# Patient Record
Sex: FEMALE | Race: WHITE | NOT HISPANIC OR LATINO | Employment: FULL TIME | URBAN - METROPOLITAN AREA
[De-identification: names, ages, dates, MRNs, and addresses within clinical notes are randomized per-mention and may not be internally consistent; named-entity substitution may affect disease eponyms.]

---

## 2017-05-23 ENCOUNTER — HOSPITAL ENCOUNTER (EMERGENCY)
Facility: HOSPITAL | Age: 52
Discharge: HOME | End: 2017-05-24
Attending: EMERGENCY MEDICINE
Payer: COMMERCIAL

## 2017-05-23 DIAGNOSIS — F41.0 ANXIETY ATTACK: Primary | ICD-10-CM

## 2017-05-23 PROCEDURE — 63600175 PHARM REV CODE 636 W HCPCS: Performed by: EMERGENCY MEDICINE

## 2017-05-23 PROCEDURE — 96372 THER/PROPH/DIAG INJ SC/IM: CPT

## 2017-05-23 PROCEDURE — 25000003 PHARM REV CODE 250: Performed by: EMERGENCY MEDICINE

## 2017-05-23 PROCEDURE — 99283 EMERGENCY DEPT VISIT LOW MDM: CPT | Mod: 25

## 2017-05-23 RX ORDER — ESTRADIOL 0.1 MG/D
1 PATCH TRANSDERMAL
COMMUNITY
End: 2019-10-30

## 2017-05-23 RX ORDER — DEXAMETHASONE SODIUM PHOSPHATE 4 MG/ML
8 INJECTION, SOLUTION INTRA-ARTICULAR; INTRALESIONAL; INTRAMUSCULAR; INTRAVENOUS; SOFT TISSUE
Status: COMPLETED | OUTPATIENT
Start: 2017-05-23 | End: 2017-05-23

## 2017-05-23 RX ORDER — BUTALBITAL, ACETAMINOPHEN AND CAFFEINE 50; 325; 40 MG/1; MG/1; MG/1
2 TABLET ORAL
Status: COMPLETED | OUTPATIENT
Start: 2017-05-23 | End: 2017-05-23

## 2017-05-23 RX ADMIN — BUTALBITAL, ACETAMINOPHEN, AND CAFFEINE 2 TABLET: 50; 325; 40 TABLET ORAL at 11:05

## 2017-05-23 RX ADMIN — DEXAMETHASONE SODIUM PHOSPHATE 8 MG: 4 INJECTION, SOLUTION INTRAMUSCULAR; INTRAVENOUS at 11:05

## 2017-05-24 VITALS
SYSTOLIC BLOOD PRESSURE: 129 MMHG | HEART RATE: 91 BPM | RESPIRATION RATE: 18 BRPM | BODY MASS INDEX: 28.32 KG/M2 | HEIGHT: 61 IN | DIASTOLIC BLOOD PRESSURE: 98 MMHG | OXYGEN SATURATION: 98 % | WEIGHT: 150 LBS | TEMPERATURE: 97 F

## 2017-05-24 PROCEDURE — 25000003 PHARM REV CODE 250: Performed by: EMERGENCY MEDICINE

## 2017-05-24 RX ORDER — PREGABALIN 100 MG/1
100 CAPSULE ORAL 2 TIMES DAILY
Qty: 60 CAPSULE | Refills: 6 | Status: SHIPPED | OUTPATIENT
Start: 2017-05-24 | End: 2017-05-24 | Stop reason: DRUGHIGH

## 2017-05-24 RX ORDER — ALPRAZOLAM 0.25 MG/1
0.5 TABLET ORAL
Status: COMPLETED | OUTPATIENT
Start: 2017-05-24 | End: 2017-05-24

## 2017-05-24 RX ORDER — PREGABALIN 150 MG/1
150 CAPSULE ORAL 2 TIMES DAILY
Qty: 60 CAPSULE | Refills: 6 | Status: SHIPPED | OUTPATIENT
Start: 2017-05-24 | End: 2019-06-07

## 2017-05-24 RX ADMIN — ALPRAZOLAM 0.5 MG: 0.25 TABLET ORAL at 12:05

## 2017-05-24 NOTE — ED PROVIDER NOTES
Encounter Date: 2017       History     Chief Complaint   Patient presents with    Weakness     Pt c/o generalized pain to total body ans restless. Pt has a hx of lupus and fibromyalgia     Review of patient's allergies indicates:   Allergen Reactions    Erythromycin Other (See Comments)    Other      Other reaction(s): Angioedema, carrots    Zofran (as hydrochloride) [ondansetron hcl] Hives     Local hive after IV injection     The history is provided by the patient.   Mental Health Problem   The primary symptoms include dysphoric mood. The current episode started today. This is a recurrent problem.   The degree of incapacity that she is experiencing as a consequence of her illness is mild. Additional symptoms of the illness include insomnia. Additional symptoms of the illness do not include no anhedonia, no hypersomnia, no appetite change, no unexpected weight change, no feelings of worthlessness or no increased goal-directed activity. She does not admit to suicidal ideas. She does not have a plan to commit suicide. She does not contemplate harming herself. She has not already injured self. She does not contemplate injuring another person. She has not already  injured another person.     Past Medical History:   Diagnosis Date    Anxiety     Asthma     Chronic constipation     Chronic insomnia     Edema     Fibromyalgia     Interstitial cystitis     Migraine headache     Restless legs syndrome     TIA (transient ischemic attack)      Past Surgical History:   Procedure Laterality Date    BLADDER SURGERY       SECTION      CHOLECYSTECTOMY      gastric sleeve      HYSTERECTOMY      LASER ABLATION      to back    TUBAL LIGATION       Family History   Problem Relation Age of Onset    Hypertension Mother     Hypertension Brother     Breast cancer Maternal Aunt      x2      Social History   Substance Use Topics    Smoking status: Never Smoker    Smokeless tobacco: Not on file     Alcohol use Yes      Comment: occasionally     Review of Systems   Constitutional: Negative for appetite change and unexpected weight change.   Psychiatric/Behavioral: Positive for dysphoric mood. The patient has insomnia.    All other systems reviewed and are negative.      Physical Exam     Initial Vitals   BP Pulse Resp Temp SpO2   -- -- -- -- --     Physical Exam    Nursing note and vitals reviewed.  Constitutional: She appears well-developed and well-nourished.   HENT:   Head: Normocephalic and atraumatic.   Eyes: EOM are normal.   Neck: Normal range of motion. Neck supple.   Cardiovascular: Normal rate, regular rhythm, normal heart sounds and intact distal pulses.   Pulmonary/Chest: Breath sounds normal.   Abdominal: Soft.   Musculoskeletal: Normal range of motion.   Neurological: She is alert and oriented to person, place, and time.   Skin: Skin is warm and dry. Capillary refill takes less than 2 seconds.   Psychiatric: She has a normal mood and affect. Her behavior is normal. Judgment and thought content normal.         ED Course   Procedures  Labs Reviewed - No data to display          Medical Decision Making:   ED Management:  Patient was given Decadron IM.  Sent for headache and Xanax for anxiety.  Patient is requesting Dilaudid and says that she takes for her pain.  Explained to her that that is not the treatment for fibromyalgia and I will treat her according to evidence based medicine.                   ED Course     Clinical Impression:   The encounter diagnosis was Anxiety attack.    Disposition:   Disposition: Discharged  Condition: Stable       Isis Garza MD  05/24/17 0037

## 2017-06-24 ENCOUNTER — HOSPITAL ENCOUNTER (EMERGENCY)
Facility: HOSPITAL | Age: 52
Discharge: HOME | End: 2017-06-24
Attending: EMERGENCY MEDICINE
Payer: COMMERCIAL

## 2017-06-24 VITALS
SYSTOLIC BLOOD PRESSURE: 139 MMHG | OXYGEN SATURATION: 98 % | RESPIRATION RATE: 16 BRPM | TEMPERATURE: 98 F | HEART RATE: 75 BPM | DIASTOLIC BLOOD PRESSURE: 82 MMHG | WEIGHT: 145 LBS | HEIGHT: 62 IN | BODY MASS INDEX: 26.68 KG/M2

## 2017-06-24 DIAGNOSIS — R51.9 ACUTE NONINTRACTABLE HEADACHE, UNSPECIFIED HEADACHE TYPE: Primary | ICD-10-CM

## 2017-06-24 LAB
ALBUMIN SERPL BCP-MCNC: 3.8 G/DL
ALP SERPL-CCNC: 79 U/L
ALT SERPL W/O P-5'-P-CCNC: 27 U/L
ANION GAP SERPL CALC-SCNC: 12 MMOL/L
AST SERPL-CCNC: 19 U/L
BASOPHILS # BLD AUTO: 0.04 K/UL
BASOPHILS NFR BLD: 0.4 %
BILIRUB SERPL-MCNC: 0.4 MG/DL
BUN SERPL-MCNC: 10 MG/DL
CALCIUM SERPL-MCNC: 8.9 MG/DL
CHLORIDE SERPL-SCNC: 109 MMOL/L
CO2 SERPL-SCNC: 24 MMOL/L
CREAT SERPL-MCNC: 0.65 MG/DL
DIFFERENTIAL METHOD: NORMAL
EOSINOPHIL # BLD AUTO: 0 K/UL
EOSINOPHIL NFR BLD: 0.2 %
ERYTHROCYTE [DISTWIDTH] IN BLOOD BY AUTOMATED COUNT: 13.9 %
EST. GFR  (AFRICAN AMERICAN): >60 ML/MIN/1.73 M^2
EST. GFR  (NON AFRICAN AMERICAN): >60 ML/MIN/1.73 M^2
GLUCOSE SERPL-MCNC: 95 MG/DL
HCT VFR BLD AUTO: 39.3 %
HGB BLD-MCNC: 13.2 G/DL
LYMPHOCYTES # BLD AUTO: 1.9 K/UL
LYMPHOCYTES NFR BLD: 21.3 %
MCH RBC QN AUTO: 28.4 PG
MCHC RBC AUTO-ENTMCNC: 33.6 %
MCV RBC AUTO: 85 FL
MONOCYTES # BLD AUTO: 0.6 K/UL
MONOCYTES NFR BLD: 7 %
NEUTROPHILS # BLD AUTO: 6.4 K/UL
NEUTROPHILS NFR BLD: 70.9 %
PLATELET # BLD AUTO: 245 K/UL
PMV BLD AUTO: 10.2 FL
POTASSIUM SERPL-SCNC: 4 MMOL/L
PROT SERPL-MCNC: 7.2 G/DL
RBC # BLD AUTO: 4.65 M/UL
SODIUM SERPL-SCNC: 145 MMOL/L
WBC # BLD AUTO: 9.08 K/UL

## 2017-06-24 PROCEDURE — 63600175 PHARM REV CODE 636 W HCPCS: Performed by: EMERGENCY MEDICINE

## 2017-06-24 PROCEDURE — 85025 COMPLETE CBC W/AUTO DIFF WBC: CPT

## 2017-06-24 PROCEDURE — 99283 EMERGENCY DEPT VISIT LOW MDM: CPT | Mod: 25

## 2017-06-24 PROCEDURE — 80053 COMPREHEN METABOLIC PANEL: CPT

## 2017-06-24 PROCEDURE — 96374 THER/PROPH/DIAG INJ IV PUSH: CPT

## 2017-06-24 RX ORDER — METHYLPREDNISOLONE SOD SUCC 125 MG
250 VIAL (EA) INJECTION
Status: COMPLETED | OUTPATIENT
Start: 2017-06-24 | End: 2017-06-24

## 2017-06-24 RX ORDER — PREDNISONE 20 MG/1
40 TABLET ORAL DAILY
Qty: 10 TABLET | Refills: 0 | Status: SHIPPED | OUTPATIENT
Start: 2017-06-24 | End: 2017-06-29

## 2017-06-24 RX ORDER — PREDNISONE 20 MG/1
40 TABLET ORAL DAILY
Qty: 10 TABLET | Refills: 0 | Status: SHIPPED | OUTPATIENT
Start: 2017-06-24 | End: 2017-06-24

## 2017-06-24 RX ADMIN — METHYLPREDNISOLONE SODIUM SUCCINATE 250 MG: 125 INJECTION, POWDER, FOR SOLUTION INTRAMUSCULAR; INTRAVENOUS at 01:06

## 2017-06-24 NOTE — ED PROVIDER NOTES
"Encounter Date: 2017       History     Chief Complaint   Patient presents with    Lupus     "I think I'm going into a lupus crisis."  Patient c/o severe headache and pain "all over but especially my hands are cramping up" x3 days.  C/O joint swelling to hands/knees, low grade fever at home to 100.0     The history is provided by the patient.   Headache    This is a recurrent problem. The current episode started in the past 7 days. The problem occurs intermittently. The problem has been unchanged. The pain is located in the bilateral, frontal, parietal and occipital region. The pain does not radiate. The pain quality is not similar to prior headaches. The quality of the pain is described as throbbing. The pain is at a severity of 8/10. Pertinent negatives include no abdominal pain, abnormal behavior, back pain, blurred vision, coughing, dizziness, drainage, hearing loss, muscle aches, nausea, neck pain, rhinorrhea, scalp tenderness, seizures or sinus pressure. Nothing aggravates the symptoms. She has tried oral narcotics for the symptoms. The treatment provided mild relief.     Review of patient's allergies indicates:   Allergen Reactions    Erythromycin Other (See Comments)    Other      Other reaction(s): Angioedema, carrots    Zofran (as hydrochloride) [ondansetron hcl] Hives     Local hive after IV injection     Past Medical History:   Diagnosis Date    Anxiety     Asthma     Chronic constipation     Chronic insomnia     Edema     Fibromyalgia     Interstitial cystitis     Migraine headache     Restless legs syndrome     TIA (transient ischemic attack)      Past Surgical History:   Procedure Laterality Date    BLADDER SURGERY       SECTION      CHOLECYSTECTOMY      gastric sleeve      HYSTERECTOMY      LASER ABLATION      to back    TUBAL LIGATION       Family History   Problem Relation Age of Onset    Hypertension Mother     Hypertension Brother     Breast cancer Maternal " Aunt      x2      Social History   Substance Use Topics    Smoking status: Never Smoker    Smokeless tobacco: Not on file    Alcohol use Yes      Comment: occasionally     Review of Systems   HENT: Negative for hearing loss, rhinorrhea and sinus pressure.    Eyes: Negative for blurred vision.   Respiratory: Negative for cough.    Gastrointestinal: Negative for abdominal pain and nausea.   Musculoskeletal: Negative for back pain and neck pain.   Neurological: Positive for headaches. Negative for dizziness and seizures.   All other systems reviewed and are negative.      Physical Exam     Initial Vitals   BP Pulse Resp Temp SpO2   06/24/17 0044 06/24/17 0037 06/24/17 0037 06/24/17 0044 06/24/17 0037   136/83 (!) 114 20 97.3 °F (36.3 °C) 96 %      MAP       06/24/17 0044       100.67         Physical Exam    Nursing note and vitals reviewed.  Constitutional: She appears well-developed and well-nourished.   HENT:   Head: Normocephalic and atraumatic.   Eyes: EOM are normal.   Neck: Normal range of motion. Neck supple.   Cardiovascular: Normal rate, regular rhythm, normal heart sounds and intact distal pulses.   Pulmonary/Chest: Breath sounds normal.   Abdominal: Soft.   Musculoskeletal: Normal range of motion.   Neurological: She is alert and oriented to person, place, and time.   Skin: Skin is warm and dry. Capillary refill takes less than 2 seconds.   Psychiatric: She has a normal mood and affect. Her behavior is normal. Judgment and thought content normal.         ED Course   Procedures  Labs Reviewed   CBC W/ AUTO DIFFERENTIAL   COMPREHENSIVE METABOLIC PANEL             Medical Decision Making:   Clinical Tests:   Lab Tests: Ordered and Reviewed                   ED Course     Clinical Impression:   The encounter diagnosis was Acute nonintractable headache, unspecified headache type.    Disposition:   Disposition: Discharged  Condition: Stable                        Isis Garza MD  06/24/17 4542

## 2018-09-26 ENCOUNTER — HOSPITAL ENCOUNTER (OUTPATIENT)
Facility: HOSPITAL | Age: 53
LOS: 1 days | Discharge: HOME | End: 2018-09-27
Attending: EMERGENCY MEDICINE | Admitting: HOSPITALIST
Payer: COMMERCIAL

## 2018-09-26 DIAGNOSIS — R79.89 ELEVATED D-DIMER: ICD-10-CM

## 2018-09-26 DIAGNOSIS — R07.89 ATYPICAL CHEST PAIN: ICD-10-CM

## 2018-09-26 DIAGNOSIS — R52 INTRACTABLE PAIN: Primary | ICD-10-CM

## 2018-09-26 DIAGNOSIS — M79.7 FIBROMYALGIA: ICD-10-CM

## 2018-09-26 DIAGNOSIS — G89.29 OTHER CHRONIC PAIN: ICD-10-CM

## 2018-09-26 DIAGNOSIS — M45.9 ANKYLOSING SPONDYLITIS, UNSPECIFIED SITE OF SPINE: ICD-10-CM

## 2018-09-26 DIAGNOSIS — N64.4 BREAST PAIN: ICD-10-CM

## 2018-09-26 DIAGNOSIS — R07.9 CHEST PAIN: ICD-10-CM

## 2018-09-26 PROCEDURE — 96375 TX/PRO/DX INJ NEW DRUG ADDON: CPT

## 2018-09-26 PROCEDURE — 99285 EMERGENCY DEPT VISIT HI MDM: CPT | Mod: 25

## 2018-09-26 PROCEDURE — 96372 THER/PROPH/DIAG INJ SC/IM: CPT

## 2018-09-26 PROCEDURE — 96374 THER/PROPH/DIAG INJ IV PUSH: CPT

## 2018-09-26 PROCEDURE — 96376 TX/PRO/DX INJ SAME DRUG ADON: CPT

## 2018-09-26 RX ORDER — HYDROMORPHONE HYDROCHLORIDE 1 MG/ML
1 INJECTION, SOLUTION INTRAMUSCULAR; INTRAVENOUS; SUBCUTANEOUS
Status: COMPLETED | OUTPATIENT
Start: 2018-09-27 | End: 2018-09-27

## 2018-09-27 VITALS
WEIGHT: 140 LBS | RESPIRATION RATE: 24 BRPM | TEMPERATURE: 99 F | SYSTOLIC BLOOD PRESSURE: 154 MMHG | HEIGHT: 62 IN | OXYGEN SATURATION: 99 % | DIASTOLIC BLOOD PRESSURE: 82 MMHG | BODY MASS INDEX: 25.76 KG/M2 | HEART RATE: 64 BPM

## 2018-09-27 PROBLEM — G89.18 POSTOPERATIVE PAIN: Status: ACTIVE | Noted: 2018-09-27

## 2018-09-27 PROBLEM — R79.89 ELEVATED D-DIMER: Status: ACTIVE | Noted: 2018-09-27

## 2018-09-27 PROBLEM — F11.90 CHRONIC, CONTINUOUS USE OF OPIOIDS: Chronic | Status: ACTIVE | Noted: 2018-09-27

## 2018-09-27 PROBLEM — R52 INTRACTABLE PAIN: Status: ACTIVE | Noted: 2018-09-27

## 2018-09-27 PROBLEM — Z76.5 DRUG-SEEKING BEHAVIOR: Status: ACTIVE | Noted: 2018-09-27

## 2018-09-27 PROBLEM — M79.7 FIBROMYALGIA: Chronic | Status: ACTIVE | Noted: 2018-09-27

## 2018-09-27 PROBLEM — Z90.3 HISTORY OF SLEEVE GASTRECTOMY: Chronic | Status: ACTIVE | Noted: 2018-09-27

## 2018-09-27 LAB
ANION GAP SERPL CALC-SCNC: 10 MMOL/L
BUN SERPL-MCNC: 11 MG/DL
CALCIUM SERPL-MCNC: 9.2 MG/DL
CHLORIDE SERPL-SCNC: 104 MMOL/L
CO2 SERPL-SCNC: 26 MMOL/L
CREAT SERPL-MCNC: 0.6 MG/DL
D DIMER PPP IA.FEU-MCNC: 0.73 MG/L FEU
EST. GFR  (AFRICAN AMERICAN): >60 ML/MIN/1.73 M^2
EST. GFR  (NON AFRICAN AMERICAN): >60 ML/MIN/1.73 M^2
GLUCOSE SERPL-MCNC: 75 MG/DL
POTASSIUM SERPL-SCNC: 3.3 MMOL/L
SODIUM SERPL-SCNC: 140 MMOL/L
TROPONIN I SERPL DL<=0.01 NG/ML-MCNC: 0.01 NG/ML

## 2018-09-27 PROCEDURE — 93010 ELECTROCARDIOGRAM REPORT: CPT | Mod: ,,, | Performed by: INTERNAL MEDICINE

## 2018-09-27 PROCEDURE — 85379 FIBRIN DEGRADATION QUANT: CPT

## 2018-09-27 PROCEDURE — G0378 HOSPITAL OBSERVATION PER HR: HCPCS

## 2018-09-27 PROCEDURE — 84484 ASSAY OF TROPONIN QUANT: CPT

## 2018-09-27 PROCEDURE — 80048 BASIC METABOLIC PNL TOTAL CA: CPT

## 2018-09-27 PROCEDURE — 93005 ELECTROCARDIOGRAM TRACING: CPT

## 2018-09-27 PROCEDURE — 25500020 PHARM REV CODE 255: Performed by: EMERGENCY MEDICINE

## 2018-09-27 PROCEDURE — 63600175 PHARM REV CODE 636 W HCPCS: Performed by: EMERGENCY MEDICINE

## 2018-09-27 RX ORDER — ZOLPIDEM TARTRATE 10 MG/1
10 TABLET ORAL NIGHTLY PRN
COMMUNITY
End: 2019-06-07 | Stop reason: ALTCHOICE

## 2018-09-27 RX ORDER — DIPHENHYDRAMINE HYDROCHLORIDE 50 MG/ML
25 INJECTION INTRAMUSCULAR; INTRAVENOUS
Status: COMPLETED | OUTPATIENT
Start: 2018-09-27 | End: 2018-09-27

## 2018-09-27 RX ORDER — MORPHINE SULFATE 2 MG/ML
5 INJECTION, SOLUTION INTRAMUSCULAR; INTRAVENOUS
Status: COMPLETED | OUTPATIENT
Start: 2018-09-27 | End: 2018-09-27

## 2018-09-27 RX ORDER — IBUPROFEN 400 MG/1
800 TABLET ORAL EVERY 8 HOURS PRN
Status: CANCELLED | OUTPATIENT
Start: 2018-09-27

## 2018-09-27 RX ORDER — ENOXAPARIN SODIUM 100 MG/ML
1 INJECTION SUBCUTANEOUS
Status: CANCELLED | OUTPATIENT
Start: 2018-09-27

## 2018-09-27 RX ORDER — FAMOTIDINE 20 MG/1
20 TABLET, FILM COATED ORAL 2 TIMES DAILY
Status: CANCELLED | OUTPATIENT
Start: 2018-09-27

## 2018-09-27 RX ORDER — HYDROMORPHONE HYDROCHLORIDE 1 MG/ML
1 INJECTION, SOLUTION INTRAMUSCULAR; INTRAVENOUS; SUBCUTANEOUS
Status: COMPLETED | OUTPATIENT
Start: 2018-09-27 | End: 2018-09-27

## 2018-09-27 RX ORDER — IBUPROFEN 400 MG/1
400 TABLET ORAL EVERY 6 HOURS PRN
Status: CANCELLED | OUTPATIENT
Start: 2018-09-27

## 2018-09-27 RX ORDER — HYDROMORPHONE HYDROCHLORIDE 2 MG/1
2 TABLET ORAL EVERY 8 HOURS PRN
COMMUNITY
End: 2019-07-29 | Stop reason: SDUPTHER

## 2018-09-27 RX ORDER — HYDROCODONE BITARTRATE AND ACETAMINOPHEN 10; 325 MG/1; MG/1
1 TABLET ORAL EVERY 8 HOURS PRN
COMMUNITY
End: 2019-07-29

## 2018-09-27 RX ORDER — KETOROLAC TROMETHAMINE 30 MG/ML
30 INJECTION, SOLUTION INTRAMUSCULAR; INTRAVENOUS
Status: COMPLETED | OUTPATIENT
Start: 2018-09-27 | End: 2018-09-27

## 2018-09-27 RX ORDER — ACETAMINOPHEN 325 MG/1
650 TABLET ORAL EVERY 6 HOURS PRN
Status: CANCELLED | OUTPATIENT
Start: 2018-09-27

## 2018-09-27 RX ORDER — SODIUM CHLORIDE 0.9 % (FLUSH) 0.9 %
5 SYRINGE (ML) INJECTION
Status: CANCELLED | OUTPATIENT
Start: 2018-09-27

## 2018-09-27 RX ADMIN — MORPHINE SULFATE 5 MG: 2 INJECTION, SOLUTION INTRAMUSCULAR; INTRAVENOUS at 02:09

## 2018-09-27 RX ADMIN — LORAZEPAM 1 MG: 2 INJECTION INTRAMUSCULAR; INTRAVENOUS at 05:09

## 2018-09-27 RX ADMIN — HYDROMORPHONE HYDROCHLORIDE 1 MG: 1 INJECTION, SOLUTION INTRAMUSCULAR; INTRAVENOUS; SUBCUTANEOUS at 06:09

## 2018-09-27 RX ADMIN — LORAZEPAM 1 MG: 2 INJECTION INTRAMUSCULAR; INTRAVENOUS at 01:09

## 2018-09-27 RX ADMIN — IOHEXOL 100 ML: 350 INJECTION, SOLUTION INTRAVENOUS at 08:09

## 2018-09-27 RX ADMIN — HYDROMORPHONE HYDROCHLORIDE 1 MG: 1 INJECTION, SOLUTION INTRAMUSCULAR; INTRAVENOUS; SUBCUTANEOUS at 12:09

## 2018-09-27 RX ADMIN — KETOROLAC TROMETHAMINE 30 MG: 30 INJECTION, SOLUTION INTRAMUSCULAR at 01:09

## 2018-09-27 RX ADMIN — DIPHENHYDRAMINE HYDROCHLORIDE 25 MG: 50 INJECTION, SOLUTION INTRAMUSCULAR; INTRAVENOUS at 03:09

## 2018-09-27 NOTE — PLAN OF CARE
[facility]    Tosha Kwok is a 53 y.o. 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  She lives in [place], [place].  She is .  Her primary care physician is Dr. Cecille Oliveira in [place].  Her rheumatologist is Dr. Gustavo Carson.  Her pain management specialist is Dr. Storm Rae.   She had been having breast pain after bilateral breast reduction surgery.  The surgeon was out of town so she went to [facility] on 9/26/18.  She takes hydrocodone-acetaminophen  mg (90 tablets filled on 9/2/18).  She also used to take hydromorphone 2 mg (90 tablets filled on 7/24/18) and fentanyl 50 mcg/hr patch (10 patches filled on 7/24/18).  She was prescribed 20 tablets of tramadol 50 mg and 9 tablets of diazepam 5 mg by an ED physician at [facility] on 9/2/18.     In [facility] ED, she was given hydromorphone 1 mg x 2, morphine 5 mg, lorazepam 1 mg IV x 2, and diphenhydramine 25 mg despite opioid tolerance and drug-seeking behavior.  Chest X-ray was unremarkable.  Due to proximity of pain to her chest, a D-dimer was checked and was mildly elevated at 0.73.  No other labs were checked.  An EKG was not checked.  A chest CTA was ordered but peripheral IV was unable to be placed in the ED.  A creatinine was not checked, which is necessary to know prior to getting a CTA.  She was admitted to [facility] the next morning for IV placement and chest CTA.    Plan: Consult Anesthesiology for peripheral IV placement.  Check BMP then get chest CTA.  Empiric enoxaparin for now.  Also check EKG and troponin to complete chest pain workup.  Ibuprofen PRN for pain.  If everything negative, can get discharged from the ED.

## 2018-09-27 NOTE — ED NOTES
Pt. Updated on the plan of care that a CT scan of the chest has been ordered and that she needs to have a larger IV for the contrast. IV attempt to left forearm was unsuccessesful. Pt. C/o that her pain hasn't been unrelieved and that she now has arm pain from being stuck multiple times. Pt. Wishes to speak to Dr. Rodriguez at this time.

## 2018-09-27 NOTE — ED PROVIDER NOTES
Encounter Date: 9/26/2018    SCRIBE #1 NOTE: I, Bhaskar De La Cruz, am scribing for, and in the presence of,  Dr. Rodriguez. I have scribed the entire note.       History     Chief Complaint   Patient presents with    Breast Pain     To ER with c/o bilateral breast pain.  pt states that she had breast reduction in february and has been having pain x 1 month increasing in pain daily.   states that she is scheduled for surgery soon for pain issues with Dr. Byrd     This is a 53 y.o. female who has a past medical history of Anxiety, Asthma, Chronic constipation, Chronic insomnia, Edema, Fibromyalgia, Interstitial cystitis, Migraine headache, Restless legs syndrome, and TIA who presents with chief complaint of bilateral breast pain and swelling x 1 month. She had a breast reduction and lump removal in February, and notes her pain has been gradually worsening since onset. Patient denies any cardiac-type CP, SOB, breast discharge, rash/skin changes, or any other concerning symptoms. She has been taking Hydrocodone and her Fentanyl patch at home for pain. The patient was receiving Cortisone injections to relieve her symptoms, but states she is scheduled to see Dr. Byrd to discuss the possibility a surgery for pain control with Dr. Byrd on Monday (10/1). She also states that Dr. Byrd plans to begin radiation following the surgery.      The history is provided by the patient and the spouse.     Review of patient's allergies indicates:   Allergen Reactions    Erythromycin Other (See Comments)    Other      Other reaction(s): Angioedema, carrots    Zofran (as hydrochloride) [ondansetron hcl] Hives     Local hive after IV injection     Past Medical History:   Diagnosis Date    Ankylosing spondylitis     Anxiety     Asthma     Chronic constipation     Chronic insomnia     Edema     Fibromyalgia     Interstitial cystitis     Migraine headache     Restless legs syndrome     TIA (transient ischemic attack)      Past  Surgical History:   Procedure Laterality Date    BLADDER SURGERY       SECTION      CHOLECYSTECTOMY      gastric sleeve      HYSTERECTOMY      LASER ABLATION      to back    TUBAL LIGATION       Family History   Problem Relation Age of Onset    Hypertension Mother     Hypertension Brother     Breast cancer Maternal Aunt         x2      Social History     Tobacco Use    Smoking status: Never Smoker   Substance Use Topics    Alcohol use: Yes     Comment: occasionally    Drug use: No     Review of Systems   Constitutional: Negative for chills and fever.   HENT: Negative for facial swelling and trouble swallowing.    Eyes: Negative for redness.   Respiratory: Negative for shortness of breath.    Cardiovascular: Negative for chest pain.   Gastrointestinal: Negative for abdominal pain, diarrhea and vomiting.   Genitourinary: Negative for dysuria and hematuria.   Musculoskeletal: Negative for gait problem.   Skin: Negative for rash.        Bilateral breast pain   Neurological: Negative for facial asymmetry and speech difficulty.     Physical Exam     Initial Vitals [18 2321]   BP Pulse Resp Temp SpO2   (!) 177/79 69 18 98.6 °F (37 °C) 100 %      MAP       --         Physical Exam    Nursing note and vitals reviewed.  Constitutional: She appears well-developed and well-nourished. She is not diaphoretic. No distress.   HENT:   Head: Normocephalic and atraumatic.   Eyes: Conjunctivae and EOM are normal.   Neck: Normal range of motion. Neck supple.   Cardiovascular: Normal rate, regular rhythm and normal heart sounds.   Pulmonary/Chest: Breath sounds normal. No respiratory distress.   Well-healed surgical incisions to both breasts, tender to touch  No abnormal skin changes, no nipple discharge, no erythema, no signs of infection, no palpable masses   Abdominal: Soft. There is no tenderness.   Musculoskeletal: Normal range of motion. She exhibits no edema or tenderness.   Neurological: She is alert  and oriented to person, place, and time. She has normal strength.   Skin: Skin is warm and dry. Capillary refill takes less than 2 seconds.       ED Course   Procedures  Labs Reviewed   D DIMER, QUANTITATIVE - Abnormal; Notable for the following components:       Result Value    D-Dimer 0.73 (*)     All other components within normal limits          X-Rays:   Independently Interpreted Readings:   Other Readings:  Reviewed by myself, read by radiology.    Imaging Results          X-Ray Chest PA And Lateral (Final result)  Result time 09/27/18 00:57:11    Final result by Saji Barrios MD (09/27/18 00:57:11)                 Impression:      No acute cardiopulmonary process identified.      Electronically signed by: Saji Barrios MD  Date:    09/27/2018  Time:    00:57             Narrative:    EXAMINATION:  XR CHEST PA AND LATERAL    CLINICAL HISTORY:  Mastodynia    TECHNIQUE:  PA and lateral views of the chest were performed.    COMPARISON:  None    FINDINGS:  Cardiac silhouette is normal in size.  Lungs are symmetrically expanded.  No evidence of focal consolidative process, pneumothorax, or significant effusion.  No acute osseous abnormality identified.                              Medical Decision Making:   Clinical Tests:   Radiological Study: Ordered and Reviewed  ED Management:  - Pt presents with worsening bilateral breast pain s/p surgical procedures in February 2018. Pt administered Dilaudid, Morphine, Ketorolac, and IV ativan without improvement in pain. CXR unremarkable for acute process. D -dimer elevated. Unable to get IV access to facilitate CT PE study. Will admit to [facility] for intractable pain and likely CT PE vs V/Q scan.   - Spoke with Dr. Odonnell, [facility], about patient's presentation. Plan to admit for intractable breast pain. Discussed elevated D-dimer and need for CTA or VQ scan to rule out PE. He is in agreement with admission.                      Clinical  Impression:     1. Intractable pain    2. Breast pain    3. Ankylosing spondylitis, unspecified site of spine    4. Elevated d-dimer      Disposition:   Disposition: Admitted       I, Pavan Rodriguez,  personally performed the services described in this documentation. All medical record entries made by the scribe were at my direction and in my presence.  I have reviewed the chart and agree that the record reflects my personal performance and is accurate and complete. Pavan Rodriguez M.D. 5:58 AM09/27/2018     Pavan Rodriguez MD  09/27/18 0558

## 2018-09-27 NOTE — ASSESSMENT & PLAN NOTE
Pt with reported chest pain. D-dimer checked only, 0.73. Pt has multiple inflammatory conditions.  -CTA negative  -BMP showing acceptable Cr 0.6  -EKG showing only sinus bradycardia  -Troponin is 0.006    D dimer was likely elevated 2/2 patient's multiple inflammatory conditions, including ankylosing spondylitis and fibromyalgia.

## 2018-09-27 NOTE — ED NOTES
Spoke with Dr. Ann regarding admission status - states will await lab and CT results before disposition determined. Discussed plan at length with patient and spouse, verbalizes understanding.

## 2018-09-27 NOTE — ED NOTES
"PT AGREES TO ALLOW ME TO DRAW D-DIMER FROM LEFT AC SITE WITH DR. CUEVAS AT BEDSIDE PER REQUEST OF DR. CUEVAS. PT STATES "OKAY, BUT Y'ALL OWE ME AN APOLOGY WHEN THIS DOESN'T WORK."  "

## 2018-09-27 NOTE — ASSESSMENT & PLAN NOTE
Drug-seeking behavior  Postoperative pain  History of sleeve gastrectomy  Fibromyalgia  Chronic, continuous use of opioids  Ankylosing spondylitis  Continue home pain management regimen. Follow up with rheumatology and surgeon.

## 2018-09-27 NOTE — HPI
Tosha Kwok is a 53 y.o. 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  She lives in [place], [place].  She is .  Her primary care physician is Dr. Cecille Oliveira in [place].  Her rheumatologist is Dr. Gustavo Carson.  Her pain management specialist is Dr. Storm Rae.              She had been having breast pain after bilateral breast reduction surgery.  The surgeon was out of town so she went to [facility] on 9/26/18.  She takes hydrocodone-acetaminophen  mg (90 tablets filled on 9/2/18).  She also used to take hydromorphone 2 mg (90 tablets filled on 7/24/18) and fentanyl 50 mcg/hr patch (10 patches filled on 7/24/18).  She was prescribed 20 tablets of tramadol 50 mg and 9 tablets of diazepam 5 mg by an ED physician at [facility] on 9/2/18.                In [facility] ED, she was given hydromorphone 1 mg x 2, morphine 5 mg, lorazepam 1 mg IV x 2, and diphenhydramine 25 mg despite opioid tolerance and drug-seeking behavior.  Chest X-ray was unremarkable.  Due to proximity of pain to her chest, a D-dimer was checked and was mildly elevated at 0.73.  No other labs were checked.  An EKG was not checked.  A chest CTA was ordered but peripheral IV was unable to be placed in the ED.  A creatinine was not checked, which is necessary to know prior to getting a CTA.  She was admitted to [facility] the next morning for IV placement and chest CTA.

## 2018-09-27 NOTE — ASSESSMENT & PLAN NOTE
Pt with reported chest pain. D-dimer checked only, 0.73. Pt has multiple inflammatory conditions.  -CTA  -Check BMP prior to CTA  -Check EKG  -Check troponin

## 2018-09-27 NOTE — ED NOTES
Dr. Garner aware of additional c/o pain. DARRYL Kwok ([facility]) at bedside for evaluation.

## 2018-09-27 NOTE — ED NOTES
"ATTEMPTED TO DRAW D-DIMER. PT WITH SPECIFIC REQUESTS ABOUT WHERE BLOOD CAN BE DRAWN. PT STATES "I GIVE BLOOD EVERY TWO WEEKS AND I KNOW WHERE MY VEINS BLOW." PT REQUESTING BLOOD TO BE DRAWN FROM LEFT WRIST; HOWEVER, THIS SITE SEEMS TO BE A TENDON. PT REFUSES BLOOD DRAW FROM ANY OTHER SITE. I EXPLAINED TO PT THAT I AM NOT COMFORTABLE DRAWING BLOOD FROM THAT SITE AS I DO NOT BELIEVE IT IS A VEIN. PT STATES "GOOD, THEN GET SOMEONE ELSE. I'M NOT COMFORTABLE WITH YOU DRAWING MY BLOOD EITHER." ARDEN, PRIMARY NURSE AWARE.  "

## 2018-09-27 NOTE — SUBJECTIVE & OBJECTIVE
Past Medical History:   Diagnosis Date    Ankylosing spondylitis     Anxiety     Asthma     Chronic constipation     Chronic insomnia     Edema     Fibromyalgia     Interstitial cystitis     Migraine headache     Restless legs syndrome     TIA (transient ischemic attack)        Past Surgical History:   Procedure Laterality Date    BLADDER SURGERY      BREAST SURGERY  2018    reduction     SECTION      CHOLECYSTECTOMY      HYSTERECTOMY      LASER ABLATION      to back    SLEEVE GASTROPLASTY  2016    TUBAL LIGATION         Review of patient's allergies indicates:   Allergen Reactions    Morphine Hives    Erythromycin Other (See Comments)    Other      Other reaction(s): Angioedema, carrots    Zofran (as hydrochloride) [ondansetron hcl] Hives     Local hive after IV injection       No current facility-administered medications on file prior to encounter.      Current Outpatient Medications on File Prior to Encounter   Medication Sig    HYDROcodone-acetaminophen (NORCO)  mg per tablet Take 1 tablet by mouth every 8 (eight) hours as needed for Pain.     HYDROmorphone (DILAUDID) 2 MG tablet Take 2 mg by mouth every 8 (eight) hours as needed for Pain.    zolpidem (AMBIEN) 10 mg Tab Take 10 mg by mouth nightly as needed (insomnia).    clonazepam (KLONOPIN) 0.5 MG tablet Take 0.5 mg by mouth. 1 Tablet Oral At bedtime.  BV [id number]    diclofenac sodium (VOLTAREN) 1 % Gel 1 %.  Gel Topical .  AAA bid    ergocalciferol (ERGOCALCIFEROL) 50,000 unit capsule Take by mouth. 1 Capsule Oral Weekly    estradiol (ESTRACE) 0.01 % (0.1 mg/g) vaginal cream Place 1 g vaginally twice a week.    estradiol 0.1 mg/24 hr td ptwk (ESTRADIOL TRANSDERMAL PATCH) 0.1 mg/24 hr PTWK Place 1 patch onto the skin every 7 days.    lidocaine (LIDODERM) 5 %(700 mg/patch) 1 Adhesive Patch, Medicated Topical Every 12 hours.  BV [id number]    lisinopril (PRINIVIL,ZESTRIL) 20 MG tablet Take 20 mg by mouth once daily.     meperidine (DEMEROL) 100 MG tablet Take 20 mg by mouth every 4 (four) hours as needed for Pain.    milnacipran (SAVELLA) 50 mg Tab 1 Tablet Oral Twice a day    mth-me blue-sod phos-phsal-hyo (URIBEL) 118-10-40.8-36 mg Cap     omeprazole (PRILOSEC) 20 MG capsule Take 20 mg by mouth. 1 Capsule, Delayed Release(E.C.) Oral Every day    polyethylene glycol 1000 Powd  Powder Miscellaneous .  17g po dailydispense qs 1 month    pregabalin (LYRICA) 150 MG capsule Take 1 capsule (150 mg total) by mouth 2 (two) times daily.    promethazine (PHENERGAN) 25 MG tablet Take 25 mg by mouth. 1 Tablet Oral Every 6 hours    topiramate (TOPAMAX) 50 MG tablet TAKE 1 TABLET BY MOUTH TWICE A DAY FOR MIGRAINE PREVENTION     Family History     Problem Relation (Age of Onset)    Breast cancer Maternal Aunt    Hypertension Mother, Brother        Tobacco Use    Smoking status: Never Smoker   Substance and Sexual Activity    Alcohol use: Yes     Comment: occasionally    Drug use: No    Sexual activity: Yes     Partners: Male     Review of Systems   Unable to perform ROS: Other (Patient refused)   Constitutional: Negative for chills and fever.   Respiratory: Positive for cough. Negative for shortness of breath.    Gastrointestinal: Positive for nausea and vomiting.     Objective:     Vital Signs (Most Recent):  Temp: 98.6 °F (37 °C) (09/27/18 0444)  Pulse: 64 (09/27/18 0856)  Resp: (!) 24 (09/27/18 0856)  BP: (!) 154/82 (09/27/18 0856)  SpO2: 99 % (09/27/18 0856) Vital Signs (24h Range):  Temp:  [98.6 °F (37 °C)] 98.6 °F (37 °C)  Pulse:  [63-78] 64  Resp:  [16-24] 24  SpO2:  [97 %-100 %] 99 %  BP: (136-177)/(69-82) 154/82     Weight: 63.5 kg (140 lb)  Body mass index is 25.61 kg/m².    Physical Exam   Constitutional:   Patient refused exam   Cardiovascular: Normal rate and regular rhythm.           Significant Labs: All pertinent labs within the past 24 hours have been reviewed.    Significant Imaging: I have reviewed all pertinent  imaging results/findings within the past 24 hours.

## 2018-09-27 NOTE — HOSPITAL COURSE
BMP showed Cr 0.6, so CTA was ordered. Negative for PE. No inflammation visible in the breasts. On exam/interview in the ED, pt was agitated and upset. I attempted to explain to her and her  that we would not be admitting her for pain management - that we were consulted for admission if she had a PE. Since she did not, she should follow up with her outpatient team for better pain management. Pt was still angry that she was not receiving more pain medications, and refused examination or further questioning. Pt was discharged from the ED.

## 2018-09-27 NOTE — ED NOTES
Patient complains of redness and itchiness after morphine push, Dr. Rodriguez notified. Benadryl IV ordered and administered.

## 2018-09-27 NOTE — ED NOTES
Dr. Rodriguez at the bedside speaking with pt. And spouse about the plan of care to admit pt. For pain control and for possible VQ scan today.

## 2018-09-27 NOTE — ED NOTES
Attempted blood draw x1 in left hand, unable to obtain specimen. Patient only wants blood draw from hand, unable to find a vein that I am comfortable to draw from. Called lab for lab draw.

## 2018-09-27 NOTE — DISCHARGE SUMMARY
[facility] Medicine  Discharge Summary      Patient Name: Tosha Kwok  MRN: [id number]  Admission Date: 9/26/2018  Hospital Length of Stay: 1 days  Discharge Date and Time: No discharge date for patient encounter.  Attending Physician: No att. providers found   Discharging Provider: Karen Kwok PA-C  Primary Care Provider: Cecille Oliveira MD      HPI:   Tosha Kwok is a 53 y.o. 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  She lives in [place], [place].  She is .  Her primary care physician is Dr. Cecille Oliveira in [place].  Her rheumatologist is Dr. Gustavo Carson.  Her pain management specialist is Dr. Storm Rae.              She had been having breast pain after bilateral breast reduction surgery.  The surgeon was out of town so she went to [facility] Emergency Department on 9/26/18.  She takes hydrocodone-acetaminophen  mg (90 tablets filled on 9/2/18).  She also used to take hydromorphone 2 mg (90 tablets filled on 7/24/18) and fentanyl 50 mcg/hr patch (10 patches filled on 7/24/18).  She was prescribed 20 tablets of tramadol 50 mg and 9 tablets of diazepam 5 mg by an ED physician at [facility] on 9/2/18.                In [facility] ED, she was given hydromorphone 1 mg x 2, morphine 5 mg, lorazepam 1 mg IV x 2, and diphenhydramine 25 mg despite opioid tolerance and drug-seeking behavior.  Chest X-ray was unremarkable.  Due to proximity of pain to her chest, a D-dimer was checked and was mildly elevated at 0.73.  No other labs were checked.  An EKG was not checked.  A chest CTA was ordered but peripheral IV was unable to be placed in the ED.  A creatinine was not checked, which is necessary to know prior to getting a  CTA.  She was admitted to [facility] the next morning for IV placement and chest CTA.    * No surgery found *      Hospital Course:   BMP showed Cr 0.6, so CTA was ordered. Negative for PE. No inflammation visible in the breasts. On exam/interview in the ED, pt was agitated and upset. I attempted to explain to her and her  that we would not be admitting her for pain management - that we were consulted for admission if she had a PE. Since she did not, she should follow up with her outpatient team for better pain management. Pt was still angry that she was not receiving more pain medications, and refused examination or further questioning. Pt was discharged from the ED.     Consults:   Consults (From admission, onward)        Status Ordering Provider     Inpatient consult to Anesthesiology  Once     Provider:  (Not yet assigned)    Acknowledged KALYAN GONZALEZ          * Elevated d-dimer    Pt with reported chest pain. D-dimer checked only, 0.73. Pt has multiple inflammatory conditions.  -CTA negative  -BMP showing acceptable Cr 0.6  -EKG showing only sinus bradycardia  -Troponin is 0.006    D dimer was likely elevated 2/2 patient's multiple inflammatory conditions, including ankylosing spondylitis and fibromyalgia.         Intractable pain    Drug-seeking behavior  Postoperative pain  History of sleeve gastrectomy  Fibromyalgia  Chronic, continuous use of opioids  Ankylosing spondylitis  Continue home pain management regimen. Follow up with rheumatology and surgeon.          Final Active Diagnoses:    Diagnosis Date Noted POA    PRINCIPAL PROBLEM:  Elevated d-dimer [R79.89] 09/27/2018 Yes    Chronic, continuous use of opioids [F11.90] 09/27/2018 Yes     Chronic    Fibromyalgia [M79.7] 09/27/2018 Yes     Chronic    Postoperative pain [G89.18] 09/27/2018 Yes    History of sleeve gastrectomy [Z90.3] 09/27/2018 Not Applicable     Chronic    Drug-seeking behavior [Z76.5] 09/27/2018 Yes     Intractable pain [R52] 09/27/2018 Yes    Ankylosing spondylitis [M45.9]  Yes     Chronic      Problems Resolved During this Admission:       Discharged Condition: good    Disposition: Home or Self Care    Follow Up:  Follow-up Information     Schedule an appointment as soon as possible for a visit  with Cecille Oliveira MD.    Specialty:  Internal Medicine  Contact information:  [address] [address]  [phone]                 Patient Instructions:      Diet Adult Regular     Notify your health care provider if you experience any of the following:  temperature >100.4     Notify your health care provider if you experience any of the following:  redness, tenderness, or signs of infection (pain, swelling, redness, odor or green/yellow discharge around incision site)     Notify your health care provider if you experience any of the following:  difficulty breathing or increased cough     Notify your health care provider if you experience any of the following:  increased confusion or weakness     Activity as tolerated       Significant Diagnostic Studies: Labs:   Troponin   Recent Labs   Lab  09/27/18   0620   TROPONINI  0.006     Radiology: CT scan: CTA   No evidence of acute pulmonary embolus.    Mild peribronchial wall thickening in this patient with a history of asthma.    3 mm noncalcified pulmonary nodule in the right upper lobe.  For a solid nodule <6 mm, Fleischner Society 2017 guidelines recommend no routine follow up for a low risk patient, or follow-up with non-contrast chest CT at 12 months in a high risk patient.    Postoperative changes as detailed in the body of the report.    Pending Diagnostic Studies:     None         Medications:  Reconciled Home Medications:      Medication List      CONTINUE taking these medications    ergocalciferol 50,000 unit Cap  Commonly known as:  ERGOCALCIFEROL  Take by mouth. 1 Capsule Oral Weekly     * ESTRADIOL TRANSDERMAL PATCH 0.1 mg/24 hr  Ptwk  Generic drug:  estradiol 0.1 mg/24 hr td ptwk  Place 1 patch onto the skin every 7 days.     * estradiol 0.01 % (0.1 mg/gram) vaginal cream  Commonly known as:  ESTRACE  Place 1 g vaginally twice a week.     HYDROcodone-acetaminophen  mg per tablet  Commonly known as:  NORCO  Take 1 tablet by mouth every 8 (eight) hours as needed for Pain.     HYDROmorphone 2 MG tablet  Commonly known as:  DILAUDID  Take 2 mg by mouth every 8 (eight) hours as needed for Pain.     KlonoPIN 0.5 MG tablet  Generic drug:  clonazePAM  Take 0.5 mg by mouth. 1 Tablet Oral At bedtime.  BV [id number]     LIDODERM 5 %  Generic drug:  lidocaine  1 Adhesive Patch, Medicated Topical Every 12 hours.   [id number]     lisinopril 20 MG tablet  Commonly known as:  PRINIVIL,ZESTRIL  Take 20 mg by mouth once daily.     meperidine 100 MG tablet  Commonly known as:  DEMEROL  Take 20 mg by mouth every 4 (four) hours as needed for Pain.     milnacipran 50 mg Tab  Commonly known as:  SAVELLA  1 Tablet Oral Twice a day     PHENERGAN 25 MG tablet  Generic drug:  promethazine  Take 25 mg by mouth. 1 Tablet Oral Every 6 hours     polyethylene glycol 1000(bulk) Powd  Powder Miscellaneous .  17g po dailydispense qs 1 month     pregabalin 150 MG capsule  Commonly known as:  LYRICA  Take 1 capsule (150 mg total) by mouth 2 (two) times daily.     PriLOSEC 20 MG capsule  Generic drug:  omeprazole  Take 20 mg by mouth. 1 Capsule, Delayed Release(E.C.) Oral Every day     topiramate 50 MG tablet  Commonly known as:  TOPAMAX  TAKE 1 TABLET BY MOUTH TWICE A DAY FOR MIGRAINE PREVENTION     URIBEL 118-10-40.8-36 mg Cap  Generic drug:  methen-mTessablue-s.phos-phsal-hyo     VOLTAREN 1 % Gel  Generic drug:  diclofenac sodium  1 %.  Gel Topical .  AAA bid     zolpidem 10 mg Tab  Commonly known as:  AMBIEN  Take 10 mg by mouth nightly as needed (insomnia).         * This list has 2 medication(s) that are the same as other medications prescribed for you. Read the directions  carefully, and ask your doctor or other care provider to review them with you.                Indwelling Lines/Drains at time of discharge:   Lines/Drains/Airways          None          Time spent on the discharge of patient: 45 minutes  Patient was seen and examined on the date of discharge and determined to be suitable for discharge.         Karen Kwok PA-C  Department of Hospital Medicine  [facility]

## 2018-09-27 NOTE — ED TRIAGE NOTES
Patient comes into the ER with bilateral breast pain. Patient had a breast reduction in February, reports pain started 1 month ago. She comes in today with 10/10 pain.

## 2018-12-25 ENCOUNTER — HOSPITAL ENCOUNTER (EMERGENCY)
Facility: HOSPITAL | Age: 53
Discharge: HOME | End: 2018-12-25
Attending: SURGERY
Payer: COMMERCIAL

## 2018-12-25 VITALS
SYSTOLIC BLOOD PRESSURE: 130 MMHG | RESPIRATION RATE: 20 BRPM | BODY MASS INDEX: 28.71 KG/M2 | TEMPERATURE: 98 F | WEIGHT: 156 LBS | DIASTOLIC BLOOD PRESSURE: 88 MMHG | HEIGHT: 62 IN | HEART RATE: 64 BPM | OXYGEN SATURATION: 99 %

## 2018-12-25 DIAGNOSIS — M32.9 LUPUS: ICD-10-CM

## 2018-12-25 DIAGNOSIS — G89.4 CHRONIC PAIN SYNDROME: ICD-10-CM

## 2018-12-25 DIAGNOSIS — M79.7 FIBROMYALGIA, PRIMARY: Primary | ICD-10-CM

## 2018-12-25 LAB
ALBUMIN SERPL BCP-MCNC: 3.9 G/DL
ALP SERPL-CCNC: 75 U/L
ALT SERPL W/O P-5'-P-CCNC: 34 U/L
ANION GAP SERPL CALC-SCNC: 8 MMOL/L
AST SERPL-CCNC: 30 U/L
BASOPHILS # BLD AUTO: 0.03 K/UL
BASOPHILS NFR BLD: 0.7 %
BILIRUB SERPL-MCNC: 0.4 MG/DL
BUN SERPL-MCNC: 22 MG/DL
CALCIUM SERPL-MCNC: 9.2 MG/DL
CHLORIDE SERPL-SCNC: 105 MMOL/L
CO2 SERPL-SCNC: 27 MMOL/L
CREAT SERPL-MCNC: 0.69 MG/DL
DIFFERENTIAL METHOD: ABNORMAL
EOSINOPHIL # BLD AUTO: 0.5 K/UL
EOSINOPHIL NFR BLD: 11.6 %
ERYTHROCYTE [DISTWIDTH] IN BLOOD BY AUTOMATED COUNT: 13.6 %
EST. GFR  (AFRICAN AMERICAN): >60 ML/MIN/1.73 M^2
EST. GFR  (NON AFRICAN AMERICAN): >60 ML/MIN/1.73 M^2
FLUAV AG SPEC QL IA: NEGATIVE
FLUBV AG SPEC QL IA: NEGATIVE
GLUCOSE SERPL-MCNC: 82 MG/DL
HCT VFR BLD AUTO: 35.2 %
HGB BLD-MCNC: 11.1 G/DL
LYMPHOCYTES # BLD AUTO: 2 K/UL
LYMPHOCYTES NFR BLD: 47.6 %
MCH RBC QN AUTO: 26.9 PG
MCHC RBC AUTO-ENTMCNC: 31.5 G/DL
MCV RBC AUTO: 85 FL
MONOCYTES # BLD AUTO: 0.4 K/UL
MONOCYTES NFR BLD: 8.5 %
NEUTROPHILS # BLD AUTO: 1.3 K/UL
NEUTROPHILS NFR BLD: 30.9 %
PLATELET # BLD AUTO: 270 K/UL
PMV BLD AUTO: 10.2 FL
POTASSIUM SERPL-SCNC: 4.2 MMOL/L
PROT SERPL-MCNC: 7.2 G/DL
RBC # BLD AUTO: 4.12 M/UL
SODIUM SERPL-SCNC: 140 MMOL/L
SPECIMEN SOURCE: NORMAL
WBC # BLD AUTO: 4.22 K/UL

## 2018-12-25 PROCEDURE — 99284 EMERGENCY DEPT VISIT MOD MDM: CPT | Mod: 25

## 2018-12-25 PROCEDURE — 93005 ELECTROCARDIOGRAM TRACING: CPT

## 2018-12-25 PROCEDURE — 93010 ELECTROCARDIOGRAM REPORT: CPT | Mod: ,,, | Performed by: STUDENT IN AN ORGANIZED HEALTH CARE EDUCATION/TRAINING PROGRAM

## 2018-12-25 PROCEDURE — 85025 COMPLETE CBC W/AUTO DIFF WBC: CPT

## 2018-12-25 PROCEDURE — 63600175 PHARM REV CODE 636 W HCPCS: Performed by: SURGERY

## 2018-12-25 PROCEDURE — 80053 COMPREHEN METABOLIC PANEL: CPT

## 2018-12-25 PROCEDURE — 87400 INFLUENZA A/B EACH AG IA: CPT

## 2018-12-25 PROCEDURE — 96375 TX/PRO/DX INJ NEW DRUG ADDON: CPT

## 2018-12-25 PROCEDURE — 96374 THER/PROPH/DIAG INJ IV PUSH: CPT

## 2018-12-25 PROCEDURE — 96361 HYDRATE IV INFUSION ADD-ON: CPT

## 2018-12-25 PROCEDURE — 25000003 PHARM REV CODE 250: Performed by: SURGERY

## 2018-12-25 RX ORDER — HYDROMORPHONE HYDROCHLORIDE 1 MG/ML
1 INJECTION, SOLUTION INTRAMUSCULAR; INTRAVENOUS; SUBCUTANEOUS
Status: COMPLETED | OUTPATIENT
Start: 2018-12-25 | End: 2018-12-25

## 2018-12-25 RX ADMIN — HYDROMORPHONE HYDROCHLORIDE 1 MG: 1 INJECTION, SOLUTION INTRAMUSCULAR; INTRAVENOUS; SUBCUTANEOUS at 09:12

## 2018-12-25 RX ADMIN — SODIUM CHLORIDE 1000 ML: 0.9 INJECTION, SOLUTION INTRAVENOUS at 09:12

## 2018-12-25 RX ADMIN — LORAZEPAM 1 MG: 2 INJECTION INTRAMUSCULAR; INTRAVENOUS at 09:12

## 2018-12-25 NOTE — ED PROVIDER NOTES
Encounter Date: 2018       History     Chief Complaint   Patient presents with    Generalized Body Aches     patient states that she have a hx of chronic pain      Patient as a history of chronic pain is on fentanyl and Dilaudid and has had a restless night secondary to chronic pain at multiple sites including chest wall shoulders hips and joints      The history is provided by the patient.   General Illness    The current episode started several hours ago. The problem occurs frequently. The problem has been unchanged. The pain is at a severity of 10/10. Nothing relieves the symptoms. Nothing aggravates the symptoms. Associated symptoms include neck pain. Pertinent negatives include no fever, no decreased vision, no double vision, no eye itching, no cough, no pain and no eye redness.     Review of patient's allergies indicates:   Allergen Reactions    Morphine Hives    Erythromycin Other (See Comments)    Other      Other reaction(s): Angioedema, carrots    Zofran (as hydrochloride) [ondansetron hcl] Hives     Local hive after IV injection     Past Medical History:   Diagnosis Date    Ankylosing spondylitis     Anxiety     Asthma     Chronic constipation     Chronic insomnia     Edema     Fibromyalgia     Interstitial cystitis     Migraine headache     Restless legs syndrome     TIA (transient ischemic attack)      Past Surgical History:   Procedure Laterality Date    BLADDER SURGERY      BREAST SURGERY  2018    reduction     SECTION      CHOLECYSTECTOMY      HYSTERECTOMY      LASER ABLATION      to back    SLEEVE GASTROPLASTY  2016    TUBAL LIGATION       Family History   Problem Relation Age of Onset    Hypertension Mother     Hypertension Brother     Breast cancer Maternal Aunt         x2      Social History     Tobacco Use    Smoking status: Never Smoker   Substance Use Topics    Alcohol use: Yes     Comment: occasionally    Drug use: No     Review of Systems    Constitutional: Negative.  Negative for fever.   HENT: Negative.    Eyes: Negative.  Negative for double vision, pain, redness and itching.   Respiratory: Negative.  Negative for cough.    Cardiovascular: Positive for chest pain.   Endocrine: Negative.    Genitourinary: Negative.    Musculoskeletal: Positive for back pain, myalgias and neck pain.   Skin: Negative.    Allergic/Immunologic: Negative.    Neurological: Negative.    Hematological: Negative.    Psychiatric/Behavioral: Negative.        Physical Exam     Initial Vitals [12/25/18 0907]   BP Pulse Resp Temp SpO2   130/88 76 18 98.3 °F (36.8 °C) 99 %      MAP       --         Physical Exam    Nursing note and vitals reviewed.  Constitutional: She appears well-developed and well-nourished. She is not diaphoretic. No distress.   HENT:   Head: Normocephalic.   Eyes: Conjunctivae are normal.   Neck: Normal range of motion.   Cardiovascular: Normal rate, regular rhythm and normal heart sounds.   Pulmonary/Chest: She exhibits tenderness.   Abdominal: Soft.   Musculoskeletal: Normal range of motion.        Arms:  Neurological: She is alert and oriented to person, place, and time. She has normal strength.   Skin: Skin is warm and dry. Capillary refill takes less than 2 seconds.   Psychiatric: She has a normal mood and affect.         ED Course   Procedures  Labs Reviewed   CBC W/ AUTO DIFFERENTIAL - Abnormal; Notable for the following components:       Result Value    Hemoglobin 11.1 (*)     Hematocrit 35.2 (*)     MCH 26.9 (*)     MCHC 31.5 (*)     Gran # (ANC) 1.3 (*)     Gran% 30.9 (*)     Eosinophil% 11.6 (*)     All other components within normal limits   COMPREHENSIVE METABOLIC PANEL   INFLUENZA A AND B ANTIGEN     EKG Readings: (Independently Interpreted)   Rhythm: Normal Sinus Rhythm. Ectopy: No Ectopy. Conduction: Normal. ST Segments: Normal ST Segments. T Waves: Normal. Clinical Impression: Normal Sinus Rhythm       Imaging Results    None          Medical  Decision Making:   Initial Assessment:   Acute exacerbation of fibromyalgia/lupus  ED Management:  Lab work, EKG physical exam all were unremarkable referred to pain management                      Clinical Impression:   The primary encounter diagnosis was Fibromyalgia, primary. Diagnoses of Lupus and Chronic pain syndrome were also pertinent to this visit.      Disposition:   Disposition: Discharged  Condition: Stable                        C. Brice Carr III, MD  12/25/18 0459

## 2018-12-28 ENCOUNTER — HOSPITAL ENCOUNTER (EMERGENCY)
Facility: HOSPITAL | Age: 53
Discharge: HOME | End: 2018-12-28
Attending: EMERGENCY MEDICINE
Payer: COMMERCIAL

## 2018-12-28 VITALS
BODY MASS INDEX: 23.92 KG/M2 | TEMPERATURE: 100 F | SYSTOLIC BLOOD PRESSURE: 135 MMHG | WEIGHT: 130 LBS | DIASTOLIC BLOOD PRESSURE: 85 MMHG | OXYGEN SATURATION: 100 % | HEART RATE: 88 BPM | RESPIRATION RATE: 21 BRPM | HEIGHT: 62 IN

## 2018-12-28 DIAGNOSIS — R07.9 CHEST PAIN: ICD-10-CM

## 2018-12-28 DIAGNOSIS — F41.0 ANXIETY ATTACK: Primary | ICD-10-CM

## 2018-12-28 LAB
ALBUMIN SERPL BCP-MCNC: 5 G/DL
ALP SERPL-CCNC: 133 U/L
ALT SERPL W/O P-5'-P-CCNC: 73 U/L
ANION GAP SERPL CALC-SCNC: 18 MMOL/L
AST SERPL-CCNC: 62 U/L
BASOPHILS # BLD AUTO: 0.02 K/UL
BASOPHILS NFR BLD: 0.3 %
BILIRUB SERPL-MCNC: 0.5 MG/DL
BUN SERPL-MCNC: 7 MG/DL
CALCIUM SERPL-MCNC: 10.5 MG/DL
CHLORIDE SERPL-SCNC: 104 MMOL/L
CO2 SERPL-SCNC: 21 MMOL/L
CREAT SERPL-MCNC: 0.61 MG/DL
DIFFERENTIAL METHOD: ABNORMAL
EOSINOPHIL # BLD AUTO: 0.4 K/UL
EOSINOPHIL NFR BLD: 6.6 %
ERYTHROCYTE [DISTWIDTH] IN BLOOD BY AUTOMATED COUNT: 13.6 %
EST. GFR  (AFRICAN AMERICAN): >60 ML/MIN/1.73 M^2
EST. GFR  (NON AFRICAN AMERICAN): >60 ML/MIN/1.73 M^2
GLUCOSE SERPL-MCNC: 114 MG/DL
HCT VFR BLD AUTO: 43.4 %
HGB BLD-MCNC: 14.7 G/DL
LYMPHOCYTES # BLD AUTO: 1.1 K/UL
LYMPHOCYTES NFR BLD: 17.9 %
MCH RBC QN AUTO: 27 PG
MCHC RBC AUTO-ENTMCNC: 33.9 G/DL
MCV RBC AUTO: 80 FL
MONOCYTES # BLD AUTO: 0.3 K/UL
MONOCYTES NFR BLD: 4.8 %
NEUTROPHILS # BLD AUTO: 4.2 K/UL
NEUTROPHILS NFR BLD: 69.6 %
NT-PROBNP: 82 PG/ML
PLATELET # BLD AUTO: 97 K/UL
PMV BLD AUTO: 10.3 FL
POTASSIUM SERPL-SCNC: 3.6 MMOL/L
PROT SERPL-MCNC: 9 G/DL
RBC # BLD AUTO: 5.44 M/UL
SODIUM SERPL-SCNC: 143 MMOL/L
TROPONIN I SERPL DL<=0.01 NG/ML-MCNC: <0.012 NG/ML
TROPONIN I SERPL DL<=0.01 NG/ML-MCNC: <0.012 NG/ML
WBC # BLD AUTO: 6.05 K/UL

## 2018-12-28 PROCEDURE — 93005 ELECTROCARDIOGRAM TRACING: CPT

## 2018-12-28 PROCEDURE — 93010 EKG 12-LEAD: ICD-10-PCS | Mod: ,,, | Performed by: INTERNAL MEDICINE

## 2018-12-28 PROCEDURE — 25000003 PHARM REV CODE 250: Performed by: EMERGENCY MEDICINE

## 2018-12-28 PROCEDURE — 96374 THER/PROPH/DIAG INJ IV PUSH: CPT

## 2018-12-28 PROCEDURE — 83880 ASSAY OF NATRIURETIC PEPTIDE: CPT

## 2018-12-28 PROCEDURE — 93010 ELECTROCARDIOGRAM REPORT: CPT | Mod: ,,, | Performed by: INTERNAL MEDICINE

## 2018-12-28 PROCEDURE — 84484 ASSAY OF TROPONIN QUANT: CPT

## 2018-12-28 PROCEDURE — 80053 COMPREHEN METABOLIC PANEL: CPT

## 2018-12-28 PROCEDURE — 96375 TX/PRO/DX INJ NEW DRUG ADDON: CPT

## 2018-12-28 PROCEDURE — 99285 EMERGENCY DEPT VISIT HI MDM: CPT | Mod: 25

## 2018-12-28 PROCEDURE — 96376 TX/PRO/DX INJ SAME DRUG ADON: CPT

## 2018-12-28 PROCEDURE — 85025 COMPLETE CBC W/AUTO DIFF WBC: CPT

## 2018-12-28 PROCEDURE — 63600175 PHARM REV CODE 636 W HCPCS: Performed by: EMERGENCY MEDICINE

## 2018-12-28 RX ORDER — KETOROLAC TROMETHAMINE 30 MG/ML
30 INJECTION, SOLUTION INTRAMUSCULAR; INTRAVENOUS
Status: COMPLETED | OUTPATIENT
Start: 2018-12-28 | End: 2018-12-28

## 2018-12-28 RX ORDER — CLONAZEPAM 0.5 MG/1
0.5 TABLET ORAL 3 TIMES DAILY
Qty: 30 TABLET | Refills: 0 | Status: SHIPPED | OUTPATIENT
Start: 2018-12-28 | End: 2019-06-07 | Stop reason: ALTCHOICE

## 2018-12-28 RX ORDER — ASPIRIN 325 MG
325 TABLET ORAL
Status: COMPLETED | OUTPATIENT
Start: 2018-12-28 | End: 2018-12-28

## 2018-12-28 RX ORDER — METHYLPREDNISOLONE SOD SUCC 125 MG
125 VIAL (EA) INJECTION
Status: COMPLETED | OUTPATIENT
Start: 2018-12-28 | End: 2018-12-28

## 2018-12-28 RX ADMIN — LORAZEPAM 1 MG: 2 INJECTION INTRAMUSCULAR; INTRAVENOUS at 09:12

## 2018-12-28 RX ADMIN — LORAZEPAM 1 MG: 2 INJECTION INTRAMUSCULAR; INTRAVENOUS at 07:12

## 2018-12-28 RX ADMIN — METHYLPREDNISOLONE SODIUM SUCCINATE 125 MG: 125 INJECTION, POWDER, FOR SOLUTION INTRAMUSCULAR; INTRAVENOUS at 09:12

## 2018-12-28 RX ADMIN — ASPIRIN 325 MG ORAL TABLET 325 MG: 325 PILL ORAL at 07:12

## 2018-12-28 RX ADMIN — KETOROLAC TROMETHAMINE 30 MG: 30 INJECTION, SOLUTION INTRAMUSCULAR at 08:12

## 2018-12-29 NOTE — ED NOTES
MD aware that patient has been stuck and no tolerating sticks to get Troponin #2. Awaiting for orders at this time. Patient vitals, respirations WDL. Breathing unlabored. O2 saturation 99% on room air.

## 2018-12-29 NOTE — ED TRIAGE NOTES
Pt presents to ED c/o shortness of breath, chest tightness, and palpitations that started this am. Pt reports her grandmother just  and her  is tomorrow-pt appears anxious but states she isnt. She is tachypnic-slightly hyperventilating Pt does have a hx of fibromyalgia and lupus which she thinks is flaring up. Pt is aao x 4. Chest pain does not radiate and is non reproducible. Described as elephant on chest.

## 2018-12-29 NOTE — ED NOTES
Patient laying in bed with  at bedside. Patient in tears and stated pain level is 8 out of 10 at this time. Respirations even and unlabored Vital signs stable at this time. Pateint oxygen saturations 99% on room air. MD informed that patient pain level is 8 out of 10. Will continue to monitor patient.

## 2018-12-29 NOTE — ED PROVIDER NOTES
"Encounter Date: 2018       History     Chief Complaint   Patient presents with    Shortness of Breath     c/o racing heart and chest pressure "feels like an elephant is on my chest"; also c/o SOB and N/V    Chest Pain    Tachycardia     The history is provided by the patient.   Chest Pain   The current episode started just prior to arrival. Duration of episode(s) is 10 hours. Chest pain occurs constantly. The chest pain is unchanged. The quality of the pain is described as pressure-like. The pain does not radiate. Primary symptoms include shortness of breath. Pertinent negatives for primary symptoms include no fever, no fatigue, no syncope, no cough, no wheezing, no palpitations, no abdominal pain, no nausea, no vomiting, no dizziness and no altered mental status.   Associated symptoms include numbness.   Pertinent negatives for associated symptoms include no claudication, no diaphoresis, no lower extremity edema, no near-syncope, no orthopnea, no paroxysmal nocturnal dyspnea and no weakness. She tried nothing for the symptoms. Risk factors include post-menopausal and sedentary lifestyle.   Pertinent negatives for past medical history include no CAD, no CHF, no diabetes, no hyperlipidemia, no hypertension, no MI and no strokes.     Review of patient's allergies indicates:   Allergen Reactions    Morphine Hives    Erythromycin Other (See Comments)    Other      Other reaction(s): Angioedema, carrots    Zofran (as hydrochloride) [ondansetron hcl] Hives     Local hive after IV injection     Past Medical History:   Diagnosis Date    Ankylosing spondylitis     Anxiety     Asthma     Chronic constipation     Chronic insomnia     Edema     Fibromyalgia     Interstitial cystitis     Migraine headache     Restless legs syndrome     TIA (transient ischemic attack)      Past Surgical History:   Procedure Laterality Date    BLADDER SURGERY      BREAST SURGERY  2018    reduction     SECTION   "    CHOLECYSTECTOMY      HYSTERECTOMY      LASER ABLATION      to back    SLEEVE GASTROPLASTY  05/2016    TUBAL LIGATION       Family History   Problem Relation Age of Onset    Hypertension Mother     Hypertension Brother     Breast cancer Maternal Aunt         x2      Social History     Tobacco Use    Smoking status: Never Smoker   Substance Use Topics    Alcohol use: Yes     Comment: occasionally    Drug use: No     Review of Systems   Constitutional: Negative for diaphoresis, fatigue and fever.   Respiratory: Positive for shortness of breath. Negative for cough and wheezing.    Cardiovascular: Positive for chest pain. Negative for palpitations, orthopnea, claudication, syncope and near-syncope.   Gastrointestinal: Negative for abdominal pain, nausea and vomiting.   Neurological: Positive for numbness. Negative for dizziness and weakness.   All other systems reviewed and are negative.      Physical Exam     Initial Vitals [12/28/18 1822]   BP Pulse Resp Temp SpO2   (!) 141/88 96 (!) 24 98.4 °F (36.9 °C) 100 %      MAP       --         Physical Exam    Nursing note and vitals reviewed.  Constitutional: She appears well-developed and well-nourished.   HENT:   Head: Normocephalic and atraumatic.   Eyes: Conjunctivae and EOM are normal.   Neck: Normal range of motion. Neck supple.   Cardiovascular: Normal rate, regular rhythm and normal heart sounds.   Pulmonary/Chest: Breath sounds normal. She has no wheezes. She has no rhonchi. She has no rales.   Abdominal: Soft. There is no tenderness. There is no rebound and no guarding.   Musculoskeletal: Normal range of motion.   Neurological: She is alert and oriented to person, place, and time. GCS score is 15. GCS eye subscore is 4. GCS verbal subscore is 5. GCS motor subscore is 6.   Skin: Skin is warm and dry. Capillary refill takes less than 2 seconds.   Psychiatric: Her behavior is normal. Judgment and thought content normal. Her mood appears anxious. Her  speech is rapid and/or pressured. She is not actively hallucinating. She is attentive.         ED Course   Procedures  Labs Reviewed   COMPREHENSIVE METABOLIC PANEL - Abnormal; Notable for the following components:       Result Value    CO2 21 (*)     Glucose 114 (*)     Total Protein 9.0 (*)     Alkaline Phosphatase 133 (*)     AST 62 (*)     ALT 73 (*)     Anion Gap 18 (*)     All other components within normal limits   CBC W/ AUTO DIFFERENTIAL - Abnormal; Notable for the following components:    RBC 5.44 (*)     MCV 80 (*)     Platelets 97 (*)     Lymph% 17.9 (*)     All other components within normal limits   TROPONIN I   NT-PRO NATRIURETIC PEPTIDE   TROPONIN I     EKG Readings: (Independently Interpreted)   Rhythm: Sinus Tachycardia. Heart Rate: 112. Ectopy: No Ectopy. Conduction: Normal. ST Segments: Normal ST Segments. T Waves: Normal. Q Waves: V2. Clinical Impression: Normal Sinus Rhythm       Imaging Results          X-Ray Chest PA And Lateral (Final result)  Result time 12/28/18 19:49:36    Final result by Gorge Serrano MD (12/28/18 19:49:36)                 Impression:      No acute findings.  No change since 09/27/2018.      Electronically signed by: Gorge Serrano MD  Date:    12/28/2018  Time:    19:49             Narrative:    EXAMINATION:  XR CHEST PA AND LATERAL    CLINICAL HISTORY:  Chest Pain;    COMPARISON:  None    FINDINGS:  Lungs are clear.  Heart size within normal limits.No significant bony findings.                              X-Rays:   Independently Interpreted Readings:   Chest X-Ray: Normal heart size.  No infiltrates.  No acute abnormalities.     Medical Decision Making:   Clinical Tests:   Lab Tests: Ordered and Reviewed  Radiological Study: Ordered and Reviewed  Medical Tests: Ordered and Reviewed                      Clinical Impression:   The primary encounter diagnosis was Anxiety attack. A diagnosis of Chest pain was also pertinent to this visit.      Disposition:    Disposition: Discharged  Condition: Stable                        Isis Garza MD  12/28/18 6338

## 2018-12-29 NOTE — ED NOTES
Patient provided with juice. No complaints of chest pain or SOB at this time. Vital signs remain WDL. Respirations even and unlabored. Oxygen saturation 99% on room air. Will continue to monitor patient.

## 2018-12-29 NOTE — ED NOTES
Patient stated that she cant breath per  who stepped out of the room to inform Nurse writer. MD informed and in route to bedside.

## 2018-12-31 PROBLEM — G89.18 POSTOPERATIVE PAIN: Status: RESOLVED | Noted: 2018-09-27 | Resolved: 2018-12-31

## 2019-05-22 ENCOUNTER — TELEPHONE (OUTPATIENT)
Dept: NEUROLOGY | Facility: CLINIC | Age: 54
End: 2019-05-22

## 2019-05-22 ENCOUNTER — HOSPITAL ENCOUNTER (EMERGENCY)
Facility: HOSPITAL | Age: 54
Discharge: HOME | End: 2019-05-22
Attending: EMERGENCY MEDICINE
Payer: COMMERCIAL

## 2019-05-22 VITALS — DIASTOLIC BLOOD PRESSURE: 102 MMHG | HEART RATE: 88 BPM | SYSTOLIC BLOOD PRESSURE: 146 MMHG | OXYGEN SATURATION: 97 %

## 2019-05-22 DIAGNOSIS — B02.9 HERPES ZOSTER WITHOUT COMPLICATION: Primary | ICD-10-CM

## 2019-05-22 PROCEDURE — 99284 EMERGENCY DEPT VISIT MOD MDM: CPT | Mod: ER

## 2019-05-22 PROCEDURE — 25000003 PHARM REV CODE 250: Mod: ER | Performed by: EMERGENCY MEDICINE

## 2019-05-22 RX ORDER — VALACYCLOVIR HYDROCHLORIDE 500 MG/1
500 TABLET, FILM COATED ORAL 2 TIMES DAILY
COMMUNITY
End: 2019-10-30

## 2019-05-22 RX ORDER — PREDNISONE 10 MG/1
TABLET ORAL DAILY
COMMUNITY
End: 2019-07-02

## 2019-05-22 RX ORDER — GABAPENTIN 300 MG/1
300 CAPSULE ORAL 3 TIMES DAILY
COMMUNITY
End: 2020-02-21 | Stop reason: SDUPTHER

## 2019-05-22 RX ORDER — FENTANYL 25 UG/1
1 PATCH TRANSDERMAL
COMMUNITY
End: 2019-06-07 | Stop reason: ALTCHOICE

## 2019-05-22 RX ORDER — OXYCODONE AND ACETAMINOPHEN 5; 325 MG/1; MG/1
1 TABLET ORAL
Status: COMPLETED | OUTPATIENT
Start: 2019-05-22 | End: 2019-05-22

## 2019-05-22 RX ORDER — KETOROLAC TROMETHAMINE 10 MG/1
10 TABLET, FILM COATED ORAL
Status: COMPLETED | OUTPATIENT
Start: 2019-05-22 | End: 2019-05-22

## 2019-05-22 RX ORDER — ETODOLAC 300 MG/1
300 CAPSULE ORAL EVERY 8 HOURS PRN
Qty: 30 CAPSULE | Refills: 0 | Status: SHIPPED | OUTPATIENT
Start: 2019-05-22 | End: 2019-06-07

## 2019-05-22 RX ORDER — OXYCODONE AND ACETAMINOPHEN 5; 325 MG/1; MG/1
1 TABLET ORAL EVERY 6 HOURS PRN
Qty: 12 TABLET | Refills: 0 | Status: SHIPPED | OUTPATIENT
Start: 2019-05-22 | End: 2019-07-29

## 2019-05-22 RX ADMIN — OXYCODONE HYDROCHLORIDE AND ACETAMINOPHEN 1 TABLET: 5; 325 TABLET ORAL at 10:05

## 2019-05-22 RX ADMIN — KETOROLAC TROMETHAMINE 10 MG: 10 TABLET, FILM COATED ORAL at 10:05

## 2019-05-22 NOTE — TELEPHONE ENCOUNTER
----- Message from Rosi Faustin sent at 5/22/2019 11:02 AM CDT -----  Contact: Pt  Pt called to speak to the nurse to find out if she should come in to her appt on 5/23/19 due to having the shingles and would like a call back today asap at [phone]

## 2019-05-22 NOTE — ED PROVIDER NOTES
"Encounter Date: 2019       History     Chief Complaint   Patient presents with    shingles on my back     "I have shingles on my back since last Tuesday and it is getting worse" states she is taking valtrex and got a shot of steroids at urgent care last Thursday.      HPI   This is a 54 y.o. female who has a past medical history of Ankylosing spondylitis, Anxiety, Asthma, Chronic constipation, Chronic insomnia, Edema, Fibromyalgia, Interstitial cystitis, Migraine headache, Restless legs syndrome, Shingles, and TIA (transient ischemic attack).     The patient presents to the Emergency Department with shingles to her right back since 1 week ago.   Symptoms are associated with pain to that right side.  Symptoms are aggravated by palpation even with light touch or with clothing.  Symptoms are relieved by nothing.  Patient is currently on Valtrex and had a steroid shot at urgent care a week ago.   Patient has no prior history of similar symptoms. Patient reports that she had chickenpox in her 20s.       Review of patient's allergies indicates:   Allergen Reactions    Morphine Hives    Erythromycin Other (See Comments)    Other      Other reaction(s): Angioedema, carrots    Zofran (as hydrochloride) [ondansetron hcl] Hives     Local hive after IV injection     Past Medical History:   Diagnosis Date    Ankylosing spondylitis     Anxiety     Asthma     Chronic constipation     Chronic insomnia     Edema     Fibromyalgia     Interstitial cystitis     Migraine headache     Restless legs syndrome     Shingles     TIA (transient ischemic attack)      Past Surgical History:   Procedure Laterality Date    BLADDER SURGERY      BREAST SURGERY  2018    reduction     SECTION      CHOLECYSTECTOMY      HYSTERECTOMY      LASER ABLATION      to back    SLEEVE GASTROPLASTY  2016    TUBAL LIGATION       Family History   Problem Relation Age of Onset    Hypertension Mother     Hypertension Brother  "    Breast cancer Maternal Aunt         x2      Social History     Tobacco Use    Smoking status: Never Smoker   Substance Use Topics    Alcohol use: Yes     Comment: occasionally    Drug use: No     Review of Systems   Constitutional: Positive for activity change.   Cardiovascular: Positive for chest pain.   Gastrointestinal: Positive for abdominal pain.   Musculoskeletal: Positive for back pain.   Skin: Positive for rash.   Psychiatric/Behavioral: Positive for sleep disturbance.   All other systems reviewed and are negative.      Physical Exam     Initial Vitals   BP Pulse Resp Temp SpO2   -- -- -- -- --      MAP       --         Physical Exam    Nursing note and vitals reviewed.  Constitutional: She appears well-developed and well-nourished. She is not diaphoretic. She appears distressed (pain).   HENT:   Head: Normocephalic and atraumatic.   Mouth/Throat: Oropharynx is clear and moist.   Eyes: Conjunctivae are normal.   Cardiovascular: Normal rate, regular rhythm and intact distal pulses.   Pulmonary/Chest: No respiratory distress.   Musculoskeletal: Normal range of motion.   Neurological: She is alert and oriented to person, place, and time.   Skin: Skin is warm and dry. Capillary refill takes less than 2 seconds. Rash noted. No erythema.   Vesicular/bullous rash to right flank in dermatomal distribution wrapping around to abdomen   Psychiatric:   Patient is anxious, distressed from pain                 ED Course   Procedures  Labs Reviewed - No data to display       Imaging Results    None          Medical Decision Making:   History:   Old Medical Records: I decided to obtain old medical records.  Old Records Summarized: records from previous admission(s).       <> Summary of Records: Patient was seen in the ER 12/25/2018 for chronic generalized body pain. Patient was seen again in the ER on 12/28/2018 for an anxiety attack.  Patient was discharged on both occasions  Initial Assessment:   Emergent  evaluation 54-year-old female presents with shingles, diagnosed a week ago, on Valtrex as clinically indicated.  Patient is on a fentanyl patch for pain, however no acute treatment of this painful episode.  Will write for Percocet and Toradol here in the ER, similar for home.  Discussed typical course, painful syndrome, symptomatic treatment and follow up with PCP.  Return for any emergent concerns.                      Clinical Impression:       ICD-10-CM ICD-9-CM   1. Herpes zoster without complication B02.9 053.9                                Rakan Berg MD  05/22/19 1001

## 2019-05-22 NOTE — DISCHARGE INSTRUCTIONS
Thank you for choosing [facility]! We appreciate you coming to us for your medical care. We hope you feel better soon! Please come back to [facility] for all of your future medical needs.    Our goal in the emergency department is to always give you outstanding care and exceptional service. You may receive a survey by mail or e-mail in the next week regarding your experience in our ED. We would greatly appreciate your completing and returning the survey. Your feedback provides us with a way to recognize our staff who give very good care and it helps us learn how to improve when your experience was below our aspiration of excellence.       Sincerely,    Rakan Berg MD  Medical Director  Emergency Department  [facility]

## 2019-05-23 NOTE — TELEPHONE ENCOUNTER
I called the patient and gave her the recommendations per Dr. Vee , she understood. She will call back after seeing the PCP.

## 2019-06-07 ENCOUNTER — OFFICE VISIT (OUTPATIENT)
Dept: FAMILY MEDICINE | Facility: CLINIC | Age: 54
End: 2019-06-07
Payer: COMMERCIAL

## 2019-06-07 VITALS
WEIGHT: 156.88 LBS | OXYGEN SATURATION: 97 % | DIASTOLIC BLOOD PRESSURE: 80 MMHG | BODY MASS INDEX: 28.87 KG/M2 | HEART RATE: 75 BPM | TEMPERATURE: 99 F | HEIGHT: 62 IN | SYSTOLIC BLOOD PRESSURE: 120 MMHG

## 2019-06-07 DIAGNOSIS — Z12.11 COLON CANCER SCREENING: ICD-10-CM

## 2019-06-07 DIAGNOSIS — Z13.6 ENCOUNTER FOR LIPID SCREENING FOR CARDIOVASCULAR DISEASE: ICD-10-CM

## 2019-06-07 DIAGNOSIS — G47.00 INSOMNIA, UNSPECIFIED TYPE: ICD-10-CM

## 2019-06-07 DIAGNOSIS — J45.909 ASTHMA, UNSPECIFIED ASTHMA SEVERITY, UNSPECIFIED WHETHER COMPLICATED, UNSPECIFIED WHETHER PERSISTENT: ICD-10-CM

## 2019-06-07 DIAGNOSIS — F41.9 ANXIETY: ICD-10-CM

## 2019-06-07 DIAGNOSIS — D64.9 ANEMIA, UNSPECIFIED TYPE: Primary | ICD-10-CM

## 2019-06-07 DIAGNOSIS — Z13.220 ENCOUNTER FOR LIPID SCREENING FOR CARDIOVASCULAR DISEASE: ICD-10-CM

## 2019-06-07 PROCEDURE — 99203 OFFICE O/P NEW LOW 30 MIN: CPT | Mod: S$GLB,,, | Performed by: FAMILY MEDICINE

## 2019-06-07 PROCEDURE — 3008F BODY MASS INDEX DOCD: CPT | Mod: CPTII,S$GLB,, | Performed by: FAMILY MEDICINE

## 2019-06-07 PROCEDURE — 99203 PR OFFICE/OUTPT VISIT, NEW, LEVL III, 30-44 MIN: ICD-10-PCS | Mod: S$GLB,,, | Performed by: FAMILY MEDICINE

## 2019-06-07 PROCEDURE — 3008F PR BODY MASS INDEX (BMI) DOCUMENTED: ICD-10-PCS | Mod: CPTII,S$GLB,, | Performed by: FAMILY MEDICINE

## 2019-06-07 RX ORDER — AMITRIPTYLINE HYDROCHLORIDE 25 MG/1
25 TABLET, FILM COATED ORAL NIGHTLY PRN
Qty: 30 TABLET | Refills: 3 | Status: SHIPPED | OUTPATIENT
Start: 2019-06-07 | End: 2019-07-02 | Stop reason: ALTCHOICE

## 2019-06-07 RX ORDER — ALBUTEROL SULFATE 90 UG/1
AEROSOL, METERED RESPIRATORY (INHALATION)
COMMUNITY
End: 2019-09-25

## 2019-06-07 RX ORDER — BUTALBITAL, ACETAMINOPHEN AND CAFFEINE 50; 325; 40 MG/1; MG/1; MG/1
TABLET ORAL
COMMUNITY
Start: 2015-12-28 | End: 2019-09-25

## 2019-06-07 RX ORDER — ALBUTEROL SULFATE 90 UG/1
2 AEROSOL, METERED RESPIRATORY (INHALATION) EVERY 4 HOURS PRN
Qty: 18 G | Refills: 3 | Status: SHIPPED | OUTPATIENT
Start: 2019-06-07 | End: 2022-03-22 | Stop reason: SDUPTHER

## 2019-06-07 RX ORDER — HYDROXYCHLOROQUINE SULFATE 200 MG/1
200 TABLET, FILM COATED ORAL DAILY
Refills: 1 | COMMUNITY
Start: 2019-05-15 | End: 2023-08-22 | Stop reason: SDUPTHER

## 2019-06-07 RX ORDER — ALBUTEROL SULFATE 90 UG/1
2 AEROSOL, METERED RESPIRATORY (INHALATION) EVERY 6 HOURS PRN
COMMUNITY
End: 2019-06-07 | Stop reason: SDUPTHER

## 2019-06-07 RX ORDER — TIZANIDINE 4 MG/1
4 TABLET ORAL 3 TIMES DAILY
Refills: 3 | COMMUNITY
Start: 2019-05-17 | End: 2020-09-06 | Stop reason: ALTCHOICE

## 2019-06-07 RX ORDER — CLONAZEPAM 0.5 MG/1
0.5 TABLET ORAL
COMMUNITY
End: 2019-06-07 | Stop reason: ALTCHOICE

## 2019-06-07 RX ORDER — PROGESTERONE 100 MG/1
100 CAPSULE ORAL NIGHTLY
Refills: 10 | COMMUNITY
Start: 2019-05-16 | End: 2019-10-30

## 2019-06-07 RX ORDER — MECLIZINE HYDROCHLORIDE 25 MG/1
TABLET ORAL
Refills: 0 | COMMUNITY
Start: 2019-05-02 | End: 2019-10-30

## 2019-06-07 RX ORDER — EPINEPHRINE 0.3 MG/.3ML
1 INJECTION SUBCUTANEOUS ONCE
Qty: 2 DEVICE | Refills: 3 | Status: SHIPPED | OUTPATIENT
Start: 2019-06-07 | End: 2020-05-29 | Stop reason: SDUPTHER

## 2019-06-07 RX ORDER — HYDROXYZINE PAMOATE 25 MG/1
25 CAPSULE ORAL EVERY 4 HOURS PRN
COMMUNITY
Start: 2014-11-26 | End: 2020-07-16 | Stop reason: SDUPTHER

## 2019-06-07 RX ORDER — ESCITALOPRAM OXALATE 20 MG/1
20 TABLET ORAL DAILY
Refills: 3 | COMMUNITY
Start: 2019-05-15 | End: 2019-06-07

## 2019-06-07 NOTE — PROGRESS NOTES
Chief Complaint  Chief Complaint   Patient presents with    Herpes Zoster       HPI  Tosha Kwok is a 54 y.o. female with multiple medical diagnoses as listed in the medical history and problem list that presents to establish care.  She has a history of Stage 0 breast cancer, fibromyalgia and lupus.  Going to pain management.  Needs PCP    Anxiety:  Has had problems with anxiety for many years.  Was taking clonazepam, but her pain management stopped prescribing it.  Lexapro was prescribed about 3 months ago.  This has not helped.  Has occasional panic attacks.  Irritability.  Feels anxious most of the time.  Doesn't sleep well.  Has ambien for sleep, but is sleep walking with this.     Asthma:  Rarely uses her rescue inhaler.  Rarely uses her Pulmicort.  No cough or wheezing    Anemia:  Says she had labs recently and her H & H was low.  Does not have a copy of those labs.  In surgical menopause.  No rectal bleeding.  No restless leg.  Always has fatigue.      PAST MEDICAL HISTORY:  Past Medical History:   Diagnosis Date    Ankylosing spondylitis     Anxiety     Asthma     Cancer     Breast    Chronic constipation     Chronic insomnia     Edema     Fibromyalgia     Interstitial cystitis     Lupus     Migraine headache     Restless legs syndrome     Shingles     TIA (transient ischemic attack)        PAST SURGICAL HISTORY:  Past Surgical History:   Procedure Laterality Date    BLADDER SURGERY      BREAST SURGERY  2018    reduction     SECTION      CHOLECYSTECTOMY      HYSTERECTOMY      LASER ABLATION      to back    SLEEVE GASTROPLASTY  2016    TUBAL LIGATION         SOCIAL HISTORY:  Social History     Socioeconomic History    Marital status:      Spouse name: Not on file    Number of children: Not on file    Years of education: Not on file    Highest education level: Not on file   Occupational History    Not on file   Social Needs    Financial resource strain: Not on  file    Food insecurity:     Worry: Not on file     Inability: Not on file    Transportation needs:     Medical: Not on file     Non-medical: Not on file   Tobacco Use    Smoking status: Never Smoker    Smokeless tobacco: Never Used   Substance and Sexual Activity    Alcohol use: Yes     Comment: occasionally    Drug use: No    Sexual activity: Yes     Partners: Male   Lifestyle    Physical activity:     Days per week: Not on file     Minutes per session: Not on file    Stress: Very much   Relationships    Social connections:     Talks on phone: Not on file     Gets together: Not on file     Attends Yazidi service: Not on file     Active member of club or organization: Not on file     Attends meetings of clubs or organizations: Not on file     Relationship status: Not on file   Other Topics Concern    Not on file   Social History Narrative    Not on file       FAMILY HISTORY:  Family History   Problem Relation Age of Onset    Hypertension Mother     Hypertension Brother     Breast cancer Maternal Aunt         x2        ALLERGIES AND MEDICATIONS: updated and reviewed.  Review of patient's allergies indicates:   Allergen Reactions    Morphine Hives    Erythromycin Other (See Comments)    Other      Other reaction(s): Angioedema, carrots    Zofran (as hydrochloride) [ondansetron hcl] Hives     Local hive after IV injection     Current Outpatient Medications   Medication Sig Dispense Refill    albuterol (PROAIR HFA) 90 mcg/actuation inhaler Inhale 2 puffs into the lungs every 4 (four) hours as needed. 18 g 3    albuterol (PROVENTIL/VENTOLIN HFA) 90 mcg/actuation inhaler Inhale into the lungs.      budesonide 180mcg (PULMICORT 180MCG) 180 mcg/actuation AePB Inhale 2 puffs into the lungs once daily. 1 each 3    butalbital-acetaminophen-caffeine -40 mg (FIORICET, ESGIC) -40 mg per tablet Take 1 to 2 tablets orally every 6 hours as needed for headache      diclofenac sodium (VOLTAREN)  1 % Gel 1 %.  Gel Topical .  AAA bid      escitalopram oxalate (LEXAPRO) 20 MG tablet Take 20 mg by mouth once daily.  3    estradiol 0.1 mg/24 hr td ptwk (ESTRADIOL TRANSDERMAL PATCH) 0.1 mg/24 hr PTWK Place 1 patch onto the skin every 7 days.      gabapentin (NEURONTIN) 300 MG capsule Take 300 mg by mouth 3 (three) times daily.      hydroxychloroquine (PLAQUENIL) 200 mg tablet Take 200 mg by mouth once daily.  1    hydrOXYzine pamoate (VISTARIL) 25 MG Cap Take 25 mg by mouth.      lidocaine (LIDODERM) 5 %(700 mg/patch) 1 Adhesive Patch, Medicated Topical Every 12 hours.   [id number]      lisinopril (PRINIVIL,ZESTRIL) 20 MG tablet Take 20 mg by mouth once daily.      meclizine (ANTIVERT) 25 mg tablet TAKE 1 TABLET BY MOUTH THREE TIMES A DAY AS NEEDED FOR DIZZINESS FOR 5 DAYS.  0    omeprazole (PRILOSEC) 20 MG capsule Take 20 mg by mouth. 1 Capsule, Delayed Release(E.C.) Oral Every day      oxyCODONE-acetaminophen (PERCOCET) 5-325 mg per tablet Take 1 tablet by mouth every 6 (six) hours as needed for Pain. 12 tablet 0    predniSONE (DELTASONE) 10 mg tablet pack Take by mouth once daily.      progesterone (PROMETRIUM) 100 MG capsule Take 100 mg by mouth nightly.  10    promethazine (PHENERGAN) 25 MG tablet Take 25 mg by mouth. 1 Tablet Oral Every 6 hours      tiZANidine (ZANAFLEX) 4 MG tablet Take 4 mg by mouth 3 (three) times daily.  3    amitriptyline (ELAVIL) 25 MG tablet Take 1 tablet (25 mg total) by mouth nightly as needed for Insomnia. 30 tablet 3    EPINEPHrine (EPIPEN 2-DENNIS) 0.3 mg/0.3 mL AtIn Inject 0.3 mLs (0.3 mg total) into the muscle once. for 1 dose 2 Device 3    HYDROcodone-acetaminophen (NORCO)  mg per tablet Take 1 tablet by mouth every 8 (eight) hours as needed for Pain.       HYDROmorphone (DILAUDID) 2 MG tablet Take 2 mg by mouth every 8 (eight) hours as needed for Pain.      topiramate (TOPAMAX) 50 MG tablet TAKE 1 TABLET BY MOUTH TWICE A DAY FOR MIGRAINE PREVENTION 60  "tablet 0    valACYclovir (VALTREX) 500 MG tablet Take 500 mg by mouth 2 (two) times daily.       No current facility-administered medications for this visit.          ROS  Review of Systems   Constitutional: Positive for fatigue. Negative for activity change, appetite change and chills.   HENT: Negative for congestion, ear discharge, ear pain, rhinorrhea, sinus pain, sore throat and trouble swallowing.    Eyes: Negative for photophobia, pain, redness, itching and visual disturbance.   Respiratory: Negative for cough, chest tightness, shortness of breath and wheezing.    Cardiovascular: Negative for chest pain, palpitations and leg swelling.   Gastrointestinal: Negative for abdominal distention, abdominal pain, blood in stool, diarrhea, nausea and vomiting.   Genitourinary: Negative for dysuria, pelvic pain, vaginal bleeding, vaginal discharge and vaginal pain.   Musculoskeletal: Positive for myalgias. Negative for arthralgias, back pain, gait problem and neck pain.   Skin: Negative for color change, pallor and rash.   Neurological: Negative for dizziness, tremors, weakness, light-headedness, numbness and headaches.   Psychiatric/Behavioral: Negative for agitation, behavioral problems, confusion and sleep disturbance.           PHYSICAL EXAM  Vitals:    06/07/19 1509   BP: 120/80   BP Location: Right arm   Patient Position: Sitting   Pulse: 75   Temp: 98.8 °F (37.1 °C)   TempSrc: Oral   SpO2: 97%   Weight: 71.2 kg (156 lb 13.7 oz)   Height: 5' 2" (1.575 m)    Body mass index is 28.69 kg/m².  Weight: 71.2 kg (156 lb 13.7 oz)   Height: 5' 2" (157.5 cm)     Physical Exam   Constitutional: She is oriented to person, place, and time. She appears well-developed and well-nourished. No distress.   HENT:   Head: Normocephalic and atraumatic.   Right Ear: External ear normal.   Left Ear: External ear normal.   Mouth/Throat: Oropharynx is clear and moist. No oropharyngeal exudate.   Eyes: Pupils are equal, round, and reactive " to light. Conjunctivae and EOM are normal. Right eye exhibits no discharge. Left eye exhibits no discharge.   Neck: Normal range of motion. Neck supple. No tracheal deviation present. No thyromegaly present.   Cardiovascular: Normal rate, regular rhythm, normal heart sounds and intact distal pulses. Exam reveals no gallop and no friction rub.   No murmur heard.  Pulmonary/Chest: Effort normal and breath sounds normal. No respiratory distress. She has no wheezes. She has no rales. She exhibits no tenderness.   Abdominal: Soft. Bowel sounds are normal. She exhibits no distension. There is no tenderness. There is no guarding.   Musculoskeletal: Normal range of motion. She exhibits no edema, tenderness or deformity.   Neurological: She is alert and oriented to person, place, and time. She displays normal reflexes. No cranial nerve deficit. She exhibits normal muscle tone. Coordination normal.   Skin: Skin is warm and dry. Capillary refill takes less than 2 seconds. No rash noted. She is not diaphoretic. No erythema. No pallor.   Psychiatric: She has a normal mood and affect. Her behavior is normal. Judgment normal.         Health Maintenance       Date Due Completion Date    TETANUS VACCINE 03/13/1985 ---    Lipid Panel 05/08/2017 5/8/2012    Mammogram 11/29/2019 (Originally 3/13/2007) ---    Colonoscopy 12/31/2019 (Originally 3/13/2017) ---    Influenza Vaccine 08/01/2019 11/2/2016            Assessment & Plan      Tosha was seen today for herpes zoster.    Diagnoses and all orders for this visit:    Anemia, unspecified type  -     CBC auto differential; Future  -     Comprehensive metabolic panel; Future  -     Vitamin D; Future  -     Iron; Future  -     Vitamin B12; Future    Colon cancer screening  -     Case request GI: COLONOSCOPY    Encounter for lipid screening for cardiovascular disease  -     Lipid panel; Future    Anxiety  -     TSH; Future    Insomnia, unspecified type    Asthma, unspecified asthma  severity, unspecified whether complicated, unspecified whether persistent    Other orders  -     EPINEPHrine (EPIPEN 2-DENNIS) 0.3 mg/0.3 mL AtIn; Inject 0.3 mLs (0.3 mg total) into the muscle once. for 1 dose  -     albuterol (PROAIR HFA) 90 mcg/actuation inhaler; Inhale 2 puffs into the lungs every 4 (four) hours as needed.  -     budesonide 180mcg (PULMICORT 180MCG) 180 mcg/actuation AePB; Inhale 2 puffs into the lungs once daily.  -     amitriptyline (ELAVIL) 25 MG tablet; Take 1 tablet (25 mg total) by mouth nightly as needed for Insomnia.          Follow-up: No follow-ups on file.

## 2019-06-10 ENCOUNTER — TELEPHONE (OUTPATIENT)
Dept: FAMILY MEDICINE | Facility: CLINIC | Age: 54
End: 2019-06-10

## 2019-06-10 NOTE — TELEPHONE ENCOUNTER
Spoke to pt and she states the elavil is not helping her sleep at night. Pt would like to try something else or something stronger. Please advise.

## 2019-06-10 NOTE — TELEPHONE ENCOUNTER
----- Message from Rosi Faustin sent at 6/10/2019  9:02 AM CDT -----  Contact: Pt  Pt called to speak to the nurse regarding her care and would like a call back at [phone]

## 2019-06-11 ENCOUNTER — TELEPHONE (OUTPATIENT)
Dept: GASTROENTEROLOGY | Facility: CLINIC | Age: 54
End: 2019-06-11

## 2019-06-11 ENCOUNTER — LAB VISIT (OUTPATIENT)
Dept: LAB | Facility: HOSPITAL | Age: 54
End: 2019-06-11
Payer: COMMERCIAL

## 2019-06-11 DIAGNOSIS — D64.9 ANEMIA, UNSPECIFIED TYPE: ICD-10-CM

## 2019-06-11 DIAGNOSIS — Z13.6 ENCOUNTER FOR LIPID SCREENING FOR CARDIOVASCULAR DISEASE: ICD-10-CM

## 2019-06-11 DIAGNOSIS — Z13.220 ENCOUNTER FOR LIPID SCREENING FOR CARDIOVASCULAR DISEASE: ICD-10-CM

## 2019-06-11 DIAGNOSIS — F41.9 ANXIETY: ICD-10-CM

## 2019-06-11 LAB
25(OH)D3+25(OH)D2 SERPL-MCNC: 12 NG/ML (ref 30–96)
ALBUMIN SERPL BCP-MCNC: 4.3 G/DL (ref 3.5–5.2)
ALP SERPL-CCNC: 69 U/L (ref 55–135)
ALT SERPL W/O P-5'-P-CCNC: 18 U/L (ref 10–44)
ANION GAP SERPL CALC-SCNC: 11 MMOL/L (ref 8–16)
AST SERPL-CCNC: 16 U/L (ref 10–40)
BASOPHILS # BLD AUTO: 0.03 K/UL (ref 0–0.2)
BASOPHILS NFR BLD: 0.8 % (ref 0–1.9)
BILIRUB SERPL-MCNC: 0.4 MG/DL (ref 0.1–1)
BUN SERPL-MCNC: 15 MG/DL (ref 6–20)
CALCIUM SERPL-MCNC: 9.7 MG/DL (ref 8.7–10.5)
CHLORIDE SERPL-SCNC: 107 MMOL/L (ref 95–110)
CHOLEST SERPL-MCNC: 230 MG/DL (ref 120–199)
CHOLEST/HDLC SERPL: 3.6 {RATIO} (ref 2–5)
CO2 SERPL-SCNC: 21 MMOL/L (ref 23–29)
CREAT SERPL-MCNC: 0.8 MG/DL (ref 0.5–1.4)
DIFFERENTIAL METHOD: ABNORMAL
EOSINOPHIL # BLD AUTO: 0.2 K/UL (ref 0–0.5)
EOSINOPHIL NFR BLD: 4.7 % (ref 0–8)
ERYTHROCYTE [DISTWIDTH] IN BLOOD BY AUTOMATED COUNT: 14.6 % (ref 11.5–14.5)
EST. GFR  (AFRICAN AMERICAN): >60 ML/MIN/1.73 M^2
EST. GFR  (NON AFRICAN AMERICAN): >60 ML/MIN/1.73 M^2
GLUCOSE SERPL-MCNC: 65 MG/DL (ref 70–110)
HCT VFR BLD AUTO: 36.2 % (ref 37–48.5)
HDLC SERPL-MCNC: 64 MG/DL (ref 40–75)
HDLC SERPL: 27.8 % (ref 20–50)
HGB BLD-MCNC: 11.7 G/DL (ref 12–16)
IMM GRANULOCYTES # BLD AUTO: 0 K/UL (ref 0–0.04)
IMM GRANULOCYTES NFR BLD AUTO: 0 % (ref 0–0.5)
IRON SERPL-MCNC: 115 UG/DL (ref 30–160)
LDLC SERPL CALC-MCNC: 144.8 MG/DL (ref 63–159)
LYMPHOCYTES # BLD AUTO: 2.1 K/UL (ref 1–4.8)
LYMPHOCYTES NFR BLD: 57.6 % (ref 18–48)
MCH RBC QN AUTO: 28.7 PG (ref 27–31)
MCHC RBC AUTO-ENTMCNC: 32.3 G/DL (ref 32–36)
MCV RBC AUTO: 89 FL (ref 82–98)
MONOCYTES # BLD AUTO: 0.3 K/UL (ref 0.3–1)
MONOCYTES NFR BLD: 8 % (ref 4–15)
NEUTROPHILS # BLD AUTO: 1 K/UL (ref 1.8–7.7)
NEUTROPHILS NFR BLD: 28.9 % (ref 38–73)
NONHDLC SERPL-MCNC: 166 MG/DL
NRBC BLD-RTO: 0 /100 WBC
PLATELET # BLD AUTO: 331 K/UL (ref 150–350)
PMV BLD AUTO: 10.4 FL (ref 9.2–12.9)
POTASSIUM SERPL-SCNC: 3.6 MMOL/L (ref 3.5–5.1)
PROT SERPL-MCNC: 7.7 G/DL (ref 6–8.4)
RBC # BLD AUTO: 4.07 M/UL (ref 4–5.4)
SODIUM SERPL-SCNC: 139 MMOL/L (ref 136–145)
TRIGL SERPL-MCNC: 106 MG/DL (ref 30–150)
TSH SERPL DL<=0.005 MIU/L-ACNC: 1.29 UIU/ML (ref 0.4–4)
VIT B12 SERPL-MCNC: 169 PG/ML (ref 210–950)
WBC # BLD AUTO: 3.61 K/UL (ref 3.9–12.7)

## 2019-06-11 PROCEDURE — 80053 COMPREHEN METABOLIC PANEL: CPT

## 2019-06-11 PROCEDURE — 85025 COMPLETE CBC W/AUTO DIFF WBC: CPT

## 2019-06-11 PROCEDURE — 82306 VITAMIN D 25 HYDROXY: CPT

## 2019-06-11 PROCEDURE — 84443 ASSAY THYROID STIM HORMONE: CPT

## 2019-06-11 PROCEDURE — 83540 ASSAY OF IRON: CPT

## 2019-06-11 PROCEDURE — 80061 LIPID PANEL: CPT

## 2019-06-11 PROCEDURE — 36415 COLL VENOUS BLD VENIPUNCTURE: CPT

## 2019-06-11 PROCEDURE — 82607 VITAMIN B-12: CPT

## 2019-06-11 NOTE — TELEPHONE ENCOUNTER
Called pt back explained the increase of meds and she refused the colonoscopy wanted to do fitkit she will come in a get a kit

## 2019-06-11 NOTE — TELEPHONE ENCOUNTER
Called pt back and explained that Dr Gibson would like to increase her meds to two pills a day no answer left message on vm to call office back

## 2019-06-25 ENCOUNTER — TELEPHONE (OUTPATIENT)
Dept: FAMILY MEDICINE | Facility: CLINIC | Age: 54
End: 2019-06-25

## 2019-06-25 DIAGNOSIS — Z12.11 COLON CANCER SCREENING: Primary | ICD-10-CM

## 2019-06-27 PROCEDURE — 82274 ASSAY TEST FOR BLOOD FECAL: CPT

## 2019-06-28 ENCOUNTER — LAB VISIT (OUTPATIENT)
Dept: LAB | Facility: HOSPITAL | Age: 54
End: 2019-06-28
Attending: INTERNAL MEDICINE
Payer: COMMERCIAL

## 2019-06-28 DIAGNOSIS — Z12.11 COLON CANCER SCREENING: ICD-10-CM

## 2019-07-01 LAB — HEMOCCULT STL QL IA: NEGATIVE

## 2019-07-02 ENCOUNTER — TELEPHONE (OUTPATIENT)
Dept: FAMILY MEDICINE | Facility: CLINIC | Age: 54
End: 2019-07-02

## 2019-07-02 ENCOUNTER — OFFICE VISIT (OUTPATIENT)
Dept: FAMILY MEDICINE | Facility: CLINIC | Age: 54
End: 2019-07-02
Payer: COMMERCIAL

## 2019-07-02 VITALS
BODY MASS INDEX: 30.04 KG/M2 | WEIGHT: 163.25 LBS | OXYGEN SATURATION: 99 % | DIASTOLIC BLOOD PRESSURE: 84 MMHG | HEIGHT: 62 IN | HEART RATE: 88 BPM | SYSTOLIC BLOOD PRESSURE: 130 MMHG | TEMPERATURE: 99 F

## 2019-07-02 DIAGNOSIS — G47.00 INSOMNIA, UNSPECIFIED TYPE: ICD-10-CM

## 2019-07-02 DIAGNOSIS — G56.01 CARPAL TUNNEL SYNDROME OF RIGHT WRIST: Primary | ICD-10-CM

## 2019-07-02 DIAGNOSIS — E53.8 VITAMIN B12 DEFICIENCY: ICD-10-CM

## 2019-07-02 DIAGNOSIS — E55.9 VITAMIN D DEFICIENCY: ICD-10-CM

## 2019-07-02 PROCEDURE — 3008F BODY MASS INDEX DOCD: CPT | Mod: CPTII,S$GLB,, | Performed by: FAMILY MEDICINE

## 2019-07-02 PROCEDURE — 99214 OFFICE O/P EST MOD 30 MIN: CPT | Mod: S$GLB,,, | Performed by: FAMILY MEDICINE

## 2019-07-02 PROCEDURE — 99214 PR OFFICE/OUTPT VISIT, EST, LEVL IV, 30-39 MIN: ICD-10-PCS | Mod: S$GLB,,, | Performed by: FAMILY MEDICINE

## 2019-07-02 PROCEDURE — 3008F PR BODY MASS INDEX (BMI) DOCUMENTED: ICD-10-PCS | Mod: CPTII,S$GLB,, | Performed by: FAMILY MEDICINE

## 2019-07-02 RX ORDER — TRAZODONE HYDROCHLORIDE 100 MG/1
100 TABLET ORAL NIGHTLY
Qty: 30 TABLET | Refills: 11 | Status: SHIPPED | OUTPATIENT
Start: 2019-07-02 | End: 2020-06-08 | Stop reason: SDUPTHER

## 2019-07-02 RX ORDER — FENTANYL 50 UG/1
PATCH TRANSDERMAL
Refills: 0 | COMMUNITY
Start: 2019-06-04 | End: 2019-09-25

## 2019-07-02 RX ORDER — CYANOCOBALAMIN 1000 UG/ML
1000 INJECTION, SOLUTION INTRAMUSCULAR; SUBCUTANEOUS
Qty: 10 ML | Refills: 3 | Status: SHIPPED | OUTPATIENT
Start: 2019-07-02 | End: 2020-07-08

## 2019-07-02 RX ORDER — ESCITALOPRAM OXALATE 20 MG/1
20 TABLET ORAL DAILY
Refills: 3 | COMMUNITY
Start: 2019-06-13 | End: 2019-12-04 | Stop reason: ALTCHOICE

## 2019-07-02 RX ORDER — ERGOCALCIFEROL 1.25 MG/1
50000 CAPSULE ORAL
Qty: 12 CAPSULE | Refills: 0 | Status: SHIPPED | OUTPATIENT
Start: 2019-07-02 | End: 2019-09-23 | Stop reason: SDUPTHER

## 2019-07-02 RX ORDER — PREDNISONE 2.5 MG/1
2.5 TABLET ORAL DAILY
Refills: 1 | COMMUNITY
Start: 2019-06-24 | End: 2019-08-29

## 2019-07-02 NOTE — PROGRESS NOTES
Chief Complaint  Chief Complaint   Patient presents with    FMLA paperwork       HPI  Tosha Kwok is a 54 y.o. female with multiple medical diagnoses as listed in the medical history and problem list that presents for f/u and to have FMLA paperwork filled out.  She has a history of Stage 0 breast cancer, fibromyalgia and lupus.  Going to pain management.   Says her pain remains uncontrolled.  Mostly back and nick pain, but also having pain in the right arm and wrist.  Had an EMG over 5 years ago and was given splints to wear at that time.     Did not bring FMLA paperwork with her.  She understands that her job is going to fax it to us.  (This would be unusual as patient are usually required to print the paperwork themselves.)  The following information might be needed:    Leave started 5/2/2019 and expected to continue until 11/2/19.  She is applying for permanent disability.  She has been off of work completely since that time.  She works in [employer] and is unable to type at the keyboard or stay in a sitting position for more than 15 minutes at a time.    Did PT over 1 year ago with no relief.  Had a nerve ablation on 6/20 with no improvement.   Zonster on 5/27/19    Anxiety:  Has had problems with anxiety for many years.  Was taking clonazepam, but her pain management stopped prescribing it.  Lexapro was prescribed about 3 months ago.  This has not helped.  Has occasional panic attacks.  Irritability.  Feels anxious most of the time.  Doesn't sleep well.  We stopped her ambien because she is on opioids and it's probably too sedating.  Elavil was not helpful at 25mg nightly.     Asthma:  Rarely uses her rescue inhaler.  Rarely uses her Pulmicort.  No cough or wheezing    Anemia:  Says she had labs recently and her H & H was low. Hemoglobins is slightly low.  Vitamin D and B12 are also low.  In surgical menopause.  No rectal bleeding.  No restless leg.  Always has fatigue.      PAST MEDICAL  HISTORY:  Past Medical History:   Diagnosis Date    Ankylosing spondylitis     Anxiety     Asthma     Cancer     Breast    Chronic constipation     Chronic insomnia     Edema     Fibromyalgia     Interstitial cystitis     Lupus     Migraine headache     Restless legs syndrome     Shingles     TIA (transient ischemic attack)        PAST SURGICAL HISTORY:  Past Surgical History:   Procedure Laterality Date    BLADDER SURGERY      BREAST SURGERY  2018    reduction     SECTION      CHOLECYSTECTOMY      HYSTERECTOMY      LASER ABLATION      to back    SLEEVE GASTROPLASTY  2016    TUBAL LIGATION         SOCIAL HISTORY:  Social History     Socioeconomic History    Marital status:      Spouse name: Not on file    Number of children: Not on file    Years of education: Not on file    Highest education level: Not on file   Occupational History    Not on file   Social Needs    Financial resource strain: Not hard at all    Food insecurity:     Worry: Never true     Inability: Never true    Transportation needs:     Medical: No     Non-medical: No   Tobacco Use    Smoking status: Never Smoker    Smokeless tobacco: Never Used   Substance and Sexual Activity    Alcohol use: Yes     Frequency: Monthly or less     Drinks per session: 1 or 2     Binge frequency: Never     Comment: occasionally    Drug use: No    Sexual activity: Yes     Partners: Male   Lifestyle    Physical activity:     Days per week: 2 days     Minutes per session: 20 min    Stress: To some extent   Relationships    Social connections:     Talks on phone: Three times a week     Gets together: Once a week     Attends Church service: Not on file     Active member of club or organization: Yes     Attends meetings of clubs or organizations: Never     Relationship status:    Other Topics Concern    Not on file   Social History Narrative    Not on file       FAMILY HISTORY:  Family History   Problem  Relation Age of Onset    Hypertension Mother     Hypertension Brother     Breast cancer Maternal Aunt         x2        ALLERGIES AND MEDICATIONS: updated and reviewed.  Review of patient's allergies indicates:   Allergen Reactions    Morphine Hives    Erythromycin Other (See Comments)    Other      Other reaction(s): Angioedema, carrots    Zofran (as hydrochloride) [ondansetron hcl] Hives     Local hive after IV injection     Current Outpatient Medications   Medication Sig Dispense Refill    albuterol (PROAIR HFA) 90 mcg/actuation inhaler Inhale 2 puffs into the lungs every 4 (four) hours as needed. 18 g 3    albuterol (PROVENTIL/VENTOLIN HFA) 90 mcg/actuation inhaler Inhale into the lungs.      budesonide 180mcg (PULMICORT 180MCG) 180 mcg/actuation AePB Inhale 2 puffs into the lungs once daily. 1 each 3    butalbital-acetaminophen-caffeine -40 mg (FIORICET, ESGIC) -40 mg per tablet Take 1 to 2 tablets orally every 6 hours as needed for headache      diclofenac sodium (VOLTAREN) 1 % Gel 1 %.  Gel Topical .  AAA bid      escitalopram oxalate (LEXAPRO) 20 MG tablet Take 20 mg by mouth once daily.  3    estradiol 0.1 mg/24 hr td ptwk (ESTRADIOL TRANSDERMAL PATCH) 0.1 mg/24 hr PTWK Place 1 patch onto the skin every 7 days.      fentaNYL (DURAGESIC) 50 mcg/hr APPLY 1 PATCH TO SKIN EVERY 3 DAYS AS NEEDED  0    gabapentin (NEURONTIN) 300 MG capsule Take 300 mg by mouth 3 (three) times daily.      HYDROcodone-acetaminophen (NORCO)  mg per tablet Take 1 tablet by mouth every 8 (eight) hours as needed for Pain.       HYDROmorphone (DILAUDID) 2 MG tablet Take 2 mg by mouth every 8 (eight) hours as needed for Pain.      hydroxychloroquine (PLAQUENIL) 200 mg tablet Take 200 mg by mouth once daily.  1    hydrOXYzine pamoate (VISTARIL) 25 MG Cap Take 25 mg by mouth.      lidocaine (LIDODERM) 5 %(700 mg/patch) 1 Adhesive Patch, Medicated Topical Every 12 hours.   [id number]      lisinopril  (PRINIVIL,ZESTRIL) 20 MG tablet Take 20 mg by mouth once daily.      meclizine (ANTIVERT) 25 mg tablet TAKE 1 TABLET BY MOUTH THREE TIMES A DAY AS NEEDED FOR DIZZINESS FOR 5 DAYS.  0    omeprazole (PRILOSEC) 20 MG capsule Take 20 mg by mouth. 1 Capsule, Delayed Release(E.C.) Oral Every day      predniSONE (DELTASONE) 2.5 MG tablet Take 2.5 mg by mouth once daily.  1    progesterone (PROMETRIUM) 100 MG capsule Take 100 mg by mouth nightly.  10    tiZANidine (ZANAFLEX) 4 MG tablet Take 4 mg by mouth 3 (three) times daily.  3    topiramate (TOPAMAX) 50 MG tablet TAKE 1 TABLET BY MOUTH TWICE A DAY FOR MIGRAINE PREVENTION 60 tablet 0    cyanocobalamin 1,000 mcg/mL injection Inject 1 mL (1,000 mcg total) into the muscle every 28 days. 10 mL 3    EPINEPHrine (EPIPEN 2-DENNIS) 0.3 mg/0.3 mL AtIn Inject 0.3 mLs (0.3 mg total) into the muscle once. for 1 dose 2 Device 3    ergocalciferol (ERGOCALCIFEROL) 50,000 unit Cap Take 1 capsule (50,000 Units total) by mouth every 7 days. 12 capsule 0    HYDROmorphone (DILAUDID) 2 MG tablet Take 1 tablet by mouth every 8 hours as needed for pain 90 tablet 0    oxyCODONE-acetaminophen (PERCOCET) 5-325 mg per tablet Take 1 tablet by mouth every 6 (six) hours as needed for Pain. 12 tablet 0    promethazine (PHENERGAN) 25 MG tablet Take 25 mg by mouth. 1 Tablet Oral Every 6 hours      traZODone (DESYREL) 100 MG tablet Take 1 tablet (100 mg total) by mouth every evening. 30 tablet 11    valACYclovir (VALTREX) 500 MG tablet Take 500 mg by mouth 2 (two) times daily.       No current facility-administered medications for this visit.          ROS  Review of Systems   Constitutional: Positive for fatigue. Negative for activity change, appetite change, chills and unexpected weight change.   HENT: Negative for congestion, ear discharge, ear pain, hearing loss, rhinorrhea, sinus pain, sore throat and trouble swallowing.    Eyes: Negative for photophobia, pain, discharge, redness, itching  "and visual disturbance.   Respiratory: Negative for cough, chest tightness, shortness of breath and wheezing.    Cardiovascular: Negative for chest pain, palpitations and leg swelling.   Gastrointestinal: Positive for constipation. Negative for abdominal distention, abdominal pain, blood in stool, diarrhea, nausea and vomiting.   Endocrine: Negative for polydipsia and polyuria.   Genitourinary: Positive for difficulty urinating. Negative for dysuria, hematuria, menstrual problem, pelvic pain, vaginal bleeding, vaginal discharge and vaginal pain.   Musculoskeletal: Positive for joint swelling and myalgias. Negative for arthralgias, back pain, gait problem and neck pain.   Skin: Negative for color change, pallor and rash.   Neurological: Negative for dizziness, tremors, weakness, light-headedness, numbness and headaches.   Psychiatric/Behavioral: Positive for dysphoric mood. Negative for agitation, behavioral problems, confusion and sleep disturbance.           PHYSICAL EXAM  Vitals:    07/02/19 1308   BP: 130/84   Pulse: 88   Temp: 99.2 °F (37.3 °C)   SpO2: 99%   Weight: 74 kg (163 lb 4 oz)   Height: 5' 2" (1.575 m)    Body mass index is 29.86 kg/m².  Weight: 74 kg (163 lb 4 oz)   Height: 5' 2" (157.5 cm)     Physical Exam   Constitutional: She is oriented to person, place, and time. She appears well-developed and well-nourished. No distress.   HENT:   Head: Normocephalic and atraumatic.   Right Ear: External ear normal.   Left Ear: External ear normal.   Mouth/Throat: Oropharynx is clear and moist. No oropharyngeal exudate.   Eyes: Pupils are equal, round, and reactive to light. Conjunctivae and EOM are normal. Right eye exhibits no discharge. Left eye exhibits no discharge.   Neck: Normal range of motion. Neck supple. No tracheal deviation present. No thyromegaly present.   Cardiovascular: Normal rate, regular rhythm, normal heart sounds and intact distal pulses. Exam reveals no gallop and no friction rub.   No " murmur heard.  Pulmonary/Chest: Effort normal and breath sounds normal. No respiratory distress. She has no wheezes. She has no rales. She exhibits no tenderness.   Abdominal: Soft. Bowel sounds are normal. She exhibits no distension. There is no tenderness. There is no guarding.   Musculoskeletal: Normal range of motion. She exhibits no edema, tenderness or deformity.   Neurological: She is alert and oriented to person, place, and time. She displays normal reflexes. No cranial nerve deficit. She exhibits normal muscle tone. Coordination normal.   Skin: Skin is warm and dry. Capillary refill takes less than 2 seconds. No rash noted. She is not diaphoretic. No erythema. No pallor.   Psychiatric: She has a normal mood and affect. Her behavior is normal. Judgment normal.         Health Maintenance       Date Due Completion Date    TETANUS VACCINE 03/13/1985 ---    Lipid Panel 05/08/2017 5/8/2012    Mammogram 11/29/2019 (Originally 3/13/2007) ---    Colonoscopy 12/31/2019 (Originally 3/13/2017) ---    Influenza Vaccine 08/01/2019 11/2/2016        Lab Visit on 06/28/2019   Component Date Value Ref Range Status    Fecal Immunochemical Test (iFOBT) 06/27/2019 Negative  Negative Final   Lab Visit on 06/11/2019   Component Date Value Ref Range Status    Cholesterol 06/11/2019 230* 120 - 199 mg/dL Final    Comment: The National Cholesterol Education Program (NCEP) has set the  following guidelines (reference ranges) for Cholesterol:  Optimal.....................<200 mg/dL  Borderline High.............200-239 mg/dL  High........................> or = 240 mg/dL      Triglycerides 06/11/2019 106  30 - 150 mg/dL Final    Comment: The National Cholesterol Education Program (NCEP) has set the  following guidelines (reference values) for triglycerides:  Normal......................<150 mg/dL  Borderline High.............150-199 mg/dL  High........................200-499 mg/dL      HDL 06/11/2019 64  40 - 75 mg/dL Final     Comment: The National Cholesterol Education Program (NCEP) has set the  following guidelines (reference values) for HDL Cholesterol:  Low...............<40 mg/dL  Optimal...........>60 mg/dL      LDL Cholesterol 06/11/2019 144.8  63.0 - 159.0 mg/dL Final    Comment: The National Cholesterol Education Program (NCEP) has set the  following guidelines (reference values) for LDL Cholesterol:  Optimal.......................<130 mg/dL  Borderline High...............130-159 mg/dL  High..........................160-189 mg/dL  Very High.....................>190 mg/dL      Hdl/Cholesterol Ratio 06/11/2019 27.8  20.0 - 50.0 % Final    Total Cholesterol/HDL Ratio 06/11/2019 3.6  2.0 - 5.0 Final    Non-HDL Cholesterol 06/11/2019 166  mg/dL Final    Comment: Risk category and Non-HDL cholesterol goals:  Coronary heart disease (CHD)or equivalent (10-year risk of CHD >20%):  Non-HDL cholesterol goal     <130 mg/dL  Two or more CHD risk factors and 10-year risk of CHD <= 20%:  Non-HDL cholesterol goal     <160 mg/dL  0 to 1 CHD risk factor:  Non-HDL cholesterol goal     <190 mg/dL      WBC 06/11/2019 3.61* 3.90 - 12.70 K/uL Final    RBC 06/11/2019 4.07  4.00 - 5.40 M/uL Final    Hemoglobin 06/11/2019 11.7* 12.0 - 16.0 g/dL Final    Hematocrit 06/11/2019 36.2* 37.0 - 48.5 % Final    Mean Corpuscular Volume 06/11/2019 89  82 - 98 fL Final    Mean Corpuscular Hemoglobin 06/11/2019 28.7  27.0 - 31.0 pg Final    Mean Corpuscular Hemoglobin Conc 06/11/2019 32.3  32.0 - 36.0 g/dL Final    RDW 06/11/2019 14.6* 11.5 - 14.5 % Final    Platelets 06/11/2019 331  150 - 350 K/uL Final    MPV 06/11/2019 10.4  9.2 - 12.9 fL Final    Immature Granulocytes 06/11/2019 0.0  0.0 - 0.5 % Final    Gran # (ANC) 06/11/2019 1.0* 1.8 - 7.7 K/uL Final    Immature Grans (Abs) 06/11/2019 0.00  0.00 - 0.04 K/uL Final    Comment: Mild elevation in immature granulocytes is non specific and   can be seen in a variety of conditions including stress  response,   acute inflammation, trauma and pregnancy. Correlation with other   laboratory and clinical findings is essential.      Lymph # 06/11/2019 2.1  1.0 - 4.8 K/uL Final    Mono # 06/11/2019 0.3  0.3 - 1.0 K/uL Final    Eos # 06/11/2019 0.2  0.0 - 0.5 K/uL Final    Baso # 06/11/2019 0.03  0.00 - 0.20 K/uL Final    nRBC 06/11/2019 0  0 /100 WBC Final    Gran% 06/11/2019 28.9* 38.0 - 73.0 % Final    Lymph% 06/11/2019 57.6* 18.0 - 48.0 % Final    Mono% 06/11/2019 8.0  4.0 - 15.0 % Final    Eosinophil% 06/11/2019 4.7  0.0 - 8.0 % Final    Basophil% 06/11/2019 0.8  0.0 - 1.9 % Final    Differential Method 06/11/2019 Automated   Final    Sodium 06/11/2019 139  136 - 145 mmol/L Final    Potassium 06/11/2019 3.6  3.5 - 5.1 mmol/L Final    Chloride 06/11/2019 107  95 - 110 mmol/L Final    CO2 06/11/2019 21* 23 - 29 mmol/L Final    Glucose 06/11/2019 65* 70 - 110 mg/dL Final    BUN, Bld 06/11/2019 15  6 - 20 mg/dL Final    Creatinine 06/11/2019 0.8  0.5 - 1.4 mg/dL Final    Calcium 06/11/2019 9.7  8.7 - 10.5 mg/dL Final    Total Protein 06/11/2019 7.7  6.0 - 8.4 g/dL Final    Albumin 06/11/2019 4.3  3.5 - 5.2 g/dL Final    Total Bilirubin 06/11/2019 0.4  0.1 - 1.0 mg/dL Final    Comment: For infants and newborns, interpretation of results should be based  on gestational age, weight and in agreement with clinical  observations.  Premature Infant recommended reference ranges:  Up to 24 hours.............<8.0 mg/dL  Up to 48 hours............<12.0 mg/dL  3-5 days..................<15.0 mg/dL  6-29 days.................<15.0 mg/dL      Alkaline Phosphatase 06/11/2019 69  55 - 135 U/L Final    AST 06/11/2019 16  10 - 40 U/L Final    ALT 06/11/2019 18  10 - 44 U/L Final    Anion Gap 06/11/2019 11  8 - 16 mmol/L Final    eGFR if African American 06/11/2019 >60.0  >60 mL/min/1.73 m^2 Final    eGFR if non African American 06/11/2019 >60.0  >60 mL/min/1.73 m^2 Final    Comment: Calculation used to  obtain the estimated glomerular filtration  rate (eGFR) is the CKD-EPI equation.       TSH 06/11/2019 1.293  0.400 - 4.000 uIU/mL Final    Vit D, 25-Hydroxy 06/11/2019 12* 30 - 96 ng/mL Final    Comment: Vitamin D deficiency.........<10 ng/mL                              Vitamin D insufficiency......10-29 ng/mL       Vitamin D sufficiency........> or equal to 30 ng/mL  Vitamin D toxicity............>100 ng/mL      Iron 06/11/2019 115  30 - 160 ug/dL Final    Vitamin B-12 06/11/2019 169* 210 - 950 pg/mL Final         Assessment & Plan      Tosha  was seen today for [facility] paperwork.    Diagnoses and all orders for this visit:    Carpal tunnel syndrome of right wrist  -     Ambulatory referral to Orthopedics    Insomnia, unspecified type  -     traZODone (DESYREL) 100 MG tablet; Take 1 tablet (100 mg total) by mouth every evening.    Vitamin B12 deficiency  -     cyanocobalamin 1,000 mcg/mL injection; Inject 1 mL (1,000 mcg total) into the muscle every 28 days.  -     Vitamin B12; Future    Vitamin D deficiency  -     ergocalciferol (ERGOCALCIFEROL) 50,000 unit Cap; Take 1 capsule (50,000 Units total) by mouth every 7 days.  -     Vitamin D; Future          Follow-up: No follow-ups on file.

## 2019-07-03 ENCOUNTER — OFFICE VISIT (OUTPATIENT)
Dept: ORTHOPEDICS | Facility: CLINIC | Age: 54
End: 2019-07-03
Attending: ORTHOPAEDIC SURGERY
Payer: COMMERCIAL

## 2019-07-03 VITALS — HEIGHT: 62 IN | WEIGHT: 163 LBS | BODY MASS INDEX: 30 KG/M2

## 2019-07-03 DIAGNOSIS — M65.4 DE QUERVAIN'S TENOSYNOVITIS, RIGHT: ICD-10-CM

## 2019-07-03 DIAGNOSIS — R20.0 HAND NUMBNESS: Primary | ICD-10-CM

## 2019-07-03 PROCEDURE — 99203 OFFICE O/P NEW LOW 30 MIN: CPT | Mod: 25,S$GLB,, | Performed by: ORTHOPAEDIC SURGERY

## 2019-07-03 PROCEDURE — 20550 NJX 1 TENDON SHEATH/LIGAMENT: CPT | Mod: RT,S$GLB,, | Performed by: ORTHOPAEDIC SURGERY

## 2019-07-03 PROCEDURE — 99203 PR OFFICE/OUTPT VISIT, NEW, LEVL III, 30-44 MIN: ICD-10-PCS | Mod: 25,S$GLB,, | Performed by: ORTHOPAEDIC SURGERY

## 2019-07-03 PROCEDURE — 99999 PR PBB SHADOW E&M-EST. PATIENT-LVL II: CPT | Mod: PBBFAC,,, | Performed by: ORTHOPAEDIC SURGERY

## 2019-07-03 PROCEDURE — 3008F BODY MASS INDEX DOCD: CPT | Mod: CPTII,S$GLB,, | Performed by: ORTHOPAEDIC SURGERY

## 2019-07-03 PROCEDURE — 20550 PR INJECT TENDON SHEATH/LIGAMENT: ICD-10-PCS | Mod: RT,S$GLB,, | Performed by: ORTHOPAEDIC SURGERY

## 2019-07-03 PROCEDURE — 99999 PR PBB SHADOW E&M-EST. PATIENT-LVL II: ICD-10-PCS | Mod: PBBFAC,,, | Performed by: ORTHOPAEDIC SURGERY

## 2019-07-03 PROCEDURE — 3008F PR BODY MASS INDEX (BMI) DOCUMENTED: ICD-10-PCS | Mod: CPTII,S$GLB,, | Performed by: ORTHOPAEDIC SURGERY

## 2019-07-03 RX ORDER — TRIAMCINOLONE ACETONIDE 40 MG/ML
20 INJECTION, SUSPENSION INTRA-ARTICULAR; INTRAMUSCULAR
Status: COMPLETED | OUTPATIENT
Start: 2019-07-03 | End: 2019-07-03

## 2019-07-03 RX ADMIN — TRIAMCINOLONE ACETONIDE 20 MG: 40 INJECTION, SUSPENSION INTRA-ARTICULAR; INTRAMUSCULAR at 01:07

## 2019-07-03 NOTE — PROGRESS NOTES
Subjective:      Patient ID: Tosha Kwok is a 54 y.o. female.    Chief Complaint: Pain and Numbness of the Right Shoulder; Pain and Numbness of the Right Arm; and Pain, Numbness, and Tingling of the Right Hand      HPI  Tosha Kwok is a  54 y.o. female presenting today for right hand and wrist pain.  There was not a history of trauma.  Onset of symptoms began several months ago.  She has noticed some pain and swelling the radial aspect of the right wrist usually worse with gripping  But she also has some nocturnal symptoms including numbness tingling in all the fingers  She does have a history of neck problems and had a nerve test a few years ago but she does know the results of that  No history of trauma  .      Review of patient's allergies indicates:   Allergen Reactions    Morphine Hives    Erythromycin Other (See Comments)    Other      Other reaction(s): Angioedema, carrots    Zofran (as hydrochloride) [ondansetron hcl] Hives     Local hive after IV injection         Current Outpatient Medications   Medication Sig Dispense Refill    albuterol (PROAIR HFA) 90 mcg/actuation inhaler Inhale 2 puffs into the lungs every 4 (four) hours as needed. 18 g 3    albuterol (PROVENTIL/VENTOLIN HFA) 90 mcg/actuation inhaler Inhale into the lungs.      budesonide 180mcg (PULMICORT 180MCG) 180 mcg/actuation AePB Inhale 2 puffs into the lungs once daily. 1 each 3    butalbital-acetaminophen-caffeine -40 mg (FIORICET, ESGIC) -40 mg per tablet Take 1 to 2 tablets orally every 6 hours as needed for headache      cyanocobalamin 1,000 mcg/mL injection Inject 1 mL (1,000 mcg total) into the muscle every 28 days. 10 mL 3    diclofenac sodium (VOLTAREN) 1 % Gel 1 %.  Gel Topical .  AAA bid      ergocalciferol (ERGOCALCIFEROL) 50,000 unit Cap Take 1 capsule (50,000 Units total) by mouth every 7 days. 12 capsule 0    escitalopram oxalate (LEXAPRO) 20 MG tablet Take 20 mg by mouth once daily.  3    estradiol  0.1 mg/24 hr td ptwk (ESTRADIOL TRANSDERMAL PATCH) 0.1 mg/24 hr PTWK Place 1 patch onto the skin every 7 days.      fentaNYL (DURAGESIC) 50 mcg/hr APPLY 1 PATCH TO SKIN EVERY 3 DAYS AS NEEDED  0    gabapentin (NEURONTIN) 300 MG capsule Take 300 mg by mouth 3 (three) times daily.      HYDROcodone-acetaminophen (NORCO)  mg per tablet Take 1 tablet by mouth every 8 (eight) hours as needed for Pain.       HYDROmorphone (DILAUDID) 2 MG tablet Take 2 mg by mouth every 8 (eight) hours as needed for Pain.      HYDROmorphone (DILAUDID) 2 MG tablet Take 1 tablet by mouth every 8 hours as needed for pain 90 tablet 0    hydroxychloroquine (PLAQUENIL) 200 mg tablet Take 200 mg by mouth once daily.  1    hydrOXYzine pamoate (VISTARIL) 25 MG Cap Take 25 mg by mouth.      lidocaine (LIDODERM) 5 %(700 mg/patch) 1 Adhesive Patch, Medicated Topical Every 12 hours.   [id number]      lisinopril (PRINIVIL,ZESTRIL) 20 MG tablet Take 20 mg by mouth once daily.      meclizine (ANTIVERT) 25 mg tablet TAKE 1 TABLET BY MOUTH THREE TIMES A DAY AS NEEDED FOR DIZZINESS FOR 5 DAYS.  0    omeprazole (PRILOSEC) 20 MG capsule Take 20 mg by mouth. 1 Capsule, Delayed Release(E.C.) Oral Every day      oxyCODONE-acetaminophen (PERCOCET) 5-325 mg per tablet Take 1 tablet by mouth every 6 (six) hours as needed for Pain. 12 tablet 0    predniSONE (DELTASONE) 2.5 MG tablet Take 2.5 mg by mouth once daily.  1    progesterone (PROMETRIUM) 100 MG capsule Take 100 mg by mouth nightly.  10    promethazine (PHENERGAN) 25 MG tablet Take 25 mg by mouth. 1 Tablet Oral Every 6 hours      tiZANidine (ZANAFLEX) 4 MG tablet Take 4 mg by mouth 3 (three) times daily.  3    topiramate (TOPAMAX) 50 MG tablet TAKE 1 TABLET BY MOUTH TWICE A DAY FOR MIGRAINE PREVENTION 60 tablet 0    traZODone (DESYREL) 100 MG tablet Take 1 tablet (100 mg total) by mouth every evening. 30 tablet 11    valACYclovir (VALTREX) 500 MG tablet Take 500 mg by mouth 2 (two)  "times daily.      EPINEPHrine (EPIPEN 2-DENNIS) 0.3 mg/0.3 mL AtIn Inject 0.3 mLs (0.3 mg total) into the muscle once. for 1 dose 2 Device 3     Current Facility-Administered Medications   Medication Dose Route Frequency Provider Last Rate Last Dose    [COMPLETED] triamcinolone acetonide injection 20 mg  20 mg INTRABURSAL 1 time in Clinic/HOD Dion Mojica Jr., MD   20 mg at 19 1330       Past Medical History:   Diagnosis Date    Ankylosing spondylitis     Anxiety     Asthma     Cancer     Breast    Chronic constipation     Chronic insomnia     Edema     Fibromyalgia     Interstitial cystitis     Lupus     Migraine headache     Restless legs syndrome     Shingles     TIA (transient ischemic attack)        Past Surgical History:   Procedure Laterality Date    BLADDER SURGERY      BREAST SURGERY  2018    reduction     SECTION      CHOLECYSTECTOMY      HYSTERECTOMY      LASER ABLATION      to back    SLEEVE GASTROPLASTY  2016    TUBAL LIGATION         Review of Systems:  ROS    OBJECTIVE:     PHYSICAL EXAM:  Height: 5' 2" (157.5 cm) Weight: 73.9 kg (163 lb)  Vitals:    19 1308   Weight: 73.9 kg (163 lb)   Height: 5' 2" (1.575 m)   PainSc:   6   PainLoc: Hand     Well developed, well nourished female in no acute distress  Alert and oriented x 3  HEENT- Normal exam  Lungs- Clear to auscultation  Heart- Regular rate and rhythm  Abdomen- Soft nontender  Extremity exam- examination right hand demonstrates some mild swelling over the 1st dorsal compartment of the wrist. Tenderness in that area  Positive Finkelstein test  Negative Tinel sign negative Phalen's test  Range of motion wrist fingers full  Sensation intact all digits  No atrophy noted      RADIOGRAPHS:  None  Comments: I have personally reviewed the imaging and I agree with the above radiologist's report.    ASSESSMENT/PLAN:     IMPRESSION:  1.  De Quervain tendonitis right wrist.  2.  Possible superimposed compression " neuropathy right arm.    PLAN:  I explained the nature of the problem to the patient. For the de Quervain I recommended injection.  After pause for time-out identified the right wrist injected 1st dorsal compartment  With combination Kenalog 20 mg 0.5 cc xylocaine sterile technique  She tolerated the procedure well without complication  I have also given her a thumb wrap for support  Additionally I ordered a nerve conduction study and explained that we would need to evaluate the nerves in the right arm because this is a different problem from the tendinitis  Follow-up after the nerve test is complete       - We talked at length about the anatomy and pathophysiology of   Encounter Diagnoses   Name Primary?    Hand numbness Yes    De Quervain's tenosynovitis, right            Disclaimer: This note has been generated using voice-recognition software. There may be typographical errors that have been missed during proof-reading.

## 2019-07-03 NOTE — LETTER
July 3, 2019      Jeannie Gibson, DO  [address] [address]           [facility] [address] [address] [address], [address] [place] [id number]  Phone: [phone]          Patient: Tosha Kwok   MR Number: [id number]   YOB: 1965   Date of Visit: 7/3/2019       Dear Dr. Jeannie Gibson:    Thank you for referring Tosha Kwok to me for evaluation. Attached you will find relevant portions of my assessment and plan of care.    If you have questions, please do not hesitate to call me. I look forward to following Tosha Kwok along with you.    Sincerely,    Dion Mojica Jr., MD    Enclosure  CC:  No Recipients    If you would like to receive this communication electronically, please contact [email or web address] or [phone] to request more information on [employer] Link access.    For providers and/or their staff who would like to refer a patient to [facility], please contact us through our one-stop-shop provider referral line, [facility], at [phone].    If you feel you have received this communication in error or would no longer like to receive these types of communications, please e-mail [email or web address]

## 2019-07-09 ENCOUNTER — PATIENT MESSAGE (OUTPATIENT)
Dept: ORTHOPEDICS | Facility: CLINIC | Age: 54
End: 2019-07-09

## 2019-07-10 ENCOUNTER — TELEPHONE (OUTPATIENT)
Dept: ORTHOPEDICS | Facility: CLINIC | Age: 54
End: 2019-07-10

## 2019-07-10 NOTE — TELEPHONE ENCOUNTER
"Message sent to  " Fabien , please put in External EMG order so I can fax to [facility] for her EMG text . "     "

## 2019-07-22 ENCOUNTER — OFFICE VISIT (OUTPATIENT)
Dept: NEUROLOGY | Facility: CLINIC | Age: 54
End: 2019-07-22
Payer: COMMERCIAL

## 2019-07-22 VITALS
BODY MASS INDEX: 30.39 KG/M2 | SYSTOLIC BLOOD PRESSURE: 162 MMHG | HEART RATE: 80 BPM | WEIGHT: 165.13 LBS | DIASTOLIC BLOOD PRESSURE: 84 MMHG | HEIGHT: 62 IN

## 2019-07-22 DIAGNOSIS — G43.809 OTHER MIGRAINE WITHOUT STATUS MIGRAINOSUS, NOT INTRACTABLE: ICD-10-CM

## 2019-07-22 PROBLEM — K21.9 GERD (GASTROESOPHAGEAL REFLUX DISEASE): Status: ACTIVE | Noted: 2019-07-22

## 2019-07-22 PROBLEM — G43.909 MIGRAINE: Status: ACTIVE | Noted: 2019-07-22

## 2019-07-22 PROBLEM — F41.9 ANXIETY: Status: ACTIVE | Noted: 2019-07-22

## 2019-07-22 PROBLEM — C50.919 MALIGNANT NEOPLASM OF FEMALE BREAST: Status: ACTIVE | Noted: 2019-07-22

## 2019-07-22 PROBLEM — E78.5 HYPERLIPIDEMIA: Status: ACTIVE | Noted: 2019-07-22

## 2019-07-22 PROBLEM — E55.9 VITAMIN D DEFICIENCY: Status: ACTIVE | Noted: 2019-07-22

## 2019-07-22 PROBLEM — Z79.890 POSTMENOPAUSAL HORMONE REPLACEMENT THERAPY: Status: ACTIVE | Noted: 2019-07-22

## 2019-07-22 PROCEDURE — 99203 OFFICE O/P NEW LOW 30 MIN: CPT | Mod: S$GLB,,, | Performed by: PSYCHIATRY & NEUROLOGY

## 2019-07-22 PROCEDURE — 99999 PR PBB SHADOW E&M-EST. PATIENT-LVL III: ICD-10-PCS | Mod: PBBFAC,,, | Performed by: PSYCHIATRY & NEUROLOGY

## 2019-07-22 PROCEDURE — 3008F PR BODY MASS INDEX (BMI) DOCUMENTED: ICD-10-PCS | Mod: CPTII,S$GLB,, | Performed by: PSYCHIATRY & NEUROLOGY

## 2019-07-22 PROCEDURE — 99203 PR OFFICE/OUTPT VISIT, NEW, LEVL III, 30-44 MIN: ICD-10-PCS | Mod: S$GLB,,, | Performed by: PSYCHIATRY & NEUROLOGY

## 2019-07-22 PROCEDURE — 99999 PR PBB SHADOW E&M-EST. PATIENT-LVL III: CPT | Mod: PBBFAC,,, | Performed by: PSYCHIATRY & NEUROLOGY

## 2019-07-22 PROCEDURE — 3008F BODY MASS INDEX DOCD: CPT | Mod: CPTII,S$GLB,, | Performed by: PSYCHIATRY & NEUROLOGY

## 2019-07-22 RX ORDER — TOPIRAMATE 50 MG/1
TABLET, FILM COATED ORAL
Qty: 180 TABLET | Refills: 3 | Status: SHIPPED | OUTPATIENT
Start: 2019-07-22 | End: 2019-10-16 | Stop reason: SDUPTHER

## 2019-07-22 NOTE — PROGRESS NOTES
[facility] NEUROLOGY  [facility] [place]    Date: 7/22/19  Patient Name: Tosha Kwok   MRN: [id number]   PCP: Cecille Oliveira  Referring Provider: Self, Aaareferral    Assessment:   Tosha Kwok is a 54 y.o. female Presenting for evaluation of a funny feeling in her head.  Patient's symptoms are very amorphous.  Her neurologic exam is completely normal today.  May be representative of a migrainous phenomenon.  As sensation began with discontinuation of Topamax, favor resumption of this.    Plan:     Problem List Items Addressed This Visit        Neuro    Migraine    Current Assessment & Plan     -- resume topamax tapering up to prior dose of 50 mg nightly               Skyler Sheth MD  [facility]   Department of Neurology    Patient note was created using MModal Dictation.  Any errors in syntax or even information may not have been identified and edited on initial review prior to signing this note.  Subjective:          HPI:   Ms. Tosha Kwok is a 54 y.o. female presenting for evaluation of a funny feeling in my head.  The patient states that she experiences a few seconds of the feeling like they are going over the top of a roller coaster throughout her head.  She states that at the longest this lasts 30 sec and at most last only an instant.  The episodes are not associated with any other symptoms such as loss of consciousness, weakness, numbness, change in coordination, change in speech, vertigo, presyncope, palpitations, chest pain, shortness of breath, or altered awareness.  She states this is occurring approximately 10 times per month and nothing clearly provokes it.  It can occur when she is lying still in bed and can also occur when walking around.  She does state that she feels that the symptoms began after she discontinued Topamax which she had been on for management of migraine.  She does believe that her migraine frequency has increased to 1-2 per month since  discontinuing Topamax.    PAST MEDICAL HISTORY:  Past Medical History:   Diagnosis Date    Ankylosing spondylitis     Anxiety     Asthma     Cancer     Breast    Chronic constipation     Chronic insomnia     Edema     Fibromyalgia     Interstitial cystitis     Lupus     Migraine headache     Restless legs syndrome     Shingles     TIA (transient ischemic attack)        PAST SURGICAL HISTORY:  Past Surgical History:   Procedure Laterality Date    BLADDER SURGERY      BREAST SURGERY  2018    reduction     SECTION      CHOLECYSTECTOMY      HYSTERECTOMY      LASER ABLATION      to back    SLEEVE GASTROPLASTY  2016    TUBAL LIGATION         CURRENT MEDS:  Current Outpatient Medications   Medication Sig Dispense Refill    albuterol (PROAIR HFA) 90 mcg/actuation inhaler Inhale 2 puffs into the lungs every 4 (four) hours as needed. 18 g 3    albuterol (PROVENTIL/VENTOLIN HFA) 90 mcg/actuation inhaler Inhale into the lungs.      cyanocobalamin 1,000 mcg/mL injection Inject 1 mL (1,000 mcg total) into the muscle every 28 days. 10 mL 3    diclofenac sodium (VOLTAREN) 1 % Gel 1 %.  Gel Topical .  AAA bid      ergocalciferol (ERGOCALCIFEROL) 50,000 unit Cap Take 1 capsule (50,000 Units total) by mouth every 7 days. 12 capsule 0    escitalopram oxalate (LEXAPRO) 20 MG tablet Take 20 mg by mouth once daily.  3    estradiol 0.1 mg/24 hr td ptwk (ESTRADIOL TRANSDERMAL PATCH) 0.1 mg/24 hr PTWK Place 1 patch onto the skin every 7 days.      fentaNYL (DURAGESIC) 50 mcg/hr APPLY 1 PATCH TO SKIN EVERY 3 DAYS AS NEEDED  0    gabapentin (NEURONTIN) 300 MG capsule Take 300 mg by mouth 3 (three) times daily.      hydroxychloroquine (PLAQUENIL) 200 mg tablet Take 200 mg by mouth once daily.  1    hydrOXYzine pamoate (VISTARIL) 25 MG Cap Take 25 mg by mouth.      lidocaine (LIDODERM) 5 %(700 mg/patch) 1 Adhesive Patch, Medicated Topical Every 12 hours.   [id number]      omeprazole (PRILOSEC) 20 MG  capsule Take 20 mg by mouth. 1 Capsule, Delayed Release(E.C.) Oral Every day      tiZANidine (ZANAFLEX) 4 MG tablet Take 4 mg by mouth 3 (three) times daily.  3    budesonide 180mcg (PULMICORT 180MCG) 180 mcg/actuation AePB Inhale 2 puffs into the lungs once daily. 1 each 3    butalbital-acetaminophen-caffeine -40 mg (FIORICET, ESGIC) -40 mg per tablet Take 1 to 2 tablets orally every 6 hours as needed for headache      EPINEPHrine (EPIPEN 2-DENNIS) 0.3 mg/0.3 mL AtIn Inject 0.3 mLs (0.3 mg total) into the muscle once. for 1 dose 2 Device 3    HYDROcodone-acetaminophen (NORCO)  mg per tablet Take 1 tablet by mouth every 8 (eight) hours as needed for Pain.       HYDROmorphone (DILAUDID) 2 MG tablet Take 2 mg by mouth every 8 (eight) hours as needed for Pain.      HYDROmorphone (DILAUDID) 2 MG tablet Take 1 tablet by mouth every 8 hours as needed for pain 90 tablet 0    lisinopril (PRINIVIL,ZESTRIL) 20 MG tablet Take 20 mg by mouth once daily.      meclizine (ANTIVERT) 25 mg tablet TAKE 1 TABLET BY MOUTH THREE TIMES A DAY AS NEEDED FOR DIZZINESS FOR 5 DAYS.  0    oxyCODONE-acetaminophen (PERCOCET) 5-325 mg per tablet Take 1 tablet by mouth every 6 (six) hours as needed for Pain. 12 tablet 0    predniSONE (DELTASONE) 2.5 MG tablet Take 2.5 mg by mouth once daily.  1    progesterone (PROMETRIUM) 100 MG capsule Take 100 mg by mouth nightly.  10    promethazine (PHENERGAN) 25 MG tablet Take 25 mg by mouth. 1 Tablet Oral Every 6 hours      topiramate (TOPAMAX) 50 MG tablet Take 0.5 tablets (25 mg total) by mouth every evening for 14 days, THEN 1 tablet (50 mg total) every evening. 180 tablet 3    traZODone (DESYREL) 100 MG tablet Take 1 tablet (100 mg total) by mouth every evening. 30 tablet 11    valACYclovir (VALTREX) 500 MG tablet Take 500 mg by mouth 2 (two) times daily.       No current facility-administered medications for this visit.        ALLERGIES:  Review of patient's allergies  "indicates:   Allergen Reactions    Morphine Hives    Erythromycin Other (See Comments)    Other      Other reaction(s): Angioedema, carrots    Zofran (as hydrochloride) [ondansetron hcl] Hives     Local hive after IV injection       FAMILY HISTORY:  Family History   Problem Relation Age of Onset    Hypertension Mother     Hypertension Brother     Breast cancer Maternal Aunt         x2        SOCIAL HISTORY:  Social History     Tobacco Use    Smoking status: Never Smoker    Smokeless tobacco: Never Used   Substance Use Topics    Alcohol use: Yes     Frequency: Monthly or less     Drinks per session: 1 or 2     Binge frequency: Never     Comment: occasionally    Drug use: No       Review of Systems:  12 system review of systems is negative except for the symptoms mentioned in HPI.      Objective:     Vitals:    07/22/19 1611   BP: (!) 162/84   Pulse: 80   Weight: 74.9 kg (165 lb 2 oz)   Height: 5' 2" (1.575 m)     General: NAD, well nourished   Eyes: no tearing, discharge, no erythema   ENT: moist mucous membranes of the oral cavity, nares patent    Neck: Supple, full range of motion  Cardiovascular: Warm and well perfused, pulses equal and symmetrical  Lungs: Normal work of breathing, normal chest wall excursions  Skin: No rash, lesions, or breakdown on exposed skin  Psychiatry: Mood and affect are appropriate   Abdomen: soft, non tender, non distended  Extremeties: No cyanosis, clubbing or edema.    Neurological   MENTAL STATUS: Alert and oriented to person, place, and time. Attention and concentration within normal limits. Speech without dysarthria. Recent and remote memory within normal limits   CRANIAL NERVES: Visual fields intact. PERRL. EOMI. Facial sensation intact. Face symmetrical. Hearing grossly intact. Full shoulder shrug bilaterally. Tongue protrudes midline   SENSORY: Sensation is intact to light touch throughout.    MOTOR: Normal bulk and tone.  5/5 deltoid, biceps, triceps, interosseous, " hand  bilaterally. 5/5 iliopsoas, knee extension/flexion, foot dorsi/plantarflexion bilaterally.    REFLEXES: Symmetric and 2+ throughout. CEREBELLAR/COORDINATION/GAIT: Gait steady with normal arm swing and stride length.   Finger to nose intact. Normal rapid alternating movements.

## 2019-07-27 ENCOUNTER — PATIENT MESSAGE (OUTPATIENT)
Dept: ORTHOPEDICS | Facility: CLINIC | Age: 54
End: 2019-07-27

## 2019-07-29 ENCOUNTER — TELEPHONE (OUTPATIENT)
Dept: ORTHOPEDICS | Facility: CLINIC | Age: 54
End: 2019-07-29

## 2019-07-29 ENCOUNTER — OFFICE VISIT (OUTPATIENT)
Dept: ORTHOPEDICS | Facility: CLINIC | Age: 54
End: 2019-07-29
Payer: COMMERCIAL

## 2019-07-29 VITALS — BODY MASS INDEX: 30.36 KG/M2 | HEIGHT: 62 IN | WEIGHT: 165 LBS

## 2019-07-29 DIAGNOSIS — M79.7 FIBROMYALGIA: ICD-10-CM

## 2019-07-29 DIAGNOSIS — M79.602 PAIN OF LEFT UPPER EXTREMITY: Primary | ICD-10-CM

## 2019-07-29 PROCEDURE — 99212 OFFICE O/P EST SF 10 MIN: CPT | Mod: S$GLB,,, | Performed by: ORTHOPAEDIC SURGERY

## 2019-07-29 PROCEDURE — 3008F BODY MASS INDEX DOCD: CPT | Mod: CPTII,S$GLB,, | Performed by: ORTHOPAEDIC SURGERY

## 2019-07-29 PROCEDURE — 99999 PR PBB SHADOW E&M-EST. PATIENT-LVL II: CPT | Mod: PBBFAC,,, | Performed by: ORTHOPAEDIC SURGERY

## 2019-07-29 PROCEDURE — 3008F PR BODY MASS INDEX (BMI) DOCUMENTED: ICD-10-PCS | Mod: CPTII,S$GLB,, | Performed by: ORTHOPAEDIC SURGERY

## 2019-07-29 PROCEDURE — 99212 PR OFFICE/OUTPT VISIT, EST, LEVL II, 10-19 MIN: ICD-10-PCS | Mod: S$GLB,,, | Performed by: ORTHOPAEDIC SURGERY

## 2019-07-29 PROCEDURE — 99999 PR PBB SHADOW E&M-EST. PATIENT-LVL II: ICD-10-PCS | Mod: PBBFAC,,, | Performed by: ORTHOPAEDIC SURGERY

## 2019-07-29 RX ORDER — CYCLOBENZAPRINE HCL 5 MG
TABLET ORAL
Refills: 6 | COMMUNITY
Start: 2019-07-21 | End: 2020-09-10

## 2019-07-29 NOTE — TELEPHONE ENCOUNTER
----- Message from Luciana Christianson sent at 7/29/2019  9:02 AM CDT -----  Contact: self / [phone]  Type:  Patient Returning Call    Who Called: Patient  Who Left Message for Patient: Nena   Does the patient know what this is regarding?: Appointment  Would the patient rather a call back or a response via [facility]? Call back  Best Call Back Number: [phone]  Additional Information: N/A

## 2019-07-29 NOTE — PROGRESS NOTES
Subjective:      Patient ID: Tosha Kwok is a 54 y.o. female.    Chief Complaint: Pain of the Left Upper Arm      HPI: Tosha Kwok has past medical history significant for fibromyalgia and lupus.  She is here with new complaints of left arm pain. Patient was seen approximately 3 weeks ago by Dr. Mojica with, opposite, right upper extremity pain, numbness tingling.  She was treated with EMG and corticosteroid injection were de Quervain tendonitis.  Today patient reports tendinitis has resolved.    Her only concern today is pain in her left forearm near the antecubital fossa that is achy in nature.  Pain is worse with use of the arm like twisting or lifting.  Pain radiates throughout the whole arm and up to the shoulder.  She denies any burning, numbness, tingling.  She denies any relevant history of injury or trauma.  Symptoms began 3 days ago and are unchanged.  Patient reports inability to use her arm 2/2 pain. Patient rates pain 10/10.  She has tried her oral Dilaudid, fentanyl patch, Zanaflex, Lidoderm, and Voltaren gel without significant relief.    Past Medical History:   Diagnosis Date    Ankylosing spondylitis     Anxiety     Asthma     Cancer     Breast    Chronic constipation     Chronic insomnia     Edema     Fibromyalgia     Interstitial cystitis     Lupus     Migraine headache     Restless legs syndrome     Shingles     TIA (transient ischemic attack)        Current Outpatient Medications:     albuterol (PROAIR HFA) 90 mcg/actuation inhaler, Inhale 2 puffs into the lungs every 4 (four) hours as needed., Disp: 18 g, Rfl: 3    albuterol (PROVENTIL/VENTOLIN HFA) 90 mcg/actuation inhaler, Inhale into the lungs., Disp: , Rfl:     budesonide 180mcg (PULMICORT 180MCG) 180 mcg/actuation AePB, Inhale 2 puffs into the lungs once daily., Disp: 1 each, Rfl: 3    butalbital-acetaminophen-caffeine -40 mg (FIORICET, ESGIC) -40 mg per tablet, Take 1 to 2 tablets orally every 6 hours  as needed for headache, Disp: , Rfl:     cyanocobalamin 1,000 mcg/mL injection, Inject 1 mL (1,000 mcg total) into the muscle every 28 days., Disp: 10 mL, Rfl: 3    cyclobenzaprine (FLEXERIL) 5 MG tablet, TAKE 3 PILLS BY MOUTH (NIGHTLY), Disp: , Rfl: 6    diclofenac sodium (VOLTAREN) 1 % Gel, 1 %.  Gel Topical .  AAA bid, Disp: , Rfl:     ergocalciferol (ERGOCALCIFEROL) 50,000 unit Cap, Take 1 capsule (50,000 Units total) by mouth every 7 days., Disp: 12 capsule, Rfl: 0    escitalopram oxalate (LEXAPRO) 20 MG tablet, Take 20 mg by mouth once daily., Disp: , Rfl: 3    estradiol 0.1 mg/24 hr td ptwk (ESTRADIOL TRANSDERMAL PATCH) 0.1 mg/24 hr PTWK, Place 1 patch onto the skin every 7 days., Disp: , Rfl:     fentaNYL (DURAGESIC) 50 mcg/hr, APPLY 1 PATCH TO SKIN EVERY 3 DAYS AS NEEDED, Disp: , Rfl: 0    gabapentin (NEURONTIN) 300 MG capsule, Take 300 mg by mouth 3 (three) times daily., Disp: , Rfl:     HYDROmorphone (DILAUDID) 2 MG tablet, Take 1 tablet by mouth every 8 hours as needed for pain, Disp: 90 tablet, Rfl: 0    hydroxychloroquine (PLAQUENIL) 200 mg tablet, Take 200 mg by mouth once daily., Disp: , Rfl: 1    hydrOXYzine pamoate (VISTARIL) 25 MG Cap, Take 25 mg by mouth., Disp: , Rfl:     lidocaine (LIDODERM) 5 %(700 mg/patch), 1 Adhesive Patch, Medicated Topical Every 12 hours.   [id number], Disp: , Rfl:     lisinopril (PRINIVIL,ZESTRIL) 20 MG tablet, Take 20 mg by mouth once daily., Disp: , Rfl:     meclizine (ANTIVERT) 25 mg tablet, TAKE 1 TABLET BY MOUTH THREE TIMES A DAY AS NEEDED FOR DIZZINESS FOR 5 DAYS., Disp: , Rfl: 0    omeprazole (PRILOSEC) 20 MG capsule, Take 20 mg by mouth. 1 Capsule, Delayed Release(E.C.) Oral Every day, Disp: , Rfl:     predniSONE (DELTASONE) 2.5 MG tablet, Take 2.5 mg by mouth once daily., Disp: , Rfl: 1    progesterone (PROMETRIUM) 100 MG capsule, Take 100 mg by mouth nightly., Disp: , Rfl: 10    promethazine (PHENERGAN) 25 MG tablet, Take 25 mg by mouth. 1  "Tablet Oral Every 6 hours, Disp: , Rfl:     tiZANidine (ZANAFLEX) 4 MG tablet, Take 4 mg by mouth 3 (three) times daily., Disp: , Rfl: 3    topiramate (TOPAMAX) 50 MG tablet, Take 0.5 tablets (25 mg total) by mouth every evening for 14 days, THEN 1 tablet (50 mg total) every evening., Disp: 180 tablet, Rfl: 3    traZODone (DESYREL) 100 MG tablet, Take 1 tablet (100 mg total) by mouth every evening., Disp: 30 tablet, Rfl: 11    valACYclovir (VALTREX) 500 MG tablet, Take 500 mg by mouth 2 (two) times daily., Disp: , Rfl:     EPINEPHrine (EPIPEN 2-DENNIS) 0.3 mg/0.3 mL AtIn, Inject 0.3 mLs (0.3 mg total) into the muscle once. for 1 dose, Disp: 2 Device, Rfl: 3  Review of patient's allergies indicates:   Allergen Reactions    Morphine Hives    Erythromycin Other (See Comments)    Other      Other reaction(s): Angioedema, carrots    Zofran (as hydrochloride) [ondansetron hcl] Hives     Local hive after IV injection       Ht 5' 2" (1.575 m)   Wt 74.8 kg (165 lb)   LMP 03/26/2012   BMI 30.18 kg/m²     Review of Systems   Constitution: Negative for chills and fever.   Cardiovascular: Negative for chest pain and palpitations.   Respiratory: Negative for shortness of breath and wheezing.    Skin: Negative for poor wound healing and rash.   Musculoskeletal: Positive for joint pain and myalgias. Negative for joint swelling.   Gastrointestinal: Negative for nausea and vomiting.   Genitourinary: Negative for dysuria and hematuria.   Neurological: Negative for numbness, paresthesias, seizures and tremors.   Psychiatric/Behavioral: Negative for altered mental status.   Allergic/Immunologic: Negative for environmental allergies and persistent infections.         Objective:    Ortho Exam       Left upper extremity:  Left upper extremity appears normal. There may be mild swelling at the wrist which is not tender to palpation.  However, the remaining forearm, antecubital fossa, lateral elbow, biceps, and shoulder are all " tender to palpation.  Pain worsens with wrist flexion, supination, pronation, and elbow flexion. Biceps tendon palpable. No bruising.  No muscle atrophy. Range of motion shoulder full.  Sensation intact.  GEN: Well developed, well nourished female. AAOX3. No acute distress.   Normocephalic, atraumatic.   NAJMA  Breathing unlabored.  Mood and affect anxious.     Assessment:     Imaging:  No new imaging.        1. Pain of left upper extremity    2. Fibromyalgia          Plan:           Explained to the patient that her symptoms and physical exam are unusual and do not correlate directly with 1 definitive diagnosis.  Dr. Mojica was consulted on this case and he was present for the physical exam.  My differential diagnosis includes but is not limited to:  Muscle strain, tendinitis (general), lateral epicondylitis, fibromyalgia, nerve compression, and drug-seeking behavior.   Dr. Mojica recommends adding left upper extremity to the previously ordered EMG and referral to pain management.   Patient is already currently under the care pain management.    Continue compression sleeve.  Start anti-inflammatory like 600-800 mg of ibuprofen.  Continue Voltaren.  Try fentanyl patch over the area.  I also offered the option of lateral epicondylitis injection -- however this was not likely to resolve the antecubital fossa pain. Patient declined.     Follow up for Regularly scheduled follow-up with Dr. Mojica.

## 2019-07-29 NOTE — TELEPHONE ENCOUNTER
----- Message from Brenda Mitchell sent at 7/29/2019  1:37 PM CDT -----  Contact: [facility]/[phone]  Vivian called to speak with your office about the EMG and NCV test on the patient.    Please call [phone] to discuss today.

## 2019-08-01 ENCOUNTER — TELEPHONE (OUTPATIENT)
Dept: NEUROLOGY | Facility: CLINIC | Age: 54
End: 2019-08-01

## 2019-08-05 ENCOUNTER — TELEPHONE (OUTPATIENT)
Dept: ORTHOPEDICS | Facility: CLINIC | Age: 54
End: 2019-08-05

## 2019-08-05 ENCOUNTER — TELEPHONE (OUTPATIENT)
Dept: NEUROLOGY | Facility: CLINIC | Age: 54
End: 2019-08-05

## 2019-08-05 ENCOUNTER — PATIENT MESSAGE (OUTPATIENT)
Dept: ORTHOPEDICS | Facility: CLINIC | Age: 54
End: 2019-08-05

## 2019-08-05 NOTE — TELEPHONE ENCOUNTER
Spoke to patient this morning.  She was returning my call from Friday, August 2nd.  I was attempting to schedule her EMG appointment, however the patient states this morning that she is confused.  She was told that she had an appointment on [facility], tomorrow, August 6th for an EMG procedure.  She had quite a few questions and I advised that she call Dr. Mojica's office so that her concerns could be addressed. I provided her with my return info. In case of anything further that I could assist with.

## 2019-08-05 NOTE — TELEPHONE ENCOUNTER
Patient left a message on my vm stating that she was going to keep her appointment for her EMG with the outside facility.

## 2019-08-05 NOTE — TELEPHONE ENCOUNTER
----- Message from Mitali Salazar sent at 8/5/2019  9:44 AM CDT -----  Contact: HANNA DOMINGUEZ    Name of Who is Calling: HANNA DOMINGUEZ        What is the request in detail: Patient is requesting a call back concerning her appointment that is schedule for tomorrow she has some questions      Can the clinic reply by [facility]: no      What Number to Call Back if not in [facility]: [phone]

## 2019-08-05 NOTE — TELEPHONE ENCOUNTER
Returned call to pt. She had an appt scheduled w/[facility] for tomorrow and she received a ph call from [facility] for her NCV. So she was confused.  I explained to her that since it had been taking awhile for [facility] to get pts in for an appt. That we had been sending our pts to an outside place for NCVs. I told her it was up to her if she wanted to wait or to go to the appt tomorrow and she said she would go tomorrow.

## 2019-08-16 ENCOUNTER — TELEPHONE (OUTPATIENT)
Dept: GASTROENTEROLOGY | Facility: CLINIC | Age: 54
End: 2019-08-16

## 2019-08-29 ENCOUNTER — OFFICE VISIT (OUTPATIENT)
Dept: ORTHOPEDICS | Facility: CLINIC | Age: 54
End: 2019-08-29
Payer: COMMERCIAL

## 2019-08-29 ENCOUNTER — HOSPITAL ENCOUNTER (OUTPATIENT)
Dept: RADIOLOGY | Facility: HOSPITAL | Age: 54
Discharge: HOME | End: 2019-08-29
Attending: ORTHOPAEDIC SURGERY
Payer: COMMERCIAL

## 2019-08-29 VITALS — HEIGHT: 62 IN | WEIGHT: 165 LBS | BODY MASS INDEX: 30.36 KG/M2

## 2019-08-29 DIAGNOSIS — M25.512 LEFT SHOULDER PAIN, UNSPECIFIED CHRONICITY: Primary | ICD-10-CM

## 2019-08-29 DIAGNOSIS — M25.512 LEFT SHOULDER PAIN, UNSPECIFIED CHRONICITY: ICD-10-CM

## 2019-08-29 DIAGNOSIS — M75.42 IMPINGEMENT SYNDROME OF LEFT SHOULDER: ICD-10-CM

## 2019-08-29 PROCEDURE — 73030 X-RAY EXAM OF SHOULDER: CPT | Mod: TC,PN,LT

## 2019-08-29 PROCEDURE — 73030 XR SHOULDER COMPLETE 2 OR MORE VIEWS LEFT: ICD-10-PCS | Mod: 26,LT,, | Performed by: RADIOLOGY

## 2019-08-29 PROCEDURE — 3008F PR BODY MASS INDEX (BMI) DOCUMENTED: ICD-10-PCS | Mod: CPTII,S$GLB,, | Performed by: ORTHOPAEDIC SURGERY

## 2019-08-29 PROCEDURE — 73030 X-RAY EXAM OF SHOULDER: CPT | Mod: 26,LT,, | Performed by: RADIOLOGY

## 2019-08-29 PROCEDURE — 99213 PR OFFICE/OUTPT VISIT, EST, LEVL III, 20-29 MIN: ICD-10-PCS | Mod: 25,S$GLB,, | Performed by: ORTHOPAEDIC SURGERY

## 2019-08-29 PROCEDURE — 99999 PR PBB SHADOW E&M-EST. PATIENT-LVL IV: CPT | Mod: PBBFAC,,, | Performed by: ORTHOPAEDIC SURGERY

## 2019-08-29 PROCEDURE — 99213 OFFICE O/P EST LOW 20 MIN: CPT | Mod: 25,S$GLB,, | Performed by: ORTHOPAEDIC SURGERY

## 2019-08-29 PROCEDURE — 99999 PR PBB SHADOW E&M-EST. PATIENT-LVL IV: ICD-10-PCS | Mod: PBBFAC,,, | Performed by: ORTHOPAEDIC SURGERY

## 2019-08-29 PROCEDURE — 20610 PR DRAIN/INJECT LARGE JOINT/BURSA: ICD-10-PCS | Mod: LT,S$GLB,, | Performed by: ORTHOPAEDIC SURGERY

## 2019-08-29 PROCEDURE — 20610 DRAIN/INJ JOINT/BURSA W/O US: CPT | Mod: LT,S$GLB,, | Performed by: ORTHOPAEDIC SURGERY

## 2019-08-29 PROCEDURE — 3008F BODY MASS INDEX DOCD: CPT | Mod: CPTII,S$GLB,, | Performed by: ORTHOPAEDIC SURGERY

## 2019-08-29 RX ORDER — TRIAMCINOLONE ACETONIDE 40 MG/ML
40 INJECTION, SUSPENSION INTRA-ARTICULAR; INTRAMUSCULAR
Status: COMPLETED | OUTPATIENT
Start: 2019-08-29 | End: 2019-08-29

## 2019-08-29 RX ADMIN — TRIAMCINOLONE ACETONIDE 40 MG: 40 INJECTION, SUSPENSION INTRA-ARTICULAR; INTRAMUSCULAR at 03:08

## 2019-08-29 NOTE — PROGRESS NOTES
Subjective:      Patient ID: Tosha Kwok is a 54 y.o. female.  Chief Complaint: Results (EMG)      HPI  Tosha Kwok is a  54 y.o. female presenting today for follow up of bilateral hand symptoms.  She reports that she is doing much better with the right wrist since the injection about 2 months ago.  However she has had a multiple other symptoms since that time.  She was having some pain in her left forearm and wrist and saw my PA for that.  A nerve conduction study was ordered for both upper extremities.  The results of the nerve test shows that she does have some mild left cubital tunnel syndrome.  I went over those findings with her today.  However she is not really having any numbness in her left hand.  Today the main complain is pain in the left left shoulder.  She reports that she has had problems in her shoulder on and off for the past few months no history of trauma  She does have fibromyalgia and chronic pain syndrome.    Review of patient's allergies indicates:   Allergen Reactions    Morphine Hives    Erythromycin Other (See Comments)    Other      Other reaction(s): Angioedema, carrots    Zofran (as hydrochloride) [ondansetron hcl] Hives     Local hive after IV injection         Current Outpatient Medications   Medication Sig Dispense Refill    cyanocobalamin 1,000 mcg/mL injection Inject 1 mL (1,000 mcg total) into the muscle every 28 days. 10 mL 3    cyclobenzaprine (FLEXERIL) 5 MG tablet TAKE 3 PILLS BY MOUTH (NIGHTLY)  6    ergocalciferol (ERGOCALCIFEROL) 50,000 unit Cap Take 1 capsule (50,000 Units total) by mouth every 7 days. 12 capsule 0    escitalopram oxalate (LEXAPRO) 20 MG tablet Take 20 mg by mouth once daily.  3    fentaNYL (DURAGESIC) 50 mcg/hr APPLY 1 PATCH TO SKIN EVERY 3 DAYS AS NEEDED  0    gabapentin (NEURONTIN) 300 MG capsule Take 600 mg by mouth 3 (three) times daily.       HYDROmorphone (DILAUDID) 2 MG tablet Take 1 tablet by mouth every 8 hours as needed for pain  90 tablet 0    hydroxychloroquine (PLAQUENIL) 200 mg tablet Take 200 mg by mouth once daily.  1    progesterone (PROMETRIUM) 100 MG capsule Take 100 mg by mouth nightly.  10    tiZANidine (ZANAFLEX) 4 MG tablet Take 4 mg by mouth 3 (three) times daily.  3    topiramate (TOPAMAX) 50 MG tablet Take 0.5 tablets (25 mg total) by mouth every evening for 14 days, THEN 1 tablet (50 mg total) every evening. 180 tablet 3    traZODone (DESYREL) 100 MG tablet Take 1 tablet (100 mg total) by mouth every evening. 30 tablet 11    albuterol (PROAIR HFA) 90 mcg/actuation inhaler Inhale 2 puffs into the lungs every 4 (four) hours as needed. 18 g 3    albuterol (PROVENTIL/VENTOLIN HFA) 90 mcg/actuation inhaler Inhale into the lungs.      budesonide 180mcg (PULMICORT 180MCG) 180 mcg/actuation AePB Inhale 2 puffs into the lungs once daily. 1 each 3    butalbital-acetaminophen-caffeine -40 mg (FIORICET, ESGIC) -40 mg per tablet Take 1 to 2 tablets orally every 6 hours as needed for headache      diclofenac sodium (VOLTAREN) 1 % Gel 1 %.  Gel Topical .  AAA bid      EPINEPHrine (EPIPEN 2-DENNIS) 0.3 mg/0.3 mL AtIn Inject 0.3 mLs (0.3 mg total) into the muscle once. for 1 dose 2 Device 3    estradiol 0.1 mg/24 hr td ptwk (ESTRADIOL TRANSDERMAL PATCH) 0.1 mg/24 hr PTWK Place 1 patch onto the skin every 7 days.      hydrOXYzine pamoate (VISTARIL) 25 MG Cap Take 25 mg by mouth.      lidocaine (LIDODERM) 5 %(700 mg/patch) 1 Adhesive Patch, Medicated Topical Every 12 hours.   [id number]      meclizine (ANTIVERT) 25 mg tablet TAKE 1 TABLET BY MOUTH THREE TIMES A DAY AS NEEDED FOR DIZZINESS FOR 5 DAYS.  0    omeprazole (PRILOSEC) 20 MG capsule Take 20 mg by mouth. 1 Capsule, Delayed Release(E.C.) Oral Every day      promethazine (PHENERGAN) 25 MG tablet Take 25 mg by mouth. 1 Tablet Oral Every 6 hours      valACYclovir (VALTREX) 500 MG tablet Take 500 mg by mouth 2 (two) times daily.       No current  "facility-administered medications for this visit.        Past Medical History:   Diagnosis Date    Ankylosing spondylitis     Anxiety     Asthma     Cancer     Breast    Chronic constipation     Chronic insomnia     Edema     Fibromyalgia     Interstitial cystitis     Lupus     Migraine headache     Restless legs syndrome     Shingles     TIA (transient ischemic attack)        Past Surgical History:   Procedure Laterality Date    BLADDER SURGERY      BREAST SURGERY  2018    reduction     SECTION      CHOLECYSTECTOMY      HYSTERECTOMY      LASER ABLATION      to back    SLEEVE GASTROPLASTY  2016    TUBAL LIGATION         OBJECTIVE:   PHYSICAL EXAM:  Height: 5' 2" (157.5 cm) Weight: 74.8 kg (165 lb)  Vitals:    19 1426   Weight: 74.8 kg (165 lb)   Height: 5' 2" (1.575 m)   PainSc: 0-No pain     Ortho/SPM Exam  Examination left shoulder there is diffuse tenderness.  There is limited range of motion due to patient guarding  She jumps in and winces and even slight touch to the shoulder  She is not able to tolerate much movement however the shoulder itself is not frozen.  She has full range of motion left elbow wrist fingers  Tinel sign is negative at the left elbow  Sensation intact left hand  No atrophy neck      RADIOGRAPHS:  AP and lateral x-rays left shoulder demonstrates some mild spurring at the anterolateral acromion.  Comments: I have personally reviewed the imaging and I agree with the above radiologist's report.    ASSESSMENT/PLAN:     IMPRESSION:  1.  Fibromyalgia with migratory symptoms both upper extremities.  2.  Impingement bursitis left shoulder symptomatic.  Mild subclinical left cubital tunnel syndrome    PLAN:  I explained the nature of these problems to the patient. I think today the majority of her symptoms are coming from the shoulder recommended injection for the shoulder.  After pause for time-out identified the left shoulder injected with Kenalog 40 mg 2 cc " xylocaine sterile technique  She tolerated the procedure well without complication  I recommend she continue current medications including anti-inflammatory medication but narcotics are not indicated for her symptoms      FOLLOW UP:  4-6 weeks    Disclaimer: This note has been generated using voice-recognition software. There may be typographical errors that have been missed during proof-reading.

## 2019-09-03 ENCOUNTER — OFFICE VISIT (OUTPATIENT)
Dept: FAMILY MEDICINE | Facility: CLINIC | Age: 54
End: 2019-09-03
Payer: COMMERCIAL

## 2019-09-03 VITALS
HEIGHT: 62 IN | HEART RATE: 86 BPM | DIASTOLIC BLOOD PRESSURE: 80 MMHG | SYSTOLIC BLOOD PRESSURE: 120 MMHG | BODY MASS INDEX: 29.22 KG/M2 | OXYGEN SATURATION: 98 % | WEIGHT: 158.81 LBS | TEMPERATURE: 99 F

## 2019-09-03 DIAGNOSIS — E55.9 VITAMIN D DEFICIENCY: ICD-10-CM

## 2019-09-03 DIAGNOSIS — K21.9 GASTROESOPHAGEAL REFLUX DISEASE, ESOPHAGITIS PRESENCE NOT SPECIFIED: ICD-10-CM

## 2019-09-03 DIAGNOSIS — D64.9 ANEMIA, UNSPECIFIED TYPE: Primary | ICD-10-CM

## 2019-09-03 DIAGNOSIS — E53.8 VITAMIN B12 DEFICIENCY: ICD-10-CM

## 2019-09-03 PROCEDURE — 90471 IMMUNIZATION ADMIN: CPT | Mod: S$GLB,,, | Performed by: FAMILY MEDICINE

## 2019-09-03 PROCEDURE — 90471 PNEUMOCOCCAL POLYSACCHARIDE VACCINE 23-VALENT =>2YO SQ IM: ICD-10-PCS | Mod: S$GLB,,, | Performed by: FAMILY MEDICINE

## 2019-09-03 PROCEDURE — 99214 OFFICE O/P EST MOD 30 MIN: CPT | Mod: 25,S$GLB,, | Performed by: FAMILY MEDICINE

## 2019-09-03 PROCEDURE — 99214 PR OFFICE/OUTPT VISIT, EST, LEVL IV, 30-39 MIN: ICD-10-PCS | Mod: 25,S$GLB,, | Performed by: FAMILY MEDICINE

## 2019-09-03 PROCEDURE — 3008F PR BODY MASS INDEX (BMI) DOCUMENTED: ICD-10-PCS | Mod: CPTII,S$GLB,, | Performed by: FAMILY MEDICINE

## 2019-09-03 PROCEDURE — 90732 PPSV23 VACC 2 YRS+ SUBQ/IM: CPT | Mod: S$GLB,,, | Performed by: FAMILY MEDICINE

## 2019-09-03 PROCEDURE — 3008F BODY MASS INDEX DOCD: CPT | Mod: CPTII,S$GLB,, | Performed by: FAMILY MEDICINE

## 2019-09-03 PROCEDURE — 90732 PNEUMOCOCCAL POLYSACCHARIDE VACCINE 23-VALENT =>2YO SQ IM: ICD-10-PCS | Mod: S$GLB,,, | Performed by: FAMILY MEDICINE

## 2019-09-03 RX ORDER — OMEPRAZOLE 20 MG/1
20 CAPSULE, DELAYED RELEASE ORAL DAILY
Qty: 90 CAPSULE | Refills: 3 | Status: SHIPPED | OUTPATIENT
Start: 2019-09-03 | End: 2020-09-01

## 2019-09-03 RX ORDER — ZOLPIDEM TARTRATE 10 MG/1
10 TABLET ORAL NIGHTLY
Refills: 5 | COMMUNITY
Start: 2019-08-28 | End: 2019-09-03 | Stop reason: ALTCHOICE

## 2019-09-03 NOTE — PROGRESS NOTES
Chief Complaint  No chief complaint on file.      HPI  Tosha Kwok is a 54 y.o. female with multiple medical diagnoses as listed in the medical history and problem list that presents for f/u.  She has a history of Stage 0 breast cancer, fibromyalgia and lupus.  Going to pain management.     Anxiety:  Has had problems with anxiety for many years.  Was taking clonazepam, but her pain management stopped prescribing it.  Lexapro was prescribed about 3 months ago.  This has not helped.  Has occasional panic attacks.  Irritability.  Feels anxious most of the time.  Doesn't sleep well.  Had ambien for sleep, but was sleep walking with this.   Trazodone helps with sleep some.      Asthma:  Rarely uses her rescue inhaler.  Rarely uses her Pulmicort.  No cough or wheezing.  Now having URI symptoms.  Started 2 days ago.  Scratchy throat.      Anemia:  Says she had labs recently and her H & H was low.  Does not have a copy of those labs.  In surgical menopause.  No rectal bleeding.  No restless leg.  Always has fatigue.  B12 deficiency and vitamin D deficiency.  Currently being supplemented with both.        PAST MEDICAL HISTORY:  Past Medical History:   Diagnosis Date    Ankylosing spondylitis     Anxiety     Asthma     Cancer     Breast    Chronic constipation     Chronic insomnia     Edema     Fibromyalgia     Interstitial cystitis     Lupus     Migraine headache     Restless legs syndrome     Shingles     TIA (transient ischemic attack)        PAST SURGICAL HISTORY:  Past Surgical History:   Procedure Laterality Date    BLADDER SURGERY      BREAST SURGERY  2018    reduction     SECTION      CHOLECYSTECTOMY      HYSTERECTOMY      LASER ABLATION      to back    SLEEVE GASTROPLASTY  2016    TUBAL LIGATION         SOCIAL HISTORY:  Social History     Socioeconomic History    Marital status:      Spouse name: Not on file    Number of children: Not on file    Years of education: Not on  file    Highest education level: Not on file   Occupational History    Not on file   Social Needs    Financial resource strain: Not hard at all    Food insecurity:     Worry: Never true     Inability: Never true    Transportation needs:     Medical: No     Non-medical: No   Tobacco Use    Smoking status: Never Smoker    Smokeless tobacco: Never Used   Substance and Sexual Activity    Alcohol use: Yes     Frequency: Monthly or less     Drinks per session: 1 or 2     Binge frequency: Never     Comment: occasionally    Drug use: No    Sexual activity: Yes     Partners: Male   Lifestyle    Physical activity:     Days per week: 2 days     Minutes per session: 20 min    Stress: To some extent   Relationships    Social connections:     Talks on phone: Three times a week     Gets together: Once a week     Attends [employer] service: Not on file     Active member of club or organization: Yes     Attends meetings of clubs or organizations: Never     Relationship status:    Other Topics Concern    Not on file   Social History Narrative    Not on file       FAMILY HISTORY:  Family History   Problem Relation Age of Onset    Hypertension Mother     Hypertension Brother     Breast cancer Maternal Aunt         x2        ALLERGIES AND MEDICATIONS: updated and reviewed.  Review of patient's allergies indicates:   Allergen Reactions    Morphine Hives    Erythromycin Other (See Comments)    Other      Other reaction(s): Angioedema, carrots    Zofran (as hydrochloride) [ondansetron hcl] Hives     Local hive after IV injection     Current Outpatient Medications   Medication Sig Dispense Refill    albuterol (PROAIR HFA) 90 mcg/actuation inhaler Inhale 2 puffs into the lungs every 4 (four) hours as needed. 18 g 3    albuterol (PROVENTIL/VENTOLIN HFA) 90 mcg/actuation inhaler Inhale into the lungs.      budesonide 180mcg (PULMICORT 180MCG) 180 mcg/actuation AePB Inhale 2 puffs into the lungs once daily. 1 each  3    butalbital-acetaminophen-caffeine -40 mg (FIORICET, ESGIC) -40 mg per tablet Take 1 to 2 tablets orally every 6 hours as needed for headache      cyanocobalamin 1,000 mcg/mL injection Inject 1 mL (1,000 mcg total) into the muscle every 28 days. 10 mL 3    cyclobenzaprine (FLEXERIL) 5 MG tablet TAKE 3 PILLS BY MOUTH (NIGHTLY)  6    diclofenac sodium (VOLTAREN) 1 % Gel 1 %.  Gel Topical .  AAA bid      EPINEPHrine (EPIPEN 2-DENNIS) 0.3 mg/0.3 mL AtIn Inject 0.3 mLs (0.3 mg total) into the muscle once. for 1 dose 2 Device 3    ergocalciferol (ERGOCALCIFEROL) 50,000 unit Cap Take 1 capsule (50,000 Units total) by mouth every 7 days. 12 capsule 0    escitalopram oxalate (LEXAPRO) 20 MG tablet Take 20 mg by mouth once daily.  3    estradiol 0.1 mg/24 hr td ptwk (ESTRADIOL TRANSDERMAL PATCH) 0.1 mg/24 hr PTWK Place 1 patch onto the skin every 7 days.      fentaNYL (DURAGESIC) 50 mcg/hr APPLY 1 PATCH TO SKIN EVERY 3 DAYS AS NEEDED  0    gabapentin (NEURONTIN) 300 MG capsule Take 600 mg by mouth 3 (three) times daily.       HYDROmorphone (DILAUDID) 2 MG tablet Take 1 tablet by mouth every 8 hours as needed for pain 90 tablet 0    hydroxychloroquine (PLAQUENIL) 200 mg tablet Take 200 mg by mouth once daily.  1    hydrOXYzine pamoate (VISTARIL) 25 MG Cap Take 25 mg by mouth.      lidocaine (LIDODERM) 5 %(700 mg/patch) 1 Adhesive Patch, Medicated Topical Every 12 hours.   [id number]      meclizine (ANTIVERT) 25 mg tablet TAKE 1 TABLET BY MOUTH THREE TIMES A DAY AS NEEDED FOR DIZZINESS FOR 5 DAYS.  0    omeprazole (PRILOSEC) 20 MG capsule Take 20 mg by mouth. 1 Capsule, Delayed Release(E.C.) Oral Every day      progesterone (PROMETRIUM) 100 MG capsule Take 100 mg by mouth nightly.  10    promethazine (PHENERGAN) 25 MG tablet Take 25 mg by mouth. 1 Tablet Oral Every 6 hours      tiZANidine (ZANAFLEX) 4 MG tablet Take 4 mg by mouth 3 (three) times daily.  3    topiramate (TOPAMAX) 50 MG tablet Take  "0.5 tablets (25 mg total) by mouth every evening for 14 days, THEN 1 tablet (50 mg total) every evening. 180 tablet 3    traZODone (DESYREL) 100 MG tablet Take 1 tablet (100 mg total) by mouth every evening. 30 tablet 11    valACYclovir (VALTREX) 500 MG tablet Take 500 mg by mouth 2 (two) times daily.       No current facility-administered medications for this visit.          ROS  Review of Systems   Constitutional: Positive for fatigue. Negative for activity change, appetite change and chills.   HENT: Negative for congestion, ear discharge, ear pain, rhinorrhea, sinus pain, sore throat and trouble swallowing.    Eyes: Negative for photophobia, pain, redness, itching and visual disturbance.   Respiratory: Negative for cough, chest tightness, shortness of breath and wheezing.    Cardiovascular: Negative for chest pain, palpitations and leg swelling.   Gastrointestinal: Negative for abdominal distention, abdominal pain, blood in stool, diarrhea, nausea and vomiting.   Genitourinary: Negative for dysuria, pelvic pain, vaginal bleeding, vaginal discharge and vaginal pain.   Musculoskeletal: Positive for myalgias. Negative for arthralgias, back pain, gait problem and neck pain.   Skin: Negative for color change, pallor and rash.   Neurological: Negative for dizziness, tremors, weakness, light-headedness, numbness and headaches.   Psychiatric/Behavioral: Negative for agitation, behavioral problems, confusion and sleep disturbance.           PHYSICAL EXAM    /80 (BP Location: Right arm, Patient Position: Sitting)   Pulse 86   Temp 98.5 °F (36.9 °C) (Oral)   Ht 5' 2" (1.575 m)   Wt 72 kg (158 lb 13.5 oz)   LMP 03/26/2012   SpO2 98%   BMI 29.05 kg/m²           Physical Exam   Constitutional: She is oriented to person, place, and time. She appears well-developed and well-nourished. No distress.   HENT:   Head: Normocephalic and atraumatic.   Right Ear: External ear normal.   Left Ear: External ear normal. "   Mouth/Throat: Oropharynx is clear and moist. No oropharyngeal exudate.   Eyes: Pupils are equal, round, and reactive to light. Conjunctivae and EOM are normal. Right eye exhibits no discharge. Left eye exhibits no discharge.   Neck: Normal range of motion. Neck supple. No tracheal deviation present. No thyromegaly present.   Cardiovascular: Normal rate, regular rhythm, normal heart sounds and intact distal pulses. Exam reveals no gallop and no friction rub.   No murmur heard.  Pulmonary/Chest: Effort normal and breath sounds normal. No respiratory distress. She has no wheezes. She has no rales. She exhibits no tenderness.   Abdominal: Soft. Bowel sounds are normal. She exhibits no distension. There is no tenderness. There is no guarding.   Musculoskeletal: Normal range of motion. She exhibits no edema, tenderness or deformity.   Neurological: She is alert and oriented to person, place, and time. She displays normal reflexes. No cranial nerve deficit. She exhibits normal muscle tone. Coordination normal.   Skin: Skin is warm and dry. Capillary refill takes less than 2 seconds. No rash noted. She is not diaphoretic. No erythema. No pallor.   Psychiatric: She has a normal mood and affect. Her behavior is normal. Judgment normal.         Health Maintenance       Date Due Completion Date    TETANUS VACCINE 03/13/1985 ---    Lipid Panel 05/08/2017 5/8/2012    Mammogram 11/29/2019 (Originally 3/13/2007) ---    Colonoscopy 12/31/2019 (Originally 3/13/2017) ---    Influenza Vaccine 08/01/2019 11/2/2016            Assessment & Plan    Anemia, unspecified type  -     CBC auto differential; Future; Expected date: 09/03/2019    Vitamin D deficiency  -     Vitamin D; Future; Expected date: 12/03/2019    Vitamin B12 deficiency    Gastroesophageal reflux disease, esophagitis presence not specified  -     omeprazole (PRILOSEC) 20 MG capsule; Take 1 capsule (20 mg total) by mouth once daily. 1 Capsule, Delayed Release(E.C.) Oral  Every day  Dispense: 90 capsule; Refill: 3    Other orders  -     (In Office Administered) Pneumococcal Polysaccharide Vaccine (23 Valent) (SQ/IM)          Follow-up: No follow-ups on file.

## 2019-09-13 ENCOUNTER — PATIENT MESSAGE (OUTPATIENT)
Dept: FAMILY MEDICINE | Facility: CLINIC | Age: 54
End: 2019-09-13

## 2019-09-23 DIAGNOSIS — E55.9 VITAMIN D DEFICIENCY: ICD-10-CM

## 2019-09-24 ENCOUNTER — TELEPHONE (OUTPATIENT)
Dept: GASTROENTEROLOGY | Facility: CLINIC | Age: 54
End: 2019-09-24

## 2019-09-24 RX ORDER — ERGOCALCIFEROL 1.25 MG/1
CAPSULE ORAL
Qty: 4 CAPSULE | Refills: 2 | Status: SHIPPED | OUTPATIENT
Start: 2019-09-24 | End: 2019-10-16 | Stop reason: SDUPTHER

## 2019-09-25 ENCOUNTER — HOSPITAL ENCOUNTER (EMERGENCY)
Facility: HOSPITAL | Age: 54
Discharge: HOME | End: 2019-09-25
Attending: EMERGENCY MEDICINE
Payer: COMMERCIAL

## 2019-09-25 VITALS
DIASTOLIC BLOOD PRESSURE: 83 MMHG | TEMPERATURE: 98 F | HEART RATE: 61 BPM | HEIGHT: 62 IN | SYSTOLIC BLOOD PRESSURE: 126 MMHG | OXYGEN SATURATION: 99 % | WEIGHT: 160 LBS | RESPIRATION RATE: 16 BRPM | BODY MASS INDEX: 29.44 KG/M2

## 2019-09-25 DIAGNOSIS — G89.4 CHRONIC PAIN SYNDROME: Primary | ICD-10-CM

## 2019-09-25 DIAGNOSIS — Z87.39 HISTORY OF FIBROMYALGIA: ICD-10-CM

## 2019-09-25 DIAGNOSIS — R00.2 PALPITATIONS: ICD-10-CM

## 2019-09-25 DIAGNOSIS — Z86.69 HISTORY OF MIGRAINE HEADACHES: ICD-10-CM

## 2019-09-25 DIAGNOSIS — M25.552 LEFT HIP PAIN: ICD-10-CM

## 2019-09-25 DIAGNOSIS — R51.9 ACUTE NONINTRACTABLE HEADACHE, UNSPECIFIED HEADACHE TYPE: ICD-10-CM

## 2019-09-25 DIAGNOSIS — R52 PAIN: ICD-10-CM

## 2019-09-25 LAB
ALBUMIN SERPL BCP-MCNC: 3.8 G/DL (ref 3.5–5.2)
ALP SERPL-CCNC: 101 U/L (ref 55–135)
ALT SERPL W/O P-5'-P-CCNC: 91 U/L (ref 10–44)
ANION GAP SERPL CALC-SCNC: 9 MMOL/L (ref 8–16)
AST SERPL-CCNC: 32 U/L (ref 10–40)
BASOPHILS # BLD AUTO: 0.03 K/UL (ref 0–0.2)
BASOPHILS NFR BLD: 0.7 % (ref 0–1.9)
BILIRUB SERPL-MCNC: 0.3 MG/DL (ref 0.1–1)
BILIRUB UR QL STRIP: NEGATIVE
BNP SERPL-MCNC: 15 PG/ML (ref 0–99)
BUN SERPL-MCNC: 16 MG/DL (ref 6–20)
CALCIUM SERPL-MCNC: 9.3 MG/DL (ref 8.7–10.5)
CHLORIDE SERPL-SCNC: 111 MMOL/L (ref 95–110)
CK SERPL-CCNC: 127 U/L (ref 20–180)
CLARITY UR REFRACT.AUTO: CLEAR
CO2 SERPL-SCNC: 21 MMOL/L (ref 23–29)
COLOR UR AUTO: YELLOW
CREAT SERPL-MCNC: 0.8 MG/DL (ref 0.5–1.4)
DIFFERENTIAL METHOD: ABNORMAL
EOSINOPHIL # BLD AUTO: 0.2 K/UL (ref 0–0.5)
EOSINOPHIL NFR BLD: 3.6 % (ref 0–8)
ERYTHROCYTE [DISTWIDTH] IN BLOOD BY AUTOMATED COUNT: 13.8 % (ref 11.5–14.5)
EST. GFR  (AFRICAN AMERICAN): >60 ML/MIN/1.73 M^2
EST. GFR  (NON AFRICAN AMERICAN): >60 ML/MIN/1.73 M^2
GLUCOSE SERPL-MCNC: 80 MG/DL (ref 70–110)
GLUCOSE UR QL STRIP: NEGATIVE
HCT VFR BLD AUTO: 35.3 % (ref 37–48.5)
HGB BLD-MCNC: 11.1 G/DL (ref 12–16)
HGB UR QL STRIP: NEGATIVE
IMM GRANULOCYTES # BLD AUTO: 0.01 K/UL (ref 0–0.04)
IMM GRANULOCYTES NFR BLD AUTO: 0.2 % (ref 0–0.5)
KETONES UR QL STRIP: NEGATIVE
LACTATE SERPL-SCNC: 0.9 MMOL/L (ref 0.5–2.2)
LEUKOCYTE ESTERASE UR QL STRIP: NEGATIVE
LIPASE SERPL-CCNC: 18 U/L (ref 4–60)
LYMPHOCYTES # BLD AUTO: 2 K/UL (ref 1–4.8)
LYMPHOCYTES NFR BLD: 44.6 % (ref 18–48)
MCH RBC QN AUTO: 28 PG (ref 27–31)
MCHC RBC AUTO-ENTMCNC: 31.4 G/DL (ref 32–36)
MCV RBC AUTO: 89 FL (ref 82–98)
MONOCYTES # BLD AUTO: 0.4 K/UL (ref 0.3–1)
MONOCYTES NFR BLD: 9.8 % (ref 4–15)
NEUTROPHILS # BLD AUTO: 1.8 K/UL (ref 1.8–7.7)
NEUTROPHILS NFR BLD: 41.1 % (ref 38–73)
NITRITE UR QL STRIP: NEGATIVE
NRBC BLD-RTO: 0 /100 WBC
PH UR STRIP: 6 [PH] (ref 5–8)
PLATELET # BLD AUTO: 274 K/UL (ref 150–350)
PMV BLD AUTO: 9.8 FL (ref 9.2–12.9)
POTASSIUM SERPL-SCNC: 3.8 MMOL/L (ref 3.5–5.1)
PROT SERPL-MCNC: 7.3 G/DL (ref 6–8.4)
PROT UR QL STRIP: NEGATIVE
RBC # BLD AUTO: 3.96 M/UL (ref 4–5.4)
SODIUM SERPL-SCNC: 141 MMOL/L (ref 136–145)
SP GR UR STRIP: 1.01 (ref 1–1.03)
TROPONIN I SERPL DL<=0.01 NG/ML-MCNC: <0.006 NG/ML (ref 0–0.03)
URN SPEC COLLECT METH UR: NORMAL
WBC # BLD AUTO: 4.48 K/UL (ref 3.9–12.7)

## 2019-09-25 PROCEDURE — 93010 EKG 12-LEAD: ICD-10-PCS | Mod: ,,, | Performed by: INTERNAL MEDICINE

## 2019-09-25 PROCEDURE — 99285 EMERGENCY DEPT VISIT HI MDM: CPT | Mod: 25

## 2019-09-25 PROCEDURE — 84484 ASSAY OF TROPONIN QUANT: CPT

## 2019-09-25 PROCEDURE — 83880 ASSAY OF NATRIURETIC PEPTIDE: CPT

## 2019-09-25 PROCEDURE — 96376 TX/PRO/DX INJ SAME DRUG ADON: CPT

## 2019-09-25 PROCEDURE — 82550 ASSAY OF CK (CPK): CPT

## 2019-09-25 PROCEDURE — 96361 HYDRATE IV INFUSION ADD-ON: CPT

## 2019-09-25 PROCEDURE — 81003 URINALYSIS AUTO W/O SCOPE: CPT

## 2019-09-25 PROCEDURE — 83690 ASSAY OF LIPASE: CPT

## 2019-09-25 PROCEDURE — 83605 ASSAY OF LACTIC ACID: CPT

## 2019-09-25 PROCEDURE — 25000003 PHARM REV CODE 250: Performed by: EMERGENCY MEDICINE

## 2019-09-25 PROCEDURE — 93010 ELECTROCARDIOGRAM REPORT: CPT | Mod: ,,, | Performed by: INTERNAL MEDICINE

## 2019-09-25 PROCEDURE — 93005 ELECTROCARDIOGRAM TRACING: CPT

## 2019-09-25 PROCEDURE — 99285 EMERGENCY DEPT VISIT HI MDM: CPT | Mod: ,,, | Performed by: EMERGENCY MEDICINE

## 2019-09-25 PROCEDURE — 85025 COMPLETE CBC W/AUTO DIFF WBC: CPT

## 2019-09-25 PROCEDURE — 96375 TX/PRO/DX INJ NEW DRUG ADDON: CPT

## 2019-09-25 PROCEDURE — 96365 THER/PROPH/DIAG IV INF INIT: CPT

## 2019-09-25 PROCEDURE — 99285 PR EMERGENCY DEPT VISIT,LEVEL V: ICD-10-PCS | Mod: ,,, | Performed by: EMERGENCY MEDICINE

## 2019-09-25 PROCEDURE — 63600175 PHARM REV CODE 636 W HCPCS: Performed by: EMERGENCY MEDICINE

## 2019-09-25 PROCEDURE — 80053 COMPREHEN METABOLIC PANEL: CPT

## 2019-09-25 RX ORDER — BUTALBITAL, ACETAMINOPHEN AND CAFFEINE 50; 325; 40 MG/1; MG/1; MG/1
1 TABLET ORAL EVERY 4 HOURS PRN
Qty: 30 TABLET | Refills: 0 | Status: SHIPPED | OUTPATIENT
Start: 2019-09-25 | End: 2019-10-16

## 2019-09-25 RX ORDER — HYDROMORPHONE HYDROCHLORIDE 1 MG/ML
1 INJECTION, SOLUTION INTRAMUSCULAR; INTRAVENOUS; SUBCUTANEOUS
Status: COMPLETED | OUTPATIENT
Start: 2019-09-25 | End: 2019-09-25

## 2019-09-25 RX ORDER — FENTANYL 50 UG/1
PATCH TRANSDERMAL
COMMUNITY
Start: 2016-07-12 | End: 2020-11-23

## 2019-09-25 RX ORDER — BUTALBITAL, ACETAMINOPHEN AND CAFFEINE 50; 325; 40 MG/1; MG/1; MG/1
2 TABLET ORAL
Status: COMPLETED | OUTPATIENT
Start: 2019-09-25 | End: 2019-09-25

## 2019-09-25 RX ORDER — KETOROLAC TROMETHAMINE 30 MG/ML
15 INJECTION, SOLUTION INTRAMUSCULAR; INTRAVENOUS
Status: COMPLETED | OUTPATIENT
Start: 2019-09-25 | End: 2019-09-25

## 2019-09-25 RX ORDER — BUTALBITAL, ACETAMINOPHEN AND CAFFEINE 50; 325; 40 MG/1; MG/1; MG/1
TABLET ORAL
COMMUNITY
Start: 2015-12-28 | End: 2019-09-25

## 2019-09-25 RX ORDER — DICLOFENAC SODIUM 10 MG/G
GEL TOPICAL
COMMUNITY
Start: 2015-07-24 | End: 2019-10-30

## 2019-09-25 RX ORDER — ALBUTEROL SULFATE 90 UG/1
AEROSOL, METERED RESPIRATORY (INHALATION)
COMMUNITY
End: 2019-09-25

## 2019-09-25 RX ORDER — GABAPENTIN 600 MG/1
600 TABLET ORAL DAILY
Refills: 5 | COMMUNITY
Start: 2019-09-10 | End: 2019-09-25

## 2019-09-25 RX ADMIN — PROMETHAZINE HYDROCHLORIDE 25 MG: 25 INJECTION INTRAMUSCULAR; INTRAVENOUS at 02:09

## 2019-09-25 RX ADMIN — KETOROLAC TROMETHAMINE 15 MG: 30 INJECTION, SOLUTION INTRAMUSCULAR; INTRAVENOUS at 01:09

## 2019-09-25 RX ADMIN — HYDROMORPHONE HYDROCHLORIDE 1 MG: 1 INJECTION, SOLUTION INTRAMUSCULAR; INTRAVENOUS; SUBCUTANEOUS at 04:09

## 2019-09-25 RX ADMIN — SODIUM CHLORIDE, SODIUM LACTATE, POTASSIUM CHLORIDE, AND CALCIUM CHLORIDE 1000 ML: .6; .31; .03; .02 INJECTION, SOLUTION INTRAVENOUS at 02:09

## 2019-09-25 RX ADMIN — BUTALBITAL, ACETAMINOPHEN AND CAFFEINE 2 TABLET: 50; 325; 40 TABLET ORAL at 02:09

## 2019-09-25 RX ADMIN — HYDROMORPHONE HYDROCHLORIDE 1 MG: 1 INJECTION, SOLUTION INTRAMUSCULAR; INTRAVENOUS; SUBCUTANEOUS at 03:09

## 2019-09-25 NOTE — ED NOTES
DR Mcclure notified that pt wishes to speak w/ him. Pt /  informed that Dr Mcclure  Will be w/ her as soon as he can.

## 2019-09-25 NOTE — ED TRIAGE NOTES
Office Appt canceled with Neurologist on yesterday, having headache and pain 10/10 everywhere, Hx Lupus fibromyalgia . Had a fall in the tub 2 days ago, also reports chest tightness, palpitations, light sensitivity, nausea and tunneled vision. Has a  fentanyl patch on , also took muscle relaxer, phenergan and oral dilaudid with no relief,  Denies diarrhea vomiting, CP, temps, recent illness      LOC: The patient is awake, alert, and oriented to place, time, situation. Affect is appropriate.  Speech is appropriate and clear.     APPEARANCE: Patient resting comfortably in no acute distress.  Patient is clean and well groomed.    SKIN: The skin is warm and dry; color consistent with ethnicity.  Patient has normal skin turgor and moist mucus membranes.  Skin intact; no breakdown or bruising noted.     MUSCULOSKELETAL: Patient moving upper and lower extremities without difficulty with exception of pain in hips and pelvis s/p fall 2 days ago.  Generalized  weakness.     RESPIRATORY: Airway is open and patent. Respirations spontaneous, even, easy, and non-labored.  Patient has a normal effort and rate.  No accessory muscle use noted. Denies cough.     CARDIAC:  Normal rhythm and rate noted.  No peripheral edema noted. No complaints of chest pain.      ABDOMEN: Soft and non tender to palpation.  No distention noted.     NEUROLOGIC: Eyes open spontaneously.  Behavior appropriate to situation.  Follows commands; facial expression symmetrical.  Purposeful motor response noted; normal sensation in all extremities.

## 2019-09-25 NOTE — ED PROVIDER NOTES
"Encounter Date: 9/25/2019    SCRIBE #1 NOTE: I, Marilee Cronin, am scribing for, and in the presence of,  Dr. Neo Cortez. I have scribed the following portions of the note - the EKG reading.       History     Chief Complaint   Patient presents with    Body Pain     fibromyalgia and lupus, pain "all over" that began 2 nights ago     HPI   53 Y/O F with history of fibromyalgia, ankylosing spondylitis, chronic back pain, chronic pain syndrome, lupus, migraine headaches, presents with  reporting gradual onset of "pain all over".  She is a non-smoker reporting non-radiating, band-like HA with associated photo/phono-phobia usual to her migraine headaches. She reports HA was gradual in onset  and not associated with any visual changes, dizziness, N/V, neck pain/stiffness, no fever or chills and no recent travel. No reported alleviating or aggravating factors. No reported Hx of DVT/PE or other complaints.  She reports slip and fall 48 hr ago with no preceding symptoms, and assures no head injury or loss of consciousness.  She denies any bleeding disorders, recurrent epistaxis, gingival bleed or easy bruising.  She is very sad that her neurologist canceled her appointment yesterday, which she was greatly looking forward secondary to her gradual worsening diffuse pain.  Otherwise, she denies any changes in her p.o. intake, nausea/vomiting, abdominal pain or, back pain, abdominal distension, black or bloody stool or any urinary or vaginal complaints.    Review of patient's allergies indicates:   Allergen Reactions    Morphine Anxiety     Hives     Other Other (See Comments)     Allergy is carrots Other reaction(s): Angioedema, carrots    Erythromycin Other (See Comments)     Hives and cramps     Zofran (as hydrochloride) [ondansetron hcl] Hives     Local hive after IV injection     Past Medical History:   Diagnosis Date    Ankylosing spondylitis     Anxiety     Asthma     Cancer     Breast    Chronic " constipation     Chronic insomnia     Edema     Fibromyalgia     Interstitial cystitis     Lupus     Migraine headache     Restless legs syndrome     Shingles     TIA (transient ischemic attack)      Past Surgical History:   Procedure Laterality Date    BLADDER SURGERY      BREAST SURGERY  2018    reduction     SECTION      CHOLECYSTECTOMY      HYSTERECTOMY      LASER ABLATION      to back    SLEEVE GASTROPLASTY  2016    TUBAL LIGATION       Family History   Problem Relation Age of Onset    Hypertension Mother     Hypertension Brother     Breast cancer Maternal Aunt         x2      Social History     Tobacco Use    Smoking status: Never Smoker    Smokeless tobacco: Never Used   Substance Use Topics    Alcohol use: Yes     Frequency: Monthly or less     Drinks per session: 1 or 2     Binge frequency: Never     Comment: occasionally    Drug use: No     Review of Systems  CONST: No fever, chills, weight change, or fatigue.  HEENT: + headache, but no blurry vision/change in vision, sore throat, ear pain, eye pain,  otorrhea, rhinorrhea, tooth pain, swelling, or voice changes; no tenderness to palpation or nodularity over B/L temporal artery region.  NECK: No pain, masses, trauma, or redness.  HEART: No pain, palpitations, diaphoresis, nausea, or vomiting  LUNG: No SOB, cough, orthopnea, SALGADO or other complaints.  ABDOMEN: No pain, nausea, vomiting, diarrhea, constipation, or flank pain; No black or  bloody stools.  : No discharge, dysuria, lesions, rashes, masses, sores, or hematuria  EXTREMITIES: +Left Hip/buttocks pain; FROM despite pain with No swelling, redness, injuries/trauma, lesions, sores,  weakness, numbness, or tingling  NEURO: No dizziness, weakness, fatigue, tremors, headache, change in vision or  disturbances of balance or coordination  SKIN: No lesions, rashes, trauma or other complaints.    Physical Exam     Initial Vitals [19 1246]   BP Pulse Resp Temp SpO2    103/66 79 18 97.6 °F (36.4 °C) 100 %      MAP       --         Physical Exam  GENERAL:  Anxious yet cooperative and very tearful; Well-appearing and Non-Toxic; Well-Nourished; mild acute distress secondary to sad mood and reported subjective pain  HEENT: AT/NC; green contact lenses with brown iris; anicteric; PERRL, EOMI, Acuity & Fields WNL; TA region with no TTP, no nodularity or pulse asymmetry to TA; speaking full sentences with no drooling.  NECK: Supple, FROM with no meningismus, no accessory muscle use. No carotid bruits B/L.  HEART: Regular rate and rhythm, no M/G/T.   LUNGS: No Tachypnea, No Retractions, and CTA B/L with no W/R/R.  ABDOMEN: +BS, Soft, ND, NTTP. No rigidity. No guarding. NEG Beltrán's, Rovsing's, Psoas', Obturator's and McBurney's Signs.  BACK: Atraumatic, No midline TTP to C/T/LS spine, but + LEFT paravertebral left muscle tenderness to palpation eliciting versus reproducing subjective pain; No CVA tenderness B/L.  EXTREMITIES: FROM. Strength 5/5. Symmetrical Sensorium and with no deficits. Soft Comparments.  SKIN: Warm, Dry, No Skin Tears or Rashes.  VASCULAR: 2+ pulses Prox/Dist &amp; Symm with no delay.  NEUROLOGIC: AAOx3, No Receptive or Expressive Aphasia, Answering Questions Appropriately, Recent & Remote Memory Intact, No Visual or Tactile Agnosia to B/L UE; CN/PN Intact, Strength 5/5, Sens Symmetrical to UE & LE, No Ataxia, NEG Romberg's and WNL FTN & HTS. Intact Marching in place.     ED Course   Procedures  Labs Reviewed   CBC W/ AUTO DIFFERENTIAL - Abnormal; Notable for the following components:       Result Value    RBC 3.96 (*)     Hemoglobin 11.1 (*)     Hematocrit 35.3 (*)     Mean Corpuscular Hemoglobin Conc 31.4 (*)     All other components within normal limits   COMPREHENSIVE METABOLIC PANEL - Abnormal; Notable for the following components:    Chloride 111 (*)     CO2 21 (*)     ALT 91 (*)     All other components within normal limits   LACTIC ACID, PLASMA   LIPASE    TROPONIN I   B-TYPE NATRIURETIC PEPTIDE   URINALYSIS, REFLEX TO URINE CULTURE    Narrative:     Preferred Collection Type->Urine, Clean Catch   CK     EKG Readings: (Independently Interpreted)   Sinus rhythm at 73 bpm. Normal ventricular axis; CT/QRS/QTC within normal limits; No STEMI.      ECG Results          EKG 12-lead (Final result)  Result time 09/26/19 16:39:55    Final result by Interface, Lab In [facility] (09/26/19 16:39:55)                 Narrative:    Test Reason : R00.2,    Vent. Rate : 073 BPM     Atrial Rate : 073 BPM     P-R Int : 178 ms          QRS Dur : 082 ms      QT Int : 398 ms       P-R-T Axes : 071 -02 049 degrees     QTc Int : 438 ms    Normal sinus rhythm  Low voltage QRS in the precordial leads  Otherwise normal ECG  When compared with ECG of 28-DEC-2018 18:26,  Vent. rate has decreased BY  39 BPM    Confirmed by Elizabeth DIAZ, Janice ([id number]) on 9/26/2019 4:39:45 PM    Referred By: [email or web address]   SELF           Confirmed By:Janice Johnson MD                            Imaging Results          X-Ray Hips Bilateral 2 View Incl AP Pelvis (Final result)  Result time 09/25/19 14:20:43    Final result by Oc Fiore MD (09/25/19 14:20:43)                 Impression:      No acute displaced fracture-dislocation identified.      Electronically signed by: Oc Fiore MD  Date:    09/25/2019  Time:    14:20             Narrative:    EXAMINATION:  XR HIPS BILATERAL 2 VIEW INCL AP PELVIS    CLINICAL HISTORY:  Pain in left hip    TECHNIQUE:  AP view of the pelvis and frogleg lateral views of both hips were performed.    COMPARISON:  None.    FINDINGS:  Bones are well mineralized.  Overall alignment is within normal limits.  No displaced fracture, dislocation or destructive osseous process.  Mild degenerative change at the pubic symphysis and bilateral sacroiliac and hip joints.  No subcutaneous emphysema or radiodense foreign body seen.                               X-Ray Chest PA And Lateral (Final result)   "Result time 09/25/19 14:12:33    Final result by Jaymie Ortiz MD (09/25/19 14:12:33)                 Impression:      No source for pain identified.      Electronically signed by: Jaymie Ortiz MD  Date:    09/25/2019  Time:    14:12             Narrative:    EXAMINATION:  XR CHEST PA AND LATERAL    CLINICAL HISTORY:  Pain, unspecified    TECHNIQUE:  PA and lateral views of the chest were performed.    COMPARISON:  12/28/2018.    FINDINGS:  Mediastinal structures are midline. Cardiac silhouette and pulmonary vascular distribution are normal.    Lung volumes are normal and symmetric. I detect no pulmonary disease, pleural fluid, lymph node enlargement, cardiac decompensation, pneumothorax, pneumomediastinum, pneumoperitoneum or significant osseous abnormality.                                 Medical Decision Making:   History:   Old Medical Records: I decided to obtain old medical records.  Initial Assessment:   Afebrile, atraumatic and hemodynamically stable female with extensive history of fibromyalgia presents very tearful reporting "pain all over".  She reports headache with no appreciated red flags warranting emergent CT or MRI, which I will treat.  No focal neurological deficits appreciated. No appreciated gross deformity, however, given reported trip and fall inter gluteus with left greater than right hip pain, will obtain x-ray to confirm lack suspicion of any fractures.  No neurovascular deficits as reported above.  Will perform basic labs, obtain x-rays and treat her symptoms.  ____________________  Ren Cortez MD, [facility] [place]  Emergency Medicine Staff  1:28 PM 9/25/2019    STAFF ATTENDING PHYSICIAN F/U NOTE:  Tosha Kwok has been evaluated and treated. She reports complete resolution of Sx and is ready to return home. Currently patient reports no nausea and is tolerating p.o. We discussed Sx warranting immediate ED return, which were acknowledged. I recommended F/U and discussion of visit with primary " care physician.  ____________________  Ren Cortez MD, [facility]  Emergency Medicine Staff        Clinical Tests:   Lab Tests: Ordered  Radiological Study: Ordered  Medical Tests: Ordered            Scribe Attestation:   Scribe #1: I performed the above scribed service and the documentation accurately describes the services I performed. I attest to the accuracy of the note.  Comments: STAFF ATTENDING PHYSICIAN NOTE:  I provided and agree with the documentation provided by JEAN CLAUDE on Tosha Kwok.  ____________________  Ren Cortez MD, [facility]  Emergency Medicine Staff                       Clinical Impression:       ICD-10-CM ICD-9-CM   1. Chronic pain syndrome G89.4 338.4   2. Palpitations R00.2 785.1   3. Pain R52 780.96   4. Left hip pain M25.552 719.45   5. History of fibromyalgia Z87.39 V13.59   6. History of migraine headaches Z86.69 V12.49   7. Acute nonintractable headache, unspecified headache type R51 784.0         Disposition:   Disposition: Discharged  Condition: Stable                        Neo Cortez MD  09/26/19 9113

## 2019-09-25 NOTE — DISCHARGE INSTRUCTIONS
X-Ray Hips Bilateral 2 View Incl AP Pelvis (Final result)   Result time 09/25/19 14:20:43   Final result by Oc Fiore MD (09/25/19 14:20:43)                Impression:      No acute displaced fracture-dislocation identified.      Electronically signed by: Oc Fiore MD  Date: 09/25/2019  Time: 14:20            Narrative:    EXAMINATION:  XR HIPS BILATERAL 2 VIEW INCL AP PELVIS    CLINICAL HISTORY:  Pain in left hip    TECHNIQUE:  AP view of the pelvis and frogleg lateral views of both hips were performed.    COMPARISON:  None.    FINDINGS:  Bones are well mineralized.  Overall alignment is within normal limits.  No displaced fracture, dislocation or destructive osseous process.  Mild degenerative change at the pubic symphysis and bilateral sacroiliac and hip joints.  No subcutaneous emphysema or radiodense foreign body seen.                    X-Ray Chest PA And Lateral (Final result)   Result time 09/25/19 14:12:33   Final result by Jaymie Ortiz MD (09/25/19 14:12:33)                Impression:      No source for pain identified.      Electronically signed by: Jaymie Ortiz MD  Date: 09/25/2019  Time: 14:12            Narrative:    EXAMINATION:  XR CHEST PA AND LATERAL    CLINICAL HISTORY:  Pain, unspecified    TECHNIQUE:  PA and lateral views of the chest were performed.    COMPARISON:  12/28/2018.    FINDINGS:  Mediastinal structures are midline. Cardiac silhouette and pulmonary vascular distribution are normal.    Lung volumes are normal and symmetric. I detect no pulmonary disease, pleural fluid, lymph node enlargement, cardiac decompensation, pneumothorax, pneumomediastinum, pneumoperitoneum or significant osseous abnormality.

## 2019-09-27 ENCOUNTER — PATIENT MESSAGE (OUTPATIENT)
Dept: FAMILY MEDICINE | Facility: CLINIC | Age: 54
End: 2019-09-27

## 2019-09-28 ENCOUNTER — TELEPHONE (OUTPATIENT)
Dept: FAMILY MEDICINE | Facility: CLINIC | Age: 54
End: 2019-09-28

## 2019-09-29 NOTE — TELEPHONE ENCOUNTER
Called [facility] still in pain   Did get some relief from er visit and dilaudid.  Was better pain returned asking for suggestions  Discussed increasing gabapentin to 600 mg tonight and using additional prednisone 15 mg and 20 mg in am   Prednisone may keep up so suggested the above. Warned about sedation

## 2019-09-30 ENCOUNTER — LAB VISIT (OUTPATIENT)
Dept: LAB | Facility: HOSPITAL | Age: 54
End: 2019-09-30
Attending: FAMILY MEDICINE
Payer: COMMERCIAL

## 2019-09-30 DIAGNOSIS — D64.9 ANEMIA, UNSPECIFIED TYPE: ICD-10-CM

## 2019-09-30 DIAGNOSIS — E55.9 VITAMIN D DEFICIENCY: ICD-10-CM

## 2019-09-30 DIAGNOSIS — E53.8 VITAMIN B12 DEFICIENCY: ICD-10-CM

## 2019-09-30 LAB
25(OH)D3+25(OH)D2 SERPL-MCNC: 22 NG/ML (ref 30–96)
BASOPHILS # BLD AUTO: 0.02 K/UL (ref 0–0.2)
BASOPHILS NFR BLD: 0.4 % (ref 0–1.9)
DIFFERENTIAL METHOD: ABNORMAL
EOSINOPHIL # BLD AUTO: 0.1 K/UL (ref 0–0.5)
EOSINOPHIL NFR BLD: 2.2 % (ref 0–8)
ERYTHROCYTE [DISTWIDTH] IN BLOOD BY AUTOMATED COUNT: 14 % (ref 11.5–14.5)
HCT VFR BLD AUTO: 39 % (ref 37–48.5)
HGB BLD-MCNC: 12.4 G/DL (ref 12–16)
LYMPHOCYTES # BLD AUTO: 2.2 K/UL (ref 1–4.8)
LYMPHOCYTES NFR BLD: 49.7 % (ref 18–48)
MCH RBC QN AUTO: 27.4 PG (ref 27–31)
MCHC RBC AUTO-ENTMCNC: 31.8 G/DL (ref 32–36)
MCV RBC AUTO: 86 FL (ref 82–98)
MONOCYTES # BLD AUTO: 0.4 K/UL (ref 0.3–1)
MONOCYTES NFR BLD: 8.3 % (ref 4–15)
NEUTROPHILS # BLD AUTO: 1.8 K/UL (ref 1.8–7.7)
NEUTROPHILS NFR BLD: 39.4 % (ref 38–73)
PLATELET # BLD AUTO: 362 K/UL (ref 150–350)
PMV BLD AUTO: 10 FL (ref 9.2–12.9)
RBC # BLD AUTO: 4.53 M/UL (ref 4–5.4)
VIT B12 SERPL-MCNC: 219 PG/ML (ref 210–950)
WBC # BLD AUTO: 4.47 K/UL (ref 3.9–12.7)

## 2019-09-30 PROCEDURE — 82306 VITAMIN D 25 HYDROXY: CPT | Mod: PO

## 2019-09-30 PROCEDURE — 36415 COLL VENOUS BLD VENIPUNCTURE: CPT | Mod: PO

## 2019-09-30 PROCEDURE — 82607 VITAMIN B-12: CPT | Mod: PO

## 2019-09-30 PROCEDURE — 85025 COMPLETE CBC W/AUTO DIFF WBC: CPT | Mod: PO

## 2019-10-01 ENCOUNTER — PATIENT MESSAGE (OUTPATIENT)
Dept: FAMILY MEDICINE | Facility: CLINIC | Age: 54
End: 2019-10-01

## 2019-10-01 RX ORDER — AMITRIPTYLINE HYDROCHLORIDE 25 MG/1
25 TABLET, FILM COATED ORAL NIGHTLY PRN
Qty: 30 TABLET | Refills: 3 | Status: SHIPPED | OUTPATIENT
Start: 2019-10-01 | End: 2020-01-21

## 2019-10-02 ENCOUNTER — PATIENT MESSAGE (OUTPATIENT)
Dept: FAMILY MEDICINE | Facility: CLINIC | Age: 54
End: 2019-10-02

## 2019-10-03 ENCOUNTER — PATIENT MESSAGE (OUTPATIENT)
Dept: FAMILY MEDICINE | Facility: CLINIC | Age: 54
End: 2019-10-03

## 2019-10-03 DIAGNOSIS — B02.8 HERPES ZOSTER WITH COMPLICATION: Primary | ICD-10-CM

## 2019-10-03 RX ORDER — ACYCLOVIR 800 MG/1
800 TABLET ORAL
Qty: 35 TABLET | Refills: 0 | Status: SHIPPED | OUTPATIENT
Start: 2019-10-03 | End: 2019-10-30

## 2019-10-10 ENCOUNTER — OFFICE VISIT (OUTPATIENT)
Dept: ORTHOPEDICS | Facility: CLINIC | Age: 54
End: 2019-10-10
Payer: COMMERCIAL

## 2019-10-10 DIAGNOSIS — M75.42 IMPINGEMENT SYNDROME OF LEFT SHOULDER: ICD-10-CM

## 2019-10-10 DIAGNOSIS — M79.7 FIBROMYALGIA: Primary | Chronic | ICD-10-CM

## 2019-10-10 PROCEDURE — 99213 PR OFFICE/OUTPT VISIT, EST, LEVL III, 20-29 MIN: ICD-10-PCS | Mod: S$GLB,,, | Performed by: ORTHOPAEDIC SURGERY

## 2019-10-10 PROCEDURE — 99999 PR PBB SHADOW E&M-EST. PATIENT-LVL III: ICD-10-PCS | Mod: PBBFAC,,, | Performed by: ORTHOPAEDIC SURGERY

## 2019-10-10 PROCEDURE — 99999 PR PBB SHADOW E&M-EST. PATIENT-LVL III: CPT | Mod: PBBFAC,,, | Performed by: ORTHOPAEDIC SURGERY

## 2019-10-10 PROCEDURE — 99213 OFFICE O/P EST LOW 20 MIN: CPT | Mod: S$GLB,,, | Performed by: ORTHOPAEDIC SURGERY

## 2019-10-10 NOTE — PROGRESS NOTES
Subjective:      Patient ID: Tosha Kwok is a 54 y.o. female.  Chief Complaint: Pain of the Right Forearm and Pain of the Right Wrist      HPI  Tosha Kwok is a  54 y.o. female presenting today for follow up of bilateral arm and wrist symptoms.  She reports that she is improved after the injection in the left shoulder last visit  But she is her main complaint today is weakness and difficulty using both hands  She has had a previous nerve test which showed some mild left cubital tunnel but no other significant findings.    Review of patient's allergies indicates:   Allergen Reactions    Morphine Anxiety     Hives     Other Other (See Comments)     Allergy is carrots Other reaction(s): Angioedema, carrots    Erythromycin Other (See Comments)     Hives and cramps     Zofran (as hydrochloride) [ondansetron hcl] Hives     Local hive after IV injection         Current Outpatient Medications   Medication Sig Dispense Refill    acyclovir (ZOVIRAX) 800 MG Tab Take 1 tablet (800 mg total) by mouth 5 (five) times daily. for 7 days 35 tablet 0    albuterol (PROAIR HFA) 90 mcg/actuation inhaler Inhale 2 puffs into the lungs every 4 (four) hours as needed. 18 g 3    amitriptyline (ELAVIL) 25 MG tablet TAKE 1 TABLET (25 MG TOTAL) BY MOUTH NIGHTLY AS NEEDED FOR INSOMNIA. 30 tablet 3    budesonide 180mcg (PULMICORT 180MCG) 180 mcg/actuation AePB Inhale 2 puffs into the lungs once daily. 1 each 3    butalbital-acetaminophen-caffeine -40 mg (FIORICET, ESGIC) -40 mg per tablet Take 1 tablet by mouth every 4 (four) hours as needed for Pain. 30 tablet 0    cyanocobalamin 1,000 mcg/mL injection Inject 1 mL (1,000 mcg total) into the muscle every 28 days. 10 mL 3    cyclobenzaprine (FLEXERIL) 5 MG tablet TAKE 3 PILLS BY MOUTH (NIGHTLY)  6    diclofenac sodium (VOLTAREN) 1 % Gel       ergocalciferol (ERGOCALCIFEROL) 50,000 unit Cap TAKE ONE CAPSULE BY MOUTH ONE TIME PER WEEK 4 capsule 2    escitalopram  oxalate (LEXAPRO) 20 MG tablet Take 20 mg by mouth once daily.  3    estradiol 0.1 mg/24 hr td ptwk (ESTRADIOL TRANSDERMAL PATCH) 0.1 mg/24 hr PTWK Place 1 patch onto the skin every 7 days.      fentaNYL (DURAGESIC) 50 mcg/hr APPLY 1 PATCH TO SKIN EVERY 72 HOURS      gabapentin (NEURONTIN) 300 MG capsule Take 300 mg by mouth 3 (three) times daily.       HYDROmorphone (DILAUDID) 2 MG tablet Take 1 tablet by mouth every 8 hours as needed for pain 90 tablet 0    hydroxychloroquine (PLAQUENIL) 200 mg tablet Take 200 mg by mouth once daily.  1    hydrOXYzine pamoate (VISTARIL) 25 MG Cap Take 25 mg by mouth.      lidocaine (LIDODERM) 5 %(700 mg/patch) 1 Adhesive Patch, Medicated Topical Every 12 hours.   [id number]      lidocaine HCl 2 % Crea Apply to affected areas 4 times a day as needed. 118 mL 2    meclizine (ANTIVERT) 25 mg tablet TAKE 1 TABLET BY MOUTH THREE TIMES A DAY AS NEEDED FOR DIZZINESS FOR 5 DAYS.  0    omeprazole (PRILOSEC) 20 MG capsule Take 1 capsule (20 mg total) by mouth once daily. 1 Capsule, Delayed Release(E.C.) Oral Every day 90 capsule 3    progesterone (PROMETRIUM) 100 MG capsule Take 100 mg by mouth nightly.  10    promethazine (PHENERGAN) 25 MG tablet Take 25 mg by mouth. 1 Tablet Oral Every 6 hours      tiZANidine (ZANAFLEX) 4 MG tablet Take 4 mg by mouth 3 (three) times daily.  3    topiramate (TOPAMAX) 50 MG tablet Take 0.5 tablets (25 mg total) by mouth every evening for 14 days, THEN 1 tablet (50 mg total) every evening. 180 tablet 3    traZODone (DESYREL) 100 MG tablet Take 1 tablet (100 mg total) by mouth every evening. 30 tablet 11    valACYclovir (VALTREX) 500 MG tablet Take 500 mg by mouth 2 (two) times daily.      EPINEPHrine (EPIPEN 2-DENNIS) 0.3 mg/0.3 mL AtIn Inject 0.3 mLs (0.3 mg total) into the muscle once. for 1 dose 2 Device 3     No current facility-administered medications for this visit.        Past Medical History:   Diagnosis Date    Ankylosing spondylitis      Anxiety     Asthma     Cancer     Breast    Chronic constipation     Chronic insomnia     Edema     Fibromyalgia     Interstitial cystitis     Lupus     Migraine headache     Restless legs syndrome     Shingles     TIA (transient ischemic attack)        Past Surgical History:   Procedure Laterality Date    BLADDER SURGERY      BREAST SURGERY  2018    reduction     SECTION      CHOLECYSTECTOMY      HYSTERECTOMY      LASER ABLATION      to back    SLEEVE GASTROPLASTY  2016    TUBAL LIGATION         OBJECTIVE:   PHYSICAL EXAM:       Vitals:    10/10/19 1501   PainSc:   5     Ortho/SPM Exam  On examination left shoulder is nontender she has full range of motion negative impingement sign  Examination of the hands demonstrates no atrophy she does have full range of motion wrist fingers but she does have weak  in both hands  Sensation intact in all digits      RADIOGRAPHS:  None  Comments: I have personally reviewed the imaging and I agree with the above radiologist's report.    ASSESSMENT/PLAN:     IMPRESSION:  1.  Left shoulder impingement improved.  2.  Bilateral hand weakness    PLAN:  She has never had therapy for her hands and elbows saw I have ordered some OT for strengthening of the forearms elbows and wrists.  In the meantime I would like her to start Voltaren gel for topical use on the elbows where she is having some pain and follow-up 4-6 weeks    FOLLOW UP:  4-6 weeks    Disclaimer: This note has been generated using voice-recognition software. There may be typographical errors that have been missed during proof-reading.

## 2019-10-16 ENCOUNTER — OFFICE VISIT (OUTPATIENT)
Dept: NEUROLOGY | Facility: CLINIC | Age: 54
End: 2019-10-16
Payer: COMMERCIAL

## 2019-10-16 ENCOUNTER — PATIENT MESSAGE (OUTPATIENT)
Dept: ORTHOPEDICS | Facility: CLINIC | Age: 54
End: 2019-10-16

## 2019-10-16 VITALS — WEIGHT: 160.06 LBS | BODY MASS INDEX: 29.45 KG/M2 | HEIGHT: 62 IN

## 2019-10-16 DIAGNOSIS — E55.9 VITAMIN D DEFICIENCY: ICD-10-CM

## 2019-10-16 DIAGNOSIS — G43.809 OTHER MIGRAINE WITHOUT STATUS MIGRAINOSUS, NOT INTRACTABLE: ICD-10-CM

## 2019-10-16 PROCEDURE — 3008F BODY MASS INDEX DOCD: CPT | Mod: CPTII,S$GLB,, | Performed by: PSYCHIATRY & NEUROLOGY

## 2019-10-16 PROCEDURE — 99213 OFFICE O/P EST LOW 20 MIN: CPT | Mod: S$GLB,,, | Performed by: PSYCHIATRY & NEUROLOGY

## 2019-10-16 PROCEDURE — 99999 PR PBB SHADOW E&M-EST. PATIENT-LVL III: CPT | Mod: PBBFAC,,, | Performed by: PSYCHIATRY & NEUROLOGY

## 2019-10-16 PROCEDURE — 99213 PR OFFICE/OUTPT VISIT, EST, LEVL III, 20-29 MIN: ICD-10-PCS | Mod: S$GLB,,, | Performed by: PSYCHIATRY & NEUROLOGY

## 2019-10-16 PROCEDURE — 99999 PR PBB SHADOW E&M-EST. PATIENT-LVL III: ICD-10-PCS | Mod: PBBFAC,,, | Performed by: PSYCHIATRY & NEUROLOGY

## 2019-10-16 PROCEDURE — 3008F PR BODY MASS INDEX (BMI) DOCUMENTED: ICD-10-PCS | Mod: CPTII,S$GLB,, | Performed by: PSYCHIATRY & NEUROLOGY

## 2019-10-16 RX ORDER — TOPIRAMATE 100 MG/1
100 TABLET, FILM COATED ORAL NIGHTLY
Qty: 90 TABLET | Refills: 3 | Status: SHIPPED | OUTPATIENT
Start: 2019-10-16 | End: 2020-11-19 | Stop reason: SDUPTHER

## 2019-10-16 NOTE — ASSESSMENT & PLAN NOTE
-- increasing topamax to 100 mg nightly  -- discontinue fioricet, contributing to rebound headache

## 2019-10-16 NOTE — PROGRESS NOTES
[facility] NEUROLOGY  [facility] [place]    Date: 10/16/19  Patient Name: Tosha Wilks   MRN: [id number]   PCP: Cecille Oliveira  Referring Provider: No ref. provider found    Assessment:   Tosha Wilks is a 54 y.o. female Presenting in follow-up for management of chronic headaches and migraines.  Discussed with patient her headaches are likely to be worsened by medication overuse.  Will discontinue Fioricet immediately.  Patient should not resume Fioricet due to her history of overuse.  Will increase Topamax by ramp to 100 mg nightly.     Plan:     Problem List Items Addressed This Visit        Neuro    Migraine    Current Assessment & Plan     -- increasing topamax to 100 mg nightly  -- discontinue fioricet, contributing to rebound headache             Skyler Sheth MD  [facility]   Department of Neurology    Patient note was created using MModal Dictation.  Any errors in syntax or even information may not have been identified and edited on initial review prior to signing this note.  Subjective:          HPI:   Ms. Tosha Wilks is a 54 y.o. female presenting in follow-up for frequent migraine headaches.  The patient reports that since her last visit she has presented to the emergency room once for breakthrough headache.  She does note slight increased headache frequency since resuming Topamax but admits she has been taking Fioricet every single day.  She denies any new features to her headaches.  She has no other complaints today.    PAST MEDICAL HISTORY:  Past Medical History:   Diagnosis Date    Ankylosing spondylitis     Anxiety     Asthma     Cancer     Breast    Chronic constipation     Chronic insomnia     Edema     Fibromyalgia     Interstitial cystitis     Lupus     Migraine headache     Restless legs syndrome     Shingles     TIA (transient ischemic attack)        PAST SURGICAL HISTORY:  Past Surgical History:   Procedure Laterality Date     BLADDER SURGERY      BREAST SURGERY  2018    reduction     SECTION      CHOLECYSTECTOMY      HYSTERECTOMY      LASER ABLATION      to back    SLEEVE GASTROPLASTY  2016    TUBAL LIGATION         CURRENT MEDS:  Current Outpatient Medications   Medication Sig Dispense Refill    acyclovir (ZOVIRAX) 800 MG Tab Take 1 tablet (800 mg total) by mouth 5 (five) times daily. for 7 days 35 tablet 0    albuterol (PROAIR HFA) 90 mcg/actuation inhaler Inhale 2 puffs into the lungs every 4 (four) hours as needed. 18 g 3    amitriptyline (ELAVIL) 25 MG tablet TAKE 1 TABLET (25 MG TOTAL) BY MOUTH NIGHTLY AS NEEDED FOR INSOMNIA. 30 tablet 3    budesonide 180mcg (PULMICORT 180MCG) 180 mcg/actuation AePB Inhale 2 puffs into the lungs once daily. 1 each 3    cyanocobalamin 1,000 mcg/mL injection Inject 1 mL (1,000 mcg total) into the muscle every 28 days. 10 mL 3    cyclobenzaprine (FLEXERIL) 5 MG tablet TAKE 3 PILLS BY MOUTH (NIGHTLY)  6    diclofenac sodium (VOLTAREN) 1 % Gel       EPINEPHrine (EPIPEN 2-DENNIS) 0.3 mg/0.3 mL AtIn Inject 0.3 mLs (0.3 mg total) into the muscle once. for 1 dose 2 Device 3    ergocalciferol (ERGOCALCIFEROL) 50,000 unit Cap TAKE ONE CAPSULE BY MOUTH ONE TIME PER WEEK 4 capsule 2    escitalopram oxalate (LEXAPRO) 20 MG tablet Take 20 mg by mouth once daily.  3    estradiol 0.1 mg/24 hr td ptwk (ESTRADIOL TRANSDERMAL PATCH) 0.1 mg/24 hr PTWK Place 1 patch onto the skin every 7 days.      fentaNYL (DURAGESIC) 50 mcg/hr APPLY 1 PATCH TO SKIN EVERY 72 HOURS      gabapentin (NEURONTIN) 300 MG capsule Take 300 mg by mouth 3 (three) times daily.       HYDROmorphone (DILAUDID) 2 MG tablet take 1 tablet by mouth every 8 hours as needed for pain 90 tablet 0    hydroxychloroquine (PLAQUENIL) 200 mg tablet Take 200 mg by mouth once daily.  1    hydrOXYzine pamoate (VISTARIL) 25 MG Cap Take 25 mg by mouth.      lidocaine (LIDODERM) 5 %(700 mg/patch) 1 Adhesive Patch, Medicated Topical  Every 12 hours.   [id number]      lidocaine HCl 2 % Crea Apply to affected areas 4 times a day as needed. 118 mL 2    meclizine (ANTIVERT) 25 mg tablet TAKE 1 TABLET BY MOUTH THREE TIMES A DAY AS NEEDED FOR DIZZINESS FOR 5 DAYS.  0    omeprazole (PRILOSEC) 20 MG capsule Take 1 capsule (20 mg total) by mouth once daily. 1 Capsule, Delayed Release(E.C.) Oral Every day 90 capsule 3    progesterone (PROMETRIUM) 100 MG capsule Take 100 mg by mouth nightly.  10    promethazine (PHENERGAN) 25 MG tablet Take 25 mg by mouth. 1 Tablet Oral Every 6 hours      tiZANidine (ZANAFLEX) 4 MG tablet Take 4 mg by mouth 3 (three) times daily.  3    topiramate (TOPAMAX) 100 MG tablet Take 1 tablet (100 mg total) by mouth every evening. for 14 days 90 tablet 3    traZODone (DESYREL) 100 MG tablet Take 1 tablet (100 mg total) by mouth every evening. 30 tablet 11    valACYclovir (VALTREX) 500 MG tablet Take 500 mg by mouth 2 (two) times daily.       No current facility-administered medications for this visit.        ALLERGIES:  Review of patient's allergies indicates:   Allergen Reactions    Morphine Anxiety     Hives     Other Other (See Comments)     Allergy is carrots Other reaction(s): Angioedema, carrots    Erythromycin Other (See Comments)     Hives and cramps     Zofran (as hydrochloride) [ondansetron hcl] Hives     Local hive after IV injection       FAMILY HISTORY:  Family History   Problem Relation Age of Onset    Hypertension Mother     Hypertension Brother     Breast cancer Maternal Aunt         x2        SOCIAL HISTORY:  Social History     Tobacco Use    Smoking status: Never Smoker    Smokeless tobacco: Never Used   Substance Use Topics    Alcohol use: Yes     Frequency: Monthly or less     Drinks per session: 1 or 2     Binge frequency: Never     Comment: occasionally    Drug use: No       Review of Systems:  12 system review of systems is negative except for the symptoms mentioned in HPI.      Objective:      There were no vitals filed for this visit.  General: NAD, well nourished   Eyes: no tearing, discharge, no erythema   ENT: moist mucous membranes of the oral cavity, nares patent    Neck: Supple, full range of motion  Cardiovascular: Warm and well perfused, pulses equal and symmetrical  Lungs: Normal work of breathing, normal chest wall excursions  Skin: No rash, lesions, or breakdown on exposed skin  Psychiatry: Mood and affect are appropriate   Abdomen: soft, non tender, non distended  Extremeties: No cyanosis, clubbing or edema.    Neurological   MENTAL STATUS: Alert and oriented to person, place, and time. Attention and concentration within normal limits. Speech without dysarthria. Recent and remote memory within normal limits   CRANIAL NERVES: Visual fields intact. PERRL. EOMI. Facial sensation intact. Face symmetrical. Hearing grossly intact. Full shoulder shrug bilaterally. Tongue protrudes midline   SENSORY: Sensation is intact to light touch throughout.    MOTOR: Normal bulk and tone.  5/5 deltoid, biceps, triceps, interosseous, hand  bilaterally. 5/5 iliopsoas, knee extension/flexion, foot dorsi/plantarflexion bilaterally.    REFLEXES: Symmetric and 2+ throughout. CEREBELLAR/COORDINATION/GAIT: Gait steady with normal arm swing and stride length.   Finger to nose intact. Normal rapid alternating movements.

## 2019-10-17 DIAGNOSIS — M25.519 SHOULDER PAIN, UNSPECIFIED CHRONICITY, UNSPECIFIED LATERALITY: Primary | ICD-10-CM

## 2019-10-17 RX ORDER — ERGOCALCIFEROL 1.25 MG/1
CAPSULE ORAL
Qty: 4 CAPSULE | Refills: 2 | Status: SHIPPED | OUTPATIENT
Start: 2019-10-17 | End: 2020-01-10

## 2019-10-29 ENCOUNTER — PATIENT MESSAGE (OUTPATIENT)
Dept: NEUROLOGY | Facility: CLINIC | Age: 54
End: 2019-10-29

## 2019-10-30 ENCOUNTER — PATIENT OUTREACH (OUTPATIENT)
Dept: ADMINISTRATIVE | Facility: OTHER | Age: 54
End: 2019-10-30

## 2019-10-30 ENCOUNTER — OFFICE VISIT (OUTPATIENT)
Dept: CARDIOLOGY | Facility: CLINIC | Age: 54
End: 2019-10-30
Payer: COMMERCIAL

## 2019-10-30 VITALS
WEIGHT: 158.75 LBS | HEIGHT: 62 IN | SYSTOLIC BLOOD PRESSURE: 114 MMHG | HEART RATE: 79 BPM | BODY MASS INDEX: 29.21 KG/M2 | DIASTOLIC BLOOD PRESSURE: 64 MMHG

## 2019-10-30 DIAGNOSIS — E78.00 PURE HYPERCHOLESTEROLEMIA: Primary | ICD-10-CM

## 2019-10-30 DIAGNOSIS — R00.2 PALPITATIONS: ICD-10-CM

## 2019-10-30 PROCEDURE — 3008F BODY MASS INDEX DOCD: CPT | Mod: CPTII,S$GLB,, | Performed by: INTERNAL MEDICINE

## 2019-10-30 PROCEDURE — 99999 PR PBB SHADOW E&M-EST. PATIENT-LVL III: CPT | Mod: PBBFAC,,, | Performed by: INTERNAL MEDICINE

## 2019-10-30 PROCEDURE — 99204 OFFICE O/P NEW MOD 45 MIN: CPT | Mod: S$GLB,,, | Performed by: INTERNAL MEDICINE

## 2019-10-30 PROCEDURE — 99999 PR PBB SHADOW E&M-EST. PATIENT-LVL III: ICD-10-PCS | Mod: PBBFAC,,, | Performed by: INTERNAL MEDICINE

## 2019-10-30 PROCEDURE — 3008F PR BODY MASS INDEX (BMI) DOCUMENTED: ICD-10-PCS | Mod: CPTII,S$GLB,, | Performed by: INTERNAL MEDICINE

## 2019-10-30 PROCEDURE — 99204 PR OFFICE/OUTPT VISIT, NEW, LEVL IV, 45-59 MIN: ICD-10-PCS | Mod: S$GLB,,, | Performed by: INTERNAL MEDICINE

## 2019-10-30 RX ORDER — SUMATRIPTAN 50 MG/1
TABLET, FILM COATED ORAL
Qty: 12 TABLET | Refills: 3 | Status: SHIPPED | OUTPATIENT
Start: 2019-10-30 | End: 2020-02-18 | Stop reason: SDUPTHER

## 2019-10-30 NOTE — PROGRESS NOTES
"Subjective:   Patient ID:  Tosha Wilks is a 54 y.o. female who presents for evaluation of Chest Pain; Tachycardia; and Palpitations      HPI: She presents today for evaluation of tachycardia. She reports that for the pat year or so she has had intermittent tachycardia. It comes on randomly and occurs 2-3 days a week. It will last for up to 30 minutes at a time. When she checks her pulse, it's around 100-120. Recently, the palpitations have been coming on more frequently and lasting longer. She has no associated symptoms with them, and they are non- exertional.  She has had a stress test in the past which was normal. She reports having a "TIA" in the  after childbirth. She reports having numbness in her right and leg which resolved after a few months. She was on asa for a while but then stopped it after a few years. She was never placed on statin therapy.    ECG :( 19): NSR 73 bpm    Past Medical History:   Diagnosis Date    Ankylosing spondylitis     Anxiety     Asthma     Chronic constipation     Chronic insomnia     Edema     Fibromyalgia     Interstitial cystitis     Lupus     Migraine headache     Restless legs syndrome     Shingles     Stroke     post partum right sided numbness    TIA (transient ischemic attack)        Past Surgical History:   Procedure Laterality Date    BLADDER SURGERY      BREAST SURGERY  2018    reduction     SECTION      CHOLECYSTECTOMY      HYSTERECTOMY      LASER ABLATION      to back    SLEEVE GASTROPLASTY  2016    TUBAL LIGATION         Social History     Socioeconomic History    Marital status:      Spouse name: Not on file    Number of children: Not on file    Years of education: Not on file    Highest education level: Not on file   Occupational History    Not on file   Social Needs    Financial resource strain: Not hard at all    Food insecurity:     Worry: Never true     Inability: Never true    " Transportation needs:     Medical: No     Non-medical: No   Tobacco Use    Smoking status: Never Smoker    Smokeless tobacco: Never Used   Substance and Sexual Activity    Alcohol use: Yes     Frequency: Monthly or less     Drinks per session: 1 or 2     Binge frequency: Never     Comment: occasionally    Drug use: No    Sexual activity: Yes     Partners: Male   Lifestyle    Physical activity:     Days per week: 2 days     Minutes per session: 20 min    Stress: To some extent   Relationships    Social connections:     Talks on phone: Three times a week     Gets together: Once a week     Attends Congregation service: Not on file     Active member of club or organization: Yes     Attends meetings of clubs or organizations: Never     Relationship status:    Other Topics Concern    Not on file   Social History Narrative    Not on file       Family History   Problem Relation Age of Onset    Hypertension Mother     Hypertension Brother     Breast cancer Maternal Aunt         x2     Heart attack Father     Hypertension Sister     Lupus Sister        Patient's Medications   New Prescriptions    No medications on file   Previous Medications    ALBUTEROL (PROAIR HFA) 90 MCG/ACTUATION INHALER    Inhale 2 puffs into the lungs every 4 (four) hours as needed.    AMITRIPTYLINE (ELAVIL) 25 MG TABLET    TAKE 1 TABLET (25 MG TOTAL) BY MOUTH NIGHTLY AS NEEDED FOR INSOMNIA.    BUDESONIDE 180MCG (PULMICORT 180MCG) 180 MCG/ACTUATION AEPB    Inhale 2 puffs into the lungs once daily.    CYANOCOBALAMIN 1,000 MCG/ML INJECTION    Inject 1 mL (1,000 mcg total) into the muscle every 28 days.    CYCLOBENZAPRINE (FLEXERIL) 5 MG TABLET    TAKE 3 PILLS BY MOUTH (NIGHTLY)    EPINEPHRINE (EPIPEN 2-DENNIS) 0.3 MG/0.3 ML ATIN    Inject 0.3 mLs (0.3 mg total) into the muscle once. for 1 dose    ERGOCALCIFEROL (ERGOCALCIFEROL) 50,000 UNIT CAP    TAKE ONE CAPSULE BY MOUTH ONE TIME PER WEEK    ESCITALOPRAM OXALATE (LEXAPRO) 20 MG TABLET     Take 20 mg by mouth once daily.    FENTANYL (DURAGESIC) 50 MCG/HR    APPLY 1 PATCH TO SKIN EVERY 72 HOURS    GABAPENTIN (NEURONTIN) 300 MG CAPSULE    Take 300 mg by mouth 3 (three) times daily.     HYDROMORPHONE (DILAUDID) 2 MG TABLET    take 1 tablet by mouth every 8 hours as needed for pain    HYDROXYCHLOROQUINE (PLAQUENIL) 200 MG TABLET    Take 200 mg by mouth once daily.    HYDROXYZINE PAMOATE (VISTARIL) 25 MG CAP    Take 25 mg by mouth every 4 (four) hours as needed.     LIDOCAINE HCL 2 % CREA    Apply to affected areas 4 times a day as needed.    OMEPRAZOLE (PRILOSEC) 20 MG CAPSULE    Take 1 capsule (20 mg total) by mouth once daily. 1 Capsule, Delayed Release(E.C.) Oral Every day    SUMATRIPTAN (IMITREX) 50 MG TABLET    Once for severe headache. May repeat once after 2 hours. Do not exceed 3-4 doses in one week.    TIZANIDINE (ZANAFLEX) 4 MG TABLET    Take 4 mg by mouth 3 (three) times daily.    TOPIRAMATE (TOPAMAX) 100 MG TABLET    Take 1 tablet (100 mg total) by mouth every evening. for 14 days    TRAZODONE (DESYREL) 100 MG TABLET    Take 1 tablet (100 mg total) by mouth every evening.   Modified Medications    No medications on file   Discontinued Medications    ACYCLOVIR (ZOVIRAX) 800 MG TAB    Take 1 tablet (800 mg total) by mouth 5 (five) times daily. for 7 days    DICLOFENAC SODIUM (VOLTAREN) 1 % GEL        ESTRADIOL 0.1 MG/24 HR TD PTWK (ESTRADIOL TRANSDERMAL PATCH) 0.1 MG/24 HR PTWK    Place 1 patch onto the skin every 7 days.    LIDOCAINE (LIDODERM) 5 %(700 MG/PATCH)    1 Adhesive Patch, Medicated Topical Every 12 hours.   [id number]    MECLIZINE (ANTIVERT) 25 MG TABLET    TAKE 1 TABLET BY MOUTH THREE TIMES A DAY AS NEEDED FOR DIZZINESS FOR 5 DAYS.    PROGESTERONE (PROMETRIUM) 100 MG CAPSULE    Take 100 mg by mouth nightly.    PROMETHAZINE (PHENERGAN) 25 MG TABLET    Take 25 mg by mouth. 1 Tablet Oral Every 6 hours    VALACYCLOVIR (VALTREX) 500 MG TABLET    Take 500 mg by mouth 2 (two) times daily.  "      Review of Systems   Constitution: Negative for malaise/fatigue and weight gain.   HENT: Negative for hearing loss.    Eyes: Negative for visual disturbance.   Cardiovascular: Positive for palpitations. Negative for chest pain, claudication, dyspnea on exertion, leg swelling, near-syncope, orthopnea, paroxysmal nocturnal dyspnea and syncope.   Respiratory: Negative for cough, shortness of breath, sleep disturbances due to breathing, snoring and wheezing.    Endocrine: Negative for cold intolerance, heat intolerance, polydipsia, polyphagia and polyuria.   Hematologic/Lymphatic: Negative for bleeding problem. Does not bruise/bleed easily.   Skin: Negative for rash and suspicious lesions.   Musculoskeletal: Negative for arthritis, falls, joint pain, muscle weakness and myalgias.   Gastrointestinal: Negative for abdominal pain, change in bowel habit, constipation, diarrhea, heartburn, hematochezia, melena and nausea.   Genitourinary: Negative for hematuria and nocturia.   Neurological: Negative for excessive daytime sleepiness, dizziness, headaches, light-headedness, loss of balance and weakness.   Psychiatric/Behavioral: Negative for depression. The patient is not nervous/anxious.    Allergic/Immunologic: Negative for environmental allergies.       /64 (BP Location: Left arm, Patient Position: Sitting, BP Method: Large (Automatic))   Pulse 79   Ht 5' 2" (1.575 m)   Wt 72 kg (158 lb 11.7 oz)   LMP 03/26/2012   BMI 29.03 kg/m²     Objective:   Physical Exam   Constitutional: She is oriented to person, place, and time. She appears well-developed and well-nourished.        HENT:   Head: Normocephalic and atraumatic.   Mouth/Throat: Oropharynx is clear and moist.   Eyes: Pupils are equal, round, and reactive to light. Conjunctivae and EOM are normal. No scleral icterus.   Neck: Normal range of motion. Neck supple. No hepatojugular reflux and no JVD present. No tracheal deviation present. No thyromegaly " present.   Cardiovascular: Normal rate, regular rhythm, normal heart sounds and intact distal pulses. PMI is not displaced.   Pulses:       Carotid pulses are 2+ on the right side, and 2+ on the left side.       Radial pulses are 2+ on the right side, and 2+ on the left side.        Dorsalis pedis pulses are 2+ on the right side, and 2+ on the left side.        Posterior tibial pulses are 2+ on the right side, and 2+ on the left side.   Pulmonary/Chest: Effort normal and breath sounds normal.   Abdominal: Soft. Bowel sounds are normal. She exhibits no distension and no mass. There is no hepatosplenomegaly. There is no tenderness.   Musculoskeletal: She exhibits no edema or tenderness.   Lymphadenopathy:     She has no cervical adenopathy.   Neurological: She is alert and oriented to person, place, and time.   Skin: Skin is warm and dry. No rash noted. No cyanosis or erythema. Nails show no clubbing.   Psychiatric: She has a normal mood and affect. Her speech is normal and behavior is normal.       Lab Results   Component Value Date     09/25/2019    K 3.8 09/25/2019     (H) 09/25/2019    CO2 21 (L) 09/25/2019    BUN 16 09/25/2019    CREATININE 0.8 09/25/2019    GLU 80 09/25/2019    AST 32 09/25/2019    ALT 91 (H) 09/25/2019    ALBUMIN 3.8 09/25/2019    PROT 7.3 09/25/2019    BILITOT 0.3 09/25/2019    WBC 4.47 09/30/2019    HGB 12.4 09/30/2019    HCT 39.0 09/30/2019    MCV 86 09/30/2019     (H) 09/30/2019    INR 0.9 06/29/2015    TSH 1.293 06/11/2019    CHOL 230 (H) 06/11/2019    HDL 64 06/11/2019    LDLCALC 144.8 06/11/2019    TRIG 106 06/11/2019    BNP 15 09/25/2019       Assessment:     1. Pure hypercholesterolemia : Her 10 year ASCVD risk score is 1.4%. I have ordered a carotid US given her history of TIA to see if statin therapy is warranted.   2. Palpitations : Her ECG and exam are normal. 30 day event monitor ordered.       Plan:     Tosha  was seen today for chest pain, tachycardia and  palpitations.    Diagnoses and all orders for this visit:    Pure hypercholesterolemia  -     Cardiac event monitor; Future  -     CV Ultrasound Bilateral Doppler Carotid; Future    Palpitations  -     Cardiac event monitor; Future  -     CV Ultrasound Bilateral Doppler Carotid; Future        Thank you for allowing me to participate in this patient's care. Please do not hesitate to contact me with any questions or concerns.

## 2019-10-31 ENCOUNTER — CLINICAL SUPPORT (OUTPATIENT)
Dept: REHABILITATION | Facility: HOSPITAL | Age: 54
End: 2019-10-31
Attending: ORTHOPAEDIC SURGERY
Payer: COMMERCIAL

## 2019-10-31 ENCOUNTER — CLINICAL SUPPORT (OUTPATIENT)
Dept: CARDIOLOGY | Facility: HOSPITAL | Age: 54
End: 2019-10-31
Attending: INTERNAL MEDICINE
Payer: COMMERCIAL

## 2019-10-31 DIAGNOSIS — M25.649 JOINT STIFFNESS OF HAND, UNSPECIFIED LATERALITY: ICD-10-CM

## 2019-10-31 DIAGNOSIS — M62.81 MUSCLE WEAKNESS: ICD-10-CM

## 2019-10-31 DIAGNOSIS — M79.641 PAIN IN BOTH HANDS: ICD-10-CM

## 2019-10-31 DIAGNOSIS — E78.00 PURE HYPERCHOLESTEROLEMIA: ICD-10-CM

## 2019-10-31 DIAGNOSIS — M79.642 PAIN IN BOTH HANDS: ICD-10-CM

## 2019-10-31 DIAGNOSIS — R00.2 PALPITATIONS: ICD-10-CM

## 2019-10-31 PROCEDURE — 93271 ECG/MONITORING AND ANALYSIS: CPT

## 2019-10-31 PROCEDURE — 97018 PARAFFIN BATH THERAPY: CPT | Mod: PO

## 2019-10-31 PROCEDURE — 97110 THERAPEUTIC EXERCISES: CPT | Mod: PO

## 2019-10-31 PROCEDURE — 93272 ECG/REVIEW INTERPRET ONLY: CPT | Mod: ,,, | Performed by: INTERNAL MEDICINE

## 2019-10-31 PROCEDURE — 97166 OT EVAL MOD COMPLEX 45 MIN: CPT | Mod: PO

## 2019-10-31 PROCEDURE — 93272 CARDIAC EVENT MONITOR (CUPID ONLY): ICD-10-PCS | Mod: ,,, | Performed by: INTERNAL MEDICINE

## 2019-10-31 NOTE — PATIENT INSTRUCTIONS
[facility], OCCUPATIONAL THERAPY  HOME EXERCISE PROGRAM     Complete the following massages for 3-5min each, 2x/day.                                            Complete the following exercises with 10 repetitions each, 2x/day.     AROM: Supination / Pronation   With your elbow by your side, turn your palm up then turn your palm down.     AROM: Wrist Flexion / Extension               Bend your wrist forward and back as far as possible.      AROM: Wrist Radial / Ulnar Deviation  Bend your wrist from side to side as far as possible.    AROM: Wrist Flexion / Extension  Make a fist, then bend your wrist forward then back as far as possible.         AROM: Wrist Radial / Ulnar Deviation   Make a fist then bend your wrist toward your body, then away.         Copyright © I. All rights reserved.     AROM: Elbow Flexion / Extension        With left hand palm up, gently bend elbow as far as possible. Then straighten arm as far as possible.  Repeat __10__ times per set. Do __1__ sets per session. Do __2__ sessions per day.    Therapist: VLAD Wilcox

## 2019-10-31 NOTE — PLAN OF CARE
"  [facility] Occupational Therapy  Initial Evaluation     Date: 10/31/2019  Name: Tosha Wilks  Clinic Number: [id number]    Therapy Diagnosis:   Encounter Diagnoses   Name Primary?    Pain in both hands     Joint stiffness of hand, unspecified laterality     Muscle weakness      Physician: Dion Mojica Jr., *    Physician Orders: Eval & treat  Medical Diagnosis: M79.7 (ICD-10-CM) - Fibromyalgia M75.42 (ICD-10-CM) - Impingement syndrome of left shoulder     Surgical Procedure and Date: n/a  Evaluation Date: 10310/19  Insurance Authorization Period Expiration: 10/09/2020  Plan of Care Certification Period: 10/31/19 to 12/13/19  Date of Return to MD: 11/21/19    Visit # / Visits authorized: 1 / pending  Time In: 5:05 am  Time Out: 6:05 am  Total Billable Time: 60 minutes     Precautions:  Standard, Fall and Fibromyalgia, Lupus    Subjective     Involved Side: Both elbow/wrist/hands  Dominant Side: Right  Date of Onset: approximately April 2019  Mechanism of Injury: gradual onset of symptoms that progressively worsened  History of Current Condition: patient self-treated symptoms for approx 2 weeks and when symptoms didn't improve she went to see Dr. Mojica.  She experienced a "flare up" and received a cortisone injection in her L shld on 08/29/19,  She currently is on p.o steroids for mgmt of chronic FM & Lupus, and ankylosis spond.  She also received R wrist cortisone injection, which helped temporarily. She has been referred to OT for strengthening and conservative mgmt of CuTS, B'ly  Surgical Procedure: n/a  Imaging: NCV/EMG  Previous Therapy: none for this conditon    Past Medical History/Physical Systems Review:   Tosha Wilks  has a past medical history of Ankylosing spondylitis, Anxiety, Asthma, Chronic constipation, Chronic insomnia, Edema, Fibromyalgia, Interstitial cystitis, Lupus, Migraine headache, Restless legs syndrome, Shingles, Stroke, and TIA (transient ischemic " "attack).    Tosha Wilks  has a past surgical history that includes Bladder surgery;  section; Tubal ligation; Laser ablation; Cholecystectomy; Hysterectomy; Sleeve Gastroplasty (2016); and Breast surgery (2018).    Tosha  has a current medication list which includes the following prescription(s): albuterol, amitriptyline, budesonide 180mcg, cyanocobalamin, cyclobenzaprine, epinephrine, ergocalciferol, escitalopram oxalate, fentanyl, gabapentin, hydromorphone, hydroxychloroquine, hydroxyzine pamoate, lidocaine hcl, omeprazole, sumatriptan, tizanidine, topiramate, and trazodone.    Review of patient's allergies indicates:   Allergen Reactions    Morphine Anxiety     Hives     Other Other (See Comments)     Allergy is carrots Other reaction(s): Angioedema, carrots    Erythromycin Other (See Comments)     Hives and cramps     Zofran (as hydrochloride) [ondansetron hcl] Hives     Local hive after IV injection        Patient's Goals for Therapy: "strenghtening. 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  Working presently: disability  Duties: admin    Functional Limitations/Social History:    Previous functional status includes: Independent with all ADLs, including work, driving    Current FunctionalStatus   Home/Living environment : lives with their spouse      Limitation of Functional Status as follows:   ADLs/IADLs:     - Feeding: moderate difficulty cutting meat/food    - Bathing: minimal difficulty    - Dressing/Grooming: minimal difficulty    - Driving: difficulty gripping    Objective "     Observation/Appearance:  Joint stiffness B hands/fingers and Deformities noted: multiple joints in B hands/fingers (swan-neck deformities)    Edema.   Trace to none in B elbow/forearm/wrist/hands    AROM:   Left Right   Forearm Sup/pron WNL WNl   Wrist E/F 36/40 34/10   Wrist RD/UD 10/15 12/20   Thumb R/P Abd 35/50 39/45   Thumb MP flex 69 66   Thumb IP flex 32 29   Thumb [place] To SF MCP To SF MCP       Hand ROM. Measured in degrees.   10/31/2019 10/31/2019    Left Right        Index: MP  0/79 0/79              PIP     0/96 -28/100              DIP 0/34 0/40              RAMSEY 209 191        Long:  MP 0/79 0/85              PIP -20/95 -16/100              DIP 0/52 0/25              RAMSEY 206 194        Ring:   MP 0/84 0/90              PIP -20/104 0/102              DIP 0/52 0/0              RAMSEY 196 192        Small:  MP 0/96 0/94               PIP 0/103 0/98               DIP 0/34 0/45              RAMSEY 233 229          Sensation:  Patient reports numbness & tingling in B wrist/hands     Strength (Dyanmometer) and Pinch Strength (Pinch Gauge)  Measured in pounds and psi.    10/31/2019 10/31/2019    Left Right   Rung II 25 10   Key Pinch 2 .5   3pt Pinch 1 1   2pt Pinch 1 .5       Treatment     Treatment Time In: 5:45 pm  Treatment Time Out: 6:05 pm  Total Treatment time separate from Evaluation time: 20 minutes    Tosha received the following supervised modalities after being cleared for contradictions for 10 minutes:   -Paraffin w/ MHP to B wrist/hands, pre-tx to decrease pain & increase tissue extensibility    Tosha received therapeutic exercises for 10 minutes including:  -performed each exercise on HEP, see attached for details    Home Exercise Program/Education:  Issued HEP (see patient instructions in EMR) and educated on modality use for pain management . Exercises were reviewed and Tosha was able to demonstrate them prior to the end of the session.   Pt received a written copy of exercises to  perform at home. Tosha demonstrated good  understanding of the education provided.  Pt was advised to perform these exercises free of pain, and to stop performing them if pain occurs.    Patient/Family Education: role of OT, goals for OT, scheduling/cancellations - pt verbalized understanding. Discussed insurance limitations with patient.    Additional Education provided: discussed basic joint protection principles and benefits of paraffin bath for home use for pain mmgt    Assessment     Tosha Wilks is a 54 y.o. female referred to outpatient occupational therapy and presents with a medical diagnosis of Fibromyalgia with B wrist/hand weakness, resulting in Decreased ROM, Decreased  strength, Decreased pinch strength, Decreased functional hand use, Increased pain, Joint Stiffness, Diminished/Impaired Sensation and Diminished/Impaired Coordination and demonstrates limitations as described in the chart below. Following medical record review it is determined that pt will benefit from occupational therapy services in order to maximize pain free and/or functional use of bilateral wrist/hands. The following goals were discussed with the patient and patient is in agreement with them as to be addressed in the treatment plan. The patient's rehab potential is Fair.     Anticipated barriers to occupational therapy: co-morbid ds of Lupus, FM  Pt has no cultural, educational or language barriers to learning provided.    Profile and History Assessment of Occupational Performance Level of Clinical Decision Making Complexity Score   Occupational Profile:   Tosha Wilks is a 54 y.o. female who lives with their spouse and is currently disability as an RN. Tosha Wilks has difficulty with  feeding, bathing, grooming and dressing  driving/transportation management, shopping, phone/computer use and housework/household chores  affecting his/her daily functional abilities. His/her main goal for therapy is gain  strength.     Comorbidities:   Lupus, FM    Medical and Therapy History Review:   Expanded               Performance Deficits    Physical:  Joint Mobility  Muscle Power/Strength  Muscle Endurance   Strength  Pinch Strength  Fine Motor Coordination  Pain    Cognitive:  No Deficits    Psychosocial:    No Deficits     Clinical Decision Making:  moderate    Assessment Process:  Detailed Assessments    Modification/Need for Assistance:  Minimal-Moderate Modifications/Assistance    Intervention Selection:  Several Treatment Options       moderate  Based on PMHX, co morbidities , data from assessments and functional level of assistance required with task and clinical presentation directly impacting function.       The following goals were discussed with the patient and patient is in agreement with them as to be addressed in the treatment plan.     Goals:   Short Term Goals: (in 2 weeks)  1) Patient will be independent in HEP  2) Decrease pain in B forearm/wrist/hands to no more than 5/10 worst with ADL/IADL's   3) Increase AROM in B IF-SF RAMSEY's by 8-10 degrees for improved functioning in ADL/IADL's  4) Increase B  strength by 3-5 psi for increased functional use  5) Patient able to cut meat/food without difficulty    Long Term Goals: (in 6 weeks)  1) Decrease pain in B forearm/wrist/hands to no more than 2-3/10 worst   2) Increase AROM of B wrist/hands to WFL for increased functioning in ADL/IADL's  3) Increase strength in B  by 20% of initial measures for improved functioning in ADL/IADL's  4) Increased functioning in ADL's, as evidenced by patient able to perform all self-care without difficulty and at Mod I      Plan     Certification Period/Plan of care expiration: 10/31/2019 to 12/13/19.    Outpatient Occupational Therapy 2 times weekly for 6 weeks to include the following interventions: Paraffin, Manual therapy/joint mobilizations, Modalities for pain management, US 3 mhz, Therapeutic  exercises/activities., Strengthening, Electrical Modalities, Joint Protection and Energy Conservation.      Micheal Calvo, OT

## 2019-11-06 ENCOUNTER — PATIENT MESSAGE (OUTPATIENT)
Dept: CARDIOLOGY | Facility: CLINIC | Age: 54
End: 2019-11-06

## 2019-11-06 ENCOUNTER — CLINICAL SUPPORT (OUTPATIENT)
Dept: CARDIOLOGY | Facility: CLINIC | Age: 54
End: 2019-11-06
Attending: INTERNAL MEDICINE
Payer: COMMERCIAL

## 2019-11-06 DIAGNOSIS — E78.00 PURE HYPERCHOLESTEROLEMIA: ICD-10-CM

## 2019-11-06 DIAGNOSIS — R00.2 PALPITATIONS: ICD-10-CM

## 2019-11-06 LAB
LEFT CBA DIAS: 27 CM/S
LEFT CBA SYS: 74 CM/S
LEFT CCA DIST DIAS: 26 CM/S
LEFT CCA DIST SYS: 79 CM/S
LEFT CCA MID DIAS: 20 CM/S
LEFT CCA MID SYS: 83 CM/S
LEFT CCA PROX DIAS: 30 CM/S
LEFT CCA PROX SYS: 140 CM/S
LEFT ECA DIAS: 22 CM/S
LEFT ECA SYS: 99 CM/S
LEFT ICA DIST DIAS: 31 CM/S
LEFT ICA DIST SYS: 78 CM/S
LEFT ICA MID DIAS: 42 CM/S
LEFT ICA MID SYS: 117 CM/S
LEFT ICA PROX DIAS: 23 CM/S
LEFT ICA PROX SYS: 59 CM/S
LEFT VERTEBRAL DIAS: 21 CM/S
LEFT VERTEBRAL SYS: 70 CM/S
OHS CV CAROTID RIGHT ICA EDV HIGHEST: 31
OHS CV CAROTID ULTRASOUND LEFT ICA/CCA RATIO: 0.84
OHS CV CAROTID ULTRASOUND RIGHT ICA/CCA RATIO: 1.06
OHS CV PV CAROTID LEFT HIGHEST CCA: 140
OHS CV PV CAROTID LEFT HIGHEST ICA: 117
OHS CV PV CAROTID RIGHT HIGHEST CCA: 89
OHS CV PV CAROTID RIGHT HIGHEST ICA: 94
OHS CV US CAROTID LEFT HIGHEST EDV: 42
RIGHT ARM DIASTOLIC BLOOD PRESSURE: 64 MMHG
RIGHT ARM SYSTOLIC BLOOD PRESSURE: 114 MMHG
RIGHT CBA DIAS: 23 CM/S
RIGHT CBA SYS: 72 CM/S
RIGHT CCA DIST DIAS: 27 CM/S
RIGHT CCA DIST SYS: 77 CM/S
RIGHT CCA MID DIAS: 26 CM/S
RIGHT CCA MID SYS: 83 CM/S
RIGHT CCA PROX DIAS: 19 CM/S
RIGHT CCA PROX SYS: 89 CM/S
RIGHT ECA DIAS: 44 CM/S
RIGHT ECA SYS: 160 CM/S
RIGHT ICA DIST DIAS: 31 CM/S
RIGHT ICA DIST SYS: 94 CM/S
RIGHT ICA MID DIAS: 28 CM/S
RIGHT ICA MID SYS: 72 CM/S
RIGHT ICA PROX DIAS: 13 CM/S
RIGHT ICA PROX SYS: 45 CM/S
RIGHT VERTEBRAL DIAS: 9 CM/S
RIGHT VERTEBRAL SYS: 53 CM/S

## 2019-11-06 PROCEDURE — 93880 EXTRACRANIAL BILAT STUDY: CPT | Mod: S$GLB,,, | Performed by: INTERNAL MEDICINE

## 2019-11-06 PROCEDURE — 93880 CV US DOPPLER CAROTID (CUPID ONLY): ICD-10-PCS | Mod: S$GLB,,, | Performed by: INTERNAL MEDICINE

## 2019-11-12 ENCOUNTER — PATIENT MESSAGE (OUTPATIENT)
Dept: CARDIOLOGY | Facility: CLINIC | Age: 54
End: 2019-11-12

## 2019-11-12 ENCOUNTER — PATIENT MESSAGE (OUTPATIENT)
Dept: ORTHOPEDICS | Facility: CLINIC | Age: 54
End: 2019-11-12

## 2019-11-15 ENCOUNTER — PATIENT MESSAGE (OUTPATIENT)
Dept: ORTHOPEDICS | Facility: CLINIC | Age: 54
End: 2019-11-15

## 2019-11-20 ENCOUNTER — PATIENT OUTREACH (OUTPATIENT)
Dept: ADMINISTRATIVE | Facility: OTHER | Age: 54
End: 2019-11-20

## 2019-11-21 ENCOUNTER — PATIENT MESSAGE (OUTPATIENT)
Dept: FAMILY MEDICINE | Facility: CLINIC | Age: 54
End: 2019-11-21

## 2019-11-21 ENCOUNTER — OFFICE VISIT (OUTPATIENT)
Dept: ORTHOPEDICS | Facility: CLINIC | Age: 54
End: 2019-11-21
Payer: COMMERCIAL

## 2019-11-21 VITALS
WEIGHT: 158 LBS | SYSTOLIC BLOOD PRESSURE: 150 MMHG | DIASTOLIC BLOOD PRESSURE: 90 MMHG | BODY MASS INDEX: 29.08 KG/M2 | TEMPERATURE: 98 F | HEIGHT: 62 IN

## 2019-11-21 DIAGNOSIS — M79.642 PAIN IN BOTH HANDS: Primary | ICD-10-CM

## 2019-11-21 DIAGNOSIS — M79.641 PAIN IN BOTH HANDS: Primary | ICD-10-CM

## 2019-11-21 DIAGNOSIS — M25.649 JOINT STIFFNESS OF HAND, UNSPECIFIED LATERALITY: ICD-10-CM

## 2019-11-21 PROCEDURE — 3008F BODY MASS INDEX DOCD: CPT | Mod: CPTII,S$GLB,, | Performed by: ORTHOPAEDIC SURGERY

## 2019-11-21 PROCEDURE — 99213 PR OFFICE/OUTPT VISIT, EST, LEVL III, 20-29 MIN: ICD-10-PCS | Mod: S$GLB,,, | Performed by: ORTHOPAEDIC SURGERY

## 2019-11-21 PROCEDURE — 99999 PR PBB SHADOW E&M-EST. PATIENT-LVL IV: ICD-10-PCS | Mod: PBBFAC,,, | Performed by: ORTHOPAEDIC SURGERY

## 2019-11-21 PROCEDURE — 99213 OFFICE O/P EST LOW 20 MIN: CPT | Mod: S$GLB,,, | Performed by: ORTHOPAEDIC SURGERY

## 2019-11-21 PROCEDURE — 99999 PR PBB SHADOW E&M-EST. PATIENT-LVL IV: CPT | Mod: PBBFAC,,, | Performed by: ORTHOPAEDIC SURGERY

## 2019-11-21 PROCEDURE — 3008F PR BODY MASS INDEX (BMI) DOCUMENTED: ICD-10-PCS | Mod: CPTII,S$GLB,, | Performed by: ORTHOPAEDIC SURGERY

## 2019-11-21 NOTE — PROGRESS NOTES
Subjective:      Patient ID: Tosha Wilks is a 54 y.o. female.  Chief Complaint: Hand Pain (bilateral/wrist); Arm Pain (bilaterl); Extremity Pain; and Follow-up      HPI  Tosha Wilks is a  54 y.o. female presenting today for follow up of bilateral hand and wrist pain.  She reports that she is still having pain which seems to be getting worse  Previously I ordered therapy but she has been frustrated by the inability to set up an appointment and she would like to change therapy over the [facility]  She does have some numbness which seems to be intermittent in the right hand mostly previous nerve test was negative for carpal tunnel syndrome.    Review of patient's allergies indicates:   Allergen Reactions    Morphine Anxiety     Hives     Other Other (See Comments)     Allergy is carrots Other reaction(s): Angioedema, carrots    Erythromycin Other (See Comments)     Hives and cramps     Zofran (as hydrochloride) [ondansetron hcl] Hives     Local hive after IV injection         Current Outpatient Medications   Medication Sig Dispense Refill    albuterol (PROAIR HFA) 90 mcg/actuation inhaler Inhale 2 puffs into the lungs every 4 (four) hours as needed. 18 g 3    amitriptyline (ELAVIL) 25 MG tablet TAKE 1 TABLET (25 MG TOTAL) BY MOUTH NIGHTLY AS NEEDED FOR INSOMNIA. 30 tablet 3    budesonide 180mcg (PULMICORT 180MCG) 180 mcg/actuation AePB Inhale 2 puffs into the lungs once daily. 1 each 3    cyanocobalamin 1,000 mcg/mL injection Inject 1 mL (1,000 mcg total) into the muscle every 28 days. 10 mL 3    cyclobenzaprine (FLEXERIL) 5 MG tablet TAKE 3 PILLS BY MOUTH (NIGHTLY)  6    ergocalciferol (ERGOCALCIFEROL) 50,000 unit Cap TAKE ONE CAPSULE BY MOUTH ONE TIME PER WEEK 4 capsule 2    escitalopram oxalate (LEXAPRO) 20 MG tablet Take 20 mg by mouth once daily.  3    fentaNYL (DURAGESIC) 50 mcg/hr APPLY 1 PATCH TO SKIN EVERY 72 HOURS      gabapentin (NEURONTIN) 300 MG capsule Take 300 mg by mouth 3  "(three) times daily.       HYDROmorphone (DILAUDID) 2 MG tablet take 1 tablet by mouth every 8 hours as needed for pain 90 tablet 0    hydroxychloroquine (PLAQUENIL) 200 mg tablet Take 200 mg by mouth once daily.  1    hydrOXYzine pamoate (VISTARIL) 25 MG Cap Take 25 mg by mouth every 4 (four) hours as needed.       lidocaine HCl 2 % Crea Apply to affected areas 4 times a day as needed. 118 mL 2    omeprazole (PRILOSEC) 20 MG capsule Take 1 capsule (20 mg total) by mouth once daily. 1 Capsule, Delayed Release(E.C.) Oral Every day 90 capsule 3    sumatriptan (IMITREX) 50 MG tablet Once for severe headache. May repeat once after 2 hours. Do not exceed 3-4 doses in one week. 12 tablet 3    tiZANidine (ZANAFLEX) 4 MG tablet Take 4 mg by mouth 3 (three) times daily.  3    traZODone (DESYREL) 100 MG tablet Take 1 tablet (100 mg total) by mouth every evening. 30 tablet 11    EPINEPHrine (EPIPEN 2-DENNIS) 0.3 mg/0.3 mL AtIn Inject 0.3 mLs (0.3 mg total) into the muscle once. for 1 dose 2 Device 3    topiramate (TOPAMAX) 100 MG tablet Take 1 tablet (100 mg total) by mouth every evening. for 14 days 90 tablet 3     No current facility-administered medications for this visit.        Past Medical History:   Diagnosis Date    Ankylosing spondylitis     Anxiety     Asthma     Chronic constipation     Chronic insomnia     Edema     Fibromyalgia     Interstitial cystitis     Lupus     Migraine headache     Restless legs syndrome     Shingles     Stroke     post partum right sided numbness    TIA (transient ischemic attack)        Past Surgical History:   Procedure Laterality Date    BLADDER SURGERY      BREAST SURGERY  2018    reduction     SECTION      CHOLECYSTECTOMY      HYSTERECTOMY      LASER ABLATION      to back    SLEEVE GASTROPLASTY  2016    TUBAL LIGATION         OBJECTIVE:   PHYSICAL EXAM:  Height: 5' 2" (157.5 cm) Weight: 71.7 kg (158 lb)  Vitals:    19 1526   BP: " "(!) 150/90   Temp: 98.4 °F (36.9 °C)   Weight: 71.7 kg (158 lb)   Height: 5' 2" (1.575 m)   PainSc: 10-Worst pain ever     Ortho/SPM Exam  Examination hands no swelling or tenderness either hand  Full range of motion wrist and fingers bilateral  She has some pain with motion   strength decreased both hands no atrophy either hand    RADIOGRAPHS:  None  Comments: I have personally reviewed the imaging and I agree with the above radiologist's report.    ASSESSMENT/PLAN:     IMPRESSION:  Bilateral hand pain and weakness    PLAN:  I have ordered OT and [facility] for range of motion stretching strengthening and modality treatments  She is currently on prednisone continue with that Voltaren gel topical  Follow-up 4-6 weeks    FOLLOW UP:  4-6 weeks    Disclaimer: This note has been generated using voice-recognition software. There may be typographical errors that have been missed during proof-reading.  "

## 2019-12-04 ENCOUNTER — PATIENT MESSAGE (OUTPATIENT)
Dept: CARDIOLOGY | Facility: CLINIC | Age: 54
End: 2019-12-04

## 2019-12-04 ENCOUNTER — OFFICE VISIT (OUTPATIENT)
Dept: FAMILY MEDICINE | Facility: CLINIC | Age: 54
End: 2019-12-04

## 2019-12-04 VITALS
HEIGHT: 62 IN | WEIGHT: 163.81 LBS | DIASTOLIC BLOOD PRESSURE: 84 MMHG | TEMPERATURE: 99 F | HEART RATE: 94 BPM | SYSTOLIC BLOOD PRESSURE: 126 MMHG | OXYGEN SATURATION: 98 % | BODY MASS INDEX: 30.14 KG/M2

## 2019-12-04 DIAGNOSIS — Z23 NEED FOR TETANUS BOOSTER: Primary | ICD-10-CM

## 2019-12-04 DIAGNOSIS — N95.1 MENOPAUSAL SYNDROME (HOT FLASHES): ICD-10-CM

## 2019-12-04 DIAGNOSIS — Z23 NEED FOR INFLUENZA VACCINATION: ICD-10-CM

## 2019-12-04 PROCEDURE — 90471 IMMUNIZATION ADMIN: CPT | Mod: S$GLB,,, | Performed by: FAMILY MEDICINE

## 2019-12-04 PROCEDURE — 99214 OFFICE O/P EST MOD 30 MIN: CPT | Mod: 25,S$GLB,, | Performed by: FAMILY MEDICINE

## 2019-12-04 PROCEDURE — 90715 TDAP VACCINE GREATER THAN OR EQUAL TO 7YO IM: ICD-10-PCS | Mod: S$GLB,,, | Performed by: FAMILY MEDICINE

## 2019-12-04 PROCEDURE — 3008F PR BODY MASS INDEX (BMI) DOCUMENTED: ICD-10-PCS | Mod: CPTII,S$GLB,, | Performed by: FAMILY MEDICINE

## 2019-12-04 PROCEDURE — 99214 PR OFFICE/OUTPT VISIT, EST, LEVL IV, 30-39 MIN: ICD-10-PCS | Mod: 25,S$GLB,, | Performed by: FAMILY MEDICINE

## 2019-12-04 PROCEDURE — 90471 FLU VACCINE (QUAD) GREATER THAN OR EQUAL TO 3YO PRESERVATIVE FREE IM: ICD-10-PCS | Mod: S$GLB,,, | Performed by: FAMILY MEDICINE

## 2019-12-04 PROCEDURE — 90715 TDAP VACCINE 7 YRS/> IM: CPT | Mod: S$GLB,,, | Performed by: FAMILY MEDICINE

## 2019-12-04 PROCEDURE — 3008F BODY MASS INDEX DOCD: CPT | Mod: CPTII,S$GLB,, | Performed by: FAMILY MEDICINE

## 2019-12-04 PROCEDURE — 90686 FLU VACCINE (QUAD) GREATER THAN OR EQUAL TO 3YO PRESERVATIVE FREE IM: ICD-10-PCS | Mod: S$GLB,,, | Performed by: FAMILY MEDICINE

## 2019-12-04 PROCEDURE — 90472 TDAP VACCINE GREATER THAN OR EQUAL TO 7YO IM: ICD-10-PCS | Mod: S$GLB,,, | Performed by: FAMILY MEDICINE

## 2019-12-04 PROCEDURE — 90686 IIV4 VACC NO PRSV 0.5 ML IM: CPT | Mod: S$GLB,,, | Performed by: FAMILY MEDICINE

## 2019-12-04 PROCEDURE — 90472 IMMUNIZATION ADMIN EACH ADD: CPT | Mod: S$GLB,,, | Performed by: FAMILY MEDICINE

## 2019-12-04 RX ORDER — VENLAFAXINE HYDROCHLORIDE 150 MG/1
150 CAPSULE, EXTENDED RELEASE ORAL DAILY
Qty: 30 CAPSULE | Refills: 3 | Status: SHIPPED | OUTPATIENT
Start: 2019-12-04 | End: 2020-03-03

## 2019-12-04 RX ORDER — VENLAFAXINE HYDROCHLORIDE 37.5 MG/1
37.5 CAPSULE, EXTENDED RELEASE ORAL DAILY
Qty: 7 CAPSULE | Refills: 0 | Status: SHIPPED | OUTPATIENT
Start: 2019-12-04 | End: 2020-09-01 | Stop reason: SDUPTHER

## 2019-12-04 RX ORDER — VENLAFAXINE HYDROCHLORIDE 75 MG/1
75 CAPSULE, EXTENDED RELEASE ORAL DAILY
Qty: 7 CAPSULE | Refills: 0 | Status: SHIPPED | OUTPATIENT
Start: 2019-12-04 | End: 2020-09-01 | Stop reason: SDUPTHER

## 2019-12-04 NOTE — PROGRESS NOTES
Chief Complaint  Chief Complaint   Patient presents with    Flu Vaccine    Herpes Zoster       HPI  Tosha Wilks is a 54 y.o. female with multiple medical diagnoses as listed in the medical history and problem list that presents for hot flashes.  She has a history of Stage 0 breast cancer, fibromyalgia and lupus.  Going to pain management.  She had a hysterectomy about 4 years ago.  Prior to that she was on estrogen for hot flashes.  She has been out of estrogen now for about 1 year.  Initially she did fine without it, but over the past few months she has had increasing hot flashes in the number and in intensity.  She has tried OTC phytoestrogen products with no improvement. No mood swings.  No skin changes.           PAST MEDICAL HISTORY:  Past Medical History:   Diagnosis Date    Ankylosing spondylitis     Anxiety     Asthma     Chronic constipation     Chronic insomnia     Edema     Fibromyalgia     Interstitial cystitis     Lupus     Migraine headache     Restless legs syndrome     Shingles     Stroke     post partum right sided numbness    TIA (transient ischemic attack)        PAST SURGICAL HISTORY:  Past Surgical History:   Procedure Laterality Date    BLADDER SURGERY      BREAST SURGERY  2018    reduction     SECTION      CHOLECYSTECTOMY      HYSTERECTOMY      LASER ABLATION      to back    SLEEVE GASTROPLASTY  2016    TUBAL LIGATION         SOCIAL HISTORY:  Social History     Socioeconomic History    Marital status:      Spouse name: Not on file    Number of children: Not on file    Years of education: Not on file    Highest education level: Not on file   Occupational History    Not on file   Social Needs    Financial resource strain: Not hard at all    Food insecurity:     Worry: Never true     Inability: Never true    Transportation needs:     Medical: No     Non-medical: No   Tobacco Use    Smoking status: Never Smoker    Smokeless  tobacco: Never Used   Substance and Sexual Activity    Alcohol use: Yes     Frequency: Monthly or less     Drinks per session: 1 or 2     Binge frequency: Never     Comment: occasionally    Drug use: No    Sexual activity: Yes     Partners: Male   Lifestyle    Physical activity:     Days per week: 2 days     Minutes per session: 20 min    Stress: To some extent   Relationships    Social connections:     Talks on phone: Three times a week     Gets together: Once a week     Attends Christianity service: Not on file     Active member of club or organization: Yes     Attends meetings of clubs or organizations: Never     Relationship status:    Other Topics Concern    Not on file   Social History Narrative    Not on file       FAMILY HISTORY:  Family History   Problem Relation Age of Onset    Hypertension Mother     Hypertension Brother     Breast cancer Maternal Aunt         x2     Heart attack Father     Hypertension Sister     Lupus Sister        ALLERGIES AND MEDICATIONS: updated and reviewed.  Review of patient's allergies indicates:   Allergen Reactions    Morphine Anxiety     Hives     Other Other (See Comments)     Allergy is carrots Other reaction(s): Angioedema, carrots    Erythromycin Other (See Comments)     Hives and cramps     Zofran (as hydrochloride) [ondansetron hcl] Hives     Local hive after IV injection     Current Outpatient Medications   Medication Sig Dispense Refill    albuterol (PROAIR HFA) 90 mcg/actuation inhaler Inhale 2 puffs into the lungs every 4 (four) hours as needed. 18 g 3    amitriptyline (ELAVIL) 25 MG tablet TAKE 1 TABLET (25 MG TOTAL) BY MOUTH NIGHTLY AS NEEDED FOR INSOMNIA. 30 tablet 3    budesonide 180mcg (PULMICORT 180MCG) 180 mcg/actuation AePB Inhale 2 puffs into the lungs once daily. 1 each 3    cyanocobalamin 1,000 mcg/mL injection Inject 1 mL (1,000 mcg total) into the muscle every 28 days. 10 mL 3    cyclobenzaprine (FLEXERIL) 5 MG tablet TAKE 3  PILLS BY MOUTH (NIGHTLY)  6    ergocalciferol (ERGOCALCIFEROL) 50,000 unit Cap TAKE ONE CAPSULE BY MOUTH ONE TIME PER WEEK 4 capsule 2    fentaNYL (DURAGESIC) 50 mcg/hr APPLY 1 PATCH TO SKIN EVERY 72 HOURS      gabapentin (NEURONTIN) 300 MG capsule Take 300 mg by mouth 3 (three) times daily.       HYDROmorphone (DILAUDID) 2 MG tablet take 1 tablet by mouth every 8 hours as needed for pain 90 tablet 0    hydroxychloroquine (PLAQUENIL) 200 mg tablet Take 200 mg by mouth once daily.  1    hydrOXYzine pamoate (VISTARIL) 25 MG Cap Take 25 mg by mouth every 4 (four) hours as needed.       lidocaine HCl 2 % Crea Apply to affected areas 4 times a day as needed. 118 mL 2    omeprazole (PRILOSEC) 20 MG capsule Take 1 capsule (20 mg total) by mouth once daily. 1 Capsule, Delayed Release(E.C.) Oral Every day 90 capsule 3    sumatriptan (IMITREX) 50 MG tablet Once for severe headache. May repeat once after 2 hours. Do not exceed 3-4 doses in one week. 12 tablet 3    tiZANidine (ZANAFLEX) 4 MG tablet Take 4 mg by mouth 3 (three) times daily.  3    traZODone (DESYREL) 100 MG tablet Take 1 tablet (100 mg total) by mouth every evening. 30 tablet 11    EPINEPHrine (EPIPEN 2-DENNIS) 0.3 mg/0.3 mL AtIn Inject 0.3 mLs (0.3 mg total) into the muscle once. for 1 dose 2 Device 3    topiramate (TOPAMAX) 100 MG tablet Take 1 tablet (100 mg total) by mouth every evening. for 14 days 90 tablet 3    venlafaxine (EFFEXOR-XR) 150 MG Cp24 Take 1 capsule (150 mg total) by mouth once daily. 30 capsule 3    venlafaxine (EFFEXOR-XR) 37.5 MG 24 hr capsule Take 1 capsule (37.5 mg total) by mouth once daily. 7 capsule 0    venlafaxine (EFFEXOR-XR) 75 MG 24 hr capsule Take 1 capsule (75 mg total) by mouth once daily. 7 capsule 0     No current facility-administered medications for this visit.          ROS  Review of Systems   Constitutional: Positive for fatigue. Negative for activity change, appetite change and chills.   HENT: Negative for  "congestion, ear discharge, ear pain, rhinorrhea, sinus pain, sore throat and trouble swallowing.    Eyes: Negative for photophobia, pain, redness, itching and visual disturbance.   Respiratory: Negative for cough, chest tightness, shortness of breath and wheezing.    Cardiovascular: Negative for chest pain, palpitations and leg swelling.   Gastrointestinal: Negative for abdominal distention, abdominal pain, blood in stool, diarrhea, nausea and vomiting.   Genitourinary: Negative for dysuria, pelvic pain, vaginal bleeding, vaginal discharge and vaginal pain.   Musculoskeletal: Positive for myalgias. Negative for arthralgias, back pain, gait problem and neck pain.   Skin: Negative for color change, pallor and rash.   Neurological: Negative for dizziness, tremors, weakness, light-headedness, numbness and headaches.   Psychiatric/Behavioral: Negative for agitation, behavioral problems, confusion and sleep disturbance.           PHYSICAL EXAM  Weight: 74.3 kg (163 lb 12.8 oz) /84 (BP Location: Left arm, Patient Position: Sitting, BP Method: Small (Manual))   Pulse 94   Temp 98.8 °F (37.1 °C) (Oral)   Ht 5' 2" (1.575 m)   Wt 74.3 kg (163 lb 12.8 oz)   LMP 03/26/2012   SpO2 98%   BMI 29.96 kg/m²     Height: 5' 2" (157.5 cm)     Physical Exam   Constitutional: She is oriented to person, place, and time. She appears well-developed and well-nourished. No distress.   HENT:   Head: Normocephalic and atraumatic.   Right Ear: External ear normal.   Left Ear: External ear normal.   Mouth/Throat: Oropharynx is clear and moist. No oropharyngeal exudate.   Eyes: Pupils are equal, round, and reactive to light. Conjunctivae and EOM are normal. Right eye exhibits no discharge. Left eye exhibits no discharge.   Neck: Normal range of motion. Neck supple. No tracheal deviation present. No thyromegaly present.   Cardiovascular: Normal rate, regular rhythm, normal heart sounds and intact distal pulses. Exam reveals no gallop and " no friction rub.   No murmur heard.  Pulmonary/Chest: Effort normal and breath sounds normal. No respiratory distress. She has no wheezes. She has no rales. She exhibits no tenderness.   Abdominal: Soft. Bowel sounds are normal. She exhibits no distension. There is no tenderness. There is no guarding.   Musculoskeletal: Normal range of motion. She exhibits no edema, tenderness or deformity.   Neurological: She is alert and oriented to person, place, and time. She displays normal reflexes. No cranial nerve deficit. She exhibits normal muscle tone. Coordination normal.   Skin: Skin is warm and dry. Capillary refill takes less than 2 seconds. No rash noted. She is not diaphoretic. No erythema. No pallor.   Psychiatric: She has a normal mood and affect. Her behavior is normal. Judgment normal.         Health Maintenance       Date Due Completion Date    TETANUS VACCINE 03/13/1985 ---    Lipid Panel 05/08/2017 5/8/2012    Mammogram 11/29/2019 (Originally 3/13/2007) ---    Colonoscopy 12/31/2019 (Originally 3/13/2017) ---    Influenza Vaccine 08/01/2019 11/2/2016            Assessment & Plan    Need for tetanus booster  -     (In Office Administered) Tdap Vaccine    Menopausal syndrome (hot flashes)  -     venlafaxine (EFFEXOR-XR) 37.5 MG 24 hr capsule; Take 1 capsule (37.5 mg total) by mouth once daily.  Dispense: 7 capsule; Refill: 0  -     venlafaxine (EFFEXOR-XR) 75 MG 24 hr capsule; Take 1 capsule (75 mg total) by mouth once daily.  Dispense: 7 capsule; Refill: 0  -     venlafaxine (EFFEXOR-XR) 150 MG Cp24; Take 1 capsule (150 mg total) by mouth once daily.  Dispense: 30 capsule; Refill: 3  - Instructions for cross-taper with lexapro given to patient.      Need for influenza vaccination  -     Influenza - Quadrivalent (6 months+) (PF)          Follow-up: No follow-ups on file.

## 2019-12-08 ENCOUNTER — PATIENT MESSAGE (OUTPATIENT)
Dept: ORTHOPEDICS | Facility: CLINIC | Age: 54
End: 2019-12-08

## 2019-12-12 ENCOUNTER — HOSPITAL ENCOUNTER (EMERGENCY)
Facility: HOSPITAL | Age: 54
Discharge: HOME | End: 2019-12-13
Attending: EMERGENCY MEDICINE

## 2019-12-12 DIAGNOSIS — R52 PAIN CRISIS: ICD-10-CM

## 2019-12-12 DIAGNOSIS — E87.6 HYPOKALEMIA: ICD-10-CM

## 2019-12-12 DIAGNOSIS — R07.9 CHEST PAIN: ICD-10-CM

## 2019-12-12 DIAGNOSIS — M79.7 FIBROMYALGIA: Primary | ICD-10-CM

## 2019-12-12 LAB
ALBUMIN SERPL BCP-MCNC: 3.8 G/DL (ref 3.5–5.2)
ALP SERPL-CCNC: 72 U/L (ref 55–135)
ALT SERPL W/O P-5'-P-CCNC: 12 U/L (ref 10–44)
ANION GAP SERPL CALC-SCNC: 11 MMOL/L (ref 8–16)
AST SERPL-CCNC: 16 U/L (ref 10–40)
B-HCG UR QL: NEGATIVE
BASOPHILS # BLD AUTO: 0.02 K/UL (ref 0–0.2)
BASOPHILS NFR BLD: 0.3 % (ref 0–1.9)
BILIRUB SERPL-MCNC: 0.2 MG/DL (ref 0.1–1)
BILIRUB UR QL STRIP: NEGATIVE
BUN SERPL-MCNC: 10 MG/DL (ref 6–20)
CALCIUM SERPL-MCNC: 8.9 MG/DL (ref 8.7–10.5)
CHLORIDE SERPL-SCNC: 108 MMOL/L (ref 95–110)
CK SERPL-CCNC: 40 U/L (ref 20–180)
CLARITY UR: CLEAR
CO2 SERPL-SCNC: 23 MMOL/L (ref 23–29)
COLOR UR: YELLOW
CREAT SERPL-MCNC: 0.8 MG/DL (ref 0.5–1.4)
CRP SERPL-MCNC: 1.5 MG/L (ref 0–8.2)
CTP QC/QA: YES
DIFFERENTIAL METHOD: ABNORMAL
EOSINOPHIL # BLD AUTO: 0.1 K/UL (ref 0–0.5)
EOSINOPHIL NFR BLD: 1.6 % (ref 0–8)
ERYTHROCYTE [DISTWIDTH] IN BLOOD BY AUTOMATED COUNT: 13.2 % (ref 11.5–14.5)
EST. GFR  (AFRICAN AMERICAN): >60 ML/MIN/1.73 M^2
EST. GFR  (NON AFRICAN AMERICAN): >60 ML/MIN/1.73 M^2
GLUCOSE SERPL-MCNC: 81 MG/DL (ref 70–110)
GLUCOSE UR QL STRIP: NEGATIVE
HCT VFR BLD AUTO: 36.2 % (ref 37–48.5)
HGB BLD-MCNC: 11.7 G/DL (ref 12–16)
HGB UR QL STRIP: NEGATIVE
KETONES UR QL STRIP: NEGATIVE
LEUKOCYTE ESTERASE UR QL STRIP: NEGATIVE
LYMPHOCYTES # BLD AUTO: 2.4 K/UL (ref 1–4.8)
LYMPHOCYTES NFR BLD: 38.1 % (ref 18–48)
MCH RBC QN AUTO: 28.2 PG (ref 27–31)
MCHC RBC AUTO-ENTMCNC: 32.3 G/DL (ref 32–36)
MCV RBC AUTO: 87 FL (ref 82–98)
MONOCYTES # BLD AUTO: 0.5 K/UL (ref 0.3–1)
MONOCYTES NFR BLD: 7.6 % (ref 4–15)
NEUTROPHILS # BLD AUTO: 3.3 K/UL (ref 1.8–7.7)
NEUTROPHILS NFR BLD: 52.4 % (ref 38–73)
NITRITE UR QL STRIP: NEGATIVE
PH UR STRIP: 8 [PH] (ref 5–8)
PLATELET # BLD AUTO: 319 K/UL (ref 150–350)
PMV BLD AUTO: 9.4 FL (ref 9.2–12.9)
POTASSIUM SERPL-SCNC: 3 MMOL/L (ref 3.5–5.1)
PROT SERPL-MCNC: 7.1 G/DL (ref 6–8.4)
PROT UR QL STRIP: NEGATIVE
RBC # BLD AUTO: 4.15 M/UL (ref 4–5.4)
SODIUM SERPL-SCNC: 142 MMOL/L (ref 136–145)
SP GR UR STRIP: 1.01 (ref 1–1.03)
TROPONIN I SERPL DL<=0.01 NG/ML-MCNC: <0.006 NG/ML (ref 0–0.03)
URN SPEC COLLECT METH UR: NORMAL
UROBILINOGEN UR STRIP-ACNC: 1 EU/DL
WBC # BLD AUTO: 6.22 K/UL (ref 3.9–12.7)

## 2019-12-12 PROCEDURE — 82550 ASSAY OF CK (CPK): CPT

## 2019-12-12 PROCEDURE — 96375 TX/PRO/DX INJ NEW DRUG ADDON: CPT

## 2019-12-12 PROCEDURE — 86140 C-REACTIVE PROTEIN: CPT

## 2019-12-12 PROCEDURE — 81003 URINALYSIS AUTO W/O SCOPE: CPT

## 2019-12-12 PROCEDURE — 84484 ASSAY OF TROPONIN QUANT: CPT

## 2019-12-12 PROCEDURE — 63600175 PHARM REV CODE 636 W HCPCS: Performed by: EMERGENCY MEDICINE

## 2019-12-12 PROCEDURE — 96374 THER/PROPH/DIAG INJ IV PUSH: CPT

## 2019-12-12 PROCEDURE — 80053 COMPREHEN METABOLIC PANEL: CPT

## 2019-12-12 PROCEDURE — 99285 EMERGENCY DEPT VISIT HI MDM: CPT | Mod: 25

## 2019-12-12 PROCEDURE — 85025 COMPLETE CBC W/AUTO DIFF WBC: CPT

## 2019-12-12 PROCEDURE — 81025 URINE PREGNANCY TEST: CPT | Performed by: EMERGENCY MEDICINE

## 2019-12-12 RX ORDER — HALOPERIDOL 5 MG/ML
5 INJECTION INTRAMUSCULAR
Status: DISCONTINUED | OUTPATIENT
Start: 2019-12-12 | End: 2019-12-12

## 2019-12-12 RX ORDER — HYDROMORPHONE HYDROCHLORIDE 1 MG/ML
1 INJECTION, SOLUTION INTRAMUSCULAR; INTRAVENOUS; SUBCUTANEOUS
Status: COMPLETED | OUTPATIENT
Start: 2019-12-13 | End: 2019-12-13

## 2019-12-12 RX ORDER — HALOPERIDOL 5 MG/ML
5 INJECTION INTRAMUSCULAR
Status: COMPLETED | OUTPATIENT
Start: 2019-12-12 | End: 2019-12-12

## 2019-12-12 RX ORDER — DIPHENHYDRAMINE HYDROCHLORIDE 50 MG/ML
25 INJECTION INTRAMUSCULAR; INTRAVENOUS
Status: COMPLETED | OUTPATIENT
Start: 2019-12-12 | End: 2019-12-12

## 2019-12-12 RX ORDER — KETOROLAC TROMETHAMINE 30 MG/ML
15 INJECTION, SOLUTION INTRAMUSCULAR; INTRAVENOUS
Status: COMPLETED | OUTPATIENT
Start: 2019-12-12 | End: 2019-12-12

## 2019-12-12 RX ORDER — FENTANYL CITRATE 50 UG/ML
50 INJECTION, SOLUTION INTRAMUSCULAR; INTRAVENOUS
Status: COMPLETED | OUTPATIENT
Start: 2019-12-12 | End: 2019-12-12

## 2019-12-12 RX ORDER — POTASSIUM CHLORIDE 20 MEQ/1
40 TABLET, EXTENDED RELEASE ORAL
Status: COMPLETED | OUTPATIENT
Start: 2019-12-13 | End: 2019-12-13

## 2019-12-12 RX ADMIN — DIPHENHYDRAMINE HYDROCHLORIDE 25 MG: 50 INJECTION, SOLUTION INTRAMUSCULAR; INTRAVENOUS at 10:12

## 2019-12-12 RX ADMIN — KETOROLAC TROMETHAMINE 15 MG: 30 INJECTION, SOLUTION INTRAMUSCULAR at 11:12

## 2019-12-12 RX ADMIN — HALOPERIDOL LACTATE 5 MG: 5 INJECTION, SOLUTION INTRAMUSCULAR at 11:12

## 2019-12-12 RX ADMIN — FENTANYL CITRATE 50 MCG: 50 INJECTION, SOLUTION INTRAMUSCULAR; INTRAVENOUS at 11:12

## 2019-12-13 VITALS
BODY MASS INDEX: 29.26 KG/M2 | WEIGHT: 160 LBS | RESPIRATION RATE: 14 BRPM | OXYGEN SATURATION: 95 % | DIASTOLIC BLOOD PRESSURE: 64 MMHG | TEMPERATURE: 98 F | HEART RATE: 83 BPM | SYSTOLIC BLOOD PRESSURE: 119 MMHG

## 2019-12-13 PROCEDURE — 25000003 PHARM REV CODE 250: Performed by: EMERGENCY MEDICINE

## 2019-12-13 PROCEDURE — 63600175 PHARM REV CODE 636 W HCPCS: Performed by: EMERGENCY MEDICINE

## 2019-12-13 RX ADMIN — HYDROMORPHONE HYDROCHLORIDE 1 MG: 1 INJECTION, SOLUTION INTRAMUSCULAR; INTRAVENOUS; SUBCUTANEOUS at 12:12

## 2019-12-13 RX ADMIN — POTASSIUM CHLORIDE 40 MEQ: 1500 TABLET, EXTENDED RELEASE ORAL at 12:12

## 2019-12-13 NOTE — ED PROVIDER NOTES
Encounter Date: 12/12/2019    SCRIBE #1 NOTE: I, Bhaskar De La Cruz, am scribing for, and in the presence of,  Dr. Alvarez. I have scribed the entire note.       History     Chief Complaint   Patient presents with    Generalized Body Aches     Patient had a fall in shower on Sunday. Started off with back pain. Progressed to generalized body aches over the week. Patient has hx of Lupus and Fibromyalgia. No cough or congestion. No fever. Last dose of pain medicine taken at 3 pm with no relief     Tosha Wilks is a 54 y.o. female who  has a past medical history of Ankylosing spondylitis, Anxiety, Asthma, Chronic constipation, Chronic insomnia, Edema, Fibromyalgia, Interstitial cystitis, Lupus, Migraine headache, Restless legs syndrome, Shingles, Stroke (1998), and TIA (transient ischemic attack) (1998).    The patient presents to the ED due to generalized body aches. The patient reports her symptoms began as back pain following a fall in the shower 4 days ago, which has gradually progressed to generalized body aches since the incident. She has been taking her prescribed Dilaudid 4 mg and Fentanyl patch for pain without relief, last taken at 1500 today. The patient reports that she has chronic chest pain which is at baseline. Patient denies any fever, chills, or SOB, and does not report any other symptoms. She reports her symptoms are somewhat similar with prior flares of her lupus and fibromyalgia. She is not a smoker, and denies any complications related to her lupus.        Review of patient's allergies indicates:   Allergen Reactions    Morphine Anxiety     Hives     Other Other (See Comments)     Allergy is carrots Other reaction(s): Angioedema, carrots    Erythromycin Other (See Comments)     Hives and cramps     Zofran (as hydrochloride) [ondansetron hcl] Hives     Local hive after IV injection     Past Medical History:   Diagnosis Date    Ankylosing spondylitis     Anxiety     Asthma     Chronic  constipation     Chronic insomnia     Edema     Fibromyalgia     Interstitial cystitis     Lupus     Migraine headache     Restless legs syndrome     Shingles     Stroke 1998    post partum right sided numbness    TIA (transient ischemic attack)      Past Surgical History:   Procedure Laterality Date    BLADDER SURGERY      BREAST SURGERY  2018    reduction     SECTION      CHOLECYSTECTOMY      HYSTERECTOMY      LASER ABLATION      to back    SLEEVE GASTROPLASTY  2016    TUBAL LIGATION       Family History   Problem Relation Age of Onset    Hypertension Mother     Hypertension Brother     Breast cancer Maternal Aunt         x2     Heart attack Father     Hypertension Sister     Lupus Sister      Social History     Tobacco Use    Smoking status: Never Smoker    Smokeless tobacco: Never Used   Substance Use Topics    Alcohol use: Yes     Frequency: Monthly or less     Drinks per session: 1 or 2     Binge frequency: Never     Comment: occasionally    Drug use: No     Review of Systems   Constitutional: Negative for chills and fever.   HENT: Negative for ear pain and sore throat.    Eyes: Negative for redness.   Respiratory: Negative for shortness of breath.    Cardiovascular: Positive for chest pain (chronic).   Gastrointestinal: Negative for abdominal pain, diarrhea and vomiting.   Genitourinary: Negative for dysuria.   Musculoskeletal: Positive for back pain and myalgias.   Skin: Negative for rash.   Neurological: Negative for headaches.       Physical Exam     Initial Vitals [19]   BP Pulse Resp Temp SpO2   120/88 91 18 98.2 °F (36.8 °C) 100 %      MAP       --         Physical Exam    Nursing note and vitals reviewed.  Constitutional: She appears well-developed and well-nourished. She is not diaphoretic.   Uncomfortable due to pain   HENT:   Head: Normocephalic and atraumatic.   Eyes: Conjunctivae and EOM are normal.   Neck: Normal range of motion. Neck supple.    Cardiovascular: Normal rate, regular rhythm and normal heart sounds.   Pulmonary/Chest: Breath sounds normal. No respiratory distress.   Abdominal: Soft. There is no tenderness.   Musculoskeletal: Normal range of motion. She exhibits no edema or tenderness.   Neurological: She is alert and oriented to person, place, and time. She has normal strength.   Skin: Skin is warm and dry. Capillary refill takes less than 2 seconds.         ED Course   Procedures  Labs Reviewed   CBC W/ AUTO DIFFERENTIAL - Abnormal; Notable for the following components:       Result Value    Hemoglobin 11.7 (*)     Hematocrit 36.2 (*)     All other components within normal limits   COMPREHENSIVE METABOLIC PANEL - Abnormal; Notable for the following components:    Potassium 3.0 (*)     All other components within normal limits   TROPONIN I   CK   C-REACTIVE PROTEIN   URINALYSIS, REFLEX TO URINE CULTURE    Narrative:     Preferred Collection Type->Urine, Clean Catch   POCT URINE PREGNANCY     EKG Readings: (Independently Interpreted)   Sinus rhythm 7 beats per minute, normal intervals, normal axis, no acute ST changes       Imaging Results          X-Ray Chest 1 View (Final result)  Result time 12/12/19 23:24:18    Final result by Darren Turner MD (12/12/19 23:24:18)                 Impression:      No acute process.      Electronically signed by: Darren Turner MD  Date:    12/12/2019  Time:    23:24             Narrative:    EXAMINATION:  XR CHEST 1 VIEW    CLINICAL HISTORY:  Chest pain, unspecified    TECHNIQUE:  Single frontal view of the chest was performed.    COMPARISON:  09/25/2019.    FINDINGS:  Monitoring EKG leads are present.  There are postoperative changes in the right upper abdominal quadrant.    The trachea is unremarkable.  The cardiac silhouette is within normal limits.  The hemidiaphragms are within normal limits.  There is no evidence of free air beneath the hemidiaphragms.  There are no pleural effusions.  There is no  evidence of a pneumothorax.  There is no evidence of pneumomediastinum.  No airspace opacity is present.  The osseous structures are unremarkable.                                 Medical Decision Making:   Initial Assessment:   54-year-old female history of anxiety, fibromyalgia, lupus, restless leg syndrome, presents the ER for evaluation of diffuse generalized pain weakness. Reports she fell in the shower Saturday, started pain progressed diffuse pain. She reports this feels like her pain crisis, has taken her fentanyl patch and Dilaudid with no improvement.  In came to the ER for further evaluation.  Patient is crying, tearful uncomfortable due to pain. Her physical exam is grossly.  However she is just complaining diffuse pain. She also notes that she is having chest pain she reports she has been seen and evaluated by Cardiology acute process noted.  Differential includes flare, lupus flare, rhabdo, pain crisis, infection versus cause.  Will obtain blood work symptomatic control reassess.  Clinical Tests:   Lab Tests: Ordered and Reviewed  Radiological Study: Ordered and Reviewed  Medical Tests: Ordered and Reviewed              Attending Attestation:           Physician Attestation for Scribe:  Physician Attestation Statement for Scribe #1: I, Dr. Alvarez, reviewed documentation, as scribed by Bhaskar De La Cruz in my presence, and it is both accurate and complete.                 ED Course as of Dec 13 0219   Fri Dec 13, 2019   0018 Resting in bed, no acute distress, pain improved.  Labs imaging reviewed, no acute process identified.  No leukocytosis, no elevated CRP, elevated troponin, CPK normal.  Noted to be slightly hypokalemic.  This could be the cause of the pain crisis, fibromyalgia patient's, been to be sensitive to electrolyte abnormalities.  Discussed with patient diagnosis of pain crisis, discussed plan to discharge home, strict return precautions, follow up with pain specialist.  Patient understand agree  with, patient will be discharged.    [SE]      ED Course User Index  [SE] Naz Alvarez MD                Clinical Impression:       ICD-10-CM ICD-9-CM   1. Fibromyalgia M79.7 729.1   2. Chest pain R07.9 786.50   3. Pain crisis R52 780.96   4. Hypokalemia E87.6 276.8         Disposition:   Disposition: Discharged  Condition: Stable                     Naz Alvarez MD  12/13/19 0221

## 2019-12-13 NOTE — ED TRIAGE NOTES
Pt. Care assumed, pt. Is awake, alert and oriented and has c/o generalized pain to entire body. Pt. Placed on the continuous cardiac, BP and pulse oximetry monitors. Skin is PWD. Bed in the low position, side rails elevated and spouse at the bedside.

## 2019-12-13 NOTE — ED TRIAGE NOTES
Patient arrived to ED from home c/o generalized body aches, states she had a fall at home on Sunday, no LOC, no noted deformity, no bruises or abrasions, patient has Lupus and Fibromyalgia, states she has been taking Dilaudid at home

## 2020-01-10 DIAGNOSIS — E55.9 VITAMIN D DEFICIENCY: ICD-10-CM

## 2020-01-10 RX ORDER — ERGOCALCIFEROL 1.25 MG/1
CAPSULE ORAL
Qty: 12 CAPSULE | Refills: 0 | Status: SHIPPED | OUTPATIENT
Start: 2020-01-10 | End: 2020-04-21

## 2020-01-16 ENCOUNTER — CLINICAL SUPPORT (OUTPATIENT)
Dept: REHABILITATION | Facility: HOSPITAL | Age: 55
End: 2020-01-16
Attending: ORTHOPAEDIC SURGERY
Payer: COMMERCIAL

## 2020-01-16 DIAGNOSIS — M79.641 PAIN IN BOTH HANDS: ICD-10-CM

## 2020-01-16 DIAGNOSIS — M62.81 MUSCLE WEAKNESS: ICD-10-CM

## 2020-01-16 DIAGNOSIS — M79.642 PAIN IN BOTH HANDS: ICD-10-CM

## 2020-01-16 DIAGNOSIS — M25.649 JOINT STIFFNESS OF HAND, UNSPECIFIED LATERALITY: ICD-10-CM

## 2020-01-16 PROCEDURE — 97110 THERAPEUTIC EXERCISES: CPT | Mod: PN

## 2020-01-16 PROCEDURE — 97018 PARAFFIN BATH THERAPY: CPT | Mod: PN

## 2020-01-16 NOTE — PATIENT INSTRUCTIONS
[facility] [place]  [phone]  CLEMENTINE CONNOR, OTR/L, CHT  OCCUPATIONAL THERAPIST, CERTIFIED HAND THERAPIST    Complete 10 repetitions 1- 2 x day

## 2020-01-20 NOTE — PLAN OF CARE
"  Occupational Therapy Daily Treatment Note      Date: 1/16/2020  Name: Tosha Wilks  [employer] [employer] Number: [id number]    Therapy Diagnosis:   Encounter Diagnoses   Name Primary?    Pain in both hands     Joint stiffness of hand, unspecified laterality     Muscle weakness      Physician: Dion Mojica Jr., *    Date: 10/31/2019  Name: Tosha Wilks  [employer] Number: [id number]     Therapy Diagnosis:        Encounter Diagnoses   Name Primary?    Pain in both hands      Joint stiffness of hand, unspecified laterality      Muscle weakness        Physician: Dion Mojica Jr., *     Physician Orders: Eval & treat  Medical Diagnosis: M79.7 (ICD-10-CM) - Fibromyalgia M75.42   M79.641,M79.642 (ICD-10-CM) - Pain in both hands  Surgical Procedure and Date: n/a  Evaluation Date: 10310/19  Insurance Authorization Period Expiration: 10/09/2020  Plan of Care Certification Period: 10/31/19 to 12/13/19  Date of Return to MD: 11/21/19     Visit # / Visits authorized: 2 / pending insurance- pt self pay currently   Time In: 100pm  Time Out : 200pm  Total Billable Time: 60 minutes      Precautions:  Standard, Fall and Fibromyalgia, Lupus      Subjective     Pt reports: " Everything hurts. I have been doing the exercises some. I have numbness at night. The doctor said it was from tennis elbow and a pinched nerve in my neck. My shoulders hurt too. I wear this brace but it doesn't help too much. I can't  heavy things. My  has to help me get undressed at times because of the pain. The pain spreads all the way up my arms and into my hands.  "   she was not compliant with home exercise program given last session.   Response to previous treatment:no change  Functional change: none reported     Pain: 6/10  Location: bilateral wrists    Heat helps with pain, TENS unit    Objective        AROM:   10/31/2019 10/31/2019 01/16/2020 10/16/2020     Left Right Left Right   Forearm Sup/pron WNL WNl WNL WNL   Wrist E/F 36/40 " 34/10 55/60 60/43   Wrist RD/UD 10/15 12/20 12/14 5/10   Thumb R/P Abd 35/50 39/45 35/45 30/35   Thumb MP flex 69 66 50 55   Thumb IP flex 32 29 35 10   Thumb [place] To SF MCP To SF MCP Tip of LF Tip of LF         Hand ROM. Measured in degrees.    10/31/2019 10/31/20  19 01/16/2020 01/16/2020     Left Right Left Right            Index: MP  0/79 0/79  30              PIP     0/96 -28/100  75              DIP 0/34 0/40  30              RAMSEY 209 191 Full fist 135            Long:  MP 0/79 0/85  30              PIP -20/95 -16/100  82              DIP 0/52 0/25  25              RAMSEY 206 194 Full fist 137            Ring:   MP 0/84 0/90  15              PIP -20/104 0/102  85              DIP 0/52 0/0  15              RAMSEY 196 192 Full fist 115            Small:  MP 0/96 0/94  10               PIP 0/103 0/98  64               DIP 0/34 0/45  5              RAMSEY 233 229 Full fist 79               Sensation:  Patient reports numbness & tingling in B wrist/hands      Strength (Dyanmometer) and Pinch Strength (Pinch Gauge)  Measured in pounds and psi.     10/31/2019 10/31/2019 01/16/2020 01/26/2020     Left Right Left Right   Rung II 25 10 15 1   Key Pinch 2 .5 5 1   3pt Pinch 1 1 1 1.5           Tosha received the following supervised modalities after being cleared for contradictions for 10 minutes:   -Paraffin w/ MHP to B wrist/hands, pre-tx to decrease pain & increase tissue extensibility        Tosha received therapeutic exercises for 40 minutes including:    AROM Wrist  Sup/Pro  Ext/flx  RD/UD  Ext/flx with fist  RD/UD with fist   X 5- 10 reps each    Elbow flex/ext  x 5-10 reps   TGEs  Spreads  Thumb AROM: IP flx, rad/palm abd, opposition   X5- 10 reps each        Home Exercises and Education Provided     Education provided: Cont previous HEP, compression glove wear at night, paraffin wax bath handout, [employer] catalog provided for adaptive equipment ideas, educated pt that we would attempt therapy and if  that doesn't help then she will need to f/u with MD, pt and  verbalized understanding.   - Progress towards goals     Written Home Exercises Provided: yes.  Exercises were reviewed and Tosha was able to demonstrate them prior to the end of the session.  Tosha demonstrated good  understanding of the education provided.   .   See EMR under Patient Instructions for exercises provided 01/16/2020.     Assessment     Pt. Presents for re-evaluation visit following initial eval in October by alternate therapist. Pt. States she has not been doing the exercises a much as she was instructed. She continues with high reports of pain. Upon receiving subjective report pt able to make full fist with bilateral hands, however upon objective AROM testing pt unable to make full fist with right hand. Pt. Able to  dynamometer on level 2 with right hand yet only able to obtain 1# of force. Unsure if pt is giving best effort with objective measurements. Pt. Wincing with paraffin dip and removal due to pain in hands and shoulders/elbows. She was only able to complete 5 repetitions of each exercise this date due to pain. Reports pain in back of hand with exercises. She is observed to be able to carrying medium sized handbag on shoulder out of clinic.     Tosha is not progressing towards her goals and there are no updates to goals at this time. Pt prognosis continues as Fair. Pt will continue to benefit from skilled outpatient occupational therapy to address the deficits listed in the problem list on initial evaluation, provide pt/family education and to maximize pt's level of independence in the home and community environment.     Anticipated barriers to continued occupational therapy: dx, compliance, best effort given    Pt's spiritual, cultural and educational needs considered and pt agreeable to plan of care and goals.    Goals     Goals:   Short Term Goals: (in 2 weeks)  1) Patient will be independent in  HEP-progressing  2) Decrease pain in B forearm/wrist/hands to no more than 5/10 worst with ADL/IADL's -progressing    3) Increase AROM in B IF-SF RAMSEY's by 8-10 degrees for improved functioning in ADL/IADL's-progressing    4) Increase B  strength by 3-5 psi for increased functional use-progressing    5) Patient able to cut meat/food without difficulty-progressing       Long Term Goals: (in 6 weeks)  1) Decrease pain in B forearm/wrist/hands to no more than 2-3/10 worst -progressing    2) Increase AROM of B wrist/hands to WFL for increased functioning in ADL/IADL's-progressing    3) Increase strength in B  by 20% of initial measures for improved functioning in ADL/IADL's-progressing    4) Increased functioning in ADL's, as evidenced by patient able to perform all self-care without difficulty and at Mod I-progressing         Plan     Continue skilled occupational therapy with individualized plan of care focusing on increasing active participation in daily activities through AROM, strength, and adaptations      Updates/Grading for next session: progress as able    Libby Estes, OTR/L,CHT

## 2020-01-21 ENCOUNTER — CLINICAL SUPPORT (OUTPATIENT)
Dept: REHABILITATION | Facility: HOSPITAL | Age: 55
End: 2020-01-21
Attending: ORTHOPAEDIC SURGERY
Payer: COMMERCIAL

## 2020-01-21 DIAGNOSIS — M62.81 MUSCLE WEAKNESS: ICD-10-CM

## 2020-01-21 DIAGNOSIS — M25.649 JOINT STIFFNESS OF HAND, UNSPECIFIED LATERALITY: ICD-10-CM

## 2020-01-21 DIAGNOSIS — M79.642 PAIN IN BOTH HANDS: ICD-10-CM

## 2020-01-21 DIAGNOSIS — M79.641 PAIN IN BOTH HANDS: ICD-10-CM

## 2020-01-21 PROCEDURE — 97110 THERAPEUTIC EXERCISES: CPT | Mod: PN

## 2020-01-21 PROCEDURE — 97018 PARAFFIN BATH THERAPY: CPT | Mod: PN

## 2020-01-21 RX ORDER — AMITRIPTYLINE HYDROCHLORIDE 25 MG/1
25 TABLET, FILM COATED ORAL NIGHTLY PRN
Qty: 90 TABLET | Refills: 1 | Status: SHIPPED | OUTPATIENT
Start: 2020-01-21 | End: 2020-09-06 | Stop reason: ALTCHOICE

## 2020-01-21 NOTE — PROGRESS NOTES
"  Occupational Therapy Daily Treatment Note      Date: 1/21/2020  Name: Tosha Wilks  [facility]inic Number: [id number]    Therapy Diagnosis:   Encounter Diagnoses   Name Primary?    Pain in both hands     Joint stiffness of hand, unspecified laterality     Muscle weakness      Physician: Dion Mojica Jr., *    Physician Orders: Eval & treat  Medical Diagnosis: M79.7 (ICD-10-CM) - Fibromyalgia M75.42   M79.641,M79.642 (ICD-10-CM) - Pain in both hands  Surgical Procedure and Date: n/a  Evaluation Date: 10310/19  Insurance Authorization Period Expiration: 10/09/2020  Plan of Care Certification Period: 01/16/2020 to 03/05/2020  Date of Return to MD: none scheduled     Visit # / Visits authorized: 3 / pending insurance- pt self pay currently   Time In: 105pm  Time Out : 153pm  Total Billable Time: 38 minutes      Precautions:  Standard, Fall and Fibromyalgia, Lupus       Subjective     Pt reports: " I'm having a bad day today. Any day it is cold I hurt a lot"   she was compliant with home exercise program given last session.   Response to previous treatment: getting a little better  Functional change: none reported    Pain: 3/10  Location: bilateral hands/fingers    Objective      AROM:    10/31/2019 10/31/2019 01/16/2020 10/16/2020     Left Right Left Right   Forearm Sup/pron WNL WNl WNL WNL   Wrist E/F 36/40 34/10 55/60 60/43   Wrist RD/UD 10/15 12/20 12/14 5/10   Thumb R/P Abd 35/50 39/45 35/45 30/35   Thumb MP flex 69 66 50 55   Thumb IP flex 32 29 35 10   Thumb [place] To SF MCP To SF MCP Tip of LF Tip of LF         Hand ROM. Measured in degrees.            10/31/2019 10/31/20  19 01/16/2020 01/16/2020     Left Right Left Right              Index: MP  0/79 0/79   30              PIP     0/96 -28/100   75              DIP 0/34 0/40   30              RAMSEY 209 191 Full fist 135              Long:  MP 0/79 0/85   30              PIP -20/95 -16/100   82              DIP 0/52 0/25   25              RAMSEY 206 194 Full fist " 137              Ring:   MP 0/84 0/90   15              PIP -20/104 0/102   85              DIP 0/52 0/0   15              RAMSEY 196 192 Full fist 115              Small:  MP 0/96 0/94   10               PIP 0/103 0/98   64               DIP 0/34 0/45   5              RAMSEY 233 229 Full fist 79                 Sensation:  Patient reports numbness & tingling in B wrist/hands      Strength (Dyanmometer) and Pinch Strength (Pinch Gauge)  Measured in pounds and psi.     10/31/2019 10/31/2019 01/16/2020 01/26/2020     Left Right Left Right   Rung II 25 10 15 1   Key Pinch 2 .5 5 1   3pt Pinch 1 1 1 1.5                 Tosha received the following supervised modalities after being cleared for contradictions for 10 minutes:   -Paraffin w/ MHP to B wrist/hands, pre-tx to decrease pain & increase tissue extensibility           Tosha received therapeutic exercises for 40 minutes including:     AROM Wrist  Sup/Pro  Ext/flx  RD/UD  Ext/flx with fist  RD/UD with fist    X 5- 10 reps each    Elbow flex/ext  x 5-10 reps   TGEs  Spreads  Thumb AROM: IP flx, rad/palm abd, opposition    X5- 10 reps each    Isospheres X 2 min palm down on table   Wrist maze X 2 min (unable on Right, able to complete on Left x 1.5 min)   Wrist PRE  1# x 10 reps x flex/ext only   Yellow Putty X 10 squeezes each gentle     Large pom poms  X1/2 container each hand, thumb to each digit as able          Home Exercises and Education Provided     Education provided: Cont HEP  - Progress towards goals     Written Home Exercises Provided: Patient instructed to cont prior HEP.  Exercises were reviewed and Tosha was able to demonstrate them prior to the end of the session.  Tosha demonstrated good  understanding of the education provided.   .   See EMR under Patient Instructions for exercises provided 01/21/2020.     Assessment   Pt. Presents for first visit after initial evaluation. She states she is having a bad day today but has low subjective pain  report. She was able to do her exercises at home per report with less pain than at initial visit. She states she has tried lidocane and voltran for topical pain relief. She has a call in to her PCP about getting a Rheumotologist on board. She is able to do some exercises better with L>R and some with R>L. Thumb opposition is most challenging. She is able to do light strengthening with pain reported but completed. Pt. Unable to complete all of opposition task this date.     Tosha is progressing  towards her goals and there are no updates to goals at this time. Pt prognosis continues as Fair. Pt will continue to benefit from skilled outpatient occupational therapy to address the deficits listed in the problem list on initial evaluation, provide pt/family education and to maximize pt's level of independence in the home and community environment.     Anticipated barriers to continued occupational therapy: dx, compliance, best effort given    Pt's spiritual, cultural and educational needs considered and pt agreeable to plan of care and goals.    Goals     Goals:   Short Term Goals: (in 2 weeks)  1) Patient will be independent in HEP-progressing  2) Decrease pain in B forearm/wrist/hands to no more than 5/10 worst with ADL/IADL's -progressing     3) Increase AROM in B IF-SF RAMSEY's by 8-10 degrees for improved functioning in ADL/IADL's-progressing     4) Increase B  strength by 3-5 psi for increased functional use-progressing     5) Patient able to cut meat/food without difficulty-progressing        Long Term Goals: (in 6 weeks)  1) Decrease pain in B forearm/wrist/hands to no more than 2-3/10 worst -progressing     2) Increase AROM of B wrist/hands to WFL for increased functioning in ADL/IADL's-progressing     3) Increase strength in B  by 20% of initial measures for improved functioning in ADL/IADL's-progressing     4) Increased functioning in ADL's, as evidenced by patient able to perform all self-care without  difficulty and at Mod I-progressing          Plan     Continue skilled occupational therapy with individualized plan of care focusing on increasing return to functional occupations with decreased pain      Updates/Grading for next session: cont to progress as able    Libby Estes, OTR/L,CHT

## 2020-01-23 ENCOUNTER — CLINICAL SUPPORT (OUTPATIENT)
Dept: REHABILITATION | Facility: HOSPITAL | Age: 55
End: 2020-01-23
Attending: ORTHOPAEDIC SURGERY
Payer: COMMERCIAL

## 2020-01-23 DIAGNOSIS — M79.641 PAIN IN BOTH HANDS: ICD-10-CM

## 2020-01-23 DIAGNOSIS — M62.81 MUSCLE WEAKNESS: ICD-10-CM

## 2020-01-23 DIAGNOSIS — M79.642 PAIN IN BOTH HANDS: ICD-10-CM

## 2020-01-23 DIAGNOSIS — M25.649 JOINT STIFFNESS OF HAND, UNSPECIFIED LATERALITY: ICD-10-CM

## 2020-01-23 PROCEDURE — 97018 PARAFFIN BATH THERAPY: CPT | Mod: PN

## 2020-01-23 PROCEDURE — 97110 THERAPEUTIC EXERCISES: CPT | Mod: PN

## 2020-01-23 NOTE — PROGRESS NOTES
"  Occupational Therapy Daily Treatment Note      Date: 1/23/2020  Name: Tosha Wilks  Clinic Number: [id number]    Therapy Diagnosis:   Encounter Diagnoses   Name Primary?    Pain in both hands     Joint stiffness of hand, unspecified laterality     Muscle weakness      Physician: Dion Mojica Jr., *    Physician Orders: Eval & treat  Medical Diagnosis: M79.7 (ICD-10-CM) - Fibromyalgia M75.42   M79.641,M79.642 (ICD-10-CM) - Pain in both hands  Surgical Procedure and Date: n/a  Evaluation Date: 10310/19  Insurance Authorization Period Expiration: 12/31/2020  Plan of Care Certification Period: 1/16/2020 - 3/5/2020  Date of Return to MD: not scheduled     Visit # / Visits authorized: 4 / 50  Time In: 11:05 am  Time Out : 12:00 pm  Total Treatment Time: 55 minutes   Total Timed minutes: 45 minutes     Precautions:  Standard, Fall and Fibromyalgia, Lupus       Subjective     Pt reports: "It's hurting. It always hurts when it's cold weather." Pt states she has min A from  with UE dressing and buttons. Pt did not report having assist with donning jewelry or with grooming and wrapping hair.   she was compliant with home exercise program given last session.   Response to previous treatment: getting a little better  Functional change: none reported    Pain: 7/10  Location: bilateral hands/fingers    Objective      AROM:    10/31/2019 10/31/2019 01/16/2020 01/16/2020     Left Right Left Right   Forearm Sup/pron WNL WNl WNL WNL   Wrist E/F 36/40 34/10 55/60 60/43   Wrist RD/UD 10/15 12/20 12/14 5/10   Thumb R/P Abd 35/50 39/45 35/45 30/35   Thumb MP flex 69 66 50 55   Thumb IP flex 32 29 35 10   Thumb [place] To SF MCP To SF MCP Tip of LF Tip of LF         Hand ROM. Measured in degrees.            10/31/2019 10/31/20  19 01/16/2020 01/16/2020     Left Right Left Right              Index: MP  0/79 0/79   30              PIP     0/96 -28/100   75              DIP 0/34 0/40   30              RAMSEY 209 191 Full fist 135 "              Long:  MP 0/79 0/85   30              PIP -20/95 -16/100   82              DIP 0/52 0/25   25              RAMSEY 206 194 Full fist 137              Ring:   MP 0/84 0/90   15              PIP -20/104 0/102   85              DIP 0/52 0/0   15              RAMSEY 196 192 Full fist 115              Small:  MP 0/96 0/94   10               PIP 0/103 0/98   64               DIP 0/34 0/45   5              RAMSEY 233 229 Full fist 79                 Sensation:  Patient reports numbness & tingling in B wrist/hands      Strength (Dyanmometer) and Pinch Strength (Pinch Gauge)  Measured in pounds and psi.     10/31/2019 10/31/2019 01/16/2020 01/26/2020     Left Right Left Right   Rung II 25 10 15 1   Key Pinch 2 .5 5 1   3pt Pinch 1 1 1 1.5                 Tosha received the following supervised modalities after being cleared for contradictions for 10 minutes:   -Paraffin w/ MHP to B wrist/hands, pre-tx to decrease pain & increase tissue extensibility           Tosah received therapeutic exercises for 40 minutes including:     AROM Wrist  Sup/Pro  Ext/flx  RD/UD  Ext/flx with fist  RD/UD with fist    X 5- 10 reps each    Elbow flex/ext  x 5-10 reps   TGEs  Spreads  Thumb AROM: IP flx, rad/palm abd, opposition    X5- 10 reps each    Isospheres X 2 min palm down on table   Wrist maze X 2 min    Wrist PRE  1# x 10 reps x flex/ext only   Yellow Putty X 10 squeezes each gentle     Large pom poms  X1/2 container each hand, thumb to each digit as able          Home Exercises and Education Provided     Education provided: Cont HEP  - Progress towards goals     Written Home Exercises Provided: Patient instructed to cont prior HEP.  Exercises were reviewed and Tosha was able to demonstrate them prior to the end of the session.  Tosha demonstrated good  understanding of the education provided.   .   See EMR under Patient Instructions for exercises provided 01/21/2020.     Assessment    She states she is having a bad  day again today and has high pain report. Pt tolerated tx fairly, and required increased time to perform ex. Observed pt being able to make fist with hands during session today. However, when performing tendon glides, she appeared to struggle with wave and fist. She was able to do her exercises at home per report.  Per report, she has a call in to her PCP about getting a Rheumotologist on board. She is able to do some exercises better with L>R and some with R>L. Thumb opposition cont to be most challenging. She is able to do light strengthening with pain reported but completed. Pt. Unable to complete all of opposition task again this date. During task she appeared to have difficulty with opposing even to her IF with B hands, however, observed patient  and grasp a card out of her purse, and able to manage coin purse with both hands. She reports she does require min A with UE dressing and supervision for showering, and that she has pain with activities.     Tosha is progressing  towards her goals and there are no updates to goals at this time. Pt prognosis continues as Fair. Pt will continue to benefit from skilled outpatient occupational therapy to address the deficits listed in the problem list on initial evaluation, provide pt/family education and to maximize pt's level of independence in the home and community environment.     Anticipated barriers to continued occupational therapy: dx, compliance, best effort given    Pt's spiritual, cultural and educational needs considered and pt agreeable to plan of care and goals.    Goals     Goals:   Short Term Goals: (in 2 weeks)  1) Patient will be independent in HEP-progressing  2) Decrease pain in B forearm/wrist/hands to no more than 5/10 worst with ADL/IADL's -progressing     3) Increase AROM in B IF-SF RAMSEY's by 8-10 degrees for improved functioning in ADL/IADL's-progressing     4) Increase B  strength by 3-5 psi for increased functional  use-progressing     5) Patient able to cut meat/food without difficulty-progressing        Long Term Goals: (in 6 weeks)  1) Decrease pain in B forearm/wrist/hands to no more than 2-3/10 worst -progressing     2) Increase AROM of B wrist/hands to WFL for increased functioning in ADL/IADL's-progressing     3) Increase strength in B  by 20% of initial measures for improved functioning in ADL/IADL's-progressing     4) Increased functioning in ADL's, as evidenced by patient able to perform all self-care without difficulty and at Mod I-progressing          Plan     Continue skilled occupational therapy with individualized plan of care focusing on increasing return to functional occupations with decreased pain      Updates/Grading for next session: cont to progress as able    Jessica Baugh, OT

## 2020-01-28 ENCOUNTER — DOCUMENTATION ONLY (OUTPATIENT)
Dept: REHABILITATION | Facility: HOSPITAL | Age: 55
End: 2020-01-28

## 2020-01-28 NOTE — PROGRESS NOTES
OT Not Seen Note    Date: 1/28/2020    Pt was scheduled for OT appointment but did not show. She called after appointment and stated that she was sick. Pt is scheduled for OT again on 2/4/2020. No charges dropped for today.    ETIENNE Lockhart

## 2020-02-17 ENCOUNTER — HOSPITAL ENCOUNTER (EMERGENCY)
Facility: HOSPITAL | Age: 55
Discharge: HOME | End: 2020-02-18
Attending: EMERGENCY MEDICINE
Payer: COMMERCIAL

## 2020-02-17 DIAGNOSIS — G43.809 OTHER MIGRAINE WITHOUT STATUS MIGRAINOSUS, NOT INTRACTABLE: Primary | ICD-10-CM

## 2020-02-17 PROCEDURE — 96372 THER/PROPH/DIAG INJ SC/IM: CPT | Mod: 59

## 2020-02-17 PROCEDURE — 63600175 PHARM REV CODE 636 W HCPCS: Performed by: EMERGENCY MEDICINE

## 2020-02-17 PROCEDURE — 96375 TX/PRO/DX INJ NEW DRUG ADDON: CPT

## 2020-02-17 PROCEDURE — 99284 EMERGENCY DEPT VISIT MOD MDM: CPT | Mod: 25

## 2020-02-17 PROCEDURE — 96365 THER/PROPH/DIAG IV INF INIT: CPT

## 2020-02-17 RX ORDER — HALOPERIDOL 5 MG/ML
5 INJECTION INTRAMUSCULAR
Status: COMPLETED | OUTPATIENT
Start: 2020-02-17 | End: 2020-02-17

## 2020-02-17 RX ORDER — KETOROLAC TROMETHAMINE 30 MG/ML
15 INJECTION, SOLUTION INTRAMUSCULAR; INTRAVENOUS
Status: DISCONTINUED | OUTPATIENT
Start: 2020-02-17 | End: 2020-02-17

## 2020-02-17 RX ORDER — KETOROLAC TROMETHAMINE 30 MG/ML
30 INJECTION, SOLUTION INTRAMUSCULAR; INTRAVENOUS
Status: COMPLETED | OUTPATIENT
Start: 2020-02-17 | End: 2020-02-17

## 2020-02-17 RX ORDER — METOCLOPRAMIDE HYDROCHLORIDE 5 MG/ML
10 INJECTION INTRAMUSCULAR; INTRAVENOUS
Status: DISCONTINUED | OUTPATIENT
Start: 2020-02-17 | End: 2020-02-17

## 2020-02-17 RX ORDER — DIPHENHYDRAMINE HYDROCHLORIDE 50 MG/ML
25 INJECTION INTRAMUSCULAR; INTRAVENOUS
Status: DISCONTINUED | OUTPATIENT
Start: 2020-02-17 | End: 2020-02-17

## 2020-02-17 RX ORDER — HYDROMORPHONE HYDROCHLORIDE 2 MG/ML
2 INJECTION, SOLUTION INTRAMUSCULAR; INTRAVENOUS; SUBCUTANEOUS
Status: COMPLETED | OUTPATIENT
Start: 2020-02-17 | End: 2020-02-17

## 2020-02-17 RX ADMIN — HYDROMORPHONE HYDROCHLORIDE 2 MG: 2 INJECTION, SOLUTION INTRAMUSCULAR; INTRAVENOUS; SUBCUTANEOUS at 11:02

## 2020-02-17 RX ADMIN — KETOROLAC TROMETHAMINE 30 MG: 30 INJECTION, SOLUTION INTRAMUSCULAR at 10:02

## 2020-02-17 RX ADMIN — HALOPERIDOL LACTATE 5 MG: 5 INJECTION, SOLUTION INTRAMUSCULAR at 10:02

## 2020-02-18 VITALS
TEMPERATURE: 99 F | SYSTOLIC BLOOD PRESSURE: 97 MMHG | OXYGEN SATURATION: 96 % | WEIGHT: 158 LBS | DIASTOLIC BLOOD PRESSURE: 66 MMHG | HEART RATE: 80 BPM | HEIGHT: 62 IN | BODY MASS INDEX: 29.08 KG/M2 | RESPIRATION RATE: 16 BRPM

## 2020-02-18 PROCEDURE — 63600175 PHARM REV CODE 636 W HCPCS: Performed by: EMERGENCY MEDICINE

## 2020-02-18 RX ORDER — SUMATRIPTAN 50 MG/1
TABLET, FILM COATED ORAL
Qty: 12 TABLET | Refills: 3 | Status: SHIPPED | OUTPATIENT
Start: 2020-02-18 | End: 2020-11-19 | Stop reason: SDUPTHER

## 2020-02-18 RX ORDER — BUTALBITAL, ACETAMINOPHEN AND CAFFEINE 50; 325; 40 MG/1; MG/1; MG/1
1 TABLET ORAL EVERY 4 HOURS PRN
Qty: 15 TABLET | Refills: 0 | Status: SHIPPED | OUTPATIENT
Start: 2020-02-18 | End: 2020-02-25

## 2020-02-18 RX ORDER — METOCLOPRAMIDE HYDROCHLORIDE 5 MG/ML
10 INJECTION INTRAMUSCULAR; INTRAVENOUS
Status: COMPLETED | OUTPATIENT
Start: 2020-02-18 | End: 2020-02-18

## 2020-02-18 RX ORDER — HYDROMORPHONE HYDROCHLORIDE 2 MG/ML
1 INJECTION, SOLUTION INTRAMUSCULAR; INTRAVENOUS; SUBCUTANEOUS
Status: COMPLETED | OUTPATIENT
Start: 2020-02-18 | End: 2020-02-18

## 2020-02-18 RX ADMIN — PROMETHAZINE HYDROCHLORIDE 25 MG: 25 INJECTION INTRAMUSCULAR; INTRAVENOUS at 01:02

## 2020-02-18 RX ADMIN — HYDROMORPHONE HYDROCHLORIDE 1 MG: 2 INJECTION, SOLUTION INTRAMUSCULAR; INTRAVENOUS; SUBCUTANEOUS at 12:02

## 2020-02-18 RX ADMIN — METOCLOPRAMIDE 10 MG: 5 INJECTION, SOLUTION INTRAMUSCULAR; INTRAVENOUS at 01:02

## 2020-02-18 NOTE — ED NOTES
Pt cont c/o headache pain. States unresolved with medication. Pt updated with plan to obtain CT head and dilaudid IM.

## 2020-02-18 NOTE — ED NOTES
Pt continues to state pain has greatly improved but is still present. States she is ready to go home. Reports nausea has resolved.

## 2020-02-18 NOTE — ED PROVIDER NOTES
Encounter Date: 2/17/2020    SCRIBE #1 NOTE: I, Samra Reynolds, am scribing for, and in the presence of,  Dr. Peña. I have scribed the entire note.     I, Dr. Madeleine Peña MD, personally performed the services described in this documentation. All medical record entries made by the scribe were at my direction and in my presence.  I have reviewed the chart and agree that the record reflects my personal performance and is accurate and complete. Madeleine Peña MD.    History     Chief Complaint   Patient presents with    Headache     Pt c/o headache, states she has clusters headaches and has had this one for a week and it keeps getting worse. Pt is on Imitrex and Topamax.     CHIEF COMPLAINT: Patient presents with: Migraine     HISTORY OF PRESENT ILLNESS: Tosha Wilks who is a 53 y/o with history of migraines, fibromyalgia, lupus who presents to the emergency department today with complaint of migraine.  She reports that her symptoms started about a week ago.  She has been taking her usual medications but has no relief.  She has been told that if she cannot get relief with home medication she should come to the emergency department.  She reports feels similar to her usual migraines.  It is just not responding to her medications.  She has not needed to come to the emergency department for year for her migraines.  She has needed to come for her fibromyalgia.  In addition to her migraine she feels like she is having back pain secondary to her fibromyalgia.  She denies any urinary symptoms, no dysuria, hematuria, frequency or urgency.  She denies any urinary incontinence.  No bowel incontinence.  No fever, chills or sweats. No rashes.  Her usual medications include Topamax and Imitrex.  She tried taking Toradol and Benadryl but is not having relief.  She is worried secondary to the fact that it has been lasting a week without getting relief.      ALLERGIES REVIEWED  MEDICATIONS REVIEWED  PMH/PSH/SOC/FH REVIEWED      The history is provided by the patient and her  at bedside    Nursing/Ancillary staff note reviewed.            Review of patient's allergies indicates:   Allergen Reactions    Morphine Anxiety     Hives     Other Other (See Comments)     Allergy is carrots Other reaction(s): Angioedema, carrots    Erythromycin Other (See Comments)     Hives and cramps     Zofran (as hydrochloride) [ondansetron hcl] Hives     Local hive after IV injection     Past Medical History:   Diagnosis Date    Ankylosing spondylitis     Anxiety     Asthma     Chronic constipation     Chronic insomnia     Edema     Fibromyalgia     Interstitial cystitis     Lupus     Migraine headache     Restless legs syndrome     Shingles     Stroke     post partum right sided numbness    TIA (transient ischemic attack)      Past Surgical History:   Procedure Laterality Date    BLADDER SURGERY      BREAST SURGERY  2018    reduction     SECTION      CHOLECYSTECTOMY      HYSTERECTOMY      LASER ABLATION      to back    SLEEVE GASTROPLASTY  2016    TUBAL LIGATION       Family History   Problem Relation Age of Onset    Hypertension Mother     Hypertension Brother     Breast cancer Maternal Aunt         x2     Heart attack Father     Hypertension Sister     Lupus Sister      Social History     Tobacco Use    Smoking status: Never Smoker    Smokeless tobacco: Never Used   Substance Use Topics    Alcohol use: Yes     Frequency: Monthly or less     Drinks per session: 1 or 2     Binge frequency: Never     Comment: occasionally    Drug use: No     Review of Systems   Constitutional: Negative for activity change, appetite change, chills, diaphoresis and fever.   HENT: Negative for congestion, drooling, ear pain, mouth sores, rhinorrhea, sinus pain, sore throat and trouble swallowing.    Eyes: Negative for pain and discharge.   Respiratory: Negative for cough, chest tightness, shortness of breath,  wheezing and stridor.    Cardiovascular: Negative for chest pain, palpitations and leg swelling.   Gastrointestinal: Positive for nausea and vomiting. Negative for abdominal distention, abdominal pain, blood in stool, constipation and diarrhea.   Genitourinary: Negative for difficulty urinating, dysuria, flank pain, frequency, hematuria and urgency.   Musculoskeletal: Positive for myalgias. Negative for arthralgias and back pain.   Skin: Negative for pallor, rash and wound.   Neurological: Positive for headaches. Negative for dizziness, syncope, weakness, light-headedness and numbness.        Tunnel vision   All other systems reviewed and are negative.      Physical Exam     Initial Vitals [02/17/20 2100]   BP Pulse Resp Temp SpO2   (!) 137/94 (!) 112 20 98.7 °F (37.1 °C) 100 %      MAP       --         Physical Exam    Nursing note and vitals reviewed.  Constitutional: She appears well-developed and well-nourished.   HENT:   Head: Normocephalic and atraumatic.   Right Ear: External ear normal.   Left Ear: External ear normal.   Nose: Nose normal.   Mouth/Throat: Oropharynx is clear and moist.   Eyes: Conjunctivae and EOM are normal. Pupils are equal, round, and reactive to light. No scleral icterus.   Neck: Normal range of motion. Neck supple. No JVD present.   Cardiovascular: Normal rate, regular rhythm, normal heart sounds and intact distal pulses. Exam reveals no gallop and no friction rub.    No murmur heard.  Pulmonary/Chest: Breath sounds normal. No stridor. No respiratory distress. She has no wheezes. She exhibits no tenderness.   Abdominal: Soft. Bowel sounds are normal. She exhibits no distension and no mass. There is no tenderness. There is no rebound and no guarding.   Musculoskeletal: Normal range of motion. She exhibits no edema or tenderness.   Back is nontender to palpation.    Neurological: She is alert and oriented to person, place, and time. She has normal strength. No cranial nerve deficit.    Skin: Skin is warm and dry. Capillary refill takes less than 2 seconds. No rash noted. No pallor.   Psychiatric: She has a normal mood and affect. Thought content normal.         ED Course   Procedures  Labs Reviewed - No data to display       Imaging Results    None          Medical Decision Making:   History:   Old Medical Records: I decided to obtain old medical records.  Initial Assessment:   Tosha Wilks presents to the ED today with migraine. Pt has a history of migraines.  No red flags on history or exam. I will treat their pain and reassess.   Differential Diagnosis:   Migraine headache, cluster headache, tension headache eye strain, and infectious causes such as meningitis, pharyngitis and sinusitis, other dangerous causes such as subarachnoid hemorrhage, tumor  ED Management:  2325 a review of the patient's chart shows that frequently she needs Dilaudid for her pain. At this time she reports that the Haldol and Toradol is not helping with her pain. I have discussed that we could try giving her a dose of Dilaudid and both she and her  agree that this would be beneficial.  Patient would like to get a head CT as she is worried due to the context of this migraine.  Will obtain CT scan of the head for further evaluation.  Will give Dilaudid and reassess.    0000 Pt care turned over to Dr Berg at shift change awaiting CT scan and response to treatment. He will make the final disposition according to the pts workup and response to treatment.                    ED Course as of Feb 17 2215 Mon Feb 17, 2020 2059 Sort note: Tosha Wilks nontoxic/afebrile 54 y.o.  presented to the ED with c/o headache x week. H/o cluster headache. Headache is similar to previous episodes     Patient seen and medically screened by Physician assistant in Sort process due to ED crowding.  Appropriate tests and/or medications ordered.  Care transferred to an alternate provider when patient was placed in an Exam  Room from the [facility] for physical exam, additional orders, and disposition. A[facility]      [AM]      ED Course User Index  [AM] Suly Coyne PA-C                Clinical Impression:       ICD-10-CM ICD-9-CM   1. Other migraine without status migrainosus, not intractable G43.809 346.80                 Madeleine Peña MD  02/18/20 [id number]

## 2020-02-18 NOTE — DISCHARGE INSTRUCTIONS
Thank you for choosing [facility]! We appreciate you coming to us for your medical care. We hope you feel better soon! Please come back to [facility] for all of your future medical needs.    Our goal in the emergency department is to always give you outstanding care and exceptional service. You may receive a survey by mail or e-mail in the next week regarding your experience in our ED. We would greatly appreciate your completing and returning the survey. Your feedback provides us with a way to recognize our staff who give very good care and it helps us learn how to improve when your experience was below our aspiration of excellence.       Sincerely,    Rakan Berg MD  Medical Director  Emergency Department  [facility] [place]

## 2020-02-18 NOTE — ED TRIAGE NOTES
Pt presents to ED with c/o headache x 1 wk that has gotten progressively worse. Pt confirms she has been taking medication, but has not resolved headache. Hx migraine headaches.     APPEARANCE: Alert, oriented and in no acute distress.  CARDIAC: Normal rate and rhythm, no murmur heard.   PERIPHERAL VASCULAR: peripheral pulses present. Normal cap refill. No edema. Warm to touch.    RESPIRATORY:Normal rate and effort, breath sounds clear bilaterally throughout chest. Respirations are equal and unlabored no obvious signs of distress.  NEURO: 5/5 strength major flexors/extensors bilaterally. Sensory intact to light touch bilaterally. Rian coma scale: eyes open spontaneously-4, oriented & converses-5, obeys commands-6. No neurological abnormalities; generalized headache with photophobia.  MENTAL STATUS: awake, alert and aware of environment.

## 2020-02-18 NOTE — PROVIDER PROGRESS NOTES - EMERGENCY DEPT.
Encounter Date: 2/17/2020    ED Physician Progress Notes           1:52 AM - patient feeling better at this time.  Just received Phenergan.  About to get Reglan 10 mg IV    2:19 AM - stable for discharge at this time.  Rx for sumatriptan and Fioricet.    No abnormality seen on CT, no concern for subarachnoid hemorrhage or other intracranial hemorrhage at this time.  No concern for hypertensive encephalopathy as patient's blood pressure is well controlled.    Patient to follow up with PCP as needed in the next week or return for any emergent concerns.      IMPRESSION:    ICD-10-CM ICD-9-CM   1. Other migraine without status migrainosus, not intractable G43.809 346.80         DISPO:  Pt dc in stable condition.

## 2020-02-18 NOTE — ED NOTES
Pt reports great improvement in headache. Rates pain 5/10. States nausea has improved but is still present.

## 2020-02-20 ENCOUNTER — PATIENT MESSAGE (OUTPATIENT)
Dept: NEUROLOGY | Facility: CLINIC | Age: 55
End: 2020-02-20

## 2020-02-21 ENCOUNTER — OFFICE VISIT (OUTPATIENT)
Dept: FAMILY MEDICINE | Facility: CLINIC | Age: 55
End: 2020-02-21
Payer: COMMERCIAL

## 2020-02-21 VITALS
WEIGHT: 164.44 LBS | HEIGHT: 62 IN | BODY MASS INDEX: 30.26 KG/M2 | TEMPERATURE: 98 F | OXYGEN SATURATION: 96 % | HEART RATE: 97 BPM | DIASTOLIC BLOOD PRESSURE: 80 MMHG | SYSTOLIC BLOOD PRESSURE: 130 MMHG

## 2020-02-21 DIAGNOSIS — M32.9 LUPUS: Primary | ICD-10-CM

## 2020-02-21 DIAGNOSIS — E55.9 VITAMIN D DEFICIENCY: ICD-10-CM

## 2020-02-21 DIAGNOSIS — E87.6 HYPOKALEMIA: ICD-10-CM

## 2020-02-21 PROCEDURE — 3008F BODY MASS INDEX DOCD: CPT | Mod: CPTII,S$GLB,, | Performed by: FAMILY MEDICINE

## 2020-02-21 PROCEDURE — 99214 OFFICE O/P EST MOD 30 MIN: CPT | Mod: S$GLB,,, | Performed by: FAMILY MEDICINE

## 2020-02-21 PROCEDURE — 99214 PR OFFICE/OUTPT VISIT, EST, LEVL IV, 30-39 MIN: ICD-10-PCS | Mod: S$GLB,,, | Performed by: FAMILY MEDICINE

## 2020-02-21 PROCEDURE — 3008F PR BODY MASS INDEX (BMI) DOCUMENTED: ICD-10-PCS | Mod: CPTII,S$GLB,, | Performed by: FAMILY MEDICINE

## 2020-02-21 RX ORDER — GABAPENTIN 400 MG/1
400 CAPSULE ORAL 3 TIMES DAILY
Qty: 90 CAPSULE | Refills: 0 | Status: SHIPPED | OUTPATIENT
Start: 2020-02-21 | End: 2020-03-17

## 2020-02-21 RX ORDER — POTASSIUM CHLORIDE 750 MG/1
10 TABLET, EXTENDED RELEASE ORAL 2 TIMES DAILY
Qty: 60 TABLET | Refills: 0 | Status: SHIPPED | OUTPATIENT
Start: 2020-02-21 | End: 2020-03-17

## 2020-02-21 NOTE — PROGRESS NOTES
"Chief Complaint  Chief Complaint   Patient presents with    Follow-up     6 month        HPI  Tosha Wilks is a 54 y.o. female with multiple medical diagnoses as listed in the medical history and problem list that presents for f/u.  She has a history of Stage 0 breast cancer, fibromyalgia and lupus.  Going to pain management.     Anxiety:  Has had problems with anxiety for many years.  Was taking clonazepam, but her pain management stopped prescribing it.  Now on effexor.   This is helping in addition it has eliminated her hot flahses. .  Has occasional panic attacks.  Irritability.  Feels anxious most of the time.  Doesn't sleep well.  Had ambien for sleep, but was sleep walking with this.   Trazodone helps with sleep some.      Asthma:  Rarely uses her rescue inhaler.  Rarely uses her Pulmicort.  No cough or wheezing.  Now having URI symptoms.  Started 2 days ago.  Scratchy throat.      Anemia:  Says she had labs recently and her H & H was low.  Does not have a copy of those labs.  In surgical menopause.  No rectal bleeding.  No restless leg.  Always has fatigue.  B12 deficiency and vitamin D deficiency.  Currently being supplemented with both.      Pruritis:  Feels like her skin is crawling. Started about 5 days ago.  She was seen in the ER for a headache and was treated with reglan, toradol, haldol and dilaudid.  Headache is improved, but not completely resolved.  Since then she has felt "itchy all over"  But scratching doesn't relieve the itch.     IV access:  Patient has multiple medical problems.  She has difficulty with lab drawn and infusion.  She requests a surgical consult for a port.        PAST MEDICAL HISTORY:  Past Medical History:   Diagnosis Date    Ankylosing spondylitis     Anxiety     Asthma     Chronic constipation     Chronic insomnia     Edema     Fibromyalgia     Interstitial cystitis     Lupus     Migraine headache     Restless legs syndrome     Shingles     Stroke 1998 "    post partum right sided numbness    TIA (transient ischemic attack)        PAST SURGICAL HISTORY:  Past Surgical History:   Procedure Laterality Date    BLADDER SURGERY      BREAST SURGERY  2018    reduction     SECTION      CHOLECYSTECTOMY      HYSTERECTOMY      LASER ABLATION      to back    SLEEVE GASTROPLASTY  2016    TUBAL LIGATION         SOCIAL HISTORY:  Social History     Socioeconomic History    Marital status:      Spouse name: Not on file    Number of children: Not on file    Years of education: Not on file    Highest education level: Not on file   Occupational History    Not on file   Social Needs    Financial resource strain: Not hard at all    Food insecurity:     Worry: Never true     Inability: Never true    Transportation needs:     Medical: No     Non-medical: No   Tobacco Use    Smoking status: Never Smoker    Smokeless tobacco: Never Used   Substance and Sexual Activity    Alcohol use: Yes     Frequency: Monthly or less     Drinks per session: 1 or 2     Binge frequency: Never     Comment: occasionally    Drug use: No    Sexual activity: Yes     Partners: Male   Lifestyle    Physical activity:     Days per week: 2 days     Minutes per session: 20 min    Stress: To some extent   Relationships    Social connections:     Talks on phone: Three times a week     Gets together: Once a week     Attends Yazidism service: Not on file     Active member of club or organization: Yes     Attends meetings of clubs or organizations: Never     Relationship status:    Other Topics Concern    Not on file   Social History Narrative    Not on file       FAMILY HISTORY:  Family History   Problem Relation Age of Onset    Hypertension Mother     Hypertension Brother     Breast cancer Maternal Aunt         x2     Heart attack Father     Hypertension Sister     Lupus Sister        ALLERGIES AND MEDICATIONS: updated and reviewed.  Review of patient's  allergies indicates:   Allergen Reactions    Morphine Anxiety     Hives     Other Other (See Comments)     Allergy is carrots Other reaction(s): Angioedema, carrots    Erythromycin Other (See Comments)     Hives and cramps     Zofran (as hydrochloride) [ondansetron hcl] Hives     Local hive after IV injection     Current Outpatient Medications   Medication Sig Dispense Refill    albuterol (PROAIR HFA) 90 mcg/actuation inhaler Inhale 2 puffs into the lungs every 4 (four) hours as needed. 18 g 3    amitriptyline (ELAVIL) 25 MG tablet TAKE 1 TABLET (25 MG TOTAL) BY MOUTH NIGHTLY AS NEEDED FOR INSOMNIA. 90 tablet 1    budesonide 180mcg (PULMICORT 180MCG) 180 mcg/actuation AePB Inhale 2 puffs into the lungs once daily. 1 each 3    butalbital-acetaminophen-caffeine -40 mg (FIORICET, ESGIC) -40 mg per tablet Take 1 tablet by mouth every 4 (four) hours as needed for Headaches. 15 tablet 0    cyanocobalamin 1,000 mcg/mL injection Inject 1 mL (1,000 mcg total) into the muscle every 28 days. 10 mL 3    cyclobenzaprine (FLEXERIL) 5 MG tablet TAKE 3 PILLS BY MOUTH (NIGHTLY)  6    ergocalciferol (ERGOCALCIFEROL) 50,000 unit Cap TAKE 1 CAPSULE BY MOUTH ONE TIME PER WEEK 12 capsule 0    fentaNYL (DURAGESIC) 50 mcg/hr APPLY 1 PATCH TO SKIN EVERY 72 HOURS      gabapentin (NEURONTIN) 400 MG capsule Take 1 capsule (400 mg total) by mouth 3 (three) times daily. 90 capsule 0    HYDROmorphone (DILAUDID) 2 MG tablet take 1 tablet by mouth every 8 hours as needed for pain 90 tablet 0    hydroxychloroquine (PLAQUENIL) 200 mg tablet Take 200 mg by mouth once daily.  1    hydrOXYzine pamoate (VISTARIL) 25 MG Cap Take 25 mg by mouth every 4 (four) hours as needed.       lidocaine HCl 2 % Crea Apply to affected areas 4 times a day as needed. 118 mL 2    omeprazole (PRILOSEC) 20 MG capsule Take 1 capsule (20 mg total) by mouth once daily. 1 Capsule, Delayed Release(E.C.) Oral Every day 90 capsule 3    sumatriptan  (IMITREX) 50 MG tablet Once for severe headache. May repeat once after 2 hours. Do not exceed 3-4 doses in one week. 12 tablet 3    tiZANidine (ZANAFLEX) 4 MG tablet Take 4 mg by mouth 3 (three) times daily.  3    topiramate (TOPAMAX) 100 MG tablet Take 1 tablet (100 mg total) by mouth every evening. for 14 days 90 tablet 3    traZODone (DESYREL) 100 MG tablet Take 1 tablet (100 mg total) by mouth every evening. 30 tablet 11    venlafaxine (EFFEXOR-XR) 150 MG Cp24 Take 1 capsule (150 mg total) by mouth once daily. 30 capsule 3    venlafaxine (EFFEXOR-XR) 37.5 MG 24 hr capsule Take 1 capsule (37.5 mg total) by mouth once daily. 7 capsule 0    venlafaxine (EFFEXOR-XR) 75 MG 24 hr capsule Take 1 capsule (75 mg total) by mouth once daily. 7 capsule 0    EPINEPHrine (EPIPEN 2-DENNIS) 0.3 mg/0.3 mL AtIn Inject 0.3 mLs (0.3 mg total) into the muscle once. for 1 dose 2 Device 3    potassium chloride (KLOR-CON) 10 MEQ TbSR Take 1 tablet (10 mEq total) by mouth 2 (two) times daily. 60 tablet 0     No current facility-administered medications for this visit.          ROS  Review of Systems   Constitutional: Positive for fatigue. Negative for activity change, appetite change and chills.   HENT: Negative for congestion, ear discharge, ear pain, rhinorrhea, sinus pain, sore throat and trouble swallowing.    Eyes: Negative for photophobia, pain, redness, itching and visual disturbance.   Respiratory: Negative for cough, chest tightness, shortness of breath and wheezing.    Cardiovascular: Negative for chest pain, palpitations and leg swelling.   Gastrointestinal: Negative for abdominal distention, abdominal pain, blood in stool, diarrhea, nausea and vomiting.   Genitourinary: Negative for dysuria, pelvic pain, vaginal bleeding, vaginal discharge and vaginal pain.   Musculoskeletal: Positive for myalgias. Negative for arthralgias, back pain, gait problem and neck pain.   Skin: Negative for color change, pallor and rash.  "  Neurological: Negative for dizziness, tremors, weakness, light-headedness, numbness and headaches.   Psychiatric/Behavioral: Negative for agitation, behavioral problems, confusion and sleep disturbance.           PHYSICAL EXAM  Weight: 74.6 kg (164 lb 7.4 oz) /80 (BP Location: Right arm, Patient Position: Sitting)   Pulse 97   Temp 98.4 °F (36.9 °C) (Oral)   Ht 5' 2" (1.575 m)   Wt 74.6 kg (164 lb 7.4 oz)   LMP 03/26/2012   SpO2 96%   BMI 30.08 kg/m²     Height: 5' 2" (157.5 cm)     Physical Exam   Constitutional: She is oriented to person, place, and time. She appears well-developed and well-nourished. No distress.   HENT:   Head: Normocephalic and atraumatic.   Right Ear: External ear normal.   Left Ear: External ear normal.   Mouth/Throat: Oropharynx is clear and moist. No oropharyngeal exudate.   Eyes: Pupils are equal, round, and reactive to light. Conjunctivae and EOM are normal. Right eye exhibits no discharge. Left eye exhibits no discharge.   Neck: Normal range of motion. Neck supple. No tracheal deviation present. No thyromegaly present.   Cardiovascular: Normal rate, regular rhythm, normal heart sounds and intact distal pulses. Exam reveals no gallop and no friction rub.   No murmur heard.  Pulmonary/Chest: Effort normal and breath sounds normal. No respiratory distress. She has no wheezes. She has no rales. She exhibits no tenderness.   Abdominal: Soft. Bowel sounds are normal. She exhibits no distension. There is no tenderness. There is no guarding.   Musculoskeletal: Normal range of motion. She exhibits no edema, tenderness or deformity.   Neurological: She is alert and oriented to person, place, and time. She displays normal reflexes. No cranial nerve deficit. She exhibits normal muscle tone. Coordination normal.   Skin: Skin is warm and dry. Capillary refill takes less than 2 seconds. No rash noted. She is not diaphoretic. No erythema. No pallor.   Psychiatric: She has a normal mood " and affect. Her behavior is normal. Judgment normal.         Health Maintenance       Date Due Completion Date    TETANUS VACCINE 03/13/1985 ---    Lipid Panel 05/08/2017 5/8/2012    Mammogram 11/29/2019 (Originally 3/13/2007) ---    Colonoscopy 12/31/2019 (Originally 3/13/2017) ---    Influenza Vaccine 08/01/2019 11/2/2016        No visits with results within 1 Week(s) from this visit.   Latest known visit with results is:   Admission on 12/12/2019, Discharged on 12/13/2019   Component Date Value Ref Range Status    WBC 12/12/2019 6.22  3.90 - 12.70 K/uL Final    RBC 12/12/2019 4.15  4.00 - 5.40 M/uL Final    Hemoglobin 12/12/2019 11.7* 12.0 - 16.0 g/dL Final    Hematocrit 12/12/2019 36.2* 37.0 - 48.5 % Final    Mean Corpuscular Volume 12/12/2019 87  82 - 98 fL Final    Mean Corpuscular Hemoglobin 12/12/2019 28.2  27.0 - 31.0 pg Final    Mean Corpuscular Hemoglobin Conc 12/12/2019 32.3  32.0 - 36.0 g/dL Final    RDW 12/12/2019 13.2  11.5 - 14.5 % Final    Platelets 12/12/2019 319  150 - 350 K/uL Final    MPV 12/12/2019 9.4  9.2 - 12.9 fL Final    Gran # (ANC) 12/12/2019 3.3  1.8 - 7.7 K/uL Final    Lymph # 12/12/2019 2.4  1.0 - 4.8 K/uL Final    Mono # 12/12/2019 0.5  0.3 - 1.0 K/uL Final    Eos # 12/12/2019 0.1  0.0 - 0.5 K/uL Final    Baso # 12/12/2019 0.02  0.00 - 0.20 K/uL Final    Gran% 12/12/2019 52.4  38.0 - 73.0 % Final    Lymph% 12/12/2019 38.1  18.0 - 48.0 % Final    Mono% 12/12/2019 7.6  4.0 - 15.0 % Final    Eosinophil% 12/12/2019 1.6  0.0 - 8.0 % Final    Basophil% 12/12/2019 0.3  0.0 - 1.9 % Final    Differential Method 12/12/2019 Automated   Final    Sodium 12/12/2019 142  136 - 145 mmol/L Final    Potassium 12/12/2019 3.0* 3.5 - 5.1 mmol/L Final    Chloride 12/12/2019 108  95 - 110 mmol/L Final    CO2 12/12/2019 23  23 - 29 mmol/L Final    Glucose 12/12/2019 81  70 - 110 mg/dL Final    BUN, Bld 12/12/2019 10  6 - 20 mg/dL Final    Creatinine 12/12/2019 0.8  0.5 - 1.4  mg/dL Final    Calcium 12/12/2019 8.9  8.7 - 10.5 mg/dL Final    Total Protein 12/12/2019 7.1  6.0 - 8.4 g/dL Final    Albumin 12/12/2019 3.8  3.5 - 5.2 g/dL Final    Total Bilirubin 12/12/2019 0.2  0.1 - 1.0 mg/dL Final    Comment: For infants and newborns, interpretation of results should be based  on gestational age, weight and in agreement with clinical  observations.  Premature Infant recommended reference ranges:  Up to 24 hours.............<8.0 mg/dL  Up to 48 hours............<12.0 mg/dL  3-5 days..................<15.0 mg/dL  6-29 days.................<15.0 mg/dL      Alkaline Phosphatase 12/12/2019 72  55 - 135 U/L Final    AST 12/12/2019 16  10 - 40 U/L Final    ALT 12/12/2019 12  10 - 44 U/L Final    Anion Gap 12/12/2019 11  8 - 16 mmol/L Final    eGFR if African American 12/12/2019 >60  >60 mL/min/1.73 m^2 Final    eGFR if non African American 12/12/2019 >60  >60 mL/min/1.73 m^2 Final    Comment: Calculation used to obtain the estimated glomerular filtration  rate (eGFR) is the CKD-EPI equation.       Troponin I 12/12/2019 <0.006  0.000 - 0.026 ng/mL Final    Comment: The reference interval for Troponin I represents the 99th percentile   cutoff   for our facility and is consistent with 3rd generation assay   performance.      CPK 12/12/2019 40  20 - 180 U/L Final    CRP 12/12/2019 1.5  0.0 - 8.2 mg/L Final    Specimen UA 12/12/2019 Urine, Clean Catch   Final    Color, UA 12/12/2019 Yellow  Yellow, Straw, [place] Final    Appearance, UA 12/12/2019 Clear  Clear Final    pH, UA 12/12/2019 8.0  5.0 - 8.0 Final    Specific Gravity, UA 12/12/2019 1.015  1.005 - 1.030 Final    Protein, UA 12/12/2019 Negative  Negative Final    Comment: Recommend a 24 hour urine protein or a urine   protein/creatinine ratio if globulin induced proteinuria is  clinically suspected.      Glucose, UA 12/12/2019 Negative  Negative Final    Ketones, UA 12/12/2019 Negative  Negative Final    Bilirubin (UA)  12/12/2019 Negative  Negative Final    Occult Blood UA 12/12/2019 Negative  Negative Final    Nitrite, UA 12/12/2019 Negative  Negative Final    Urobilinogen, UA 12/12/2019 1.0  <2.0 EU/dL Final    Leukocytes, UA 12/12/2019 Negative  Negative Final    POC Preg Test, Ur 12/12/2019 Negative  Negative Final     Acceptable 12/12/2019 Yes   Final         Assessment & Plan    Lupus  -     Ambulatory referral/consult to General Surgery; Future; Expected date: 02/28/2020  - Consult for port placement.     Vitamin D deficiency  -     Vitamin D; Future; Expected date: 05/21/2020    Hypokalemia  -     Basic metabolic panel; Future; Expected date: 02/21/2020    Pruritis:   -     gabapentin (NEURONTIN) 400 MG capsule; Take 1 capsule (400 mg total) by mouth 3 (three) times daily.  Dispense: 90 capsule; Refill: 0  -     potassium chloride (KLOR-CON) 10 MEQ TbSR; Take 1 tablet (10 mEq total) by mouth 2 (two) times daily.  Dispense: 60 tablet; Refill: 0  - I suspect the low potassium is causing this.  Will treat and see if symptoms improve.           Follow-up: No follow-ups on file.

## 2020-02-24 ENCOUNTER — TELEPHONE (OUTPATIENT)
Dept: FAMILY MEDICINE | Facility: CLINIC | Age: 55
End: 2020-02-24

## 2020-02-25 ENCOUNTER — HOSPITAL ENCOUNTER (EMERGENCY)
Facility: HOSPITAL | Age: 55
Discharge: HOME | End: 2020-02-25
Attending: EMERGENCY MEDICINE
Payer: COMMERCIAL

## 2020-02-25 VITALS
HEIGHT: 62 IN | OXYGEN SATURATION: 98 % | SYSTOLIC BLOOD PRESSURE: 129 MMHG | WEIGHT: 158 LBS | RESPIRATION RATE: 20 BRPM | TEMPERATURE: 98 F | HEART RATE: 106 BPM | DIASTOLIC BLOOD PRESSURE: 83 MMHG | BODY MASS INDEX: 29.08 KG/M2

## 2020-02-25 DIAGNOSIS — K59.00 CONSTIPATION, UNSPECIFIED CONSTIPATION TYPE: Primary | ICD-10-CM

## 2020-02-25 DIAGNOSIS — R10.9 ABDOMINAL PAIN: ICD-10-CM

## 2020-02-25 PROCEDURE — 99284 EMERGENCY DEPT VISIT MOD MDM: CPT | Mod: 25,ER

## 2020-02-25 PROCEDURE — 63600175 PHARM REV CODE 636 W HCPCS: Mod: ER | Performed by: EMERGENCY MEDICINE

## 2020-02-25 PROCEDURE — 96372 THER/PROPH/DIAG INJ SC/IM: CPT | Mod: ER

## 2020-02-25 RX ORDER — KETOROLAC TROMETHAMINE 30 MG/ML
60 INJECTION, SOLUTION INTRAMUSCULAR; INTRAVENOUS
Status: COMPLETED | OUTPATIENT
Start: 2020-02-25 | End: 2020-02-25

## 2020-02-25 RX ORDER — DOCUSATE SODIUM 100 MG/1
100 CAPSULE, LIQUID FILLED ORAL 2 TIMES DAILY
Qty: 30 CAPSULE | Refills: 0 | Status: SHIPPED | OUTPATIENT
Start: 2020-02-25 | End: 2020-03-11

## 2020-02-25 RX ORDER — METOCLOPRAMIDE HYDROCHLORIDE 5 MG/ML
10 INJECTION INTRAMUSCULAR; INTRAVENOUS
Status: COMPLETED | OUTPATIENT
Start: 2020-02-25 | End: 2020-02-25

## 2020-02-25 RX ADMIN — METOCLOPRAMIDE 10 MG: 5 INJECTION, SOLUTION INTRAMUSCULAR; INTRAVENOUS at 10:02

## 2020-02-25 RX ADMIN — KETOROLAC TROMETHAMINE 60 MG: 60 INJECTION, SOLUTION INTRAMUSCULAR at 10:02

## 2020-02-26 ENCOUNTER — OFFICE VISIT (OUTPATIENT)
Dept: SURGERY | Facility: CLINIC | Age: 55
End: 2020-02-26
Payer: COMMERCIAL

## 2020-02-26 VITALS
TEMPERATURE: 97 F | BODY MASS INDEX: 30.73 KG/M2 | DIASTOLIC BLOOD PRESSURE: 86 MMHG | HEART RATE: 90 BPM | HEIGHT: 62 IN | WEIGHT: 167 LBS | SYSTOLIC BLOOD PRESSURE: 149 MMHG

## 2020-02-26 DIAGNOSIS — M32.9 LUPUS: ICD-10-CM

## 2020-02-26 PROCEDURE — 3008F PR BODY MASS INDEX (BMI) DOCUMENTED: ICD-10-PCS | Mod: CPTII,S$GLB,, | Performed by: STUDENT IN AN ORGANIZED HEALTH CARE EDUCATION/TRAINING PROGRAM

## 2020-02-26 PROCEDURE — 99999 PR PBB SHADOW E&M-EST. PATIENT-LVL III: CPT | Mod: PBBFAC,,, | Performed by: STUDENT IN AN ORGANIZED HEALTH CARE EDUCATION/TRAINING PROGRAM

## 2020-02-26 PROCEDURE — 99204 PR OFFICE/OUTPT VISIT, NEW, LEVL IV, 45-59 MIN: ICD-10-PCS | Mod: S$GLB,,, | Performed by: STUDENT IN AN ORGANIZED HEALTH CARE EDUCATION/TRAINING PROGRAM

## 2020-02-26 PROCEDURE — 99204 OFFICE O/P NEW MOD 45 MIN: CPT | Mod: S$GLB,,, | Performed by: STUDENT IN AN ORGANIZED HEALTH CARE EDUCATION/TRAINING PROGRAM

## 2020-02-26 PROCEDURE — 99999 PR PBB SHADOW E&M-EST. PATIENT-LVL III: ICD-10-PCS | Mod: PBBFAC,,, | Performed by: STUDENT IN AN ORGANIZED HEALTH CARE EDUCATION/TRAINING PROGRAM

## 2020-02-26 PROCEDURE — 3008F BODY MASS INDEX DOCD: CPT | Mod: CPTII,S$GLB,, | Performed by: STUDENT IN AN ORGANIZED HEALTH CARE EDUCATION/TRAINING PROGRAM

## 2020-02-26 NOTE — ED PROVIDER NOTES
Chief Complaint  Chief Complaint   Patient presents with    Constipation     Per  pt has not had a bowel movement in a week. Pt takes pain medication for fibromyalgia. Pt states that she has had some nausea. C/o bilateral lower quadrant abdominal pain.        HPI  Tosha Wilks is a 54 y.o. female who presents with severe abdominal pain and rectal pain.  Patient has not had a bowel movement approximately 1 week.  She reports familiar history with the current constipation even since she was a child.  She does take opiate pain patches and pain medicines and has for years related to her fibromyalgia and lupus.  This is controlled by a pain management doctor.  She did report some nausea and may be small vomiting.  Her main complaint is the rectal pressure and pain that she feels is severe and exacerbated by nothing and relieved by nothing.  She believes she is impacted and would like assistance removing the impaction.  Symptoms and pain began strongly today.    Past medical history  Past Medical History:   Diagnosis Date    Ankylosing spondylitis     Anxiety     Asthma     Chronic constipation     Chronic insomnia     Edema     Fibromyalgia     Interstitial cystitis     Lupus     Migraine headache     Restless legs syndrome     Shingles     Stroke 1998    post partum right sided numbness    TIA (transient ischemic attack) 1998       Current Medications  No current facility-administered medications for this encounter.     Current Outpatient Medications:     albuterol (PROAIR HFA) 90 mcg/actuation inhaler, Inhale 2 puffs into the lungs every 4 (four) hours as needed., Disp: 18 g, Rfl: 3    amitriptyline (ELAVIL) 25 MG tablet, TAKE 1 TABLET (25 MG TOTAL) BY MOUTH NIGHTLY AS NEEDED FOR INSOMNIA., Disp: 90 tablet, Rfl: 1    budesonide 180mcg (PULMICORT 180MCG) 180 mcg/actuation AePB, Inhale 2 puffs into the lungs once daily., Disp: 1 each, Rfl: 3    butalbital-acetaminophen-caffeine -40  mg (FIORICET, ESGIC) -40 mg per tablet, Take 1 tablet by mouth every 4 (four) hours as needed for Headaches., Disp: 15 tablet, Rfl: 0    cyanocobalamin 1,000 mcg/mL injection, Inject 1 mL (1,000 mcg total) into the muscle every 28 days., Disp: 10 mL, Rfl: 3    cyclobenzaprine (FLEXERIL) 5 MG tablet, TAKE 3 PILLS BY MOUTH (NIGHTLY), Disp: , Rfl: 6    docusate sodium (COLACE) 100 MG capsule, Take 1 capsule (100 mg total) by mouth 2 (two) times daily. for 15 days, Disp: 30 capsule, Rfl: 0    EPINEPHrine (EPIPEN 2-DENNIS) 0.3 mg/0.3 mL AtIn, Inject 0.3 mLs (0.3 mg total) into the muscle once. for 1 dose, Disp: 2 Device, Rfl: 3    ergocalciferol (ERGOCALCIFEROL) 50,000 unit Cap, TAKE 1 CAPSULE BY MOUTH ONE TIME PER WEEK, Disp: 12 capsule, Rfl: 0    fentaNYL (DURAGESIC) 50 mcg/hr, APPLY 1 PATCH TO SKIN EVERY 72 HOURS, Disp: , Rfl:     gabapentin (NEURONTIN) 400 MG capsule, Take 1 capsule (400 mg total) by mouth 3 (three) times daily., Disp: 90 capsule, Rfl: 0    HYDROmorphone (DILAUDID) 2 MG tablet, take 1 tablet by mouth every 8 hours as needed for pain, Disp: 90 tablet, Rfl: 0    hydroxychloroquine (PLAQUENIL) 200 mg tablet, Take 200 mg by mouth once daily., Disp: , Rfl: 1    hydrOXYzine pamoate (VISTARIL) 25 MG Cap, Take 25 mg by mouth every 4 (four) hours as needed. , Disp: , Rfl:     lidocaine HCl 2 % Crea, Apply to affected areas 4 times a day as needed., Disp: 118 mL, Rfl: 2    omeprazole (PRILOSEC) 20 MG capsule, Take 1 capsule (20 mg total) by mouth once daily. 1 Capsule, Delayed Release(E.C.) Oral Every day, Disp: 90 capsule, Rfl: 3    potassium chloride (KLOR-CON) 10 MEQ TbSR, Take 1 tablet (10 mEq total) by mouth 2 (two) times daily., Disp: 60 tablet, Rfl: 0    sumatriptan (IMITREX) 50 MG tablet, Once for severe headache. May repeat once after 2 hours. Do not exceed 3-4 doses in one week., Disp: 12 tablet, Rfl: 3    tiZANidine (ZANAFLEX) 4 MG tablet, Take 4 mg by mouth 3 (three) times daily.,  Disp: , Rfl: 3    topiramate (TOPAMAX) 100 MG tablet, Take 1 tablet (100 mg total) by mouth every evening. for 14 days, Disp: 90 tablet, Rfl: 3    traZODone (DESYREL) 100 MG tablet, Take 1 tablet (100 mg total) by mouth every evening., Disp: 30 tablet, Rfl: 11    venlafaxine (EFFEXOR-XR) 150 MG Cp24, Take 1 capsule (150 mg total) by mouth once daily., Disp: 30 capsule, Rfl: 3    venlafaxine (EFFEXOR-XR) 37.5 MG 24 hr capsule, Take 1 capsule (37.5 mg total) by mouth once daily., Disp: 7 capsule, Rfl: 0    venlafaxine (EFFEXOR-XR) 75 MG 24 hr capsule, Take 1 capsule (75 mg total) by mouth once daily., Disp: 7 capsule, Rfl: 0    Allergies  Review of patient's allergies indicates:   Allergen Reactions    Morphine Anxiety     Hives     Other Other (See Comments)     Allergy is carrots Other reaction(s): Angioedema, carrots    Erythromycin Other (See Comments)     Hives and cramps     Zofran (as hydrochloride) [ondansetron hcl] Hives     Local hive after IV injection       Surgical history  Past Surgical History:   Procedure Laterality Date    BLADDER SURGERY      BREAST SURGERY  2018    reduction     SECTION      CHOLECYSTECTOMY      HYSTERECTOMY      LASER ABLATION      to back    SLEEVE GASTROPLASTY  2016    TUBAL LIGATION         Social history  Social History     Socioeconomic History    Marital status:      Spouse name: Not on file    Number of children: Not on file    Years of education: Not on file    Highest education level: Not on file   Occupational History    Not on file   Social Needs    Financial resource strain: Not hard at all    Food insecurity:     Worry: Never true     Inability: Never true    Transportation needs:     Medical: No     Non-medical: No   Tobacco Use    Smoking status: Never Smoker    Smokeless tobacco: Never Used   Substance and Sexual Activity    Alcohol use: Yes     Frequency: Monthly or less     Drinks per session: 1 or 2     Binge  "frequency: Never     Comment: occasionally    Drug use: No    Sexual activity: Yes     Partners: Male   Lifestyle    Physical activity:     Days per week: 2 days     Minutes per session: 20 min    Stress: To some extent   Relationships    Social connections:     Talks on phone: Three times a week     Gets together: Once a week     Attends Buddhism service: Not on file     Active member of club or organization: Yes     Attends meetings of clubs or organizations: Never     Relationship status:    Other Topics Concern    Not on file   Social History Narrative    Not on file       Family History  Family History   Problem Relation Age of Onset    Hypertension Mother     Hypertension Brother     Breast cancer Maternal Aunt         x2     Heart attack Father     Hypertension Sister     Lupus Sister        Review of systems  Musculoskeletal: No injury; full range of motion.  Skin: No rash, abscess, or laceration.  Neurologic: No new focal weakness or sensory changes.  All systems otherwise negative except as noted in ROS and HPI    Physical Exam  Vital signs: /83   Pulse 106   Temp 98 °F (36.7 °C) (Oral)   Resp 20   Ht 5' 2" (1.575 m)   Wt 71.7 kg (158 lb)   LMP 03/26/2012   SpO2 98%   BMI 28.90 kg/m²   Constitutional:  In severe distress secondary to pain.  Well developed, alert, crying in pain.  HENT: Normocephalic, atraumatic. Normal ear, nose, and throat.  Eyes: PERRL, EOMI, normal conjunctiva.  Neck: Normal range of motion, no tenderness; supple.  Respiratory: Nonlabored breathing with normal breath sounds.  Cardiovascular:  Tachycardic  GI: Soft, nontender, no rebound or guarding.  Rectal:  Large firm impaction, broken up with digital rectal examination but not removed by me.  Musculoskeletal: Normal ROM, no tenderness, injury, or edema.  Skin: Warm, dry skin without infection or injury.  Neurologic: Normal motor, sensation with no new focal deficit.  Psychiatric: Affect normal, " judgement normal, mood normal.  No SI, HI, and not gravely disabled.    Labs  Pertinent labs reviewed (see chart for details)  Labs Reviewed - No data to display    ECG  Results for orders placed or performed during the hospital encounter of 09/25/19   EKG 12-lead    Collection Time: 09/25/19  1:37 PM    Narrative    Test Reason : R00.2,    Vent. Rate : 073 BPM     Atrial Rate : 073 BPM     P-R Int : 178 ms          QRS Dur : 082 ms      QT Int : 398 ms       P-R-T Axes : 071 -02 049 degrees     QTc Int : 438 ms    Normal sinus rhythm  Low voltage QRS in the precordial leads  Otherwise normal ECG  When compared with ECG of 28-DEC-2018 18:26,  Vent. rate has decreased BY  39 BPM    Confirmed by Janice Johnson MD ([id number]) on 9/26/2019 4:39:45 PM    Referred By: ESTELITAERR   SELF           Confirmed By:Janice Johnson MD     ECG interpreted by ED MD    Radiology  X-Ray Abdomen Flat And Erect   Final Result      Stool-filled colon/constipation.         Electronically signed by: Zackery Christopher MD   Date:    02/25/2020   Time:    22:58          Procedures  Procedures    Medications   ketorolac injection 60 mg (60 mg Intramuscular Given 2/25/20 2235)   metoclopramide HCl injection 10 mg (10 mg Intramuscular Given 2/25/20 2236)       ED course and medical decision making         After digital rectal examination and breaking up the fecal impaction, the patient went to the restroom and had a large in successful bowel movement.  Upon return to the room, the patient's symptoms had resolved completely and she was smiling and laughing and very thankful.  She will follow up with her primary doctor and they will continue tips and tricks to prevent this in the future including increasing fiber as well as stool softeners and drinking more water and avoiding dehydrating substances and consideration of alternate therapies besides opiates with her pain management doctors and other primary doctors    Disposition    Patient discharged in stable  condition      Final impression  1. Constipation, unspecified constipation type    2. Abdominal pain        Critical care time spent with this patient was 0 minutes excluding the procedure time.          Edgardo Tabor MD  02/25/20 3121

## 2020-02-26 NOTE — DISCHARGE INSTRUCTIONS
Drink plenty of plain water and avoid caffeine and alcohol.  Please return to the ED immediately if symptoms return, change or worsen in any way.  Please call your primary doctor today for reevaluation appointment.

## 2020-02-26 NOTE — ED NOTES
At bedside for rectal exam with MD. Per MD, fecal impaction noted. Pt did not tolerated attempted disimpaction well, but states that she will attempt to utilize restroom at this time prior to continuation of disimpaction.

## 2020-02-26 NOTE — LETTER
February 28, 2020      Jeannie Gibson, DO  [address] [address]           [facility]  [address], [address]  [facility] [address]  Phone: [phone]          Patient: Tosha Wilks   MR Number: [id number]   YOB: 1965   Date of Visit: 2/26/2020       Dear Dr. Jaennie Gibson:    Thank you for referring Tosha Wilks to me for evaluation. Attached you will find relevant portions of my assessment and plan of care.    If you have questions, please do not hesitate to call me. I look forward to following Tosha Wilks along with you.    Sincerely,    Deandre Hyatt MD    Enclosure  CC:  No Recipients    If you would like to receive this communication electronically, please contact [email or web address] or [phone] to request more information on [employer] Link access.    For providers and/or their staff who would like to refer a patient to [facility], please contact us through our one-stop-shop provider referral line, [facility], at [phone].    If you feel you have received this communication in error or would no longer like to receive these types of communications, please e-mail [email or web address]

## 2020-02-28 NOTE — PROGRESS NOTES
Patient ID: Tosha Wilks is a 54 y.o. female.    Chief Complaint: No chief complaint on file.      HPI:  54F presents here for consultation for mediport placement. Hx of lupus and anemia of uncertain origin. Sees outside hematology. Gets frequent blood draws to follow response to medications? Adverse effects? Has gotten iron infusions in the past but does not seem very frequent. Never had colonosocpy but recent FOBT negative. No family hx of cancer. She reports that she has always been difficult to stick whether starting IVs or drawing blood and the nurses have a hard time everytime she comes in.       Review of Systems   Constitutional: Negative for fever.   HENT: Negative for trouble swallowing.    Respiratory: Negative for shortness of breath.    Cardiovascular: Negative for chest pain.   Gastrointestinal: Negative for abdominal pain, blood in stool, nausea and vomiting.   Genitourinary: Negative for dysuria.   Musculoskeletal: Positive for joint swelling and myalgias. Negative for gait problem.   Skin: Negative for rash and wound.   Allergic/Immunologic: Negative for immunocompromised state.   Neurological: Negative for weakness.   Hematological: Does not bruise/bleed easily.   Psychiatric/Behavioral: Negative for agitation.       Current Outpatient Medications   Medication Sig Dispense Refill    albuterol (PROAIR HFA) 90 mcg/actuation inhaler Inhale 2 puffs into the lungs every 4 (four) hours as needed. 18 g 3    amitriptyline (ELAVIL) 25 MG tablet TAKE 1 TABLET (25 MG TOTAL) BY MOUTH NIGHTLY AS NEEDED FOR INSOMNIA. 90 tablet 1    budesonide 180mcg (PULMICORT 180MCG) 180 mcg/actuation AePB Inhale 2 puffs into the lungs once daily. 1 each 3    cyanocobalamin 1,000 mcg/mL injection Inject 1 mL (1,000 mcg total) into the muscle every 28 days. 10 mL 3    docusate sodium (COLACE) 100 MG capsule Take 1 capsule (100 mg total) by mouth 2 (two) times daily. for 15 days 30 capsule 0    ergocalciferol  (ERGOCALCIFEROL) 50,000 unit Cap TAKE 1 CAPSULE BY MOUTH ONE TIME PER WEEK 12 capsule 0    fentaNYL (DURAGESIC) 50 mcg/hr APPLY 1 PATCH TO SKIN EVERY 72 HOURS      gabapentin (NEURONTIN) 400 MG capsule Take 1 capsule (400 mg total) by mouth 3 (three) times daily. 90 capsule 0    HYDROmorphone (DILAUDID) 2 MG tablet take 1 tablet by mouth every 8 hours as needed for pain 90 tablet 0    hydroxychloroquine (PLAQUENIL) 200 mg tablet Take 200 mg by mouth once daily.  1    hydrOXYzine pamoate (VISTARIL) 25 MG Cap Take 25 mg by mouth every 4 (four) hours as needed.       lidocaine HCl 2 % Crea Apply to affected areas 4 times a day as needed. 118 mL 2    omeprazole (PRILOSEC) 20 MG capsule Take 1 capsule (20 mg total) by mouth once daily. 1 Capsule, Delayed Release(E.C.) Oral Every day 90 capsule 3    potassium chloride (KLOR-CON) 10 MEQ TbSR Take 1 tablet (10 mEq total) by mouth 2 (two) times daily. 60 tablet 0    sumatriptan (IMITREX) 50 MG tablet Once for severe headache. May repeat once after 2 hours. Do not exceed 3-4 doses in one week. 12 tablet 3    tiZANidine (ZANAFLEX) 4 MG tablet Take 4 mg by mouth 3 (three) times daily.  3    topiramate (TOPAMAX) 100 MG tablet Take 1 tablet (100 mg total) by mouth every evening. for 14 days 90 tablet 3    traZODone (DESYREL) 100 MG tablet Take 1 tablet (100 mg total) by mouth every evening. 30 tablet 11    venlafaxine (EFFEXOR-XR) 150 MG Cp24 Take 1 capsule (150 mg total) by mouth once daily. 30 capsule 3    cyclobenzaprine (FLEXERIL) 5 MG tablet TAKE 3 PILLS BY MOUTH (NIGHTLY)  6    EPINEPHrine (EPIPEN 2-DENNIS) 0.3 mg/0.3 mL AtIn Inject 0.3 mLs (0.3 mg total) into the muscle once. for 1 dose 2 Device 3    venlafaxine (EFFEXOR-XR) 37.5 MG 24 hr capsule Take 1 capsule (37.5 mg total) by mouth once daily. (Patient not taking: Reported on 2/26/2020) 7 capsule 0    venlafaxine (EFFEXOR-XR) 75 MG 24 hr capsule Take 1 capsule (75 mg total) by mouth once daily. (Patient  not taking: Reported on 2020) 7 capsule 0     No current facility-administered medications for this visit.        Review of patient's allergies indicates:   Allergen Reactions    Morphine Anxiety     Hives     Other Other (See Comments)     Allergy is carrots Other reaction(s): Angioedema, carrots    Erythromycin Other (See Comments)     Hives and cramps     Zofran (as hydrochloride) [ondansetron hcl] Hives     Local hive after IV injection       Past Medical History:   Diagnosis Date    Ankylosing spondylitis     Anxiety     Asthma     Chronic constipation     Chronic insomnia     Edema     Fibromyalgia     Interstitial cystitis     Lupus     Migraine headache     Restless legs syndrome     Shingles     Stroke     post partum right sided numbness    TIA (transient ischemic attack)        Past Surgical History:   Procedure Laterality Date    BLADDER SURGERY      BREAST SURGERY  2018    reduction     SECTION      CHOLECYSTECTOMY      HYSTERECTOMY      LASER ABLATION      to back    SLEEVE GASTROPLASTY  2016    TUBAL LIGATION         Family History   Problem Relation Age of Onset    Hypertension Mother     Hypertension Brother     Breast cancer Maternal Aunt         x2     Heart attack Father     Hypertension Sister     Lupus Sister        Social History     Socioeconomic History    Marital status:      Spouse name: Not on file    Number of children: Not on file    Years of education: Not on file    Highest education level: Not on file   Occupational History    Not on file   Social Needs    Financial resource strain: Not hard at all    Food insecurity:     Worry: Never true     Inability: Never true    Transportation needs:     Medical: No     Non-medical: No   Tobacco Use    Smoking status: Never Smoker    Smokeless tobacco: Never Used   Substance and Sexual Activity    Alcohol use: Yes     Frequency: Monthly or less     Drinks per session: 1  or 2     Binge frequency: Never     Comment: occasionally    Drug use: No    Sexual activity: Yes     Partners: Male   Lifestyle    Physical activity:     Days per week: 2 days     Minutes per session: 20 min    Stress: To some extent   Relationships    Social connections:     Talks on phone: Three times a week     Gets together: Once a week     Attends Yarsani service: Not on file     Active member of club or organization: Yes     Attends meetings of clubs or organizations: Never     Relationship status:    Other Topics Concern    Not on file   Social History Narrative    Not on file       Vitals:    02/26/20 1551   BP: (!) 149/86   Pulse: 90   Temp: 97 °F (36.1 °C)       Physical Exam   Constitutional: She is oriented to person, place, and time. She appears well-developed and well-nourished. No distress.   HENT:   Head: Normocephalic and atraumatic.   Eyes: No scleral icterus.   Cardiovascular: Normal rate.   Pulmonary/Chest: Effort normal. No stridor.   Abdominal: Soft. She exhibits no distension. There is no tenderness.   Lymphadenopathy:     She has no cervical adenopathy.   Neurological: She is alert and oriented to person, place, and time.   Skin: Skin is warm. No erythema.   Psychiatric: She has a normal mood and affect. Her behavior is normal.     CBC normal. h as been mildly microcystic anemic in the past, normal platelets      Assessment & Plan:   54F with chronic pain from fibromyalgia, lupus presents requesting mediport  Not sure how well a mediport will help with frequent blood draws. Will discuss with pcp

## 2020-03-03 DIAGNOSIS — N95.1 MENOPAUSAL SYNDROME (HOT FLASHES): ICD-10-CM

## 2020-03-03 RX ORDER — VENLAFAXINE HYDROCHLORIDE 150 MG/1
150 CAPSULE, EXTENDED RELEASE ORAL DAILY
Qty: 90 CAPSULE | Refills: 1 | Status: SHIPPED | OUTPATIENT
Start: 2020-03-03 | End: 2020-09-01

## 2020-03-06 ENCOUNTER — PATIENT MESSAGE (OUTPATIENT)
Dept: CARDIOLOGY | Facility: CLINIC | Age: 55
End: 2020-03-06

## 2020-03-17 RX ORDER — POTASSIUM CHLORIDE 750 MG/1
TABLET, FILM COATED, EXTENDED RELEASE ORAL
Qty: 60 TABLET | Refills: 0 | Status: SHIPPED | OUTPATIENT
Start: 2020-03-17 | End: 2020-04-01 | Stop reason: SDUPTHER

## 2020-03-17 RX ORDER — GABAPENTIN 400 MG/1
400 CAPSULE ORAL 3 TIMES DAILY
Qty: 90 CAPSULE | Refills: 0 | Status: SHIPPED | OUTPATIENT
Start: 2020-03-17 | End: 2021-03-10 | Stop reason: ALTCHOICE

## 2020-03-23 ENCOUNTER — PATIENT MESSAGE (OUTPATIENT)
Dept: FAMILY MEDICINE | Facility: CLINIC | Age: 55
End: 2020-03-23

## 2020-03-25 ENCOUNTER — OFFICE VISIT (OUTPATIENT)
Dept: FAMILY MEDICINE | Facility: CLINIC | Age: 55
End: 2020-03-25
Payer: COMMERCIAL

## 2020-03-25 DIAGNOSIS — R50.9 FEVER, UNSPECIFIED FEVER CAUSE: Primary | ICD-10-CM

## 2020-03-25 PROCEDURE — 99213 PR OFFICE/OUTPT VISIT, EST, LEVL III, 20-29 MIN: ICD-10-PCS | Mod: 95,,, | Performed by: FAMILY MEDICINE

## 2020-03-25 PROCEDURE — 99213 OFFICE O/P EST LOW 20 MIN: CPT | Mod: 95,,, | Performed by: FAMILY MEDICINE

## 2020-03-25 NOTE — PROGRESS NOTES
The patient location is: home  The chief complaint leading to consultation is: fever and uri symptoms  Visit type: Virtual visit with synchronous audio and video  Total time spent with patient: 10 min  Each patient to whom he or she provides medical services by telemedicine is:  (1) informed of the relationship between the physician and patient and the respective role of any other health care provider with respect to management of the patient; and (2) notified that he or she may decline to receive medical services by telemedicine and may withdraw from such care at any time.    Notes:       Subjective:       Patient ID: Tosha Wilks is a 55 y.o. female.    Chief Complaint: fever and uri symptoms.   Fever    This is a new problem. The current episode started in the past 7 days. The problem occurs intermittently. The problem has been gradually improving. The maximum temperature noted was 101 to 101.9 F. The temperature was taken using an oral thermometer. Associated symptoms include congestion, coughing and a sore throat. Pertinent negatives include no abdominal pain, chest pain, diarrhea, ear pain, headaches, muscle aches, nausea, rash, sleepiness, urinary pain, vomiting or wheezing. She has tried nothing for the symptoms. The treatment provided mild relief.   Risk factors comment:   was sent home from work with URI symptoms.  Not tested for COVID because he had no fever.     Past Medical History:   Diagnosis Date    Ankylosing spondylitis     Anxiety     Asthma     Chronic constipation     Chronic insomnia     Edema     Fibromyalgia     Interstitial cystitis     Lupus     Migraine headache     Restless legs syndrome     Shingles     Stroke 1998    post partum right sided numbness    TIA (transient ischemic attack) 1998     Social History     Socioeconomic History    Marital status:      Spouse name: Not on file    Number of children: Not on file    Years of education: Not on file     Highest education level: Not on file   Occupational History    Not on file   Social Needs    Financial resource strain: Not hard at all    Food insecurity:     Worry: Never true     Inability: Never true    Transportation needs:     Medical: No     Non-medical: No   Tobacco Use    Smoking status: Never Smoker    Smokeless tobacco: Never Used   Substance and Sexual Activity    Alcohol use: Yes     Frequency: Monthly or less     Drinks per session: 1 or 2     Binge frequency: Never     Comment: occasionally    Drug use: No    Sexual activity: Yes     Partners: Male   Lifestyle    Physical activity:     Days per week: 2 days     Minutes per session: 20 min    Stress: To some extent   Relationships    Social connections:     Talks on phone: Three times a week     Gets together: Once a week     Attends Jewish service: Not on file     Active member of club or organization: Yes     Attends meetings of clubs or organizations: Never     Relationship status:    Other Topics Concern    Not on file   Social History Narrative    Not on file       Review of Systems   Constitutional: Positive for fever. Negative for fatigue.   HENT: Positive for congestion and sore throat. Negative for ear pain, postnasal drip, rhinorrhea, sinus pressure and voice change.    Eyes: Negative for discharge.   Respiratory: Positive for cough. Negative for chest tightness, shortness of breath and wheezing.    Cardiovascular: Negative for chest pain.   Gastrointestinal: Negative for abdominal pain, diarrhea, nausea and vomiting.   Genitourinary: Negative for dysuria.   Skin: Negative for rash.   Neurological: Negative for headaches.       Objective:      Physical Exam    Assessment:       1. Fever, unspecified fever cause        Plan:       Fever, unspecified fever cause  -     SARS- CoV-2 (COVID-19) QUALITATIVE PCR  - I counseled the patient on general home care guidelines for cough and congestion including increasing fluid  intake, getting plenty of rest and use of OTC cough and cold medications.  I recommended guafenesin for congestion and dextromethorphan as directed for cough.  A brand like Mucinex DM is recommended.  Avoidance of decongestants is recommended for patients with heart problems and hypertension.  Extra vitamin C may also benefit.  Return to clinic if symptoms last longer than 10 days or sooner if worsen with symptoms like fever > 100.4, severe sinus pain or headache, thick yellow nasal discharge or sputum, dehydration or lethargy.

## 2020-03-26 ENCOUNTER — CLINICAL SUPPORT (OUTPATIENT)
Dept: INTERNAL MEDICINE | Facility: CLINIC | Age: 55
End: 2020-03-26
Payer: COMMERCIAL

## 2020-03-26 ENCOUNTER — PATIENT MESSAGE (OUTPATIENT)
Dept: FAMILY MEDICINE | Facility: CLINIC | Age: 55
End: 2020-03-26

## 2020-03-26 ENCOUNTER — DOCUMENTATION ONLY (OUTPATIENT)
Dept: REHABILITATION | Facility: HOSPITAL | Age: 55
End: 2020-03-26

## 2020-03-26 DIAGNOSIS — R50.9 FEVER, UNSPECIFIED FEVER CAUSE: Primary | ICD-10-CM

## 2020-03-26 PROBLEM — M62.81 MUSCLE WEAKNESS: Status: RESOLVED | Noted: 2019-10-31 | Resolved: 2020-03-26

## 2020-03-26 PROBLEM — M25.649 JOINT STIFFNESS OF HAND, UNSPECIFIED LATERALITY: Status: RESOLVED | Noted: 2019-10-31 | Resolved: 2020-03-26

## 2020-03-26 PROBLEM — M79.641 PAIN IN BOTH HANDS: Status: RESOLVED | Noted: 2019-10-31 | Resolved: 2020-03-26

## 2020-03-26 PROBLEM — M79.642 PAIN IN BOTH HANDS: Status: RESOLVED | Noted: 2019-10-31 | Resolved: 2020-03-26

## 2020-03-26 PROCEDURE — U0002 COVID-19 LAB TEST NON-CDC: HCPCS

## 2020-03-26 NOTE — PROGRESS NOTES
Outpatient Therapy Discharge Summary     Name: Tosha Wilks  [facility] Number: [id number]    Therapy Diagnosis:        Encounter Diagnoses   Name Primary?    Pain in both hands      Joint stiffness of hand, unspecified laterality      Muscle weakness        Physician: Dion Mojica Jr., *     Physician Orders: Eval & treat  Medical Diagnosis: M79.7 (ICD-10-CM) - Fibromyalgia M75.42   M79.641,M79.642 (ICD-10-CM) - Pain in both hands  Surgical Procedure and Date: n/a  Evaluation Date: 10/31/19    Date of Last visit: 1/23/2020  Total Visits Received: 4  Cancelled Visits: 4  No Show Visits: 1    Assessment      Pt did not return to therapy after 4 visits.    Goals: not met    Discharge reason: Patient has not attended therapy since 1/23/2020    Plan   This patient is discharged from Occupational Therapy    ETIENNE Lockhart

## 2020-03-28 LAB — SARS-COV-2 RNA RESP QL NAA+PROBE: NOT DETECTED

## 2020-03-31 ENCOUNTER — PATIENT MESSAGE (OUTPATIENT)
Dept: FAMILY MEDICINE | Facility: CLINIC | Age: 55
End: 2020-03-31

## 2020-04-01 RX ORDER — POTASSIUM CHLORIDE 750 MG/1
10 TABLET, EXTENDED RELEASE ORAL 2 TIMES DAILY
Qty: 180 TABLET | Refills: 3 | Status: SHIPPED | OUTPATIENT
Start: 2020-04-01 | End: 2020-04-06 | Stop reason: SDUPTHER

## 2020-04-07 RX ORDER — POTASSIUM CHLORIDE 750 MG/1
10 TABLET, EXTENDED RELEASE ORAL 2 TIMES DAILY
Qty: 180 TABLET | Refills: 3 | Status: SHIPPED | OUTPATIENT
Start: 2020-04-07 | End: 2020-09-06

## 2020-04-18 DIAGNOSIS — E55.9 VITAMIN D DEFICIENCY: ICD-10-CM

## 2020-04-21 RX ORDER — ERGOCALCIFEROL 1.25 MG/1
CAPSULE ORAL
Qty: 12 CAPSULE | Refills: 0 | Status: SHIPPED | OUTPATIENT
Start: 2020-04-21 | End: 2020-07-09

## 2020-05-28 ENCOUNTER — PATIENT MESSAGE (OUTPATIENT)
Dept: FAMILY MEDICINE | Facility: CLINIC | Age: 55
End: 2020-05-28

## 2020-05-29 ENCOUNTER — OFFICE VISIT (OUTPATIENT)
Dept: FAMILY MEDICINE | Facility: CLINIC | Age: 55
End: 2020-05-29
Payer: COMMERCIAL

## 2020-05-29 VITALS
DIASTOLIC BLOOD PRESSURE: 62 MMHG | BODY MASS INDEX: 31.74 KG/M2 | SYSTOLIC BLOOD PRESSURE: 114 MMHG | TEMPERATURE: 99 F | HEART RATE: 107 BPM | WEIGHT: 172.5 LBS | HEIGHT: 62 IN | OXYGEN SATURATION: 98 %

## 2020-05-29 DIAGNOSIS — R59.1 LYMPHADENOPATHY: Primary | ICD-10-CM

## 2020-05-29 PROCEDURE — 99213 PR OFFICE/OUTPT VISIT, EST, LEVL III, 20-29 MIN: ICD-10-PCS | Mod: S$GLB,,, | Performed by: FAMILY MEDICINE

## 2020-05-29 PROCEDURE — 99213 OFFICE O/P EST LOW 20 MIN: CPT | Mod: S$GLB,,, | Performed by: FAMILY MEDICINE

## 2020-05-29 PROCEDURE — 3008F BODY MASS INDEX DOCD: CPT | Mod: CPTII,S$GLB,, | Performed by: FAMILY MEDICINE

## 2020-05-29 PROCEDURE — 3008F PR BODY MASS INDEX (BMI) DOCUMENTED: ICD-10-PCS | Mod: CPTII,S$GLB,, | Performed by: FAMILY MEDICINE

## 2020-05-29 RX ORDER — AMOXICILLIN 875 MG/1
875 TABLET, FILM COATED ORAL EVERY 12 HOURS
Qty: 14 TABLET | Refills: 0 | Status: SHIPPED | OUTPATIENT
Start: 2020-05-29 | End: 2020-09-06

## 2020-05-29 RX ORDER — EPINEPHRINE 0.3 MG/.3ML
1 INJECTION SUBCUTANEOUS ONCE
Qty: 2 DEVICE | Refills: 3 | Status: SHIPPED | OUTPATIENT
Start: 2020-05-29 | End: 2022-03-22 | Stop reason: SDUPTHER

## 2020-05-29 NOTE — PROGRESS NOTES
Chief Complaint  Chief Complaint   Patient presents with    Adenopathy       HPI  Tosha Wilks is a 55 y.o. female with multiple medical diagnoses as listed in the medical history and problem list that presents for a tender lump on her left jaw.  Has been present for 1 week.  Lump feels smaller, but is more tender.  No fevers.  Some post nasal drip and cough.  No SOB, rhinitis or congestion.       PAST MEDICAL HISTORY:  Past Medical History:   Diagnosis Date    Ankylosing spondylitis     Anxiety     Asthma     Chronic constipation     Chronic insomnia     Edema     Fibromyalgia     Interstitial cystitis     Lupus     Migraine headache     Restless legs syndrome     Shingles     Stroke     post partum right sided numbness    TIA (transient ischemic attack)        PAST SURGICAL HISTORY:  Past Surgical History:   Procedure Laterality Date    BLADDER SURGERY      BREAST SURGERY  2018    reduction     SECTION      CHOLECYSTECTOMY      HYSTERECTOMY      LASER ABLATION      to back    SLEEVE GASTROPLASTY  2016    TUBAL LIGATION         SOCIAL HISTORY:  Social History     Socioeconomic History    Marital status:      Spouse name: Not on file    Number of children: Not on file    Years of education: Not on file    Highest education level: Not on file   Occupational History    Not on file   Social Needs    Financial resource strain: Not hard at all    Food insecurity:     Worry: Never true     Inability: Never true    Transportation needs:     Medical: No     Non-medical: No   Tobacco Use    Smoking status: Never Smoker    Smokeless tobacco: Never Used   Substance and Sexual Activity    Alcohol use: Yes     Frequency: Monthly or less     Drinks per session: 1 or 2     Binge frequency: Never     Comment: occasionally    Drug use: No    Sexual activity: Yes     Partners: Male   Lifestyle    Physical activity:     Days per week: 2 days     Minutes per session:  20 min    Stress: To some extent   Relationships    Social connections:     Talks on phone: Three times a week     Gets together: Once a week     Attends [employer] service: Not on file     Active member of club or organization: Yes     Attends meetings of clubs or organizations: Never     Relationship status:    Other Topics Concern    Not on file   Social History Narrative    Not on file       FAMILY HISTORY:  Family History   Problem Relation Age of Onset    Hypertension Mother     Hypertension Brother     Breast cancer Maternal Aunt         x2     Heart attack Father     Hypertension Sister     Lupus Sister        ALLERGIES AND MEDICATIONS: updated and reviewed.  Review of patient's allergies indicates:   Allergen Reactions    Morphine Anxiety     Hives     Other Other (See Comments)     Allergy is carrots Other reaction(s): Angioedema, carrots    Erythromycin Other (See Comments)     Hives and cramps     Zofran (as hydrochloride) [ondansetron hcl] Hives     Local hive after IV injection     Current Outpatient Medications   Medication Sig Dispense Refill    albuterol (PROAIR HFA) 90 mcg/actuation inhaler Inhale 2 puffs into the lungs every 4 (four) hours as needed. 18 g 3    amitriptyline (ELAVIL) 25 MG tablet TAKE 1 TABLET (25 MG TOTAL) BY MOUTH NIGHTLY AS NEEDED FOR INSOMNIA. 90 tablet 1    budesonide 180mcg (PULMICORT 180MCG) 180 mcg/actuation AePB Inhale 2 puffs into the lungs once daily. 1 each 3    cyanocobalamin 1,000 mcg/mL injection Inject 1 mL (1,000 mcg total) into the muscle every 28 days. 10 mL 3    EPINEPHrine (EPIPEN 2-DENNIS) 0.3 mg/0.3 mL AtIn Inject 0.3 mLs (0.3 mg total) into the muscle once. for 1 dose 2 Device 3    gabapentin (NEURONTIN) 400 MG capsule TAKE 1 CAPSULE (400 MG TOTAL) BY MOUTH 3 (THREE) TIMES DAILY. 90 capsule 0    HYDROmorphone (DILAUDID) 2 MG tablet take 1 tablet by mouth every 8 hours as needed for pain 90 tablet 0    hydroxychloroquine (PLAQUENIL)  200 mg tablet Take 200 mg by mouth once daily.  1    hydrOXYzine pamoate (VISTARIL) 25 MG Cap Take 25 mg by mouth every 4 (four) hours as needed.       lidocaine HCl 2 % Crea Apply to affected areas 4 times a day as needed. 118 mL 2    omeprazole (PRILOSEC) 20 MG capsule Take 1 capsule (20 mg total) by mouth once daily. 1 Capsule, Delayed Release(E.C.) Oral Every day 90 capsule 3    potassium chloride (KLOR-CON 10) 10 MEQ TbSR Take 1 tablet (10 mEq total) by mouth 2 (two) times daily. 180 tablet 3    sumatriptan (IMITREX) 50 MG tablet Once for severe headache. May repeat once after 2 hours. Do not exceed 3-4 doses in one week. 12 tablet 3    tiZANidine (ZANAFLEX) 4 MG tablet Take 4 mg by mouth 3 (three) times daily.  3    topiramate (TOPAMAX) 100 MG tablet Take 1 tablet (100 mg total) by mouth every evening. for 14 days 90 tablet 3    traZODone (DESYREL) 100 MG tablet Take 1 tablet (100 mg total) by mouth every evening. 30 tablet 11    venlafaxine (EFFEXOR-XR) 150 MG Cp24 TAKE 1 CAPSULE (150 MG TOTAL) BY MOUTH ONCE DAILY. 90 capsule 1    VITAMIN D2 1,250 mcg (50,000 unit) capsule TAKE 1 CAPSULE BY MOUTH ONCE A WEEK 12 capsule 0    amoxicillin (AMOXIL) 875 MG tablet Take 1 tablet (875 mg total) by mouth every 12 (twelve) hours. 14 tablet 0    cyclobenzaprine (FLEXERIL) 5 MG tablet TAKE 3 PILLS BY MOUTH (NIGHTLY)  6    fentaNYL (DURAGESIC) 50 mcg/hr APPLY 1 PATCH TO SKIN EVERY 72 HOURS      HYDROmorphone (DILAUDID) 2 MG tablet Take 1 tablet (2 mg total) by mouth every 8 (eight) hours as needed for pain. 90 tablet 0    venlafaxine (EFFEXOR-XR) 37.5 MG 24 hr capsule Take 1 capsule (37.5 mg total) by mouth once daily. (Patient not taking: Reported on 2/26/2020) 7 capsule 0    venlafaxine (EFFEXOR-XR) 75 MG 24 hr capsule Take 1 capsule (75 mg total) by mouth once daily. (Patient not taking: Reported on 2/26/2020) 7 capsule 0     No current facility-administered medications for this visit.   "        ROS  Review of Systems   Constitutional: Positive for activity change. Negative for unexpected weight change.   HENT: Positive for trouble swallowing. Negative for hearing loss and rhinorrhea.    Eyes: Negative for discharge and visual disturbance.   Respiratory: Negative for chest tightness and wheezing.    Cardiovascular: Negative for chest pain and palpitations.   Gastrointestinal: Positive for constipation. Negative for blood in stool, diarrhea and vomiting.   Endocrine: Negative for polydipsia and polyuria.   Genitourinary: Negative for difficulty urinating, dysuria, hematuria and menstrual problem.   Musculoskeletal: Positive for arthralgias, joint swelling and neck pain.   Neurological: Positive for weakness and headaches.   Psychiatric/Behavioral: Positive for dysphoric mood. Negative for confusion.           PHYSICAL EXAM  Vitals:    05/29/20 1436   BP: 114/62   BP Location: Right arm   Patient Position: Sitting   Pulse: 107   Temp: 98.6 °F (37 °C)   TempSrc: Oral   SpO2: 98%   Weight: 78.3 kg (172 lb 8.2 oz)   Height: 5' 2" (1.575 m)    Body mass index is 31.55 kg/m².  Weight: 78.3 kg (172 lb 8.2 oz)   Height: 5' 2" (157.5 cm)     Physical Exam   Constitutional: She appears well-developed and well-nourished.   HENT:   Head: Normocephalic.   Eyes: Conjunctivae are normal.   Neck: Normal range of motion. Neck supple.   Cardiovascular: Normal rate, regular rhythm and normal heart sounds.   Pulmonary/Chest: Effort normal and breath sounds normal.   Lymphadenopathy:        Head (left side): Submental adenopathy present.   0.5 cm tender lymph node on left mandible.  No redness or open wounds.  Tender to palpation.    Neurological: She is alert.   Skin: Skin is warm and dry.   Psychiatric: Her behavior is normal.         Health Maintenance       Date Due Completion Date    HIV Screening 02/01/2021 (Originally 3/13/1980) ---    Shingles Vaccine (2 of 2) 02/21/2021 (Originally 1/29/2020) 12/4/2019 (Done)    " Override on 12/4/2019: Done    Fecal Occult Blood Test (FOBT)/[employer] 06/27/2020 6/27/2019    Mammogram 08/19/2021 8/19/2019    Lipid Panel 06/11/2024 6/11/2019    Pneumococcal Vaccine (Highest Risk) (3 of 3 - PPSV23) 09/03/2024 9/3/2019    TETANUS VACCINE 12/04/2029 12/4/2019    Override on 12/4/2019: Done            Assessment & Plan      Tosha  was seen today for adenopathy.    Diagnoses and all orders for this visit:    Lymphadenopathy  -     amoxicillin (AMOXIL) 875 MG tablet; Take 1 tablet (875 mg total) by mouth every 12 (twelve) hours.    Other orders  -     EPINEPHrine (EPIPEN 2-DENNIS) 0.3 mg/0.3 mL AtIn; Inject 0.3 mLs (0.3 mg total) into the muscle once. for 1 dose          Follow-up: No follow-ups on file.

## 2020-06-24 ENCOUNTER — OFFICE VISIT (OUTPATIENT)
Dept: SPORTS MEDICINE | Facility: CLINIC | Age: 55
End: 2020-06-24
Payer: COMMERCIAL

## 2020-06-24 ENCOUNTER — HOSPITAL ENCOUNTER (OUTPATIENT)
Dept: RADIOLOGY | Facility: HOSPITAL | Age: 55
Discharge: HOME | End: 2020-06-24
Attending: ORTHOPAEDIC SURGERY
Payer: COMMERCIAL

## 2020-06-24 ENCOUNTER — TELEPHONE (OUTPATIENT)
Dept: FAMILY MEDICINE | Facility: CLINIC | Age: 55
End: 2020-06-24

## 2020-06-24 VITALS
BODY MASS INDEX: 31.65 KG/M2 | HEART RATE: 91 BPM | HEIGHT: 62 IN | SYSTOLIC BLOOD PRESSURE: 148 MMHG | WEIGHT: 172 LBS | DIASTOLIC BLOOD PRESSURE: 100 MMHG

## 2020-06-24 DIAGNOSIS — M25.512 LEFT SHOULDER PAIN, UNSPECIFIED CHRONICITY: Primary | ICD-10-CM

## 2020-06-24 DIAGNOSIS — M25.50 ARTHRALGIA, UNSPECIFIED JOINT: Primary | ICD-10-CM

## 2020-06-24 DIAGNOSIS — M25.512 LEFT SHOULDER PAIN, UNSPECIFIED CHRONICITY: ICD-10-CM

## 2020-06-24 DIAGNOSIS — M75.102 ROTATOR CUFF SYNDROME OF LEFT SHOULDER: Primary | ICD-10-CM

## 2020-06-24 PROCEDURE — 99999 PR PBB SHADOW E&M-EST. PATIENT-LVL V: CPT | Mod: PBBFAC,,, | Performed by: ORTHOPAEDIC SURGERY

## 2020-06-24 PROCEDURE — 99203 OFFICE O/P NEW LOW 30 MIN: CPT | Mod: S$GLB,,, | Performed by: ORTHOPAEDIC SURGERY

## 2020-06-24 PROCEDURE — 3008F PR BODY MASS INDEX (BMI) DOCUMENTED: ICD-10-PCS | Mod: CPTII,S$GLB,, | Performed by: ORTHOPAEDIC SURGERY

## 2020-06-24 PROCEDURE — 73030 XR SHOULDER COMPLETE 2 OR MORE VIEWS LEFT: ICD-10-PCS | Mod: 26,LT,, | Performed by: RADIOLOGY

## 2020-06-24 PROCEDURE — 99203 PR OFFICE/OUTPT VISIT, NEW, LEVL III, 30-44 MIN: ICD-10-PCS | Mod: S$GLB,,, | Performed by: ORTHOPAEDIC SURGERY

## 2020-06-24 PROCEDURE — 73030 X-RAY EXAM OF SHOULDER: CPT | Mod: 26,LT,, | Performed by: RADIOLOGY

## 2020-06-24 PROCEDURE — 99999 PR PBB SHADOW E&M-EST. PATIENT-LVL V: ICD-10-PCS | Mod: PBBFAC,,, | Performed by: ORTHOPAEDIC SURGERY

## 2020-06-24 PROCEDURE — 3008F BODY MASS INDEX DOCD: CPT | Mod: CPTII,S$GLB,, | Performed by: ORTHOPAEDIC SURGERY

## 2020-06-24 PROCEDURE — 73030 X-RAY EXAM OF SHOULDER: CPT | Mod: TC,LT

## 2020-06-24 NOTE — PROGRESS NOTES
CC: left shoulder pain.  Referred by Dr. Arthur NASCIMENTO.  Work: [employer] from home.     55 y.o. Female reports that the pain is severe and not responding to any conservative care.  Fell onto Left shoulder, when she was cleaning the bathroom, 2 weeks ago.  Pain began after fall and progressed.  Now has pain at rest, worse with overhead activities.  Unable to lift arm above head because of pain.  Tried Narcotics and lyrica.  PMH of fibromyalgia and Lupus.  Also has ankylosing spondylitis.  Burning neck pain, no radiculopathy.    She reports that the pain is worse with overhead activity. It also bothers her at night.    Is affecting ADLs.     Review of Systems   Constitution: Negative. Negative for chills, fever and night sweats.   HENT: Negative for congestion and headaches.    Eyes: Negative for blurred vision, left vision loss and right vision loss.   Cardiovascular: Negative for chest pain and syncope.   Respiratory: Negative for cough and shortness of breath.    Endocrine: Negative for polydipsia, polyphagia and polyuria.   Hematologic/Lymphatic: Negative for bleeding problem. Does not bruise/bleed easily.   Skin: Negative for dry skin, itching and rash.   Musculoskeletal: Negative for falls and muscle weakness.   Gastrointestinal: Negative for abdominal pain and bowel incontinence.   Genitourinary: Negative for bladder incontinence and nocturia.   Neurological: Negative for disturbances in coordination, loss of balance and seizures.   Psychiatric/Behavioral: Negative for depression. The patient does not have insomnia.    Allergic/Immunologic: Negative for hives and persistent infections.     PAST MEDICAL HISTORY:   Past Medical History:   Diagnosis Date    Ankylosing spondylitis     Anxiety     Asthma     Chronic constipation     Chronic insomnia     Edema     Fibromyalgia     Interstitial cystitis     Lupus     Migraine headache     Restless legs syndrome     Shingles     Stroke 1998     [facility] Neurology outpatient progress note  Date of service: 6/4/2019    Patient here for a follow-up visit for severe head and facial trauma.  She has overall improved significantly since last visit, although right face tingling painful sensation still present Disp: 40 tablet, Rfl: 6  •  clotrimazole-betamethasone 1-0.05 % External Cream, Apply 1 application topically to affected area two times a day as needed, Disp: 60 g, Rfl: 3  Allergies:    Morphine                NAUSEA AND VOMITING    Comment:Derivatives post partum right sided numbness    TIA (transient ischemic attack)      PAST SURGICAL HISTORY:   Past Surgical History:   Procedure Laterality Date    BLADDER SURGERY      BREAST SURGERY  2018    reduction     SECTION      CHOLECYSTECTOMY      HYSTERECTOMY      LASER ABLATION      to back    SLEEVE GASTROPLASTY  2016    TUBAL LIGATION       FAMILY HISTORY:   Family History   Problem Relation Age of Onset    Hypertension Mother     Hypertension Brother     Breast cancer Maternal Aunt         x2     Heart attack Father     Hypertension Sister     Lupus Sister      SOCIAL HISTORY:   Social History     Socioeconomic History    Marital status:      Spouse name: Not on file    Number of children: Not on file    Years of education: Not on file    Highest education level: Not on file   Occupational History    Not on file   Social Needs    Financial resource strain: Not hard at all    Food insecurity     Worry: Never true     Inability: Never true    Transportation needs     Medical: No     Non-medical: No   Tobacco Use    Smoking status: Never Smoker    Smokeless tobacco: Never Used   Substance and Sexual Activity    Alcohol use: Yes     Frequency: Monthly or less     Drinks per session: 1 or 2     Binge frequency: Never     Comment: occasionally    Drug use: No    Sexual activity: Yes     Partners: Male   Lifestyle    Physical activity     Days per week: 2 days     Minutes per session: 20 min    Stress: To some extent   Relationships    Social connections     Talks on phone: Three times a week     Gets together: Once a week     Attends [employer] service: Not on file     Active member of club or organization: Yes     Attends meetings of clubs or organizations: Never     Relationship status:    Other Topics Concern    Not on file   Social History Narrative    Not on file       MEDICATIONS:   Current Outpatient Medications:     albuterol (PROAIR HFA) 90  6/4/2019    A/P:   SDH (subdural hematoma) (HCC)  (primary encounter diagnosis): left parietal occipital, small; resolved  Brain contusions in left parietal lobe, paramedian left cerebellar hemisphere   Closed nondisplaced fracture of fifth cervical verteb mcg/actuation inhaler, Inhale 2 puffs into the lungs every 4 (four) hours as needed., Disp: 18 g, Rfl: 3    amitriptyline (ELAVIL) 25 MG tablet, TAKE 1 TABLET (25 MG TOTAL) BY MOUTH NIGHTLY AS NEEDED FOR INSOMNIA., Disp: 90 tablet, Rfl: 1    amoxicillin (AMOXIL) 875 MG tablet, Take 1 tablet (875 mg total) by mouth every 12 (twelve) hours., Disp: 14 tablet, Rfl: 0    budesonide 180mcg (PULMICORT 180MCG) 180 mcg/actuation AePB, Inhale 2 puffs into the lungs once daily., Disp: 1 each, Rfl: 3    cyanocobalamin 1,000 mcg/mL injection, Inject 1 mL (1,000 mcg total) into the muscle every 28 days., Disp: 10 mL, Rfl: 3    gabapentin (NEURONTIN) 400 MG capsule, TAKE 1 CAPSULE (400 MG TOTAL) BY MOUTH 3 (THREE) TIMES DAILY., Disp: 90 capsule, Rfl: 0    HYDROmorphone (DILAUDID) 2 MG tablet, take 1 tablet by mouth every 8 hours as needed for pain, Disp: 90 tablet, Rfl: 0    hydroxychloroquine (PLAQUENIL) 200 mg tablet, Take 200 mg by mouth once daily., Disp: , Rfl: 1    hydrOXYzine pamoate (VISTARIL) 25 MG Cap, Take 25 mg by mouth every 4 (four) hours as needed. , Disp: , Rfl:     lidocaine HCl 2 % Crea, Apply to affected areas 4 times a day as needed., Disp: 118 mL, Rfl: 2    omeprazole (PRILOSEC) 20 MG capsule, Take 1 capsule (20 mg total) by mouth once daily. 1 Capsule, Delayed Release(E.C.) Oral Every day, Disp: 90 capsule, Rfl: 3    potassium chloride (KLOR-CON 10) 10 MEQ TbSR, Take 1 tablet (10 mEq total) by mouth 2 (two) times daily., Disp: 180 tablet, Rfl: 3    sumatriptan (IMITREX) 50 MG tablet, Once for severe headache. May repeat once after 2 hours. Do not exceed 3-4 doses in one week., Disp: 12 tablet, Rfl: 3    tiZANidine (ZANAFLEX) 4 MG tablet, Take 4 mg by mouth 3 (three) times daily., Disp: , Rfl: 3    topiramate (TOPAMAX) 100 MG tablet, Take 1 tablet (100 mg total) by mouth every evening. for 14 days, Disp: 90 tablet, Rfl: 3    traZODone (DESYREL) 100 MG tablet, TAKE 1 TABLET (100 MG TOTAL) BY  "MOUTH EVERY EVENING., Disp: 90 tablet, Rfl: 3    venlafaxine (EFFEXOR-XR) 150 MG Cp24, TAKE 1 CAPSULE (150 MG TOTAL) BY MOUTH ONCE DAILY., Disp: 90 capsule, Rfl: 1    VITAMIN D2 1,250 mcg (50,000 unit) capsule, TAKE 1 CAPSULE BY MOUTH ONCE A WEEK, Disp: 12 capsule, Rfl: 0    cyclobenzaprine (FLEXERIL) 5 MG tablet, TAKE 3 PILLS BY MOUTH (NIGHTLY), Disp: , Rfl: 6    EPINEPHrine (EPIPEN 2-DENNIS) 0.3 mg/0.3 mL AtIn, Inject 0.3 mLs (0.3 mg total) into the muscle once. for 1 dose, Disp: 2 Device, Rfl: 3    fentaNYL (DURAGESIC) 50 mcg/hr, APPLY 1 PATCH TO SKIN EVERY 72 HOURS, Disp: , Rfl:     HYDROmorphone (DILAUDID) 2 MG tablet, Take 1 tablet (2 mg total) by mouth every 8 (eight) hours as needed for pain., Disp: 90 tablet, Rfl: 0    venlafaxine (EFFEXOR-XR) 37.5 MG 24 hr capsule, Take 1 capsule (37.5 mg total) by mouth once daily. (Patient not taking: Reported on 2/26/2020), Disp: 7 capsule, Rfl: 0    venlafaxine (EFFEXOR-XR) 75 MG 24 hr capsule, Take 1 capsule (75 mg total) by mouth once daily. (Patient not taking: Reported on 2/26/2020), Disp: 7 capsule, Rfl: 0  ALLERGIES:   Review of patient's allergies indicates:   Allergen Reactions    Morphine Anxiety     Hives     Other Other (See Comments)     Allergy is carrots Other reaction(s): Angioedema, carrots    Erythromycin Other (See Comments)     Hives and cramps     Zofran (as hydrochloride) [ondansetron hcl] Hives     Local hive after IV injection       VITAL SIGNS: BP (!) 148/100   Pulse 91   Ht 5' 2" (1.575 m)   Wt 78 kg (172 lb)   LMP 03/26/2012   BMI 31.46 kg/m²      PHYSICAL EXAMINATION:  General:  The patient is alert and oriented x 3.  Mood is pleasant.  Observation of ears, eyes and nose reveal no gross abnormalities.  No labored breathing observed.  Gait is coordinated. Patient can toe walk and heel walk without difficulty.      left Shoulder / Upper Extremity Exam  * Unable to perform exam due to pain  OBSERVATION:  "    Swelling  *  Deformity  *   Discoloration  *   Scapular winging *   Scars   *  Atrophy  *    TENDERNESS / CREPITUS (T/C):          T/C      T/C   Clavicle   *  SUPRAspinatus    *     AC Jt.    *  INFRAspinatus  *    SC Jt.    *  Deltoid    *      G. Tuberosity  *  LH BICEP groove  *   Acromion:  *  Midline Neck   *     Scapular Spine *  Trapezium   *   SMA Scapula  *  GH jt. line - post  *     Scapulothoracic  *         ROM: (* = with pain)  Right shoulder   Left shoulder        AROM (PROM)   AROM (PROM)   FE    170° (175°)     *     ER at 0°    60°  (65°)    *   ER at 90° ABD  90°  (90°)    *   IR at 90°  ABD   NA  (40°)     *    IR (spine level)   T10     *    STRENGTH: (* = with pain) RIGHT SHOULDER  LEFT SHOULDER   SCAPTION at 0  5/5    *   SCAPTION at 30  5/5    *    IR    5/5    *   ER    5/5    *   BICEPS   5/5    *   Deltoid    5/5    *     SIGNS:  Painful side       NEER   *    OMITCHELLS  *    GARCIA   *    SPEEDS  *     DROP ARM   *   BELLY PRESS *   Superior escape *    LIFT-OFF  *   X-Body ADD    *    MOVING VALGUS *        STABILITY TESTING    RIGHT SHOULDER   LEFT SHOULDER     Translation     Anterior  up face     *    Posterior  up face    *    Sulcus   < 10mm    *     Signs   Apprehension   neg      *       Relocation   no change     *      Jerk test  neg     *    EXTREMITY NEURO-VASCULAR EXAM    Sensation grossly intact to light touch all dermatomal regions.    DTR 2+ Biceps, Triceps, BR and Negative Jss sign   Grossly intact motor function at Elbow, Wrist and Hand   Distal pulses radial and ulnar 2+, brisk cap refill, symmetric.      NECK:  Painless FROM and spinous processes non-tender. Negative Spurlings sign.      OTHER FINDINGS:  + scapular dyskinesia    XRAYS:  Shoulder trauma series left,  were obtained and reviewed  No convincing fracture or dislocation is noted. The osseous structures appear well mineralized and well aligned        ASSESSMENT:   left:  1. Likely acute  rotator cuff tear   2.  Scapular dyskinesia    PLAN:    PT for ROM and strengthening exercises    All questions were answered, pt will contact us for questions or concerns in the interim.

## 2020-06-25 ENCOUNTER — TELEPHONE (OUTPATIENT)
Dept: FAMILY MEDICINE | Facility: CLINIC | Age: 55
End: 2020-06-25

## 2020-06-27 ENCOUNTER — HOSPITAL ENCOUNTER (OUTPATIENT)
Dept: RADIOLOGY | Facility: HOSPITAL | Age: 55
Discharge: HOME | End: 2020-06-27
Attending: ORTHOPAEDIC SURGERY
Payer: COMMERCIAL

## 2020-06-27 DIAGNOSIS — M75.102 ROTATOR CUFF SYNDROME OF LEFT SHOULDER: ICD-10-CM

## 2020-06-27 PROCEDURE — 73221 MRI JOINT UPR EXTREM W/O DYE: CPT | Mod: 26,LT,, | Performed by: RADIOLOGY

## 2020-06-27 PROCEDURE — 73221 MRI JOINT UPR EXTREM W/O DYE: CPT | Mod: TC,LT

## 2020-06-27 PROCEDURE — 73221 MRI SHOULDER WITHOUT CONTRAST LEFT: ICD-10-PCS | Mod: 26,LT,, | Performed by: RADIOLOGY

## 2020-06-29 ENCOUNTER — TELEPHONE (OUTPATIENT)
Dept: SPORTS MEDICINE | Facility: CLINIC | Age: 55
End: 2020-06-29

## 2020-06-29 DIAGNOSIS — G25.89 SCAPULAR DYSKINESIS: Primary | ICD-10-CM

## 2020-06-29 NOTE — TELEPHONE ENCOUNTER
Called patient to discuss MRI of the Left shoulder, patient did not answer, and left her a voicemail instructing her to call back.  SLAP tear present.  Rotator cuff is intact.  Plan is to begin physical therapy for rotator cuff strengthening, scapular stabilizing exercises, and range of motion.  PT order has been placed.

## 2020-07-02 ENCOUNTER — PATIENT OUTREACH (OUTPATIENT)
Dept: ADMINISTRATIVE | Facility: HOSPITAL | Age: 55
End: 2020-07-02

## 2020-07-10 ENCOUNTER — CLINICAL SUPPORT (OUTPATIENT)
Dept: REHABILITATION | Facility: HOSPITAL | Age: 55
End: 2020-07-10
Payer: COMMERCIAL

## 2020-07-10 DIAGNOSIS — S46.912A LEFT SHOULDER STRAIN, INITIAL ENCOUNTER: ICD-10-CM

## 2020-07-10 DIAGNOSIS — M75.102 ROTATOR CUFF SYNDROME OF LEFT SHOULDER: ICD-10-CM

## 2020-07-10 DIAGNOSIS — M25.619 LIMITED RANGE OF MOTION (ROM) OF SHOULDER: ICD-10-CM

## 2020-07-10 PROCEDURE — 97162 PT EVAL MOD COMPLEX 30 MIN: CPT | Performed by: PHYSICAL THERAPIST

## 2020-07-10 NOTE — PLAN OF CARE
[facility]  Physical Therapy Initial Evaluation    Date: 7/10/2020   Name: Tosha Wilks  Clinic Number: [id number]    Therapy Diagnosis:   Encounter Diagnosis   Name Primary?    Rotator cuff syndrome of left shoulder      Physician: Zackery Rothman MD    Physician Orders: PT Eval and Treat   Medical Diagnosis from Referral: M75.102 (ICD-10-CM) - Rotator cuff syndrome of left shoulder  Evaluation Date: 7/10/2020  Authorization Period Expiration: 10/30/2020  Plan of Care Expiration: 2020  Visit # / Visits authorized:     Time In: 1045  Time Out: 1130  Total Appointment Time (timed & untimed codes): 45 minutes    Precautions: Fall and fibromyalgia, lupus, and ankylosing spondylitis    Subjective   Date of onset: 2 weeks  History of current condition - Tosha reports: Patients reports she was in the shower and slipped and fell. She hit her left shoulder on the drain and experienced sharp pain. She underwent an MRI which revealed a torn labrum but rotator cuff intact. She notes the right shoulder also hurts especially with reaching. She has been wearing a sling but reports pain at night when she takes it off and accidentally using it. She has difficulty completing ADLs and sleeping. Patient is right hand dominant     Medical History:   Past Medical History:   Diagnosis Date    Ankylosing spondylitis     Anxiety     Asthma     Chronic constipation     Chronic insomnia     Edema     Fibromyalgia     Interstitial cystitis     Lupus     Migraine headache     Restless legs syndrome     Shingles     Stroke     post partum right sided numbness    TIA (transient ischemic attack)        Surgical History:   Tosha Wilks  has a past surgical history that includes Bladder surgery;  section; Tubal ligation; Laser ablation; Cholecystectomy; Hysterectomy; Sleeve Gastroplasty (2016); and Breast surgery ().    Medications:   Tosha  has a current  medication list which includes the following prescription(s): albuterol, amitriptyline, amoxicillin, budesonide 180mcg, cyanocobalamin, cyclobenzaprine, epinephrine, ergocalciferol, fentanyl, gabapentin, hydromorphone, hydromorphone, hydroxychloroquine, hydroxyzine pamoate, lidocaine hcl, omeprazole, potassium chloride, sumatriptan, tizanidine, topiramate, trazodone, venlafaxine, venlafaxine, and venlafaxine.    Allergies:   Review of patient's allergies indicates:   Allergen Reactions    Morphine Anxiety     Hives     Other Other (See Comments)     Allergy is carrots Other reaction(s): Angioedema, carrots    Erythromycin Other (See Comments)     Hives and cramps     Zofran (as hydrochloride) [ondansetron hcl] Hives     Local hive after IV injection        Imaging, MRI studies:     Impression:     No evidence for rotator cuff tear.     Suspect SLAP tear with extension to the bicipital labral complex.     Indistinctness at the level of the inferior glenohumeral ligament which is thickened and early indistinctness at the level of the biceps pulley.  .  In the appropriate context, this may represent that of adhesive capsulitis.    Prior Therapy: Yes on wrist  Social History: She lives with their family  Occupation: Not working  Prior Level of Function: Independent  Current Level of Function: Modified independent with L arm in sling    Pain:  Current 8/10, worst 10/10, best 6/10   Location: { left shoulder(s)  Description: Aching, Burning, Throbbing and Deep  Aggravating Factors: Flexing and Lifting  Easing Factors: relaxation and rest    Pts goals: return to using LUE    Objective     Observation: Patient is a 55 y.o. female presenting with L arm in slide, single point crutch in R hand and a slipper on her L foot      Passive Range of Motion:   Shoulder Right Left   Flexion 180 64   Abduction 180 45   ER at 0 30 8   ER at 90 nt nt   IR 60 40      Patient guarding, reports extreme pain in all motions     Strength  not tested due to acute pain    Joint Mobility: reports pain with Gr I-II oscillations, guarding and not relaxing her arm. Verbal discussion distracted patient enough for passive motions listed above. Unable perform posterior or inferior humeral mobs due to reported pain     Palpation: Pain over AC joint and tightness to biceps tendon    Sensation: intact    Flexibility: Full elbow flexion and extension      Limitation/Restriction for FOTO Shoulder Survey    Therapist reviewed FOTO scores for Tosha Wilks on 7/10/2020.   FOTO documents entered into [employer] - see Media section.    Limitation Score: 64%         TREATMENT   Treatment Time In: 1100  Treatment Time Out: 1110  Total Treatment time (time-based codes) separate from Evaluation: 10 minutes    Tosha received the following manual therapy techniques: Joint mobilizations were applied to the: L shoulder  for 10 minutes, including:  Gr I-II oscillations   Elbow passive flexion/extension  Passive flexion/IR/ER within ROM    Home Exercises and Patient Education Provided    Education provided:   - Healing process for frozen shoulder    Written Home Exercises Provided: yes.  Exercises were reviewed and Theo was able to demonstrate them prior to the end of the session.  Theo demonstrated good  understanding of the education provided.     See EMR under Patient Instructions for exercisesprovided 7/10/2020.    Assessment   Tosha  is a 55 y.o. female referred to outpatient Physical Therapy with a medical diagnosis of left rotator cuff syndrome. Pt presents with limited left shoulder ROM, strength deficits, difficulty sleeping, trouble completing ADLs, limited to wearing a shoulder and pain in all positions    Pt prognosis is Excellent.   Pt will benefit from skilled outpatient Physical Therapy to address the deficits stated above and in the chart below, provide pt/family education, and to maximize pt's level of independence.     Plan of care discussed with  patient: Yes  Pt's spiritual, cultural and educational needs considered and patient is agreeable to the plan of care and goals as stated below:     Anticipated Barriers for therapy: covid    Medical Necessity is demonstrated by the following  History  Co-morbidities and personal factors that may impact the plan of care Co-morbidities:   advanced age, anxiety, difficulty sleeping, financial considerations, level of undertstanding of current condition and poor medication/medical compliance    Personal Factors:   no deficits     moderate   Examination  Body Structures and Functions, activity limitations and participation restrictions that may impact the plan of care Body Regions:   upper extremities    Body Systems:    ROM  strength  transfers  motor control  motor learning  edema    Participation Restrictions:   covid-19    Activity limitations:   Learning and applying knowledge  no deficits    General Tasks and Commands  no deficits    Communication  no deficits    Mobility  lifting and carrying objects  fine hand use (grasping/picking up)    Self care  no deficits    Domestic Life  no deficits    Interactions/Relationships  no deficits    Life Areas  no deficits    Community and Social Life  no deficits         moderate   Clinical Presentation evolving clinical presentation with changing clinical characteristics moderate   Decision Making/ Complexity Score: moderate     GOALS: Short Term Goals:  4 weeks  1.Report decreased shoulder pain < / =  3/10  to increase tolerance for completing ADLs  2. Increase PROM 140 deg of flexion to restore reach   3. Increased strength by 1/3 MMT grade in L shoulder to increase tolerance for ADL and work activities.  4. Pt to tolerate HEP to improve ROM and independence with ADL's    Long Term Goals: 12 weeks  1.Report decreased L shoulder pain  < / =  1 /10  to increase tolerance for sleeping at night  2.Increase AROM to 140 deg of flexion to restore overhead reach   3.Increase  strength to >/= 4/5 in L shoulder to increase tolerance for ADL and work activities.  4. Pt goal: reach overhead without pain in L shoulder  5. Pt will have improved to 29% on FOTO shoulder in order to demonstrate true functional improvement.      Plan   Plan of care Certification: 7/10/2020 to 10/30/2020.    Outpatient Physical Therapy 2 times weekly for 10 weeks to include the following interventions: Manual Therapy, Moist Heat/ Ice, Neuromuscular Re-ed, Patient Education, Self Care and Therapeutic Activites.     Tam Bragg, PT

## 2020-07-16 ENCOUNTER — OFFICE VISIT (OUTPATIENT)
Dept: FAMILY MEDICINE | Facility: CLINIC | Age: 55
End: 2020-07-16
Payer: COMMERCIAL

## 2020-07-16 VITALS
HEART RATE: 107 BPM | HEIGHT: 62 IN | BODY MASS INDEX: 32.62 KG/M2 | WEIGHT: 177.25 LBS | SYSTOLIC BLOOD PRESSURE: 118 MMHG | TEMPERATURE: 99 F | DIASTOLIC BLOOD PRESSURE: 72 MMHG | OXYGEN SATURATION: 97 %

## 2020-07-16 DIAGNOSIS — F41.9 ANXIETY: ICD-10-CM

## 2020-07-16 DIAGNOSIS — T14.8XXA BLISTER: Primary | ICD-10-CM

## 2020-07-16 PROCEDURE — 3008F BODY MASS INDEX DOCD: CPT | Mod: CPTII,S$GLB,, | Performed by: FAMILY MEDICINE

## 2020-07-16 PROCEDURE — 99213 OFFICE O/P EST LOW 20 MIN: CPT | Mod: S$GLB,,, | Performed by: FAMILY MEDICINE

## 2020-07-16 PROCEDURE — 3008F PR BODY MASS INDEX (BMI) DOCUMENTED: ICD-10-PCS | Mod: CPTII,S$GLB,, | Performed by: FAMILY MEDICINE

## 2020-07-16 PROCEDURE — 99213 PR OFFICE/OUTPT VISIT, EST, LEVL III, 20-29 MIN: ICD-10-PCS | Mod: S$GLB,,, | Performed by: FAMILY MEDICINE

## 2020-07-16 RX ORDER — HYDROXYZINE PAMOATE 25 MG/1
25 CAPSULE ORAL EVERY 4 HOURS PRN
Qty: 90 CAPSULE | Refills: 3 | Status: SHIPPED | OUTPATIENT
Start: 2020-07-16 | End: 2020-07-25

## 2020-07-16 NOTE — PROGRESS NOTES
Chief Complaint  Chief Complaint   Patient presents with    Follow-up       HPI  Tosha Wilks is a 55 y.o. female with multiple medical diagnoses as listed in the medical history and problem list that presents for a wound on her left heel.  She was seen at [facility] twice for this.  The wound was punctured and drained.  It has now dried, but remains very tender and she is having trouble walking on it.      PAST MEDICAL HISTORY:  Past Medical History:   Diagnosis Date    Ankylosing spondylitis     Anxiety     Asthma     Chronic constipation     Chronic insomnia     Edema     Fibromyalgia     Interstitial cystitis     Lupus     Migraine headache     Restless legs syndrome     Shingles     Stroke     post partum right sided numbness    TIA (transient ischemic attack)        PAST SURGICAL HISTORY:  Past Surgical History:   Procedure Laterality Date    BLADDER SURGERY      BREAST SURGERY  2018    reduction     SECTION      CHOLECYSTECTOMY      HYSTERECTOMY      LASER ABLATION      to back    SLEEVE GASTROPLASTY  2016    TUBAL LIGATION         SOCIAL HISTORY:  Social History     Socioeconomic History    Marital status:      Spouse name: Not on file    Number of children: Not on file    Years of education: Not on file    Highest education level: Not on file   Occupational History    Not on file   Social Needs    Financial resource strain: Not hard at all    Food insecurity     Worry: Never true     Inability: Never true    Transportation needs     Medical: No     Non-medical: No   Tobacco Use    Smoking status: Never Smoker    Smokeless tobacco: Never Used   Substance and Sexual Activity    Alcohol use: Yes     Frequency: Monthly or less     Drinks per session: 1 or 2     Binge frequency: Never     Comment: occasionally    Drug use: No    Sexual activity: Yes     Partners: Male   Lifestyle    Physical activity     Days per week: 2 days     Minutes per  session: 20 min    Stress: To some extent   Relationships    Social connections     Talks on phone: Three times a week     Gets together: Once a week     Attends Scientologist service: Not on file     Active member of club or organization: Yes     Attends meetings of clubs or organizations: Never     Relationship status:    Other Topics Concern    Not on file   Social History Narrative    Not on file       FAMILY HISTORY:  Family History   Problem Relation Age of Onset    Hypertension Mother     Hypertension Brother     Breast cancer Maternal Aunt         x2     Heart attack Father     Hypertension Sister     Lupus Sister        ALLERGIES AND MEDICATIONS: updated and reviewed.  Review of patient's allergies indicates:   Allergen Reactions    Morphine Anxiety     Hives     Other Other (See Comments)     Allergy is carrots Other reaction(s): Angioedema, carrots    Erythromycin Other (See Comments)     Hives and cramps     Zofran (as hydrochloride) [ondansetron hcl] Hives     Local hive after IV injection     Current Outpatient Medications   Medication Sig Dispense Refill    albuterol (PROAIR HFA) 90 mcg/actuation inhaler Inhale 2 puffs into the lungs every 4 (four) hours as needed. 18 g 3    amitriptyline (ELAVIL) 25 MG tablet TAKE 1 TABLET (25 MG TOTAL) BY MOUTH NIGHTLY AS NEEDED FOR INSOMNIA. 90 tablet 1    budesonide 180mcg (PULMICORT 180MCG) 180 mcg/actuation AePB Inhale 2 puffs into the lungs once daily. 1 each 3    EPINEPHrine (EPIPEN 2-DENNIS) 0.3 mg/0.3 mL AtIn Inject 0.3 mLs (0.3 mg total) into the muscle once. for 1 dose 2 Device 3    gabapentin (NEURONTIN) 400 MG capsule TAKE 1 CAPSULE (400 MG TOTAL) BY MOUTH 3 (THREE) TIMES DAILY. 90 capsule 0    hydroxychloroquine (PLAQUENIL) 200 mg tablet Take 200 mg by mouth once daily.  1    hydrOXYzine pamoate (VISTARIL) 25 MG Cap Take 1 capsule (25 mg total) by mouth every 4 (four) hours as needed. 90 capsule 3    lidocaine HCl 2 % Crea Apply to  affected areas 4 times a day as needed. 118 mL 2    potassium chloride (KLOR-CON 10) 10 MEQ TbSR Take 1 tablet (10 mEq total) by mouth 2 (two) times daily. 180 tablet 3    sumatriptan (IMITREX) 50 MG tablet Once for severe headache. May repeat once after 2 hours. Do not exceed 3-4 doses in one week. 12 tablet 3    tiZANidine (ZANAFLEX) 4 MG tablet Take 4 mg by mouth 3 (three) times daily.  3    topiramate (TOPAMAX) 100 MG tablet Take 1 tablet (100 mg total) by mouth every evening. for 14 days 90 tablet 3    traZODone (DESYREL) 100 MG tablet TAKE 1 TABLET (100 MG TOTAL) BY MOUTH EVERY EVENING. 90 tablet 3    venlafaxine (EFFEXOR-XR) 150 MG Cp24 TAKE 1 CAPSULE (150 MG TOTAL) BY MOUTH ONCE DAILY. 90 capsule 1    amoxicillin (AMOXIL) 875 MG tablet Take 1 tablet (875 mg total) by mouth every 12 (twelve) hours. (Patient not taking: Reported on 7/16/2020) 14 tablet 0    bacitracin-polymyxin b (POLYSPORIN) ointment Apply topically 2 (two) times daily. 15 g 1    cyanocobalamin 1,000 mcg/mL injection INJECT 1 ML (1,000 MCG TOTAL) INTO THE MUSCLE EVERY 28 DAYS. (Patient not taking: Reported on 7/16/2020) 3 mL 13    cyclobenzaprine (FLEXERIL) 5 MG tablet TAKE 3 PILLS BY MOUTH (NIGHTLY)  6    ergocalciferol (ERGOCALCIFEROL) 50,000 unit Cap TAKE 1 CAPSULE BY MOUTH ONE TIME PER WEEK (Patient not taking: Reported on 7/16/2020) 12 capsule 0    fentaNYL (DURAGESIC) 50 mcg/hr APPLY 1 PATCH TO SKIN EVERY 72 HOURS      HYDROmorphone (DILAUDID) 2 MG tablet take 1 tablet by mouth every 8 hours as needed for pain (Patient not taking: Reported on 7/16/2020) 90 tablet 0    HYDROmorphone (DILAUDID) 2 MG tablet Take 1 tablet by mouth every 8 hours as needed for pain (Patient not taking: Reported on 7/16/2020) 90 tablet 0    omeprazole (PRILOSEC) 20 MG capsule Take 1 capsule (20 mg total) by mouth once daily. 1 Capsule, Delayed Release(E.C.) Oral Every day (Patient not taking: Reported on 7/16/2020) 90 capsule 3    venlafaxine  "(EFFEXOR-XR) 37.5 MG 24 hr capsule Take 1 capsule (37.5 mg total) by mouth once daily. (Patient not taking: Reported on 2/26/2020) 7 capsule 0    venlafaxine (EFFEXOR-XR) 75 MG 24 hr capsule Take 1 capsule (75 mg total) by mouth once daily. (Patient not taking: Reported on 2/26/2020) 7 capsule 0     No current facility-administered medications for this visit.          ROS  Review of Systems   Constitutional: Positive for activity change. Negative for unexpected weight change.   HENT: Positive for trouble swallowing. Negative for hearing loss and rhinorrhea.    Eyes: Negative for discharge and visual disturbance.   Respiratory: Negative for chest tightness and wheezing.    Cardiovascular: Negative for chest pain and palpitations.   Gastrointestinal: Positive for constipation. Negative for blood in stool, diarrhea and vomiting.   Endocrine: Negative for polydipsia and polyuria.   Genitourinary: Negative for difficulty urinating, dysuria, hematuria and menstrual problem.   Musculoskeletal: Positive for arthralgias, joint swelling and neck pain.   Neurological: Positive for weakness and headaches.   Psychiatric/Behavioral: Positive for dysphoric mood. Negative for confusion.           PHYSICAL EXAM  Vitals:    07/16/20 1344   BP: 118/72   BP Location: Right arm   Patient Position: Sitting   Pulse: 107   Temp: 98.6 °F (37 °C)   TempSrc: Temporal   SpO2: 97%   Weight: 80.4 kg (177 lb 4 oz)   Height: 5' 2" (1.575 m)    Body mass index is 32.42 kg/m².  Weight: 80.4 kg (177 lb 4 oz)   Height: 5' 2" (157.5 cm)     Physical Exam  Constitutional:       Appearance: She is well-developed.   HENT:      Head: Normocephalic.   Eyes:      Conjunctiva/sclera: Conjunctivae normal.   Neck:      Musculoskeletal: Normal range of motion and neck supple.   Cardiovascular:      Rate and Rhythm: Normal rate and regular rhythm.      Pulses:           Dorsalis pedis pulses are 2+ on the right side and 2+ on the left side.        Posterior " "tibial pulses are 2+ on the right side and 2+ on the left side.      Heart sounds: Normal heart sounds.   Pulmonary:      Effort: Pulmonary effort is normal.      Breath sounds: Normal breath sounds.   Musculoskeletal:        Feet:    Feet:      Right foot:      Skin integrity: No ulcer, blister or skin breakdown.      Left foot:      Skin integrity: Blister present. No ulcer or skin breakdown.   Lymphadenopathy:      Head:      Left side of head: Submental adenopathy present.      Comments: 0.5 cm tender lymph node on left mandible.  No redness or open wounds.  Tender to palpation.    Skin:     General: Skin is warm and dry.   Neurological:      Mental Status: She is alert.   Psychiatric:         Behavior: Behavior normal.           Health Maintenance       Date Due Completion Date    HIV Screening 02/01/2021 (Originally 3/13/1980) ---    Shingles Vaccine (2 of 2) 02/21/2021 (Originally 1/29/2020) 12/4/2019 (Done)    Override on 12/4/2019: Done    Fecal Occult Blood Test (FOBT)/[employer] 06/27/2020 6/27/2019    Mammogram 08/19/2021 8/19/2019    Lipid Panel 06/11/2024 6/11/2019    Pneumococcal Vaccine (Highest Risk) (3 of 3 - PPSV23) 09/03/2024 9/3/2019    TETANUS VACCINE 12/04/2029 12/4/2019    Override on 12/4/2019: Done            Assessment & Plan      Tosha  was seen today for follow-up.    Diagnoses and all orders for this visit:    Blister  -     bacitracin-polymyxin b (POLYSPORIN) ointment; Apply topically 2 (two) times daily.  - At this point it looks like the blister is healing well.  She can apply neosporin daily until the blister "cap" comes off and the tissue undernealth as healed.     Anxiety  -     hydrOXYzine pamoate (VISTARIL) 25 MG Cap; Take 1 capsule (25 mg total) by mouth every 4 (four) hours as needed.          Follow-up: No follow-ups on file.      "

## 2020-07-24 ENCOUNTER — CLINICAL SUPPORT (OUTPATIENT)
Dept: REHABILITATION | Facility: HOSPITAL | Age: 55
End: 2020-07-24
Payer: COMMERCIAL

## 2020-07-24 DIAGNOSIS — M25.619 LIMITED RANGE OF MOTION (ROM) OF SHOULDER: ICD-10-CM

## 2020-07-24 DIAGNOSIS — S46.912A LEFT SHOULDER STRAIN, INITIAL ENCOUNTER: ICD-10-CM

## 2020-07-24 PROCEDURE — 97110 THERAPEUTIC EXERCISES: CPT | Mod: CQ

## 2020-07-24 NOTE — PROGRESS NOTES
Physical Therapy Daily Treatment Note     Name: Tosha Wilks  Clinic Number: [id number]    Therapy Diagnosis:   Encounter Diagnoses   Name Primary?    Left shoulder strain, initial encounter     Limited range of motion (ROM) of shoulder      Physician: Zackery Rothman MD    Visit Date: 7/24/2020    Physician Orders: PT Eval and Treat   Medical Diagnosis from Referral: M75.102 (ICD-10-CM) - Rotator cuff syndrome of left shoulder  Evaluation Date: 7/10/2020  Authorization Period Expiration: 10/30/2020  Plan of Care Expiration: 12/31/2020  Visit # / Visits authorized: 2/20    Time In: 1355  Time Out: 1433  Total Billable Time: 30 minutes    Precautions: Fall and fibromyalgia, lupus, and ankylosing spondylitis    Subjective     Pt reports: Pt returns to therapy this pm with c/o continued L shldr pain upon entry. She presents with slipper on L foot d/t a blister being removed from L heel.     She was compliant with home exercise program.  Response to previous treatment: No adverse effects  Functional change: N/A    Pain: 6/10  Location: left shoulder      Objective     Tosha received therapeutic exercises to develop strength, endurance, ROM and flexibility for 30 minutes including:    UBE (fwd) x 6'  Pulleys (flx) x 6'  Supine AA wand flexion x 6'  Supine AA wand ER x 6'  Pt education: HEP importance/frequency    Tosha received the following manual therapy techniques: Joint mobilizations were applied for 00 minutes including:      Tosha participated in neuromuscular re-education activities to improve Posture and Neuromuscular for 00 minutes. The following activities were included:      Tosha participated in dynamic functional therapeutic activities to improve functional performance for 00 minutes including:        Home Exercises Provided and Patient Education Provided     Education provided:   - Healing process for frozen shoulder    Written Home Exercises Provided: yes.  Exercises were reviewed and  "Tosha was able to demonstrate them prior to the end of the session.  Tosah demonstrated good  understanding of the education provided.     See EMR under Patient Instructions for exercises provided 7/10/2020.    Assessment     Pt was unable to perform any exercise without sig pain and emotional response to movement. Supine AA shldr flexion more resembled bicep curls and caused pt to cry/report "burning sensation" to her pinky finger. Per DPT, pt is unable to tolerate manual therapy and could benefit from AAROM; tolerance of AAROM was also poor. AA flexion on pulleys ~80deg. Encouraged frequent compliance with HEP; pt voiced understanding. No adverse effects reported p tx.     Tosha is progressing well towards her goals.   Pt prognosis is Excellent.     Pt will continue to benefit from skilled outpatient physical therapy to address the deficits listed in the problem list box on initial evaluation, provide pt/family education and to maximize pt's level of independence in the home and community environment.     Pt's spiritual, cultural and educational needs considered and pt agreeable to plan of care and goals.     Anticipated barriers to physical therapy: covid    GOALS: Short Term Goals:  4 weeks  1.Report decreased shoulder pain < / =  3/10  to increase tolerance for completing ADLs  2. Increase PROM 140 deg of flexion to restore reach   3. Increased strength by 1/3 MMT grade in L shoulder to increase tolerance for ADL and work activities.  4. Pt to tolerate HEP to improve ROM and independence with ADL's     Long Term Goals: 12 weeks  1.Report decreased L shoulder pain  < / =  1 /10  to increase tolerance for sleeping at night  2.Increase AROM to 140 deg of flexion to restore overhead reach   3.Increase strength to >/= 4/5 in L shoulder to increase tolerance for ADL and work activities.  4. Pt goal: reach overhead without pain in L shoulder  5. Pt will have improved to 29% on FOTO shoulder in order to " demonstrate true functional improvement.    Plan   Plan of care Certification: 7/10/2020 to 10/30/2020.     Outpatient Physical Therapy 2 times weekly for 10 weeks to include the following interventions: Manual Therapy, Moist Heat/ Ice, Neuromuscular Re-ed, Patient Education, Self Care and Therapeutic Activites.     Shara Richards, PTA

## 2020-07-28 DIAGNOSIS — T14.8XXA BLISTER: Primary | ICD-10-CM

## 2020-07-28 RX ORDER — SILVER SULFADIAZINE 10 G/1000G
CREAM TOPICAL 2 TIMES DAILY
Qty: 50 G | Refills: 0 | Status: SHIPPED | OUTPATIENT
Start: 2020-07-28 | End: 2020-09-06

## 2020-07-31 ENCOUNTER — OFFICE VISIT (OUTPATIENT)
Dept: PODIATRY | Facility: CLINIC | Age: 55
End: 2020-07-31
Payer: COMMERCIAL

## 2020-07-31 ENCOUNTER — PATIENT OUTREACH (OUTPATIENT)
Dept: ADMINISTRATIVE | Facility: OTHER | Age: 55
End: 2020-07-31

## 2020-07-31 ENCOUNTER — HOSPITAL ENCOUNTER (OUTPATIENT)
Dept: RADIOLOGY | Facility: HOSPITAL | Age: 55
Discharge: HOME | End: 2020-07-31
Attending: PODIATRIST
Payer: COMMERCIAL

## 2020-07-31 VITALS — BODY MASS INDEX: 32.62 KG/M2 | HEIGHT: 62 IN | WEIGHT: 177.25 LBS

## 2020-07-31 DIAGNOSIS — L02.612 ABSCESS OF LEFT FOOT: ICD-10-CM

## 2020-07-31 DIAGNOSIS — M79.672 PAIN OF LEFT HEEL: ICD-10-CM

## 2020-07-31 DIAGNOSIS — L02.612 ABSCESS OF LEFT FOOT: Primary | ICD-10-CM

## 2020-07-31 DIAGNOSIS — Z12.11 ENCOUNTER FOR FIT (FECAL IMMUNOCHEMICAL TEST) SCREENING: Primary | ICD-10-CM

## 2020-07-31 PROCEDURE — 3008F PR BODY MASS INDEX (BMI) DOCUMENTED: ICD-10-PCS | Mod: CPTII,S$GLB,, | Performed by: PODIATRIST

## 2020-07-31 PROCEDURE — 99999 PR PBB SHADOW E&M-EST. PATIENT-LVL V: ICD-10-PCS | Mod: PBBFAC,,, | Performed by: PODIATRIST

## 2020-07-31 PROCEDURE — 73650 X-RAY EXAM OF HEEL: CPT | Mod: 26,LT,, | Performed by: RADIOLOGY

## 2020-07-31 PROCEDURE — 99203 OFFICE O/P NEW LOW 30 MIN: CPT | Mod: S$GLB,,, | Performed by: PODIATRIST

## 2020-07-31 PROCEDURE — 99999 PR PBB SHADOW E&M-EST. PATIENT-LVL V: CPT | Mod: PBBFAC,,, | Performed by: PODIATRIST

## 2020-07-31 PROCEDURE — 73650 X-RAY EXAM OF HEEL: CPT | Mod: TC,FY,LT

## 2020-07-31 PROCEDURE — 3008F BODY MASS INDEX DOCD: CPT | Mod: CPTII,S$GLB,, | Performed by: PODIATRIST

## 2020-07-31 PROCEDURE — 99203 PR OFFICE/OUTPT VISIT, NEW, LEVL III, 30-44 MIN: ICD-10-PCS | Mod: S$GLB,,, | Performed by: PODIATRIST

## 2020-07-31 PROCEDURE — 73650 XR CALCANEUS 2 VIEW LEFT: ICD-10-PCS | Mod: 26,LT,, | Performed by: RADIOLOGY

## 2020-07-31 NOTE — PROGRESS NOTES
Requested updates within Care Everywhere.  Patient's chart was reviewed for overdue ALONSO topics.  Immunizations reconciled.    Orders placed: FIT kit  Tasked appts:  Labs Linked:

## 2020-07-31 NOTE — PROGRESS NOTES
"Subjective:      Patient ID: Tosha Wilks is a 55 y.o. female.    Chief Complaint: Diabetes Mellitus (2020 office visit with PCP-FIFI Gibson ) and Foot Ulcer (left foot )    Tosha  is a 55 y.o. female who presents to the clinic complaining of heel pain in the left foot, especially with the first step in the morning for the past 2 months.  Relates history of large blister forming to the plantar medial left heel that was lanced twice at different urgent care visits.  She completed a course of oral Bactrim DS with no improvement.  She was given topical antibiotic per her PCP which helps somewhat.  Pain is unchanged overall however did describe that initially when the blister began that there was numbness to the area.  There was a history back pain that radiates down the left lower extremity how that has improved.  The pain is described as Sharp. The onset of the pain was sudden and has worsened over the past several weeks. Tosha  rates the pain as 10/10. She denies a history of trauma. Prior treatments include antibiotics, wound care and debridement. Symptoms have worsened. She denies F/N/V/C.      Vitals:    20 1707   Weight: 80.4 kg (177 lb 4 oz)   Height: 5' 2" (1.575 m)   PainSc:   7   PainLoc: Foot      Past Medical History:   Diagnosis Date    Ankylosing spondylitis     Anxiety     Asthma     Chronic constipation     Chronic insomnia     Edema     Fibromyalgia     Interstitial cystitis     Lupus     Migraine headache     Restless legs syndrome     Shingles     Stroke     post partum right sided numbness    TIA (transient ischemic attack)        Past Surgical History:   Procedure Laterality Date    BLADDER SURGERY      BREAST SURGERY  2018    reduction     SECTION      CHOLECYSTECTOMY      HYSTERECTOMY      LASER ABLATION      to back    SLEEVE GASTROPLASTY  2016    TUBAL LIGATION         Family History   Problem Relation Age of Onset    " Hypertension Mother     Hypertension Brother     Breast cancer Maternal Aunt         x2     Heart attack Father     Hypertension Sister     Lupus Sister        Social History     Socioeconomic History    Marital status:      Spouse name: Not on file    Number of children: Not on file    Years of education: Not on file    Highest education level: Not on file   Occupational History    Not on file   Social Needs    Financial resource strain: Not hard at all    Food insecurity     Worry: Never true     Inability: Never true    Transportation needs     Medical: No     Non-medical: No   Tobacco Use    Smoking status: Never Smoker    Smokeless tobacco: Never Used   Substance and Sexual Activity    Alcohol use: Yes     Frequency: Monthly or less     Drinks per session: 1 or 2     Binge frequency: Never     Comment: occasionally    Drug use: No    Sexual activity: Yes     Partners: Male   Lifestyle    Physical activity     Days per week: 2 days     Minutes per session: 20 min    Stress: To some extent   Relationships    Social connections     Talks on phone: Three times a week     Gets together: Once a week     Attends Taoist service: Not on file     Active member of club or organization: Yes     Attends meetings of clubs or organizations: Never     Relationship status:    Other Topics Concern    Not on file   Social History Narrative    Not on file       Current Outpatient Medications   Medication Sig Dispense Refill    albuterol (PROAIR HFA) 90 mcg/actuation inhaler Inhale 2 puffs into the lungs every 4 (four) hours as needed. 18 g 3    amitriptyline (ELAVIL) 25 MG tablet TAKE 1 TABLET (25 MG TOTAL) BY MOUTH NIGHTLY AS NEEDED FOR INSOMNIA. 90 tablet 1    bacitracin-polymyxin b (POLYSPORIN) ointment Apply topically 2 (two) times daily. 15 g 1    budesonide 180mcg (PULMICORT 180MCG) 180 mcg/actuation AePB Inhale 2 puffs into the lungs once daily. 1 each 3    EPINEPHrine (EPIPEN  2-DENNIS) 0.3 mg/0.3 mL AtIn Inject 0.3 mLs (0.3 mg total) into the muscle once. for 1 dose 2 Device 3    gabapentin (NEURONTIN) 400 MG capsule TAKE 1 CAPSULE (400 MG TOTAL) BY MOUTH 3 (THREE) TIMES DAILY. 90 capsule 0    hydroxychloroquine (PLAQUENIL) 200 mg tablet Take 200 mg by mouth once daily.  1    hydrOXYzine pamoate (VISTARIL) 25 MG Cap TAKE 1 CAPSULE (25 MG TOTAL) BY MOUTH EVERY 4 (FOUR) HOURS AS NEEDED. 90 capsule 3    lidocaine HCl 2 % Crea Apply to affected areas 4 times a day as needed. 118 mL 2    potassium chloride (KLOR-CON 10) 10 MEQ TbSR Take 1 tablet (10 mEq total) by mouth 2 (two) times daily. 180 tablet 3    silver sulfADIAZINE 1% (SILVADENE) 1 % cream Apply topically 2 (two) times daily. 50 g 0    sumatriptan (IMITREX) 50 MG tablet Once for severe headache. May repeat once after 2 hours. Do not exceed 3-4 doses in one week. 12 tablet 3    tiZANidine (ZANAFLEX) 4 MG tablet Take 4 mg by mouth 3 (three) times daily.  3    topiramate (TOPAMAX) 100 MG tablet Take 1 tablet (100 mg total) by mouth every evening. for 14 days 90 tablet 3    traZODone (DESYREL) 100 MG tablet TAKE 1 TABLET (100 MG TOTAL) BY MOUTH EVERY EVENING. 90 tablet 3    venlafaxine (EFFEXOR-XR) 150 MG Cp24 TAKE 1 CAPSULE (150 MG TOTAL) BY MOUTH ONCE DAILY. 90 capsule 1    amoxicillin (AMOXIL) 875 MG tablet Take 1 tablet (875 mg total) by mouth every 12 (twelve) hours. (Patient not taking: Reported on 7/16/2020) 14 tablet 0    cyanocobalamin 1,000 mcg/mL injection INJECT 1 ML (1,000 MCG TOTAL) INTO THE MUSCLE EVERY 28 DAYS. (Patient not taking: Reported on 7/16/2020) 3 mL 13    cyclobenzaprine (FLEXERIL) 5 MG tablet TAKE 3 PILLS BY MOUTH (NIGHTLY)  6    ergocalciferol (ERGOCALCIFEROL) 50,000 unit Cap TAKE 1 CAPSULE BY MOUTH ONE TIME PER WEEK (Patient not taking: Reported on 7/16/2020) 12 capsule 0    fentaNYL (DURAGESIC) 50 mcg/hr APPLY 1 PATCH TO SKIN EVERY 72 HOURS      HYDROmorphone (DILAUDID) 2 MG tablet take 1  tablet by mouth every 8 hours as needed for pain (Patient not taking: Reported on 7/16/2020) 90 tablet 0    HYDROmorphone (DILAUDID) 2 MG tablet Take 1 tablet by mouth every 8 hours as needed for pain (Patient not taking: Reported on 7/16/2020) 90 tablet 0    omeprazole (PRILOSEC) 20 MG capsule Take 1 capsule (20 mg total) by mouth once daily. 1 Capsule, Delayed Release(E.C.) Oral Every day (Patient not taking: Reported on 7/16/2020) 90 capsule 3    venlafaxine (EFFEXOR-XR) 37.5 MG 24 hr capsule Take 1 capsule (37.5 mg total) by mouth once daily. (Patient not taking: Reported on 2/26/2020) 7 capsule 0    venlafaxine (EFFEXOR-XR) 75 MG 24 hr capsule Take 1 capsule (75 mg total) by mouth once daily. (Patient not taking: Reported on 2/26/2020) 7 capsule 0     No current facility-administered medications for this visit.        Review of patient's allergies indicates:   Allergen Reactions    Morphine Anxiety     Hives     Other Other (See Comments)     Allergy is carrots Other reaction(s): Angioedema, carrots    Erythromycin Other (See Comments)     Hives and cramps     Zofran (as hydrochloride) [ondansetron hcl] Hives     Local hive after IV injection       Review of Systems   Constitution: Negative for chills, decreased appetite, fever and night sweats.   HENT: Negative for congestion and hearing loss.    Cardiovascular: Negative for chest pain, claudication, cyanosis, irregular heartbeat and leg swelling.   Respiratory: Negative for cough and shortness of breath.    Skin: Positive for color change and poor wound healing. Negative for dry skin, itching, nail changes, rash, suspicious lesions and unusual hair distribution.        wound   Musculoskeletal: Positive for back pain. Negative for arthritis, falls, gout, joint pain, joint swelling and muscle weakness.        Left heel pain     Gastrointestinal: Negative for nausea and vomiting.   Neurological: Positive for numbness and paresthesias. Negative for  dizziness, headaches, loss of balance and weakness.           Objective:      Physical Exam  Constitutional:       General: She is not in acute distress.     Appearance: She is not ill-appearing.   Cardiovascular:      Pulses:           Dorsalis pedis pulses are 2+ on the right side and 2+ on the left side.        Posterior tibial pulses are 2+ on the right side and 2+ on the left side.      Comments: CFT brisk < 3s  No edema to the LEs  Hair growth and distribution is normal.    Musculoskeletal:      Comments: Patient has extreme pain to palpation at the wound site plantar medial left heel.  Pain also along with the distal tarsal tunnel that radiates to the toes with localized pain on palpation along the plantar lateral heel and plantar central heel.  Moderate localized edema overall to left heel slight erythematous appearance however no palpable fluctuance or crepitance.   Skin:     General: Skin is warm.      Capillary Refill: Capillary refill takes less than 2 seconds.      Findings: No ecchymosis.      Nails: There is no clubbing.        Comments: Small dry blister on the medial aspect of the left heel.  Deep tissue was healed there is minor edema some periwound erythema.   Neurological:      Mental Status: She is alert and oriented to person, place, and time.      Comments: + provocation to the PT nerve  Light touch intact.  MMT 5/5 DF, PF, Inv, Ev  Normal muscle tone.  No signs of atrophy.  No tremor or spasticity.                 Assessment:       Encounter Diagnosis   Name Primary?    Abscess of left foot Yes         Plan:       Tosha  was seen today for diabetes mellitus and foot ulcer.    Diagnoses and all orders for this visit:    Abscess of left foot  -     CBC auto differential; Future  -     C-reactive protein; Future  -     Sedimentation rate; Future  -     Basic metabolic panel; Future      I counseled the patient on her conditions, their implications and medical management.    Ddx:  Deep  abscess, soft tissue mass, tarsal tunnel, plantar fasciitis    Our working diagnosis is deep abscess due to extensive pain, edema and minor erythema to the wound that is slow to heal and at 1 point had drainage.     Plan for lab work, foot radiographs and MRI of the heel the left foot.    If MRI returns without any malignancy or deep abscess, plan to have the patient follow-up for diagnostic injection to the tarsal tunnel.  It is possible that the patient has worsening pain is due to her fibromyalgia after the initial incident.    Patient to follow up after MRI.    She is to present to the ED if she develops fever, nausea, vomiting or chills    Feliciano Duarte PGY 3    I have personally taken the history and examined this patient and agree with the resident's note as stated as above.   Blaine Jacinto DPM, FACFAS

## 2020-08-01 ENCOUNTER — PATIENT MESSAGE (OUTPATIENT)
Dept: PODIATRY | Facility: CLINIC | Age: 55
End: 2020-08-01

## 2020-08-06 ENCOUNTER — HOSPITAL ENCOUNTER (OUTPATIENT)
Dept: RADIOLOGY | Facility: HOSPITAL | Age: 55
Discharge: HOME | End: 2020-08-06
Attending: PODIATRIST
Payer: COMMERCIAL

## 2020-08-06 DIAGNOSIS — L02.612 ABSCESS OF LEFT FOOT: ICD-10-CM

## 2020-08-06 DIAGNOSIS — M79.672 PAIN OF LEFT HEEL: ICD-10-CM

## 2020-08-06 PROCEDURE — 73718 MRI FOOT (HINDFOOT) LEFT WITHOUT CONTRAST: ICD-10-PCS | Mod: 26,LT,, | Performed by: RADIOLOGY

## 2020-08-06 PROCEDURE — 73718 MRI LOWER EXTREMITY W/O DYE: CPT | Mod: TC,LT

## 2020-08-06 PROCEDURE — 73718 MRI LOWER EXTREMITY W/O DYE: CPT | Mod: 26,LT,, | Performed by: RADIOLOGY

## 2020-08-07 ENCOUNTER — OFFICE VISIT (OUTPATIENT)
Dept: PODIATRY | Facility: CLINIC | Age: 55
End: 2020-08-07
Payer: COMMERCIAL

## 2020-08-07 VITALS
HEART RATE: 90 BPM | BODY MASS INDEX: 32.62 KG/M2 | SYSTOLIC BLOOD PRESSURE: 138 MMHG | HEIGHT: 62 IN | WEIGHT: 177.25 LBS | DIASTOLIC BLOOD PRESSURE: 84 MMHG

## 2020-08-07 DIAGNOSIS — L02.612 ABSCESS OF LEFT FOOT: Primary | ICD-10-CM

## 2020-08-07 PROCEDURE — 3008F PR BODY MASS INDEX (BMI) DOCUMENTED: ICD-10-PCS | Mod: CPTII,S$GLB,, | Performed by: PODIATRIST

## 2020-08-07 PROCEDURE — 3008F BODY MASS INDEX DOCD: CPT | Mod: CPTII,S$GLB,, | Performed by: PODIATRIST

## 2020-08-07 PROCEDURE — 87075 CULTR BACTERIA EXCEPT BLOOD: CPT

## 2020-08-07 PROCEDURE — 99999 PR PBB SHADOW E&M-EST. PATIENT-LVL IV: ICD-10-PCS | Mod: PBBFAC,,, | Performed by: PODIATRIST

## 2020-08-07 PROCEDURE — 99213 PR OFFICE/OUTPT VISIT, EST, LEVL III, 20-29 MIN: ICD-10-PCS | Mod: 25,S$GLB,, | Performed by: PODIATRIST

## 2020-08-07 PROCEDURE — 10060 I&D ABSCESS SIMPLE/SINGLE: CPT | Mod: S$GLB,,, | Performed by: PODIATRIST

## 2020-08-07 PROCEDURE — 87070 CULTURE OTHR SPECIMN AEROBIC: CPT

## 2020-08-07 PROCEDURE — 99213 OFFICE O/P EST LOW 20 MIN: CPT | Mod: 25,S$GLB,, | Performed by: PODIATRIST

## 2020-08-07 PROCEDURE — 10060 PR DRAIN SKIN ABSCESS SIMPLE: ICD-10-PCS | Mod: S$GLB,,, | Performed by: PODIATRIST

## 2020-08-07 PROCEDURE — 99999 PR PBB SHADOW E&M-EST. PATIENT-LVL IV: CPT | Mod: PBBFAC,,, | Performed by: PODIATRIST

## 2020-08-07 RX ORDER — DOXYCYCLINE 100 MG/1
100 CAPSULE ORAL 2 TIMES DAILY
Qty: 20 CAPSULE | Refills: 0 | Status: SHIPPED | OUTPATIENT
Start: 2020-08-07 | End: 2020-09-06 | Stop reason: ALTCHOICE

## 2020-08-07 NOTE — PROGRESS NOTES
"Subjective:      Patient ID: Tosha Wilks is a 55 y.o. female.    Chief Complaint: No chief complaint on file.    Tosha  is a 55 y.o. female who presents to the clinic complaining of heel pain in the left foot, especially with the first step in the morning for the past 2 months.  Relates history of large blister forming to the plantar medial left heel that was lanced twice at different urgent care visits.  She completed a course of oral Bactrim DS with no improvement.  She was given topical antibiotic per her PCP which helps somewhat.  Pain is unchanged overall however did describe that initially when the blister began that there was numbness to the area.  There was a history back pain that radiates down the left lower extremity how that has improved.  The pain is described as Sharp. The onset of the pain was sudden and has worsened over the past several weeks. Tosha  rates the pain as 10/10. She denies a history of trauma. Prior treatments include antibiotics, wound care and debridement. Symptoms have worsened. She denies F/N/V/C.    2020: Patient relates worsening left heel pain 2 days ago that seems to have improved somewhat today.  She had recent MRI. Denies F/N/V/C.  Followed per Pain Clinic.      Vitals:    20 1310   BP: 138/84   Pulse: 90   Weight: 80.4 kg (177 lb 4 oz)   Height: 5' 2" (1.575 m)   PainSc:   5      Past Medical History:   Diagnosis Date    Ankylosing spondylitis     Anxiety     Asthma     Chronic constipation     Chronic insomnia     Edema     Fibromyalgia     Interstitial cystitis     Lupus     Migraine headache     Restless legs syndrome     Shingles     Stroke     post partum right sided numbness    TIA (transient ischemic attack)        Past Surgical History:   Procedure Laterality Date    BLADDER SURGERY      BREAST SURGERY  2018    reduction     SECTION      CHOLECYSTECTOMY      HYSTERECTOMY      LASER ABLATION      to back    " SLEEVE GASTROPLASTY  05/2016    TUBAL LIGATION         Family History   Problem Relation Age of Onset    Hypertension Mother     Hypertension Brother     Breast cancer Maternal Aunt         x2     Heart attack Father     Hypertension Sister     Lupus Sister        Social History     Socioeconomic History    Marital status:      Spouse name: Not on file    Number of children: Not on file    Years of education: Not on file    Highest education level: Not on file   Occupational History    Not on file   Social Needs    Financial resource strain: Not hard at all    Food insecurity     Worry: Never true     Inability: Never true    Transportation needs     Medical: No     Non-medical: No   Tobacco Use    Smoking status: Never Smoker    Smokeless tobacco: Never Used   Substance and Sexual Activity    Alcohol use: Yes     Frequency: Monthly or less     Drinks per session: 1 or 2     Binge frequency: Never     Comment: occasionally    Drug use: No    Sexual activity: Yes     Partners: Male   Lifestyle    Physical activity     Days per week: 2 days     Minutes per session: 20 min    Stress: To some extent   Relationships    Social connections     Talks on phone: Three times a week     Gets together: Once a week     Attends Jew service: Not on file     Active member of club or organization: Yes     Attends meetings of clubs or organizations: Never     Relationship status:    Other Topics Concern    Not on file   Social History Narrative    Not on file       Current Outpatient Medications   Medication Sig Dispense Refill    albuterol (PROAIR HFA) 90 mcg/actuation inhaler Inhale 2 puffs into the lungs every 4 (four) hours as needed. 18 g 3    amitriptyline (ELAVIL) 25 MG tablet TAKE 1 TABLET (25 MG TOTAL) BY MOUTH NIGHTLY AS NEEDED FOR INSOMNIA. 90 tablet 1    bacitracin-polymyxin b (POLYSPORIN) ointment Apply topically 2 (two) times daily. 15 g 1    budesonide 180mcg (PULMICORT  180MCG) 180 mcg/actuation AePB Inhale 2 puffs into the lungs once daily. 1 each 3    EPINEPHrine (EPIPEN 2-DENNIS) 0.3 mg/0.3 mL AtIn Inject 0.3 mLs (0.3 mg total) into the muscle once. for 1 dose 2 Device 3    gabapentin (NEURONTIN) 400 MG capsule TAKE 1 CAPSULE (400 MG TOTAL) BY MOUTH 3 (THREE) TIMES DAILY. 90 capsule 0    hydroxychloroquine (PLAQUENIL) 200 mg tablet Take 200 mg by mouth once daily.  1    hydrOXYzine pamoate (VISTARIL) 25 MG Cap TAKE 1 CAPSULE (25 MG TOTAL) BY MOUTH EVERY 4 (FOUR) HOURS AS NEEDED. 90 capsule 3    lidocaine HCl 2 % Crea Apply to affected areas 4 times a day as needed. 118 mL 2    potassium chloride (KLOR-CON 10) 10 MEQ TbSR Take 1 tablet (10 mEq total) by mouth 2 (two) times daily. 180 tablet 3    silver sulfADIAZINE 1% (SILVADENE) 1 % cream Apply topically 2 (two) times daily. 50 g 0    sumatriptan (IMITREX) 50 MG tablet Once for severe headache. May repeat once after 2 hours. Do not exceed 3-4 doses in one week. 12 tablet 3    tiZANidine (ZANAFLEX) 4 MG tablet Take 4 mg by mouth 3 (three) times daily.  3    topiramate (TOPAMAX) 100 MG tablet Take 1 tablet (100 mg total) by mouth every evening. for 14 days 90 tablet 3    traZODone (DESYREL) 100 MG tablet TAKE 1 TABLET (100 MG TOTAL) BY MOUTH EVERY EVENING. 90 tablet 3    venlafaxine (EFFEXOR-XR) 150 MG Cp24 TAKE 1 CAPSULE (150 MG TOTAL) BY MOUTH ONCE DAILY. 90 capsule 1    amoxicillin (AMOXIL) 875 MG tablet Take 1 tablet (875 mg total) by mouth every 12 (twelve) hours. (Patient not taking: Reported on 7/16/2020) 14 tablet 0    cyanocobalamin 1,000 mcg/mL injection INJECT 1 ML (1,000 MCG TOTAL) INTO THE MUSCLE EVERY 28 DAYS. (Patient not taking: Reported on 7/16/2020) 3 mL 13    cyclobenzaprine (FLEXERIL) 5 MG tablet TAKE 3 PILLS BY MOUTH (NIGHTLY)  6    doxycycline (VIBRAMYCIN) 100 MG Cap Take 1 capsule (100 mg total) by mouth 2 (two) times daily. 20 capsule 0    ergocalciferol (ERGOCALCIFEROL) 50,000 unit Cap TAKE 1  CAPSULE BY MOUTH ONE TIME PER WEEK (Patient not taking: Reported on 7/16/2020) 12 capsule 0    fentaNYL (DURAGESIC) 50 mcg/hr APPLY 1 PATCH TO SKIN EVERY 72 HOURS      HYDROmorphone (DILAUDID) 2 MG tablet take 1 tablet by mouth every 8 hours as needed for pain (Patient not taking: Reported on 7/16/2020) 90 tablet 0    HYDROmorphone (DILAUDID) 2 MG tablet Take 1 tablet (2 mg total) by mouth every 6 to 8 hours as needed for pain 105 tablet 0    omeprazole (PRILOSEC) 20 MG capsule Take 1 capsule (20 mg total) by mouth once daily. 1 Capsule, Delayed Release(E.C.) Oral Every day (Patient not taking: Reported on 7/16/2020) 90 capsule 3    venlafaxine (EFFEXOR-XR) 37.5 MG 24 hr capsule Take 1 capsule (37.5 mg total) by mouth once daily. (Patient not taking: Reported on 2/26/2020) 7 capsule 0    venlafaxine (EFFEXOR-XR) 75 MG 24 hr capsule Take 1 capsule (75 mg total) by mouth once daily. (Patient not taking: Reported on 2/26/2020) 7 capsule 0     No current facility-administered medications for this visit.        Review of patient's allergies indicates:   Allergen Reactions    Morphine Anxiety     Hives     Other Other (See Comments)     Allergy is carrots Other reaction(s): Angioedema, carrots    Erythromycin Other (See Comments)     Hives and cramps     Zofran (as hydrochloride) [ondansetron hcl] Hives     Local hive after IV injection       Review of Systems   Constitution: Negative for chills, decreased appetite, fever and night sweats.   HENT: Negative for congestion and hearing loss.    Cardiovascular: Negative for chest pain, claudication, cyanosis, irregular heartbeat and leg swelling.   Respiratory: Negative for cough and shortness of breath.    Skin: Positive for color change and poor wound healing. Negative for dry skin, itching, nail changes, rash, suspicious lesions and unusual hair distribution.        wound   Musculoskeletal: Positive for back pain. Negative for arthritis, falls, gout, joint pain,  joint swelling and muscle weakness.        Left heel pain     Gastrointestinal: Negative for nausea and vomiting.   Neurological: Positive for numbness and paresthesias. Negative for dizziness, headaches, loss of balance and weakness.           Objective:      Physical Exam  Constitutional:       General: She is not in acute distress.     Appearance: She is not ill-appearing.   Cardiovascular:      Pulses:           Dorsalis pedis pulses are 2+ on the right side and 2+ on the left side.        Posterior tibial pulses are 2+ on the right side and 2+ on the left side.      Comments: CFT brisk < 3s  No edema to the LEs  Hair growth and distribution is normal.    Musculoskeletal:      Comments: Localized pain on palpation to the plantar medial left heel region with no palpable fluctuance or crepitance.  Previous erythema to the area has since resolved.  There is mild localized edema.  There is some mild pain on palpation overlying the distal tarsal tunnel region.  No pain range of motion to the left hindfoot or mid foot.   Skin:     General: Skin is warm.      Capillary Refill: Capillary refill takes less than 2 seconds.      Findings: No ecchymosis.      Nails: There is no clubbing.        Comments: Left foot: Skin appears healed today. She still has tenderness on palpation to the foot. No erythema, and minor edema to the heel.    Neurological:      Mental Status: She is alert and oriented to person, place, and time.      Comments: + provocation to the PT nerve  Light touch intact.  MMT 5/5 DF, PF, Inv, Ev  Normal muscle tone.  No signs of atrophy.  No tremor or spasticity.                 Assessment:       Encounter Diagnosis   Name Primary?    Abscess of left foot Yes         Plan:       Diagnoses and all orders for this visit:    Abscess of left foot  -     Aerobic culture (Specify Source)  -     CULTURE, ANAEROBE    Other orders  -     doxycycline (VIBRAMYCIN) 100 MG Cap; Take 1 capsule (100 mg total) by mouth 2  (two) times daily.      I counseled the patient on her conditions, their implications and medical management.    MRI report revealed:  EXAMINATION:  MRI FOOT (HINDFOOT) LEFT WITHOUT CONTRAST     CLINICAL HISTORY:  assess for possible deep abscess left heel;  Cutaneous abscess of left foot     TECHNIQUE:  MRI evaluation of the left ankle performed without contrast according to the infection protocol.     COMPARISON:  Radiograph 07/31/2020.     FINDINGS:  Tendons: There is posterior tibial tendon sheath fluid.  There is flexor hallucis longus tenosynovitis with fluid extending distally up to the level of Yann's knot.  There is peroneal tendon sheath fluid.  Extensor tendons are normal.  Achilles tendon is intact.     Ligaments: There is nonvisualization of the anterior talofibular ligament in keeping with a remote injury.  Posterior talofibular, calcaneofibular, spring, anterior-inferior tibiofibular, posterior-inferior tibiofibular, Lisfranc, dorsal talonavicular, bifurcate and dorsal calcaneocuboid ligaments are intact.     Bones: No fractures.  No avascular necrosis.  No marrow infiltrative process.  Incidental note is made of small os naviculare and os peroneum.     Joints/cartilage: Tibial plafond and talar dome are intact.  There is mild subchondral edema within the lateral aspect of the calcaneocuboid joint with associated chondral thinning, likely degenerative in nature.  Tibiotalar, subtalar and talonavicular cartilage spaces are preserved.  No significant joint effusion.     Muscles: Normal bulk and signal intensity.  No accessory muscles.     Miscellaneous: There is a 2.6 x 1.4 x 1.9 cm focal area of signal hyperintensity on fluid sensitive sequences along the medial aspect of the heel pad consistent with edema without focal drainable fluid collection.  There is mild thickening involving the lateral cord of the plantar fascia at the level of the calcaneal insertion.  Sinus tarsi demonstrates preserved  signal intensity with grossly intact cervical and interosseous ligaments.  Tarsal tunnel is unremarkable.     Impression:     1. Focal area of edema within the medial aspect of the heel pad, possibly sterile inflammation or sequela of cellulitis.  No focal drainable fluid collection.  No osteomyelitis.  2. Posterior tibial, flexor hallucis longus, flexor digitorum longus and peroneal tenosynovitis.  3. Mild calcaneocuboid osteoarthritis.  4. Nonvisualization of the ATFL in keeping with a remote sprain.        Electronically signed by: Israel Welch MD  Date:                                            08/06/2020  Time:                                           09:58      Previous labs have been reviewed noting no significant increase in inflammatory markers.  Her prior x-ray of the left calcaneus had shown possible erosion to the plantar heel area.    We discussed all the findings and elected to perform a simple incision drainage of the skin overlying the previous blister due to the MRI findings which indicated a possible abscess/fluid collection within the fatty layer.    Surgeon:EUNICE Alas DPM  Assistant: Feliciano Duarte DPM PGY 3  Preoperative diagnosis:  Abscess left heel  Postoperative diagnosis:  Cellulitis left heel  Procedure:  Simple incision and drainage left heel  Anesthesia:  Local infiltration of 20 mL of one-to-one mixture containing 0.25% plain Marcaine and 2% plain lidocaine  Hemostasis:  Direct pressure  Estimated blood loss:  Less than 3 mL  Materials:  None  Injectables:  Per above  Complications:  None    Procedure in detail:  The above-named patient was identified and a time-out was performed identifying the patient procedure and foot.  The left foot was then prepped to Betadine.  Local infiltration of the above anesthetic was performed in the tarsal tunnel region/medial hindfoot and heel area.  A sterile 15.  Scalpel then utilized to perform a stab incision to the skin overlying the  previous blister site.  Blunt dissection was then created past the subcutaneous tissue layer to the fatty layer with a hemostat and scissors.  A pocket was felt to given this region however there was only mild sanguinous drainage observed.  No palpable fluctuance to the area.  The edges of the incision were expressed however only bloody drainage was noted.  A deep wound C&S was then obtained and sent for aerobic and anaerobic culture.  Copious irrigation of normal saline was performed.  Mastisol and Steri-Strips were applied to reapproximate the skin edges followed by Betadine and Vel foam.  Wrap 2 layers of cast padding to the foot secured with outer Coban wrap.  Dressings remain clean, dry and intact until her follow-up visit.    Patient tolerated procedure well without apparent complication.  Patient was started on doxycycline 100 mg p.o. b.i.d. prophylactically.    Patient is to rest and elevate her foot.    RTC 1-2 weeks or p.r.n. as discussed.

## 2020-08-10 LAB — BACTERIA SPEC AEROBE CULT: NORMAL

## 2020-08-12 ENCOUNTER — PATIENT OUTREACH (OUTPATIENT)
Dept: ADMINISTRATIVE | Facility: OTHER | Age: 55
End: 2020-08-12

## 2020-08-12 NOTE — PROGRESS NOTES
Updates were requested from care everywhere.  Chart was reviewed for overdue Proactive [facility] Encounters ([facility]) topics (CRS, Breast Cancer Screening, Eye exam)  Health Maintenance has been updated.  [place] immunization registry triggered.  Immunizations were reconciled.

## 2020-08-13 LAB — BACTERIA SPEC ANAEROBE CULT: NORMAL

## 2020-08-14 ENCOUNTER — OFFICE VISIT (OUTPATIENT)
Dept: PODIATRY | Facility: CLINIC | Age: 55
End: 2020-08-14
Payer: COMMERCIAL

## 2020-08-14 VITALS
BODY MASS INDEX: 32.62 KG/M2 | HEART RATE: 88 BPM | SYSTOLIC BLOOD PRESSURE: 138 MMHG | HEIGHT: 62 IN | WEIGHT: 177.25 LBS | DIASTOLIC BLOOD PRESSURE: 91 MMHG

## 2020-08-14 DIAGNOSIS — G57.52 TARSAL TUNNEL SYNDROME, LEFT: Primary | ICD-10-CM

## 2020-08-14 DIAGNOSIS — M79.672 LEFT FOOT PAIN: ICD-10-CM

## 2020-08-14 PROCEDURE — 99213 OFFICE O/P EST LOW 20 MIN: CPT | Mod: 25,S$GLB,, | Performed by: PODIATRIST

## 2020-08-14 PROCEDURE — 99999 PR PBB SHADOW E&M-EST. PATIENT-LVL IV: ICD-10-PCS | Mod: PBBFAC,,, | Performed by: PODIATRIST

## 2020-08-14 PROCEDURE — 99213 PR OFFICE/OUTPT VISIT, EST, LEVL III, 20-29 MIN: ICD-10-PCS | Mod: 25,S$GLB,, | Performed by: PODIATRIST

## 2020-08-14 PROCEDURE — 64450 NJX AA&/STRD OTHER PN/BRANCH: CPT | Mod: 79,LT,S$GLB, | Performed by: PODIATRIST

## 2020-08-14 PROCEDURE — 3008F BODY MASS INDEX DOCD: CPT | Mod: CPTII,S$GLB,, | Performed by: PODIATRIST

## 2020-08-14 PROCEDURE — 99999 PR PBB SHADOW E&M-EST. PATIENT-LVL IV: CPT | Mod: PBBFAC,,, | Performed by: PODIATRIST

## 2020-08-14 PROCEDURE — 64450 PR NERVE BLOCK INJ, ANES/STEROID, OTHER PERIPHERAL: ICD-10-PCS | Mod: 79,LT,S$GLB, | Performed by: PODIATRIST

## 2020-08-14 PROCEDURE — 3008F PR BODY MASS INDEX (BMI) DOCUMENTED: ICD-10-PCS | Mod: CPTII,S$GLB,, | Performed by: PODIATRIST

## 2020-08-14 RX ORDER — METHYLPREDNISOLONE ACETATE 40 MG/ML
40 INJECTION, SUSPENSION INTRA-ARTICULAR; INTRALESIONAL; INTRAMUSCULAR; SOFT TISSUE
Status: COMPLETED | OUTPATIENT
Start: 2020-08-14 | End: 2020-08-14

## 2020-08-14 RX ADMIN — METHYLPREDNISOLONE ACETATE 40 MG: 40 INJECTION, SUSPENSION INTRA-ARTICULAR; INTRALESIONAL; INTRAMUSCULAR; SOFT TISSUE at 03:08

## 2020-08-14 NOTE — PROGRESS NOTES
"Subjective:      Patient ID: Tosha Wilks is a 55 y.o. female.    Chief Complaint: Follow-up (abscess of left foot )    Tosha  is a 55 y.o. female who presents to the clinic complaining of heel pain in the left foot, especially with the first step in the morning for the past 2 months.  Relates history of large blister forming to the plantar medial left heel that was lanced twice at different urgent care visits.  She completed a course of oral Bactrim DS with no improvement.  She was given topical antibiotic per her PCP which helps somewhat.  Pain is unchanged overall however did describe that initially when the blister began that there was numbness to the area.  There was a history back pain that radiates down the left lower extremity how that has improved.  The pain is described as Sharp. The onset of the pain was sudden and has worsened over the past several weeks. Tosha  rates the pain as 10/10. She denies a history of trauma. Prior treatments include antibiotics, wound care and debridement. Symptoms have worsened. She denies F/N/V/C.    8/7/2020: Patient relates worsening left heel pain 2 days ago that seems to have improved somewhat today.  She had recent MRI. Denies F/N/V/C.  Followed per Pain Clinic.    08/14/2020:  Post I&D of the left heel x7 days.  Has 1 remaining days of doxycycline.  Wound C&S no growth.  Relates that she has a moderate amount of pain when she attempts to stand or walk on the left foot.  She feels pain radiate to her toes.  She has a history of low back pain also raise on the left lower extremity.  Previous MRI in shown area of uptake that the foot cellulitis versus a fluid collection along the plantar medial left heel.      Vitals:    08/14/20 1440   BP: (!) 138/91   Pulse: 88   Weight: 80.4 kg (177 lb 4 oz)   Height: 5' 2" (1.575 m)   PainSc:   8   PainLoc: Foot      Past Medical History:   Diagnosis Date    Ankylosing spondylitis     Anxiety     Asthma     Chronic " constipation     Chronic insomnia     Edema     Fibromyalgia     Interstitial cystitis     Lupus     Migraine headache     Restless legs syndrome     Shingles     Stroke 1998    post partum right sided numbness    TIA (transient ischemic attack)        Past Surgical History:   Procedure Laterality Date    BLADDER SURGERY      BREAST SURGERY  2018    reduction     SECTION      CHOLECYSTECTOMY      HYSTERECTOMY      LASER ABLATION      to back    SLEEVE GASTROPLASTY  2016    TUBAL LIGATION         Family History   Problem Relation Age of Onset    Hypertension Mother     Hypertension Brother     Breast cancer Maternal Aunt         x2     Heart attack Father     Hypertension Sister     Lupus Sister        Social History     Socioeconomic History    Marital status:      Spouse name: Not on file    Number of children: Not on file    Years of education: Not on file    Highest education level: Not on file   Occupational History    Not on file   Social Needs    Financial resource strain: Not hard at all    Food insecurity     Worry: Never true     Inability: Never true    Transportation needs     Medical: No     Non-medical: No   Tobacco Use    Smoking status: Never Smoker    Smokeless tobacco: Never Used   Substance and Sexual Activity    Alcohol use: Yes     Frequency: Monthly or less     Drinks per session: 1 or 2     Binge frequency: Never     Comment: occasionally    Drug use: No    Sexual activity: Yes     Partners: Male   Lifestyle    Physical activity     Days per week: 2 days     Minutes per session: 20 min    Stress: To some extent   Relationships    Social connections     Talks on phone: Three times a week     Gets together: Once a week     Attends Christian service: Not on file     Active member of club or organization: Yes     Attends meetings of clubs or organizations: Never     Relationship status:    Other Topics Concern    Not on file    Social History Narrative    Not on file       Current Outpatient Medications   Medication Sig Dispense Refill    albuterol (PROAIR HFA) 90 mcg/actuation inhaler Inhale 2 puffs into the lungs every 4 (four) hours as needed. 18 g 3    amitriptyline (ELAVIL) 25 MG tablet TAKE 1 TABLET (25 MG TOTAL) BY MOUTH NIGHTLY AS NEEDED FOR INSOMNIA. 90 tablet 1    amoxicillin (AMOXIL) 875 MG tablet Take 1 tablet (875 mg total) by mouth every 12 (twelve) hours. 14 tablet 0    bacitracin-polymyxin b (POLYSPORIN) ointment Apply topically 2 (two) times daily. 15 g 1    budesonide 180mcg (PULMICORT 180MCG) 180 mcg/actuation AePB Inhale 2 puffs into the lungs once daily. 1 each 3    cyanocobalamin 1,000 mcg/mL injection INJECT 1 ML (1,000 MCG TOTAL) INTO THE MUSCLE EVERY 28 DAYS. 3 mL 13    doxycycline (VIBRAMYCIN) 100 MG Cap Take 1 capsule (100 mg total) by mouth 2 (two) times daily. 20 capsule 0    EPINEPHrine (EPIPEN 2-DENNIS) 0.3 mg/0.3 mL AtIn Inject 0.3 mLs (0.3 mg total) into the muscle once. for 1 dose 2 Device 3    ergocalciferol (ERGOCALCIFEROL) 50,000 unit Cap TAKE 1 CAPSULE BY MOUTH ONE TIME PER WEEK 12 capsule 0    gabapentin (NEURONTIN) 400 MG capsule TAKE 1 CAPSULE (400 MG TOTAL) BY MOUTH 3 (THREE) TIMES DAILY. 90 capsule 0    HYDROmorphone (DILAUDID) 2 MG tablet take 1 tablet by mouth every 8 hours as needed for pain 90 tablet 0    HYDROmorphone (DILAUDID) 2 MG tablet Take 1 tablet (2 mg total) by mouth every 6 to 8 hours as needed for pain 105 tablet 0    hydroxychloroquine (PLAQUENIL) 200 mg tablet Take 200 mg by mouth once daily.  1    hydrOXYzine pamoate (VISTARIL) 25 MG Cap TAKE 1 CAPSULE (25 MG TOTAL) BY MOUTH EVERY 4 (FOUR) HOURS AS NEEDED. 90 capsule 3    lidocaine HCl 2 % Crea Apply to affected areas 4 times a day as needed. 118 mL 2    omeprazole (PRILOSEC) 20 MG capsule Take 1 capsule (20 mg total) by mouth once daily. 1 Capsule, Delayed Release(E.C.) Oral Every day 90 capsule 3    potassium  chloride (KLOR-CON 10) 10 MEQ TbSR Take 1 tablet (10 mEq total) by mouth 2 (two) times daily. 180 tablet 3    silver sulfADIAZINE 1% (SILVADENE) 1 % cream Apply topically 2 (two) times daily. 50 g 0    sumatriptan (IMITREX) 50 MG tablet Once for severe headache. May repeat once after 2 hours. Do not exceed 3-4 doses in one week. 12 tablet 3    tiZANidine (ZANAFLEX) 4 MG tablet Take 4 mg by mouth 3 (three) times daily.  3    topiramate (TOPAMAX) 100 MG tablet Take 1 tablet (100 mg total) by mouth every evening. for 14 days 90 tablet 3    traZODone (DESYREL) 100 MG tablet TAKE 1 TABLET (100 MG TOTAL) BY MOUTH EVERY EVENING. 90 tablet 3    venlafaxine (EFFEXOR-XR) 150 MG Cp24 TAKE 1 CAPSULE (150 MG TOTAL) BY MOUTH ONCE DAILY. 90 capsule 1    cyclobenzaprine (FLEXERIL) 5 MG tablet TAKE 3 PILLS BY MOUTH (NIGHTLY)  6    fentaNYL (DURAGESIC) 50 mcg/hr APPLY 1 PATCH TO SKIN EVERY 72 HOURS      venlafaxine (EFFEXOR-XR) 37.5 MG 24 hr capsule Take 1 capsule (37.5 mg total) by mouth once daily. (Patient not taking: Reported on 2/26/2020) 7 capsule 0    venlafaxine (EFFEXOR-XR) 75 MG 24 hr capsule Take 1 capsule (75 mg total) by mouth once daily. (Patient not taking: Reported on 2/26/2020) 7 capsule 0     No current facility-administered medications for this visit.        Review of patient's allergies indicates:   Allergen Reactions    Morphine Anxiety     Hives     Other Other (See Comments)     Allergy is carrots Other reaction(s): Angioedema, carrots    Erythromycin Other (See Comments)     Hives and cramps     Zofran (as hydrochloride) [ondansetron hcl] Hives     Local hive after IV injection       Review of Systems   Constitution: Negative for chills, decreased appetite, fever and night sweats.   HENT: Negative for congestion and hearing loss.    Cardiovascular: Negative for chest pain, claudication, cyanosis, irregular heartbeat and leg swelling.   Respiratory: Negative for cough and shortness of breath.     Skin: Positive for color change and poor wound healing. Negative for dry skin, itching, nail changes, rash, suspicious lesions and unusual hair distribution.        wound   Musculoskeletal: Positive for back pain. Negative for arthritis, falls, gout, joint pain, joint swelling and muscle weakness.        Left heel pain     Gastrointestinal: Negative for nausea and vomiting.   Neurological: Positive for numbness and paresthesias. Negative for dizziness, headaches, loss of balance and weakness.           Objective:      Physical Exam  Constitutional:       General: She is not in acute distress.     Appearance: She is not ill-appearing.   Cardiovascular:      Pulses:           Dorsalis pedis pulses are 2+ on the right side and 2+ on the left side.        Posterior tibial pulses are 2+ on the right side and 2+ on the left side.      Comments: CFT brisk < 3s  No edema to the LEs  Hair growth and distribution is normal.    Musculoskeletal:      Comments: Moderate pain on palpation to the left heel plantar medial and central aspect extending to the proximal 3rd of the medial longitudinal arch.  There is moderate pain on palpation along the tarsal tunnel region commencing at the area just superior to the medial malleolus with pain radiating down to the toes and this continues to extend down through the tarsal tunnel.  Pain with attempted dorsiflexion of the left foot along the medial hindfoot.  No palpable fluctuance or crepitance left foot.   Skin:     General: Skin is warm.      Capillary Refill: Capillary refill takes less than 2 seconds.      Findings: No ecchymosis.      Nails: There is no clubbing.        Comments: Left foot: Skin appears healed today. She still has tenderness on palpation to the foot. No erythema, and minor edema to the heel.    Neurological:      Mental Status: She is alert and oriented to person, place, and time.      Comments: + provocation to the PT nerve  Light touch intact.  MMT 5/5 DF, PF,  Inv, Ev  Normal muscle tone.  No signs of atrophy.  No tremor or spasticity.                 Assessment:       Encounter Diagnoses   Name Primary?    Tarsal tunnel syndrome, left Yes    Left foot pain          Plan:       Tosha  was seen today for follow-up.    Diagnoses and all orders for this visit:    Tarsal tunnel syndrome, left  -     EMG W/ ULTRASOUND AND NERVE CONDUCTION TEST 1 Extremity; Future    Left foot pain    Other orders  -     methylPREDNISolone acetate injection 40 mg      I counseled the patient on her conditions, their implications and medical management.    Previous I&D of the left heel per that there was no deep infection no growth the cultures.  I am more concerned about tarsal tunnel syndrome of the left lower extremity however it is possible she could have a double crush syndrome.  We did discuss diagnostic injection to the proximal aspect of the tarsal tunnel to see how much relief she gets and for how long.    With the patient's verbal consent the skin was prepped over the proximal tarsal tunnel with alcohol followed by skin anesthesia with ethyl chloride.  Injected a mixture containing 40 mg Depo-Medrol mixed with 1 mL 0.25% plain Marcaine to the affected area.  She tolerated procedure well without apparent complications.  He did report that she had pain radiating to the toes upon the injection.  This began to significantly reduce over subsequent several minutes to following the injection.  He is instructed rest, ice and elevate p.r.n..    Dispensed tall orthopedic boot to help protect the hindfoot reduce her pain.    Reviewed appropriate activity restrictions and modifications.    EMG/NCV to assess for significant tarsal tunnel syndrome and or lumbar radiculopathy.  Most likely will require tarsal tunnel decompression left lower extremity as we discussed today.    RTC 3 weeks or p.r.n. as discussed.

## 2020-08-26 ENCOUNTER — PATIENT MESSAGE (OUTPATIENT)
Dept: PODIATRY | Facility: CLINIC | Age: 55
End: 2020-08-26

## 2020-08-28 ENCOUNTER — PATIENT MESSAGE (OUTPATIENT)
Dept: PODIATRY | Facility: CLINIC | Age: 55
End: 2020-08-28

## 2020-08-28 RX ORDER — DICLOFENAC SODIUM AND MISOPROSTOL 75; 200 MG/1; UG/1
1 TABLET, DELAYED RELEASE ORAL 2 TIMES DAILY
Qty: 60 TABLET | Refills: 2 | Status: SHIPPED | OUTPATIENT
Start: 2020-08-28 | End: 2020-12-21

## 2020-08-28 RX ORDER — METHYLPREDNISOLONE 4 MG/1
TABLET ORAL
Qty: 1 PACKAGE | Refills: 0 | Status: SHIPPED | OUTPATIENT
Start: 2020-08-28 | End: 2020-09-18

## 2020-08-31 ENCOUNTER — TELEPHONE (OUTPATIENT)
Dept: PODIATRY | Facility: CLINIC | Age: 55
End: 2020-08-31

## 2020-08-31 NOTE — TELEPHONE ENCOUNTER
Left message to reschedule patient appointment with Dr. Jacinto, Patient is having a Nerve test on the same day 9/4/2020.

## 2020-09-01 ENCOUNTER — TELEPHONE (OUTPATIENT)
Dept: FAMILY MEDICINE | Facility: CLINIC | Age: 55
End: 2020-09-01

## 2020-09-01 ENCOUNTER — TELEPHONE (OUTPATIENT)
Dept: PODIATRY | Facility: CLINIC | Age: 55
End: 2020-09-01

## 2020-09-01 DIAGNOSIS — Z12.39 BREAST CANCER SCREENING: ICD-10-CM

## 2020-09-01 DIAGNOSIS — Z12.11 COLON CANCER SCREENING: Primary | ICD-10-CM

## 2020-09-01 NOTE — TELEPHONE ENCOUNTER
----- Message from Nia Olivarez sent at 9/1/2020  4:28 PM CDT -----  Type:  Patient Returning Call    Who CalledPatient  Who Left Message for Patient:Office  Does the patient know what this is regarding?:appointment  Would the patient rather a call back or a response via [facility]? call  Best Call Back Number: [phone]  Additional Information:

## 2020-09-02 ENCOUNTER — TELEPHONE (OUTPATIENT)
Dept: GASTROENTEROLOGY | Facility: CLINIC | Age: 55
End: 2020-09-02

## 2020-09-03 DIAGNOSIS — Z01.812 PRE-PROCEDURE LAB EXAM: ICD-10-CM

## 2020-09-03 NOTE — TELEPHONE ENCOUNTER
Endoscopy Scheduling Questionnaire:     1. Have you been admitted overnight to the hospital in the past 3 months? NO  2. Have you had a cardiac stent placed in the past 12 months? NO  3. Have you had a stroke or heart attack in the past 6 months? NO  4. Have you had any chest pain in the past 3 months?       If so, have you been evaluated by your PCP or Cardiologist? NO  5. Do you take any blood thinners?NO  6. Have you been diagnosed with Diverticulitis within the past 3 months? NO  7. Are you on dialysis or have Kidney Disease?NO   8. Are you diabetic?  Do you have an insulin pump? NO  9. Do you have any other health issues that you feel might limit your ability to safely have the procedure and/or sedation?   10. Is the patient over 79 yo? NO     If yes, has the patient been seen by their PCP or GI in the last 6 months?   11. Family History of Colon Cancer?NO         If yes, relationship/age?  12. Previous Colonoscopy Procedure? NO         If yes, when/where  13. History of Colon Cancer?NO  14.  History of Colon Polyps?NO  15. Have you had a FIT Test in the last year? NO                 -I have reviewed the patient's medications and allergies.       is not on high risk medication,   A Medication /Cardiac Clearance request  has been sent to N/A.  -I have verified the pharmacy information. The prep being used is SUPREP. The patient's prep instructions were sent by PORTAL.

## 2020-09-04 ENCOUNTER — PROCEDURE VISIT (OUTPATIENT)
Dept: PHYSICAL MEDICINE AND REHAB | Facility: CLINIC | Age: 55
End: 2020-09-04
Payer: COMMERCIAL

## 2020-09-04 DIAGNOSIS — G57.52 TARSAL TUNNEL SYNDROME, LEFT: ICD-10-CM

## 2020-09-04 PROCEDURE — 95886 PR EMG COMPLETE, W/ NERVE CONDUCTION STUDIES, 5+ MUSCLES: ICD-10-PCS | Mod: S$GLB,,, | Performed by: PHYSICAL MEDICINE & REHABILITATION

## 2020-09-04 PROCEDURE — 95909 NRV CNDJ TST 5-6 STUDIES: CPT | Mod: S$GLB,,, | Performed by: PHYSICAL MEDICINE & REHABILITATION

## 2020-09-04 PROCEDURE — 95909 PR NERVE CONDUCTION STUDY; 5-6 STUDIES: ICD-10-PCS | Mod: S$GLB,,, | Performed by: PHYSICAL MEDICINE & REHABILITATION

## 2020-09-04 PROCEDURE — 95886 MUSC TEST DONE W/N TEST COMP: CPT | Mod: S$GLB,,, | Performed by: PHYSICAL MEDICINE & REHABILITATION

## 2020-09-04 NOTE — PROCEDURES
Test Date:  2020    Patient: Tosha Wilks : 1965 Physician: Macho Sosa D.O.   ID#:  SEX: Female Ref. Phys: Blaine Jacinto DPM     HPI: Tosha Wilks is a 55 y.o.female who presents for NCS/EMG to evaluate left tarsal tunnel syndrome    NCV & EMG Findings:   All nerve conduction studies (as indicated in the following tables) were within normal limits.   All examined muscles (as indicated in the following table) showed no evidence of electrical instability.    Impression:  This was a normal electrodiagnostic study of the left lower extremity.  No electrophysiologic evidence of tarsal tunnel syndrome.    ___________________________  Macho Sosa D.O.        NCS+  Motor Nerve Results      Latency Amplitude F-Lat Segment Distance CV Comment   Site (ms) Norm (mV) Norm (ms)  (cm) (m/s) Norm    Left Fibular (EDB)   Ankle 3.5  < 6.5 4.3  > 1.10         Bel Fib Head 9.9 - 3.7 -  Bel Fib Head-Ankle 40 63  > 39    Pop Fossa 12.4 - 3.2 -  Pop Fossa-Bel Fib Head 16 64  > 42    Left Tibial (AHB)   Ankle 3.9  < 6.1 6.4  > 5.3         Knee 11.1 - 6.8 -  Knee-Ankle 39 54  > 40      Sensory Nerve Results      Latency (Peak) Amplitude (P-P) Segment Distance CV Comment   Site (ms) Norm (µV) Norm  (cm) (m/s) Norm    Left Sural   Calf-Lat Mall 3.1  < 4.5 12  > 4 Calf-Lat Mall 14 45  > 35    Left Superficial Fibular   14 cm-Ankle 2.8  < 4.2 34  > 5 14 cm-Ankle 14 50  > 32    Left Medial Plantar   Med Sole-Med Mall 2.8  < 3.7 25  > 10 Med Sole-Med Mall - - -      EMG+     Side Muscle Nerve Root Ins Act Fibs Psw Amp Dur Poly Recrt Int Pat Comment   Left Vastus Med Femoral L2-L4 Nml Nml Nml Nml Nml 0 Nml Nml    Left Tib Anterior Fibular,  Deep Fibula... L4-L5 Nml Nml Nml Nml Nml 0 Nml Nml    Left Fib Longus Fibular,  Superficial... L5-S1 Nml Nml Nml Nml Nml 0 Nml Nml    Left Gastroc Tibial S1-S2 Nml Nml Nml Nml Nml 0 Nml Nml    Left FDI Pedis Lateral Plantar,  [place]... S1-S2 Nml Nml Nml Nml Nml  0 Nml Nml    Left Gluteus Med Sup Gluteal L5-S1 Nml Nml Nml Nml Nml 0 Nml Nml    Left Lumbo Parasp (Lower) Rami L5-S1 Nml Nml Nml         Left ADQP [place] S1-S2 Nml Nml Nml Nml Nml 0 Nml Nml    Left AHB Tibial,  Medial Plant... S1-S2 Nml Nml Nml Nml Nml 0 Nml Nml    Left EDB Fibular,  Deep Fibula... L5-S1 Nml Nml Nml Nml Nml 0 Nml Nml            Waveforms:    Motor       Sensory

## 2020-09-06 ENCOUNTER — HOSPITAL ENCOUNTER (EMERGENCY)
Facility: HOSPITAL | Age: 55
Discharge: HOME | End: 2020-09-06
Attending: EMERGENCY MEDICINE
Payer: COMMERCIAL

## 2020-09-06 VITALS
WEIGHT: 160 LBS | HEIGHT: 62 IN | SYSTOLIC BLOOD PRESSURE: 147 MMHG | TEMPERATURE: 98 F | OXYGEN SATURATION: 100 % | HEART RATE: 75 BPM | RESPIRATION RATE: 18 BRPM | BODY MASS INDEX: 29.44 KG/M2 | DIASTOLIC BLOOD PRESSURE: 84 MMHG

## 2020-09-06 DIAGNOSIS — G89.4 CHRONIC PAIN SYNDROME: ICD-10-CM

## 2020-09-06 DIAGNOSIS — R52 PAIN CRISIS: Primary | ICD-10-CM

## 2020-09-06 PROCEDURE — 63600175 PHARM REV CODE 636 W HCPCS: Mod: ER | Performed by: EMERGENCY MEDICINE

## 2020-09-06 PROCEDURE — 99284 EMERGENCY DEPT VISIT MOD MDM: CPT | Mod: 25,ER

## 2020-09-06 PROCEDURE — 96372 THER/PROPH/DIAG INJ SC/IM: CPT | Mod: ER

## 2020-09-06 RX ORDER — DEXAMETHASONE SODIUM PHOSPHATE 4 MG/ML
8 INJECTION, SOLUTION INTRA-ARTICULAR; INTRALESIONAL; INTRAMUSCULAR; INTRAVENOUS; SOFT TISSUE
Status: COMPLETED | OUTPATIENT
Start: 2020-09-06 | End: 2020-09-06

## 2020-09-06 RX ORDER — KETOROLAC TROMETHAMINE 30 MG/ML
60 INJECTION, SOLUTION INTRAMUSCULAR; INTRAVENOUS
Status: COMPLETED | OUTPATIENT
Start: 2020-09-06 | End: 2020-09-06

## 2020-09-06 RX ADMIN — DEXAMETHASONE SODIUM PHOSPHATE 8 MG: 4 INJECTION, SOLUTION INTRAMUSCULAR; INTRAVENOUS at 06:09

## 2020-09-06 RX ADMIN — KETOROLAC TROMETHAMINE 60 MG: 30 INJECTION, SOLUTION INTRAMUSCULAR at 06:09

## 2020-09-06 NOTE — ED NOTES
"Patient informed that she is being discharged, when asked if she is ready to go, the patient  states, "not really, she haven't gotten any relief. " Patient and patient  informed that the doctor will be in to speak with them. When locating the doctor, the patient  stepped out of the room and stated, "Don't worry about it" and grabbed a wheelchair. MD informed of the patients concern. No new orders given and the patient was discharged. Patient wheeled to discharge desk and to the car by nurse.   "

## 2020-09-06 NOTE — ED PROVIDER NOTES
Chief Complaint  Chief Complaint   Patient presents with    Generalized Body Aches     Pt reports generalized body aches d/t lupus and fibromyalgia. Pt has tried her pain protocol at with not relief.       HPI  Tosha Wilks is a 55 y.o. female who presents with whole body pain.  Patient reports daily pain for years.  They report recent issue with foot that has cleared up completely but they feel it may have started a lupus flare.  She is using her fentanyl patch, her gabapentin and Lyrica, her daily steroids, her muscle relaxant and still her pain continues.  They do not have concern for any infection at this time.  No new trauma.  No fever or vomiting or diarrhea or chest pain or shortness of breath or palpitations or syncope.  No blister or rash at this time.  They are requesting help with her pain.  The  describes that her usual daily pain, he would put it on the level of 3 and he feels like she is now at a level of 10 pain.  No exacerbating or relieving factors.  They report the usual pain protocol she follows is to take all of her medicines and then go to the ER if having an exacerbation of her pain    Past medical history  Past Medical History:   Diagnosis Date    Ankylosing spondylitis     Anxiety     Asthma     Chronic constipation     Chronic insomnia     Edema     Fibromyalgia     Interstitial cystitis     Lupus     Migraine headache     Restless legs syndrome     Shingles     Stroke 1998    post partum right sided numbness    TIA (transient ischemic attack) 1998       Current Medications  No current facility-administered medications for this encounter.     Current Outpatient Medications:     diclofenac-misoprostol  mg-mcg (ARTHROTEC 75)  mg-mcg per tablet, Take 1 tablet by mouth 2 (two) times daily., Disp: 60 tablet, Rfl: 2    gabapentin (NEURONTIN) 400 MG capsule, TAKE 1 CAPSULE (400 MG TOTAL) BY MOUTH 3 (THREE) TIMES DAILY., Disp: 90 capsule, Rfl: 0     HYDROmorphone (DILAUDID) 2 MG tablet, Take 1 tablet (2 mg total) by mouth every 6 to 8 hours as needed for pain, Disp: 105 tablet, Rfl: 0    hydroxychloroquine (PLAQUENIL) 200 mg tablet, Take 200 mg by mouth once daily., Disp: , Rfl: 1    hydrOXYzine pamoate (VISTARIL) 25 MG Cap, TAKE 1 CAPSULE (25 MG TOTAL) BY MOUTH EVERY 4 (FOUR) HOURS AS NEEDED., Disp: 90 capsule, Rfl: 3    lidocaine HCl 2 % Crea, Apply to affected areas 4 times a day as needed., Disp: 118 mL, Rfl: 2    methylPREDNISolone (MEDROL DOSEPACK) 4 mg tablet, use as directed, Disp: 1 Package, Rfl: 0    topiramate (TOPAMAX) 100 MG tablet, Take 1 tablet (100 mg total) by mouth every evening. for 14 days, Disp: 90 tablet, Rfl: 3    albuterol (PROAIR HFA) 90 mcg/actuation inhaler, Inhale 2 puffs into the lungs every 4 (four) hours as needed., Disp: 18 g, Rfl: 3    budesonide 180mcg (PULMICORT 180MCG) 180 mcg/actuation AePB, Inhale 2 puffs into the lungs once daily., Disp: 1 each, Rfl: 3    cyclobenzaprine (FLEXERIL) 5 MG tablet, TAKE 3 PILLS BY MOUTH (NIGHTLY), Disp: , Rfl: 6    EPINEPHrine (EPIPEN 2-DENNIS) 0.3 mg/0.3 mL AtIn, Inject 0.3 mLs (0.3 mg total) into the muscle once. for 1 dose, Disp: 2 Device, Rfl: 3    fentaNYL (DURAGESIC) 50 mcg/hr, APPLY 1 PATCH TO SKIN EVERY 72 HOURS, Disp: , Rfl:     HYDROmorphone (DILAUDID) 2 MG tablet, take 1 tablet by mouth every 8 hours as needed for pain, Disp: 90 tablet, Rfl: 0    omeprazole (PRILOSEC) 20 MG capsule, TAKE 1 CAPSULE BY MOUTH EVERY DAY (Patient taking differently: Take 20 mg by mouth 3 (three) times daily with meals. ), Disp: 90 capsule, Rfl: 3    sumatriptan (IMITREX) 50 MG tablet, Once for severe headache. May repeat once after 2 hours. Do not exceed 3-4 doses in one week., Disp: 12 tablet, Rfl: 3    Allergies  Review of patient's allergies indicates:   Allergen Reactions    Morphine Anxiety     Hives     Other Other (See Comments)     Allergy is carrots Other reaction(s): Angioedema,  carrots    Erythromycin Other (See Comments)     Hives and cramps     Zofran (as hydrochloride) [ondansetron hcl] Hives     Local hive after IV injection       Surgical history  Past Surgical History:   Procedure Laterality Date    BLADDER SURGERY      BREAST SURGERY  2018    reduction     SECTION      CHOLECYSTECTOMY      HYSTERECTOMY      LASER ABLATION      to back    SLEEVE GASTROPLASTY  2016    TUBAL LIGATION         Social history  Social History     Socioeconomic History    Marital status:      Spouse name: Not on file    Number of children: Not on file    Years of education: Not on file    Highest education level: Not on file   Occupational History    Not on file   Social Needs    Financial resource strain: Not hard at all    Food insecurity     Worry: Never true     Inability: Never true    Transportation needs     Medical: No     Non-medical: No   Tobacco Use    Smoking status: Never Smoker    Smokeless tobacco: Never Used   Substance and Sexual Activity    Alcohol use: Not Currently     Frequency: Monthly or less     Drinks per session: 1 or 2     Binge frequency: Never     Comment: occasionally    Drug use: No    Sexual activity: Yes     Partners: Male   Lifestyle    Physical activity     Days per week: 2 days     Minutes per session: 20 min    Stress: To some extent   Relationships    Social connections     Talks on phone: Three times a week     Gets together: Once a week     Attends Druze service: Not on file     Active member of club or organization: Yes     Attends meetings of clubs or organizations: Never     Relationship status:    Other Topics Concern    Not on file   Social History Narrative    Not on file       Family History  Family History   Problem Relation Age of Onset    Hypertension Mother     Hypertension Brother     Breast cancer Maternal Aunt         x2     Heart attack Father     Hypertension Sister     Lupus Sister   "      Review of systems  Respiratory: No wheezing, cough, or shortness of breath.  Cardiovascular: No chest pain or palpitations.  : No dysuria or discharge.  All systems otherwise negative except as noted in ROS and HPI    Physical Exam  Vital signs: BP (!) 147/84   Pulse 75   Temp 98.3 °F (36.8 °C) (Oral)   Resp 18   Ht 5' 2" (1.575 m)   Wt 72.6 kg (160 lb)   LMP 03/26/2012   SpO2 100%   Breastfeeding No   BMI 29.26 kg/m²   Constitutional: No acute distress.  Well developed, alert, oriented and appropriate.  HENT: Normocephalic, atraumatic. Normal ear, nose, and throat.  Eyes: PERRL, EOMI, normal conjunctiva.  Neck: Normal range of motion, no tenderness; supple.  Respiratory: Nonlabored breathing with normal breath sounds.  Cardiovascular: RRR with no pulse deficit.  GI: Soft, nontender, no rebound or guarding.  Musculoskeletal: Normal ROM, no tenderness, injury, or edema.  Skin: Warm, dry skin without infection or injury.  Neurologic: Normal motor, sensation with no new focal deficit.  Psychiatric: Affect normal, judgement normal, mood normal.  No SI, HI, and not gravely disabled.    Labs  Pertinent labs reviewed (see chart for details)  Labs Reviewed - No data to display    ECG  Results for orders placed or performed during the hospital encounter of 09/25/19   EKG 12-lead    Collection Time: 09/25/19  1:37 PM    Narrative    Test Reason : R00.2,    Vent. Rate : 073 BPM     Atrial Rate : 073 BPM     P-R Int : 178 ms          QRS Dur : 082 ms      QT Int : 398 ms       P-R-T Axes : 071 -02 049 degrees     QTc Int : 438 ms    Normal sinus rhythm  Low voltage QRS in the precordial leads  Otherwise normal ECG  When compared with ECG of 28-DEC-2018 18:26,  Vent. rate has decreased BY  39 BPM    Confirmed by Janice Johnson MD ([id number]) on 9/26/2019 4:39:45 PM    Referred By: CONSTANZA   SELF           Confirmed By:Janice Johnson MD     ECG interpreted by ED MD    Radiology  No orders to display "       Procedures  Procedures    Medications   ketorolac injection 60 mg (60 mg Intramuscular Given 9/6/20 0639)   dexamethasone injection 8 mg (8 mg Intramuscular Given 9/6/20 0639)       ED course and medical decision making         I see no life-threatening emergency at this time.  Toradol injection and steroid injection given here today and they are encouraged to follow up with their pain management specialist.  They understand reasons to return emergency department including any change or worsening or persistence.    Disposition    Patient discharged in stable condition      Final impression  1. Pain crisis    2. Chronic pain syndrome        Critical care time spent with this patient was 0 minutes excluding the procedure time.          Edgardo Tabor MD  09/06/20 0722

## 2020-09-06 NOTE — ED TRIAGE NOTES
Tosha Wilks, a 55 y.o. female presents to the ED w/ complaint of body aches    Triage note:  Chief Complaint   Patient presents with    Generalized Body Aches     Pt reports generalized body aches d/t lupus and fibromyalgia. Pt has tried her pain protocol at with not relief.     Review of patient's allergies indicates:   Allergen Reactions    Morphine Anxiety     Hives     Other Other (See Comments)     Allergy is carrots Other reaction(s): Angioedema, carrots    Erythromycin Other (See Comments)     Hives and cramps     Zofran (as hydrochloride) [ondansetron hcl] Hives     Local hive after IV injection     Past Medical History:   Diagnosis Date    Ankylosing spondylitis     Anxiety     Asthma     Chronic constipation     Chronic insomnia     Edema     Fibromyalgia     Interstitial cystitis     Lupus     Migraine headache     Restless legs syndrome     Shingles     Stroke 1998    post partum right sided numbness    TIA (transient ischemic attack) 1998         Adult Physical Assessment  LOC: Tosha Wilks, 55 y.o. female verified via two identifiers.  The patient is awake, alert, oriented and speaking appropriately at this time.  APPEARANCE: Patient resting comfortably and appears to be in no acute distress at this time. Patient is clean and well groomed, patient's clothing is properly fastened.  SKIN:The skin is warm and dry, color consistent with ethnicity, patient has normal skin turgor and moist mucus membranes, skin intact, no breakdown or brusing noted.  MUSCULOSKELETAL: Patient moving all extremities well, no obvious swelling or deformities noted.  RESPIRATORY: Airway is open and patent, respirations are spontaneous, patient has a normal effort and rate, no accessory muscle use noted.  CARDIAC: Patient has a normal rate and rhythm, no periphreal edema noted in any extremity, capillary refill < 3 seconds in all extremities  ABDOMEN: Soft and non tender to palpation, no abdominal  distention noted. Bowel sounds present in all four quadrants.  NEUROLOGIC: Eyes open spontaneously, behavior appropriate to situation, follows commands, facial expression symmetrical, bilateral hand grasp equal and even, purposeful motor response noted, normal sensation in all extremities when touched with a finger.
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
## 2020-09-07 RX ORDER — SODIUM, POTASSIUM,MAG SULFATES 17.5-3.13G
1 SOLUTION, RECONSTITUTED, ORAL ORAL DAILY
Qty: 1 KIT | Refills: 0 | Status: SHIPPED | OUTPATIENT
Start: 2020-09-07 | End: 2020-09-09

## 2020-09-08 ENCOUNTER — PATIENT MESSAGE (OUTPATIENT)
Dept: FAMILY MEDICINE | Facility: CLINIC | Age: 55
End: 2020-09-08

## 2020-09-08 ENCOUNTER — HOSPITAL ENCOUNTER (OUTPATIENT)
Dept: RADIOLOGY | Facility: HOSPITAL | Age: 55
Discharge: HOME | End: 2020-09-08
Attending: FAMILY MEDICINE
Payer: COMMERCIAL

## 2020-09-08 DIAGNOSIS — Z12.39 BREAST CANCER SCREENING: ICD-10-CM

## 2020-09-08 PROCEDURE — 77067 SCR MAMMO BI INCL CAD: CPT | Mod: TC,PO

## 2020-09-10 ENCOUNTER — OFFICE VISIT (OUTPATIENT)
Dept: PODIATRY | Facility: CLINIC | Age: 55
End: 2020-09-10
Payer: COMMERCIAL

## 2020-09-10 VITALS
SYSTOLIC BLOOD PRESSURE: 137 MMHG | HEIGHT: 62 IN | WEIGHT: 160.06 LBS | DIASTOLIC BLOOD PRESSURE: 82 MMHG | BODY MASS INDEX: 29.45 KG/M2 | HEART RATE: 83 BPM

## 2020-09-10 DIAGNOSIS — G57.52 TARSAL TUNNEL SYNDROME, LEFT: Primary | ICD-10-CM

## 2020-09-10 DIAGNOSIS — G89.29 CHRONIC PAIN OF LEFT LOWER EXTREMITY: ICD-10-CM

## 2020-09-10 DIAGNOSIS — M72.2 PLANTAR FASCIITIS, LEFT: ICD-10-CM

## 2020-09-10 DIAGNOSIS — M79.605 CHRONIC PAIN OF LEFT LOWER EXTREMITY: ICD-10-CM

## 2020-09-10 PROCEDURE — 99213 PR OFFICE/OUTPT VISIT, EST, LEVL III, 20-29 MIN: ICD-10-PCS | Mod: S$GLB,,, | Performed by: PODIATRIST

## 2020-09-10 PROCEDURE — 99999 PR PBB SHADOW E&M-EST. PATIENT-LVL V: ICD-10-PCS | Mod: PBBFAC,,, | Performed by: PODIATRIST

## 2020-09-10 PROCEDURE — 99213 OFFICE O/P EST LOW 20 MIN: CPT | Mod: S$GLB,,, | Performed by: PODIATRIST

## 2020-09-10 PROCEDURE — 3008F PR BODY MASS INDEX (BMI) DOCUMENTED: ICD-10-PCS | Mod: CPTII,S$GLB,, | Performed by: PODIATRIST

## 2020-09-10 PROCEDURE — 3008F BODY MASS INDEX DOCD: CPT | Mod: CPTII,S$GLB,, | Performed by: PODIATRIST

## 2020-09-10 PROCEDURE — 99999 PR PBB SHADOW E&M-EST. PATIENT-LVL V: CPT | Mod: PBBFAC,,, | Performed by: PODIATRIST

## 2020-09-10 RX ORDER — POTASSIUM CHLORIDE 750 MG/1
TABLET, EXTENDED RELEASE ORAL
COMMUNITY
Start: 2020-08-25 | End: 2021-02-02

## 2020-09-10 RX ORDER — ESCITALOPRAM OXALATE 20 MG/1
20 TABLET ORAL DAILY
COMMUNITY
Start: 2020-08-24 | End: 2020-10-15 | Stop reason: ALTCHOICE

## 2020-09-10 RX ORDER — GABAPENTIN 600 MG/1
TABLET ORAL
COMMUNITY
Start: 2020-06-26 | End: 2021-03-10 | Stop reason: ALTCHOICE

## 2020-09-10 RX ORDER — PREGABALIN 100 MG/1
CAPSULE ORAL
COMMUNITY
Start: 2020-07-28 | End: 2021-02-02

## 2020-09-10 RX ORDER — ZOLPIDEM TARTRATE 10 MG/1
TABLET ORAL NIGHTLY PRN
COMMUNITY
Start: 2020-09-03 | End: 2022-09-19

## 2020-09-10 NOTE — PROGRESS NOTES
Subjective:      Patient ID: Tosha Wilks is a 55 y.o. female.    Chief Complaint: Foot Pain (left)    Tosha  is a 55 y.o. female who presents to the clinic complaining of heel pain in the left foot, especially with the first step in the morning for the past 2 months.  Relates history of large blister forming to the plantar medial left heel that was lanced twice at different urgent care visits.  She completed a course of oral Bactrim DS with no improvement.  She was given topical antibiotic per her PCP which helps somewhat.  Pain is unchanged overall however did describe that initially when the blister began that there was numbness to the area.  There was a history back pain that radiates down the left lower extremity how that has improved.  The pain is described as Sharp. The onset of the pain was sudden and has worsened over the past several weeks. Tosha  rates the pain as 10/10. She denies a history of trauma. Prior treatments include antibiotics, wound care and debridement. Symptoms have worsened. She denies F/N/V/C.    8/7/2020: Patient relates worsening left heel pain 2 days ago that seems to have improved somewhat today.  She had recent MRI. Denies F/N/V/C.  Followed per Pain Clinic.    08/14/2020:  Post I&D of the left heel x7 days.  Has 1 remaining days of doxycycline.  Wound C&S no growth.  Relates that she has a moderate amount of pain when she attempts to stand or walk on the left foot.  She feels pain radiate to her toes.  She has a history of low back pain also raise on the left lower extremity.  Previous MRI in shown area of uptake that the foot cellulitis versus a fluid collection along the plantar medial left heel.    09/10/2020:  Follow-up from diagnostic injection to the left posterior tibial nerve overlying the tarsal tunnel.  Relates that she gets minimal relief from the injection for short period of time.  Relates she was recently admitted at [facility] and treated for  "lupus flare and worsening of pain to left lower extremity.  States that she was given a course of steroids as well as treated for dehydration.  Her pain today is improved compared to last visit.  She has a history of fibromyalgia and lupus followed by Rheumatology.  Her pain management physician is in [place].      Vitals:    09/10/20 1307   BP: 137/82   Pulse: 83   Weight: 72.6 kg (160 lb 0.9 oz)   Height: 5' 2" (1.575 m)   PainSc:   6      Past Medical History:   Diagnosis Date    Ankylosing spondylitis     Anxiety     Asthma     Chronic constipation     Chronic insomnia     Edema     Fibromyalgia     Interstitial cystitis     Lupus     Migraine headache     Restless legs syndrome     Shingles     Stroke     post partum right sided numbness    TIA (transient ischemic attack)        Past Surgical History:   Procedure Laterality Date    BLADDER SURGERY      BREAST SURGERY  2018    reduction     SECTION      CHOLECYSTECTOMY      HYSTERECTOMY      LASER ABLATION      to back    SLEEVE GASTROPLASTY  2016    TOTAL REDUCTION MAMMOPLASTY Bilateral     two years ago    TUBAL LIGATION         Family History   Problem Relation Age of Onset    Hypertension Mother     Hypertension Brother     Breast cancer Maternal Aunt         x2     Heart attack Father     Hypertension Sister     Lupus Sister        Social History     Socioeconomic History    Marital status:      Spouse name: Not on file    Number of children: Not on file    Years of education: Not on file    Highest education level: Not on file   Occupational History    Not on file   Social Needs    Financial resource strain: Not hard at all    Food insecurity     Worry: Never true     Inability: Never true    Transportation needs     Medical: No     Non-medical: No   Tobacco Use    Smoking status: Never Smoker    Smokeless tobacco: Never Used   Substance and Sexual Activity    Alcohol use: Not Currently "     Frequency: Monthly or less     Drinks per session: 1 or 2     Binge frequency: Never     Comment: occasionally    Drug use: No    Sexual activity: Yes     Partners: Male   Lifestyle    Physical activity     Days per week: 2 days     Minutes per session: 20 min    Stress: To some extent   Relationships    Social connections     Talks on phone: Three times a week     Gets together: Once a week     Attends Oriental orthodox service: Not on file     Active member of club or organization: Yes     Attends meetings of clubs or organizations: Never     Relationship status:    Other Topics Concern    Not on file   Social History Narrative    Not on file       Current Outpatient Medications   Medication Sig Dispense Refill    albuterol (PROAIR HFA) 90 mcg/actuation inhaler Inhale 2 puffs into the lungs every 4 (four) hours as needed. 18 g 3    budesonide 180mcg (PULMICORT 180MCG) 180 mcg/actuation AePB Inhale 2 puffs into the lungs once daily. 1 each 3    diclofenac-misoprostol  mg-mcg (ARTHROTEC 75)  mg-mcg per tablet Take 1 tablet by mouth 2 (two) times daily. 60 tablet 2    EPINEPHrine (EPIPEN 2-DENNIS) 0.3 mg/0.3 mL AtIn Inject 0.3 mLs (0.3 mg total) into the muscle once. for 1 dose 2 Device 3    escitalopram oxalate (LEXAPRO) 20 MG tablet Take 20 mg by mouth once daily.      fentaNYL (DURAGESIC) 50 mcg/hr APPLY 1 PATCH TO SKIN EVERY 72 HOURS      gabapentin (NEURONTIN) 400 MG capsule TAKE 1 CAPSULE (400 MG TOTAL) BY MOUTH 3 (THREE) TIMES DAILY. 90 capsule 0    gabapentin (NEURONTIN) 600 MG tablet       HYDROmorphone (DILAUDID) 2 MG tablet take 1 tablet by mouth every 8 hours as needed for pain 90 tablet 0    HYDROmorphone (DILAUDID) 2 MG tablet Take 1 tablet (2 mg total) by mouth every 6 to 8 hours as needed for pain 105 tablet 0    hydroxychloroquine (PLAQUENIL) 200 mg tablet Take 200 mg by mouth once daily.  1    hydrOXYzine pamoate (VISTARIL) 25 MG Cap TAKE 1 CAPSULE (25 MG TOTAL) BY  MOUTH EVERY 4 (FOUR) HOURS AS NEEDED. 90 capsule 3    lidocaine HCl 2 % Crea Apply to affected areas 4 times a day as needed. 118 mL 2    methylPREDNISolone (MEDROL DOSEPACK) 4 mg tablet use as directed 1 Package 0    omeprazole (PRILOSEC) 20 MG capsule TAKE 1 CAPSULE BY MOUTH EVERY DAY (Patient taking differently: Take 20 mg by mouth 3 (three) times daily with meals. ) 90 capsule 3    potassium chloride SA (K-DUR,KLOR-CON) 10 MEQ tablet TAKE 1 TABLET (10 MEQ TOTAL) BY MOUTH 2 (TWO) TIMES DAILY.      pregabalin (LYRICA) 100 MG capsule       sumatriptan (IMITREX) 50 MG tablet Once for severe headache. May repeat once after 2 hours. Do not exceed 3-4 doses in one week. 12 tablet 3    topiramate (TOPAMAX) 100 MG tablet Take 1 tablet (100 mg total) by mouth every evening. for 14 days 90 tablet 3    zolpidem (AMBIEN) 10 mg Tab        No current facility-administered medications for this visit.        Review of patient's allergies indicates:   Allergen Reactions    Morphine Anxiety     Hives     Other Other (See Comments)     Allergy is carrots Other reaction(s): Angioedema, carrots    Carrot     Erythromycin Other (See Comments)     Hives and cramps     Zofran (as hydrochloride) [ondansetron hcl] Hives     Local hive after IV injection       Review of Systems   Constitution: Negative for chills, decreased appetite, fever and night sweats.   HENT: Negative for congestion and hearing loss.    Cardiovascular: Negative for chest pain, claudication, cyanosis, irregular heartbeat and leg swelling.   Respiratory: Negative for cough and shortness of breath.    Skin: Positive for color change and poor wound healing. Negative for dry skin, itching, nail changes, rash, suspicious lesions and unusual hair distribution.        wound   Musculoskeletal: Positive for back pain. Negative for arthritis, falls, gout, joint pain, joint swelling and muscle weakness.        Left heel pain     Gastrointestinal: Negative for nausea  and vomiting.   Neurological: Positive for numbness and paresthesias. Negative for dizziness, headaches, loss of balance and weakness.           Objective:      Physical Exam  Constitutional:       General: She is not in acute distress.     Appearance: She is not ill-appearing.   Cardiovascular:      Pulses:           Dorsalis pedis pulses are 2+ on the right side and 2+ on the left side.        Posterior tibial pulses are 2+ on the right side and 2+ on the left side.      Comments: CFT brisk < 3s  No edema to the LEs  Hair growth and distribution is normal.    Musculoskeletal:      Comments: Mild to moderate pain on palpation to the left heel plantar medial and central aspect extending to the proximal 3rd of the medial longitudinal arch.  There is moderate pain on palpation along the tarsal tunnel region commencing at the area just superior to the medial malleolus with pain radiating down to the toes and this continues to extend down through the tarsal tunnel.  Today there is no pain with dorsiflexion of the left foot.  No palpable fluctuance or crepitance left foot.    -20 degrees dorsiflexion left ankle to knee extended.   Skin:     General: Skin is warm.      Capillary Refill: Capillary refill takes less than 2 seconds.      Findings: No ecchymosis.      Nails: There is no clubbing.        Comments: Left foot: Skin appears healed today. She still has tenderness on palpation to the foot. No erythema, and minor edema to the heel.    Neurological:      Mental Status: She is alert and oriented to person, place, and time.      Comments: + provocation to the PT nerve  Light touch intact.  MMT 5/5 DF, PF, Inv, Ev  Normal muscle tone.  No signs of atrophy.  No tremor or spasticity.                 Assessment:       Encounter Diagnoses   Name Primary?    Tarsal tunnel syndrome, left Yes    Plantar fasciitis, left     Chronic pain of left lower extremity          Plan:       Tosha  was seen today for foot  pain.    Diagnoses and all orders for this visit:    Tarsal tunnel syndrome, left  -     Ambulatory referral/consult to Physical/Occupational Therapy; Future    Plantar fasciitis, left  -     Ambulatory referral/consult to Physical/Occupational Therapy; Future    Chronic pain of left lower extremity  -     Ambulatory referral/consult to Physical/Occupational Therapy; Future      I counseled the patient on her conditions, their implications and medical management.    Normal NCV/EMG for left lower extremity.    Previous MRI had shown the uptake in the fat pad to the plantar left heel with no other abnormality noted.    I am concerned that her chronic lower extremity pain could be linked to the lupus however she appears to be displaying tarsal tunnel syndrome the left lower extremity plantar fasciitis.  Referred to physical therapy for iontophoresis and other modalities reduce inflammation to this area.  There should be strong focus on reducing the quiesce contracture to left ankle which is causing mechanical pressure on the posterior tibial nerve.    Patient is to continue massaging her diclofenac/lidocaine cream to the affected area up to 4 times daily as discussed.    RTC 2 months or p.r.n. as discussed.  We also discussed surgical intervention consisting of tarsal tunnel release and plantar fasciotomy if her pain does not continue to improve the physical therapy.  She would require perioperative steroids to help prevent a lupus flare.    A portion of this note was generated by voice recognition software and may contain spelling and grammar errors.

## 2020-09-16 ENCOUNTER — PATIENT OUTREACH (OUTPATIENT)
Dept: ADMINISTRATIVE | Facility: OTHER | Age: 55
End: 2020-09-16

## 2020-09-16 NOTE — PROGRESS NOTES
LINKS immunization registry updated  Care Everywhere updated  Health Maintenance updated  Chart reviewed for overdue Proactive [facility] Encounters ([facility]) health maintenance testing (CRS, Breast Ca, Diabetic Eye Exam)   Orders entered:N/A

## 2020-09-25 ENCOUNTER — CLINICAL SUPPORT (OUTPATIENT)
Dept: REHABILITATION | Facility: HOSPITAL | Age: 55
End: 2020-09-25
Payer: COMMERCIAL

## 2020-09-25 ENCOUNTER — OFFICE VISIT (OUTPATIENT)
Dept: UROLOGY | Facility: CLINIC | Age: 55
End: 2020-09-25
Payer: COMMERCIAL

## 2020-09-25 VITALS
HEART RATE: 90 BPM | HEIGHT: 62 IN | DIASTOLIC BLOOD PRESSURE: 84 MMHG | BODY MASS INDEX: 29.27 KG/M2 | SYSTOLIC BLOOD PRESSURE: 138 MMHG

## 2020-09-25 DIAGNOSIS — R39.11 HESITANCY OF MICTURITION: Primary | ICD-10-CM

## 2020-09-25 DIAGNOSIS — M62.89 HIGH-TONE PELVIC FLOOR DYSFUNCTION: ICD-10-CM

## 2020-09-25 DIAGNOSIS — M25.619 LIMITED RANGE OF MOTION (ROM) OF SHOULDER: ICD-10-CM

## 2020-09-25 DIAGNOSIS — K59.03 DRUG-INDUCED CONSTIPATION: ICD-10-CM

## 2020-09-25 DIAGNOSIS — S46.912A LEFT SHOULDER STRAIN, INITIAL ENCOUNTER: Primary | ICD-10-CM

## 2020-09-25 PROCEDURE — 3008F BODY MASS INDEX DOCD: CPT | Mod: CPTII,S$GLB,, | Performed by: UROLOGY

## 2020-09-25 PROCEDURE — 97110 THERAPEUTIC EXERCISES: CPT

## 2020-09-25 PROCEDURE — 3008F PR BODY MASS INDEX (BMI) DOCUMENTED: ICD-10-PCS | Mod: CPTII,S$GLB,, | Performed by: UROLOGY

## 2020-09-25 PROCEDURE — 99999 PR PBB SHADOW E&M-EST. PATIENT-LVL V: CPT | Mod: PBBFAC,,, | Performed by: UROLOGY

## 2020-09-25 PROCEDURE — 99205 PR OFFICE/OUTPT VISIT, NEW, LEVL V, 60-74 MIN: ICD-10-PCS | Mod: 25,S$GLB,, | Performed by: UROLOGY

## 2020-09-25 PROCEDURE — 99999 PR PBB SHADOW E&M-EST. PATIENT-LVL V: ICD-10-PCS | Mod: PBBFAC,,, | Performed by: UROLOGY

## 2020-09-25 PROCEDURE — 87086 URINE CULTURE/COLONY COUNT: CPT

## 2020-09-25 PROCEDURE — 51701 PR INSERTION OF NON-INDWELLING BLADDER CATHETERIZATION FOR RESIDUAL UR: ICD-10-PCS | Mod: S$GLB,,, | Performed by: UROLOGY

## 2020-09-25 PROCEDURE — 51701 INSERT BLADDER CATHETER: CPT | Mod: S$GLB,,, | Performed by: UROLOGY

## 2020-09-25 PROCEDURE — 99205 OFFICE O/P NEW HI 60 MIN: CPT | Mod: 25,S$GLB,, | Performed by: UROLOGY

## 2020-09-25 NOTE — LETTER
September 28, 2020      Jeannie Gibson, DO  [address] [address]           [facility] - Urology [address]  [address]  [place] [address]  Phone: [phone]          Patient: Tosha Wilks   MR Number: [id number]   YOB: 1965   Date of Visit: 9/25/2020       Dear Dr. Jeannie Gibson:    Thank you for referring Tosha Wilks to me for evaluation. Attached you will find relevant portions of my assessment and plan of care.    If you have questions, please do not hesitate to call me. I look forward to following Tosha Wilks along with you.    Sincerely,    Katia Waller MD    Enclosure  CC:  No Recipients    If you would like to receive this communication electronically, please contact [email or web address] or [phone] to request more information on [employer] access.    For providers and/or their staff who would like to refer a patient to [facility], please contact us through our one-stop-shop provider referral line, [facility], at [phone].    If you feel you have received this communication in error or would no longer like to receive these types of communications, please e-mail [email or web address]

## 2020-09-25 NOTE — PROGRESS NOTES
CHIEF COMPLAINT:    Mrs. Wilks is a 55 y.o. female presenting for a consultation at the request of Dr. Jeannie Gibson. Patient presents with urgency, hesitancy.    PRESENTING ILLNESS:    Tosha Wilks is a 55 y.o. female who states that she has hesitancy, urgency but cannot urinate.  She has to lean over, pushes to urinate.  She also has a long history of constipation and has to take oral meds to have a bowel movement.  Contributing factor is a history of Lupus and fibromyalgia and uses a fentanyl patch.  She states her frequency is hourly.  Nocturia x 0-1.      , hysterectomy for menorrhagia, no pain with sexual activity, constipation.       REVIEW OF SYSTEMS:    Review of Systems   Constitutional: Negative.    HENT: Negative.    Eyes: Negative.    Respiratory: Negative.    Cardiovascular: Negative.    Gastrointestinal: Positive for constipation.   Genitourinary: Positive for frequency and urgency.   Musculoskeletal: Positive for joint pain and myalgias.   Skin: Negative.    Neurological: Negative.    Endo/Heme/Allergies: Negative.    Psychiatric/Behavioral: Negative.        PATIENT HISTORY:    Past Medical History:   Diagnosis Date    Ankylosing spondylitis     Anxiety     Asthma     Chronic constipation     Chronic insomnia     Edema     Fibromyalgia     Interstitial cystitis     Lupus     Migraine headache     Restless legs syndrome     Shingles     Stroke     post partum right sided numbness    TIA (transient ischemic attack)        Past Surgical History:   Procedure Laterality Date    BLADDER SURGERY      BREAST SURGERY  2018    reduction     SECTION      CHOLECYSTECTOMY      HYSTERECTOMY      LASER ABLATION      to back    SLEEVE GASTROPLASTY  2016    TOTAL REDUCTION MAMMOPLASTY Bilateral     two years ago    TUBAL LIGATION         Family History   Problem Relation Age of Onset    Hypertension Mother     Hypertension Brother     Breast cancer  Maternal Aunt         x2     Heart attack Father     Hypertension Sister     Lupus Sister        Socioeconomic History    Marital status:    Social Needs    Financial resource strain: Not hard at all    Food insecurity     Worry: Never true     Inability: Never true    Transportation needs     Medical: No     Non-medical: No   Tobacco Use    Smoking status: Never Smoker    Smokeless tobacco: Never Used   Substance and Sexual Activity    Alcohol use: Not Currently     Frequency: Monthly or less     Drinks per session: 1 or 2     Binge frequency: Never     Comment: occasionally    Drug use: No    Sexual activity: Yes     Partners: Male   Lifestyle    Physical activity     Days per week: 2 days     Minutes per session: 20 min    Stress: To some extent   Relationships    Social connections     Talks on phone: Three times a week     Gets together: Once a week     Attends Jewish service: Not on file     Active member of club or organization: Yes     Attends meetings of clubs or organizations: Never     Relationship status:        Allergies:  Morphine, Other, Carrot, Erythromycin, and Zofran (as hydrochloride) [ondansetron hcl]    Medications:  Outpatient Encounter Medications as of 9/25/2020   Medication Sig Dispense Refill    albuterol (PROAIR HFA) 90 mcg/actuation inhaler Inhale 2 puffs into the lungs every 4 (four) hours as needed. 18 g 3    budesonide 180mcg (PULMICORT 180MCG) 180 mcg/actuation AePB Inhale 2 puffs into the lungs once daily. 1 each 3    diclofenac-misoprostol  mg-mcg (ARTHROTEC 75)  mg-mcg per tablet Take 1 tablet by mouth 2 (two) times daily. 60 tablet 2    EPINEPHrine (EPIPEN 2-DENNIS) 0.3 mg/0.3 mL AtIn Inject 0.3 mLs (0.3 mg total) into the muscle once. for 1 dose 2 Device 3    escitalopram oxalate (LEXAPRO) 20 MG tablet Take 20 mg by mouth once daily.      fentaNYL (DURAGESIC) 50 mcg/hr APPLY 1 PATCH TO SKIN EVERY 72 HOURS      gabapentin (NEURONTIN)  400 MG capsule TAKE 1 CAPSULE (400 MG TOTAL) BY MOUTH 3 (THREE) TIMES DAILY. 90 capsule 0    gabapentin (NEURONTIN) 600 MG tablet       HYDROmorphone (DILAUDID) 2 MG tablet take 1 tablet by mouth every 8 hours as needed for pain 90 tablet 0    HYDROmorphone (DILAUDID) 2 MG tablet Take 1 tablet (2 mg total) by mouth every 6 to 8 hours as needed for pain 105 tablet 0    hydroxychloroquine (PLAQUENIL) 200 mg tablet Take 200 mg by mouth once daily.  1    hydrOXYzine pamoate (VISTARIL) 25 MG Cap TAKE 1 CAPSULE (25 MG TOTAL) BY MOUTH EVERY 4 (FOUR) HOURS AS NEEDED. 90 capsule 3    lidocaine HCl 2 % Crea Apply to affected areas 4 times a day as needed. 118 mL 2    omeprazole (PRILOSEC) 20 MG capsule TAKE 1 CAPSULE BY MOUTH EVERY DAY (Patient taking differently: Take 20 mg by mouth 3 (three) times daily with meals. ) 90 capsule 3    potassium chloride SA (K-DUR,KLOR-CON) 10 MEQ tablet TAKE 1 TABLET (10 MEQ TOTAL) BY MOUTH 2 (TWO) TIMES DAILY.      pregabalin (LYRICA) 100 MG capsule       sumatriptan (IMITREX) 50 MG tablet Once for severe headache. May repeat once after 2 hours. Do not exceed 3-4 doses in one week. 12 tablet 3    topiramate (TOPAMAX) 100 MG tablet Take 1 tablet (100 mg total) by mouth every evening. for 14 days 90 tablet 3    zolpidem (AMBIEN) 10 mg Tab        No facility-administered encounter medications on file as of 9/25/2020.          PHYSICAL EXAMINATION:    The patient generally appears in good health, is appropriately interactive, and is in no apparent distress.    Skin: No lesions.    Mental: Cooperative with normal affect.    Neuro: Grossly intact.    HEENT: Normal. No evidence of lymphadenopathy.    Chest:  normal inspiratory effort.    Abdomen:  Soft, non-tender. No masses or organomegaly. Bladder is not palpable. No evidence of flank discomfort. No evidence of inguinal hernia.    Extremities: No clubbing, cyanosis, or edema    Normal external female genitalia  Urethral meatus is  normal  Urethra and bladder are nontender to bimanual exam  Well supported anteriorly and posteriorly   Uterus and cervix are surgically absent  No adnexal masses  Has increased muscle tone throughout.    PVR by catheterization was 35 ml    LABS:    Lab Results   Component Value Date    BUN 10 12/12/2019    CREATININE 0.8 12/12/2019     UA 1.015, pH 8, + leuk, tr protein, tr blood, otherwise, negative    IMPRESSION:    Encounter Diagnoses   Name Primary?    Hesitancy of micturition Yes    High-tone pelvic floor dysfunction     Drug-induced constipation        PLAN:    1.  The catheterized specimen was sent for culture  2.  Referred to physical therapy.     Copy to:  Dr. Gibson

## 2020-09-25 NOTE — PROGRESS NOTES
Physical Therapy Progress Note      Name: Tosha Wilks  Clinic Number: [id number]     Therapy Diagnosis:        Encounter Diagnoses   Name Primary?    Left shoulder strain, initial encounter      Limited range of motion (ROM) of shoulder        Physician: Zackery Rothman MD     Visit Date: 9/25/2020     Physician Orders: PT Eval and Treat   Medical Diagnosis from Referral: M75.102 (ICD-10-CM) - Rotator cuff syndrome of left shoulder  Evaluation Date: 7/10/2020  Authorization Period Expiration: 10/30/2020  Plan of Care Expiration: 12/31/2020  Visit # / Visits authorized: 3/20     Time In: 12:00 pm  Time Out: 12:45 pm  Total Billable Time: 30 minutes     Precautions: Fall and fibromyalgia, lupus, and ankylosing spondylitis     Subjective      Pt reports: she has had difficulty completing exercises at home due to pain   She was compliant with home exercise program.  Response to previous treatment: No adverse effects  Functional change: N/A     Pain: 6/10  Location: left shoulder       Objective         Passive Range of Motion:   Shoulder Right Left   Flexion 180 70   Abduction 180 50   ER at 0 30 35   ER at 90 nt nt   IR 60 40       Tosha received therapeutic exercises to develop strength, endurance, ROM and flexibility for 30 minutes including:     Tables slides flex/scaption 10 X 10 sec  Table ER stretch 10X10 sec  Scrap retracts 2X10    UBE (fwd) x 6' NP  Pulleys (flx) x 6' NP  Supine AA wand flexion x 6' NP  Supine AA wand ER x 6' NP  Pt education: HEP importance/frequency     Tosha received the following manual therapy techniques: Joint mobilizations were applied for 00 minutes including:        Tosha participated in neuromuscular re-education activities to improve Posture and Neuromuscular for 00 minutes. The following activities were included:        Tosha participated in dynamic functional therapeutic activities to improve functional performance for 00 minutes including:           Home  Exercises Provided and Patient Education Provided      Education provided:   - Healing process for frozen shoulder     Written Home Exercises Provided: yes.  Exercises were reviewed and Tosha was able to demonstrate them prior to the end of the session.  Tosha demonstrated good  understanding of the education provided.      See EMR under Patient Instructions for exercises provided 7/10/2020.     Assessment      Pt presents today with improved PROM of left shoulder. Pt exhibits unchanged pain levels and difficulty performing ADLs.  Pt demonstrated emotional response and increased pain level to end range of left shoulder in all planes.  PT educated pt on table slides today. Pt was able to perform exercises to tolerance. PT educated pt on importance of performing exercises at home consistently.       Tosha is progressing  towards her goals.   Pt prognosis is Fair/Good.        Pt will continue to benefit from skilled outpatient physical therapy to address the deficits listed in the problem list box on initial evaluation, provide pt/family education and to maximize pt's level of independence in the home and community environment.      Pt's spiritual, cultural and educational needs considered and pt agreeable to plan of care and goals.     Anticipated barriers to physical therapy: covid     GOALS: Short Term Goals:  4 weeks  1.Report decreased shoulder pain < / =  3/10  to increase tolerance for completing ADLs (In progress)  2. Increase PROM 140 deg of flexion to restore reach  (In progress)  3. Increased strength by 1/3 MMT grade in L shoulder to increase tolerance for ADL and work activities. (In progress)  4. Pt to tolerate HEP to improve ROM and independence with ADL's (In progress)     Long Term Goals: 12 weeks  1.Report decreased L shoulder pain  < / =  1 /10  to increase tolerance for sleeping at night (In progress)  2.Increase AROM to 140 deg of flexion to restore overhead reach   (In  progress)  3.Increase strength to >/= 4/5 in L shoulder to increase tolerance for ADL and work activities. (In progress)  4. Pt goal: reach overhead without pain in L shoulder (In progress)  5. Pt will have improved to 29% on FOTO shoulder in order to demonstrate true functional improvement. (In progress)     Plan   Plan of care Certification: 7/10/2020 to 10/30/2020.     Outpatient Physical Therapy 2 times weekly for 10 weeks to include the following interventions: Manual Therapy, Moist Heat/ Ice, Neuromuscular Re-ed, Patient Education, Self Care and Therapeutic Activites.      Riccardo Aquino, PT, DPT

## 2020-09-26 LAB — BACTERIA UR CULT: NO GROWTH

## 2020-09-29 ENCOUNTER — LAB VISIT (OUTPATIENT)
Dept: FAMILY MEDICINE | Facility: CLINIC | Age: 55
End: 2020-09-29
Payer: COMMERCIAL

## 2020-09-29 DIAGNOSIS — Z01.812 PRE-PROCEDURE LAB EXAM: ICD-10-CM

## 2020-09-29 PROCEDURE — U0003 INFECTIOUS AGENT DETECTION BY NUCLEIC ACID (DNA OR RNA); SEVERE ACUTE RESPIRATORY SYNDROME CORONAVIRUS 2 (SARS-COV-2) (CORONAVIRUS DISEASE [COVID-19]), AMPLIFIED PROBE TECHNIQUE, MAKING USE OF HIGH THROUGHPUT TECHNOLOGIES AS DESCRIBED BY CMS-2020-01-R: HCPCS

## 2020-09-30 ENCOUNTER — TELEPHONE (OUTPATIENT)
Dept: ENDOSCOPY | Facility: HOSPITAL | Age: 55
End: 2020-09-30

## 2020-09-30 LAB — SARS-COV-2 RNA RESP QL NAA+PROBE: NOT DETECTED

## 2020-10-02 ENCOUNTER — ANESTHESIA (OUTPATIENT)
Dept: ENDOSCOPY | Facility: HOSPITAL | Age: 55
End: 2020-10-02
Payer: COMMERCIAL

## 2020-10-02 ENCOUNTER — ANESTHESIA EVENT (OUTPATIENT)
Dept: ENDOSCOPY | Facility: HOSPITAL | Age: 55
End: 2020-10-02
Payer: COMMERCIAL

## 2020-10-02 ENCOUNTER — HOSPITAL ENCOUNTER (OUTPATIENT)
Facility: HOSPITAL | Age: 55
Discharge: HOME | End: 2020-10-02
Attending: INTERNAL MEDICINE | Admitting: INTERNAL MEDICINE
Payer: COMMERCIAL

## 2020-10-02 VITALS
HEIGHT: 62 IN | WEIGHT: 165 LBS | BODY MASS INDEX: 30.36 KG/M2 | OXYGEN SATURATION: 100 % | RESPIRATION RATE: 17 BRPM | TEMPERATURE: 97 F | HEART RATE: 69 BPM | SYSTOLIC BLOOD PRESSURE: 100 MMHG | DIASTOLIC BLOOD PRESSURE: 76 MMHG

## 2020-10-02 DIAGNOSIS — Z12.11 SCREEN FOR COLON CANCER: Primary | ICD-10-CM

## 2020-10-02 DIAGNOSIS — Z12.11 COLON CANCER SCREENING: ICD-10-CM

## 2020-10-02 PROCEDURE — 25000003 PHARM REV CODE 250: Performed by: INTERNAL MEDICINE

## 2020-10-02 PROCEDURE — 25000003 PHARM REV CODE 250: Performed by: NURSE ANESTHETIST, CERTIFIED REGISTERED

## 2020-10-02 PROCEDURE — 88305 TISSUE EXAM BY PATHOLOGIST: CPT | Mod: 26,,, | Performed by: PATHOLOGY

## 2020-10-02 PROCEDURE — 45385 PR COLONOSCOPY,REMV LESN,SNARE: ICD-10-PCS | Mod: 33,,, | Performed by: INTERNAL MEDICINE

## 2020-10-02 PROCEDURE — 88305 TISSUE EXAM BY PATHOLOGIST: ICD-10-PCS | Mod: 26,,, | Performed by: PATHOLOGY

## 2020-10-02 PROCEDURE — 37000009 HC ANESTHESIA EA ADD 15 MINS: Performed by: INTERNAL MEDICINE

## 2020-10-02 PROCEDURE — 45385 COLONOSCOPY W/LESION REMOVAL: CPT | Mod: 33,,, | Performed by: INTERNAL MEDICINE

## 2020-10-02 PROCEDURE — 88305 TISSUE EXAM BY PATHOLOGIST: CPT | Performed by: PATHOLOGY

## 2020-10-02 PROCEDURE — 63600175 PHARM REV CODE 636 W HCPCS: Performed by: NURSE ANESTHETIST, CERTIFIED REGISTERED

## 2020-10-02 PROCEDURE — 45385 COLONOSCOPY W/LESION REMOVAL: CPT | Performed by: INTERNAL MEDICINE

## 2020-10-02 PROCEDURE — 27201089 HC SNARE, DISP (ANY): Performed by: INTERNAL MEDICINE

## 2020-10-02 PROCEDURE — 37000008 HC ANESTHESIA 1ST 15 MINUTES: Performed by: INTERNAL MEDICINE

## 2020-10-02 RX ORDER — PROPOFOL 10 MG/ML
VIAL (ML) INTRAVENOUS CONTINUOUS PRN
Status: DISCONTINUED | OUTPATIENT
Start: 2020-10-02 | End: 2020-10-02

## 2020-10-02 RX ORDER — SODIUM CHLORIDE 0.9 % (FLUSH) 0.9 %
10 SYRINGE (ML) INJECTION
Status: DISCONTINUED | OUTPATIENT
Start: 2020-10-02 | End: 2020-10-02 | Stop reason: HOSPADM

## 2020-10-02 RX ORDER — LIDOCAINE HCL/PF 100 MG/5ML
SYRINGE (ML) INTRAVENOUS
Status: DISCONTINUED | OUTPATIENT
Start: 2020-10-02 | End: 2020-10-02

## 2020-10-02 RX ORDER — PROPOFOL 10 MG/ML
VIAL (ML) INTRAVENOUS
Status: DISCONTINUED | OUTPATIENT
Start: 2020-10-02 | End: 2020-10-02

## 2020-10-02 RX ORDER — MIDAZOLAM HYDROCHLORIDE 1 MG/ML
INJECTION, SOLUTION INTRAMUSCULAR; INTRAVENOUS
Status: DISCONTINUED | OUTPATIENT
Start: 2020-10-02 | End: 2020-10-02

## 2020-10-02 RX ORDER — SODIUM CHLORIDE 9 MG/ML
INJECTION, SOLUTION INTRAVENOUS CONTINUOUS
Status: DISCONTINUED | OUTPATIENT
Start: 2020-10-02 | End: 2020-10-02 | Stop reason: HOSPADM

## 2020-10-02 RX ADMIN — PROPOFOL 60 MG: 10 INJECTION, EMULSION INTRAVENOUS at 01:10

## 2020-10-02 RX ADMIN — SODIUM CHLORIDE: 0.9 INJECTION, SOLUTION INTRAVENOUS at 12:10

## 2020-10-02 RX ADMIN — MIDAZOLAM 2 MG: 1 INJECTION INTRAMUSCULAR; INTRAVENOUS at 01:10

## 2020-10-02 RX ADMIN — LIDOCAINE HYDROCHLORIDE 100 MG: 20 INJECTION, SOLUTION INTRAVENOUS at 01:10

## 2020-10-02 RX ADMIN — PROPOFOL 175 MCG/KG/MIN: 10 INJECTION, EMULSION INTRAVENOUS at 01:10

## 2020-10-02 NOTE — ANESTHESIA POSTPROCEDURE EVALUATION
Anesthesia Post Evaluation    Patient: Tosha Wilks    Procedure(s) Performed: Procedure(s) (LRB):  COLONOSCOPY (N/A)    Final Anesthesia Type: MAC    Patient location during evaluation: GI PACU  Patient participation: Yes- Able to Participate  Level of consciousness: awake and alert and oriented  Post-procedure vital signs: reviewed and stable  Pain management: adequate  Airway patency: patent    PONV status at discharge: No PONV  Anesthetic complications: no      Cardiovascular status: blood pressure returned to baseline, hemodynamically stable and stable  Respiratory status: unassisted, spontaneous ventilation and room air  Hydration status: euvolemic  Follow-up not needed.          Vitals Value Taken Time   /85 10/02/20 1416   Temp 36 10/02/20 1416   Pulse 65 10/02/20 1416   Resp 15 10/02/20 1416   SpO2 100 10/02/20 1416         No case tracking events are documented in the log.      Pain/Tessa Score: No data recorded

## 2020-10-02 NOTE — [employer]ATION PATIENT INSTRUCTIONS
Discharge Summary/Instructions after an Endoscopic Procedure  Patient Name: Tosha Montano  Patient MRN: [id number]  Patient YOB: 1965  Friday, October 2, 2020  Guicho Hood MD  Your health is very important to us during the Covid Crisis. Following your   procedure today, you will receive a daily text for 2 weeks asking about   signs or symptoms of Covid 19.  Please respond to this text when you   receive it so we can follow up and keep you as safe as possible.   RESTRICTIONS:  During your procedure today, you received medications for sedation.  These   medications may affect your judgment, balance and coordination.  Therefore,   for 24 hours, you have the following restrictions:   - DO NOT drive a car, operate machinery, make legal/financial decisions,   sign important papers or drink alcohol.    ACTIVITY:  Today: no heavy lifting, straining or running due to procedural   sedation/anesthesia.  The following day: return to full activity including work.  DIET:  Eat and drink normally unless instructed otherwise.     TREATMENT FOR COMMON SIDE EFFECTS:  - Mild abdominal pain, nausea, belching, bloating or excessive gas:  rest,   eat lightly and use a heating pad.  - Sore Throat: treat with throat lozenges and/or gargle with warm salt   water.  - Because air was used during the procedure, expelling large amounts of air   from your rectum or belching is normal.  - If a bowel prep was taken, you may not have a bowel movement for 1-3 days.    This is normal.  SYMPTOMS TO WATCH FOR AND REPORT TO YOUR PHYSICIAN:  1. Abdominal pain or bloating, other than gas cramps.  2. Chest pain.  3. Back pain.  4. Signs of infection such as: chills or fever occurring within 24 hours   after the procedure.  5. Rectal bleeding, which would show as bright red, maroon, or black stools.   (A tablespoon of blood from the rectum is not serious, especially if   hemorrhoids are present.)  6. Vomiting.  7. Weakness or  dizziness.  GO DIRECTLY TO THE NEAREST EMERGENCY ROOM IF YOU HAVE ANY OF THE FOLLOWING:      Difficulty breathing              Chills and/or fever over 101 F   Persistent vomiting and/or vomiting blood   Severe abdominal pain   Severe chest pain   Black, tarry stools   Bleeding- more than one tablespoon   Any other symptom or condition that you feel may need urgent attention  Your doctor recommends these additional instructions:  If any biopsies were taken, your doctors clinic will contact you in 1 to 2   weeks with any results.  - Discharge patient to home.   - Patient has a contact number available for emergencies.  The signs and   symptoms of potential delayed complications were discussed with the   patient.  Return to normal activities tomorrow.  Written discharge   instructions were provided to the patient.   - Resume previous diet.   - Continue present medications.   - Await pathology results.   - Repeat colonoscopy in 5 years for surveillance.  For questions, problems or results please call your physician - Guicho Hood MD.  EMERGENCY PHONE NUMBER: [phone],  LAB RESULTS: [phone]  IF A COMPLICATION OR EMERGENCY SITUATION ARISES AND YOU ARE UNABLE TO REACH   YOUR PHYSICIAN - GO DIRECTLY TO THE EMERGENCY ROOM.  Guicho Hood MD  10/2/2020 2:12:47 PM  This report has been verified and signed electronically.  [employer]

## 2020-10-02 NOTE — TRANSFER OF CARE
"Anesthesia Transfer of Care Note    Patient: Tosha Wilks    Procedure(s) Performed: Procedure(s) (LRB):  COLONOSCOPY (N/A)    Patient location: GI    Anesthesia Type: MAC    Transport from OR: Transported from OR on room air with adequate spontaneous ventilation    Post pain: adequate analgesia    Post assessment: no apparent anesthetic complications and tolerated procedure well    Post vital signs: stable    Level of consciousness: awake, alert and oriented    Nausea/Vomiting: no nausea/vomiting    Complications: none    Transfer of care protocol was followed      Last vitals:   Visit Vitals  BP (!) 135/100 (Patient Position: Lying)   Pulse 85   Temp 37.1 °C (98.8 °F) (Temporal)   Resp 18   Ht 5' 2" (1.575 m)   Wt 74.8 kg (165 lb)   LMP 03/26/2012   SpO2 99%   Breastfeeding No   BMI 30.18 kg/m²     "

## 2020-10-02 NOTE — H&P
Short Stay Endoscopy History and Physical    PCP - Jeannie Gibson, DO    Procedure - Colonoscopy  ASA - per anesthesia  Mallampati - per anesthesia  History of Anesthesia problems - no  Family history Anesthesia problems - no   Plan of anesthesia - General    HPI:  This is a 55 y.o. female here for evaluation of :   asymptomatic screening exam      ROS:  Constitutional: No fevers, chills, No weight loss  CV: No chest pain  Pulm: No cough, No shortness of breath  GI: see HPI  Derm: No rash    Medical History:  has a past medical history of Ankylosing spondylitis, Anxiety, Asthma, Chronic constipation, Chronic insomnia, Edema, Fibromyalgia, Interstitial cystitis, Lupus, Migraine headache, Restless legs syndrome, Shingles, Stroke (), and TIA (transient ischemic attack) ().    Surgical History:  has a past surgical history that includes Bladder surgery;  section; Tubal ligation; Laser ablation; Cholecystectomy; Hysterectomy; Sleeve Gastroplasty (2016); Breast surgery (2018); and Total Reduction Mammoplasty (Bilateral).    Family History: family history includes Breast cancer in her maternal aunt; Heart attack in her father; Hypertension in her brother, mother, and sister; Lupus in her sister.. Otherwise no colon cancer, inflammatory bowel disease, or GI malignancies.    Social History:  reports that she has never smoked. She has never used smokeless tobacco. She reports previous alcohol use. She reports that she does not use drugs.    Review of patient's allergies indicates:   Allergen Reactions    Morphine Anxiety     Hives     Other Other (See Comments)     Allergy is carrots Other reaction(s): Angioedema, carrots    Carrot     Erythromycin Other (See Comments)     Hives and cramps     Zofran (as hydrochloride) [ondansetron hcl] Hives     Local hive after IV injection       Medications:   Medications Prior to Admission   Medication Sig Dispense Refill Last Dose    albuterol (PROAIR  HFA) 90 mcg/actuation inhaler Inhale 2 puffs into the lungs every 4 (four) hours as needed. 18 g 3 10/2/2020 at Unknown time    budesonide 180mcg (PULMICORT 180MCG) 180 mcg/actuation AePB Inhale 2 puffs into the lungs once daily. 1 each 3 10/2/2020 at Unknown time    diclofenac-misoprostol  mg-mcg (ARTHROTEC 75)  mg-mcg per tablet Take 1 tablet by mouth 2 (two) times daily. 60 tablet 2 10/1/2020 at Unknown time    escitalopram oxalate (LEXAPRO) 20 MG tablet Take 20 mg by mouth once daily.   10/1/2020 at Unknown time    fentaNYL (DURAGESIC) 50 mcg/hr APPLY 1 PATCH TO SKIN EVERY 72 HOURS   Past Week at Unknown time    gabapentin (NEURONTIN) 400 MG capsule TAKE 1 CAPSULE (400 MG TOTAL) BY MOUTH 3 (THREE) TIMES DAILY. 90 capsule 0 10/1/2020 at Unknown time    gabapentin (NEURONTIN) 600 MG tablet    10/1/2020 at Unknown time    HYDROmorphone (DILAUDID) 2 MG tablet take 1 tablet by mouth every 8 hours as needed for pain 90 tablet 0 10/1/2020 at Unknown time    HYDROmorphone (DILAUDID) 2 MG tablet Take 1 tablet (2 mg total) by mouth every 6 to 8 hours as needed for pain 105 tablet 0 Past Week at Unknown time    hydroxychloroquine (PLAQUENIL) 200 mg tablet Take 200 mg by mouth once daily.  1 10/1/2020 at Unknown time    hydrOXYzine pamoate (VISTARIL) 25 MG Cap TAKE 1 CAPSULE (25 MG TOTAL) BY MOUTH EVERY 4 (FOUR) HOURS AS NEEDED. 90 capsule 3 10/1/2020 at Unknown time    lidocaine HCl 2 % Crea Apply to affected areas 4 times a day as needed. 118 mL 2 Past Week at Unknown time    omeprazole (PRILOSEC) 20 MG capsule TAKE 1 CAPSULE BY MOUTH EVERY DAY (Patient taking differently: Take 20 mg by mouth 3 (three) times daily with meals. ) 90 capsule 3 10/1/2020 at Unknown time    potassium chloride SA (K-DUR,KLOR-CON) 10 MEQ tablet TAKE 1 TABLET (10 MEQ TOTAL) BY MOUTH 2 (TWO) TIMES DAILY.   10/1/2020 at Unknown time    pregabalin (LYRICA) 100 MG capsule    10/1/2020 at Unknown time    sumatriptan  (IMITREX) 50 MG tablet Once for severe headache. May repeat once after 2 hours. Do not exceed 3-4 doses in one week. 12 tablet 3 10/1/2020 at Unknown time    topiramate (TOPAMAX) 100 MG tablet Take 1 tablet (100 mg total) by mouth every evening. for 14 days 90 tablet 3 10/1/2020 at Unknown time    zolpidem (AMBIEN) 10 mg Tab    10/1/2020 at Unknown time    EPINEPHrine (EPIPEN 2-DENNIS) 0.3 mg/0.3 mL AtIn Inject 0.3 mLs (0.3 mg total) into the muscle once. for 1 dose 2 Device 3 More than a month at Unknown time         Physical Exam:    Vital Signs:   Vitals:    10/02/20 1230   BP: (!) 135/100   Pulse: 85   Resp: 18   Temp: 98.8 °F (37.1 °C)       General Appearance: Well appearing in no acute distress  Eyes:    No scleral icterus  ENT: Neck supple, Lips, mucosa, and tongue normal; teeth and gums normal  Lungs: CTA bilaterally  Heart:  S1, S2 normal, no murmurs heard  Abdomen: Soft, non tender, non distended with positive bowel sounds. No hepatosplenomegaly, ascites, or mass.  Extremities: 2+ pulses, no clubbing, cyanosis or edema  Skin: No rash      Labs:  Lab Results   Component Value Date    WBC 6.22 12/12/2019    HGB 11.7 (L) 12/12/2019    HCT 36.2 (L) 12/12/2019     12/12/2019    CHOL 230 (H) 06/11/2019    TRIG 106 06/11/2019    HDL 64 06/11/2019    ALT 12 12/12/2019    AST 16 12/12/2019     12/12/2019    K 3.0 (L) 12/12/2019     12/12/2019    CREATININE 0.8 12/12/2019    BUN 10 12/12/2019    CO2 23 12/12/2019    TSH 1.293 06/11/2019    INR 0.9 06/29/2015       I have explained the risks and benefits of endoscopy procedures to the patient including but not limited to bleeding, perforation, infection, and death.  The patient was asked if they understand and allowed to ask any further questions to their satisfaction.    Guicho Hood MD

## 2020-10-02 NOTE — PLAN OF CARE
MD at bedside discussing procedure performed.  Discharge instructions provided to pt., understanding verbalized.

## 2020-10-02 NOTE — ANESTHESIA PREPROCEDURE EVALUATION
10/02/2020  Tosha Wilks is a 55 y.o., female for colonoscopy under MAC    Past Medical History:   Diagnosis Date    Ankylosing spondylitis     Anxiety     Asthma     Chronic constipation     Chronic insomnia     Edema     Fibromyalgia     Interstitial cystitis     Lupus     Migraine headache     Restless legs syndrome     Shingles     Stroke     post partum right sided numbness    TIA (transient ischemic attack)      Past Surgical History:   Procedure Laterality Date    BLADDER SURGERY      BREAST SURGERY  2018    reduction     SECTION      CHOLECYSTECTOMY      HYSTERECTOMY      LASER ABLATION      to back    SLEEVE GASTROPLASTY  2016    TOTAL REDUCTION MAMMOPLASTY Bilateral     two years ago    TUBAL LIGATION           Anesthesia Evaluation    I have reviewed the Patient Summary Reports.   I have reviewed the NPO Status.   I have reviewed the Medications.     Review of Systems  Social:  Non-Smoker        Physical Exam  General:  Well nourished    Airway/Jaw/Neck:  Airway Findings: Mallampati: II      Chest/Lungs:  Chest/Lungs Clear    Heart/Vascular:  Heart Findings: Normal            Anesthesia Plan  Type of Anesthesia, risks & benefits discussed:  Anesthesia Type:  MAC  Patient's Preference:   Intra-op Monitoring Plan: standard ASA monitors  Intra-op Monitoring Plan Comments:   Post Op Pain Control Plan:   Post Op Pain Control Plan Comments:   Induction:    Beta Blocker:  Patient is not currently on a Beta-Blocker (No further documentation required).       Informed Consent: Patient understands risks and agrees with Anesthesia plan.  Questions answered. Anesthesia consent signed with patient.  ASA Score: 3     Day of Surgery Review of History & Physical:            Ready For Surgery From Anesthesia Perspective.

## 2020-10-07 ENCOUNTER — CLINICAL SUPPORT (OUTPATIENT)
Dept: REHABILITATION | Facility: HOSPITAL | Age: 55
End: 2020-10-07
Payer: COMMERCIAL

## 2020-10-07 DIAGNOSIS — S46.912A LEFT SHOULDER STRAIN, INITIAL ENCOUNTER: Primary | ICD-10-CM

## 2020-10-07 DIAGNOSIS — M25.619 LIMITED RANGE OF MOTION (ROM) OF SHOULDER: ICD-10-CM

## 2020-10-07 LAB
FINAL PATHOLOGIC DIAGNOSIS: NORMAL
GROSS: NORMAL
Lab: NORMAL

## 2020-10-07 PROCEDURE — 97140 MANUAL THERAPY 1/> REGIONS: CPT

## 2020-10-07 PROCEDURE — 97110 THERAPEUTIC EXERCISES: CPT

## 2020-10-07 NOTE — PROGRESS NOTES
Physical Therapy Progress Note      Name: Tosha Wilks  Clinic Number: [id number]     Therapy Diagnosis:           Encounter Diagnoses   Name Primary?    Left shoulder strain, initial encounter      Limited range of motion (ROM) of shoulder        Physician: Zackery Rothman MD     Visit Date: 10/07/2020     Physician Orders: PT Eval and Treat   Medical Diagnosis from Referral: M75.102 (ICD-10-CM) - Rotator cuff syndrome of left shoulder  Evaluation Date: 7/10/2020  Authorization Period Expiration: 10/30/2020  Plan of Care Expiration: 12/31/2020  Visit # / Visits authorized: 3/20     Time In: 5:00 pm  Time Out: 5:30 pm  Total Billable Time: 30 minutes     Precautions: Fall and fibromyalgia, lupus, and ankylosing spondylitis     Subjective      Pt reports: she has had difficulty completing exercises at home due to pain   She was compliant with home exercise program.  Response to previous treatment: No adverse effects  Functional change: N/A     Pain: 6/10  Location: left shoulder       Objective       9/25/20  Passive Range of Motion:   Shoulder Right Left   Flexion 180 70   Abduction 180 50   ER at 0 30 35   ER at 90 nt nt   IR 60 40         Tosha received therapeutic exercises to develop strength, endurance, ROM and flexibility for 25 minutes including:     Tables slides flex/scaption 10 X 10 sec  Table ER stretch 10X10 sec  Scrap retracts 2X10  Supine wand press 2X10       UBE (fwd) x 6' NP  Pulleys (flx) x 6' NP  Supine AA wand flexion x 6' NP  Supine AA wand ER x 6' NP  Pt education: HEP importance/frequency     Tosha received the following manual therapy techniques: Joint mobilizations were applied for 05 minutes including:   gentle GH oscillations  PROM/stretch     Tosha participated in neuromuscular re-education activities to improve Posture and Neuromuscular for 00 minutes. The following activities were included:        Tosha participated in dynamic functional therapeutic activities to improve  functional performance for 00 minutes including:           Home Exercises Provided and Patient Education Provided      Education provided:   - Healing process for frozen shoulder     Written Home Exercises Provided: yes.  Exercises were reviewed and Tosha was able to demonstrate them prior to the end of the session.  Tosha demonstrated good  understanding of the education provided.      See EMR under Patient Instructions for exercises provided 7/10/2020.     Assessment      Pt with improved tolerance to joint oscillations and PROM today. Pt was able to perform supine wand press and flexion to 90 deg with several verbal cues.  Pt was able to perform exercises to tolerance. PT educated pt on importance of performing exercises at home consistently.  Pt was 10 minutes lat today.      Tosha is progressing  towards her goals.   Pt prognosis is Fair/Good.        Pt will continue to benefit from skilled outpatient physical therapy to address the deficits listed in the problem list box on initial evaluation, provide pt/family education and to maximize pt's level of independence in the home and community environment.      Pt's spiritual, cultural and educational needs considered and pt agreeable to plan of care and goals.     Anticipated barriers to physical therapy: covid     GOALS: Short Term Goals:  4 weeks  1.Report decreased shoulder pain < / =  3/10  to increase tolerance for completing ADLs (In progress)  2. Increase PROM 140 deg of flexion to restore reach  (In progress)  3. Increased strength by 1/3 MMT grade in L shoulder to increase tolerance for ADL and work activities. (In progress)  4. Pt to tolerate HEP to improve ROM and independence with ADL's (In progress)     Long Term Goals: 12 weeks  1.Report decreased L shoulder pain  < / =  1 /10  to increase tolerance for sleeping at night (In progress)  2.Increase AROM to 140 deg of flexion to restore overhead reach   (In progress)  3.Increase strength to >/=  4/5 in L shoulder to increase tolerance for ADL and work activities. (In progress)  4. Pt goal: reach overhead without pain in L shoulder (In progress)  5. Pt will have improved to 29% on FOTO shoulder in order to demonstrate true functional improvement. (In progress)     Plan   Plan of care Certification: 7/10/2020 to 10/30/2020.     Outpatient Physical Therapy 2 times weekly for 10 weeks to include the following interventions: Manual Therapy, Moist Heat/ Ice, Neuromuscular Re-ed, Patient Education, Self Care and Therapeutic Activites.      Riccardo Aquino, PT, DPT

## 2020-10-09 ENCOUNTER — PATIENT MESSAGE (OUTPATIENT)
Dept: FAMILY MEDICINE | Facility: CLINIC | Age: 55
End: 2020-10-09

## 2020-10-09 DIAGNOSIS — L29.9 PRURITUS: Primary | ICD-10-CM

## 2020-10-13 ENCOUNTER — LAB VISIT (OUTPATIENT)
Dept: LAB | Facility: HOSPITAL | Age: 55
End: 2020-10-13
Attending: FAMILY MEDICINE
Payer: COMMERCIAL

## 2020-10-13 DIAGNOSIS — L29.9 PRURITUS: ICD-10-CM

## 2020-10-13 LAB
ALBUMIN SERPL BCP-MCNC: 4.1 G/DL (ref 3.5–5.2)
ALP SERPL-CCNC: 77 U/L (ref 38–126)
ALT SERPL W/O P-5'-P-CCNC: 25 U/L (ref 10–44)
ANION GAP SERPL CALC-SCNC: 8 MMOL/L (ref 8–16)
AST SERPL-CCNC: 32 U/L (ref 15–46)
BASOPHILS # BLD AUTO: 0.03 K/UL (ref 0–0.2)
BASOPHILS NFR BLD: 0.7 % (ref 0–1.9)
BILIRUB SERPL-MCNC: 0.3 MG/DL (ref 0.1–1)
BUN SERPL-MCNC: 17 MG/DL (ref 7–17)
CALCIUM SERPL-MCNC: 9.2 MG/DL (ref 8.7–10.5)
CHLORIDE SERPL-SCNC: 110 MMOL/L (ref 95–110)
CHOLEST SERPL-MCNC: 191 MG/DL (ref 120–199)
CHOLEST/HDLC SERPL: 3.1 {RATIO} (ref 2–5)
CO2 SERPL-SCNC: 24 MMOL/L (ref 23–29)
CREAT SERPL-MCNC: 0.85 MG/DL (ref 0.5–1.4)
DIFFERENTIAL METHOD: ABNORMAL
EOSINOPHIL # BLD AUTO: 0.2 K/UL (ref 0–0.5)
EOSINOPHIL NFR BLD: 4.1 % (ref 0–8)
ERYTHROCYTE [DISTWIDTH] IN BLOOD BY AUTOMATED COUNT: 13.5 % (ref 11.5–14.5)
EST. GFR  (AFRICAN AMERICAN): >60 ML/MIN/1.73 M^2
EST. GFR  (NON AFRICAN AMERICAN): >60 ML/MIN/1.73 M^2
ESTIMATED AVG GLUCOSE: 108 MG/DL (ref 68–131)
GLUCOSE SERPL-MCNC: 90 MG/DL (ref 70–110)
HBA1C MFR BLD HPLC: 5.4 % (ref 4–5.6)
HCT VFR BLD AUTO: 33.4 % (ref 37–48.5)
HDLC SERPL-MCNC: 61 MG/DL (ref 40–75)
HDLC SERPL: 31.9 % (ref 20–50)
HGB BLD-MCNC: 10.6 G/DL (ref 12–16)
IMM GRANULOCYTES # BLD AUTO: 0.01 K/UL (ref 0–0.04)
IMM GRANULOCYTES NFR BLD AUTO: 0.2 % (ref 0–0.5)
LDLC SERPL CALC-MCNC: 117.2 MG/DL (ref 63–159)
LYMPHOCYTES # BLD AUTO: 1.5 K/UL (ref 1–4.8)
LYMPHOCYTES NFR BLD: 36.7 % (ref 18–48)
MCH RBC QN AUTO: 27.7 PG (ref 27–31)
MCHC RBC AUTO-ENTMCNC: 31.7 G/DL (ref 32–36)
MCV RBC AUTO: 87 FL (ref 82–98)
MONOCYTES # BLD AUTO: 0.3 K/UL (ref 0.3–1)
MONOCYTES NFR BLD: 8.3 % (ref 4–15)
NEUTROPHILS # BLD AUTO: 2.1 K/UL (ref 1.8–7.7)
NEUTROPHILS NFR BLD: 50 % (ref 38–73)
NONHDLC SERPL-MCNC: 130 MG/DL
NRBC BLD-RTO: 0 /100 WBC
PLATELET # BLD AUTO: 265 K/UL (ref 150–350)
PMV BLD AUTO: 10.5 FL (ref 9.2–12.9)
POTASSIUM SERPL-SCNC: 3.7 MMOL/L (ref 3.5–5.1)
PROT SERPL-MCNC: 7 G/DL (ref 6–8.4)
RBC # BLD AUTO: 3.82 M/UL (ref 4–5.4)
SODIUM SERPL-SCNC: 142 MMOL/L (ref 136–145)
TRIGL SERPL-MCNC: 64 MG/DL (ref 30–150)
TSH SERPL DL<=0.005 MIU/L-ACNC: 1.09 UIU/ML (ref 0.4–4)
WBC # BLD AUTO: 4.12 K/UL (ref 3.9–12.7)

## 2020-10-13 PROCEDURE — 84443 ASSAY THYROID STIM HORMONE: CPT | Mod: PO

## 2020-10-13 PROCEDURE — 86803 HEPATITIS C AB TEST: CPT | Mod: PO

## 2020-10-13 PROCEDURE — 85025 COMPLETE CBC W/AUTO DIFF WBC: CPT | Mod: PO

## 2020-10-13 PROCEDURE — 36415 COLL VENOUS BLD VENIPUNCTURE: CPT | Mod: PO

## 2020-10-13 PROCEDURE — 80053 COMPREHEN METABOLIC PANEL: CPT | Mod: PO

## 2020-10-13 PROCEDURE — 83036 HEMOGLOBIN GLYCOSYLATED A1C: CPT

## 2020-10-13 PROCEDURE — 80061 LIPID PANEL: CPT

## 2020-10-14 LAB — HCV AB SERPL QL IA: NEGATIVE

## 2020-10-15 ENCOUNTER — OFFICE VISIT (OUTPATIENT)
Dept: FAMILY MEDICINE | Facility: CLINIC | Age: 55
End: 2020-10-15
Payer: COMMERCIAL

## 2020-10-15 VITALS
TEMPERATURE: 98 F | BODY MASS INDEX: 30.34 KG/M2 | WEIGHT: 164.88 LBS | DIASTOLIC BLOOD PRESSURE: 96 MMHG | HEART RATE: 102 BPM | SYSTOLIC BLOOD PRESSURE: 144 MMHG | HEIGHT: 62 IN | OXYGEN SATURATION: 98 %

## 2020-10-15 DIAGNOSIS — L29.9 PRURITUS: Primary | ICD-10-CM

## 2020-10-15 DIAGNOSIS — Z23 NEED FOR INFLUENZA VACCINATION: ICD-10-CM

## 2020-10-15 PROCEDURE — 99214 OFFICE O/P EST MOD 30 MIN: CPT | Mod: 25,S$GLB,, | Performed by: FAMILY MEDICINE

## 2020-10-15 PROCEDURE — 90686 FLU VACCINE (QUAD) GREATER THAN OR EQUAL TO 3YO PRESERVATIVE FREE IM: ICD-10-PCS | Mod: S$GLB,,, | Performed by: FAMILY MEDICINE

## 2020-10-15 PROCEDURE — 3008F PR BODY MASS INDEX (BMI) DOCUMENTED: ICD-10-PCS | Mod: CPTII,S$GLB,, | Performed by: FAMILY MEDICINE

## 2020-10-15 PROCEDURE — 99214 PR OFFICE/OUTPT VISIT, EST, LEVL IV, 30-39 MIN: ICD-10-PCS | Mod: 25,S$GLB,, | Performed by: FAMILY MEDICINE

## 2020-10-15 PROCEDURE — 90471 IMMUNIZATION ADMIN: CPT | Mod: S$GLB,,, | Performed by: FAMILY MEDICINE

## 2020-10-15 PROCEDURE — 90471 FLU VACCINE (QUAD) GREATER THAN OR EQUAL TO 3YO PRESERVATIVE FREE IM: ICD-10-PCS | Mod: S$GLB,,, | Performed by: FAMILY MEDICINE

## 2020-10-15 PROCEDURE — 90686 IIV4 VACC NO PRSV 0.5 ML IM: CPT | Mod: S$GLB,,, | Performed by: FAMILY MEDICINE

## 2020-10-15 PROCEDURE — 3008F BODY MASS INDEX DOCD: CPT | Mod: CPTII,S$GLB,, | Performed by: FAMILY MEDICINE

## 2020-10-15 RX ORDER — TRIAMCINOLONE ACETONIDE 1 MG/ML
LOTION TOPICAL 2 TIMES DAILY
Qty: 60 ML | Refills: 3 | Status: SHIPPED | OUTPATIENT
Start: 2020-10-15 | End: 2022-08-08

## 2020-10-15 RX ORDER — CETIRIZINE HYDROCHLORIDE 10 MG/1
10 TABLET ORAL DAILY
Qty: 30 TABLET | Refills: 0 | Status: SHIPPED | OUTPATIENT
Start: 2020-10-15 | End: 2020-11-06

## 2020-10-15 NOTE — PROGRESS NOTES
Chief Complaint  Chief Complaint   Patient presents with    Itching       HPI  Tosha Wilks is a 55 y.o. female with multiple medical diagnoses as listed in the medical history and problem list that presents for pruritis.  She reports that she is itching all over.  She feels like it is deep and scratching doesn't give any relief.  She is currently on prednisone again.  She is also in a boot for the pain in the left heel.  She Is taking lexapro and effexor for anxiety.  She doesn't feel that the lexapro does anything.  She is also taking hydroxyzine tid.       PAST MEDICAL HISTORY:  Past Medical History:   Diagnosis Date    Ankylosing spondylitis     Anxiety     Asthma     Chronic constipation     Chronic insomnia     Edema     Fibromyalgia     Interstitial cystitis     Lupus     Migraine headache     Restless legs syndrome     Shingles     Stroke     post partum right sided numbness    TIA (transient ischemic attack)        PAST SURGICAL HISTORY:  Past Surgical History:   Procedure Laterality Date    BLADDER SURGERY      BREAST SURGERY  2018    reduction     SECTION      CHOLECYSTECTOMY      COLONOSCOPY N/A 10/2/2020    Procedure: COLONOSCOPY;  Surgeon: Guicho Hood MD;  Location: [facility];  Service: Endoscopy;  Laterality: N/A;    HYSTERECTOMY      LASER ABLATION      to back    SLEEVE GASTROPLASTY  2016    TOTAL REDUCTION MAMMOPLASTY Bilateral     two years ago    TUBAL LIGATION         SOCIAL HISTORY:  Social History     Socioeconomic History    Marital status:      Spouse name: Not on file    Number of children: Not on file    Years of education: Not on file    Highest education level: Not on file   Occupational History    Not on file   Social Needs    Financial resource strain: Not hard at all    Food insecurity     Worry: Never true     Inability: Never true    Transportation needs     Medical: No     Non-medical: No   Tobacco Use     Smoking status: Never Smoker    Smokeless tobacco: Never Used   Substance and Sexual Activity    Alcohol use: Not Currently     Frequency: Monthly or less     Drinks per session: 1 or 2     Binge frequency: Never     Comment: occasionally    Drug use: No    Sexual activity: Yes     Partners: Male   Lifestyle    Physical activity     Days per week: 2 days     Minutes per session: 20 min    Stress: To some extent   Relationships    Social connections     Talks on phone: Three times a week     Gets together: Once a week     Attends Jain service: Not on file     Active member of club or organization: Yes     Attends meetings of clubs or organizations: Never     Relationship status:    Other Topics Concern    Not on file   Social History Narrative    Not on file       FAMILY HISTORY:  Family History   Problem Relation Age of Onset    Hypertension Mother     Hypertension Brother     Breast cancer Maternal Aunt         x2     Heart attack Father     Hypertension Sister     Lupus Sister        ALLERGIES AND MEDICATIONS: updated and reviewed.  Review of patient's allergies indicates:   Allergen Reactions    Morphine Anxiety     Hives     Other Other (See Comments)     Allergy is carrots Other reaction(s): Angioedema, carrots    Carrot     Erythromycin Other (See Comments)     Hives and cramps     Zofran (as hydrochloride) [ondansetron hcl] Hives     Local hive after IV injection     Current Outpatient Medications   Medication Sig Dispense Refill    albuterol (PROAIR HFA) 90 mcg/actuation inhaler Inhale 2 puffs into the lungs every 4 (four) hours as needed. 18 g 3    budesonide 180mcg (PULMICORT 180MCG) 180 mcg/actuation AePB Inhale 2 puffs into the lungs once daily. 1 each 3    diclofenac-misoprostol  mg-mcg (ARTHROTEC 75)  mg-mcg per tablet Take 1 tablet by mouth 2 (two) times daily. 60 tablet 2    EPINEPHrine (EPIPEN 2-DENNIS) 0.3 mg/0.3 mL AtIn Inject 0.3 mLs (0.3 mg total)  into the muscle once. for 1 dose 2 Device 3    ergocalciferol (ERGOCALCIFEROL) 50,000 unit Cap TAKE 1 CAPSULE BY MOUTH ONE TIME PER WEEK 12 capsule 3    gabapentin (NEURONTIN) 400 MG capsule TAKE 1 CAPSULE (400 MG TOTAL) BY MOUTH 3 (THREE) TIMES DAILY. 90 capsule 0    gabapentin (NEURONTIN) 600 MG tablet       HYDROmorphone (DILAUDID) 2 MG tablet take 1 tablet by mouth every 8 hours as needed for pain 90 tablet 0    HYDROmorphone (DILAUDID) 2 MG tablet Take one tablet by mouth every 6 to 8 hours as needed for pain. 105 tablet 0    hydroxychloroquine (PLAQUENIL) 200 mg tablet Take 200 mg by mouth once daily.  1    hydrOXYzine pamoate (VISTARIL) 25 MG Cap TAKE 1 CAPSULE (25 MG TOTAL) BY MOUTH EVERY 4 (FOUR) HOURS AS NEEDED. 90 capsule 3    lidocaine HCl 2 % Crea Apply to affected areas 4 times a day as needed. 118 mL 2    omeprazole (PRILOSEC) 20 MG capsule TAKE 1 CAPSULE BY MOUTH EVERY DAY (Patient taking differently: Take 20 mg by mouth 3 (three) times daily with meals. ) 90 capsule 3    potassium chloride SA (K-DUR,KLOR-CON) 10 MEQ tablet TAKE 1 TABLET (10 MEQ TOTAL) BY MOUTH 2 (TWO) TIMES DAILY.      pregabalin (LYRICA) 100 MG capsule       sumatriptan (IMITREX) 50 MG tablet Once for severe headache. May repeat once after 2 hours. Do not exceed 3-4 doses in one week. 12 tablet 3    topiramate (TOPAMAX) 100 MG tablet Take 1 tablet (100 mg total) by mouth every evening. for 14 days 90 tablet 3    zolpidem (AMBIEN) 10 mg Tab       cetirizine (ZYRTEC) 10 MG tablet Take 1 tablet (10 mg total) by mouth once daily. 30 tablet 0    fentaNYL (DURAGESIC) 50 mcg/hr APPLY 1 PATCH TO SKIN EVERY 72 HOURS      triamcinolone acetonide 0.1% (KENALOG) 0.1 % Lotn Apply topically 2 (two) times daily. 60 mL 3     No current facility-administered medications for this visit.          ROS  Review of Systems   Constitutional: Positive for activity change. Negative for unexpected weight change.   HENT: Positive for trouble  "swallowing. Negative for hearing loss and rhinorrhea.    Eyes: Negative for discharge and visual disturbance.   Respiratory: Negative for chest tightness and wheezing.    Cardiovascular: Negative for chest pain and palpitations.   Gastrointestinal: Positive for constipation. Negative for blood in stool, diarrhea and vomiting.   Endocrine: Negative for polydipsia and polyuria.   Genitourinary: Negative for difficulty urinating, dysuria, hematuria and menstrual problem.   Musculoskeletal: Positive for arthralgias, joint swelling and neck pain.   Neurological: Positive for weakness and headaches.   Psychiatric/Behavioral: Positive for dysphoric mood. Negative for confusion.           PHYSICAL EXAM  Vitals:    10/15/20 1326   BP: (!) 144/96   Pulse: 102   Temp: 98.4 °F (36.9 °C)   SpO2: 98%   Weight: 74.8 kg (164 lb 14.5 oz)   Height: 5' 2" (1.575 m)    Body mass index is 30.16 kg/m².  Weight: 74.8 kg (164 lb 14.5 oz)   Height: 5' 2" (157.5 cm)     Physical Exam  Constitutional:       Appearance: She is well-developed.   HENT:      Head: Normocephalic.   Eyes:      Conjunctiva/sclera: Conjunctivae normal.   Neck:      Musculoskeletal: Normal range of motion and neck supple.   Cardiovascular:      Rate and Rhythm: Normal rate and regular rhythm.      Pulses:           Dorsalis pedis pulses are 2+ on the right side and 2+ on the left side.        Posterior tibial pulses are 2+ on the right side and 2+ on the left side.      Heart sounds: Normal heart sounds.   Pulmonary:      Effort: Pulmonary effort is normal.      Breath sounds: Normal breath sounds.   Musculoskeletal:        Feet:    Feet:      Right foot:      Skin integrity: No ulcer, blister or skin breakdown.      Left foot:      Skin integrity: Blister present. No ulcer or skin breakdown.   Lymphadenopathy:      Head:      Left side of head: Submental adenopathy present.      Comments: 0.5 cm tender lymph node on left mandible.  No redness or open wounds.  Tender " to palpation.    Skin:     General: Skin is warm and dry.   Neurological:      Mental Status: She is alert.   Psychiatric:         Behavior: Behavior normal.           Health Maintenance       Date Due Completion Date    HIV Screening 02/01/2021 (Originally 3/13/1980) ---    Shingles Vaccine (2 of 2) 02/21/2021 (Originally 1/29/2020) 12/4/2019 (Done)    Override on 12/4/2019: Done    Fecal Occult Blood Test (FOBT)/[employer] 06/27/2020 6/27/2019    Mammogram 08/19/2021 8/19/2019    Lipid Panel 06/11/2024 6/11/2019    Pneumococcal Vaccine (Highest Risk) (3 of 3 - PPSV23) 09/03/2024 9/3/2019    TETANUS VACCINE 12/04/2029 12/4/2019    Override on 12/4/2019: Done            Assessment & Plan      oTsha  was seen today for follow-up.    Diagnoses and all orders for this visit:    Pruritus  -     triamcinolone acetonide 0.1% (KENALOG) 0.1 % Lotn; Apply topically 2 (two) times daily.  Dispense: 60 mL; Refill: 3  -     cetirizine (ZYRTEC) 10 MG tablet; Take 1 tablet (10 mg total) by mouth once daily.  Dispense: 30 tablet; Refill: 0  - Will add a zyrtec daily and taper off the lexapro.  If this doesn't help may consider adding abilify to help with anxieytl     Need for influenza vaccination  -     Influenza - Quadrivalent *Preferred* (6 months+) (PF)            Follow-up: No follow-ups on file.

## 2020-10-16 ENCOUNTER — CLINICAL SUPPORT (OUTPATIENT)
Dept: REHABILITATION | Facility: HOSPITAL | Age: 55
End: 2020-10-16
Payer: COMMERCIAL

## 2020-10-16 DIAGNOSIS — M79.672 LEFT FOOT PAIN: ICD-10-CM

## 2020-10-16 PROCEDURE — 97110 THERAPEUTIC EXERCISES: CPT

## 2020-10-16 PROCEDURE — 97161 PT EVAL LOW COMPLEX 20 MIN: CPT

## 2020-10-16 NOTE — PROGRESS NOTES
[facility]  Physical Therapy Initial Evaluation    Name: Tosha Wilks  Clinic Number: [id number]    Therapy Diagnosis:   Encounter Diagnosis   Name Primary?    Left foot pain      Physician: Blaine Jacinto DPM    Physician Orders: PT Eval and Treat   Medical Diagnosis from Referral: tarsal tunnel syndrome, left  Evaluation Date: 10/16/2020  Authorization Period Expiration: 20  Plan of Care Expiration: 21  Visit # / Visits authorized:     Time In: 2:15 pm  Time Out: 3:00 pm  Total Billable Time: 45 minutes    Precautions: Standard, hypertension    Subjective   Date of onset: 4-5 months ago  History of current condition - Tosha reports: she woke up several months ago and she noticed something under her foot and foot was numb. Pt states she went to urgent care they drained a cyst on heel. Pt states it started getting worse after it was drained. Pt states she went to podiatrist and he took some cultures.  Pt states MD wants her to do therapy before he does any other procedures on ankle/foot. Pt states she has been in a boot since 2020     Medical History:   Past Medical History:   Diagnosis Date    Ankylosing spondylitis     Anxiety     Asthma     Chronic constipation     Chronic insomnia     Edema     Fibromyalgia     Interstitial cystitis     Lupus     Migraine headache     Restless legs syndrome     Shingles     Stroke     post partum right sided numbness    TIA (transient ischemic attack)        Surgical History:   Tosha Wilks  has a past surgical history that includes Bladder surgery;  section; Tubal ligation; Laser ablation; Cholecystectomy; Hysterectomy; Sleeve Gastroplasty (2016); Breast surgery (); Total Reduction Mammoplasty (Bilateral); and Colonoscopy (N/A, 10/2/2020).    Medications:   Tosha  has a current medication list which includes the following prescription(s): albuterol, budesonide  180mcg, cetirizine, diclofenac-misoprostol  mg-mcg, epinephrine, ergocalciferol, fentanyl, gabapentin, gabapentin, hydromorphone, hydromorphone, hydroxychloroquine, hydroxyzine pamoate, lidocaine hcl, omeprazole, potassium chloride sa, pregabalin, sumatriptan, topiramate, triamcinolone acetonide 0.1%, and zolpidem.    Allergies:   Review of patient's allergies indicates:   Allergen Reactions    Morphine Anxiety     Hives     Other Other (See Comments)     Allergy is carrots Other reaction(s): Angioedema, carrots    Carrot     Erythromycin Other (See Comments)     Hives and cramps     Zofran (as hydrochloride) [ondansetron hcl] Hives     Local hive after IV injection        Imaging, MRI studies:   Impression:     1. Focal area of edema within the medial aspect of the heel pad, possibly sterile inflammation or sequela of cellulitis.  No focal drainable fluid collection.  No osteomyelitis.  2. Posterior tibial, flexor hallucis longus, flexor digitorum longus and peroneal tenosynovitis.  3. Mild calcaneocuboid osteoarthritis.  4. Nonvisualization of the ATFL in keeping with a remote sprain.    Prior Therapy: denies  Social History: spouse   Occupation: [employer]  Prior Level of Function: Gait unimpaired   Current Level of Function: Impaired gait, gross mobility.      Pain:  Current 5/10, worst 10/10, best 3/10   Location: left foot  Description: Tingling and Numb, aching  Aggravating Factors: Walking  Easing Factors: massage, soaking    Pts goals: decrease foot pain    Objective         Gait: Pt ambulate with boot.     ROM:  Ankle Left Right   Dorsiflexion 10 degrees 11 degrees   Plantarflexion 40 degrees 50 degrees   Inversion 10 degrees 30 degrees   Eversion 10 degrees 15 degrees     MMT:    Ankle Left Right   Dorsiflexion 4+/5 4+/5   Inversion 4/5 4+/5   Eversion 4-/5 4+/5     Special Tests:    Ankle Left Right   Anterior Drawer Test Negative Negative     Joint Mobility: Significant stiffness TC  distraction mob    Palpation: Tenderness along heel and arch of foot    Sensation: Light touch intact L foot             TREATMENT   Treatment Time In: 2:15 pm  Treatment Time Out: 3:00 pm  Total Treatment time separate from Evaluation: 15 minutes    Tosha received therapeutic exercises to develop endurance for 15 minutes including:  Toe scrunch 3 min  Ankle 4 way AROM 20X  Ankle circles 20X  Seated heel raises 20X  .    Home Exercises and Patient Education Provided    Education provided:   - Role of PT, PT POC    Written Home Exercises Provided: yes.  Exercises were reviewed and Tosha was able to demonstrate them prior to the end of the session.  Tosha demonstrated fair  understanding of the education provided.       Assessment   Tosha  is a 55 y.o. female referred to outpatient Physical Therapy with a medical diagnosis of left tarsal tunnel syndrome. Primary impairments include AROM, PROM, joint mobility, strength, balance, soft tissue restrictions, and pain which limits functional mobility. This pt is a good candidate for skilled PT tx and stands to benefit from a combination of manual therapy including joint mobilizations with trigger point/myofacscial release, therapeutic exercise to establish core/joint stability, neuromuscular re-education, and modalities PRN. The pt has been educated on their dx/POC and consents to further PT tx.    Pt prognosis is Fair.   Pt will benefit from skilled outpatient Physical Therapy to address the deficits stated above and in the chart below, provide pt/family education, and to maximize pt's level of independence.     Plan of care discussed with patient: Yes  Pt's spiritual, cultural and educational needs considered and patient is agreeable to the plan of care and goals as stated below:     Anticipated Barriers for therapy: Fibromyalgia    Medical Necessity is demonstrated by the following  History  Co-morbidities and personal factors that may impact the plan of care  Co-morbidities:   Ankylosing spondylitis   Anxiety   Asthma   Chronic constipation   Chronic insomnia   Edema   Fibromyalgia   Interstitial cystitis   Lupus   Hyperlipidemia  HTN    Personal Factors:   no deficits     moderate   Examination  Body Structures and Functions, activity limitations and participation restrictions that may impact the plan of care Body Regions:   lower extremities    Body Systems:    ROM  strength  gross coordinated movement  balance  gait    Participation Restrictions:       Activity limitations:   Learning and applying knowledge  no deficits    General Tasks and Commands  no deficits    Communication  no deficits    Mobility  walking  moving around using equipment (WC)  driving (bike, car, motorcycle)    Self care  no deficits    Domestic Life  shopping  cooking  doing house work (cleaning house, washing dishes, laundry)  assisting others    Interactions/Relationships  no deficits    Life Areas  no deficits    Community and Social Life  no deficits         low   Clinical Presentation stable and uncomplicated low   Decision Making/ Complexity Score: low     Goals:  Short-Term Goals: 6 weeks  - The patient will be independent with initial home exercise program.  - The patient to ambulate without boot in clinic 20 feet independently   - The patient will increase strength by 1/2 muscle grade to perform functional mobiltiy with pain < 4/10.  - The patient will increase ROM grossly to perform ambulation with pain < 4/10.    Long-Term Goals: 12 weeks  - The patient will be independent with home exercise program and symptom management.  - The patient will be independent amb with no assistive device/boot on all surfaces for community distances.  - The patient will increase strength by 4+/5 to perform functional mobility with pain < 2/10.  - The patient will increase ROM grossly to WFL to perform ambulation/ADLs with pain < 2/10.      Plan   Plan of care Certification: 10/16/2020 to  1/16/21    Outpatient Physical Therapy 1 times weekly for 12 weeks to include the following interventions: Gait Training, Manual Therapy, Neuromuscular Re-ed, Patient Education, Therapeutic Activites and Therapeutic Exercise.     Riccardo Aquino, PT, DPT  10/19/2020

## 2020-10-19 PROBLEM — M79.672 LEFT FOOT PAIN: Status: ACTIVE | Noted: 2020-10-19

## 2020-10-19 NOTE — PLAN OF CARE
[facility]  Physical Therapy Initial Evaluation    Name: Tosha Wilks  Clinic Number: [id number]    Therapy Diagnosis:   Encounter Diagnosis   Name Primary?    Left foot pain      Physician: Blaine Jacinto DPM    Physician Orders: PT Eval and Treat   Medical Diagnosis from Referral: tarsal tunnel syndrome, left  Evaluation Date: 10/16/2020  Authorization Period Expiration: 20  Plan of Care Expiration: 21  Visit # / Visits authorized:     Time In: 2:15 pm  Time Out: 3:00 pm  Total Billable Time: 45 minutes    Precautions: Standard, hypertension    Subjective   Date of onset: 4-5 months ago  History of current condition - Tosha reports: she woke up several months ago and she noticed something under her foot and foot was numb. Pt states she went to urgent care they drained a cyst on heel. Pt states it started getting worse after it was drained. Pt states she went to podiatrist and he took some cultures.  Pt states MD wants her to do therapy before he does any other procedures on ankle/foot. Pt states she has been in a boot since 2020     Medical History:   Past Medical History:   Diagnosis Date    Ankylosing spondylitis     Anxiety     Asthma     Chronic constipation     Chronic insomnia     Edema     Fibromyalgia     Interstitial cystitis     Lupus     Migraine headache     Restless legs syndrome     Shingles     Stroke     post partum right sided numbness    TIA (transient ischemic attack)        Surgical History:   Tosha Wilks  has a past surgical history that includes Bladder surgery;  section; Tubal ligation; Laser ablation; Cholecystectomy; Hysterectomy; Sleeve Gastroplasty (2016); Breast surgery (); Total Reduction Mammoplasty (Bilateral); and Colonoscopy (N/A, 10/2/2020).    Medications:   Tosha  has a current medication list which includes the following prescription(s): albuterol, budesonide  180mcg, cetirizine, diclofenac-misoprostol  mg-mcg, epinephrine, ergocalciferol, fentanyl, gabapentin, gabapentin, hydromorphone, hydromorphone, hydroxychloroquine, hydroxyzine pamoate, lidocaine hcl, omeprazole, potassium chloride sa, pregabalin, sumatriptan, topiramate, triamcinolone acetonide 0.1%, and zolpidem.    Allergies:   Review of patient's allergies indicates:   Allergen Reactions    Morphine Anxiety     Hives     Other Other (See Comments)     Allergy is carrots Other reaction(s): Angioedema, carrots    Carrot     Erythromycin Other (See Comments)     Hives and cramps     Zofran (as hydrochloride) [ondansetron hcl] Hives     Local hive after IV injection        Imaging, MRI studies:   Impression:     1. Focal area of edema within the medial aspect of the heel pad, possibly sterile inflammation or sequela of cellulitis.  No focal drainable fluid collection.  No osteomyelitis.  2. Posterior tibial, flexor hallucis longus, flexor digitorum longus and peroneal tenosynovitis.  3. Mild calcaneocuboid osteoarthritis.  4. Nonvisualization of the ATFL in keeping with a remote sprain.    Prior Therapy: denies  Social History: spouse   Occupation: [employer]  Prior Level of Function: Gait unimpaired   Current Level of Function: Impaired gait, gross mobility.      Pain:  Current 5/10, worst 10/10, best 3/10   Location: left foot  Description: Tingling and Numb, aching  Aggravating Factors: Walking  Easing Factors: massage, soaking    Pts goals: decrease foot pain    Objective         Gait: Pt ambulate with boot.     ROM:  Ankle Left Right   Dorsiflexion 10 degrees 11 degrees   Plantarflexion 40 degrees 50 degrees   Inversion 10 degrees 30 degrees   Eversion 10 degrees 15 degrees     MMT:    Ankle Left Right   Dorsiflexion 4+/5 4+/5   Inversion 4/5 4+/5   Eversion 4-/5 4+/5     Special Tests:    Ankle Left Right   Anterior Drawer Test Negative Negative     Joint Mobility: Significant stiffness TC  distraction mob    Palpation: Tenderness along heel and arch of foot    Sensation: Light touch intact L foot             TREATMENT   Treatment Time In: 2:15 pm  Treatment Time Out: 3:00 pm  Total Treatment time separate from Evaluation: 15 minutes    Tosha received therapeutic exercises to develop endurance for 15 minutes including:  Toe scrunch 3 min  Ankle 4 way AROM 20X  Ankle circles 20X  Seated heel raises 20X  .    Home Exercises and Patient Education Provided    Education provided:   - Role of PT, PT POC    Written Home Exercises Provided: yes.  Exercises were reviewed and Tosha was able to demonstrate them prior to the end of the session.  Tosha demonstrated fair  understanding of the education provided.       Assessment   Tosha  is a 55 y.o. female referred to outpatient Physical Therapy with a medical diagnosis of left tarsal tunnel syndrome. Primary impairments include AROM, PROM, joint mobility, strength, balance, soft tissue restrictions, and pain which limits functional mobility. This pt is a good candidate for skilled PT tx and stands to benefit from a combination of manual therapy including joint mobilizations with trigger point/myofacscial release, therapeutic exercise to establish core/joint stability, neuromuscular re-education, and modalities PRN. The pt has been educated on their dx/POC and consents to further PT tx.    Pt prognosis is Fair.   Pt will benefit from skilled outpatient Physical Therapy to address the deficits stated above and in the chart below, provide pt/family education, and to maximize pt's level of independence.     Plan of care discussed with patient: Yes  Pt's spiritual, cultural and educational needs considered and patient is agreeable to the plan of care and goals as stated below:     Anticipated Barriers for therapy: Fibromyalgia    Medical Necessity is demonstrated by the following  History  Co-morbidities and personal factors that may impact the plan of care  Co-morbidities:   Ankylosing spondylitis   Anxiety   Asthma   Chronic constipation   Chronic insomnia   Edema   Fibromyalgia   Interstitial cystitis   Lupus   Hyperlipidemia  HTN    Personal Factors:   no deficits     moderate   Examination  Body Structures and Functions, activity limitations and participation restrictions that may impact the plan of care Body Regions:   lower extremities    Body Systems:    ROM  strength  gross coordinated movement  balance  gait    Participation Restrictions:       Activity limitations:   Learning and applying knowledge  no deficits    General Tasks and Commands  no deficits    Communication  no deficits    Mobility  walking  moving around using equipment (WC)  driving (bike, car, motorcycle)    Self care  no deficits    Domestic Life  shopping  cooking  doing house work (cleaning house, washing dishes, laundry)  assisting others    Interactions/Relationships  no deficits    Life Areas  no deficits    Community and Social Life  no deficits         low   Clinical Presentation stable and uncomplicated low   Decision Making/ Complexity Score: low     Goals:  Short-Term Goals: 6 weeks  - The patient will be independent with initial home exercise program.  - The patient to ambulate without boot in clinic 20 feet independently   - The patient will increase strength by 1/2 muscle grade to perform functional mobiltiy with pain < 4/10.  - The patient will increase ROM grossly to perform ambulation with pain < 4/10.    Long-Term Goals: 12 weeks  - The patient will be independent with home exercise program and symptom management.  - The patient will be independent amb with no assistive device/boot on all surfaces for community distances.  - The patient will increase strength by 4+/5 to perform functional mobility with pain < 2/10.  - The patient will increase ROM grossly to WFL to perform ambulation/ADLs with pain < 2/10.      Plan   Plan of care Certification: 10/16/2020 to  1/16/21    Outpatient Physical Therapy 1 times weekly for 12 weeks to include the following interventions: Gait Training, Manual Therapy, Neuromuscular Re-ed, Patient Education, Therapeutic Activites and Therapeutic Exercise.     Riccardo Aquino, PT, DPT  10/19/2020

## 2020-10-20 ENCOUNTER — CLINICAL SUPPORT (OUTPATIENT)
Dept: REHABILITATION | Facility: HOSPITAL | Age: 55
End: 2020-10-20
Payer: COMMERCIAL

## 2020-10-20 DIAGNOSIS — M79.672 LEFT FOOT PAIN: ICD-10-CM

## 2020-10-20 PROCEDURE — 97140 MANUAL THERAPY 1/> REGIONS: CPT | Mod: CQ

## 2020-10-20 PROCEDURE — 97110 THERAPEUTIC EXERCISES: CPT | Mod: CQ

## 2020-10-20 NOTE — PROGRESS NOTES
Physical Therapy Daily Treatment Note     [facility]  Cl[facility] Number: [id number]  Therapy Diagnosis:        Encounter Diagnosis   Name Primary?    Left foot pain        Physician: Blaine Jacinto DPM     Physician Orders: PT Eval and Treat   Medical Diagnosis from Referral: tarsal tunnel syndrome, left  Evaluation Date: 10/16/2020  Authorization Period Expiration: 12/31/20  Plan of Care Expiration: 1/16/21  Visit # / Visits authorized: 2/18     Time In: 1555  Time Out: 1645  Total Billable Time: 50 minutes     Precautions: Standard, hypertension    Subjective     Pt reports: minimal pain in L foot when arrived for tx.   She was compliant with home exercise program.  Response to previous treatment: min increased pain after tx.   Functional change: boot donned     Pain: 2/10  Location: left feet      Objective     Tosha received therapeutic exercises to develop strength, endurance, ROM, flexibility and posture for 30 minutes including:  OTB Ankle 4 way AROM 30x  OTB hallux flexion 3x10  Ankle circles 20X  Golf ball rolling under foot 5'      Tosha received the following manual therapy techniques: Joint mobilizations and Soft tissue Mobilization were applied to the: L ankle  for 10 minutes, including: ankle mobs, PROM, and stretching     Tosha received cold pack for 10 minutes to to decrease circulation, pain, and swelling.    Home Exercises Provided and Patient Education Provided     Education provided:   Posture awareness     Written Home Exercises Provided: yes.  Exercises were reviewed and Tosha was able to demonstrate them prior to the end of the session.  Tosha demonstrated good  understanding of the education provided.       Assessment   Pt tolerating tx well. Increased discomfort with L heel palpation and midway up plantar surface of foot. Burning sensation in heel performing band exercise but able to complete. VC//TC for correcting form/technique with therex. Continue to  progress as tolerated     Tosha is progressing well towards her goals.   Pt prognosis is Good.     Pt will continue to benefit from skilled outpatient physical therapy to address the deficits listed in the problem list box on initial evaluation, provide pt/family education and to maximize pt's level of independence in the home and community environment.     Pt's spiritual, cultural and educational needs considered and pt agreeable to plan of care and goals.    Anticipated barriers to physical therapy: none     Goals:Goals:  Short-Term Goals: 6 weeks  - The patient will be independent with initial home exercise program.  - The patient to ambulate without boot in clinic 20 feet independently   - The patient will increase strength by 1/2 muscle grade to perform functional mobiltiy with pain < 4/10.  - The patient will increase ROM grossly to perform ambulation with pain < 4/10.     Long-Term Goals: 12 weeks  - The patient will be independent with home exercise program and symptom management.  - The patient will be independent amb with no assistive device/boot on all surfaces for community distances.  - The patient will increase strength by 4+/5 to perform functional mobility with pain < 2/10.  - The patient will increase ROM grossly to WFL to perform ambulation/ADLs with pain < 2/10.         Plan     Continue with POC     Lux Calhoun, PTA, STS

## 2020-10-23 ENCOUNTER — CLINICAL SUPPORT (OUTPATIENT)
Dept: REHABILITATION | Facility: HOSPITAL | Age: 55
End: 2020-10-23
Payer: COMMERCIAL

## 2020-10-23 DIAGNOSIS — S46.912A LEFT SHOULDER STRAIN, INITIAL ENCOUNTER: Primary | ICD-10-CM

## 2020-10-23 DIAGNOSIS — M25.619 LIMITED RANGE OF MOTION (ROM) OF SHOULDER: ICD-10-CM

## 2020-10-23 PROCEDURE — 97140 MANUAL THERAPY 1/> REGIONS: CPT

## 2020-10-23 PROCEDURE — 97110 THERAPEUTIC EXERCISES: CPT

## 2020-10-23 NOTE — PROGRESS NOTES
Physical Therapy Progress Note      Name: Tosha Wilks  Clinic Number: [id number]     Therapy Diagnosis:           Encounter Diagnoses   Name Primary?    Left shoulder strain, initial encounter      Limited range of motion (ROM) of shoulder        Physician: Zackery Rothman MD     Visit Date: 10/07/2020     Physician Orders: PT Eval and Treat   Medical Diagnosis from Referral: M75.102 (ICD-10-CM) - Rotator cuff syndrome of left shoulder  Evaluation Date: 7/10/2020  Authorization Period Expiration: 10/30/2020  Plan of Care Expiration: 12/31/2020  Visit # / Visits authorized: 3/20     Time In: 3:10 pm  Time Out: 3:45 pm  Total Billable Time: 35 minutes     Precautions: Fall and fibromyalgia, lupus, and ankylosing spondylitis     Subjective      Pt reports: she is having a bad day today, left side of body is sore and stiff  She was compliant with home exercise program.  Response to previous treatment: No adverse effects  Functional change: N/A     Pain: 8/10  Location: left shoulder       Objective       9/25/20  Passive Range of Motion:   Shoulder Right Left   Flexion 180 70   Abduction 180 50   ER at 0 30 35   ER at 90 nt nt   IR 60 40         Tosha received therapeutic exercises to develop strength, endurance, ROM and flexibility for 25 minutes including:     Tables slides flex/scaption 10 X 10 sec NP  Table ER stretch 10X10 sec NP  Scrap retracts 2X10  Supine AA wand press 2X10  Supine AA wand flexion x 20X  Supine AA wand ER x 20X          Pulleys (flx) x 2  Pt education: HEP importance/frequency     Tosha received the following manual therapy techniques: Joint mobilizations were applied for 10 minutes including:   gentle GH oscillations  PROM/stretch     Tosha participated in neuromuscular re-education activities to improve Posture and Neuromuscular for 00 minutes. The following activities were included:        Tosha participated in dynamic functional therapeutic activities to improve functional  performance for 00 minutes including:           Home Exercises Provided and Patient Education Provided      Education provided:   - Healing process for frozen shoulder     Written Home Exercises Provided: yes.  Exercises were reviewed and Tosha was able to demonstrate them prior to the end of the session.  Tosha demonstrated good  understanding of the education provided.      See EMR under Patient Instructions for exercises provided 7/10/2020.     Assessment      PT was started late today due to fire alarm. Pt presents with severe guarding of L shoulder during mobilizations and passive range.  Pt needed several VC/TCs to perform exercises correctly. PT discussed keeping range painfree with passive and AAROM exercises, however pt reported 10/10 pain after pulleys. PT was stopped and MHP was applied for 10 minutes.       Tosha is progressing  towards her goals.   Pt prognosis is Fair/Good.        Pt will continue to benefit from skilled outpatient physical therapy to address the deficits listed in the problem list box on initial evaluation, provide pt/family education and to maximize pt's level of independence in the home and community environment.      Pt's spiritual, cultural and educational needs considered and pt agreeable to plan of care and goals.     Anticipated barriers to physical therapy: covid     GOALS: Short Term Goals:  4 weeks  1.Report decreased shoulder pain < / =  3/10  to increase tolerance for completing ADLs (In progress)  2. Increase PROM 140 deg of flexion to restore reach  (In progress)  3. Increased strength by 1/3 MMT grade in L shoulder to increase tolerance for ADL and work activities. (In progress)  4. Pt to tolerate HEP to improve ROM and independence with ADL's (In progress)     Long Term Goals: 12 weeks  1.Report decreased L shoulder pain  < / =  1 /10  to increase tolerance for sleeping at night (In progress)  2.Increase AROM to 140 deg of flexion to restore overhead  reach   (In progress)  3.Increase strength to >/= 4/5 in L shoulder to increase tolerance for ADL and work activities. (In progress)  4. Pt goal: reach overhead without pain in L shoulder (In progress)  5. Pt will have improved to 29% on FOTO shoulder in order to demonstrate true functional improvement. (In progress)     Plan   Plan of care Certification: 7/10/2020 to 10/30/2020.     Outpatient Physical Therapy 2 times weekly for 10 weeks to include the following interventions: Manual Therapy, Moist Heat/ Ice, Neuromuscular Re-ed, Patient Education, Self Care and Therapeutic Activites.      Riccardo Aquino, PT, DPT

## 2020-11-06 ENCOUNTER — OFFICE VISIT (OUTPATIENT)
Dept: PODIATRY | Facility: CLINIC | Age: 55
End: 2020-11-06
Payer: COMMERCIAL

## 2020-11-06 VITALS
HEIGHT: 62 IN | DIASTOLIC BLOOD PRESSURE: 81 MMHG | BODY MASS INDEX: 30.34 KG/M2 | WEIGHT: 164.88 LBS | HEART RATE: 81 BPM | SYSTOLIC BLOOD PRESSURE: 144 MMHG

## 2020-11-06 DIAGNOSIS — G89.29 CHRONIC PAIN OF LEFT LOWER EXTREMITY: ICD-10-CM

## 2020-11-06 DIAGNOSIS — G57.52 TARSAL TUNNEL SYNDROME, LEFT: Primary | ICD-10-CM

## 2020-11-06 DIAGNOSIS — M72.2 PLANTAR FASCIITIS, LEFT: ICD-10-CM

## 2020-11-06 DIAGNOSIS — M79.605 CHRONIC PAIN OF LEFT LOWER EXTREMITY: ICD-10-CM

## 2020-11-06 PROCEDURE — 3008F PR BODY MASS INDEX (BMI) DOCUMENTED: ICD-10-PCS | Mod: CPTII,S$GLB,, | Performed by: PODIATRIST

## 2020-11-06 PROCEDURE — 99999 PR PBB SHADOW E&M-EST. PATIENT-LVL IV: ICD-10-PCS | Mod: PBBFAC,,, | Performed by: PODIATRIST

## 2020-11-06 PROCEDURE — 99213 OFFICE O/P EST LOW 20 MIN: CPT | Mod: 25,S$GLB,, | Performed by: PODIATRIST

## 2020-11-06 PROCEDURE — 99999 PR PBB SHADOW E&M-EST. PATIENT-LVL IV: CPT | Mod: PBBFAC,,, | Performed by: PODIATRIST

## 2020-11-06 PROCEDURE — 99213 PR OFFICE/OUTPT VISIT, EST, LEVL III, 20-29 MIN: ICD-10-PCS | Mod: 25,S$GLB,, | Performed by: PODIATRIST

## 2020-11-06 PROCEDURE — 3008F BODY MASS INDEX DOCD: CPT | Mod: CPTII,S$GLB,, | Performed by: PODIATRIST

## 2020-11-06 PROCEDURE — 64450 NJX AA&/STRD OTHER PN/BRANCH: CPT | Mod: LT,S$GLB,, | Performed by: PODIATRIST

## 2020-11-06 PROCEDURE — 64450 PR NERVE BLOCK INJ, ANES/STEROID, OTHER PERIPHERAL: ICD-10-PCS | Mod: LT,S$GLB,, | Performed by: PODIATRIST

## 2020-11-06 RX ORDER — METHYLPREDNISOLONE ACETATE 40 MG/ML
40 INJECTION, SUSPENSION INTRA-ARTICULAR; INTRALESIONAL; INTRAMUSCULAR; SOFT TISSUE
Status: COMPLETED | OUTPATIENT
Start: 2020-11-06 | End: 2020-11-06

## 2020-11-06 RX ADMIN — METHYLPREDNISOLONE ACETATE 40 MG: 40 INJECTION, SUSPENSION INTRA-ARTICULAR; INTRALESIONAL; INTRAMUSCULAR; SOFT TISSUE at 03:11

## 2020-11-06 NOTE — PROGRESS NOTES
Subjective:      Patient ID: Tosha Wilks is a 55 y.o. female.    Chief Complaint: Foot Pain (left foot)    Tosha  is a 55 y.o. female who presents to the clinic complaining of heel pain in the left foot, especially with the first step in the morning for the past 2 months.  Relates history of large blister forming to the plantar medial left heel that was lanced twice at different urgent care visits.  She completed a course of oral Bactrim DS with no improvement.  She was given topical antibiotic per her PCP which helps somewhat.  Pain is unchanged overall however did describe that initially when the blister began that there was numbness to the area.  There was a history back pain that radiates down the left lower extremity how that has improved.  The pain is described as Sharp. The onset of the pain was sudden and has worsened over the past several weeks. Tosha  rates the pain as 10/10. She denies a history of trauma. Prior treatments include antibiotics, wound care and debridement. Symptoms have worsened. She denies F/N/V/C.    8/7/2020: Patient relates worsening left heel pain 2 days ago that seems to have improved somewhat today.  She had recent MRI. Denies F/N/V/C.  Followed per Pain Clinic.    08/14/2020:  Post I&D of the left heel x7 days.  Has 1 remaining days of doxycycline.  Wound C&S no growth.  Relates that she has a moderate amount of pain when she attempts to stand or walk on the left foot.  She feels pain radiate to her toes.  She has a history of low back pain also raise on the left lower extremity.  Previous MRI in shown area of uptake that the foot cellulitis versus a fluid collection along the plantar medial left heel.    09/10/2020:  Follow-up from diagnostic injection to the left posterior tibial nerve overlying the tarsal tunnel.  Relates that she gets minimal relief from the injection for short period of time.  Relates she was recently admitted at [facility] and  "treated for lupus flare and worsening of pain to left lower extremity.  States that she was given a course of steroids as well as treated for dehydration.  Her pain today is improved compared to last visit.  She has a history of fibromyalgia and lupus followed by Rheumatology.  Her pain management physician is in [place].    2020:  Follow-up for suspected tarsal tunnel syndrome left lower extremity.  She has been attending physical therapy and says that she has performing a lot range of motion exercises such as a stationary bike while using the boot and other light resistance exercises however she is unsure of any modalities actually were used along the tarsal tunnel area to help reduce inflammation and pain.  Reports that her pain is not as severe as it was in the past.  She is not sure if the previous injection helped her or not.  Accompanied by her  today.  She is ambulating with the tall orthopedic boot.  She has not use it while she is at home.  She does say she gets intermittent sharp pain that will wake up her up in the middle of night to the tarsal tunnel region left foot.      Vitals:    20 1519   BP: (!) 144/81   Pulse: 81   Weight: 74.8 kg (164 lb 14.5 oz)   Height: 5' 2" (1.575 m)   PainSc:   3   PainLoc: Foot      Past Medical History:   Diagnosis Date    Ankylosing spondylitis     Anxiety     Asthma     Chronic constipation     Chronic insomnia     Edema     Fibromyalgia     Interstitial cystitis     Lupus     Migraine headache     Restless legs syndrome     Shingles     Stroke     post partum right sided numbness    TIA (transient ischemic attack)        Past Surgical History:   Procedure Laterality Date    BLADDER SURGERY      BREAST SURGERY  2018    reduction     SECTION      CHOLECYSTECTOMY      COLONOSCOPY N/A 10/2/2020    Procedure: COLONOSCOPY;  Surgeon: Guicho Hood MD;  Location: [facility];  Service: Endoscopy;  Laterality: N/A;    " HYSTERECTOMY      LASER ABLATION      to back    SLEEVE GASTROPLASTY  05/2016    TOTAL REDUCTION MAMMOPLASTY Bilateral     two years ago    TUBAL LIGATION         Family History   Problem Relation Age of Onset    Hypertension Mother     Hypertension Brother     Breast cancer Maternal Aunt         x2     Heart attack Father     Hypertension Sister     Lupus Sister        Social History     Socioeconomic History    Marital status:      Spouse name: Not on file    Number of children: Not on file    Years of education: Not on file    Highest education level: Not on file   Occupational History    Not on file   Social Needs    Financial resource strain: Not hard at all    Food insecurity     Worry: Never true     Inability: Never true    Transportation needs     Medical: No     Non-medical: No   Tobacco Use    Smoking status: Never Smoker    Smokeless tobacco: Never Used   Substance and Sexual Activity    Alcohol use: Not Currently     Frequency: Monthly or less     Drinks per session: 1 or 2     Binge frequency: Never     Comment: occasionally    Drug use: No    Sexual activity: Yes     Partners: Male   Lifestyle    Physical activity     Days per week: 2 days     Minutes per session: 20 min    Stress: To some extent   Relationships    Social connections     Talks on phone: Three times a week     Gets together: Once a week     Attends Gnosticism service: Not on file     Active member of club or organization: Yes     Attends meetings of clubs or organizations: Never     Relationship status:    Other Topics Concern    Not on file   Social History Narrative    Not on file       Current Outpatient Medications   Medication Sig Dispense Refill    albuterol (PROAIR HFA) 90 mcg/actuation inhaler Inhale 2 puffs into the lungs every 4 (four) hours as needed. 18 g 3    budesonide 180mcg (PULMICORT 180MCG) 180 mcg/actuation AePB Inhale 2 puffs into the lungs once daily. 1 each 3     cetirizine (ZYRTEC) 10 MG tablet TAKE 1 TABLET BY MOUTH EVERY DAY 90 tablet 3    diclofenac-misoprostol  mg-mcg (ARTHROTEC 75)  mg-mcg per tablet Take 1 tablet by mouth 2 (two) times daily. 60 tablet 2    EPINEPHrine (EPIPEN 2-DENNIS) 0.3 mg/0.3 mL AtIn Inject 0.3 mLs (0.3 mg total) into the muscle once. for 1 dose 2 Device 3    ergocalciferol (ERGOCALCIFEROL) 50,000 unit Cap TAKE 1 CAPSULE BY MOUTH ONE TIME PER WEEK 12 capsule 3    gabapentin (NEURONTIN) 400 MG capsule TAKE 1 CAPSULE (400 MG TOTAL) BY MOUTH 3 (THREE) TIMES DAILY. 90 capsule 0    gabapentin (NEURONTIN) 600 MG tablet       HYDROmorphone (DILAUDID) 2 MG tablet take 1 tablet by mouth every 8 hours as needed for pain 90 tablet 0    HYDROmorphone (DILAUDID) 2 MG tablet Take one tablet by mouth every 6 to 8 hours as needed for pain. 105 tablet 0    hydroxychloroquine (PLAQUENIL) 200 mg tablet Take 200 mg by mouth once daily.  1    hydrOXYzine pamoate (VISTARIL) 25 MG Cap TAKE 1 CAPSULE (25 MG TOTAL) BY MOUTH EVERY 4 (FOUR) HOURS AS NEEDED. 90 capsule 3    lidocaine HCl 2 % Crea Apply to affected areas 4 times a day as needed. 118 mL 2    omeprazole (PRILOSEC) 20 MG capsule TAKE 1 CAPSULE BY MOUTH EVERY DAY (Patient taking differently: Take 20 mg by mouth 3 (three) times daily with meals. ) 90 capsule 3    potassium chloride SA (K-DUR,KLOR-CON) 10 MEQ tablet TAKE 1 TABLET (10 MEQ TOTAL) BY MOUTH 2 (TWO) TIMES DAILY.      pregabalin (LYRICA) 100 MG capsule       sumatriptan (IMITREX) 50 MG tablet Once for severe headache. May repeat once after 2 hours. Do not exceed 3-4 doses in one week. 12 tablet 3    topiramate (TOPAMAX) 100 MG tablet Take 1 tablet (100 mg total) by mouth every evening. for 14 days 90 tablet 3    triamcinolone acetonide 0.1% (KENALOG) 0.1 % Lotn Apply topically 2 (two) times daily. 60 mL 3    zolpidem (AMBIEN) 10 mg Tab       fentaNYL (DURAGESIC) 50 mcg/hr APPLY 1 PATCH TO SKIN EVERY 72 HOURS       No current  facility-administered medications for this visit.        Review of patient's allergies indicates:   Allergen Reactions    Morphine Anxiety     Hives     Other Other (See Comments)     Allergy is carrots Other reaction(s): Angioedema, carrots    Carrot     Erythromycin Other (See Comments)     Hives and cramps     Zofran (as hydrochloride) [ondansetron hcl] Hives     Local hive after IV injection       Review of Systems   Constitution: Negative for chills, decreased appetite, fever and night sweats.   HENT: Negative for congestion and hearing loss.    Cardiovascular: Negative for chest pain, claudication, cyanosis, irregular heartbeat and leg swelling.   Respiratory: Negative for cough and shortness of breath.    Skin: Positive for color change and poor wound healing. Negative for dry skin, itching, nail changes, rash, suspicious lesions and unusual hair distribution.        wound   Musculoskeletal: Positive for back pain. Negative for arthritis, falls, gout, joint pain, joint swelling and muscle weakness.        Left heel pain     Gastrointestinal: Negative for nausea and vomiting.   Neurological: Positive for numbness and paresthesias. Negative for dizziness, headaches, loss of balance and weakness.           Objective:      Physical Exam  Constitutional:       General: She is not in acute distress.     Appearance: She is not ill-appearing.   Cardiovascular:      Pulses:           Dorsalis pedis pulses are 2+ on the right side and 2+ on the left side.        Posterior tibial pulses are 2+ on the right side and 2+ on the left side.      Comments: CFT brisk < 3s  No edema to the LEs  Hair growth and distribution is normal.    Musculoskeletal:      Comments: Mild pain on palpation to the left heel plantar medial and central aspect extending to the proximal 3rd of the medial longitudinal arch.  There is mild to moderate pain on palpation along the tarsal tunnel region commencing at the area just superior to the  medial malleolus with pain radiating down to the toes and this continues to extend down through the tarsal tunnel.  No pain with dorsiflexion of the left foot.  No palpable fluctuance or crepitance left foot.    -20 degrees dorsiflexion left ankle to knee extended.   Skin:     General: Skin is warm.      Capillary Refill: Capillary refill takes less than 2 seconds.      Findings: No ecchymosis.      Nails: There is no clubbing.        Comments: Hyperpigmentation of the skin from previous injection the proximal tarsal tunnel region left medial hindfoot.   Neurological:      Mental Status: She is alert and oriented to person, place, and time.      Comments: + provocation to the PT nerve  Light touch intact.  MMT 5/5 DF, PF, Inv, Ev  Normal muscle tone.  No signs of atrophy.  No tremor or spasticity.                 Assessment:       Encounter Diagnoses   Name Primary?    Tarsal tunnel syndrome, left Yes    Plantar fasciitis, left     Chronic pain of left lower extremity          Plan:       Tosha  was seen today for foot pain.    Diagnoses and all orders for this visit:    Tarsal tunnel syndrome, left    Plantar fasciitis, left    Chronic pain of left lower extremity    Other orders  -     methylPREDNISolone acetate injection 40 mg      I counseled the patient on her conditions, their implications and medical management.    Normal NCV/EMG for left lower extremity.    Previous MRI had shown the uptake in the fat pad to the plantar left heel with no other abnormality noted.    Discussed continued physical therapy however message was sent today to her physical therapist in order to ensure that she receives modalities such as iontophoresis, electrical stimulation, a stem to help reduce pain inflammation that region along the tarsal tunnel left medial hindfoot.    We discussed other steroid injection in order to help desensitize the area.  I did caution her that it may cause more hyperpigmentation of the skin.  With  the patient's verbal consent the skin was prepped overlying the mid aspect of the tarsal tunnel with alcohol followed by skin anesthesia with ethyl chloride.  Injected mixture containing 2 mL 0.25% plain Marcaine with 40 mg Depo-Medrol to the affected area.  She tolerated procedure well without apparent complication.  Instructed rest, ice and elevate p.r.n. as discussed.    Continue activity as tolerated.  She may discontinue using the orthopedic boot and use it as needed.    RTC 2 months or p.r.n..  She does not continue to improve consider repeat MRI.    A portion of this note was generated by voice recognition software and may contain spelling and grammar errors.

## 2020-11-17 ENCOUNTER — PATIENT MESSAGE (OUTPATIENT)
Dept: FAMILY MEDICINE | Facility: CLINIC | Age: 55
End: 2020-11-17

## 2020-11-17 ENCOUNTER — PATIENT MESSAGE (OUTPATIENT)
Dept: NEUROLOGY | Facility: CLINIC | Age: 55
End: 2020-11-17

## 2020-11-17 ENCOUNTER — OFFICE VISIT (OUTPATIENT)
Dept: DERMATOLOGY | Facility: CLINIC | Age: 55
End: 2020-11-17
Payer: COMMERCIAL

## 2020-11-17 DIAGNOSIS — L30.8 ASTEATOTIC ECZEMA: ICD-10-CM

## 2020-11-17 DIAGNOSIS — L91.0 HYPERTROPHIC SCAR: Primary | ICD-10-CM

## 2020-11-17 DIAGNOSIS — F41.9 ANXIETY: Primary | ICD-10-CM

## 2020-11-17 PROCEDURE — 99202 PR OFFICE/OUTPT VISIT, NEW, LEVL II, 15-29 MIN: ICD-10-PCS | Mod: S$GLB,,, | Performed by: DERMATOLOGY

## 2020-11-17 PROCEDURE — 99202 OFFICE O/P NEW SF 15 MIN: CPT | Mod: S$GLB,,, | Performed by: DERMATOLOGY

## 2020-11-17 PROCEDURE — 99999 PR PBB SHADOW E&M-EST. PATIENT-LVL IV: CPT | Mod: PBBFAC,,, | Performed by: DERMATOLOGY

## 2020-11-17 PROCEDURE — 1126F AMNT PAIN NOTED NONE PRSNT: CPT | Mod: S$GLB,,, | Performed by: DERMATOLOGY

## 2020-11-17 PROCEDURE — 1126F PR PAIN SEVERITY QUANTIFIED, NO PAIN PRESENT: ICD-10-PCS | Mod: S$GLB,,, | Performed by: DERMATOLOGY

## 2020-11-17 PROCEDURE — 99999 PR PBB SHADOW E&M-EST. PATIENT-LVL IV: ICD-10-PCS | Mod: PBBFAC,,, | Performed by: DERMATOLOGY

## 2020-11-17 RX ORDER — ARIPIPRAZOLE 5 MG/1
5 TABLET ORAL DAILY
Qty: 30 TABLET | Refills: 11 | Status: SHIPPED | OUTPATIENT
Start: 2020-11-17 | End: 2021-11-13

## 2020-11-17 RX ORDER — CLOBETASOL PROPIONATE 0.46 MG/ML
SOLUTION TOPICAL
Qty: 50 ML | Refills: 1 | Status: SHIPPED | OUTPATIENT
Start: 2020-11-17 | End: 2022-03-22

## 2020-11-17 NOTE — PATIENT INSTRUCTIONS
XEROSIS (DRY SKIN)        1. Definition    Xerosis is the term for dry skin.  We all have a natural oil coating over our skin produced by the skin oil glands.  If this oil is removed, the skin becomes dry which can lead to cracking, which can lead to inflammation.  Xerosis is usually a long-term problem that recurs often, especially in the winter.    2. Cause     Long hot baths or showers can remove our natural oil and lead to xerosis.  One should never take more than one bath or shower a day and for no longer than ten minutes.   Use of harsh soaps such as Zest, Dial, and Ivory can worsen and cause xerosis.   Cold winter weather worsens xerosis because the amount of moisture contained in cold air is much less than the amount of moisture in warm air.    3. Treatment     Treatment is intended to restore the natural oil to your skin.  Keep the skin lubricated.     Do not take more than one bath or shower a day.  Use lukewarm water, not hot.  Hot water dries out the skin.     Use a gentle moisturizing soap such as Cetaphil soap, Oil of Olay, Dove, Basis, Ivory moisture care, Restoraderm cleanser.     When toweling dry, dont rub.  Blot the skin so there is still some water left on the skin.  You should apply a moisturizing cream to all of the skin such as Cerave cream, Cetaphil cream, Restoraderm or Eucerin Original Formula cream.   Alpha hydroxyacid lotions, i.e., AmLactin, also work very well for preventing dry skin, but may burn when used on inflamed or reddened skin.     If you like to swim during the winter months, you should not use soap when getting out of the pool.  When you have finished swimming, rinse off the chlorine with cool to warm water.  If this will be the only shower of the day, then you may use Cetaphil or another mild soap to cleanse your skin.  After the shower, apply a moisturizing cream to all of the skin as above.        [address] [place] [address]/ [phone]  [phone] FAX/ [email or web address]

## 2020-11-17 NOTE — PROGRESS NOTES
Subjective:       Patient ID:  Tosha Wilks is a 55 y.o. female who presents for   Chief Complaint   Patient presents with    Itching     all over     Lesion     under breast      HPI  Here today for painful scars on lateral sides of breasts after breast reduction in 2013. States had ILK in the past but was too painful so then had radiation treatments which helped.     Also c/o diffuse itching x 2-3 months. Started as a dry, itchy spot on leg, but then itching spread. Has tried claritin from PCP and weaning off of Lexapro with no improvement.  Pt has hx of SLE, and upon review of the chart, is anemic.    Review of Systems   Constitutional: Negative for fever and chills.   Skin: Positive for itching and rash.        Objective:    Physical Exam   Skin:   Areas Examined (abnormalities noted in diagram):   Head / Face Inspection Performed  Neck Inspection Performed  Chest / Axilla Inspection Performed  RUE Inspected  LUE Inspection Performed  RLE Inspected  LLE Inspection Performed              Diagram Legend     Erythematous scaling macule/papule c/w actinic keratosis       Vascular papule c/w angioma      Pigmented verrucoid papule/plaque c/w seborrheic keratosis      Yellow umbilicated papule c/w sebaceous hyperplasia      Irregularly shaped tan macule c/w lentigo     1-2 mm smooth white papules consistent with Milia      Movable subcutaneous cyst with punctum c/w epidermal inclusion cyst      Subcutaneous movable cyst c/w pilar cyst      Firm pink to brown papule c/w dermatofibroma      Pedunculated fleshy papule(s) c/w skin tag(s)      Evenly pigmented macule c/w junctional nevus     Mildly variegated pigmented, slightly irregular-bordered macule c/w mildly atypical nevus      Flesh colored to evenly pigmented papule c/w intradermal nevus       Pink pearly papule/plaque c/w basal cell carcinoma      Erythematous hyperkeratotic cursted plaque c/w SCC      Surgical scar with no sign of skin cancer  "recurrence      Open and closed comedones      Inflammatory papules and pustules      Verrucoid papule consistent consistent with wart     Erythematous eczematous patches and plaques     Dystrophic onycholytic nail with subungual debris c/w onychomycosis     Umbilicated papule    Erythematous-base heme-crusted tan verrucoid plaque consistent with inflamed seborrheic keratosis     Erythematous Silvery Scaling Plaque c/w Psoriasis     See annotation      Assessment / Plan:        Hypertrophic scar  Offered ILK but pt declined.  Discussed looking into laser treatment, and if unsuccessful, may try radiation again.    Asteatotic eczema  -     clobetasoL (TEMOVATE) 0.05 % external solution; Pt to mix in 1 jar of cerave cream and apply to affected areas bid  Dispense: 50 mL; Refill: 1  Good skin care regimen discussed including limiting to one bath or shower/day, using lukewarm water with mild soap and moisturizing cream to skin 1 - 2x/day. Brochure was provided and reviewed with patient.  Recommended CeraVe moisturizing cream, Dove sensitive skin soap, and "free and clear" detergents.    If this does not improve the itching, recommended returning to clinic in 2 weeks for workup of diffuse itching (to include Fe studies and CXR)    Follow up in about 2 weeks (around 12/1/2020) for skin check or sooner for any concerns.    "

## 2020-11-18 ENCOUNTER — CLINICAL SUPPORT (OUTPATIENT)
Dept: REHABILITATION | Facility: HOSPITAL | Age: 55
End: 2020-11-18
Payer: COMMERCIAL

## 2020-11-18 DIAGNOSIS — M79.672 LEFT FOOT PAIN: Primary | ICD-10-CM

## 2020-11-18 PROCEDURE — 97140 MANUAL THERAPY 1/> REGIONS: CPT

## 2020-11-18 PROCEDURE — 97110 THERAPEUTIC EXERCISES: CPT

## 2020-11-18 NOTE — PROGRESS NOTES
Physical Therapy Daily Treatment Note      [facility]  [facility] Number: [id number]  Therapy Diagnosis:           Encounter Diagnosis   Name Primary?    Left foot pain        Physician: Blaine Jacinto DPM     Physician Orders: PT Eval and Treat   Medical Diagnosis from Referral: tarsal tunnel syndrome, left  Evaluation Date: 10/16/2020  Authorization Period Expiration: 12/31/20  Plan of Care Expiration: 1/16/21  Visit # / Visits authorized: 2/18     Time In: 5:05  Time Out: 5:50  Total Billable Time: 45 minutes     Precautions: Standard, hypertension     Subjective      Pt reports: some foot pain.   She was compliant with home exercise program.  Response to previous treatment: min increased pain after tx.   Functional change: boot donned      Pain: 2/10  Location: left feet       Objective      Tosha received therapeutic exercises to develop strength, endurance, ROM, flexibility and posture for 20 minutes including:  OTB Ankle 4 way AROM 30x  OTB hallux flexion 3x10  Ankle circles 20X  Golf ball rolling under foot 5'        Tosha received the following manual therapy techniques: Joint mobilizations and Soft tissue Mobilization were applied to the: L ankle  for 25 minutes, including:   IASTM/Trp release to gastroc/soleus/tib post.       Tosha received moist heat for 10 minutes to to decrease circulation, pain, and swelling.     Home Exercises Provided and Patient Education Provided      Education provided:   Posture awareness      Written Home Exercises Provided: yes.  Exercises were reviewed and Tosha was able to demonstrate them prior to the end of the session.  Tosha demonstrated good  understanding of the education provided.         Assessment   PT discussed dry needling with patient, patient Pt with fair tolerance to therapy today. IASTM to gastroc/soleus increased pain level at during the session, some improvement of soft tissue mobility noted. Pt needed tactile and verbal  cues to complete exercises correctly today.      Tosha is progressing well towards her goals.   Pt prognosis is Good.      Pt will continue to benefit from skilled outpatient physical therapy to address the deficits listed in the problem list box on initial evaluation, provide pt/family education and to maximize pt's level of independence in the home and community environment.      Pt's spiritual, cultural and educational needs considered and pt agreeable to plan of care and goals.     Anticipated barriers to physical therapy: none      Goals:Goals:  Short-Term Goals: 6 weeks  - The patient will be independent with initial home exercise program.  - The patient to ambulate without boot in clinic 20 feet independently   - The patient will increase strength by 1/2 muscle grade to perform functional mobiltiy with pain < 4/10.  - The patient will increase ROM grossly to perform ambulation with pain < 4/10.     Long-Term Goals: 12 weeks  - The patient will be independent with home exercise program and symptom management.  - The patient will be independent amb with no assistive device/boot on all surfaces for community distances.  - The patient will increase strength by 4+/5 to perform functional mobility with pain < 2/10.  - The patient will increase ROM grossly to WFL to perform ambulation/ADLs with pain < 2/10.           Plan      Continue with OLVIN Aquino, PT, DPT

## 2020-11-19 RX ORDER — SUMATRIPTAN 50 MG/1
50 TABLET, FILM COATED ORAL ONCE AS NEEDED
Qty: 12 TABLET | Refills: 0 | Status: SHIPPED | OUTPATIENT
Start: 2020-11-19 | End: 2020-11-23 | Stop reason: SDUPTHER

## 2020-11-19 RX ORDER — TOPIRAMATE 100 MG/1
100 TABLET, FILM COATED ORAL NIGHTLY
Qty: 90 TABLET | Refills: 3 | Status: SHIPPED | OUTPATIENT
Start: 2020-11-19 | End: 2021-02-02

## 2020-11-23 RX ORDER — SUMATRIPTAN 50 MG/1
50 TABLET, FILM COATED ORAL ONCE AS NEEDED
Qty: 12 TABLET | Refills: 0 | Status: SHIPPED | OUTPATIENT
Start: 2020-11-23 | End: 2022-09-19 | Stop reason: ALTCHOICE

## 2020-12-04 ENCOUNTER — TELEPHONE (OUTPATIENT)
Dept: DERMATOLOGY | Facility: CLINIC | Age: 55
End: 2020-12-04

## 2020-12-04 DIAGNOSIS — L29.9 PRURITUS: Primary | ICD-10-CM

## 2020-12-04 NOTE — TELEPHONE ENCOUNTER
BEVERLEY Peterson MD   Caller: Unspecified (Today,  3:45 PM)             Pt states that itching has not stopped and wants to know if she will still need lab work done. She showed up for her appointment but stated she could not wait. Wants to know if she will need to get lab work done and will schedule a virtual follow up to address the itching.      Since recommendations from last visit have not helped, will initiate workup for diffuse pruritus.

## 2020-12-07 ENCOUNTER — PATIENT MESSAGE (OUTPATIENT)
Dept: DERMATOLOGY | Facility: CLINIC | Age: 55
End: 2020-12-07

## 2020-12-07 ENCOUNTER — CLINICAL SUPPORT (OUTPATIENT)
Dept: REHABILITATION | Facility: HOSPITAL | Age: 55
End: 2020-12-07
Payer: COMMERCIAL

## 2020-12-07 DIAGNOSIS — M25.619 LIMITED RANGE OF MOTION (ROM) OF SHOULDER: ICD-10-CM

## 2020-12-07 DIAGNOSIS — M79.672 LEFT FOOT PAIN: ICD-10-CM

## 2020-12-07 DIAGNOSIS — S46.912A LEFT SHOULDER STRAIN, INITIAL ENCOUNTER: Primary | ICD-10-CM

## 2020-12-07 PROCEDURE — 97140 MANUAL THERAPY 1/> REGIONS: CPT

## 2020-12-07 PROCEDURE — 97110 THERAPEUTIC EXERCISES: CPT

## 2020-12-07 NOTE — PROGRESS NOTES
Physical Therapy Progress Note      Name: Tosha Wilks  Clinic Number: [id number]     Therapy Diagnosis:           Encounter Diagnoses   Name Primary?    Left shoulder strain, initial encounter      Limited range of motion (ROM) of shoulder        Physician: Zackery Rothman MD     Visit Date: 10/07/2020     Physician Orders: PT Eval and Treat   Medical Diagnosis from Referral: M75.102 (ICD-10-CM) - Rotator cuff syndrome of left shoulder  Evaluation Date: 7/10/2020  Authorization Period Expiration: 10/30/2020  Plan of Care Expiration: 2020  Visit # / Visits authorized:      Time In: 3:15 pm  Time Out: 4:00 pm  Total Billable Time: 45 minutes     Precautions: Fall and fibromyalgia, lupus, and ankylosing spondylitis     Subjective      Pt reports: she missed last week b/c her dog . Pt states her shoulder and foot pain have increased recently.   She was compliant with home exercise program.  Response to previous treatment: No adverse effects  Functional change: N/A     Pain: 8/10  Location: left shoulder       Objective       20  Passive Range of Motion:   Shoulder Right Left   Flexion 180 90   Abduction 180 80   ER at 0 30 40   ER at 90 nt nt   IR 60 80         AROM Left   Flx 80   Abd 70   ER 45   ER @ 90 nt   IR  Thumb-L4      Tosha received therapeutic exercises to develop strength, endurance, ROM and flexibility for 30 minutes including:     Tables slides flex/scaption 10 X 10 sec NP  Table ER stretch 10X10 sec NP  Scrap retracts 2X10  Supine AA wand press 2X10  Supine AA wand flexion x 20X  Supine AA wand ER x 20X          Pulleys (flx) x 3  Pt education: HEP importance/frequency     Tosha received the following manual therapy techniques: Joint mobilizations were applied for 15 minutes including:   gentle GH oscillations  PROM/stretch     Tosha participated in neuromuscular re-education activities to improve Posture and Neuromuscular for 00 minutes. The following activities were  included:        Tosha participated in dynamic functional therapeutic activities to improve functional performance for 00 minutes including:           Home Exercises Provided and Patient Education Provided      Education provided:   - Healing process for frozen shoulder     Written Home Exercises Provided: yes.  Exercises were reviewed and Tosha was able to demonstrate them prior to the end of the session.  Tosha demonstrated good  understanding of the education provided.      See EMR under Patient Instructions for exercises provided 7/10/2020.     Assessment      Pt is showing progress with physical therapy with improved left shoulder active range of motion. Pt tolerated tx well today with mild increase in pain. Pt states pain with exercise is now less sharp.       Tosha is progressing  towards her goals.   Pt prognosis is Fair/Good.        Pt will continue to benefit from skilled outpatient physical therapy to address the deficits listed in the problem list box on initial evaluation, provide pt/family education and to maximize pt's level of independence in the home and community environment.      Pt's spiritual, cultural and educational needs considered and pt agreeable to plan of care and goals.     Anticipated barriers to physical therapy: covid     GOALS: Short Term Goals:  4 weeks  1.Report decreased shoulder pain < / =  3/10  to increase tolerance for completing ADLs (In progress)  2. Increase PROM 140 deg of flexion to restore reach  (In progress)  3. Increased strength by 1/3 MMT grade in L shoulder to increase tolerance for ADL and work activities. (In progress)  4. Pt to tolerate HEP to improve ROM and independence with ADL's (In progress)     Long Term Goals: 12 weeks  1.Report decreased L shoulder pain  < / =  1 /10  to increase tolerance for sleeping at night (In progress)  2.Increase AROM to 140 deg of flexion to restore overhead reach   (In progress)  3.Increase strength to >/= 4/5 in L  shoulder to increase tolerance for ADL and work activities. (In progress)  4. Pt goal: reach overhead without pain in L shoulder (In progress)  5. Pt will have improved to 29% on FOTO shoulder in order to demonstrate true functional improvement. (In progress)     Plan   Plan of care Certification: 7/10/2020 to 10/30/2020.     Outpatient Physical Therapy 2 times weekly for 10 weeks to include the following interventions: Manual Therapy, Moist Heat/ Ice, Neuromuscular Re-ed, Patient Education, Self Care and Therapeutic Activites.      Riccardo Aquino, PT, DPT

## 2020-12-07 NOTE — PROGRESS NOTES
Physical Therapy Daily Treatment Note      Name: Tosha Wilks  Clinic Number: [id number]  Therapy Diagnosis:           Encounter Diagnosis   Name Primary?    Left foot pain        Physician: Blaine Jacinto DPM     Physician Orders: PT Eval and Treat   Medical Diagnosis from Referral: tarsal tunnel syndrome, left  Evaluation Date: 10/16/2020  Authorization Period Expiration: 12/31/20  Plan of Care Expiration: 1/16/21  Visit # / Visits authorized: 3/18     Time In: 1600  Time Out: 1645  Total Billable Time: 45 minutes     Precautions: Standard, hypertension     Subjective      Pt reports: persistent baseline foot pain with unpredictable exacerbations.   She was compliant with home exercise program.  Response to previous treatment: min increased pain after tx.   Functional change: boot donned for community ambulation however not at home    Pain: 2/10  Location: left feet       Objective      Tosha received therapeutic exercises to develop strength, endurance, ROM, flexibility and posture for 20 minutes including:  Ankle eversion AROM with cue no tibial rotation  Towel scrunches  Toe yoga      Not performed today:  OTB Ankle 4 way AROM 30x  OTB hallux flexion 3x10  Ankle circles 20X  Golf ball rolling under foot 5'       Tosha received the following manual therapy techniques: Joint mobilizations and Soft tissue Mobilization were applied to the: L ankle  for 25 minutes, including:   IASTM/Trp release to gastroc/soleus/tib post.    TCJ and subtalar mobilization Gr. II all planes  Desensitization with towel abrasion    Tosha received moist heat for 10 minutes to to decrease circulation, pain, and swelling.-np     Home Exercises Provided and Patient Education Provided      Education provided:   Posture awareness      Written Home Exercises Provided: yes.  -HEP review  -Self desensitization  -Pt education handout on dry needling        Assessment   Pt tolerated treatment well today, was challenged  with eversion AROM motor control which improved with practice. Pt continue to present with allodynia in foot, ankle, calf and shin. Recommended self massage to desensitize distal LE. Pt reports interest in functional dry needling with certified DPT.   Tosha is progressing well towards her goals.   Pt prognosis is Good.      Pt will continue to benefit from skilled outpatient physical therapy to address the deficits listed in the problem list box on initial evaluation, provide pt/family education and to maximize pt's level of independence in the home and community environment.      Pt's spiritual, cultural and educational needs considered and pt agreeable to plan of care and goals.     Anticipated barriers to physical therapy: none      Goals:Goals:  Short-Term Goals: 6 weeks  - The patient will be independent with initial home exercise program.  - The patient to ambulate without boot in clinic 20 feet independently   - The patient will increase strength by 1/2 muscle grade to perform functional mobiltiy with pain < 4/10.  - The patient will increase ROM grossly to perform ambulation with pain < 4/10.     Long-Term Goals: 12 weeks  - The patient will be independent with home exercise program and symptom management.  - The patient will be independent amb with no assistive device/boot on all surfaces for community distances.  - The patient will increase strength by 4+/5 to perform functional mobility with pain < 2/10.  - The patient will increase ROM grossly to WFL to perform ambulation/ADLs with pain < 2/10.           Plan      Continue with OLVIN Aquino, PT, DPT

## 2020-12-08 ENCOUNTER — HOSPITAL ENCOUNTER (OUTPATIENT)
Dept: RADIOLOGY | Facility: HOSPITAL | Age: 55
Discharge: HOME | End: 2020-12-08
Attending: DERMATOLOGY
Payer: COMMERCIAL

## 2020-12-08 DIAGNOSIS — L29.9 PRURITUS: ICD-10-CM

## 2020-12-08 PROCEDURE — 71046 X-RAY EXAM CHEST 2 VIEWS: CPT | Mod: TC,FY,PO

## 2020-12-09 ENCOUNTER — PATIENT MESSAGE (OUTPATIENT)
Dept: FAMILY MEDICINE | Facility: CLINIC | Age: 55
End: 2020-12-09

## 2020-12-14 ENCOUNTER — CLINICAL SUPPORT (OUTPATIENT)
Dept: REHABILITATION | Facility: HOSPITAL | Age: 55
End: 2020-12-14
Payer: COMMERCIAL

## 2020-12-14 DIAGNOSIS — M79.672 LEFT FOOT PAIN: ICD-10-CM

## 2020-12-14 DIAGNOSIS — M25.619 LIMITED RANGE OF MOTION (ROM) OF SHOULDER: ICD-10-CM

## 2020-12-14 DIAGNOSIS — S46.912A LEFT SHOULDER STRAIN, INITIAL ENCOUNTER: ICD-10-CM

## 2020-12-14 PROCEDURE — 97110 THERAPEUTIC EXERCISES: CPT

## 2020-12-14 PROCEDURE — 97140 MANUAL THERAPY 1/> REGIONS: CPT

## 2020-12-14 NOTE — PROGRESS NOTES
Note: this document addresses two distinct episodes of care. Charges have been linked accordingly.     Physical Therapy Daily Treatment Note -- FOOT/ANKLE      [facility]  [facility] Number: [id number]  Therapy Diagnosis:           Encounter Diagnosis   Name Primary?    Left foot pain        Physician: Blaine Jacinto DPM     Physician Orders: PT Eval and Treat   Medical Diagnosis from Referral: tarsal tunnel syndrome, left  Evaluation Date: 10/16/2020  Authorization Period Expiration: 12/31/20  Plan of Care Expiration: 1/16/21  Visit # / Visits authorized: 3/18     Time In: 1515  Time Out: 1600  Total Billable Time: 45 minutes     Precautions: Standard, hypertension     Subjective    Pt agreeable and consents to dry needling   Pt reports: severe muscle spasms occurring regularly over the past couple days  She was compliant with home exercise program.  Response to previous treatment: min increased pain after tx.   Functional change: boot donned      Pain: 8/10  Location: left foot and calf       Objective      Tosha received therapeutic exercises to develop strength, endurance, ROM, flexibility and posture for 30 minutes including:  OTB Ankle 4 way AROM 30x  OTB hallux flexion 3x10  Ankle circles 20X  Golf ball rolling under foot 5'  Scobey pickups 4'     Tosha received the following manual therapy techniques: Joint mobilizations and Soft tissue Mobilization were applied to the: L ankle  for 15 minutes, including:   STM/Trp release to gastroc/soleus/tib post.    Dry needling L tibialis posterior     Tosha received moist heat for 10 minutes to to decrease circulation, pain, and swelling.     Home Exercises Provided and Patient Education Provided      Education provided:   Plan to wean off walking boot entirely soon     Written Home Exercises Provided: yes.  Exercises were reviewed and Lirichard was able to demonstrate them prior to the end of the session.  Tosha demonstrated good   understanding of the education provided.         Assessment   Pt tolerated exercise interventions well today. Pt report of reduced pain in calf and foot correlated with PT finding of reduced density of tibialis posterior per palpation s/p dry needling.      Tosha is progressing well towards her goals.   Pt prognosis is Good.      Pt will continue to benefit from skilled outpatient physical therapy to address the deficits listed in the problem list box on initial evaluation, provide pt/family education and to maximize pt's level of independence in the home and community environment.      Pt's spiritual, cultural and educational needs considered and pt agreeable to plan of care and goals.     Anticipated barriers to physical therapy: none      Goals:Goals:  Short-Term Goals: 6 weeks  - The patient will be independent with initial home exercise program.  - The patient to ambulate without boot in clinic 20 feet independently   - The patient will increase strength by 1/2 muscle grade to perform functional mobiltiy with pain < 4/10.  - The patient will increase ROM grossly to perform ambulation with pain < 4/10.     Long-Term Goals: 12 weeks  - The patient will be independent with home exercise program and symptom management.  - The patient will be independent amb with no assistive device/boot on all surfaces for community distances.  - The patient will increase strength by 4+/5 to perform functional mobility with pain < 2/10.  - The patient will increase ROM grossly to WFL to perform ambulation/ADLs with pain < 2/10.           Plan      Continue with OLVIN Cardoso, PT, DPT, Cert-DN             Physical Therapy Daily Treatment Note -- SHOULDER      Name: Tosha Wilks  Clinic Number: [id number]     Therapy Diagnosis:           Encounter Diagnoses   Name Primary?    Left shoulder strain, initial encounter      Limited range of motion (ROM) of shoulder        Physician: Zackery Rothman MD     Visit  Date: 10/07/2020     Physician Orders: PT Eval and Treat   Medical Diagnosis from Referral: M75.102 (ICD-10-CM) - Rotator cuff syndrome of left shoulder  Evaluation Date: 7/10/2020  Authorization Period Expiration: 10/30/2020  Plan of Care Expiration: 12/31/2020  Visit # / Visits authorized: 4/20     Time In: 1600  Time Out: 1645  Total Billable Time: 45 minutes     Precautions: Fall and fibromyalgia, lupus, and ankylosing spondylitis     Subjective   Pt agreeable and consents to dry needling    Pt reports: shoulder is about the same   She was compliant with home exercise program.  Response to previous treatment: No adverse effects  Functional change: N/A     Pain: 8/10  Location: left anterior, posterior and lateral shoulder       Objective     Tosha received therapeutic exercises to develop strength, endurance, ROM and flexibility for 15 minutes including:  Supine AA wand press 2X10  Supine AA wand flexion x 20X  Supine AA wand ER x 20  Pulleys (flx) x 3    Not performed today:  Tables slides flex/scaption 10 X 10 sec   Table ER stretch 10X10 sec   Scap retracts 2X10      Tosha received the following manual therapy techniques were applied for 30 minutes including:  Dry needling L upper trapezius  PROM L shoulder  Gr. II GHJ posterior glides     Home Exercises Provided and Patient Education Provided      Education provided:   -Hydration and heat after dry needling    Written Home Exercises Provided: yes.  Exercises were reviewed and Tosha was able to demonstrate them prior to the end of the session.  Tosha demonstrated good  understanding of the education provided.     Assessment   Pt with significant reduction in L shoulder pain with flexion AROM s/p dry needling today, correlating with reduced density to palpation noted.      Tosha is progressing  towards her goals.   Pt prognosis is Fair/Good.        Pt will continue to benefit from skilled outpatient physical therapy to address the deficits  listed in the problem list box on initial evaluation, provide pt/family education and to maximize pt's level of independence in the home and community environment.      Pt's spiritual, cultural and educational needs considered and pt agreeable to plan of care and goals.     Anticipated barriers to physical therapy: covid     GOALS: Short Term Goals:  4 weeks  1.Report decreased shoulder pain < / =  3/10  to increase tolerance for completing ADLs (In progress)  2. Increase PROM 140 deg of flexion to restore reach  (In progress)  3. Increased strength by 1/3 MMT grade in L shoulder to increase tolerance for ADL and work activities. (In progress)  4. Pt to tolerate HEP to improve ROM and independence with ADL's (In progress)     Long Term Goals: 12 weeks  1.Report decreased L shoulder pain  < / =  1 /10  to increase tolerance for sleeping at night (In progress)  2.Increase AROM to 140 deg of flexion to restore overhead reach   (In progress)  3.Increase strength to >/= 4/5 in L shoulder to increase tolerance for ADL and work activities. (In progress)  4. Pt goal: reach overhead without pain in L shoulder (In progress)  5. Pt will have improved to 29% on FOTO shoulder in order to demonstrate true functional improvement. (In progress)     Plan   Plan of care Certification: 7/10/2020 to 10/30/2020.     Outpatient Physical Therapy 2 times weekly for 10 weeks to include the following interventions: Manual Therapy, Moist Heat/ Ice, Neuromuscular Re-ed, Patient Education, Self Care and Therapeutic Activites.      Osvaldo Cardoso, PT, DPT, Cert-DN

## 2021-01-06 ENCOUNTER — PATIENT MESSAGE (OUTPATIENT)
Dept: NEUROLOGY | Facility: CLINIC | Age: 56
End: 2021-01-06

## 2021-01-06 ENCOUNTER — CLINICAL SUPPORT (OUTPATIENT)
Dept: REHABILITATION | Facility: HOSPITAL | Age: 56
End: 2021-01-06
Attending: FAMILY MEDICINE
Payer: COMMERCIAL

## 2021-01-06 DIAGNOSIS — M79.672 LEFT FOOT PAIN: ICD-10-CM

## 2021-01-06 DIAGNOSIS — M75.42 IMPINGEMENT SYNDROME OF LEFT SHOULDER: ICD-10-CM

## 2021-01-06 DIAGNOSIS — S46.912A LEFT SHOULDER STRAIN, INITIAL ENCOUNTER: ICD-10-CM

## 2021-01-06 DIAGNOSIS — M25.619 LIMITED RANGE OF MOTION (ROM) OF SHOULDER: ICD-10-CM

## 2021-01-06 PROCEDURE — 97110 THERAPEUTIC EXERCISES: CPT

## 2021-01-06 PROCEDURE — 97140 MANUAL THERAPY 1/> REGIONS: CPT

## 2021-01-14 ENCOUNTER — CLINICAL SUPPORT (OUTPATIENT)
Dept: REHABILITATION | Facility: HOSPITAL | Age: 56
End: 2021-01-14
Payer: COMMERCIAL

## 2021-01-14 DIAGNOSIS — M79.672 LEFT FOOT PAIN: Primary | ICD-10-CM

## 2021-01-14 PROCEDURE — 97113 AQUATIC THERAPY/EXERCISES: CPT | Performed by: PHYSICAL THERAPIST

## 2021-01-18 ENCOUNTER — PATIENT MESSAGE (OUTPATIENT)
Dept: SURGERY | Facility: CLINIC | Age: 56
End: 2021-01-18

## 2021-01-20 ENCOUNTER — CLINICAL SUPPORT (OUTPATIENT)
Dept: REHABILITATION | Facility: HOSPITAL | Age: 56
End: 2021-01-20
Payer: COMMERCIAL

## 2021-01-20 DIAGNOSIS — M25.619 LIMITED RANGE OF MOTION (ROM) OF SHOULDER: ICD-10-CM

## 2021-01-20 DIAGNOSIS — S46.912A LEFT SHOULDER STRAIN, INITIAL ENCOUNTER: ICD-10-CM

## 2021-01-20 PROCEDURE — 97140 MANUAL THERAPY 1/> REGIONS: CPT

## 2021-01-20 PROCEDURE — 97110 THERAPEUTIC EXERCISES: CPT

## 2021-01-21 ENCOUNTER — CLINICAL SUPPORT (OUTPATIENT)
Dept: REHABILITATION | Facility: HOSPITAL | Age: 56
End: 2021-01-21
Payer: COMMERCIAL

## 2021-01-21 DIAGNOSIS — M79.672 LEFT FOOT PAIN: Primary | ICD-10-CM

## 2021-01-21 PROCEDURE — 97113 AQUATIC THERAPY/EXERCISES: CPT | Performed by: PHYSICAL THERAPIST

## 2021-01-27 ENCOUNTER — CLINICAL SUPPORT (OUTPATIENT)
Dept: REHABILITATION | Facility: HOSPITAL | Age: 56
End: 2021-01-27
Payer: COMMERCIAL

## 2021-01-27 DIAGNOSIS — M79.672 LEFT FOOT PAIN: ICD-10-CM

## 2021-01-27 DIAGNOSIS — S46.912A LEFT SHOULDER STRAIN, INITIAL ENCOUNTER: ICD-10-CM

## 2021-01-27 DIAGNOSIS — M25.619 LIMITED RANGE OF MOTION (ROM) OF SHOULDER: ICD-10-CM

## 2021-01-27 DIAGNOSIS — M75.42 IMPINGEMENT SYNDROME OF LEFT SHOULDER: ICD-10-CM

## 2021-01-27 PROCEDURE — 97140 MANUAL THERAPY 1/> REGIONS: CPT

## 2021-01-27 PROCEDURE — 97110 THERAPEUTIC EXERCISES: CPT

## 2021-01-28 ENCOUNTER — CLINICAL SUPPORT (OUTPATIENT)
Dept: REHABILITATION | Facility: HOSPITAL | Age: 56
End: 2021-01-28
Payer: COMMERCIAL

## 2021-01-28 DIAGNOSIS — M79.672 LEFT FOOT PAIN: ICD-10-CM

## 2021-01-28 DIAGNOSIS — M25.619 LIMITED RANGE OF MOTION (ROM) OF SHOULDER: ICD-10-CM

## 2021-01-28 DIAGNOSIS — S46.912A LEFT SHOULDER STRAIN, INITIAL ENCOUNTER: ICD-10-CM

## 2021-01-28 PROCEDURE — 97113 AQUATIC THERAPY/EXERCISES: CPT

## 2021-01-28 PROCEDURE — 97110 THERAPEUTIC EXERCISES: CPT

## 2021-02-02 ENCOUNTER — OFFICE VISIT (OUTPATIENT)
Dept: NEUROLOGY | Facility: CLINIC | Age: 56
End: 2021-02-02
Payer: COMMERCIAL

## 2021-02-02 DIAGNOSIS — R42 VERTIGO: ICD-10-CM

## 2021-02-02 DIAGNOSIS — G43.811 OTHER MIGRAINE WITH STATUS MIGRAINOSUS, INTRACTABLE: Primary | ICD-10-CM

## 2021-02-02 PROCEDURE — 99214 PR OFFICE/OUTPT VISIT, EST, LEVL IV, 30-39 MIN: ICD-10-PCS | Mod: 95,,, | Performed by: PSYCHIATRY & NEUROLOGY

## 2021-02-02 PROCEDURE — 99214 OFFICE O/P EST MOD 30 MIN: CPT | Mod: 95,,, | Performed by: PSYCHIATRY & NEUROLOGY

## 2021-02-02 RX ORDER — VERAPAMIL HYDROCHLORIDE 40 MG/1
40 TABLET ORAL 3 TIMES DAILY
Qty: 90 TABLET | Refills: 11 | Status: ON HOLD | OUTPATIENT
Start: 2021-02-02 | End: 2022-01-01 | Stop reason: CLARIF

## 2021-02-02 RX ORDER — TOPIRAMATE 100 MG/1
100 TABLET, FILM COATED ORAL 2 TIMES DAILY
Qty: 180 TABLET | Refills: 3 | Status: SHIPPED | OUTPATIENT
Start: 2021-02-02

## 2021-02-04 ENCOUNTER — CLINICAL SUPPORT (OUTPATIENT)
Dept: REHABILITATION | Facility: HOSPITAL | Age: 56
End: 2021-02-04
Payer: COMMERCIAL

## 2021-02-04 DIAGNOSIS — S46.912A LEFT SHOULDER STRAIN, INITIAL ENCOUNTER: ICD-10-CM

## 2021-02-04 DIAGNOSIS — M79.672 LEFT FOOT PAIN: ICD-10-CM

## 2021-02-04 DIAGNOSIS — M25.619 LIMITED RANGE OF MOTION (ROM) OF SHOULDER: ICD-10-CM

## 2021-02-04 PROCEDURE — 97113 AQUATIC THERAPY/EXERCISES: CPT

## 2021-02-08 ENCOUNTER — CLINICAL SUPPORT (OUTPATIENT)
Dept: REHABILITATION | Facility: HOSPITAL | Age: 56
End: 2021-02-08
Payer: COMMERCIAL

## 2021-02-08 ENCOUNTER — PATIENT MESSAGE (OUTPATIENT)
Dept: NEUROLOGY | Facility: CLINIC | Age: 56
End: 2021-02-08

## 2021-02-08 DIAGNOSIS — M25.619 LIMITED RANGE OF MOTION (ROM) OF SHOULDER: ICD-10-CM

## 2021-02-08 DIAGNOSIS — S46.912A LEFT SHOULDER STRAIN, INITIAL ENCOUNTER: ICD-10-CM

## 2021-02-08 DIAGNOSIS — M79.672 LEFT FOOT PAIN: ICD-10-CM

## 2021-02-08 PROCEDURE — 97140 MANUAL THERAPY 1/> REGIONS: CPT

## 2021-02-08 PROCEDURE — 97110 THERAPEUTIC EXERCISES: CPT

## 2021-02-10 ENCOUNTER — CLINICAL SUPPORT (OUTPATIENT)
Dept: REHABILITATION | Facility: HOSPITAL | Age: 56
End: 2021-02-10
Attending: PSYCHIATRY & NEUROLOGY
Payer: COMMERCIAL

## 2021-02-10 DIAGNOSIS — H83.2X9 VESTIBULAR HYPOFUNCTION, UNSPECIFIED LATERALITY: ICD-10-CM

## 2021-02-10 DIAGNOSIS — R42 VERTIGO: ICD-10-CM

## 2021-02-10 PROCEDURE — 97161 PT EVAL LOW COMPLEX 20 MIN: CPT | Mod: PO

## 2021-02-15 ENCOUNTER — PATIENT MESSAGE (OUTPATIENT)
Dept: REHABILITATION | Facility: HOSPITAL | Age: 56
End: 2021-02-15

## 2021-02-15 PROBLEM — H83.2X9 VESTIBULAR HYPOFUNCTION: Status: ACTIVE | Noted: 2021-02-15

## 2021-02-22 ENCOUNTER — CLINICAL SUPPORT (OUTPATIENT)
Dept: REHABILITATION | Facility: HOSPITAL | Age: 56
End: 2021-02-22
Payer: COMMERCIAL

## 2021-02-22 DIAGNOSIS — M79.672 LEFT FOOT PAIN: ICD-10-CM

## 2021-02-22 DIAGNOSIS — S46.912A LEFT SHOULDER STRAIN, INITIAL ENCOUNTER: ICD-10-CM

## 2021-02-22 DIAGNOSIS — M25.619 LIMITED RANGE OF MOTION (ROM) OF SHOULDER: ICD-10-CM

## 2021-02-22 PROCEDURE — 97110 THERAPEUTIC EXERCISES: CPT

## 2021-02-22 PROCEDURE — 97140 MANUAL THERAPY 1/> REGIONS: CPT

## 2021-02-25 ENCOUNTER — CLINICAL SUPPORT (OUTPATIENT)
Dept: REHABILITATION | Facility: HOSPITAL | Age: 56
End: 2021-02-25
Payer: COMMERCIAL

## 2021-02-25 DIAGNOSIS — S46.912A LEFT SHOULDER STRAIN, INITIAL ENCOUNTER: ICD-10-CM

## 2021-02-25 DIAGNOSIS — M79.7 FIBROMYALGIA: Chronic | ICD-10-CM

## 2021-02-25 DIAGNOSIS — M25.619 LIMITED RANGE OF MOTION (ROM) OF SHOULDER: ICD-10-CM

## 2021-02-25 DIAGNOSIS — M75.01 ADHESIVE CAPSULITIS OF RIGHT SHOULDER: ICD-10-CM

## 2021-02-25 PROCEDURE — 97113 AQUATIC THERAPY/EXERCISES: CPT

## 2021-03-02 ENCOUNTER — CLINICAL SUPPORT (OUTPATIENT)
Dept: REHABILITATION | Facility: HOSPITAL | Age: 56
End: 2021-03-02
Payer: COMMERCIAL

## 2021-03-02 DIAGNOSIS — M79.672 LEFT FOOT PAIN: ICD-10-CM

## 2021-03-02 PROCEDURE — 97113 AQUATIC THERAPY/EXERCISES: CPT

## 2021-03-04 ENCOUNTER — CLINICAL SUPPORT (OUTPATIENT)
Dept: REHABILITATION | Facility: HOSPITAL | Age: 56
End: 2021-03-04
Payer: COMMERCIAL

## 2021-03-04 DIAGNOSIS — S46.912A LEFT SHOULDER STRAIN, INITIAL ENCOUNTER: ICD-10-CM

## 2021-03-04 DIAGNOSIS — M25.619 LIMITED RANGE OF MOTION (ROM) OF SHOULDER: ICD-10-CM

## 2021-03-04 PROCEDURE — 97140 MANUAL THERAPY 1/> REGIONS: CPT

## 2021-03-04 PROCEDURE — 97110 THERAPEUTIC EXERCISES: CPT

## 2021-03-09 ENCOUNTER — PATIENT MESSAGE (OUTPATIENT)
Dept: FAMILY MEDICINE | Facility: CLINIC | Age: 56
End: 2021-03-09

## 2021-03-10 ENCOUNTER — OFFICE VISIT (OUTPATIENT)
Dept: FAMILY MEDICINE | Facility: CLINIC | Age: 56
End: 2021-03-10
Payer: COMMERCIAL

## 2021-03-10 ENCOUNTER — LAB VISIT (OUTPATIENT)
Dept: LAB | Facility: HOSPITAL | Age: 56
End: 2021-03-10
Attending: FAMILY MEDICINE
Payer: COMMERCIAL

## 2021-03-10 ENCOUNTER — PATIENT OUTREACH (OUTPATIENT)
Dept: ADMINISTRATIVE | Facility: HOSPITAL | Age: 56
End: 2021-03-10

## 2021-03-10 VITALS
OXYGEN SATURATION: 99 % | SYSTOLIC BLOOD PRESSURE: 132 MMHG | HEART RATE: 85 BPM | DIASTOLIC BLOOD PRESSURE: 84 MMHG | BODY MASS INDEX: 35.09 KG/M2 | TEMPERATURE: 99 F | WEIGHT: 190.69 LBS | HEIGHT: 62 IN

## 2021-03-10 DIAGNOSIS — D64.9 ANEMIA, UNSPECIFIED TYPE: ICD-10-CM

## 2021-03-10 DIAGNOSIS — D64.9 ANEMIA, UNSPECIFIED TYPE: Primary | ICD-10-CM

## 2021-03-10 DIAGNOSIS — L90.5 SCAR PAIN: ICD-10-CM

## 2021-03-10 DIAGNOSIS — E87.6 HYPOKALEMIA: ICD-10-CM

## 2021-03-10 LAB
ANION GAP SERPL CALC-SCNC: 6 MMOL/L (ref 8–16)
BASOPHILS # BLD AUTO: 0.02 K/UL (ref 0–0.2)
BASOPHILS NFR BLD: 0.6 % (ref 0–1.9)
CALCIUM SERPL-MCNC: 9.4 MG/DL (ref 8.7–10.5)
CHLORIDE SERPL-SCNC: 104 MMOL/L (ref 95–110)
CO2 SERPL-SCNC: 32 MMOL/L (ref 23–29)
CREAT SERPL-MCNC: 0.71 MG/DL (ref 0.5–1.4)
DIFFERENTIAL METHOD: ABNORMAL
EOSINOPHIL # BLD AUTO: 0.2 K/UL (ref 0–0.5)
EOSINOPHIL NFR BLD: 5.7 % (ref 0–8)
ERYTHROCYTE [DISTWIDTH] IN BLOOD BY AUTOMATED COUNT: 12.8 % (ref 11.5–14.5)
EST. GFR  (AFRICAN AMERICAN): >60 ML/MIN/1.73 M^2
EST. GFR  (NON AFRICAN AMERICAN): >60 ML/MIN/1.73 M^2
GLUCOSE SERPL-MCNC: 99 MG/DL (ref 70–110)
HCT VFR BLD AUTO: 35 % (ref 37–48.5)
HGB BLD-MCNC: 11 G/DL (ref 12–16)
IMM GRANULOCYTES # BLD AUTO: 0.01 K/UL (ref 0–0.04)
IMM GRANULOCYTES NFR BLD AUTO: 0.3 % (ref 0–0.5)
LYMPHOCYTES # BLD AUTO: 1.5 K/UL (ref 1–4.8)
LYMPHOCYTES NFR BLD: 45.2 % (ref 18–48)
MCH RBC QN AUTO: 27.5 PG (ref 27–31)
MCHC RBC AUTO-ENTMCNC: 31.4 G/DL (ref 32–36)
MCV RBC AUTO: 88 FL (ref 82–98)
MONOCYTES # BLD AUTO: 0.3 K/UL (ref 0.3–1)
MONOCYTES NFR BLD: 9.6 % (ref 4–15)
NEUTROPHILS # BLD AUTO: 1.3 K/UL (ref 1.8–7.7)
NEUTROPHILS NFR BLD: 38.6 % (ref 38–73)
NRBC BLD-RTO: 0 /100 WBC
PLATELET # BLD AUTO: 282 K/UL (ref 150–350)
PMV BLD AUTO: 9.9 FL (ref 9.2–12.9)
POTASSIUM SERPL-SCNC: 4 MMOL/L (ref 3.5–5.1)
RBC # BLD AUTO: 4 M/UL (ref 4–5.4)
SODIUM SERPL-SCNC: 142 MMOL/L (ref 136–145)
UUN UR-MCNC: 12 MG/DL (ref 7–17)
WBC # BLD AUTO: 3.32 K/UL (ref 3.9–12.7)

## 2021-03-10 PROCEDURE — 85025 COMPLETE CBC W/AUTO DIFF WBC: CPT | Mod: PO | Performed by: FAMILY MEDICINE

## 2021-03-10 PROCEDURE — 99214 PR OFFICE/OUTPT VISIT, EST, LEVL IV, 30-39 MIN: ICD-10-PCS | Mod: S$GLB,,, | Performed by: FAMILY MEDICINE

## 2021-03-10 PROCEDURE — 3008F PR BODY MASS INDEX (BMI) DOCUMENTED: ICD-10-PCS | Mod: CPTII,S$GLB,, | Performed by: FAMILY MEDICINE

## 2021-03-10 PROCEDURE — 36415 COLL VENOUS BLD VENIPUNCTURE: CPT | Mod: PO | Performed by: FAMILY MEDICINE

## 2021-03-10 PROCEDURE — 1125F AMNT PAIN NOTED PAIN PRSNT: CPT | Mod: S$GLB,,, | Performed by: FAMILY MEDICINE

## 2021-03-10 PROCEDURE — 3008F BODY MASS INDEX DOCD: CPT | Mod: CPTII,S$GLB,, | Performed by: FAMILY MEDICINE

## 2021-03-10 PROCEDURE — 83540 ASSAY OF IRON: CPT | Mod: PO | Performed by: FAMILY MEDICINE

## 2021-03-10 PROCEDURE — 99214 OFFICE O/P EST MOD 30 MIN: CPT | Mod: S$GLB,,, | Performed by: FAMILY MEDICINE

## 2021-03-10 PROCEDURE — 80048 BASIC METABOLIC PNL TOTAL CA: CPT | Mod: PO | Performed by: FAMILY MEDICINE

## 2021-03-10 PROCEDURE — 1125F PR PAIN SEVERITY QUANTIFIED, PAIN PRESENT: ICD-10-PCS | Mod: S$GLB,,, | Performed by: FAMILY MEDICINE

## 2021-03-11 ENCOUNTER — PATIENT MESSAGE (OUTPATIENT)
Dept: FAMILY MEDICINE | Facility: CLINIC | Age: 56
End: 2021-03-11

## 2021-03-11 DIAGNOSIS — D50.8 OTHER IRON DEFICIENCY ANEMIA: Primary | ICD-10-CM

## 2021-03-11 LAB
IRON SERPL-MCNC: 25 UG/DL (ref 30–160)
SATURATED IRON: 5 % (ref 20–50)
TOTAL IRON BINDING CAPACITY: 546 UG/DL (ref 250–450)
TRANSFERRIN SERPL-MCNC: 369 MG/DL (ref 200–375)

## 2021-03-11 RX ORDER — FERROUS SULFATE 325(65) MG
325 TABLET ORAL 2 TIMES DAILY
Qty: 30 TABLET | Refills: 0 | Status: SHIPPED | OUTPATIENT
Start: 2021-03-11 | End: 2021-03-23

## 2021-03-24 ENCOUNTER — TELEPHONE (OUTPATIENT)
Dept: PHYSICAL MEDICINE AND REHAB | Facility: CLINIC | Age: 56
End: 2021-03-24

## 2021-03-24 DIAGNOSIS — M47.27 OSTEOARTHRITIS OF SPINE WITH RADICULOPATHY, LUMBOSACRAL REGION: Primary | ICD-10-CM

## 2021-03-30 ENCOUNTER — CLINICAL SUPPORT (OUTPATIENT)
Dept: REHABILITATION | Facility: HOSPITAL | Age: 56
End: 2021-03-30
Payer: COMMERCIAL

## 2021-03-30 DIAGNOSIS — M25.619 LIMITED RANGE OF MOTION (ROM) OF SHOULDER: ICD-10-CM

## 2021-03-30 DIAGNOSIS — S46.912A LEFT SHOULDER STRAIN, INITIAL ENCOUNTER: ICD-10-CM

## 2021-03-30 PROCEDURE — 97113 AQUATIC THERAPY/EXERCISES: CPT

## 2021-03-31 ENCOUNTER — IMMUNIZATION (OUTPATIENT)
Dept: PHARMACY | Facility: CLINIC | Age: 56
End: 2021-03-31
Payer: COMMERCIAL

## 2021-03-31 ENCOUNTER — OFFICE VISIT (OUTPATIENT)
Dept: HEMATOLOGY/ONCOLOGY | Facility: CLINIC | Age: 56
End: 2021-03-31
Payer: COMMERCIAL

## 2021-03-31 VITALS
SYSTOLIC BLOOD PRESSURE: 151 MMHG | OXYGEN SATURATION: 100 % | RESPIRATION RATE: 16 BRPM | HEART RATE: 99 BPM | WEIGHT: 192.25 LBS | DIASTOLIC BLOOD PRESSURE: 92 MMHG | TEMPERATURE: 99 F | BODY MASS INDEX: 35.16 KG/M2

## 2021-03-31 DIAGNOSIS — Z90.3 HISTORY OF SLEEVE GASTRECTOMY: ICD-10-CM

## 2021-03-31 DIAGNOSIS — Z23 NEED FOR VACCINATION: Primary | ICD-10-CM

## 2021-03-31 DIAGNOSIS — D50.8 OTHER IRON DEFICIENCY ANEMIAS: ICD-10-CM

## 2021-03-31 DIAGNOSIS — D50.0 IRON DEFICIENCY ANEMIA DUE TO CHRONIC BLOOD LOSS: Primary | ICD-10-CM

## 2021-03-31 DIAGNOSIS — M32.9 SYSTEMIC LUPUS ERYTHEMATOSUS, UNSPECIFIED SLE TYPE, UNSPECIFIED ORGAN INVOLVEMENT STATUS: ICD-10-CM

## 2021-03-31 DIAGNOSIS — Z71.89 ADVANCE CARE PLANNING: ICD-10-CM

## 2021-03-31 DIAGNOSIS — Z86.39 HISTORY OF NON ANEMIC VITAMIN B12 DEFICIENCY: ICD-10-CM

## 2021-03-31 PROCEDURE — 1125F PR PAIN SEVERITY QUANTIFIED, PAIN PRESENT: ICD-10-PCS | Mod: S$GLB,,, | Performed by: INTERNAL MEDICINE

## 2021-03-31 PROCEDURE — 99999 PR PBB SHADOW E&M-EST. PATIENT-LVL V: CPT | Mod: PBBFAC,,, | Performed by: INTERNAL MEDICINE

## 2021-03-31 PROCEDURE — 99204 PR OFFICE/OUTPT VISIT, NEW, LEVL IV, 45-59 MIN: ICD-10-PCS | Mod: S$GLB,,, | Performed by: INTERNAL MEDICINE

## 2021-03-31 PROCEDURE — 3008F BODY MASS INDEX DOCD: CPT | Mod: CPTII,S$GLB,, | Performed by: INTERNAL MEDICINE

## 2021-03-31 PROCEDURE — 3008F PR BODY MASS INDEX (BMI) DOCUMENTED: ICD-10-PCS | Mod: CPTII,S$GLB,, | Performed by: INTERNAL MEDICINE

## 2021-03-31 PROCEDURE — 99497 ADVNCD CARE PLAN 30 MIN: CPT | Mod: S$GLB,,, | Performed by: INTERNAL MEDICINE

## 2021-03-31 PROCEDURE — 1125F AMNT PAIN NOTED PAIN PRSNT: CPT | Mod: S$GLB,,, | Performed by: INTERNAL MEDICINE

## 2021-03-31 PROCEDURE — 99204 OFFICE O/P NEW MOD 45 MIN: CPT | Mod: S$GLB,,, | Performed by: INTERNAL MEDICINE

## 2021-03-31 PROCEDURE — 99999 PR PBB SHADOW E&M-EST. PATIENT-LVL V: ICD-10-PCS | Mod: PBBFAC,,, | Performed by: INTERNAL MEDICINE

## 2021-03-31 PROCEDURE — 99497 PR ADVNCD CARE PLAN 30 MIN: ICD-10-PCS | Mod: S$GLB,,, | Performed by: INTERNAL MEDICINE

## 2021-03-31 RX ORDER — SODIUM CHLORIDE 0.9 % (FLUSH) 0.9 %
10 SYRINGE (ML) INJECTION
Status: CANCELLED | OUTPATIENT
Start: 2021-05-11

## 2021-03-31 RX ORDER — SODIUM CHLORIDE 0.9 % (FLUSH) 0.9 %
10 SYRINGE (ML) INJECTION
Status: CANCELLED | OUTPATIENT
Start: 2021-04-07

## 2021-03-31 RX ORDER — HEPARIN 100 UNIT/ML
500 SYRINGE INTRAVENOUS
Status: CANCELLED | OUTPATIENT
Start: 2021-05-11

## 2021-03-31 RX ORDER — HEPARIN 100 UNIT/ML
500 SYRINGE INTRAVENOUS
Status: CANCELLED | OUTPATIENT
Start: 2021-04-07

## 2021-04-01 ENCOUNTER — ANESTHESIA EVENT (OUTPATIENT)
Dept: SURGERY | Facility: OTHER | Age: 56
End: 2021-04-01
Payer: COMMERCIAL

## 2021-04-01 ENCOUNTER — HOSPITAL ENCOUNTER (OUTPATIENT)
Dept: PREADMISSION TESTING | Facility: OTHER | Age: 56
Discharge: HOME | End: 2021-04-01
Attending: PLASTIC SURGERY
Payer: COMMERCIAL

## 2021-04-01 VITALS
HEART RATE: 87 BPM | SYSTOLIC BLOOD PRESSURE: 156 MMHG | OXYGEN SATURATION: 97 % | TEMPERATURE: 97 F | BODY MASS INDEX: 34.96 KG/M2 | HEIGHT: 62 IN | DIASTOLIC BLOOD PRESSURE: 72 MMHG | WEIGHT: 190 LBS

## 2021-04-01 RX ORDER — PREGABALIN 75 MG/1
75 CAPSULE ORAL ONCE
Status: CANCELLED | OUTPATIENT
Start: 2021-04-01 | End: 2021-04-01

## 2021-04-01 RX ORDER — SODIUM CHLORIDE, SODIUM LACTATE, POTASSIUM CHLORIDE, CALCIUM CHLORIDE 600; 310; 30; 20 MG/100ML; MG/100ML; MG/100ML; MG/100ML
INJECTION, SOLUTION INTRAVENOUS CONTINUOUS
Status: CANCELLED | OUTPATIENT
Start: 2021-04-01

## 2021-04-01 RX ORDER — LIDOCAINE HYDROCHLORIDE 10 MG/ML
0.5 INJECTION, SOLUTION EPIDURAL; INFILTRATION; INTRACAUDAL; PERINEURAL ONCE
Status: CANCELLED | OUTPATIENT
Start: 2021-04-01 | End: 2021-04-01

## 2021-04-01 RX ORDER — ACETAMINOPHEN 500 MG
1000 TABLET ORAL
Status: CANCELLED | OUTPATIENT
Start: 2021-04-01 | End: 2021-04-01

## 2021-04-03 ENCOUNTER — LAB VISIT (OUTPATIENT)
Dept: INTERNAL MEDICINE | Facility: CLINIC | Age: 56
End: 2021-04-03
Payer: COMMERCIAL

## 2021-04-03 DIAGNOSIS — Z01.818 PRE-OP TESTING: ICD-10-CM

## 2021-04-03 DIAGNOSIS — F41.9 ANXIETY: ICD-10-CM

## 2021-04-03 PROCEDURE — U0005 INFEC AGEN DETEC AMPLI PROBE: HCPCS | Performed by: ANESTHESIOLOGY

## 2021-04-03 PROCEDURE — U0003 INFECTIOUS AGENT DETECTION BY NUCLEIC ACID (DNA OR RNA); SEVERE ACUTE RESPIRATORY SYNDROME CORONAVIRUS 2 (SARS-COV-2) (CORONAVIRUS DISEASE [COVID-19]), AMPLIFIED PROBE TECHNIQUE, MAKING USE OF HIGH THROUGHPUT TECHNOLOGIES AS DESCRIBED BY CMS-2020-01-R: HCPCS | Performed by: ANESTHESIOLOGY

## 2021-04-04 LAB — SARS-COV-2 RNA RESP QL NAA+PROBE: NOT DETECTED

## 2021-04-06 ENCOUNTER — ANESTHESIA (OUTPATIENT)
Dept: SURGERY | Facility: OTHER | Age: 56
End: 2021-04-06
Payer: COMMERCIAL

## 2021-04-06 ENCOUNTER — HOSPITAL ENCOUNTER (OUTPATIENT)
Facility: OTHER | Age: 56
Discharge: HOME | End: 2021-04-06
Attending: PLASTIC SURGERY | Admitting: PLASTIC SURGERY
Payer: COMMERCIAL

## 2021-04-06 VITALS
HEIGHT: 62 IN | OXYGEN SATURATION: 97 % | HEART RATE: 100 BPM | DIASTOLIC BLOOD PRESSURE: 74 MMHG | SYSTOLIC BLOOD PRESSURE: 138 MMHG | WEIGHT: 190 LBS | BODY MASS INDEX: 34.96 KG/M2 | TEMPERATURE: 98 F | RESPIRATION RATE: 16 BRPM

## 2021-04-06 DIAGNOSIS — L91.0 KELOID SCAR OF SKIN: Primary | ICD-10-CM

## 2021-04-06 DIAGNOSIS — Z01.818 PRE-OP TESTING: ICD-10-CM

## 2021-04-06 PROCEDURE — 63600175 PHARM REV CODE 636 W HCPCS: Performed by: ANESTHESIOLOGY

## 2021-04-06 PROCEDURE — 25000003 PHARM REV CODE 250: Performed by: PLASTIC SURGERY

## 2021-04-06 PROCEDURE — 88342 CHG IMMUNOCYTOCHEMISTRY: ICD-10-PCS | Mod: 26,,, | Performed by: PATHOLOGY

## 2021-04-06 PROCEDURE — 63600175 PHARM REV CODE 636 W HCPCS: Performed by: NURSE ANESTHETIST, CERTIFIED REGISTERED

## 2021-04-06 PROCEDURE — 88305 TISSUE EXAM BY PATHOLOGIST: CPT | Mod: 26,,, | Performed by: PATHOLOGY

## 2021-04-06 PROCEDURE — 37000008 HC ANESTHESIA 1ST 15 MINUTES: Performed by: PLASTIC SURGERY

## 2021-04-06 PROCEDURE — 71000033 HC RECOVERY, INTIAL HOUR: Performed by: PLASTIC SURGERY

## 2021-04-06 PROCEDURE — 63600175 PHARM REV CODE 636 W HCPCS: Performed by: PLASTIC SURGERY

## 2021-04-06 PROCEDURE — 88341 IMHCHEM/IMCYTCHM EA ADD ANTB: CPT | Performed by: PATHOLOGY

## 2021-04-06 PROCEDURE — 88305 TISSUE EXAM BY PATHOLOGIST: ICD-10-PCS | Mod: 26,,, | Performed by: PATHOLOGY

## 2021-04-06 PROCEDURE — 71000015 HC POSTOP RECOV 1ST HR: Performed by: PLASTIC SURGERY

## 2021-04-06 PROCEDURE — 36000704 HC OR TIME LEV I 1ST 15 MIN: Performed by: PLASTIC SURGERY

## 2021-04-06 PROCEDURE — 88342 IMHCHEM/IMCYTCHM 1ST ANTB: CPT | Mod: 26,,, | Performed by: PATHOLOGY

## 2021-04-06 PROCEDURE — 36000707: Performed by: PLASTIC SURGERY

## 2021-04-06 PROCEDURE — 25000003 PHARM REV CODE 250: Performed by: NURSE ANESTHETIST, CERTIFIED REGISTERED

## 2021-04-06 PROCEDURE — 71000016 HC POSTOP RECOV ADDL HR: Performed by: PLASTIC SURGERY

## 2021-04-06 PROCEDURE — 36000706: Performed by: PLASTIC SURGERY

## 2021-04-06 PROCEDURE — 88305 TISSUE EXAM BY PATHOLOGIST: CPT | Performed by: PATHOLOGY

## 2021-04-06 PROCEDURE — 88342 IMHCHEM/IMCYTCHM 1ST ANTB: CPT | Performed by: PATHOLOGY

## 2021-04-06 PROCEDURE — 71000039 HC RECOVERY, EACH ADD'L HOUR: Performed by: PLASTIC SURGERY

## 2021-04-06 PROCEDURE — 36000705 HC OR TIME LEV I EA ADD 15 MIN: Performed by: PLASTIC SURGERY

## 2021-04-06 PROCEDURE — 37000009 HC ANESTHESIA EA ADD 15 MINS: Performed by: PLASTIC SURGERY

## 2021-04-06 PROCEDURE — 25000003 PHARM REV CODE 250: Performed by: ANESTHESIOLOGY

## 2021-04-06 RX ORDER — KETOROLAC TROMETHAMINE 30 MG/ML
30 INJECTION, SOLUTION INTRAMUSCULAR; INTRAVENOUS ONCE
Status: COMPLETED | OUTPATIENT
Start: 2021-04-06 | End: 2021-04-06

## 2021-04-06 RX ORDER — OXYCODONE AND ACETAMINOPHEN 5; 325 MG/1; MG/1
1 TABLET ORAL EVERY 4 HOURS PRN
Qty: 20 TABLET | Refills: 0 | Status: SHIPPED | OUTPATIENT
Start: 2021-04-06 | End: 2021-04-15

## 2021-04-06 RX ORDER — OXYCODONE HYDROCHLORIDE 5 MG/1
5 TABLET ORAL
Status: DISCONTINUED | OUTPATIENT
Start: 2021-04-06 | End: 2021-04-06 | Stop reason: HOSPADM

## 2021-04-06 RX ORDER — HYDROMORPHONE HYDROCHLORIDE 2 MG/ML
INJECTION, SOLUTION INTRAMUSCULAR; INTRAVENOUS; SUBCUTANEOUS
Status: DISCONTINUED | OUTPATIENT
Start: 2021-04-06 | End: 2021-04-06

## 2021-04-06 RX ORDER — HYDRALAZINE HYDROCHLORIDE 20 MG/ML
10 INJECTION INTRAMUSCULAR; INTRAVENOUS ONCE
Status: COMPLETED | OUTPATIENT
Start: 2021-04-06 | End: 2021-04-06

## 2021-04-06 RX ORDER — LIDOCAINE HYDROCHLORIDE 20 MG/ML
INJECTION INTRAVENOUS
Status: DISCONTINUED | OUTPATIENT
Start: 2021-04-06 | End: 2021-04-06

## 2021-04-06 RX ORDER — LIDOCAINE HYDROCHLORIDE 10 MG/ML
0.5 INJECTION, SOLUTION EPIDURAL; INFILTRATION; INTRACAUDAL; PERINEURAL ONCE
Status: DISCONTINUED | OUTPATIENT
Start: 2021-04-06 | End: 2021-04-06 | Stop reason: HOSPADM

## 2021-04-06 RX ORDER — PHENYLEPHRINE HYDROCHLORIDE 10 MG/ML
INJECTION INTRAVENOUS
Status: DISCONTINUED | OUTPATIENT
Start: 2021-04-06 | End: 2021-04-06

## 2021-04-06 RX ORDER — PREGABALIN 75 MG/1
75 CAPSULE ORAL ONCE
Status: COMPLETED | OUTPATIENT
Start: 2021-04-06 | End: 2021-04-06

## 2021-04-06 RX ORDER — CEFAZOLIN SODIUM 1 G/3ML
2 INJECTION, POWDER, FOR SOLUTION INTRAMUSCULAR; INTRAVENOUS
Status: COMPLETED | OUTPATIENT
Start: 2021-04-06 | End: 2021-04-06

## 2021-04-06 RX ORDER — SODIUM CHLORIDE 0.9 % (FLUSH) 0.9 %
3 SYRINGE (ML) INJECTION
Status: DISCONTINUED | OUTPATIENT
Start: 2021-04-06 | End: 2021-04-06 | Stop reason: HOSPADM

## 2021-04-06 RX ORDER — FENTANYL CITRATE 50 UG/ML
INJECTION, SOLUTION INTRAMUSCULAR; INTRAVENOUS
Status: DISCONTINUED | OUTPATIENT
Start: 2021-04-06 | End: 2021-04-06

## 2021-04-06 RX ORDER — MIDAZOLAM HYDROCHLORIDE 1 MG/ML
INJECTION INTRAMUSCULAR; INTRAVENOUS
Status: DISCONTINUED | OUTPATIENT
Start: 2021-04-06 | End: 2021-04-06

## 2021-04-06 RX ORDER — PROMETHAZINE HYDROCHLORIDE 25 MG/ML
INJECTION, SOLUTION INTRAMUSCULAR; INTRAVENOUS
Status: DISCONTINUED | OUTPATIENT
Start: 2021-04-06 | End: 2021-04-06

## 2021-04-06 RX ORDER — HYDRALAZINE HYDROCHLORIDE 10 MG/1
10 TABLET, FILM COATED ORAL ONCE
Status: DISCONTINUED | OUTPATIENT
Start: 2021-04-06 | End: 2021-04-06

## 2021-04-06 RX ORDER — MEPERIDINE HYDROCHLORIDE 25 MG/ML
12.5 INJECTION INTRAMUSCULAR; INTRAVENOUS; SUBCUTANEOUS ONCE AS NEEDED
Status: DISCONTINUED | OUTPATIENT
Start: 2021-04-06 | End: 2021-04-06 | Stop reason: HOSPADM

## 2021-04-06 RX ORDER — PROPOFOL 10 MG/ML
VIAL (ML) INTRAVENOUS
Status: DISCONTINUED | OUTPATIENT
Start: 2021-04-06 | End: 2021-04-06

## 2021-04-06 RX ORDER — HYDROMORPHONE HYDROCHLORIDE 2 MG/ML
0.4 INJECTION, SOLUTION INTRAMUSCULAR; INTRAVENOUS; SUBCUTANEOUS EVERY 5 MIN PRN
Status: DISCONTINUED | OUTPATIENT
Start: 2021-04-06 | End: 2021-04-06 | Stop reason: HOSPADM

## 2021-04-06 RX ORDER — ONDANSETRON 2 MG/ML
4 INJECTION INTRAMUSCULAR; INTRAVENOUS DAILY PRN
Status: DISCONTINUED | OUTPATIENT
Start: 2021-04-06 | End: 2021-04-06 | Stop reason: HOSPADM

## 2021-04-06 RX ORDER — DEXAMETHASONE SODIUM PHOSPHATE 4 MG/ML
INJECTION, SOLUTION INTRA-ARTICULAR; INTRALESIONAL; INTRAMUSCULAR; INTRAVENOUS; SOFT TISSUE
Status: DISCONTINUED | OUTPATIENT
Start: 2021-04-06 | End: 2021-04-06

## 2021-04-06 RX ORDER — ACETAMINOPHEN 500 MG
1000 TABLET ORAL
Status: COMPLETED | OUTPATIENT
Start: 2021-04-06 | End: 2021-04-06

## 2021-04-06 RX ORDER — BUPIVACAINE HYDROCHLORIDE 2.5 MG/ML
INJECTION, SOLUTION EPIDURAL; INFILTRATION; INTRACAUDAL
Status: DISCONTINUED | OUTPATIENT
Start: 2021-04-06 | End: 2021-04-06 | Stop reason: HOSPADM

## 2021-04-06 RX ORDER — SODIUM CHLORIDE, SODIUM LACTATE, POTASSIUM CHLORIDE, CALCIUM CHLORIDE 600; 310; 30; 20 MG/100ML; MG/100ML; MG/100ML; MG/100ML
INJECTION, SOLUTION INTRAVENOUS CONTINUOUS
Status: DISCONTINUED | OUTPATIENT
Start: 2021-04-06 | End: 2021-04-06 | Stop reason: HOSPADM

## 2021-04-06 RX ADMIN — PROPOFOL 200 MG: 10 INJECTION, EMULSION INTRAVENOUS at 09:04

## 2021-04-06 RX ADMIN — CEFAZOLIN 2 G: 330 INJECTION, POWDER, FOR SOLUTION INTRAMUSCULAR; INTRAVENOUS at 09:04

## 2021-04-06 RX ADMIN — PHENYLEPHRINE HYDROCHLORIDE 100 MCG: 10 INJECTION INTRAVENOUS at 09:04

## 2021-04-06 RX ADMIN — HYDROMORPHONE HYDROCHLORIDE 0.4 MG: 2 INJECTION INTRAMUSCULAR; INTRAVENOUS; SUBCUTANEOUS at 10:04

## 2021-04-06 RX ADMIN — HYDRALAZINE HYDROCHLORIDE 10 MG: 20 INJECTION INTRAMUSCULAR; INTRAVENOUS at 11:04

## 2021-04-06 RX ADMIN — MIDAZOLAM HYDROCHLORIDE 2 MG: 1 INJECTION, SOLUTION INTRAMUSCULAR; INTRAVENOUS at 08:04

## 2021-04-06 RX ADMIN — HYDROMORPHONE HYDROCHLORIDE 0.5 MG: 2 INJECTION, SOLUTION INTRAMUSCULAR; INTRAVENOUS; SUBCUTANEOUS at 09:04

## 2021-04-06 RX ADMIN — DEXAMETHASONE SODIUM PHOSPHATE 8 MG: 4 INJECTION, SOLUTION INTRAMUSCULAR; INTRAVENOUS at 09:04

## 2021-04-06 RX ADMIN — SODIUM CHLORIDE, SODIUM LACTATE, POTASSIUM CHLORIDE, AND CALCIUM CHLORIDE: 600; 310; 30; 20 INJECTION, SOLUTION INTRAVENOUS at 08:04

## 2021-04-06 RX ADMIN — HYDROMORPHONE HYDROCHLORIDE 0.5 MG: 2 INJECTION, SOLUTION INTRAMUSCULAR; INTRAVENOUS; SUBCUTANEOUS at 10:04

## 2021-04-06 RX ADMIN — HYDROMORPHONE HYDROCHLORIDE 0.4 MG: 2 INJECTION INTRAMUSCULAR; INTRAVENOUS; SUBCUTANEOUS at 11:04

## 2021-04-06 RX ADMIN — ACETAMINOPHEN 1000 MG: 500 TABLET, FILM COATED ORAL at 07:04

## 2021-04-06 RX ADMIN — KETOROLAC TROMETHAMINE 30 MG: 30 INJECTION, SOLUTION INTRAMUSCULAR; INTRAVENOUS at 11:04

## 2021-04-06 RX ADMIN — PREGABALIN 75 MG: 75 CAPSULE ORAL at 07:04

## 2021-04-06 RX ADMIN — FENTANYL CITRATE 100 MCG: 50 INJECTION, SOLUTION INTRAMUSCULAR; INTRAVENOUS at 09:04

## 2021-04-06 RX ADMIN — LIDOCAINE HYDROCHLORIDE 60 MG: 20 INJECTION, SOLUTION INTRAVENOUS at 09:04

## 2021-04-06 RX ADMIN — PROMETHAZINE HYDROCHLORIDE 6.25 MG: 25 INJECTION INTRAMUSCULAR; INTRAVENOUS at 09:04

## 2021-04-07 ENCOUNTER — TELEPHONE (OUTPATIENT)
Dept: HEMATOLOGY/ONCOLOGY | Facility: CLINIC | Age: 56
End: 2021-04-07

## 2021-04-08 ENCOUNTER — TELEPHONE (OUTPATIENT)
Dept: HEMATOLOGY/ONCOLOGY | Facility: CLINIC | Age: 56
End: 2021-04-08

## 2021-04-08 DIAGNOSIS — D50.0 IRON DEFICIENCY ANEMIA DUE TO CHRONIC BLOOD LOSS: Primary | ICD-10-CM

## 2021-04-15 ENCOUNTER — HOSPITAL ENCOUNTER (OUTPATIENT)
Dept: INTERVENTIONAL RADIOLOGY/VASCULAR | Facility: HOSPITAL | Age: 56
Discharge: HOME | End: 2021-04-15
Attending: INTERNAL MEDICINE
Payer: COMMERCIAL

## 2021-04-15 VITALS
BODY MASS INDEX: 30.55 KG/M2 | SYSTOLIC BLOOD PRESSURE: 154 MMHG | TEMPERATURE: 98 F | RESPIRATION RATE: 10 BRPM | DIASTOLIC BLOOD PRESSURE: 75 MMHG | WEIGHT: 166 LBS | HEIGHT: 62 IN | OXYGEN SATURATION: 100 % | HEART RATE: 91 BPM

## 2021-04-15 DIAGNOSIS — D50.0 IRON DEFICIENCY ANEMIA DUE TO CHRONIC BLOOD LOSS: ICD-10-CM

## 2021-04-15 LAB
FINAL PATHOLOGIC DIAGNOSIS: NORMAL
GROSS: NORMAL
Lab: NORMAL

## 2021-04-15 PROCEDURE — 76937 US GUIDE VASCULAR ACCESS: CPT | Mod: TC | Performed by: RADIOLOGY

## 2021-04-15 PROCEDURE — 99152 MOD SED SAME PHYS/QHP 5/>YRS: CPT

## 2021-04-15 PROCEDURE — 77001 FLUOROGUIDE FOR VEIN DEVICE: CPT | Mod: 26,,, | Performed by: RADIOLOGY

## 2021-04-15 PROCEDURE — 77001 FLUOROGUIDE FOR VEIN DEVICE: CPT | Mod: TC | Performed by: RADIOLOGY

## 2021-04-15 PROCEDURE — 99152 MOD SED SAME PHYS/QHP 5/>YRS: CPT | Mod: ,,, | Performed by: RADIOLOGY

## 2021-04-15 PROCEDURE — A4550 SURGICAL TRAYS: HCPCS

## 2021-04-15 PROCEDURE — 36561 INSERT TUNNELED CV CATH: CPT | Performed by: RADIOLOGY

## 2021-04-15 PROCEDURE — 99152 PR MOD CONSCIOUS SEDATION, SAME PHYS, 5+ YRS, FIRST 15 MIN: ICD-10-PCS | Mod: ,,, | Performed by: RADIOLOGY

## 2021-04-15 PROCEDURE — 99153 MOD SED SAME PHYS/QHP EA: CPT | Performed by: RADIOLOGY

## 2021-04-15 PROCEDURE — 63600175 PHARM REV CODE 636 W HCPCS: Performed by: INTERNAL MEDICINE

## 2021-04-15 PROCEDURE — C1788 PORT, INDWELLING, IMP: HCPCS

## 2021-04-15 PROCEDURE — 99153 MOD SED SAME PHYS/QHP EA: CPT

## 2021-04-15 PROCEDURE — 77001 CHG FLUOROGUIDE CNTRL VEN ACCESS,PLACE,REPLACE,REMOVE: ICD-10-PCS | Mod: 26,,, | Performed by: RADIOLOGY

## 2021-04-15 PROCEDURE — 36561 IR TUNNELED CATH PLACEMENT WITH PORT: ICD-10-PCS | Mod: RT,,, | Performed by: RADIOLOGY

## 2021-04-15 PROCEDURE — 63600175 PHARM REV CODE 636 W HCPCS: Performed by: RADIOLOGY

## 2021-04-15 PROCEDURE — 99152 MOD SED SAME PHYS/QHP 5/>YRS: CPT | Performed by: RADIOLOGY

## 2021-04-15 PROCEDURE — 25000003 PHARM REV CODE 250: Performed by: RADIOLOGY

## 2021-04-15 PROCEDURE — 76937 US GUIDE VASCULAR ACCESS: CPT | Mod: 26,,, | Performed by: RADIOLOGY

## 2021-04-15 PROCEDURE — 25000003 PHARM REV CODE 250: Performed by: INTERNAL MEDICINE

## 2021-04-15 PROCEDURE — 76937 PR  US GUIDE, VASCULAR ACCESS: ICD-10-PCS | Mod: 26,,, | Performed by: RADIOLOGY

## 2021-04-15 RX ORDER — FENTANYL CITRATE 50 UG/ML
INJECTION, SOLUTION INTRAMUSCULAR; INTRAVENOUS CODE/TRAUMA/SEDATION MEDICATION
Status: COMPLETED | OUTPATIENT
Start: 2021-04-15 | End: 2021-04-15

## 2021-04-15 RX ORDER — CEFAZOLIN SODIUM 2 G/50ML
2 SOLUTION INTRAVENOUS
Status: COMPLETED | OUTPATIENT
Start: 2021-04-15 | End: 2021-04-15

## 2021-04-15 RX ORDER — LIDOCAINE HYDROCHLORIDE 10 MG/ML
INJECTION INFILTRATION; PERINEURAL CODE/TRAUMA/SEDATION MEDICATION
Status: COMPLETED | OUTPATIENT
Start: 2021-04-15 | End: 2021-04-15

## 2021-04-15 RX ORDER — LIDOCAINE HYDROCHLORIDE AND EPINEPHRINE 10; 10 MG/ML; UG/ML
INJECTION, SOLUTION INFILTRATION; PERINEURAL CODE/TRAUMA/SEDATION MEDICATION
Status: COMPLETED | OUTPATIENT
Start: 2021-04-15 | End: 2021-04-15

## 2021-04-15 RX ORDER — MIDAZOLAM HYDROCHLORIDE 1 MG/ML
INJECTION INTRAMUSCULAR; INTRAVENOUS CODE/TRAUMA/SEDATION MEDICATION
Status: COMPLETED | OUTPATIENT
Start: 2021-04-15 | End: 2021-04-15

## 2021-04-15 RX ORDER — HEPARIN SODIUM 200 [USP'U]/100ML
INJECTION, SOLUTION INTRAVENOUS
Status: COMPLETED | OUTPATIENT
Start: 2021-04-15 | End: 2021-04-15

## 2021-04-15 RX ORDER — SODIUM CHLORIDE 9 MG/ML
INJECTION, SOLUTION INTRAVENOUS CONTINUOUS
Status: DISCONTINUED | OUTPATIENT
Start: 2021-04-15 | End: 2021-04-16 | Stop reason: HOSPADM

## 2021-04-15 RX ORDER — HEPARIN 100 UNIT/ML
SYRINGE INTRAVENOUS CODE/TRAUMA/SEDATION MEDICATION
Status: COMPLETED | OUTPATIENT
Start: 2021-04-15 | End: 2021-04-15

## 2021-04-15 RX ADMIN — MIDAZOLAM HYDROCHLORIDE 1 MG: 1 INJECTION, SOLUTION INTRAMUSCULAR; INTRAVENOUS at 04:04

## 2021-04-15 RX ADMIN — FENTANYL CITRATE 50 MCG: 50 INJECTION, SOLUTION INTRAMUSCULAR; INTRAVENOUS at 04:04

## 2021-04-15 RX ADMIN — LIDOCAINE HYDROCHLORIDE AND EPINEPHRINE 1 ML: 10; 10 INJECTION, SOLUTION INFILTRATION; PERINEURAL at 04:04

## 2021-04-15 RX ADMIN — LIDOCAINE HYDROCHLORIDE 5 ML: 10 INJECTION, SOLUTION INFILTRATION; PERINEURAL at 04:04

## 2021-04-15 RX ADMIN — HEPARIN SODIUM 1000 UNITS/HR: 200 INJECTION, SOLUTION INTRAVENOUS at 04:04

## 2021-04-15 RX ADMIN — HEPARIN 5 ML: 100 SYRINGE at 04:04

## 2021-04-15 RX ADMIN — SODIUM CHLORIDE: 0.9 INJECTION, SOLUTION INTRAVENOUS at 03:04

## 2021-04-15 RX ADMIN — CEFAZOLIN SODIUM 2 G: 2 SOLUTION INTRAVENOUS at 03:04

## 2021-04-28 ENCOUNTER — IMMUNIZATION (OUTPATIENT)
Dept: PHARMACY | Facility: CLINIC | Age: 56
End: 2021-04-28
Payer: COMMERCIAL

## 2021-04-28 DIAGNOSIS — Z23 NEED FOR VACCINATION: Primary | ICD-10-CM

## 2021-04-30 ENCOUNTER — TELEPHONE (OUTPATIENT)
Dept: HEMATOLOGY/ONCOLOGY | Facility: CLINIC | Age: 56
End: 2021-04-30

## 2021-05-02 ENCOUNTER — PATIENT MESSAGE (OUTPATIENT)
Dept: HEMATOLOGY/ONCOLOGY | Facility: CLINIC | Age: 56
End: 2021-05-02

## 2021-05-11 ENCOUNTER — TELEPHONE (OUTPATIENT)
Dept: HEMATOLOGY/ONCOLOGY | Facility: CLINIC | Age: 56
End: 2021-05-11

## 2021-05-11 RX ORDER — HEPARIN 100 UNIT/ML
500 SYRINGE INTRAVENOUS
Status: CANCELLED | OUTPATIENT
Start: 2021-05-18

## 2021-05-11 RX ORDER — SODIUM CHLORIDE 0.9 % (FLUSH) 0.9 %
10 SYRINGE (ML) INJECTION
Status: CANCELLED | OUTPATIENT
Start: 2021-05-18

## 2021-06-15 ENCOUNTER — OFFICE VISIT (OUTPATIENT)
Dept: PHYSICAL MEDICINE AND REHAB | Facility: CLINIC | Age: 56
End: 2021-06-15
Payer: COMMERCIAL

## 2021-06-15 VITALS
WEIGHT: 188 LBS | HEIGHT: 62 IN | DIASTOLIC BLOOD PRESSURE: 90 MMHG | SYSTOLIC BLOOD PRESSURE: 132 MMHG | BODY MASS INDEX: 34.6 KG/M2 | HEART RATE: 80 BPM

## 2021-06-15 DIAGNOSIS — M45.9 ANKYLOSING SPONDYLITIS, UNSPECIFIED SITE OF SPINE: Primary | ICD-10-CM

## 2021-06-15 DIAGNOSIS — M47.27 OSTEOARTHRITIS OF SPINE WITH RADICULOPATHY, LUMBOSACRAL REGION: ICD-10-CM

## 2021-06-15 PROCEDURE — 3008F BODY MASS INDEX DOCD: CPT | Mod: CPTII,S$GLB,, | Performed by: PHYSICAL MEDICINE & REHABILITATION

## 2021-06-15 PROCEDURE — 3008F PR BODY MASS INDEX (BMI) DOCUMENTED: ICD-10-PCS | Mod: CPTII,S$GLB,, | Performed by: PHYSICAL MEDICINE & REHABILITATION

## 2021-06-15 PROCEDURE — 99205 PR OFFICE/OUTPT VISIT, NEW, LEVL V, 60-74 MIN: ICD-10-PCS | Mod: S$GLB,,, | Performed by: PHYSICAL MEDICINE & REHABILITATION

## 2021-06-15 PROCEDURE — 99205 OFFICE O/P NEW HI 60 MIN: CPT | Mod: S$GLB,,, | Performed by: PHYSICAL MEDICINE & REHABILITATION

## 2021-06-15 PROCEDURE — 99999 PR PBB SHADOW E&M-EST. PATIENT-LVL IV: CPT | Mod: PBBFAC,,, | Performed by: PHYSICAL MEDICINE & REHABILITATION

## 2021-06-15 PROCEDURE — 99999 PR PBB SHADOW E&M-EST. PATIENT-LVL IV: ICD-10-PCS | Mod: PBBFAC,,, | Performed by: PHYSICAL MEDICINE & REHABILITATION

## 2021-06-15 PROCEDURE — 1125F PR PAIN SEVERITY QUANTIFIED, PAIN PRESENT: ICD-10-PCS | Mod: S$GLB,,, | Performed by: PHYSICAL MEDICINE & REHABILITATION

## 2021-06-15 PROCEDURE — 1125F AMNT PAIN NOTED PAIN PRSNT: CPT | Mod: S$GLB,,, | Performed by: PHYSICAL MEDICINE & REHABILITATION

## 2021-06-29 ENCOUNTER — OFFICE VISIT (OUTPATIENT)
Dept: HEMATOLOGY/ONCOLOGY | Facility: CLINIC | Age: 56
End: 2021-06-29
Payer: COMMERCIAL

## 2021-06-29 VITALS
RESPIRATION RATE: 18 BRPM | BODY MASS INDEX: 32.86 KG/M2 | SYSTOLIC BLOOD PRESSURE: 134 MMHG | OXYGEN SATURATION: 100 % | WEIGHT: 179.69 LBS | TEMPERATURE: 98 F | HEART RATE: 93 BPM | DIASTOLIC BLOOD PRESSURE: 86 MMHG

## 2021-06-29 DIAGNOSIS — D50.0 IRON DEFICIENCY ANEMIA DUE TO CHRONIC BLOOD LOSS: Primary | ICD-10-CM

## 2021-06-29 DIAGNOSIS — F41.9 ANXIETY: ICD-10-CM

## 2021-06-29 DIAGNOSIS — Z90.3 HISTORY OF SLEEVE GASTRECTOMY: ICD-10-CM

## 2021-06-29 DIAGNOSIS — Z86.39 HISTORY OF NON ANEMIC VITAMIN B12 DEFICIENCY: ICD-10-CM

## 2021-06-29 PROCEDURE — 99999 PR PBB SHADOW E&M-EST. PATIENT-LVL III: CPT | Mod: PBBFAC,,, | Performed by: INTERNAL MEDICINE

## 2021-06-29 PROCEDURE — 3008F BODY MASS INDEX DOCD: CPT | Mod: CPTII,S$GLB,, | Performed by: INTERNAL MEDICINE

## 2021-06-29 PROCEDURE — 1125F AMNT PAIN NOTED PAIN PRSNT: CPT | Mod: S$GLB,,, | Performed by: INTERNAL MEDICINE

## 2021-06-29 PROCEDURE — 3008F PR BODY MASS INDEX (BMI) DOCUMENTED: ICD-10-PCS | Mod: CPTII,S$GLB,, | Performed by: INTERNAL MEDICINE

## 2021-06-29 PROCEDURE — 99999 PR PBB SHADOW E&M-EST. PATIENT-LVL III: ICD-10-PCS | Mod: PBBFAC,,, | Performed by: INTERNAL MEDICINE

## 2021-06-29 PROCEDURE — 99214 OFFICE O/P EST MOD 30 MIN: CPT | Mod: S$GLB,,, | Performed by: INTERNAL MEDICINE

## 2021-06-29 PROCEDURE — 99214 PR OFFICE/OUTPT VISIT, EST, LEVL IV, 30-39 MIN: ICD-10-PCS | Mod: S$GLB,,, | Performed by: INTERNAL MEDICINE

## 2021-06-29 PROCEDURE — 1125F PR PAIN SEVERITY QUANTIFIED, PAIN PRESENT: ICD-10-PCS | Mod: S$GLB,,, | Performed by: INTERNAL MEDICINE

## 2021-06-29 RX ORDER — SODIUM CHLORIDE 0.9 % (FLUSH) 0.9 %
10 SYRINGE (ML) INJECTION
Status: CANCELLED | OUTPATIENT
Start: 2021-06-29

## 2021-06-29 RX ORDER — HEPARIN 100 UNIT/ML
500 SYRINGE INTRAVENOUS
Status: CANCELLED | OUTPATIENT
Start: 2021-06-29

## 2021-06-29 RX ORDER — HYDROXYZINE PAMOATE 25 MG/1
25 CAPSULE ORAL EVERY 4 HOURS PRN
Qty: 90 CAPSULE | Refills: 3 | Status: SHIPPED | OUTPATIENT
Start: 2021-06-29 | End: 2021-07-14

## 2021-08-23 ENCOUNTER — PATIENT MESSAGE (OUTPATIENT)
Dept: PHYSICAL MEDICINE AND REHAB | Facility: CLINIC | Age: 56
End: 2021-08-23

## 2021-09-27 ENCOUNTER — PATIENT MESSAGE (OUTPATIENT)
Dept: PHYSICAL MEDICINE AND REHAB | Facility: CLINIC | Age: 56
End: 2021-09-27

## 2021-09-27 ENCOUNTER — PATIENT MESSAGE (OUTPATIENT)
Dept: FAMILY MEDICINE | Facility: CLINIC | Age: 56
End: 2021-09-27

## 2021-09-27 DIAGNOSIS — Z12.31 SCREENING MAMMOGRAM FOR HIGH-RISK PATIENT: Primary | ICD-10-CM

## 2021-10-01 ENCOUNTER — PATIENT MESSAGE (OUTPATIENT)
Dept: HEMATOLOGY/ONCOLOGY | Facility: CLINIC | Age: 56
End: 2021-10-01

## 2021-10-01 DIAGNOSIS — Z86.39 HISTORY OF NON ANEMIC VITAMIN B12 DEFICIENCY: Primary | ICD-10-CM

## 2021-10-01 RX ORDER — CYANOCOBALAMIN 1000 UG/ML
1000 INJECTION, SOLUTION INTRAMUSCULAR; SUBCUTANEOUS
Qty: 10 ML | Refills: 1 | Status: SHIPPED | OUTPATIENT
Start: 2021-10-01 | End: 2022-10-06 | Stop reason: SDUPTHER

## 2021-10-15 ENCOUNTER — PATIENT MESSAGE (OUTPATIENT)
Dept: PHYSICAL MEDICINE AND REHAB | Facility: CLINIC | Age: 56
End: 2021-10-15

## 2021-10-18 ENCOUNTER — TELEPHONE (OUTPATIENT)
Dept: PHYSICAL MEDICINE AND REHAB | Facility: CLINIC | Age: 56
End: 2021-10-18

## 2021-10-31 ENCOUNTER — HOSPITAL ENCOUNTER (EMERGENCY)
Facility: HOSPITAL | Age: 56
Discharge: HOME | End: 2021-10-31
Attending: EMERGENCY MEDICINE
Payer: COMMERCIAL

## 2021-10-31 VITALS
HEIGHT: 62 IN | BODY MASS INDEX: 31.28 KG/M2 | DIASTOLIC BLOOD PRESSURE: 88 MMHG | OXYGEN SATURATION: 100 % | HEART RATE: 82 BPM | WEIGHT: 170 LBS | TEMPERATURE: 99 F | SYSTOLIC BLOOD PRESSURE: 144 MMHG | RESPIRATION RATE: 18 BRPM

## 2021-10-31 DIAGNOSIS — R52 PAIN: ICD-10-CM

## 2021-10-31 DIAGNOSIS — S83.91XA SPRAIN OF RIGHT KNEE, UNSPECIFIED LIGAMENT, INITIAL ENCOUNTER: Primary | ICD-10-CM

## 2021-10-31 PROCEDURE — 29505 APPLICATION LONG LEG SPLINT: CPT | Mod: RT

## 2021-10-31 PROCEDURE — 25000003 PHARM REV CODE 250: Performed by: PHYSICIAN ASSISTANT

## 2021-10-31 PROCEDURE — 99283 EMERGENCY DEPT VISIT LOW MDM: CPT | Mod: 25

## 2021-10-31 RX ORDER — NAPROXEN 500 MG/1
500 TABLET ORAL 2 TIMES DAILY WITH MEALS
Qty: 12 TABLET | Refills: 0 | Status: SHIPPED | OUTPATIENT
Start: 2021-10-31 | End: 2021-10-31 | Stop reason: CLARIF

## 2021-10-31 RX ORDER — HYDROCODONE BITARTRATE AND ACETAMINOPHEN 5; 325 MG/1; MG/1
1 TABLET ORAL
Status: COMPLETED | OUTPATIENT
Start: 2021-10-31 | End: 2021-10-31

## 2021-10-31 RX ADMIN — HYDROCODONE BITARTRATE AND ACETAMINOPHEN 1 TABLET: 5; 325 TABLET ORAL at 08:10

## 2021-12-31 ENCOUNTER — HOSPITAL ENCOUNTER (OUTPATIENT)
Facility: HOSPITAL | Age: 56
Discharge: HOME | End: 2022-01-03
Attending: EMERGENCY MEDICINE | Admitting: HOSPITALIST
Payer: COMMERCIAL

## 2021-12-31 ENCOUNTER — PATIENT MESSAGE (OUTPATIENT)
Dept: ADMINISTRATIVE | Facility: OTHER | Age: 56
End: 2021-12-31
Payer: COMMERCIAL

## 2021-12-31 DIAGNOSIS — R07.9 CHEST PAIN: ICD-10-CM

## 2021-12-31 DIAGNOSIS — I31.9 PERICARDITIS: ICD-10-CM

## 2021-12-31 DIAGNOSIS — R07.89 ATYPICAL CHEST PAIN: Primary | ICD-10-CM

## 2021-12-31 DIAGNOSIS — U07.1 COVID-19: ICD-10-CM

## 2021-12-31 PROBLEM — G25.81 RESTLESS LEG SYNDROME: Status: ACTIVE | Noted: 2021-12-31

## 2021-12-31 LAB
ALBUMIN SERPL BCP-MCNC: 4.7 G/DL (ref 3.5–5.2)
ALP SERPL-CCNC: 106 U/L (ref 55–135)
ALT SERPL W/O P-5'-P-CCNC: 31 U/L (ref 10–44)
ANION GAP SERPL CALC-SCNC: 15 MMOL/L (ref 8–16)
AST SERPL-CCNC: 25 U/L (ref 10–40)
BASOPHILS # BLD AUTO: 0.02 K/UL (ref 0–0.2)
BASOPHILS NFR BLD: 0.5 % (ref 0–1.9)
BILIRUB SERPL-MCNC: 0.3 MG/DL (ref 0.1–1)
BNP SERPL-MCNC: <10 PG/ML (ref 0–99)
BUN SERPL-MCNC: 13 MG/DL (ref 6–20)
CALCIUM SERPL-MCNC: 9.7 MG/DL (ref 8.7–10.5)
CHLORIDE SERPL-SCNC: 106 MMOL/L (ref 95–110)
CO2 SERPL-SCNC: 20 MMOL/L (ref 23–29)
CREAT SERPL-MCNC: 0.9 MG/DL (ref 0.5–1.4)
DIFFERENTIAL METHOD: ABNORMAL
EOSINOPHIL # BLD AUTO: 0.1 K/UL (ref 0–0.5)
EOSINOPHIL NFR BLD: 1.4 % (ref 0–8)
ERYTHROCYTE [DISTWIDTH] IN BLOOD BY AUTOMATED COUNT: 13.1 % (ref 11.5–14.5)
EST. GFR  (AFRICAN AMERICAN): >60 ML/MIN/1.73 M^2
EST. GFR  (NON AFRICAN AMERICAN): >60 ML/MIN/1.73 M^2
GLUCOSE SERPL-MCNC: 103 MG/DL (ref 70–110)
HCT VFR BLD AUTO: 44.5 % (ref 37–48.5)
HGB BLD-MCNC: 14.7 G/DL (ref 12–16)
IMM GRANULOCYTES # BLD AUTO: 0.02 K/UL (ref 0–0.04)
IMM GRANULOCYTES NFR BLD AUTO: 0.5 % (ref 0–0.5)
LYMPHOCYTES # BLD AUTO: 0.7 K/UL (ref 1–4.8)
LYMPHOCYTES NFR BLD: 15.7 % (ref 18–48)
MCH RBC QN AUTO: 28.8 PG (ref 27–31)
MCHC RBC AUTO-ENTMCNC: 33 G/DL (ref 32–36)
MCV RBC AUTO: 87 FL (ref 82–98)
MONOCYTES # BLD AUTO: 0.2 K/UL (ref 0.3–1)
MONOCYTES NFR BLD: 5.6 % (ref 4–15)
NEUTROPHILS # BLD AUTO: 3.2 K/UL (ref 1.8–7.7)
NEUTROPHILS NFR BLD: 76.3 % (ref 38–73)
NRBC BLD-RTO: 0 /100 WBC
PLATELET # BLD AUTO: 291 K/UL (ref 150–450)
PMV BLD AUTO: 9.3 FL (ref 9.2–12.9)
POTASSIUM SERPL-SCNC: 4.2 MMOL/L (ref 3.5–5.1)
PROT SERPL-MCNC: 8.7 G/DL (ref 6–8.4)
RBC # BLD AUTO: 5.1 M/UL (ref 4–5.4)
SARS-COV-2 RDRP RESP QL NAA+PROBE: POSITIVE
SODIUM SERPL-SCNC: 141 MMOL/L (ref 136–145)
TROPONIN I SERPL DL<=0.01 NG/ML-MCNC: <0.006 NG/ML (ref 0–0.03)
WBC # BLD AUTO: 4.14 K/UL (ref 3.9–12.7)

## 2021-12-31 PROCEDURE — G0378 HOSPITAL OBSERVATION PER HR: HCPCS

## 2021-12-31 PROCEDURE — 93010 EKG 12-LEAD: ICD-10-PCS | Mod: ,,, | Performed by: INTERNAL MEDICINE

## 2021-12-31 PROCEDURE — 99285 EMERGENCY DEPT VISIT HI MDM: CPT | Mod: 25

## 2021-12-31 PROCEDURE — 93010 ELECTROCARDIOGRAM REPORT: CPT | Mod: ,,, | Performed by: INTERNAL MEDICINE

## 2021-12-31 PROCEDURE — 93005 ELECTROCARDIOGRAM TRACING: CPT

## 2021-12-31 PROCEDURE — 96375 TX/PRO/DX INJ NEW DRUG ADDON: CPT

## 2021-12-31 PROCEDURE — 96374 THER/PROPH/DIAG INJ IV PUSH: CPT

## 2021-12-31 PROCEDURE — 80053 COMPREHEN METABOLIC PANEL: CPT | Performed by: PHYSICIAN ASSISTANT

## 2021-12-31 PROCEDURE — 63600175 PHARM REV CODE 636 W HCPCS: Performed by: EMERGENCY MEDICINE

## 2021-12-31 PROCEDURE — U0002 COVID-19 LAB TEST NON-CDC: HCPCS | Performed by: EMERGENCY MEDICINE

## 2021-12-31 PROCEDURE — 83880 ASSAY OF NATRIURETIC PEPTIDE: CPT | Performed by: PHYSICIAN ASSISTANT

## 2021-12-31 PROCEDURE — 85025 COMPLETE CBC W/AUTO DIFF WBC: CPT | Performed by: PHYSICIAN ASSISTANT

## 2021-12-31 PROCEDURE — 84484 ASSAY OF TROPONIN QUANT: CPT | Performed by: PHYSICIAN ASSISTANT

## 2021-12-31 RX ORDER — TALC
6 POWDER (GRAM) TOPICAL NIGHTLY PRN
Status: DISCONTINUED | OUTPATIENT
Start: 2022-01-01 | End: 2022-01-01

## 2021-12-31 RX ORDER — MAG HYDROX/ALUMINUM HYD/SIMETH 200-200-20
30 SUSPENSION, ORAL (FINAL DOSE FORM) ORAL 4 TIMES DAILY PRN
Status: DISCONTINUED | OUTPATIENT
Start: 2022-01-01 | End: 2022-01-03 | Stop reason: HOSPADM

## 2021-12-31 RX ORDER — TIZANIDINE 4 MG/1
4 TABLET ORAL 3 TIMES DAILY
COMMUNITY
Start: 2021-10-01 | End: 2022-05-13 | Stop reason: ALTCHOICE

## 2021-12-31 RX ORDER — CYCLOBENZAPRINE HCL 10 MG
10 TABLET ORAL 3 TIMES DAILY
COMMUNITY
Start: 2021-10-02 | End: 2022-05-13 | Stop reason: ALTCHOICE

## 2021-12-31 RX ORDER — IBUPROFEN 200 MG
16 TABLET ORAL
Status: DISCONTINUED | OUTPATIENT
Start: 2022-01-01 | End: 2022-01-03 | Stop reason: HOSPADM

## 2021-12-31 RX ORDER — SIMETHICONE 80 MG
1 TABLET,CHEWABLE ORAL 4 TIMES DAILY PRN
Status: DISCONTINUED | OUTPATIENT
Start: 2022-01-01 | End: 2022-01-03 | Stop reason: HOSPADM

## 2021-12-31 RX ORDER — IBUPROFEN 200 MG
24 TABLET ORAL
Status: DISCONTINUED | OUTPATIENT
Start: 2022-01-01 | End: 2022-01-03 | Stop reason: HOSPADM

## 2021-12-31 RX ORDER — PROCHLORPERAZINE EDISYLATE 5 MG/ML
5 INJECTION INTRAMUSCULAR; INTRAVENOUS EVERY 6 HOURS PRN
Status: DISCONTINUED | OUTPATIENT
Start: 2022-01-01 | End: 2022-01-03 | Stop reason: HOSPADM

## 2021-12-31 RX ORDER — ALBUTEROL SULFATE 90 UG/1
2 AEROSOL, METERED RESPIRATORY (INHALATION) EVERY 6 HOURS PRN
Status: DISCONTINUED | OUTPATIENT
Start: 2022-01-01 | End: 2022-01-03 | Stop reason: HOSPADM

## 2021-12-31 RX ORDER — NAPROXEN 500 MG/1
500 TABLET ORAL DAILY PRN
COMMUNITY
Start: 2021-10-31 | End: 2022-08-08

## 2021-12-31 RX ORDER — MORPHINE SULFATE 2 MG/ML
2 INJECTION, SOLUTION INTRAMUSCULAR; INTRAVENOUS
Status: COMPLETED | OUTPATIENT
Start: 2021-12-31 | End: 2021-12-31

## 2021-12-31 RX ORDER — HEPARIN 100 UNIT/ML
3 SYRINGE INTRAVENOUS
Status: DISCONTINUED | OUTPATIENT
Start: 2021-12-31 | End: 2022-01-03 | Stop reason: HOSPADM

## 2021-12-31 RX ORDER — ENOXAPARIN SODIUM 100 MG/ML
1 INJECTION SUBCUTANEOUS 2 TIMES DAILY
Status: DISCONTINUED | OUTPATIENT
Start: 2021-12-31 | End: 2022-01-03 | Stop reason: HOSPADM

## 2021-12-31 RX ORDER — ESCITALOPRAM OXALATE 20 MG/1
20 TABLET ORAL DAILY
COMMUNITY
Start: 2021-10-08 | End: 2022-08-08

## 2021-12-31 RX ORDER — ACETAMINOPHEN 325 MG/1
650 TABLET ORAL EVERY 4 HOURS PRN
Status: DISCONTINUED | OUTPATIENT
Start: 2022-01-01 | End: 2022-01-03 | Stop reason: HOSPADM

## 2021-12-31 RX ORDER — GLUCAGON 1 MG
1 KIT INJECTION
Status: DISCONTINUED | OUTPATIENT
Start: 2022-01-01 | End: 2022-01-03 | Stop reason: HOSPADM

## 2021-12-31 RX ORDER — DICLOFENAC SODIUM 10 MG/G
GEL TOPICAL
COMMUNITY
Start: 2021-07-13 | End: 2022-03-22

## 2021-12-31 RX ORDER — ASCORBIC ACID 500 MG
500 TABLET ORAL 2 TIMES DAILY
Status: DISCONTINUED | OUTPATIENT
Start: 2022-01-01 | End: 2022-01-03 | Stop reason: HOSPADM

## 2021-12-31 RX ADMIN — LORAZEPAM 1 MG: 2 INJECTION INTRAMUSCULAR; INTRAVENOUS at 11:12

## 2021-12-31 RX ADMIN — MORPHINE SULFATE 2 MG: 2 INJECTION, SOLUTION INTRAMUSCULAR; INTRAVENOUS at 11:12

## 2021-12-31 NOTE — LETTER
January 3, 2022                 [facility]  [address]  [place]  [address]  Phone: [phone]  Fax: [phone] January 3, 2022     Patient: Tosha Kwok   YOB: 1965       To Whom It May Concern:    Tosha Kwok was admitted to the hospital on 12/31/2021  6:10 PM and discharged on  01/03/2022.  She may return with no restrictions on 1/6/22 but should isolate prior to that time.  If you have any questions or concerns, please don't hesitate to call my office at [phone].      Sincerely,        Geoff Winters MD  Department of Hospital Medicine

## 2021-12-31 NOTE — FIRST PROVIDER EVALUATION
Emergency Department TeleTriage Encounter Note      CHIEF COMPLAINT    Chief Complaint   Patient presents with    Chest Pain     Described as heaviness, pt also reports n/v/d, and fever of 101 at home this am, pt has hx of lupus        VITAL SIGNS   Initial Vitals [12/31/21 1727]   BP Pulse Resp Temp SpO2   (!) 176/79 (!) 113 20 98.8 °F (37.1 °C) 98 %      MAP       --            ALLERGIES    Review of patient's allergies indicates:   Allergen Reactions    Carrot Swelling     angioedema    Morphine Anxiety     Hives   States she can take dilaudid and percocet without any problems    Erythromycin Other (See Comments)     Hives and cramps     Zofran (as hydrochloride) [ondansetron hcl] Hives     Local hive after IV injection       PROVIDER TRIAGE NOTE  This is a teletriage evaluation of a 56 y.o. female presenting to the ED complaining of chest pain. Patient reports chest pain, fever, and diarrhea for 2 days.     Initial orders will be placed and care will be transferred to an alternate provider when patient is roomed for a full evaluation. Any additional orders and the final disposition will be determined by that provider.           ORDERS  Labs Reviewed   CBC W/ AUTO DIFFERENTIAL   COMPREHENSIVE METABOLIC PANEL   TROPONIN I   B-TYPE NATRIURETIC PEPTIDE   SARS-COV-2 RDRP GENE       ED Orders (720h ago, onward)    Start Ordered     Status Ordering Provider    12/31/21 1752 12/31/21 1751  Vital signs  Every 15 min         Ordered FARZANALYRIC G.    12/31/21 1752 12/31/21 1751  Cardiac Monitoring - Adult  Continuous        Comments: Notify Physician If:    Ordered FARZANA, LYRIC G.    12/31/21 1752 12/31/21 1751  Pulse Oximetry Continuous  Continuous         Ordered FARZANA, LYRIC G.    12/31/21 1752 12/31/21 1751  Diet NPO  Diet effective now         Ordered FARZANA, LYRIC G.    12/31/21 1752 12/31/21 1751  Saline lock IV  Once         Ordered FARZANA, LYRIC G.    12/31/21 1752 12/31/21 1751  EKG 12-lead  Once         Comments: Do not perform if previously done during this visit/ triage    Ordered LYRIC YANES.    12/31/21 1752 12/31/21 1751  CBC auto differential  STAT         Ordered LYRIC YANES.    12/31/21 1752 12/31/21 1751  Comprehensive metabolic panel  STAT         Ordered LYRIC YANES.    12/31/21 1752 12/31/21 1751  Troponin I #1  STAT         Ordered LYRIC YANES.    12/31/21 1752 12/31/21 1751  BNP  STAT         Ordered LYRIC YANES.    12/31/21 1752 12/31/21 1751  X-Ray Chest AP Portable  1 time imaging         Ordered LYRIC YANES.    12/31/21 1752 12/31/21 1751  POCT COVID-19 Rapid Screening  Once         Ordered LYRIC YANES.    12/31/21 1730 12/31/21 1730  EKG 12-lead  Once         Completed by MARIA LUISA HENRY on 12/31/2021 at  5:30 PM THERESA CUEVAS            Virtual Visit Note: The provider triage portion of this emergency department evaluation and documentation was performed via [employer], a HIPAA-compliant telemedicine application, in concert with a tele-presenter in the room. A face to face patient evaluation with one of my colleagues will occur once the patient is placed in an emergency department room.      DISCLAIMER: This note was prepared with [employer] voice recognition transcription software. Garbled syntax, mangled pronouns, and other bizarre constructions may be attributed to that software system.

## 2022-01-01 ENCOUNTER — PATIENT MESSAGE (OUTPATIENT)
Dept: HEMATOLOGY/ONCOLOGY | Facility: CLINIC | Age: 57
End: 2022-01-01
Payer: COMMERCIAL

## 2022-01-01 DIAGNOSIS — Z45.2 ENCOUNTER FOR CARE RELATED TO PORT-A-CATH: Primary | ICD-10-CM

## 2022-01-01 PROBLEM — I20.9 ANGINA PECTORIS: Status: ACTIVE | Noted: 2022-01-01

## 2022-01-01 PROBLEM — G47.00 INSOMNIA: Status: ACTIVE | Noted: 2022-01-01

## 2022-01-01 PROBLEM — U07.1 COVID-19: Status: ACTIVE | Noted: 2022-01-01

## 2022-01-01 LAB
ANION GAP SERPL CALC-SCNC: 10 MMOL/L (ref 8–16)
AORTIC ROOT ANNULUS: 3.13 CM
APTT BLDCRRT: 27.3 SEC (ref 21–32)
AV INDEX (PROSTH): 1.07
AV MEAN GRADIENT: 4 MMHG
AV PEAK GRADIENT: 8 MMHG
AV VALVE AREA: 3.97 CM2
AV VELOCITY RATIO: 0.78
BASOPHILS # BLD AUTO: 0.02 K/UL (ref 0–0.2)
BASOPHILS NFR BLD: 0.6 % (ref 0–1.9)
BSA FOR ECHO PROCEDURE: 1.87 M2
BUN SERPL-MCNC: 13 MG/DL (ref 6–20)
CALCIUM SERPL-MCNC: 8.7 MG/DL (ref 8.7–10.5)
CHLORIDE SERPL-SCNC: 109 MMOL/L (ref 95–110)
CHOLEST SERPL-MCNC: 209 MG/DL (ref 120–199)
CHOLEST/HDLC SERPL: 3.7 {RATIO} (ref 2–5)
CK SERPL-CCNC: 55 U/L (ref 20–180)
CO2 SERPL-SCNC: 18 MMOL/L (ref 23–29)
CREAT SERPL-MCNC: 0.8 MG/DL (ref 0.5–1.4)
CRP SERPL-MCNC: 12.8 MG/L (ref 0–8.2)
CV ECHO LV RWT: 0.63 CM
D DIMER PPP IA.FEU-MCNC: 0.45 MG/L FEU
DIFFERENTIAL METHOD: ABNORMAL
DOP CALC AO PEAK VEL: 1.39 M/S
DOP CALC AO VTI: 28.63 CM
DOP CALC LVOT AREA: 3.7 CM2
DOP CALC LVOT DIAMETER: 2.17 CM
DOP CALC LVOT PEAK VEL: 1.09 M/S
DOP CALC LVOT STROKE VOLUME: 113.67 CM3
DOP CALC MV VTI: 15.21 CM
DOP CALCLVOT PEAK VEL VTI: 30.75 CM
E/A RATIO: 0.81
E/E' RATIO: 6.63 M/S
ECHO LV POSTERIOR WALL: 1.17 CM (ref 0.6–1.1)
EJECTION FRACTION: 65 %
EOSINOPHIL # BLD AUTO: 0.2 K/UL (ref 0–0.5)
EOSINOPHIL NFR BLD: 5 % (ref 0–8)
ERYTHROCYTE [DISTWIDTH] IN BLOOD BY AUTOMATED COUNT: 13.2 % (ref 11.5–14.5)
ERYTHROCYTE [SEDIMENTATION RATE] IN BLOOD BY WESTERGREN METHOD: 18 MM/HR (ref 0–20)
EST. GFR  (AFRICAN AMERICAN): >60 ML/MIN/1.73 M^2
EST. GFR  (NON AFRICAN AMERICAN): >60 ML/MIN/1.73 M^2
FERRITIN SERPL-MCNC: 227 NG/ML (ref 20–300)
FRACTIONAL SHORTENING: 46 % (ref 28–44)
GLUCOSE SERPL-MCNC: 124 MG/DL (ref 70–110)
HCT VFR BLD AUTO: 36.1 % (ref 37–48.5)
HDLC SERPL-MCNC: 57 MG/DL (ref 40–75)
HDLC SERPL: 27.3 % (ref 20–50)
HGB BLD-MCNC: 11.9 G/DL (ref 12–16)
IMM GRANULOCYTES # BLD AUTO: 0.01 K/UL (ref 0–0.04)
IMM GRANULOCYTES NFR BLD AUTO: 0.3 % (ref 0–0.5)
INR PPP: 1 (ref 0.8–1.2)
INTERVENTRICULAR SEPTUM: 1.39 CM (ref 0.6–1.1)
IVRT: 68.51 MSEC
LA MAJOR: 5.45 CM
LA MINOR: 5.81 CM
LA WIDTH: 3.15 CM
LACTATE SERPL-SCNC: 1.2 MMOL/L (ref 0.5–2.2)
LDH SERPL L TO P-CCNC: 149 U/L (ref 110–260)
LDLC SERPL CALC-MCNC: 134.8 MG/DL (ref 63–159)
LEFT ATRIUM SIZE: 3.15 CM
LEFT ATRIUM VOLUME INDEX MOD: 27.4 ML/M2
LEFT ATRIUM VOLUME INDEX: 26.2 ML/M2
LEFT ATRIUM VOLUME MOD: 49.57 CM3
LEFT ATRIUM VOLUME: 47.44 CM3
LEFT INTERNAL DIMENSION IN SYSTOLE: 2 CM (ref 2.1–4)
LEFT VENTRICLE DIASTOLIC VOLUME INDEX: 32.19 ML/M2
LEFT VENTRICLE DIASTOLIC VOLUME: 58.27 ML
LEFT VENTRICLE MASS INDEX: 90 G/M2
LEFT VENTRICLE SYSTOLIC VOLUME INDEX: 7.1 ML/M2
LEFT VENTRICLE SYSTOLIC VOLUME: 12.8 ML
LEFT VENTRICULAR INTERNAL DIMENSION IN DIASTOLE: 3.7 CM (ref 3.5–6)
LEFT VENTRICULAR MASS: 162.56 G
LV LATERAL E/E' RATIO: 6.3 M/S
LV SEPTAL E/E' RATIO: 7 M/S
LYMPHOCYTES # BLD AUTO: 1.1 K/UL (ref 1–4.8)
LYMPHOCYTES NFR BLD: 31.2 % (ref 18–48)
MAGNESIUM SERPL-MCNC: 2 MG/DL (ref 1.6–2.6)
MCH RBC QN AUTO: 28.6 PG (ref 27–31)
MCHC RBC AUTO-ENTMCNC: 33 G/DL (ref 32–36)
MCV RBC AUTO: 87 FL (ref 82–98)
MONOCYTES # BLD AUTO: 0.5 K/UL (ref 0.3–1)
MONOCYTES NFR BLD: 15 % (ref 4–15)
MV A" WAVE DURATION": 9.71 MSEC
MV MEAN GRADIENT: 1 MMHG
MV PEAK A VEL: 0.78 M/S
MV PEAK E VEL: 0.63 M/S
MV PEAK GRADIENT: 2 MMHG
MV STENOSIS PRESSURE HALF TIME: 46.54 MS
MV VALVE AREA BY CONTINUITY EQUATION: 7.47 CM2
MV VALVE AREA P 1/2 METHOD: 4.73 CM2
NEUTROPHILS # BLD AUTO: 1.6 K/UL (ref 1.8–7.7)
NEUTROPHILS NFR BLD: 47.9 % (ref 38–73)
NONHDLC SERPL-MCNC: 152 MG/DL
NRBC BLD-RTO: 0 /100 WBC
PHOSPHATE SERPL-MCNC: 4.2 MG/DL (ref 2.7–4.5)
PISA MRMAX VEL: 0.03 M/S
PISA TR MAX VEL: 1.72 M/S
PLATELET # BLD AUTO: 275 K/UL (ref 150–450)
PMV BLD AUTO: 9.3 FL (ref 9.2–12.9)
POTASSIUM SERPL-SCNC: 3.4 MMOL/L (ref 3.5–5.1)
PROTHROMBIN TIME: 10.3 SEC (ref 9–12.5)
PULM VEIN S/D RATIO: 1.21
PV PEAK D VEL: 0.39 M/S
PV PEAK S VEL: 0.47 M/S
RA MAJOR: 3.72 CM
RA PRESSURE: 3 MMHG
RA WIDTH: 2.78 CM
RBC # BLD AUTO: 4.16 M/UL (ref 4–5.4)
RIGHT VENTRICULAR END-DIASTOLIC DIMENSION: 1.85 CM
RV TISSUE DOPPLER FREE WALL SYSTOLIC VELOCITY 1 (APICAL 4 CHAMBER VIEW): 20.09 CM/S
SODIUM SERPL-SCNC: 137 MMOL/L (ref 136–145)
TDI LATERAL: 0.1 M/S
TDI SEPTAL: 0.09 M/S
TDI: 0.1 M/S
TR MAX PG: 12 MMHG
TRIGL SERPL-MCNC: 86 MG/DL (ref 30–150)
TROPONIN I SERPL DL<=0.01 NG/ML-MCNC: <0.006 NG/ML (ref 0–0.03)
TV REST PULMONARY ARTERY PRESSURE: 15 MMHG
WBC # BLD AUTO: 3.4 K/UL (ref 3.9–12.7)

## 2022-01-01 PROCEDURE — 83735 ASSAY OF MAGNESIUM: CPT

## 2022-01-01 PROCEDURE — 82550 ASSAY OF CK (CPK): CPT

## 2022-01-01 PROCEDURE — 85610 PROTHROMBIN TIME: CPT

## 2022-01-01 PROCEDURE — 84100 ASSAY OF PHOSPHORUS: CPT

## 2022-01-01 PROCEDURE — 85652 RBC SED RATE AUTOMATED: CPT

## 2022-01-01 PROCEDURE — 85379 FIBRIN DEGRADATION QUANT: CPT

## 2022-01-01 PROCEDURE — 85730 THROMBOPLASTIN TIME PARTIAL: CPT

## 2022-01-01 PROCEDURE — 83605 ASSAY OF LACTIC ACID: CPT

## 2022-01-01 PROCEDURE — 63600175 PHARM REV CODE 636 W HCPCS: Performed by: HOSPITALIST

## 2022-01-01 PROCEDURE — G0378 HOSPITAL OBSERVATION PER HR: HCPCS

## 2022-01-01 PROCEDURE — 82728 ASSAY OF FERRITIN: CPT

## 2022-01-01 PROCEDURE — 94761 N-INVAS EAR/PLS OXIMETRY MLT: CPT

## 2022-01-01 PROCEDURE — 85025 COMPLETE CBC W/AUTO DIFF WBC: CPT

## 2022-01-01 PROCEDURE — 25000003 PHARM REV CODE 250

## 2022-01-01 PROCEDURE — 25000242 PHARM REV CODE 250 ALT 637 W/ HCPCS: Performed by: HOSPITALIST

## 2022-01-01 PROCEDURE — 96372 THER/PROPH/DIAG INJ SC/IM: CPT | Mod: 59

## 2022-01-01 PROCEDURE — 36415 COLL VENOUS BLD VENIPUNCTURE: CPT

## 2022-01-01 PROCEDURE — 63600175 PHARM REV CODE 636 W HCPCS

## 2022-01-01 PROCEDURE — 25000003 PHARM REV CODE 250: Performed by: NURSE PRACTITIONER

## 2022-01-01 PROCEDURE — 25000242 PHARM REV CODE 250 ALT 637 W/ HCPCS

## 2022-01-01 PROCEDURE — 25000003 PHARM REV CODE 250: Performed by: HOSPITALIST

## 2022-01-01 PROCEDURE — 86140 C-REACTIVE PROTEIN: CPT

## 2022-01-01 PROCEDURE — 99220 PR INITIAL OBSERVATION CARE,LEVL III: ICD-10-PCS | Mod: 25,,, | Performed by: INTERNAL MEDICINE

## 2022-01-01 PROCEDURE — 94640 AIRWAY INHALATION TREATMENT: CPT

## 2022-01-01 PROCEDURE — 25500020 PHARM REV CODE 255: Performed by: EMERGENCY MEDICINE

## 2022-01-01 PROCEDURE — 84484 ASSAY OF TROPONIN QUANT: CPT

## 2022-01-01 PROCEDURE — 63600175 PHARM REV CODE 636 W HCPCS: Performed by: EMERGENCY MEDICINE

## 2022-01-01 PROCEDURE — 83615 LACTATE (LD) (LDH) ENZYME: CPT

## 2022-01-01 PROCEDURE — 80048 BASIC METABOLIC PNL TOTAL CA: CPT

## 2022-01-01 PROCEDURE — 80061 LIPID PANEL: CPT

## 2022-01-01 PROCEDURE — 99220 PR INITIAL OBSERVATION CARE,LEVL III: CPT | Mod: 25,,, | Performed by: INTERNAL MEDICINE

## 2022-01-01 RX ORDER — PREDNISONE 20 MG/1
40 TABLET ORAL DAILY
Status: DISCONTINUED | OUTPATIENT
Start: 2022-01-01 | End: 2022-01-03 | Stop reason: HOSPADM

## 2022-01-01 RX ORDER — HYDROMORPHONE HYDROCHLORIDE 2 MG/1
2 TABLET ORAL EVERY 8 HOURS PRN
Status: DISCONTINUED | OUTPATIENT
Start: 2022-01-01 | End: 2022-01-03 | Stop reason: HOSPADM

## 2022-01-01 RX ORDER — ALBUTEROL SULFATE 90 UG/1
2 AEROSOL, METERED RESPIRATORY (INHALATION) EVERY 4 HOURS PRN
Status: DISCONTINUED | OUTPATIENT
Start: 2022-01-01 | End: 2022-01-03 | Stop reason: HOSPADM

## 2022-01-01 RX ORDER — ALPRAZOLAM 0.25 MG/1
0.25 TABLET ORAL ONCE
Status: COMPLETED | OUTPATIENT
Start: 2022-01-01 | End: 2022-01-01

## 2022-01-01 RX ORDER — ACETAMINOPHEN 500 MG
500 TABLET ORAL EVERY 6 HOURS PRN
COMMUNITY
End: 2022-03-22

## 2022-01-01 RX ORDER — DIPHENHYDRAMINE HYDROCHLORIDE 50 MG/ML
12.5 INJECTION INTRAMUSCULAR; INTRAVENOUS EVERY 6 HOURS PRN
Status: DISCONTINUED | OUTPATIENT
Start: 2022-01-01 | End: 2022-01-01

## 2022-01-01 RX ORDER — DICLOFENAC SODIUM 10 MG/G
2 GEL TOPICAL 2 TIMES DAILY
Status: DISCONTINUED | OUTPATIENT
Start: 2022-01-01 | End: 2022-01-03 | Stop reason: HOSPADM

## 2022-01-01 RX ORDER — ZOLPIDEM TARTRATE 5 MG/1
10 TABLET ORAL NIGHTLY PRN
Status: DISCONTINUED | OUTPATIENT
Start: 2022-01-01 | End: 2022-01-03 | Stop reason: HOSPADM

## 2022-01-01 RX ORDER — HYDROXYCHLOROQUINE SULFATE 200 MG/1
200 TABLET, FILM COATED ORAL DAILY
Status: DISCONTINUED | OUTPATIENT
Start: 2022-01-01 | End: 2022-01-03 | Stop reason: HOSPADM

## 2022-01-01 RX ORDER — FENTANYL 25 UG/1
1 PATCH TRANSDERMAL
Status: DISCONTINUED | OUTPATIENT
Start: 2022-01-01 | End: 2022-01-03 | Stop reason: HOSPADM

## 2022-01-01 RX ORDER — HYDROXYZINE PAMOATE 25 MG/1
25 CAPSULE ORAL EVERY 6 HOURS PRN
Status: DISCONTINUED | OUTPATIENT
Start: 2022-01-01 | End: 2022-01-03 | Stop reason: HOSPADM

## 2022-01-01 RX ORDER — TOPIRAMATE 25 MG/1
100 TABLET ORAL 2 TIMES DAILY
Status: DISCONTINUED | OUTPATIENT
Start: 2022-01-01 | End: 2022-01-03 | Stop reason: HOSPADM

## 2022-01-01 RX ORDER — ARIPIPRAZOLE 5 MG/1
5 TABLET ORAL DAILY
Status: DISCONTINUED | OUTPATIENT
Start: 2022-01-01 | End: 2022-01-03 | Stop reason: HOSPADM

## 2022-01-01 RX ORDER — PANTOPRAZOLE SODIUM 40 MG/1
40 TABLET, DELAYED RELEASE ORAL DAILY
Refills: 3 | Status: DISCONTINUED | OUTPATIENT
Start: 2022-01-01 | End: 2022-01-03 | Stop reason: HOSPADM

## 2022-01-01 RX ORDER — CYCLOBENZAPRINE HCL 10 MG
10 TABLET ORAL 3 TIMES DAILY
Status: DISCONTINUED | OUTPATIENT
Start: 2022-01-01 | End: 2022-01-03 | Stop reason: HOSPADM

## 2022-01-01 RX ORDER — ESCITALOPRAM OXALATE 10 MG/1
20 TABLET ORAL DAILY
Status: DISCONTINUED | OUTPATIENT
Start: 2022-01-01 | End: 2022-01-03 | Stop reason: HOSPADM

## 2022-01-01 RX ORDER — COLCHICINE 0.6 MG/1
0.6 TABLET, FILM COATED ORAL 2 TIMES DAILY
Status: DISCONTINUED | OUTPATIENT
Start: 2022-01-01 | End: 2022-01-03 | Stop reason: HOSPADM

## 2022-01-01 RX ADMIN — COLCHICINE 0.6 MG: 0.6 TABLET, FILM COATED ORAL at 11:01

## 2022-01-01 RX ADMIN — CYCLOBENZAPRINE 10 MG: 10 TABLET, FILM COATED ORAL at 08:01

## 2022-01-01 RX ADMIN — OXYCODONE HYDROCHLORIDE AND ACETAMINOPHEN 500 MG: 500 TABLET ORAL at 08:01

## 2022-01-01 RX ADMIN — HYDROMORPHONE HYDROCHLORIDE 2 MG: 2 TABLET ORAL at 08:01

## 2022-01-01 RX ADMIN — HYDROXYCHLOROQUINE SULFATE 200 MG: 200 TABLET, FILM COATED ORAL at 11:01

## 2022-01-01 RX ADMIN — DICLOFENAC SODIUM 2 G: 10 GEL TOPICAL at 02:01

## 2022-01-01 RX ADMIN — ALBUTEROL SULFATE 2 PUFF: 90 AEROSOL, METERED RESPIRATORY (INHALATION) at 05:01

## 2022-01-01 RX ADMIN — THERA TABS 1 TABLET: TAB at 09:01

## 2022-01-01 RX ADMIN — ESCITALOPRAM OXALATE 20 MG: 10 TABLET ORAL at 11:01

## 2022-01-01 RX ADMIN — TOPIRAMATE 100 MG: 25 TABLET, FILM COATED ORAL at 09:01

## 2022-01-01 RX ADMIN — COLCHICINE 0.6 MG: 0.6 TABLET, FILM COATED ORAL at 08:01

## 2022-01-01 RX ADMIN — FENTANYL TRANSDERMAL 1 PATCH: 25 PATCH, EXTENDED RELEASE TRANSDERMAL at 05:01

## 2022-01-01 RX ADMIN — ALBUTEROL SULFATE 2 PUFF: 90 AEROSOL, METERED RESPIRATORY (INHALATION) at 08:01

## 2022-01-01 RX ADMIN — PANTOPRAZOLE SODIUM 40 MG: 40 TABLET, DELAYED RELEASE ORAL at 11:01

## 2022-01-01 RX ADMIN — ENOXAPARIN SODIUM 80 MG: 80 INJECTION, SOLUTION INTRAVENOUS; SUBCUTANEOUS at 02:01

## 2022-01-01 RX ADMIN — ALPRAZOLAM 0.25 MG: 0.25 TABLET ORAL at 05:01

## 2022-01-01 RX ADMIN — ENOXAPARIN SODIUM 80 MG: 80 INJECTION, SOLUTION INTRAVENOUS; SUBCUTANEOUS at 08:01

## 2022-01-01 RX ADMIN — PREDNISONE 40 MG: 20 TABLET ORAL at 11:01

## 2022-01-01 RX ADMIN — ENOXAPARIN SODIUM 80 MG: 80 INJECTION, SOLUTION INTRAVENOUS; SUBCUTANEOUS at 09:01

## 2022-01-01 RX ADMIN — ARIPIPRAZOLE 5 MG: 5 TABLET ORAL at 09:01

## 2022-01-01 RX ADMIN — HYDROMORPHONE HYDROCHLORIDE 2 MG: 2 TABLET ORAL at 11:01

## 2022-01-01 RX ADMIN — OXYCODONE HYDROCHLORIDE AND ACETAMINOPHEN 500 MG: 500 TABLET ORAL at 09:01

## 2022-01-01 RX ADMIN — IOHEXOL 100 ML: 350 INJECTION, SOLUTION INTRAVENOUS at 12:01

## 2022-01-01 RX ADMIN — ZOLPIDEM TARTRATE 10 MG: 5 TABLET ORAL at 08:01

## 2022-01-01 RX ADMIN — TOPIRAMATE 100 MG: 25 TABLET, FILM COATED ORAL at 08:01

## 2022-01-01 RX ADMIN — ACETAMINOPHEN 650 MG: 325 TABLET ORAL at 04:01

## 2022-01-01 RX ADMIN — CYCLOBENZAPRINE 10 MG: 10 TABLET, FILM COATED ORAL at 02:01

## 2022-01-01 RX ADMIN — HEPARIN 300 UNITS: 100 SYRINGE at 11:01

## 2022-01-01 NOTE — HPI
Tosha Kwok is a 56 y.o. female who has a past medical history of Anemia, Ankylosing spondylitis, Anxiety, Asthma, Chronic constipation, Chronic insomnia, Edema, Fibromyalgia, Interstitial cystitis, Lupus, Migraine headache, PONV (postoperative nausea and vomiting), Restless legs syndrome, Shingles, Stroke (1998), and TIA (transient ischemic attack) (1998) who presented to the ED with chest pain. Patient reports symptoms started 2 days ago (12/29/21), located to her mid sternum and epigastric area. Patient describes the pain as heaviness and cramping. Patient notes the pain is worse upon deep breathing. Associated symptoms include nausea, vomiting, diarrhea, and cough with yellow phlegm. Patient reports having a fever this morning of 101 F. Patient took tylenol with mild relief. Patient is on plaquenil, takes iron 1 time per month, and takes 5 prednisone per day. Patient reports her son tested positive for covid-19 recently and several staff members at her job was also positive. Patient is vaccinated for covid-19 since December, however still needs her booster shot.     In ED cardiac workup negative so far. CXR with No acute abnormality. EKG with Sinus tachycardia. Left axis deviation. CTA with No evidence of pulmonary embolism. No acute intrathoracic process Stable 3 mm pulmonary nodule in the right upper lobe. COVID positive. Admitted to [facility] for further workup.

## 2022-01-01 NOTE — SUBJECTIVE & OBJECTIVE
Past Medical History:   Diagnosis Date    Anemia     Ankylosing spondylitis     Anxiety     Asthma     Chronic constipation     Chronic insomnia     Edema     Fibromyalgia     Interstitial cystitis     Lupus     Migraine headache     PONV (postoperative nausea and vomiting)     Restless legs syndrome     Shingles     Stroke     post partum right sided numbness    TIA (transient ischemic attack)        Past Surgical History:   Procedure Laterality Date    BLADDER SURGERY      BREAST SURGERY  2018    reduction     SECTION      CHOLECYSTECTOMY      COLONOSCOPY N/A 10/2/2020    Procedure: COLONOSCOPY;  Surgeon: Guicho Hood MD;  Location: [facility] ENDO;  Service: Endoscopy;  Laterality: N/A;    EXCISION OF GRANULOMA Bilateral 2021    Procedure: EXCISION, GRANULOMA SCARS OF BREASTS;  Surgeon: Obed Palmer Jr., MD;  Location: [facility];  Service: Plastics;  Laterality: Bilateral;    HYSTERECTOMY      LASER ABLATION      to back    SLEEVE GASTROPLASTY  2016    TOTAL REDUCTION MAMMOPLASTY Bilateral     two years ago    TUBAL LIGATION         Review of patient's allergies indicates:   Allergen Reactions    Carrot Swelling     angioedema    Morphine Anxiety     Hives   States she can take dilaudid and percocet without any problems    Erythromycin Other (See Comments)     Hives and cramps     Zofran (as hydrochloride) [ondansetron hcl] Hives     Local hive after IV injection       No current facility-administered medications on file prior to encounter.     Current Outpatient Medications on File Prior to Encounter   Medication Sig    albuterol (PROAIR HFA) 90 mcg/actuation inhaler Inhale 2 puffs into the lungs every 4 (four) hours as needed.    ARIPiprazole (ABILIFY) 5 MG Tab TAKE 1 TABLET BY MOUTH EVERY DAY    budesonide 180mcg (PULMICORT 180MCG) 180 mcg/actuation AePB Inhale 2 puffs into the lungs once daily.    cetirizine (ZYRTEC) 10 MG tablet TAKE 1 TABLET BY  MOUTH EVERY DAY    clobetasoL (TEMOVATE) 0.05 % external solution Pt to mix in 1 jar of cerave cream and apply to affected areas bid    cyanocobalamin 1,000 mcg/mL injection Inject 1 mL (1,000 mcg total) into the muscle every 28 days.    cyclobenzaprine (FLEXERIL) 10 MG tablet Take 10 mg by mouth 3 (three) times daily.    diclofenac-misoprostol  mg-mcg (ARTHROTEC 75)  mg-mcg per tablet TAKE 1 TABLET BY MOUTH TWICE A DAY    EPINEPHrine (EPIPEN 2-DENNIS) 0.3 mg/0.3 mL AtIn Inject 0.3 mLs (0.3 mg total) into the muscle once. for 1 dose    ergocalciferol (ERGOCALCIFEROL) 50,000 unit Cap TAKE 1 CAPSULE BY MOUTH ONE TIME PER WEEK    EScitalopram oxalate (LEXAPRO) 20 MG tablet Take 20 mg by mouth once daily.    fentaNYL (DURAGESIC) 25 mcg/hr apply 1 patch to skin every 72 hours as directed    ferrous sulfate (FEOSOL) 325 mg (65 mg iron) Tab tablet TAKE 1 TABLET BY MOUTH TWICE A DAY    HYDROmorphone (DILAUDID) 2 MG tablet take 1 tablet by mouth every 8 hours as needed for pain    HYDROmorphone (DILAUDID) 2 MG tablet take 1 tablet by mouth every 6 to 8 hours as needed for pain    hydroxychloroquine (PLAQUENIL) 200 mg tablet Take 200 mg by mouth once daily.    hydrOXYzine pamoate (VISTARIL) 25 MG Cap TAKE 1 CAPSULE (25 MG TOTAL) BY MOUTH EVERY 4 (FOUR) HOURS AS NEEDED (ITCHING).    lidocaine HCl 2 % Crea Apply to affected areas 4 times a day as needed.    naproxen (NAPROSYN) 500 MG tablet     omeprazole (PRILOSEC) 20 MG capsule TAKE 1 CAPSULE BY MOUTH EVERY DAY    sumatriptan (IMITREX) 50 MG tablet Take 1 tablet (50 mg total) by mouth once as needed for Migraine. May repeat once after 2 hours. Do not exceed 3-4 doses in one week. NEEDS APT FOR FURTHER REFILL    tiZANidine (ZANAFLEX) 4 MG tablet Take 4 mg by mouth 3 (three) times daily.    topiramate (TOPAMAX) 100 MG tablet Take 1 tablet (100 mg total) by mouth 2 (two) times daily.    triamcinolone acetonide 0.1% (KENALOG) 0.1 % Lotn Apply  topically 2 (two) times daily.    venlafaxine (EFFEXOR-XR) 150 MG Cp24 TAKE 1 CAPSULE (150 MG TOTAL) BY MOUTH ONCE DAILY.    verapamiL (CALAN) 40 MG Tab Take 1 tablet (40 mg total) by mouth 3 (three) times daily.    VOLTAREN 1 % Gel SMARTSIG:3 Gram(s) Topical 4 Times Daily PRN    zolpidem (AMBIEN) 10 mg Tab      Family History     Problem Relation (Age of Onset)    Breast cancer Maternal Aunt    Heart attack Father    Hypertension Mother, Brother, Sister    Lupus Sister        Tobacco Use    Smoking status: Never Smoker    Smokeless tobacco: Never Used   Substance and Sexual Activity    Alcohol use: Not Currently     Comment: occasionally    Drug use: No    Sexual activity: Yes     Partners: Male     Review of Systems   Constitutional: Positive for fever. Negative for activity change, appetite change, chills, diaphoresis, fatigue and unexpected weight change.   HENT: Negative for sore throat and trouble swallowing.    Eyes: Negative for visual disturbance.   Respiratory: Positive for cough and shortness of breath. Negative for wheezing.    Cardiovascular: Positive for chest pain. Negative for palpitations and leg swelling.   Gastrointestinal: Positive for diarrhea, nausea and vomiting. Negative for abdominal distention and abdominal pain.   Genitourinary: Negative for difficulty urinating.   Musculoskeletal: Negative for myalgias.   Skin: Negative for color change.   Neurological: Positive for weakness.     Objective:     Vital Signs (Most Recent):  Temp: 97.7 °F (36.5 °C) (01/01/22 0353)  Pulse: 83 (01/01/22 0353)  Resp: 20 (01/01/22 0353)  BP: (!) 88/58 (01/01/22 0335)  SpO2: 100 % (01/01/22 0353) Vital Signs (24h Range):  Temp:  [97.7 °F (36.5 °C)-98.8 °F (37.1 °C)] 97.7 °F (36.5 °C)  Pulse:  [] 83  Resp:  [16-20] 20  SpO2:  [98 %-100 %] 100 %  BP: ()/() 88/58     Weight: 77.1 kg (170 lb)  Body mass index is 31.09 kg/m².    Physical Exam  Vitals and nursing note reviewed.    Constitutional:       General: She is not in acute distress.     Appearance: Normal appearance. She is not ill-appearing.   HENT:      Head: Normocephalic and atraumatic.      Mouth/Throat:      Mouth: Mucous membranes are moist.   Eyes:      Extraocular Movements: Extraocular movements intact.      Pupils: Pupils are equal, round, and reactive to light.   Cardiovascular:      Rate and Rhythm: Normal rate and regular rhythm.      Pulses: Normal pulses.      Heart sounds: Normal heart sounds. No murmur heard.      Pulmonary:      Effort: Pulmonary effort is normal. No respiratory distress.      Breath sounds: Normal breath sounds. No wheezing or rales.      Comments: Becomes slight SOB while talking  Abdominal:      General: Bowel sounds are normal. There is no distension.      Palpations: Abdomen is soft.      Tenderness: There is no abdominal tenderness. There is no guarding.   Musculoskeletal:         General: No swelling. Normal range of motion.      Cervical back: Normal range of motion.   Skin:     General: Skin is warm and dry.      Capillary Refill: Capillary refill takes 2 to 3 seconds.      Comments: Right chest wall port intact   Neurological:      General: No focal deficit present.      Mental Status: She is alert and oriented to person, place, and time. Mental status is at baseline.   Psychiatric:         Mood and Affect: Mood normal.         Behavior: Behavior normal.         Thought Content: Thought content normal.         Judgment: Judgment normal.           CRANIAL NERVES     CN III, IV, VI   Pupils are equal, round, and reactive to light.       Significant Labs:   All pertinent labs within the past 24 hours have been reviewed.  Recent Lab Results       12/31/21 2148 12/31/21  1939        Albumin 4.7         Alkaline Phosphatase 106         ALT 31         Anion Gap 15         AST 25         Baso # 0.02         Basophil % 0.5         BILIRUBIN TOTAL 0.3  Comment: For infants and newborns,  interpretation of results should be based  on gestational age, weight and in agreement with clinical  observations.    Premature Infant recommended reference ranges:  Up to 24 hours.............<8.0 mg/dL  Up to 48 hours............<12.0 mg/dL  3-5 days..................<15.0 mg/dL  6-29 days.................<15.0 mg/dL           BNP <10  Comment: Values of less than 100 pg/ml are consistent with non-CHF populations.         BUN 13         Calcium 9.7         Chloride 106         CO2 20         Creatinine 0.9         Differential Method Automated         eGFR if  >60         eGFR if non  >60  Comment: Calculation used to obtain the estimated glomerular filtration  rate (eGFR) is the CKD-EPI equation.            Eos # 0.1         Eosinophil % 1.4         Glucose 103         Gran # (ANC) 3.2         Gran % 76.3         Hematocrit 44.5         Hemoglobin 14.7         Immature Grans (Abs) 0.02  Comment: Mild elevation in immature granulocytes is non specific and   can be seen in a variety of conditions including stress response,   acute inflammation, trauma and pregnancy. Correlation with other   laboratory and clinical findings is essential.           Immature Granulocytes 0.5         Lymph # 0.7         Lymph % 15.7         MCH 28.8         MCHC 33.0         MCV 87         Mono # 0.2         Mono % 5.6         MPV 9.3         nRBC 0         Platelets 291         Potassium 4.2         PROTEIN TOTAL 8.7         RBC 5.10         RDW 13.1         SARS-CoV-2 RNA, Amplification, Qual   Positive  Comment: This test utilizes isothermal nucleic acid amplification   technology to detect the SARS-CoV-2 RdRp nucleic acid segment.   The analytical sensitivity (limit of detection) is 125 genome   equivalents/mL.     A POSITIVE result implies infection with the SARS-CoV-2 virus;  the patient is presumed to be contagious.    A NEGATIVE result means that SARS-CoV-2 nucleic acids are not  present above  the limit of detection. A NEGATIVE result should be   treated as presumptive. It does not rule out the possibility of   COVID-19 and should not be the sole basis for treatment decisions.   If COVID-19 is strongly suspected based on clinical and exposure   history, re-testing using an alternate molecular assay should be   considered.       This test is only for use under the Food and Drug   Administration s Emergency Use Authorization (EUA).   Commercial kits are provided by [employer].   Performance characteristics of the EUA have been independently  verified by [facility] Department of  Pathology and Laboratory Medicine.   _________________________________________________________________  The ID NOW COVID-19 Letter of Authorization, along with the   authorized Fact Sheet for Healthcare Providers, the authorized Fact  Sheet for Patients, and authorized labeling are available on the FDA   website:  www.fda.gov/MedicalDevices/Safety/EmergencySituations/lka793990.htm         Sodium 141         Troponin I <0.006  Comment: The reference interval for Troponin I represents the 99th percentile   cutoff   for our facility and is consistent with 3rd generation assay   performance.           WBC 4.14               Significant Imaging: I have reviewed all pertinent imaging results/findings within the past 24 hours.

## 2022-01-01 NOTE — ED NOTES
The patient reports cough, chest pain, v/d, fever since being exposed to covid positive co-workers.

## 2022-01-01 NOTE — PLAN OF CARE
Problem: Adult Inpatient Plan of Care  Goal: Plan of Care Review  Outcome: Ongoing, Progressing     Plan of care reviewed with patient. Pt verbalized understanding. Pt is AAO x 4, c/o chest discomfort, headache and anxiety, DARRYL Ferguson came to bedside to assess pt. All medications administered as prescribed. Pt is on room air, NSR on Tele, no true red alarms. Pt is currently resting, bed in lowest position, HOB lowered, side rails up x 2, call light and bedside table within reach. Pt instructed to call if assistance is needed.

## 2022-01-01 NOTE — CONSULTS
[facility] - Telemetry  Cardiology  Consult Note    Patient Name: Tosha Kwok  MRN: [id number]  Admission Date: 2021  Hospital Length of Stay: 0 days  Code Status: Full Code   Attending Provider: Geoff Winters, *   Consulting Provider: Maria Del Carmen Weiss MD  Primary Care Physician: Jeannie Gibson DO  Principal Problem:Angina pectoris    Patient information was obtained from patient, past medical records and ER records.     [facility]  Consult performed by: Maria Del Carmen Weiss MD  Consult ordered by: Mary Kate Ferguson NP        Subjective:     Chief Complaint:  CP, COVID infection     HPI:  57 yo female h/o SLE, fibromyalgia  Stroke/TIA   Ankylosing spondylitis  Anxiety    Presented to the ED for fevers  Reports having CP for last 2 days (mid sternum). When she developed fevers, prompted her to seek medical care. No exertional component. Worse with laying flat. Endorses lower extremity edema x 3-4 days, as well as orthopnea, PND, SOB for 2 days.    Pain is reproducible somewhat to palpation, and with some positional change. Reports started after coughing started 2 days back, but denies heavy coughing episodes.        Past Medical History:   Diagnosis Date    Anemia     Ankylosing spondylitis     Anxiety     Asthma     Cancer     left breast    Chronic constipation     Chronic insomnia     Edema     Fibromyalgia     Hypertension     Interstitial cystitis     Lupus     Migraine headache     PONV (postoperative nausea and vomiting)     Restless legs syndrome     Shingles     Stroke     post partum right sided numbness    TIA (transient ischemic attack)        Past Surgical History:   Procedure Laterality Date    BLADDER SURGERY      BREAST SURGERY  2018    reduction     SECTION      CHOLECYSTECTOMY      COLONOSCOPY N/A 10/2/2020    Procedure: COLONOSCOPY;  Surgeon: Guicho Hood MD;  [facility];  Service: Endoscopy;   Laterality: N/A;    EXCISION OF GRANULOMA Bilateral 4/6/2021    Procedure: EXCISION, GRANULOMA SCARS OF BREASTS;  Surgeon: Obed Palmer Jr., MD;  Location: [facility];  Service: Plastics;  Laterality: Bilateral;    HYSTERECTOMY      LASER ABLATION      to back    MASTECTOMY, PARTIAL Left     lumpectomy and partial mastectomy    SLEEVE GASTROPLASTY  05/2016    TOTAL REDUCTION MAMMOPLASTY Bilateral     two years ago    TUBAL LIGATION         Review of patient's allergies indicates:   Allergen Reactions    Carrot Swelling     angioedema    Morphine Anxiety     Hives   States she can take dilaudid and percocet without any problems    Erythromycin Other (See Comments)     Hives and cramps     Zofran (as hydrochloride) [ondansetron hcl] Hives     Local hive after IV injection       No current facility-administered medications on file prior to encounter.     Current Outpatient Medications on File Prior to Encounter   Medication Sig    acetaminophen (TYLENOL) 500 MG tablet Take 500 mg by mouth every 6 (six) hours as needed for Pain.    albuterol (PROAIR HFA) 90 mcg/actuation inhaler Inhale 2 puffs into the lungs every 4 (four) hours as needed.    ARIPiprazole (ABILIFY) 5 MG Tab TAKE 1 TABLET BY MOUTH EVERY DAY    budesonide 180mcg (PULMICORT 180MCG) 180 mcg/actuation AePB Inhale 2 puffs into the lungs once daily. (Patient taking differently: Inhale 2 puffs into the lungs as needed.)    cetirizine (ZYRTEC) 10 MG tablet TAKE 1 TABLET BY MOUTH EVERY DAY (Patient taking differently: Take 10 mg by mouth daily as needed.)    clobetasoL (TEMOVATE) 0.05 % external solution Pt to mix in 1 jar of cerave cream and apply to affected areas bid    cyclobenzaprine (FLEXERIL) 10 MG tablet Take 10 mg by mouth 3 (three) times daily.    EScitalopram oxalate (LEXAPRO) 20 MG tablet Take 20 mg by mouth once daily.    fentaNYL (DURAGESIC) 25 mcg/hr apply 1 patch to skin every 72 hours as directed    hydroxychloroquine  (PLAQUENIL) 200 mg tablet Take 200 mg by mouth once daily.    hydrOXYzine pamoate (VISTARIL) 25 MG Cap TAKE 1 CAPSULE (25 MG TOTAL) BY MOUTH EVERY 4 (FOUR) HOURS AS NEEDED (ITCHING).    naproxen (NAPROSYN) 500 MG tablet Take 500 mg by mouth daily as needed.    omeprazole (PRILOSEC) 20 MG capsule TAKE 1 CAPSULE BY MOUTH EVERY DAY    sumatriptan (IMITREX) 50 MG tablet Take 1 tablet (50 mg total) by mouth once as needed for Migraine. May repeat once after 2 hours. Do not exceed 3-4 doses in one week. NEEDS APT FOR FURTHER REFILL    tiZANidine (ZANAFLEX) 4 MG tablet Take 4 mg by mouth 3 (three) times daily.    topiramate (TOPAMAX) 100 MG tablet Take 1 tablet (100 mg total) by mouth 2 (two) times daily.    VOLTAREN 1 % Gel SMARTSIG:3 Gram(s) Topical 4 Times Daily PRN    zolpidem (AMBIEN) 10 mg Tab nightly as needed.    cyanocobalamin 1,000 mcg/mL injection Inject 1 mL (1,000 mcg total) into the muscle every 28 days.    EPINEPHrine (EPIPEN 2-DENNIS) 0.3 mg/0.3 mL AtIn Inject 0.3 mLs (0.3 mg total) into the muscle once. for 1 dose    HYDROmorphone (DILAUDID) 2 MG tablet take 1 tablet by mouth every 8 hours as needed for pain    triamcinolone acetonide 0.1% (KENALOG) 0.1 % Lotn Apply topically 2 (two) times daily.    [DISCONTINUED] diclofenac-misoprostol  mg-mcg (ARTHROTEC 75)  mg-mcg per tablet TAKE 1 TABLET BY MOUTH TWICE A DAY    [DISCONTINUED] ergocalciferol (ERGOCALCIFEROL) 50,000 unit Cap TAKE 1 CAPSULE BY MOUTH ONE TIME PER WEEK    [DISCONTINUED] ferrous sulfate (FEOSOL) 325 mg (65 mg iron) Tab tablet TAKE 1 TABLET BY MOUTH TWICE A DAY    [DISCONTINUED] HYDROmorphone (DILAUDID) 2 MG tablet take 1 tablet by mouth every 6 to 8 hours as needed for pain    [DISCONTINUED] lidocaine HCl 2 % Crea Apply to affected areas 4 times a day as needed.    [DISCONTINUED] venlafaxine (EFFEXOR-XR) 150 MG Cp24 TAKE 1 CAPSULE (150 MG TOTAL) BY MOUTH ONCE DAILY.    [DISCONTINUED] verapamiL (CALAN) 40 MG Tab  Take 1 tablet (40 mg total) by mouth 3 (three) times daily.     Family History     Problem Relation (Age of Onset)    Breast cancer Maternal Aunt    Heart attack Father    Hypertension Mother, Brother, Sister    Lupus Sister        Tobacco Use    Smoking status: Never Smoker    Smokeless tobacco: Never Used   Substance and Sexual Activity    Alcohol use: Yes     Comment: occasionally    Drug use: No    Sexual activity: Yes     Partners: Male     Review of Systems   Constitutional: Positive for fever.   HENT: Negative for nosebleeds.    Cardiovascular: Positive for chest pain, dyspnea on exertion, leg swelling, orthopnea, palpitations and paroxysmal nocturnal dyspnea. Negative for irregular heartbeat, near-syncope and syncope.        As above   Respiratory: Negative for hemoptysis.    Hematologic/Lymphatic: Negative for bleeding problem.   Musculoskeletal: Negative for arthritis.   Gastrointestinal: Negative for hematochezia.   Genitourinary: Negative for hematuria.   Neurological: Negative for seizures.   Allergic/Immunologic: Positive for environmental allergies.     Objective:     Vital Signs (Most Recent):  Temp: 97.7 °F (36.5 °C) (01/01/22 0353)  Pulse: 81 (01/01/22 0506)  Resp: 18 (01/01/22 0555)  BP: 96/62 (01/01/22 0353)  SpO2: 97 % (01/01/22 0506) Vital Signs (24h Range):  Temp:  [97.7 °F (36.5 °C)-98.8 °F (37.1 °C)] 97.7 °F (36.5 °C)  Pulse:  [] 81  Resp:  [16-24] 18  SpO2:  [97 %-100 %] 97 %  BP: ()/() 96/62     Weight: 80 kg (176 lb 5.9 oz)  Body mass index is 32.26 kg/m².    SpO2: 97 %  O2 Device (Oxygen Therapy): room air      Intake/Output Summary (Last 24 hours) at 1/1/2022 0847  Last data filed at 1/1/2022 0700  Gross per 24 hour   Intake 200 ml   Output --   Net 200 ml       Lines/Drains/Airways     Central Venous Catheter Line            Port A Cath Single Lumen 04/15/21 1200 right atrial 260 days                Physical Exam  Constitutional:       General: She is not in  acute distress.     Appearance: She is not toxic-appearing.   HENT:      Head: Normocephalic.   Psychiatric:         Mood and Affect: Mood normal.     Deferred to primary team    Significant Labs:   CMP   Recent Labs   Lab 12/31/21 2148 01/01/22  0639    137   K 4.2 3.4*    109   CO2 20* 18*    124*   BUN 13 13   CREATININE 0.9 0.8   CALCIUM 9.7 8.7   PROT 8.7*  --    ALBUMIN 4.7  --    BILITOT 0.3  --    ALKPHOS 106  --    AST 25  --    ALT 31  --    ANIONGAP 15 10   ESTGFRAFRICA >60 >60   EGFRNONAA >60 >60   , CBC   Recent Labs   Lab 12/31/21 2148 12/31/21 2148 01/01/22  0639   WBC 4.14  --  3.40*   HGB 14.7  --  11.9*   HCT 44.5   < > 36.1*     --  275    < > = values in this interval not displayed.   , INR   Recent Labs   Lab 01/01/22  0640   INR 1.0    and Troponin   Recent Labs   Lab 12/31/21 2148 01/01/22  0639   TROPONINI <0.006 <0.006       Assessment and Plan:     CP  - Trop negative x2  - EKG personally reviewed. My interpretation: STac 110s, LAD. No ST abn. QTc 457  - Possible component of pericarditis?- pleuritic, worse with laying flat, fever.  No EKG changes  - Recommend echo today as does endorse orthopnea, PND, cough new over last 2 days; to assess for LVEF and pericardial effusion. Trop negative is re-assuring  - Check CRP, ESR  - NSAID would be preferred but already on glucocorticoids  - Colchicine 0.6 mg bid (3 months)    SLE  Management per primary team    Stoke/TIA noted in chart. Note 2016 states no diagnosis of stroke (e-care everywhere)      Active Diagnoses:    Diagnosis Date Noted POA    PRINCIPAL PROBLEM:  Angina pectoris [I20.9] 01/01/2022 Yes    COVID-19 [U07.1] 01/01/2022 Yes    Insomnia [G47.00] 01/01/2022 Yes    Restless leg syndrome [G25.81] 12/31/2021 Yes    Other iron deficiency anemias [D50.8] 03/31/2021 Yes    Migraine [G43.909] 07/22/2019 Yes    Anxiety [F41.9] 07/22/2019 Yes    Hyperlipidemia [E78.5] 07/22/2019 Yes    Chronic, continuous  use of opioids [F11.90] 09/27/2018 Yes     Chronic    Fibromyalgia [M79.7] 09/27/2018 Yes     Chronic    Ankylosing spondylitis [M45.9]  Yes     Chronic    Asthma [J45.909] 06/09/2015 Yes    Lupus (systemic lupus erythematosus) [M32.9] 05/09/2014 Yes    HTN (hypertension) [I10] 07/11/2013 Yes      Problems Resolved During this Admission:       VTE Risk Mitigation (From admission, onward)         Ordered     enoxaparin injection 80 mg  2 times daily         12/31/21 2342     IP VTE HIGH RISK PATIENT  Once         12/31/21 2342     Place sequential compression device  Until discontinued         12/31/21 2342     heparin, porcine (PF) 100 unit/mL injection flush 300 Units  As needed (PRN)         12/31/21 2010                Thank you for your consult. I will follow-up with patient. Please contact us if you have any additional questions.    Maria Del Carmen Wesis MD  Cardiology   [facility] - Telemetry

## 2022-01-01 NOTE — H&P
[facility] Medicine  History & Physical    Patient Name: Tosha Kwok  MRN: [id number]  Patient Class: OP- Observation  Admission Date: 12/31/2021  Attending Physician: Geoff Winters, *   Primary Care Provider: Jeannie Gibson DO         Patient information was obtained from patient, past medical records and ER records.     Subjective:     Principal Problem:Angina pectoris    Chief Complaint:   Chief Complaint   Patient presents with    Chest Pain     Described as heaviness, pt also reports n/v/d, and fever of 101 at home this am, pt has hx of lupus         HPI: Tosha Kwok is a 56 y.o. female who has a past medical history of Anemia, Ankylosing spondylitis, Anxiety, Asthma, Chronic constipation, Chronic insomnia, Edema, Fibromyalgia, Interstitial cystitis, Lupus, Migraine headache, PONV (postoperative nausea and vomiting), Restless legs syndrome, Shingles, Stroke (1998), and TIA (transient ischemic attack) (1998) who presented to the ED with chest pain. Patient reports symptoms started 2 days ago (12/29/21), located to her mid sternum and epigastric area. Patient describes the pain as heaviness and cramping. Patient notes the pain is worse upon deep breathing. Associated symptoms include nausea, vomiting, diarrhea, and cough with yellow phlegm. Patient reports having a fever this morning of 101 F. Patient took tylenol with mild relief. Patient is on plaquenil, takes iron 1 time per month, and takes 5 prednisone per day. Patient reports her son tested positive for covid-19 recently and several staff members at her job was also positive. Patient is vaccinated for covid-19 since December, however still needs her booster shot.     In ED cardiac workup negative so far. CXR with No acute abnormality. EKG with Sinus tachycardia. Left axis deviation. CTA with No evidence of pulmonary embolism. No acute intrathoracic process Stable 3 mm pulmonary nodule in the right upper lobe. COVID  positive. Admitted to [facility] medicine for further workup.      Past Medical History:   Diagnosis Date    Anemia     Ankylosing spondylitis     Anxiety     Asthma     Chronic constipation     Chronic insomnia     Edema     Fibromyalgia     Interstitial cystitis     Lupus     Migraine headache     PONV (postoperative nausea and vomiting)     Restless legs syndrome     Shingles     Stroke     post partum right sided numbness    TIA (transient ischemic attack)        Past Surgical History:   Procedure Laterality Date    BLADDER SURGERY      BREAST SURGERY  2018    reduction     SECTION      CHOLECYSTECTOMY      COLONOSCOPY N/A 10/2/2020    Procedure: COLONOSCOPY;  Surgeon: Guicho Hood MD;  Location: [facility] ENDO;  Service: Endoscopy;  Laterality: N/A;    EXCISION OF GRANULOMA Bilateral 2021    Procedure: EXCISION, GRANULOMA SCARS OF BREASTS;  Surgeon: Obed Palmer Jr., MD;  Location: [facility] OR;  Service: Plastics;  Laterality: Bilateral;    HYSTERECTOMY      LASER ABLATION      to back    SLEEVE GASTROPLASTY  2016    TOTAL REDUCTION MAMMOPLASTY Bilateral     two years ago    TUBAL LIGATION         Review of patient's allergies indicates:   Allergen Reactions    Carrot Swelling     angioedema    Morphine Anxiety     Hives   States she can take dilaudid and percocet without any problems    Erythromycin Other (See Comments)     Hives and cramps     Zofran (as hydrochloride) [ondansetron hcl] Hives     Local hive after IV injection       No current facility-administered medications on file prior to encounter.     Current Outpatient Medications on File Prior to Encounter   Medication Sig    albuterol (PROAIR HFA) 90 mcg/actuation inhaler Inhale 2 puffs into the lungs every 4 (four) hours as needed.    ARIPiprazole (ABILIFY) 5 MG Tab TAKE 1 TABLET BY MOUTH EVERY DAY    budesonide 180mcg (PULMICORT 180MCG) 180 mcg/actuation AePB Inhale 2 puffs into the  lungs once daily.    cetirizine (ZYRTEC) 10 MG tablet TAKE 1 TABLET BY MOUTH EVERY DAY    clobetasoL (TEMOVATE) 0.05 % external solution Pt to mix in 1 jar of cerave cream and apply to affected areas bid    cyanocobalamin 1,000 mcg/mL injection Inject 1 mL (1,000 mcg total) into the muscle every 28 days.    cyclobenzaprine (FLEXERIL) 10 MG tablet Take 10 mg by mouth 3 (three) times daily.    diclofenac-misoprostol  mg-mcg (ARTHROTEC 75)  mg-mcg per tablet TAKE 1 TABLET BY MOUTH TWICE A DAY    EPINEPHrine (EPIPEN 2-DENNIS) 0.3 mg/0.3 mL AtIn Inject 0.3 mLs (0.3 mg total) into the muscle once. for 1 dose    ergocalciferol (ERGOCALCIFEROL) 50,000 unit Cap TAKE 1 CAPSULE BY MOUTH ONE TIME PER WEEK    EScitalopram oxalate (LEXAPRO) 20 MG tablet Take 20 mg by mouth once daily.    fentaNYL (DURAGESIC) 25 mcg/hr apply 1 patch to skin every 72 hours as directed    ferrous sulfate (FEOSOL) 325 mg (65 mg iron) Tab tablet TAKE 1 TABLET BY MOUTH TWICE A DAY    HYDROmorphone (DILAUDID) 2 MG tablet take 1 tablet by mouth every 8 hours as needed for pain    HYDROmorphone (DILAUDID) 2 MG tablet take 1 tablet by mouth every 6 to 8 hours as needed for pain    hydroxychloroquine (PLAQUENIL) 200 mg tablet Take 200 mg by mouth once daily.    hydrOXYzine pamoate (VISTARIL) 25 MG Cap TAKE 1 CAPSULE (25 MG TOTAL) BY MOUTH EVERY 4 (FOUR) HOURS AS NEEDED (ITCHING).    lidocaine HCl 2 % Crea Apply to affected areas 4 times a day as needed.    naproxen (NAPROSYN) 500 MG tablet     omeprazole (PRILOSEC) 20 MG capsule TAKE 1 CAPSULE BY MOUTH EVERY DAY    sumatriptan (IMITREX) 50 MG tablet Take 1 tablet (50 mg total) by mouth once as needed for Migraine. May repeat once after 2 hours. Do not exceed 3-4 doses in one week. NEEDS APT FOR FURTHER REFILL    tiZANidine (ZANAFLEX) 4 MG tablet Take 4 mg by mouth 3 (three) times daily.    topiramate (TOPAMAX) 100 MG tablet Take 1 tablet (100 mg total) by mouth 2 (two) times  daily.    triamcinolone acetonide 0.1% (KENALOG) 0.1 % Lotn Apply topically 2 (two) times daily.    venlafaxine (EFFEXOR-XR) 150 MG Cp24 TAKE 1 CAPSULE (150 MG TOTAL) BY MOUTH ONCE DAILY.    verapamiL (CALAN) 40 MG Tab Take 1 tablet (40 mg total) by mouth 3 (three) times daily.    VOLTAREN 1 % Gel SMARTSIG:3 Gram(s) Topical 4 Times Daily PRN    zolpidem (AMBIEN) 10 mg Tab      Family History     Problem Relation (Age of Onset)    Breast cancer Maternal Aunt    Heart attack Father    Hypertension Mother, Brother, Sister    Lupus Sister        Tobacco Use    Smoking status: Never Smoker    Smokeless tobacco: Never Used   Substance and Sexual Activity    Alcohol use: Not Currently     Comment: occasionally    Drug use: No    Sexual activity: Yes     Partners: Male     Review of Systems   Constitutional: Positive for fever. Negative for activity change, appetite change, chills, diaphoresis, fatigue and unexpected weight change.   HENT: Negative for sore throat and trouble swallowing.    Eyes: Negative for visual disturbance.   Respiratory: Positive for cough and shortness of breath. Negative for wheezing.    Cardiovascular: Positive for chest pain. Negative for palpitations and leg swelling.   Gastrointestinal: Positive for diarrhea, nausea and vomiting. Negative for abdominal distention and abdominal pain.   Genitourinary: Negative for difficulty urinating.   Musculoskeletal: Negative for myalgias.   Skin: Negative for color change.   Neurological: Positive for weakness.     Objective:     Vital Signs (Most Recent):  Temp: 97.7 °F (36.5 °C) (01/01/22 0353)  Pulse: 83 (01/01/22 0353)  Resp: 20 (01/01/22 0353)  BP: (!) 88/58 (01/01/22 0335)  SpO2: 100 % (01/01/22 0353) Vital Signs (24h Range):  Temp:  [97.7 °F (36.5 °C)-98.8 °F (37.1 °C)] 97.7 °F (36.5 °C)  Pulse:  [] 83  Resp:  [16-20] 20  SpO2:  [98 %-100 %] 100 %  BP: ()/() 88/58     Weight: 77.1 kg (170 lb)  Body mass index is 31.09  kg/m².    Physical Exam  Vitals and nursing note reviewed.   Constitutional:       General: She is not in acute distress.     Appearance: Normal appearance. She is not ill-appearing.   HENT:      Head: Normocephalic and atraumatic.      Mouth/Throat:      Mouth: Mucous membranes are moist.   Eyes:      Extraocular Movements: Extraocular movements intact.      Pupils: Pupils are equal, round, and reactive to light.   Cardiovascular:      Rate and Rhythm: Normal rate and regular rhythm.      Pulses: Normal pulses.      Heart sounds: Normal heart sounds. No murmur heard.      Pulmonary:      Effort: Pulmonary effort is normal. No respiratory distress.      Breath sounds: Normal breath sounds. No wheezing or rales.      Comments: Becomes slight SOB while talking  Abdominal:      General: Bowel sounds are normal. There is no distension.      Palpations: Abdomen is soft.      Tenderness: There is no abdominal tenderness. There is no guarding.   Musculoskeletal:         General: No swelling. Normal range of motion.      Cervical back: Normal range of motion.   Skin:     General: Skin is warm and dry.      Capillary Refill: Capillary refill takes 2 to 3 seconds.      Comments: Right chest wall port intact   Neurological:      General: No focal deficit present.      Mental Status: She is alert and oriented to person, place, and time. Mental status is at baseline.   Psychiatric:         Mood and Affect: Mood normal.         Behavior: Behavior normal.         Thought Content: Thought content normal.         Judgment: Judgment normal.           CRANIAL NERVES     CN III, IV, VI   Pupils are equal, round, and reactive to light.       Significant Labs:   All pertinent labs within the past 24 hours have been reviewed.  Recent Lab Results       12/31/21 2148   12/31/21  1939        Albumin 4.7         Alkaline Phosphatase 106         ALT 31         Anion Gap 15         AST 25         Baso # 0.02         Basophil % 0.5          BILIRUBIN TOTAL 0.3  Comment: For infants and newborns, interpretation of results should be based  on gestational age, weight and in agreement with clinical  observations.    Premature Infant recommended reference ranges:  Up to 24 hours.............<8.0 mg/dL  Up to 48 hours............<12.0 mg/dL  3-5 days..................<15.0 mg/dL  6-29 days.................<15.0 mg/dL           BNP <10  Comment: Values of less than 100 pg/ml are consistent with non-CHF populations.         BUN 13         Calcium 9.7         Chloride 106         CO2 20         Creatinine 0.9         Differential Method Automated         eGFR if  >60         eGFR if non  >60  Comment: Calculation used to obtain the estimated glomerular filtration  rate (eGFR) is the CKD-EPI equation.            Eos # 0.1         Eosinophil % 1.4         Glucose 103         Gran # (ANC) 3.2         Gran % 76.3         Hematocrit 44.5         Hemoglobin 14.7         Immature Grans (Abs) 0.02  Comment: Mild elevation in immature granulocytes is non specific and   can be seen in a variety of conditions including stress response,   acute inflammation, trauma and pregnancy. Correlation with other   laboratory and clinical findings is essential.           Immature Granulocytes 0.5         Lymph # 0.7         Lymph % 15.7         MCH 28.8         MCHC 33.0         MCV 87         Mono # 0.2         Mono % 5.6         MPV 9.3         nRBC 0         Platelets 291         Potassium 4.2         PROTEIN TOTAL 8.7         RBC 5.10         RDW 13.1         SARS-CoV-2 RNA, Amplification, Qual   Positive  Comment: This test utilizes isothermal nucleic acid amplification   technology to detect the SARS-CoV-2 RdRp nucleic acid segment.   The analytical sensitivity (limit of detection) is 125 genome   equivalents/mL.     A POSITIVE result implies infection with the SARS-CoV-2 virus;  the patient is presumed to be contagious.    A NEGATIVE result  means that SARS-CoV-2 nucleic acids are not  present above the limit of detection. A NEGATIVE result should be   treated as presumptive. It does not rule out the possibility of   COVID-19 and should not be the sole basis for treatment decisions.   If COVID-19 is strongly suspected based on clinical and exposure   history, re-testing using an alternate molecular assay should be   considered.       This test is only for use under the Food and Drug   Administration s Emergency Use Authorization (EUA).   Commercial kits are provided by [employer].   Performance characteristics of the EUA have been independently  verified by [facility] Department of  Pathology and Laboratory Medicine.   _________________________________________________________________  The ID NOW COVID-19 Letter of Authorization, along with the   authorized Fact Sheet for Healthcare Providers, the authorized Fact  Sheet for Patients, and authorized labeling are available on the FDA   website:  www.fda.gov/MedicalDevices/Safety/EmergencySituations/uss337877.htm         Sodium 141         Troponin I <0.006  Comment: The reference interval for Troponin I represents the 99th percentile   cutoff   for our facility and is consistent with 3rd generation assay   performance.           WBC 4.14               Significant Imaging: I have reviewed all pertinent imaging results/findings within the past 24 hours.    Assessment/Plan:     * Angina pectoris  Presented to ED for chest pain that she describes as heavy in nature to the epigastric area with increase in pain with deep breathing.   Cardiac work up negative thus far  History of lupus but states this pain is worse than with a flare up  CTA with No evidence of pulmonary embolism. No acute intrathoracic process Stable 3 mm pulmonary nodule in the right upper lobe.   EKG with Sinus tachycardia. Left axis deviation  -trend troponin  -PRN EKG   -consult cardiology      Insomnia  Continue home  meds      COVID-19  COVID-19 positive 12/31/21 incidental finding  Isolation: Airborne/Droplet. Surgical mask on patient. Notify Infection Control  Diagnostics: CXR: without abnormalities  Management: per [facility] COVID Treatment Protocol  Remdesivir, Dexamethasone held 2/2 no respiratory problems or current CXR changes  Monitoring: Telemetry & continuous pulse oximetry if O2 requirements increase  Labs: pendings  Nutrition:    -Multivitamin   -Vitamin D   -Ascorbic acid  Supportive Care:   -tylenol   -loperamide   -tessalon pearls   -zofran   -I.S  Supplemental oxygen: not currently requiring      Restless leg syndrome  -continue home regimen aripoprazole      Other iron deficiency anemias  Patient does monthly iron infusions  Right chest wall port intact      Migraine  -continue home regimen topiramate      Lupus (systemic lupus erythematosus)  -continue home regimen hydroxychloroquine      Hyperlipidemia  Denies use of home statin  Lipid panel pending      HTN (hypertension)  Hypertensive on admission now with soft BP  Patient reports this is her normal BP  Denies use of antihypertensives  Resume and monitor      Asthma  -stable, no s/s of respiratory distress  -continue home regimen of Pulmicort  -Duo-nebs PRN for SOB/wheezing  -supplemental oxygen SpO2 <90%      Anxiety  Denies use of anxiety meds at home  Will resume home vistaril      Ankylosing spondylitis  Fibromyalgia  Chronic, continuous use of opioids  -chronic, stable at this time  -continue home meds  -continue to monitor        VTE Risk Mitigation (From admission, onward)         Ordered     enoxaparin injection 80 mg  2 times daily         12/31/21 2342     IP VTE HIGH RISK PATIENT  Once         12/31/21 2342     Place sequential compression device  Until discontinued         12/31/21 2342     heparin, porcine (PF) 100 unit/mL injection flush 300 Units  As needed (PRN)         12/31/21 2010                   Mary Kate Ferguson NP  Department of  [facility]

## 2022-01-01 NOTE — ED NOTES
Port-a-cath. Accessed by SABINE Srinivasan RN. Flushed easily but unable to obtain blood return. Pt. Is requesting MD or house supervisor to attempt blood collection. Dr. Holland at bedside.

## 2022-01-01 NOTE — ASSESSMENT & PLAN NOTE
Fibromyalgia  Chronic, continuous use of opioids  -chronic, stable at this time  -continue home meds  -continue to monitor

## 2022-01-01 NOTE — ED PROVIDER NOTES
Encounter Date: 12/31/2021    SCRIBE #1 NOTE: I, Gutierrez Overton, am scribing for, and in the presence of, Dr. Willa Holland.       History     Chief Complaint   Patient presents with    Chest Pain     Described as heaviness, pt also reports n/v/d, and fever of 101 at home this am, pt has hx of lupus      Tosha Kwok is a 56 y.o. female who  has a past medical history of Anemia, Ankylosing spondylitis, Anxiety, Asthma, Chronic constipation, Chronic insomnia, Edema, Fibromyalgia, Interstitial cystitis, Lupus, Migraine headache, PONV (postoperative nausea and vomiting), Restless legs syndrome, Shingles, Stroke (1998), and TIA (transient ischemic attack) (1998).    Tosha Kwok is a 56 y.o. female who presents to the ED with chest pain.   Patient reports symptoms started 2 days ago (12/29/21), located to her mid sternum. Patient describes the pain as heaviness and cramping. Patient notes the pain is worse upon deep breathing. Associated symptoms include nausea, vomiting, diarrhea, and cough with yellow phlegm. Patient reports having a fever this morning of 101 F. Patient took tylenol with mild relief. Patient is on plaquenil, takes iron 1 time per month, and takes 5 prednisone per day. Patient reports her son tested positive for covid-19 recently. Patient is vaccinated for covid-19 since December, however still needs her booster shot. Patient has a past medical history of lupus.       The history is provided by the patient and medical records.     Review of patient's allergies indicates:   Allergen Reactions    Carrot Swelling     angioedema    Morphine Anxiety     Hives   States she can take dilaudid and percocet without any problems    Erythromycin Other (See Comments)     Hives and cramps     Zofran (as hydrochloride) [ondansetron hcl] Hives     Local hive after IV injection     Past Medical History:   Diagnosis Date    Anemia     Ankylosing spondylitis     Anxiety     Asthma     Chronic  constipation     Chronic insomnia     Edema     Fibromyalgia     Interstitial cystitis     Lupus     Migraine headache     PONV (postoperative nausea and vomiting)     Restless legs syndrome     Shingles     Stroke     post partum right sided numbness    TIA (transient ischemic attack)      Past Surgical History:   Procedure Laterality Date    BLADDER SURGERY      BREAST SURGERY  2018    reduction     SECTION      CHOLECYSTECTOMY      COLONOSCOPY N/A 10/2/2020    Procedure: COLONOSCOPY;  Surgeon: Guicho Hood MD;  Location: [facility] ENDO;  Service: Endoscopy;  Laterality: N/A;    EXCISION OF GRANULOMA Bilateral 2021    Procedure: EXCISION, GRANULOMA SCARS OF BREASTS;  Surgeon: Obed Palmer Jr., MD;  Location: [facility] OR;  Service: Plastics;  Laterality: Bilateral;    HYSTERECTOMY      LASER ABLATION      to back    SLEEVE GASTROPLASTY  2016    TOTAL REDUCTION MAMMOPLASTY Bilateral     two years ago    TUBAL LIGATION       Family History   Problem Relation Age of Onset    Hypertension Mother     Hypertension Brother     Breast cancer Maternal Aunt         x2     Heart attack Father     Hypertension Sister     Lupus Sister      Social History     Tobacco Use    Smoking status: Never Smoker    Smokeless tobacco: Never Used   Substance Use Topics    Alcohol use: Not Currently     Comment: occasionally    Drug use: No     Review of Systems   Constitutional: Positive for fever.   HENT: Negative for sore throat.    Respiratory: Positive for cough (with yellow phlegm). Negative for shortness of breath.    Cardiovascular: Positive for chest pain (mid sternum).   Gastrointestinal: Positive for diarrhea, nausea and vomiting.   Genitourinary: Negative for dysuria.   Musculoskeletal: Negative for back pain.   Skin: Negative for rash.   Neurological: Negative for weakness.   Hematological: Does not bruise/bleed easily.   All other systems reviewed and are  negative.      Physical Exam     Initial Vitals [12/31/21 1727]   BP Pulse Resp Temp SpO2   (!) 176/79 (!) 113 20 98.8 °F (37.1 °C) 98 %      MAP       --         Physical Exam    Nursing note and vitals reviewed.  Constitutional: She appears well-developed and well-nourished.   HENT:   Head: Normocephalic and atraumatic.   Mouth/Throat: Oropharynx is clear and moist.   Eyes: Conjunctivae and EOM are normal. Pupils are equal, round, and reactive to light.   Neck: Neck supple.   Normal range of motion.  Cardiovascular: Normal rate, regular rhythm, normal heart sounds and intact distal pulses. Exam reveals no gallop and no friction rub.    No murmur heard.  Pulmonary/Chest: Breath sounds normal.   Abdominal: Abdomen is soft. Bowel sounds are normal. She exhibits no distension. There is no abdominal tenderness. There is no rebound and no guarding.   Musculoskeletal:         General: No tenderness or edema. Normal range of motion.      Cervical back: Normal range of motion and neck supple.     Lymphadenopathy:     She has no cervical adenopathy.   Neurological: She is alert and oriented to person, place, and time. She has normal strength and normal reflexes.   Skin: Skin is warm and dry.   Psychiatric: She has a normal mood and affect. Her behavior is normal. Judgment and thought content normal.         ED Course   Procedures  Labs Reviewed   CBC W/ AUTO DIFFERENTIAL - Abnormal; Notable for the following components:       Result Value    Lymph # 0.7 (*)     Mono # 0.2 (*)     Gran % 76.3 (*)     Lymph % 15.7 (*)     All other components within normal limits   COMPREHENSIVE METABOLIC PANEL - Abnormal; Notable for the following components:    CO2 20 (*)     Total Protein 8.7 (*)     All other components within normal limits   SARS-COV-2 RNA AMPLIFICATION, QUAL - Abnormal; Notable for the following components:    SARS-CoV-2 RNA, Amplification, Qual Positive (*)     All other components within normal limits   TROPONIN I    B-TYPE NATRIURETIC PEPTIDE          Imaging Results          X-Ray Chest AP Portable (Final result)  Result time 12/31/21 18:44:23    Final result by Omega Vazquez MD (12/31/21 18:44:23)                 Impression:      No acute abnormality.      Electronically signed by: Omega Vazquez  Date:    12/31/2021  Time:    18:44             Narrative:    EXAMINATION:  XR CHEST AP PORTABLE    CLINICAL HISTORY:  Chest Pain;    TECHNIQUE:  Single frontal view of the chest was performed.    COMPARISON:  12/08/2020    FINDINGS:  Port catheter on the right.The lungs are clear, with normal appearance of pulmonary vasculature and no pleural effusion or pneumothorax.    The cardiac silhouette is normal in size. The hilar and mediastinal contours are unremarkable.    Bones are intact.                                 Medications   heparin, porcine (PF) 100 unit/mL injection flush 300 Units (has no administration in time range)   morphine injection 2 mg (has no administration in time range)   lorazepam (ATIVAN) injection 1 mg (has no administration in time range)     Medical Decision Making:   Clinical Tests:   Lab Tests: Ordered and Reviewed  Radiological Study: Ordered and Reviewed  Medical Tests: Ordered and Reviewed          Scribe Attestation:   Scribe #1: I performed the above scribed service and the documentation accurately describes the services I performed. I attest to the accuracy of the note.        ED Course as of 12/31/21 2325   Fri Dec 31, 2021   2324 The patient is positive for COVID.  Troponin is 0. The patient is tachycardic and has chest pain with some subjective shortness of breath.  Her pulse ox is 98%.  The patient has a history of lupus and could be having a lupus flare up to her chest or a pulmonary embolus.  CTA of the chest is pending at this time but has been ordered.  I feel the safest option for this patient would be to be admitted for observation and serial cardiac enzymes overnight due to her history  of lupus.  [facility] hospitalist consulted for admission [ST]      ED Course User Index  [ST] Willa Holland MD             Clinical Impression:   Final diagnoses:  [R07.9] Chest pain  [R07.89] Atypical chest pain (Primary)  [U07.1] COVID-19       I, Willa Holland, personally performed the services described in this documentation. All medical record entries made by the scribe were at my direction and in my presence.  I have reviewed the chart and agree that the record reflects my personal performance and is accurate and complete. Willa Holland M.D. 11:22 PM12/31/2021   ED Disposition Condition    Observation               Willa Holland MD  12/31/21 0178

## 2022-01-01 NOTE — ASSESSMENT & PLAN NOTE
Presented to ED for chest pain that she describes as heavy in nature to the epigastric area with increase in pain with deep breathing.   Cardiac work up negative thus far  History of lupus but states this pain is worse than with a flare up  CTA with No evidence of pulmonary embolism. No acute intrathoracic process Stable 3 mm pulmonary nodule in the right upper lobe.   EKG with Sinus tachycardia. Left axis deviation  -trend troponin  -PRN EKG   -consult cardiology

## 2022-01-01 NOTE — PLAN OF CARE
Problem: Adult Inpatient Plan of Care  Goal: Plan of Care Review  Outcome: Ongoing, Progressing     VIRTUAL NURSE:  Labs, notes, orders, and careplan reviewed.       01/01/22 0426   Patient Request   Patient Requested left chest pain   Nurse Notification   Bedside Nurse Notified? Yes   Name of Bedside Nurse LUIS Patel   Nurse Notfication Method Secure Chat   Nurse Notified Of Patient Request   Provider Notification   Provider Notified? Yes   Name of Provider NP Mary Kate Ferguson   Provider Notification Method Secure Chat   Provider Notified Of Patient Request   Admission   Initial VN Admission Questions Complete   Communication Issues? None   Shift   Virtual Nurse - Rounding Complete   Pain Management Interventions pain management plan reviewed with patient/caregiver   Virtual Nurse - Patient Verbalized Approval Of Camera Use;VN Rounding   Type of Frequent Check   Type Patient Rounds   Safety/Activity   Patient Rounds bed in low position;placement of personal items at bedside;bed wheels locked;call light in patient/parent reach;clutter free environment maintained;visualized patient   Safety Promotion/Fall Prevention assistive device/personal item within reach;diversional activities provided;Fall Risk reviewed with patient/family;family to remain at bedside;high risk medications identified;medications reviewed;nonskid shoes/socks when out of bed;room near unit station;side rails raised x 2;supervised activity;instructed to call staff for mobility   Safety Precautions emergency equipment at bedside   Positioning   Body Position neutral body alignment;neutral head position   Head of Bed (HOB) Positioning HOB at 60 degrees   Pain/Comfort/Sleep   Pain Body Location - Side Left   Pain Body Location chest   Pain Rating (0-10): Rest 8   Frequency frequent   Quality squeezing;pressure   POSS ([place]ero Opioid-Induced Sed Scale) 1 - Awake and alert

## 2022-01-01 NOTE — ED NOTES
Pt. Requesting rt. Chest wall port-a-caht. Access instead of peripheral iv stick. Attempted access of port without success. Perkins needle removed-unable to flush or draw. Oc notified and requested access of port.

## 2022-01-01 NOTE — ASSESSMENT & PLAN NOTE
-stable, no s/s of respiratory distress  -continue home regimen of Pulmicort  -Duo-nebs PRN for SOB/wheezing  -supplemental oxygen SpO2 <90%

## 2022-01-01 NOTE — ED NOTES
Power port was re accessed with power port salinas needle per SABINE BoatengRN. Flushed easily. Pt. Is awaiting CTA-ct tech. Notified pt. Is ready.

## 2022-01-01 NOTE — ASSESSMENT & PLAN NOTE
Hypertensive on admission now with soft BP  Patient reports this is her normal BP  Denies use of antihypertensives  Resume and monitor

## 2022-01-01 NOTE — ASSESSMENT & PLAN NOTE
COVID-19 positive 12/31/21 incidental finding  Isolation: Airborne/Droplet. Surgical mask on patient. Notify Infection Control  Diagnostics: CXR: without abnormalities  Management: per [facility] COVID Treatment Protocol  Remdesivir, Dexamethasone held 2/2 no respiratory problems or current CXR changes  Monitoring: Telemetry & continuous pulse oximetry if O2 requirements increase  Labs: pendings  Nutrition:    -Multivitamin   -Vitamin D   -Ascorbic acid  Supportive Care:   -tylenol   -loperamide   -tessalon pearls   -zofran   -I.S  Supplemental oxygen: not currently requiring

## 2022-01-01 NOTE — ED NOTES
Discussed bp with admit team. Pt. Is asymptomatic and ok to go to floor. Pt. Was transported to floor per wheelchair by ed staff. Pt. Stable without distress. Reports moderate relief of chest discomfort after medications.  with pt..

## 2022-01-02 PROBLEM — R07.1 CHEST PAIN ON BREATHING: Status: ACTIVE | Noted: 2022-01-01

## 2022-01-02 PROBLEM — Z95.828 PORT-A-CATH IN PLACE: Status: ACTIVE | Noted: 2022-01-02

## 2022-01-02 PROCEDURE — 25000003 PHARM REV CODE 250: Performed by: HOSPITALIST

## 2022-01-02 PROCEDURE — 63600175 PHARM REV CODE 636 W HCPCS: Performed by: HOSPITALIST

## 2022-01-02 PROCEDURE — 25000003 PHARM REV CODE 250

## 2022-01-02 PROCEDURE — 99226 PR SUBSEQUENT OBSERVATION CARE,LEVEL III: ICD-10-PCS | Mod: ,,, | Performed by: INTERNAL MEDICINE

## 2022-01-02 PROCEDURE — 63600175 PHARM REV CODE 636 W HCPCS

## 2022-01-02 PROCEDURE — 96372 THER/PROPH/DIAG INJ SC/IM: CPT | Mod: 59

## 2022-01-02 PROCEDURE — 25000003 PHARM REV CODE 250: Performed by: NURSE PRACTITIONER

## 2022-01-02 PROCEDURE — 94761 N-INVAS EAR/PLS OXIMETRY MLT: CPT

## 2022-01-02 PROCEDURE — 99226 PR SUBSEQUENT OBSERVATION CARE,LEVEL III: CPT | Mod: ,,, | Performed by: INTERNAL MEDICINE

## 2022-01-02 PROCEDURE — 94640 AIRWAY INHALATION TREATMENT: CPT

## 2022-01-02 PROCEDURE — 25000242 PHARM REV CODE 250 ALT 637 W/ HCPCS: Performed by: HOSPITALIST

## 2022-01-02 PROCEDURE — G0378 HOSPITAL OBSERVATION PER HR: HCPCS

## 2022-01-02 PROCEDURE — 99900035 HC TECH TIME PER 15 MIN (STAT)

## 2022-01-02 RX ORDER — PROMETHAZINE HYDROCHLORIDE 12.5 MG/1
12.5 TABLET ORAL EVERY 6 HOURS PRN
Status: DISCONTINUED | OUTPATIENT
Start: 2022-01-02 | End: 2022-01-03 | Stop reason: HOSPADM

## 2022-01-02 RX ORDER — LOPERAMIDE HYDROCHLORIDE 2 MG/1
2 CAPSULE ORAL 4 TIMES DAILY PRN
Status: DISCONTINUED | OUTPATIENT
Start: 2022-01-02 | End: 2022-01-03 | Stop reason: HOSPADM

## 2022-01-02 RX ORDER — BENZONATATE 100 MG/1
100 CAPSULE ORAL 3 TIMES DAILY PRN
Status: DISCONTINUED | OUTPATIENT
Start: 2022-01-02 | End: 2022-01-03 | Stop reason: HOSPADM

## 2022-01-02 RX ADMIN — PANTOPRAZOLE SODIUM 40 MG: 40 TABLET, DELAYED RELEASE ORAL at 09:01

## 2022-01-02 RX ADMIN — CYCLOBENZAPRINE 10 MG: 10 TABLET, FILM COATED ORAL at 08:01

## 2022-01-02 RX ADMIN — OXYCODONE HYDROCHLORIDE AND ACETAMINOPHEN 500 MG: 500 TABLET ORAL at 08:01

## 2022-01-02 RX ADMIN — BUDESONIDE 2 PUFF: 180 AEROSOL, POWDER RESPIRATORY (INHALATION) at 08:01

## 2022-01-02 RX ADMIN — PREDNISONE 40 MG: 20 TABLET ORAL at 09:01

## 2022-01-02 RX ADMIN — OXYCODONE HYDROCHLORIDE AND ACETAMINOPHEN 500 MG: 500 TABLET ORAL at 09:01

## 2022-01-02 RX ADMIN — ESCITALOPRAM OXALATE 20 MG: 10 TABLET ORAL at 09:01

## 2022-01-02 RX ADMIN — CYCLOBENZAPRINE 10 MG: 10 TABLET, FILM COATED ORAL at 09:01

## 2022-01-02 RX ADMIN — PROMETHAZINE HYDROCHLORIDE 12.5 MG: 12.5 TABLET ORAL at 12:01

## 2022-01-02 RX ADMIN — ENOXAPARIN SODIUM 80 MG: 80 INJECTION, SOLUTION INTRAVENOUS; SUBCUTANEOUS at 08:01

## 2022-01-02 RX ADMIN — CYCLOBENZAPRINE 10 MG: 10 TABLET, FILM COATED ORAL at 02:01

## 2022-01-02 RX ADMIN — GUAIFENESIN AND DEXTROMETHORPHAN HYDROBROMIDE 1 TABLET: 600; 30 TABLET, EXTENDED RELEASE ORAL at 11:01

## 2022-01-02 RX ADMIN — HYDROMORPHONE HYDROCHLORIDE 2 MG: 2 TABLET ORAL at 02:01

## 2022-01-02 RX ADMIN — HYDROXYCHLOROQUINE SULFATE 200 MG: 200 TABLET, FILM COATED ORAL at 09:01

## 2022-01-02 RX ADMIN — TOPIRAMATE 100 MG: 25 TABLET, FILM COATED ORAL at 09:01

## 2022-01-02 RX ADMIN — ZOLPIDEM TARTRATE 10 MG: 5 TABLET ORAL at 08:01

## 2022-01-02 RX ADMIN — GUAIFENESIN AND DEXTROMETHORPHAN HYDROBROMIDE 1 TABLET: 600; 30 TABLET, EXTENDED RELEASE ORAL at 08:01

## 2022-01-02 RX ADMIN — BENZONATATE 100 MG: 100 CAPSULE ORAL at 02:01

## 2022-01-02 RX ADMIN — THERA TABS 1 TABLET: TAB at 09:01

## 2022-01-02 RX ADMIN — DICLOFENAC SODIUM 2 G: 10 GEL TOPICAL at 09:01

## 2022-01-02 RX ADMIN — LOPERAMIDE HYDROCHLORIDE 2 MG: 2 CAPSULE ORAL at 03:01

## 2022-01-02 RX ADMIN — DICLOFENAC SODIUM 2 G: 10 GEL TOPICAL at 08:01

## 2022-01-02 RX ADMIN — ENOXAPARIN SODIUM 80 MG: 80 INJECTION, SOLUTION INTRAVENOUS; SUBCUTANEOUS at 09:01

## 2022-01-02 RX ADMIN — COLCHICINE 0.6 MG: 0.6 TABLET, FILM COATED ORAL at 08:01

## 2022-01-02 RX ADMIN — TOPIRAMATE 100 MG: 25 TABLET, FILM COATED ORAL at 08:01

## 2022-01-02 RX ADMIN — ARIPIPRAZOLE 5 MG: 5 TABLET ORAL at 09:01

## 2022-01-02 RX ADMIN — COLCHICINE 0.6 MG: 0.6 TABLET, FILM COATED ORAL at 09:01

## 2022-01-02 NOTE — PROGRESS NOTES
[place] - Telemetry  Cardiology  Progress Note    Patient Name: Tosha Kwok  MRN: [id number]  Admission Date: 12/31/2021  Hospital Length of Stay: 0 days  Code Status: Full Code   Attending Physician: Geoff Winters, *   Primary Care Physician: Jeannie Gibson DO  Expected Discharge Date:   Principal Problem:Chest pain on breathing    Subjective:     57 yo female h/o SLE, fibromyalgia  Stroke/TIA 1998  Ankylosing spondylitis  Anxiety     Presented to the ED for fevers  Reports having CP for last 2 days (mid sternum). When she developed fevers, prompted her to seek medical care. No exertional component. Worse with laying flat. Endorses lower extremity edema x 3-4 days, as well as orthopnea, PND, SOB for 2 days.     Pain is reproducible somewhat to palpation, and with some positional change. Reports started after coughing started 2 days back, but denies heavy coughing episodes.    Reports pain is better and is able to lay back; however still present. Associated with SOB. Has been coughing up yellow-green phlegm.     Review of Systems   Cardiovascular: Chest pain: As above.   Respiratory: Shortness of breath: As above.    Hematologic/Lymphatic: Negative for bleeding problem.     Objective:     Vital Signs (Most Recent):  Temp: 97.6 °F (36.4 °C) (01/02/22 1116)  Pulse: 85 (01/02/22 1144)  Resp: 18 (01/02/22 1116)  BP: 138/83 (01/02/22 1116)  SpO2: 99 % (01/02/22 1116) Vital Signs (24h Range):  Temp:  [97.6 °F (36.4 °C)-98.4 °F (36.9 °C)] 97.6 °F (36.4 °C)  Pulse:  [72-92] 85  Resp:  [18-20] 18  SpO2:  [92 %-99 %] 99 %  BP: ()/(58-83) 138/83     Weight: 79.8 kg (176 lb)  Body mass index is 32.19 kg/m².    SpO2: 99 %  O2 Device (Oxygen Therapy): room air      Intake/Output Summary (Last 24 hours) at 1/2/2022 1207  Last data filed at 1/2/2022 0650  Gross per 24 hour   Intake 380 ml   Output --   Net 380 ml       Lines/Drains/Airways     Central Venous Catheter Line            Port A Cath Single Lumen  04/15/21 1200 right atrial 262 days                Physical Exam  Deferred to primary team d/t COVID status    Significant Labs:   CMP   Recent Labs   Lab 12/31/21 2148 01/01/22  0639    137   K 4.2 3.4*    109   CO2 20* 18*    124*   BUN 13 13   CREATININE 0.9 0.8   CALCIUM 9.7 8.7   PROT 8.7*  --    ALBUMIN 4.7  --    BILITOT 0.3  --    ALKPHOS 106  --    AST 25  --    ALT 31  --    ANIONGAP 15 10   ESTGFRAFRICA >60 >60   EGFRNONAA >60 >60   , CBC   Recent Labs   Lab 12/31/21 2148 12/31/21 2148 01/01/22  0639   WBC 4.14  --  3.40*   HGB 14.7  --  11.9*   HCT 44.5   < > 36.1*     --  275    < > = values in this interval not displayed.   , INR   Recent Labs   Lab 01/01/22  0640   INR 1.0    and Troponin   Recent Labs   Lab 12/31/21 2148 01/01/22  0639   TROPONINI <0.006 <0.006       Assessment and Plan:     CP  - Trop negative x2  - EKG personally reviewed. My interpretation: STac 110s, LAD. No ST abn. QTc 457  - Possible component of pericarditis?- pleuritic, worse with laying flat, fever.  No EKG changes. Echo with no pericardial effusion; LVEF preserved.  - Check CRP, ESR     SLE  Management per primary team     Stoke/TIA noted in chart. Note 2016 states no diagnosis of stroke (e-care everywhere)    Active Diagnoses:    Diagnosis Date Noted POA    PRINCIPAL PROBLEM:  Chest pain on breathing [R07.1] 01/01/2022 Yes    Port-A-Cath in place [Z95.828] 01/02/2022 Not Applicable    COVID-19 [U07.1] 01/01/2022 Yes    Insomnia [G47.00] 01/01/2022 Yes    Restless leg syndrome [G25.81] 12/31/2021 Yes    Other iron deficiency anemias [D50.8] 03/31/2021 Yes    Migraine [G43.909] 07/22/2019 Yes    Anxiety [F41.9] 07/22/2019 Yes    Chronic, continuous use of opioids [F11.90] 09/27/2018 Yes     Chronic    Fibromyalgia [M79.7] 09/27/2018 Yes     Chronic    Ankylosing spondylitis [M45.9]  Yes     Chronic    Asthma [J45.909] 06/09/2015 Yes    Lupus (systemic lupus erythematosus) [M32.9]  05/09/2014 Yes    HTN (hypertension) [I10] 07/11/2013 Yes      Problems Resolved During this Admission:       VTE Risk Mitigation (From admission, onward)         Ordered     enoxaparin injection 80 mg  2 times daily         12/31/21 2342     IP VTE HIGH RISK PATIENT  Once         12/31/21 2342     Place sequential compression device  Until discontinued         12/31/21 2342     heparin, porcine (PF) 100 unit/mL injection flush 300 Units  As needed (PRN)         12/31/21 2010                Maria Del Carmen Weiss MD  Cardiology  [facility] - Telemetry

## 2022-01-02 NOTE — PLAN OF CARE
Dr Winters by, spoke with pt, re pain in chest, possibly irritation, will order colchichine as rec, antidepressant ordered

## 2022-01-02 NOTE — PLAN OF CARE
Problem: Adult Inpatient Plan of Care  Goal: Plan of Care Review  Outcome: Ongoing, Progressing     Plan of care reviewed with patient. Pt verbalized understanding. Pt is AAO x 4, on room air, some generalized pain, NSR on Tele, no true red alarms. All medications administered as prescribed. Pt is currently resting, bed in lowest position, HOB lowered, side rails up x 2, call light and bedside table within reach. Pt instructed to call if assistance is needed.

## 2022-01-02 NOTE — SUBJECTIVE & OBJECTIVE
Interval History:  Still with significant chest tightness and some shortness of breath/cough.  She is not hypoxic.  Port unable to be flushed yesterday; awaiting imaging    Review of Systems   Constitutional: Negative for fever.   Respiratory: Positive for cough and shortness of breath.    Cardiovascular: Positive for chest pain. Negative for leg swelling.     Objective:     Vital Signs (Most Recent):  Temp: 97.8 °F (36.6 °C) (01/02/22 0743)  Pulse: 77 (01/02/22 0743)  Resp: 18 (01/02/22 0743)  BP: 125/73 (01/02/22 0743)  SpO2: 99 % (01/02/22 0743) Vital Signs (24h Range):  Temp:  [97 °F (36.1 °C)-98.4 °F (36.9 °C)] 97.8 °F (36.6 °C)  Pulse:  [73-97] 77  Resp:  [18-22] 18  SpO2:  [92 %-99 %] 99 %  BP: ()/(50-79) 125/73     Weight: 79.8 kg (176 lb)  Body mass index is 32.19 kg/m².    Intake/Output Summary (Last 24 hours) at 1/2/2022 0957  Last data filed at 1/2/2022 0650  Gross per 24 hour   Intake 760 ml   Output --   Net 760 ml      Physical Exam  Vitals and nursing note reviewed.   Constitutional:       General: She is not in acute distress.     Appearance: Normal appearance. She is not ill-appearing.   HENT:      Head: Normocephalic and atraumatic.      Mouth/Throat:      Mouth: Mucous membranes are moist.   Eyes:      Extraocular Movements: Extraocular movements intact.      Pupils: Pupils are equal, round, and reactive to light.   Cardiovascular:      Rate and Rhythm: Normal rate and regular rhythm.      Pulses: Normal pulses.      Heart sounds: Normal heart sounds. No murmur heard.      Pulmonary:      Effort: Pulmonary effort is normal. No respiratory distress.      Breath sounds: Normal breath sounds. No wheezing or rales.      Comments: Becomes slight SOB while talking  Abdominal:      General: Bowel sounds are normal. There is no distension.      Palpations: Abdomen is soft.      Tenderness: There is no abdominal tenderness. There is no guarding.   Musculoskeletal:         General: No swelling.  Normal range of motion.      Cervical back: Normal range of motion.   Skin:     General: Skin is warm and dry.      Capillary Refill: Capillary refill takes 2 to 3 seconds.      Comments: Right chest wall port intact   Neurological:      General: No focal deficit present.      Mental Status: She is alert and oriented to person, place, and time. Mental status is at baseline.   Psychiatric:         Mood and Affect: Mood normal.         Behavior: Behavior normal.         Thought Content: Thought content normal.         Judgment: Judgment normal.         Significant Labs: All pertinent labs within the past 24 hours have been reviewed.    Significant Imaging: I have reviewed all pertinent imaging results/findings within the past 24 hours.

## 2022-01-02 NOTE — PLAN OF CARE
Pt co of pain in chest, mid chest, non radiating, feels sore, better if sits up, ap 72 regular, do not hear a rub, resp unlabored, very dec breath sounds dolores rt lobes, occ dry cough, feels her fibromyalgia is acting up, feels sore all over, fentanyl patch in use, no other pain med ordered, will notify md,  Port  A cAth accessed, but no blood return

## 2022-01-02 NOTE — ASSESSMENT & PLAN NOTE
-continue home regimen hydroxychloroquine and prednisone; have increased dose 5 mg daily to 40 due to concern for pericarditis although may be able to decrease given unremarkable echo

## 2022-01-02 NOTE — ACP (ADVANCE CARE PLANNING)
Refused the 1200 noon troponin, states she does not want to get stuck anymore , and that is why she has a port, again no blood return, repositioned it, no return,     cAlled icu to Ask if they had a nurse to come and remove the salinas needle  and replace it    1230, Brittanie rn from icu here, salinas needle removed but had trouble accessing the site, tried twice but it would not go fully in, and the site was hurting her, some swelling was noted at the site, when biopatch removed, could see prev sticks from before, she says that the er nurse had trouble accessing it, and it took 3-4 attempts, tegAderm to site,     States she went on [facility] and chAted with Dr Marino , hematologist, and he wants ultrasound to the area, Dr Winters notified and ultrasound to be done in am, due to holiday

## 2022-01-02 NOTE — ASSESSMENT & PLAN NOTE
Presented to ED for chest pain that she describes as heavy in nature to the epigastric area with increase in pain with deep breathing.   Cardiac work up negative thus far  History of lupus but states this pain is worse than with a flare up  CTA with No evidence of pulmonary embolism. No acute intrathoracic process Stable 3 mm pulmonary nodule in the right upper lobe.    EKG with Sinus tachycardia. Left axis deviation  -trend troponin; negative  -PRN EKG   -consult cardiology:  Initially concern for pericarditis, but TTE without effusion; was started on colchicine and increase dose prednisone

## 2022-01-02 NOTE — ASSESSMENT & PLAN NOTE
Hypertensive on admission now with soft BP  Patient reports this is her normal BP  Denies use of antihypertensives

## 2022-01-02 NOTE — PROGRESS NOTES
[facility] Medicine  Progress Note    Patient Name: Tosha Kwok  MRN: [id number]  Patient Class: OP- Observation   Admission Date: 12/31/2021  Length of Stay: 0 days  Attending Physician: Geoff Winters, *  Primary Care Provider: Jeannie Gibson DO        Subjective:     Principal Problem:Chest pain on breathing        HPI:  oTsha Kwok is a 56 y.o. female who has a past medical history of Anemia, Ankylosing spondylitis, Anxiety, Asthma, Chronic constipation, Chronic insomnia, Edema, Fibromyalgia, Interstitial cystitis, Lupus, Migraine headache, PONV (postoperative nausea and vomiting), Restless legs syndrome, Shingles, Stroke (1998), and TIA (transient ischemic attack) (1998) who presented to the ED with chest pain. Patient reports symptoms started 2 days ago (12/29/21), located to her mid sternum and epigastric area. Patient describes the pain as heaviness and cramping. Patient notes the pain is worse upon deep breathing. Associated symptoms include nausea, vomiting, diarrhea, and cough with yellow phlegm. Patient reports having a fever this morning of 101 F. Patient took tylenol with mild relief. Patient is on plaquenil, takes iron 1 time per month, and takes 5 prednisone per day. Patient reports her son tested positive for covid-19 recently and several staff members at her job was also positive. Patient is vaccinated for covid-19 since December, however still needs her booster shot.     In ED cardiac workup negative so far. CXR with No acute abnormality. EKG with Sinus tachycardia. Left axis deviation. CTA with No evidence of pulmonary embolism. No acute intrathoracic process Stable 3 mm pulmonary nodule in the right upper lobe. COVID positive. Admitted to [facility] medicine for further workup.      Overview/Hospital Course:  No notes on file    Interval History:  Still with significant chest tightness and some shortness of breath/cough.  She is not hypoxic.  Port  unable to be flushed yesterday; awaiting imaging    Review of Systems   Constitutional: Negative for fever.   Respiratory: Positive for cough and shortness of breath.    Cardiovascular: Positive for chest pain. Negative for leg swelling.     Objective:     Vital Signs (Most Recent):  Temp: 97.8 °F (36.6 °C) (01/02/22 0743)  Pulse: 77 (01/02/22 0743)  Resp: 18 (01/02/22 0743)  BP: 125/73 (01/02/22 0743)  SpO2: 99 % (01/02/22 0743) Vital Signs (24h Range):  Temp:  [97 °F (36.1 °C)-98.4 °F (36.9 °C)] 97.8 °F (36.6 °C)  Pulse:  [73-97] 77  Resp:  [18-22] 18  SpO2:  [92 %-99 %] 99 %  BP: ()/(50-79) 125/73     Weight: 79.8 kg (176 lb)  Body mass index is 32.19 kg/m².    Intake/Output Summary (Last 24 hours) at 1/2/2022 0957  Last data filed at 1/2/2022 0650  Gross per 24 hour   Intake 760 ml   Output --   Net 760 ml      Physical Exam  Vitals and nursing note reviewed.   Constitutional:       General: She is not in acute distress.     Appearance: Normal appearance. She is not ill-appearing.   HENT:      Head: Normocephalic and atraumatic.      Mouth/Throat:      Mouth: Mucous membranes are moist.   Eyes:      Extraocular Movements: Extraocular movements intact.      Pupils: Pupils are equal, round, and reactive to light.   Cardiovascular:      Rate and Rhythm: Normal rate and regular rhythm.      Pulses: Normal pulses.      Heart sounds: Normal heart sounds. No murmur heard.      Pulmonary:      Effort: Pulmonary effort is normal. No respiratory distress.      Breath sounds: Normal breath sounds. No wheezing or rales.      Comments: Becomes slight SOB while talking  Abdominal:      General: Bowel sounds are normal. There is no distension.      Palpations: Abdomen is soft.      Tenderness: There is no abdominal tenderness. There is no guarding.   Musculoskeletal:         General: No swelling. Normal range of motion.      Cervical back: Normal range of motion.   Skin:     General: Skin is warm and dry.      Capillary  Refill: Capillary refill takes 2 to 3 seconds.      Comments: Right chest wall port intact   Neurological:      General: No focal deficit present.      Mental Status: She is alert and oriented to person, place, and time. Mental status is at baseline.   Psychiatric:         Mood and Affect: Mood normal.         Behavior: Behavior normal.         Thought Content: Thought content normal.         Judgment: Judgment normal.         Significant Labs: All pertinent labs within the past 24 hours have been reviewed.    Significant Imaging: I have reviewed all pertinent imaging results/findings within the past 24 hours.      Assessment/Plan:      * Chest pain on breathing  Presented to ED for chest pain that she describes as heavy in nature to the epigastric area with increase in pain with deep breathing.   Cardiac work up negative thus far  History of lupus but states this pain is worse than with a flare up  CTA with No evidence of pulmonary embolism. No acute intrathoracic process Stable 3 mm pulmonary nodule in the right upper lobe.    EKG with Sinus tachycardia. Left axis deviation  -trend troponin; negative  -PRN EKG   -consult cardiology:  Initially concern for pericarditis, but TTE without effusion; was started on colchicine and increase dose prednisone      Port-A-Cath in place  -malfunction  -ultrasound pending      Insomnia  Continue home meds      COVID-19  COVID-19 positive 12/31/21 incidental finding  Isolation: Airborne/Droplet. Surgical mask on patient. Notify Infection Control  Diagnostics: CXR: without abnormalities  Management: per [facility] COVID Treatment Protocol  Remdesivir, Dexamethasone held 2/2 no respiratory problems or current CXR changes  Monitoring: Telemetry & continuous pulse oximetry if O2 requirements increase  Labs: pendings  Nutrition:    -Multivitamin   -Vitamin D   -Ascorbic acid  Supportive Care:   -tylenol   -loperamide   -tessalon pearls   -zofran   -I.S  Supplemental oxygen: not  currently requiring      Restless leg syndrome  -continue home regimen aripoprazole      Other iron deficiency anemias  Patient does monthly iron infusions  Right chest wall port intact      Migraine  -continue home regimen topiramate      Lupus (systemic lupus erythematosus)  -continue home regimen hydroxychloroquine and prednisone; have increased dose 5 mg daily to 40 due to concern for pericarditis although may be able to decrease given unremarkable echo      Hyperlipidemia  Denies use of home statin  Lipid panel pending      HTN (hypertension)  Hypertensive on admission now with soft BP  Patient reports this is her normal BP  Denies use of antihypertensives      Asthma  -stable, no s/s of respiratory distress  -continue home regimen of Pulmicort  -Duo-nebs PRN for SOB/wheezing  -supplemental oxygen SpO2 <90%      Anxiety  Denies use of anxiety meds at home  Will continue home vistaril      Ankylosing spondylitis  Fibromyalgia  Chronic, continuous use of opioids  -chronic, stable at this time  -continue home meds  -continue to monitor        VTE Risk Mitigation (From admission, onward)         Ordered     enoxaparin injection 80 mg  2 times daily         12/31/21 2342     IP VTE HIGH RISK PATIENT  Once         12/31/21 2342     Place sequential compression device  Until discontinued         12/31/21 2342     heparin, porcine (PF) 100 unit/mL injection flush 300 Units  As needed (PRN)         12/31/21 2010                Discharge Planning   JULIO:      Code Status: Full Code   Is the patient medically ready for discharge?:     Reason for patient still in hospital (select all that apply): Patient trending condition                     Geoff Winters MD  Department of Hospital Medicine   [facility]

## 2022-01-02 NOTE — PLAN OF CARE
Feeling somewhat better, but chest still sore, dilaudid did help, spoke to her about letting me try to start iv, said no, states she is a hard stick and does not want to be stuck again, told her I can cAll icu nurse to try with utltrasound, states she will get the ultrasound in am, and go from there, told her she should have an iv, told her I cAn cAll anesthesiA, stated not now,

## 2022-01-03 ENCOUNTER — PATIENT MESSAGE (OUTPATIENT)
Dept: ADMINISTRATIVE | Facility: CLINIC | Age: 57
End: 2022-01-03
Payer: COMMERCIAL

## 2022-01-03 ENCOUNTER — PATIENT MESSAGE (OUTPATIENT)
Dept: HEMATOLOGY/ONCOLOGY | Facility: CLINIC | Age: 57
End: 2022-01-03
Payer: COMMERCIAL

## 2022-01-03 VITALS
DIASTOLIC BLOOD PRESSURE: 84 MMHG | OXYGEN SATURATION: 96 % | WEIGHT: 176 LBS | SYSTOLIC BLOOD PRESSURE: 135 MMHG | HEART RATE: 94 BPM | BODY MASS INDEX: 32.39 KG/M2 | HEIGHT: 62 IN | TEMPERATURE: 99 F | RESPIRATION RATE: 18 BRPM

## 2022-01-03 DIAGNOSIS — Z86.39 HISTORY OF NON ANEMIC VITAMIN B12 DEFICIENCY: Primary | ICD-10-CM

## 2022-01-03 DIAGNOSIS — D50.0 IRON DEFICIENCY ANEMIA DUE TO CHRONIC BLOOD LOSS: ICD-10-CM

## 2022-01-03 PROCEDURE — G0378 HOSPITAL OBSERVATION PER HR: HCPCS

## 2022-01-03 PROCEDURE — 94640 AIRWAY INHALATION TREATMENT: CPT

## 2022-01-03 PROCEDURE — 94761 N-INVAS EAR/PLS OXIMETRY MLT: CPT

## 2022-01-03 RX ORDER — HEPARIN 100 UNIT/ML
100 SYRINGE INTRAVENOUS
Status: DISCONTINUED | OUTPATIENT
Start: 2022-01-03 | End: 2022-01-03 | Stop reason: HOSPADM

## 2022-01-03 RX ADMIN — BUDESONIDE 2 PUFF: 180 AEROSOL, POWDER RESPIRATORY (INHALATION) at 08:01

## 2022-01-03 NOTE — PLAN OF CARE
TN met with patient via [employer]. Patient is independent and lives at home with her . She does not have any HH or use DME. Patient still drives, but her  will transport home. Patient refuses PCC follow-up. She requested to see her PCP since closer to home. TN requested follow-up from access navigator. No anticipated discharge needs.     Patient Contacts    Name Relation Home Work Mobile   Dimitry Kwok Spouse [phone]     Future Appointments   Date Time Provider Department Center   1/18/2022  8:45 AM RVPH US1 RVPH USOUND [facility]   2/2/2022  7:30 AM RVPH MAMMO1 RVPH MAMMO [facility]   4/1/2022  8:30 AM APPOINTMENT LAB, KENNY SULLIVAN [facility]   4/4/2022  8:20 AM Jose Manuel Marino MD [facility] HEMONC [place]      01/03/22 1036   Discharge Assessment   Assessment Type Discharge Planning Assessment   Confirmed/corrected address, phone number and insurance Yes   Confirmed Demographics Correct on Facesheet   Source of Information patient   Lives With significant other;spouse   Facility Arrived From: Home   Do you expect to return to your current living situation? Yes   Do you have help at home or someone to help you manage your care at home? Yes   Who are your caregiver(s) and their phone number(s)? -Dimitry   Prior to hospitilization cognitive status: Alert/Oriented   Current cognitive status: Alert/Oriented   Walking or Climbing Stairs Difficulty none   Dressing/Bathing Difficulty none   Equipment Currently Used at Home none   Do you have any problems affording any of your prescribed medications? No   How do you get to doctors appointments? car, drives self;family or friend will provide   Are you on dialysis? No   Do you take coumadin? No   Discharge Plan A Home   Discharge Plan B Home with family   DME Needed Upon Discharge  none   Discharge Plan discussed with: Patient   Discharge Barriers Identified None   Relationship/Environment   Name(s) of Who Lives With Patient  Jairon-Dimitry Villalpando RN    [phone]

## 2022-01-03 NOTE — NURSING
Discharge instructions and education provided. Patient voiced understanding. Telemetry discontinued. Patient discharged with no acute distress noted.

## 2022-01-03 NOTE — PLAN OF CARE
Discharge orders noted. Patient is discharged to home. No needs noted upon discharge. TN requested PCP follow-up from access navigator. Awaiting to be scheduled. Her  will transport home. No further CM needs.     PCP follow-up scheduled.    Patient Contacts    Name Relation Home Work Mobile   Dimitry Kwok Spouse [phone]     Future Appointments   Date Time Provider Department Center   1/12/2022 10:00 AM Jeannie Gibson DO [facility] [place] [facility]   1/18/2022  8:45 AM RVPH US1 RVPH USOUND [facility]   2/2/2022  7:30 AM RVPH MAMMO1 RVPH MAMMO [facility]   4/1/2022  8:30 AM APPOINTMENT LAB, [facility]ENNY SULLIVAN [facility]   4/4/2022  8:20 AM Jose Manuel Marino MD [facility]EMONC [place]       01/03/22 1040   Final Note   Assessment Type Final Discharge Note   Anticipated Discharge Disposition Home   Hospital Resources/Appts/Education Provided Appointments scheduled by Navigator/Coordinator   Post-Acute Status   Discharge Delays None known at this time     Krystal Villalpando RN    [phone]

## 2022-01-03 NOTE — PLAN OF CARE
Problem: Adult Inpatient Plan of Care  Goal: Plan of Care Review  Outcome: Ongoing, Progressing

## 2022-01-03 NOTE — PLAN OF CARE
Problem: Adult Inpatient Plan of Care  Goal: Plan of Care Review  Outcome: Ongoing, Progressing  Care plan explained & understood by pt. NSR on Tele. On RA, O2 Sats 94-97%. Pt not in distress. Meds given as per MAR. Safety maintained at all times. Call bell within hand.  Bed on low position.  Contact, Droplet, Respiratory precautions maintained. Will continue to monitor.

## 2022-01-05 ENCOUNTER — PATIENT MESSAGE (OUTPATIENT)
Dept: ADMINISTRATIVE | Facility: CLINIC | Age: 57
End: 2022-01-05
Payer: COMMERCIAL

## 2022-01-05 ENCOUNTER — TELEPHONE (OUTPATIENT)
Dept: ADMINISTRATIVE | Facility: CLINIC | Age: 57
End: 2022-01-05
Payer: COMMERCIAL

## 2022-01-05 NOTE — HOSPITAL COURSE
"Ms. Kwok presented with heavy chest pain, worse with taking a deep breath.  Coincidentally found to be COVID positive while in house.  She received EKG with sinus tachycardia, serial troponins which were negative, and CTA with no evidence of pulmonary embolism or pneumonia.  Cardiology was consulted, who felt that there was possibility patient had pericarditis.  Empirically treated with colchicine and increased home steroid.  Echocardiogram performed which was unremarkable and without effusion.  Pain has significantly improved.  Possibly all pleuritic in nature.  She remained on ambient air throughout her stay.  Will discharge with Cardiology and primary care clinic follow-up.    /84 (BP Location: Left arm, Patient Position: Lying)   Pulse 94   Temp 98.5 °F (36.9 °C) (Oral)   Resp 18   Ht 5' 2" (1.575 m)   Wt 79.8 kg (176 lb)   LMP 03/26/2012   SpO2 96%   Breastfeeding No   BMI 32.19 kg/m²   Physical Exam  Vitals and nursing note reviewed.   Constitutional:       General: She is not in acute distress.     Appearance: Normal appearance. She is not ill-appearing.   HENT:      Head: Normocephalic and atraumatic.      Mouth/Throat:      Mouth: Mucous membranes are moist.   Eyes:      Extraocular Movements: Extraocular movements intact.      Pupils: Pupils are equal, round, and reactive to light.   Cardiovascular:      Rate and Rhythm: Normal rate and regular rhythm.      Pulses: Normal pulses.      Heart sounds: Normal heart sounds. No murmur heard.       Pulmonary:      Effort: Pulmonary effort is normal. No respiratory distress.      Breath sounds: Normal breath sounds. No wheezing or rales.   Abdominal:      General: Bowel sounds are normal. There is no distension.      Palpations: Abdomen is soft.      Tenderness: There is no abdominal tenderness. There is no guarding.   Musculoskeletal:         General: No swelling. Normal range of motion.      Cervical back: Normal range of motion.   Skin:     " General: Skin is warm and dry.      Capillary Refill: Capillary refill takes 2 to 3 seconds.      Comments: Right chest wall port intact   Neurological:      General: No focal deficit present.      Mental Status: She is alert and oriented to person, place, and time. Mental status is at baseline.   Psychiatric:         Mood and Affect: Mood normal.         Behavior: Behavior normal.         Thought Content: Thought content normal.         Judgment: Judgment normal.

## 2022-01-05 NOTE — DISCHARGE SUMMARY
[facility]  Discharge Summary      Patient Name: Tosha Kwok  MRN: [id number]  Patient Class: OP- Observation  Admission Date: 12/31/2021  Hospital Length of Stay: 0 days  Discharge Date and Time: 1/3/2022  2:08 PM  Attending Physician: No att. providers found   Discharging Provider: Geoff Winters MD  Primary Care Provider: Jeannie Gibson DO      HPI:   Tosha Kwok is a 56 y.o. female who has a past medical history of Anemia, Ankylosing spondylitis, Anxiety, Asthma, Chronic constipation, Chronic insomnia, Edema, Fibromyalgia, Interstitial cystitis, Lupus, Migraine headache, PONV (postoperative nausea and vomiting), Restless legs syndrome, Shingles, Stroke (1998), and TIA (transient ischemic attack) (1998) who presented to the ED with chest pain. Patient reports symptoms started 2 days ago (12/29/21), located to her mid sternum and epigastric area. Patient describes the pain as heaviness and cramping. Patient notes the pain is worse upon deep breathing. Associated symptoms include nausea, vomiting, diarrhea, and cough with yellow phlegm. Patient reports having a fever this morning of 101 F. Patient took tylenol with mild relief. Patient is on plaquenil, takes iron 1 time per month, and takes 5 prednisone per day. Patient reports her son tested positive for covid-19 recently and several staff members at her job was also positive. Patient is vaccinated for covid-19 since December, however still needs her booster shot.     In ED cardiac workup negative so far. CXR with No acute abnormality. EKG with Sinus tachycardia. Left axis deviation. CTA with No evidence of pulmonary embolism. No acute intrathoracic process Stable 3 mm pulmonary nodule in the right upper lobe. COVID positive. Admitted to [facility] for further workup.      * No surgery found *      Hospital Course:   Ms. Kwok presented with heavy chest pain, worse with taking a deep breath.   "Coincidentally found to be COVID positive while in house.  She received EKG with sinus tachycardia, serial troponins which were negative, and CTA with no evidence of pulmonary embolism or pneumonia.  Cardiology was consulted, who felt that there was possibility patient had pericarditis.  Empirically treated with colchicine and increased home steroid.  Echocardiogram performed which was unremarkable and without effusion.  Pain has significantly improved.  Possibly all pleuritic in nature.  She remained on ambient air throughout her stay.  Will discharge with Cardiology and primary care clinic follow-up.    /84 (BP Location: Left arm, Patient Position: Lying)   Pulse 94   Temp 98.5 °F (36.9 °C) (Oral)   Resp 18   Ht 5' 2" (1.575 m)   Wt 79.8 kg (176 lb)   LMP 03/26/2012   SpO2 96%   Breastfeeding No   BMI 32.19 kg/m²   Physical Exam  Vitals and nursing note reviewed.   Constitutional:       General: She is not in acute distress.     Appearance: Normal appearance. She is not ill-appearing.   HENT:      Head: Normocephalic and atraumatic.      Mouth/Throat:      Mouth: Mucous membranes are moist.   Eyes:      Extraocular Movements: Extraocular movements intact.      Pupils: Pupils are equal, round, and reactive to light.   Cardiovascular:      Rate and Rhythm: Normal rate and regular rhythm.      Pulses: Normal pulses.      Heart sounds: Normal heart sounds. No murmur heard.       Pulmonary:      Effort: Pulmonary effort is normal. No respiratory distress.      Breath sounds: Normal breath sounds. No wheezing or rales.   Abdominal:      General: Bowel sounds are normal. There is no distension.      Palpations: Abdomen is soft.      Tenderness: There is no abdominal tenderness. There is no guarding.   Musculoskeletal:         General: No swelling. Normal range of motion.      Cervical back: Normal range of motion.   Skin:     General: Skin is warm and dry.      Capillary Refill: Capillary refill takes 2 to " 3 seconds.      Comments: Right chest wall port intact   Neurological:      General: No focal deficit present.      Mental Status: She is alert and oriented to person, place, and time. Mental status is at baseline.   Psychiatric:         Mood and Affect: Mood normal.         Behavior: Behavior normal.         Thought Content: Thought content normal.         Judgment: Judgment normal.        Goals of Care Treatment Preferences:  Code Status: Full Code    Health care agent: Dimitry Kwok ([phone])  Health care agent number: No value filed.                   Consults:   Consults (From admission, onward)        Status Ordering Provider     Inpatient consult to [facility]iology[facility]  Once        Provider:  (Not yet assigned)    Completed JAXSON MEDEROS          No new Assessment & Plan notes have been filed under this hospital service since the last note was generated.  Service: Hospital Medicine    Final Active Diagnoses:    Diagnosis Date Noted POA    PRINCIPAL PROBLEM:  Chest pain on breathing [R07.1] 01/01/2022 Yes    Port-A-Cath in place [Z95.828] 01/02/2022 Not Applicable    COVID-19 [U07.1] 01/01/2022 Yes    Insomnia [G47.00] 01/01/2022 Yes    Restless leg syndrome [G25.81] 12/31/2021 Yes    Other iron deficiency anemias [D50.8] 03/31/2021 Yes    Migraine [G43.909] 07/22/2019 Yes    Anxiety [F41.9] 07/22/2019 Yes    Chronic, continuous use of opioids [F11.90] 09/27/2018 Yes     Chronic    Fibromyalgia [M79.7] 09/27/2018 Yes     Chronic    Ankylosing spondylitis [M45.9]  Yes     Chronic    Asthma [J45.909] 06/09/2015 Yes    Lupus (systemic lupus erythematosus) [M32.9] 05/09/2014 Yes    HTN (hypertension) [I10] 07/11/2013 Yes      Problems Resolved During this Admission:       Discharged Condition: good    Disposition: Home or Self Care    Follow Up:    Patient Instructions:      Ambulatory referral[facility]ult [facility]   Standing Status: Future   Referral Priority: Routine Referral Type:  Consultation   Referral Reason: Specialty Services Required   Number of Visits Requested: 1     Diet Adult Regular     Notify your health care provider if you experience any of the following:  temperature >100.4     Notify your health care provider if you experience any of the following:  persistent nausea and vomiting or diarrhea     Notify your health care provider if you experience any of the following:  severe uncontrolled pain     Notify your health care provider if you experience any of the following:  difficulty breathing or increased cough     Notify your health care provider if you experience any of the following:  severe persistent headache     Notify your health care provider if you experience any of the following:  persistent dizziness, light-headedness, or visual disturbances     Notify your health care provider if you experience any of the following:  increased confusion or weakness     COVID-19 Surveillance Program     Order Specific Question Answer Comments   Does patient have a smartphone? Yes    Does patient have the [facility] barby on their smartphone? Yes    While in surveillance program, will patient be using home oxygen? No      Activity as tolerated       Significant Diagnostic Studies: Labs:   CMP No results for input(s): NA, K, CL, CO2, GLU, BUN, CREATININE, CALCIUM, PROT, ALBUMIN, BILITOT, ALKPHOS, AST, ALT, ANIONGAP, ESTGFRAFRICA, EGFRNONAA in the last 48 hours., CBC No results for input(s): WBC, HGB, HCT, PLT in the last 48 hours., INR   Lab Results   Component Value Date    INR 1.0 01/01/2022    INR 0.9 06/29/2015   , Lipid Panel   Lab Results   Component Value Date    CHOL 209 (H) 01/01/2022    HDL 57 01/01/2022    LDLCALC 134.8 01/01/2022    TRIG 86 01/01/2022    CHOLHDL 27.3 01/01/2022   , Troponin   Recent Labs   Lab 12/31/21  2148 01/01/22  0639   TROPONINI <0.006 <0.006   , A1C: No results for input(s): HGBA1C in the last 4320 hours. and All labs within the past 24 hours have been  reviewed  Radiology: X-Ray: CXR:   EXAMINATION:  XR CHEST AP PORTABLE     CLINICAL HISTORY:  Chest Pain;     TECHNIQUE:  Single frontal view of the chest was performed.     COMPARISON:  12/08/2020     FINDINGS:  Port catheter on the right.The lungs are clear, with normal appearance of pulmonary vasculature and no pleural effusion or pneumothorax.     The cardiac silhouette is normal in size. The hilar and mediastinal contours are unremarkable.     Bones are intact.     Impression:     No acute abnormality.        CT scan: EXAMINATION:  CTA CHEST NON CORONARY     CLINICAL HISTORY:  Pulmonary embolism (PE) suspected, unknown D-dimer;     TECHNIQUE:  Low dose axial images, sagittal and coronal reformations were obtained from the thoracic inlet to the lung bases following the IV administration of 100 mL of Omnipaque 350.  Contrast timing was optimized to evaluate the pulmonary arteries.  MIP images were performed.     COMPARISON:  Chest x-ray dated 12/31/2021 and CT pulmonary embolism examination dated 09/27/2018.     FINDINGS:  CT pulmonary embolism examination:     There are no filling defects within the pulmonary tree to suggest pulmonary embolism.     CT chest:     The base of the neck is within normal limits.  The supraclavicular regions are unremarkable.  The thyroid gland is enlarged.  No discrete thyroid nodules identified.     The trachea is unremarkable.  The central airways are within normal limits.  No endobronchial lesion is identified.  There is no evidence of bronchiectasis.  There is mild peribronchial thickening.     The heart is unremarkable.  There are no pericardial effusions.  There is no evidence of intracardiac thrombus.  The coronary arteries are unremarkable.     There is a right-sided chest port with its tip terminating in the SVC.     There is no evidence of lymphadenopathy in the chest.     There are no pleural effusions.  There is no evidence of a pneumothorax.  There is no evidence of  pneumomediastinum.  There is stable 3 mm pulmonary nodule in the right upper lobe.     There are postop changes at the GE junction.  There is a hiatal hernia.  There also postop changes in the gallbladder fossa.  The remainder of the visualized upper abdomen is unremarkable.     The chest wall is unremarkable.  The osseous structures are unremarkable.     Impression:     No evidence of pulmonary embolism.     No acute intrathoracic process     Stable 3 mm pulmonary nodule in the right upper lobe.     Ultrasound: EXAMINATION:  US CHEST MEDIASTINUM     CLINICAL HISTORY:  limited exam to assess port;     TECHNIQUE:  Limited assessment of subcutaneous tissues of the right chest at site of chest port.     COMPARISON:  CTA chest from 01/01/2022.     FINDINGS:  Port device identified within the subcutaneous tissues of the right chest with associated shadowing.  No definite organized fluid collection or abnormal vascularity.     Impression:     As above.        Cardiac Graphics: Echocardiogram:   Transthoracic echo (TTE) complete (Cupid Only):   Results for orders placed or performed during the hospital encounter of 12/31/21   Echo   Result Value Ref Range    AV mean gradient 4 mmHg    Ao peak bill 1.39 m/s    Ao VTI 28.63 cm    IVRT 68.51 msec    IVS 1.39 (A) 0.6 - 1.1 cm    LA size 3.15 cm    Left Atrium Major Axis 5.45 cm    Left Atrium Minor Axis 5.81 cm    LVIDd 3.70 3.5 - 6.0 cm    LVIDs 2.00 (A) 2.1 - 4.0 cm    LVOT diameter 2.17 cm    LVOT peak VTI 30.75 cm    Posterior Wall 1.17 (A) 0.6 - 1.1 cm    MV Peak A Bill 0.78 m/s    MV Peak E Bill 0.63 m/s    PV Peak D Bill 0.39 m/s    PV Peak S Bill 0.47 m/s    RA Major Axis 3.72 cm    RA Width 2.78 cm    RVDD 1.85 cm    TR Max Bill 1.72 m/s    TDI LATERAL 0.10 m/s    TDI SEPTAL 0.09 m/s    LA WIDTH 3.15 cm    Ao root annulus 3.13 cm    MV stenosis pressure 1/2 time 46.54 ms    LV Diastolic Volume 58.27 mL    LV Systolic Volume 12.80 mL    LVOT peak bill 1.09 m/s    Mr max bill  "0.03 m/s    LA volume (mod) 49.57 cm3    MV "A" wave duration 9.71 msec    MV mean gradient 1 mmHg    MV peak gradient 2 mmHg    RV S' 20.09 cm/s    MV VTI 15.21 cm    LV LATERAL E/E' RATIO 6.30 m/s    LV SEPTAL E/E' RATIO 7.00 m/s    FS 46 %    LA volume 47.44 cm3    LV mass 162.56 g    Left Ventricle Relative Wall Thickness 0.63 cm    AV valve area 3.97 cm2    AV Velocity Ratio 0.78     AV index (prosthetic) 1.07     MV valve area p 1/2 method 4.73 cm2    MV valve area by continuity eq 7.47 cm2    E/A ratio 0.81     Mean e' 0.10 m/s    Pulm vein S/D ratio 1.21     LVOT area 3.7 cm2    LVOT stroke volume 113.67 cm3    AV peak gradient 8 mmHg    E/E' ratio 6.63 m/s    LV Systolic Volume Index 7.1 mL/m2    LV Diastolic Volume Index 32.19 mL/m2    LA Volume Index 26.2 mL/m2    LV Mass Index 90 g/m2    Triscuspid Valve Regurgitation Peak Gradient 12 mmHg    LA Volume Index (Mod) 27.4 mL/m2    BSA 1.87 m2    Right Atrial Pressure (from IVC) 3 mmHg    EF 65 %    TV rest pulmonary artery pressure 15 mmHg    Narrative    · Concentric remodeling and normal systolic function.  · The estimated ejection fraction is 65%.  · Normal left ventricular diastolic function.  · Normal right ventricular size with normal right ventricular systolic   function.  · Normal central venous pressure (3 mmHg).  · The estimated PA systolic pressure is 15 mmHg.          Pending Diagnostic Studies:     None         Medications:  Reconciled Home Medications:      Medication List      START taking these medications    pulse oximeter device  Commonly known as: pulse oximeter  by Apply Externally route 2 (two) times a day. Use twice daily at 8 AM and 3 PM and record the value in [employer] as directed.        CHANGE how you take these medications    budesonide 180mcg 180 mcg/actuation Aepb  Commonly known as: PULMICORT 180mcg  Inhale 2 puffs into the lungs once daily.  What changed:   · when to take this  · reasons to take this     cetirizine 10 MG " tablet  Commonly known as: ZYRTEC  TAKE 1 TABLET BY MOUTH EVERY DAY  What changed:   · when to take this  · reasons to take this        CONTINUE taking these medications    acetaminophen 500 MG tablet  Commonly known as: TYLENOL  Take 500 mg by mouth every 6 (six) hours as needed for Pain.     albuterol 90 mcg/actuation inhaler  Commonly known as: PROAIR HFA  Inhale 2 puffs into the lungs every 4 (four) hours as needed.     ARIPiprazole 5 MG Tab  Commonly known as: ABILIFY  TAKE 1 TABLET BY MOUTH EVERY DAY     clobetasoL 0.05 % external solution  Commonly known as: TEMOVATE  Pt to mix in 1 jar of cerave cream and apply to affected areas bid     cyanocobalamin 1,000 mcg/mL injection  Inject 1 mL (1,000 mcg total) into the muscle every 28 days.     cyclobenzaprine 10 MG tablet  Commonly known as: FLEXERIL  Take 10 mg by mouth 3 (three) times daily.     EPINEPHrine 0.3 mg/0.3 mL Atin  Commonly known as: EPIPEN 2-DENNIS  Inject 0.3 mLs (0.3 mg total) into the muscle once. for 1 dose     EScitalopram oxalate 20 MG tablet  Commonly known as: LEXAPRO  Take 20 mg by mouth once daily.     fentaNYL 25 mcg/hr  Commonly known as: DURAGESIC  apply 1 patch to skin every 72 hours as directed     HYDROmorphone 2 MG tablet  Commonly known as: DILAUDID  take 1 tablet by mouth every 8 hours as needed for pain     hydrOXYchloroQUINE 200 mg tablet  Commonly known as: PLAQUENIL  Take 200 mg by mouth once daily.     hydrOXYzine pamoate 25 MG Cap  Commonly known as: VISTARIL  TAKE 1 CAPSULE (25 MG TOTAL) BY MOUTH EVERY 4 (FOUR) HOURS AS NEEDED (ITCHING).     naproxen 500 MG tablet  Commonly known as: NAPROSYN  Take 500 mg by mouth daily as needed.     omeprazole 20 MG capsule  Commonly known as: PRILOSEC  TAKE 1 CAPSULE BY MOUTH EVERY DAY     sumatriptan 50 MG tablet  Commonly known as: IMITREX  Take 1 tablet (50 mg total) by mouth once as needed for Migraine. May repeat once after 2 hours. Do not exceed 3-4 doses in one week. NEEDS APT FOR  FURTHER REFILL     tiZANidine 4 MG tablet  Commonly known as: ZANAFLEX  Take 4 mg by mouth 3 (three) times daily.     topiramate 100 MG tablet  Commonly known as: TOPAMAX  Take 1 tablet (100 mg total) by mouth 2 (two) times daily.     triamcinolone acetonide 0.1% 0.1 % Lotn  Commonly known as: KENALOG  Apply topically 2 (two) times daily.     VOLTAREN 1 % Gel  Generic drug: diclofenac sodium  SMARTSIG:3 Gram(s) Topical 4 Times Daily PRN     zolpidem 10 mg Tab  Commonly known as: AMBIEN  nightly as needed.            Indwelling Lines/Drains at time of discharge:   Lines/Drains/Airways     Central Venous Catheter Line            Port A Cath Single Lumen 04/15/21 1200 right atrial 265 days                Time spent on the discharge of patient: 30 minutes         Geoff Winters MD  Department of Hospital Medicine  [place] [facility]

## 2022-02-02 ENCOUNTER — HOSPITAL ENCOUNTER (OUTPATIENT)
Dept: RADIOLOGY | Facility: HOSPITAL | Age: 57
Discharge: HOME | End: 2022-02-02
Attending: FAMILY MEDICINE
Payer: COMMERCIAL

## 2022-02-02 DIAGNOSIS — Z12.31 SCREENING MAMMOGRAM FOR HIGH-RISK PATIENT: ICD-10-CM

## 2022-02-02 PROCEDURE — 77063 BREAST TOMOSYNTHESIS BI: CPT | Mod: TC,PO

## 2022-02-23 DIAGNOSIS — D84.9 IMMUNOSUPPRESSED STATUS: ICD-10-CM

## 2022-03-20 ENCOUNTER — PATIENT MESSAGE (OUTPATIENT)
Dept: FAMILY MEDICINE | Facility: CLINIC | Age: 57
End: 2022-03-20
Payer: COMMERCIAL

## 2022-03-21 ENCOUNTER — PATIENT MESSAGE (OUTPATIENT)
Dept: FAMILY MEDICINE | Facility: CLINIC | Age: 57
End: 2022-03-21
Payer: COMMERCIAL

## 2022-03-22 ENCOUNTER — OFFICE VISIT (OUTPATIENT)
Dept: FAMILY MEDICINE | Facility: CLINIC | Age: 57
End: 2022-03-22
Payer: COMMERCIAL

## 2022-03-22 VITALS
HEIGHT: 62 IN | OXYGEN SATURATION: 98 % | BODY MASS INDEX: 36.45 KG/M2 | SYSTOLIC BLOOD PRESSURE: 140 MMHG | HEART RATE: 96 BPM | WEIGHT: 198.06 LBS | TEMPERATURE: 99 F | DIASTOLIC BLOOD PRESSURE: 98 MMHG

## 2022-03-22 DIAGNOSIS — G47.00 INSOMNIA, UNSPECIFIED TYPE: ICD-10-CM

## 2022-03-22 DIAGNOSIS — K62.5 RECTAL BLEEDING: Primary | ICD-10-CM

## 2022-03-22 DIAGNOSIS — J06.9 UPPER RESPIRATORY TRACT INFECTION, UNSPECIFIED TYPE: ICD-10-CM

## 2022-03-22 PROCEDURE — 3077F SYST BP >= 140 MM HG: CPT | Mod: CPTII,S$GLB,, | Performed by: FAMILY MEDICINE

## 2022-03-22 PROCEDURE — 99214 OFFICE O/P EST MOD 30 MIN: CPT | Mod: S$GLB,,, | Performed by: FAMILY MEDICINE

## 2022-03-22 PROCEDURE — 99214 PR OFFICE/OUTPT VISIT, EST, LEVL IV, 30-39 MIN: ICD-10-PCS | Mod: S$GLB,,, | Performed by: FAMILY MEDICINE

## 2022-03-22 PROCEDURE — 1159F MED LIST DOCD IN RCRD: CPT | Mod: CPTII,S$GLB,, | Performed by: FAMILY MEDICINE

## 2022-03-22 PROCEDURE — 3080F DIAST BP >= 90 MM HG: CPT | Mod: CPTII,S$GLB,, | Performed by: FAMILY MEDICINE

## 2022-03-22 PROCEDURE — 3008F BODY MASS INDEX DOCD: CPT | Mod: CPTII,S$GLB,, | Performed by: FAMILY MEDICINE

## 2022-03-22 PROCEDURE — 3080F PR MOST RECENT DIASTOLIC BLOOD PRESSURE >= 90 MM HG: ICD-10-PCS | Mod: CPTII,S$GLB,, | Performed by: FAMILY MEDICINE

## 2022-03-22 PROCEDURE — 3077F PR MOST RECENT SYSTOLIC BLOOD PRESSURE >= 140 MM HG: ICD-10-PCS | Mod: CPTII,S$GLB,, | Performed by: FAMILY MEDICINE

## 2022-03-22 PROCEDURE — 1159F PR MEDICATION LIST DOCUMENTED IN MEDICAL RECORD: ICD-10-PCS | Mod: CPTII,S$GLB,, | Performed by: FAMILY MEDICINE

## 2022-03-22 PROCEDURE — 3008F PR BODY MASS INDEX (BMI) DOCUMENTED: ICD-10-PCS | Mod: CPTII,S$GLB,, | Performed by: FAMILY MEDICINE

## 2022-03-22 RX ORDER — ALBUTEROL SULFATE 90 UG/1
2 AEROSOL, METERED RESPIRATORY (INHALATION) EVERY 4 HOURS PRN
Qty: 18 G | Refills: 3 | Status: SHIPPED | OUTPATIENT
Start: 2022-03-22 | End: 2023-01-28 | Stop reason: SDUPTHER

## 2022-03-22 RX ORDER — DICYCLOMINE HYDROCHLORIDE 20 MG/1
TABLET ORAL
COMMUNITY
Start: 2022-03-19 | End: 2022-08-08

## 2022-03-22 RX ORDER — EPINEPHRINE 0.3 MG/.3ML
1 INJECTION SUBCUTANEOUS ONCE
Qty: 2 EACH | Refills: 3 | Status: SHIPPED | OUTPATIENT
Start: 2022-03-22 | End: 2024-03-07

## 2022-03-22 NOTE — PROGRESS NOTES
" Patient ID: Tosha Kwok is a 57 y.o. female.    Chief Complaint: ER f/u and Sinus Problem    HPI       Tosha Kwok is a 57 y.o. female  Sinus congestion preceeded by sore throat without fever or chills.  Covid test negtive  NO NVD.  Was seen in the emergency room because of rectal bleeding.  Patient had bright red blood per rectum.  Previously had seen blood streaks in her stool.  She has not had bright red blood since then.  Presented to the emergency room at [facility].  Colonoscopy done 10-20 20 polyp no diverticula  Complains of insomnia-wakes up around 230 in the morning cannot go back sleep.  Often watches TV in bed    Review of Symptoms  No symptoms this time.      Physical Exam    Vitals:    03/22/22 1059   BP: (!) 140/98   BP Location: Left arm   Patient Position: Sitting   Pulse: 96   Temp: 99.2 °F (37.3 °C)   TempSrc: Oral   SpO2: 98%   Weight: 89.8 kg (198 lb 1.3 oz)   Height: 5' 2" (1.575 m)        Constitutional:   Oriented to person, place, and time.appears well-developed and well-nourished.   No distress.     HENT:   Head: Normocephalic and atraumatic.     Right Ear: Tympanic membrane, external ear and ear canal normal     Left Ear: Tympanic membrane, external ear and ear canal normal    Nose: External Normal. Normal turbinates, No rhinorrhea     Mouth/Throat: Uvula is midline, oropharynx is clear and moist and mucous membranes are normal.     Neck: supple no anterior cervical adenopathy    Carotid artery:  Bruit    NEG []   POS[]    Eyes:   Conjunctivae are normal. Right eye exhibits no discharge. Left eye exhibits no discharge. No scleral icterus. No periorbital edema     Cardiovascular:  Regular rate and rhythm with normal S1 and S2     Pulmonary/Chest:   Clear to auscultation bilaterally without wheezes, rhonchi or rales    Musculoskeletal:  No edema. No obvious deformity No wasting     Neurological:   Alert and oriented to person, place, and time. Coordination normal. "     Skin:   Skin is warm and dry.   No diaphoresis.   No rash noted.    Psychiatric: Normal mood and affect. Behavior is normal. Judgment and thought content normal.       Assessment / Plan:      ICD-10-CM ICD-9-CM   1. Rectal bleeding  K62.5 569.3   2. Insomnia, unspecified type  G47.00 780.52   3. Upper respiratory tract infection, unspecified type  J06.9 465.9     Rectal bleeding  -     Ambulatory referral/consult to Gastroenterology; Future; Expected date: 03/29/2022    Insomnia, unspecified type  Comments:  Discussed sleep-wake cycle at length.    Upper respiratory tract infection, unspecified type    Other orders  -     EPINEPHrine (EPIPEN 2-DENNIS) 0.3 mg/0.3 mL AtIn; Inject 0.3 mLs (0.3 mg total) into the muscle once. for 1 dose  Dispense: 2 each; Refill: 3  -     albuterol (PROAIR HFA) 90 mcg/actuation inhaler; Inhale 2 puffs into the lungs every 4 (four) hours as needed.  Dispense: 18 g; Refill: 3      Upper respiratory infection-over-the-counter medications

## 2022-03-24 ENCOUNTER — TELEPHONE (OUTPATIENT)
Dept: FAMILY MEDICINE | Facility: CLINIC | Age: 57
End: 2022-03-24
Payer: COMMERCIAL

## 2022-04-02 NOTE — PROGRESS NOTES
PATIENT: Tosha Kwok  MRN: [id number]  DATE: 4/4/2022    Diagnosis:   1. Iron deficiency anemia due to chronic blood loss    2. History of sleeve gastrectomy    3. History of non anemic vitamin B12 deficiency      Chief Complaint: iron deficiency anemia    Telemedicine visit:  The patient location is: home  The chief complaint leading to consultation is: iron deficiency anemia    Visit type: audio only    Face to Face time with patient: 10 minutes  15 minutes of total time spent on the encounter, which includes face to face time and non-face to face time preparing to see the patient (eg, review of tests), Obtaining and/or reviewing separately obtained history, Documenting clinical information in the electronic or other health record, Independently interpreting results (not separately reported) and communicating results to the patient/family/caregiver, or Care coordination (not separately reported).     Each patient to whom he or she provides medical services by telemedicine is:  (1) informed of the relationship between the physician and patient and the respective role of any other health care provider with respect to management of the patient; and (2) notified that he or she may decline to receive medical services by telemedicine and may withdraw from such care at any time.    Notes: see below    Subjective:    History of Present Illness: Ms. Kwok is a 57 y.o. female who presented in March 2021 for evaluation and management of anemia. She was referred by Dr. Gibson.    - labs since 2012 reveal an intermittent, mild normocytic anemia  - iron studies in December 2020 and March 2021 reveal a decreased ferritin/iron/saturated iron with increased total iron binding capacity.  - She had a documented decreased B12 level on 6/11/19.  - she has been taking oral iron for two years. She endorses constipation from this.  - she had a hysterectomy in 2018  - she has a history of sleeve gastrectomy in 2016.  - she received  ferric carboxymaltose x 2 doses in April/May 2021.    Interval history:  - she presents for a follow-up appointment for her anemia.  - today, she is doing well. ShHe denies shortness of breath, chest pain, nausea, vomiting, diarrhea, constipation. She has intermittent itching related to the lupus.    Past medical, surgical, family, and social histories have been reviewed and updated below.    Past Medical History:   Past Medical History:   Diagnosis Date    Anemia     Ankylosing spondylitis     Anxiety     Asthma     Cancer     left breast    Chronic constipation     Chronic insomnia     Edema     Fibromyalgia     Hypertension     Interstitial cystitis     Lupus     Migraine headache     PONV (postoperative nausea and vomiting)     Restless legs syndrome     Shingles     Stroke     post partum right sided numbness    TIA (transient ischemic attack)        Past Surgical History:   Past Surgical History:   Procedure Laterality Date    BLADDER SURGERY      BREAST LUMPECTOMY  2018    BREAST RECONSTRUCTION      BREAST SURGERY  2018    reduction     SECTION      CHOLECYSTECTOMY      COLONOSCOPY N/A 10/2/2020    Procedure: COLONOSCOPY;  Surgeon: Guicho Hood MD;  Location: [facility];  Service: Endoscopy;  Laterality: N/A;    EXCISION OF GRANULOMA Bilateral 2021    Procedure: EXCISION, GRANULOMA SCARS OF BREASTS;  Surgeon: Obed Palmer Jr., MD;  Location: [facility];  Service: Plastics;  Laterality: Bilateral;    HYSTERECTOMY      LASER ABLATION      to back    SLEEVE GASTROPLASTY  2016    TOTAL REDUCTION MAMMOPLASTY Bilateral     two years ago    TUBAL LIGATION         Family History:   Family History   Problem Relation Age of Onset    Hypertension Mother     Hypertension Brother     Breast cancer Maternal Aunt         x2     Heart attack Father     Hypertension Sister     Lupus Sister     Breast cancer Maternal Grandmother        Social History:  reports  that she has never smoked. She has never used smokeless tobacco. She reports current alcohol use. She reports that she does not use drugs.    Allergies:  Review of patient's allergies indicates:   Allergen Reactions    Carrot Swelling     angioedema    Morphine Anxiety     Hives   States she can take dilaudid and percocet without any problems    Erythromycin Other (See Comments)     Hives and cramps     Zofran (as hydrochloride) [ondansetron hcl] Hives     Local hive after IV injection       Medications:  Current Outpatient Medications   Medication Sig Dispense Refill    albuterol (PROAIR HFA) 90 mcg/actuation inhaler Inhale 2 puffs into the lungs every 4 (four) hours as needed. 18 g 3    ARIPiprazole (ABILIFY) 5 MG Tab TAKE 1 TABLET BY MOUTH EVERY DAY 90 tablet 3    budesonide 180mcg (PULMICORT 180MCG) 180 mcg/actuation AePB Inhale 2 puffs into the lungs once daily. (Patient taking differently: Inhale 2 puffs into the lungs as needed.) 1 each 3    cetirizine (ZYRTEC) 10 MG tablet TAKE 1 TABLET BY MOUTH EVERY DAY (Patient taking differently: Take 10 mg by mouth daily as needed.) 90 tablet 3    cyanocobalamin 1,000 mcg/mL injection Inject 1 mL (1,000 mcg total) into the muscle every 28 days. 10 mL 1    cyclobenzaprine (FLEXERIL) 10 MG tablet Take 10 mg by mouth 3 (three) times daily.      dicyclomine (BENTYL) 20 mg tablet TAKE 1 TABLET BY MOUTH 3 (THREE) TIMES DAILY AS NEEDED FOR (ABDOMINAL CRAMPING) FOR UP TO 2 DAYS.      EPINEPHrine (EPIPEN 2-DENNIS) 0.3 mg/0.3 mL AtIn Inject 0.3 mLs (0.3 mg total) into the muscle once. for 1 dose 2 each 3    EScitalopram oxalate (LEXAPRO) 20 MG tablet Take 20 mg by mouth once daily.      fentaNYL (DURAGESIC) 25 mcg/hr apply 1 patch to skin every 72 hours as directed 10 patch 0    HYDROmorphone (DILAUDID) 2 MG tablet take 1 tablet by mouth every 8 hours as needed for pain 90 tablet 0    hydroxychloroquine (PLAQUENIL) 200 mg tablet Take 200 mg by mouth once daily.  1     hydrOXYzine pamoate (VISTARIL) 25 MG Cap TAKE 1 CAPSULE (25 MG TOTAL) BY MOUTH EVERY 4 (FOUR) HOURS AS NEEDED (ITCHING). 90 capsule 3    naproxen (NAPROSYN) 500 MG tablet Take 500 mg by mouth daily as needed.      omeprazole (PRILOSEC) 20 MG capsule TAKE 1 CAPSULE BY MOUTH EVERY DAY 90 capsule 3    pulse oximeter (PULSE OXIMETER) device by Apply Externally route 2 (two) times a day. Use twice daily at 8 AM and 3 PM and record the value in [facility] as directed. 1 each 0    sumatriptan (IMITREX) 50 MG tablet Take 1 tablet (50 mg total) by mouth once as needed for Migraine. May repeat once after 2 hours. Do not exceed 3-4 doses in one week. NEEDS APT FOR FURTHER REFILL 12 tablet 0    tiZANidine (ZANAFLEX) 4 MG tablet Take 4 mg by mouth 3 (three) times daily.      topiramate (TOPAMAX) 100 MG tablet Take 1 tablet (100 mg total) by mouth 2 (two) times daily. 180 tablet 3    triamcinolone acetonide 0.1% (KENALOG) 0.1 % Lotn Apply topically 2 (two) times daily. 60 mL 3    zolpidem (AMBIEN) 10 mg Tab nightly as needed.       No current facility-administered medications for this visit.       Review of Systems   Constitutional: Negative for fatigue.   HENT: Negative for sore throat.    Eyes: Negative for visual disturbance.   Respiratory: Negative for cough and shortness of breath.    Cardiovascular: Negative for chest pain.   Gastrointestinal: Negative for abdominal pain, diarrhea, nausea and vomiting.   Genitourinary: Negative for dysuria.   Musculoskeletal: Negative for back pain.   Skin: Negative for rash.        itching   Neurological: Negative for headaches.   Hematological: Negative for adenopathy.   Psychiatric/Behavioral: The patient is not nervous/anxious.      ECOG Performance Status:   ECOG SCORE 1     Objective:      Vitals:   There were no vitals filed for this visit.  BMI: There is no height or weight on file to calculate BMI.  Deferred due to telemedicine visit.    Physical Exam   Deferred due to  telemedicine visit.    Laboratory Data:  Labs have been reviewed.    Lab Results   Component Value Date    WBC 4.39 04/01/2022    HGB 12.5 04/01/2022    HCT 37.6 04/01/2022    MCV 86 04/01/2022     04/01/2022           Imaging:    Assessment:       1. Iron deficiency anemia due to chronic blood loss    2. History of sleeve gastrectomy    3. History of non anemic vitamin B12 deficiency           Plan:     1. Iron deficiency anemia / history of B12 deficiency  - I have reviewed her chart  - labs since 2012 reveal an intermittent, mild normocytic anemia  - iron studies in December 2020 and March 2021 reveal a decreased ferritin/iron/saturated iron with increased total iron binding capacity.  - She had a documented decreased B12 level on 6/11/19. She has a history of sleeve gastrectomy and takes B12 injections  - cause of iron deficiency may be history of abnormal uterine bleeding (had a hysterectomy in 2018) or poor absorption in setting of gastric sleeve  - she has been taking oral iron for almost 2 years without significant improvement in her iron studies and symptoms.  - I recommend ferric carboxymaltose x 2 doses  - she required a port-a-cath placement due to difficulty finding veins.  - she received ferric carboxymaltose x 2 doses in April/May 2021.  - Labs have been reviewed. Hemoglobin is normal at 12.5 g/dL. Ferritin is stable at 220 ng/mL.  - continue heparin flushes q3 months. Next one is scheduled on 7/1/22.  - return to clinic in 6 months with repeat labs.    2. Lupus  - on hydroxychloroquine  - has drug-induced neutropenia on intermittent labs.  - defer to rheumatology    3. History of gastric sleeve / B12 deficiency  - had a gastric sleeve in 2016  - B12 level in September 2021 revealed B12 deficiency  - follow-up B12 (4/1/22) was normal at 252 pg/mL  - continue to monitor    4. Advance Care Planning     Power of   After our discussion (at previous visit), the patient decided to complete a  HCPOA and appointed her , health care agent: Dimitry Kwok ([phone]).        - return to clinic in 6 months with repeat labs.    Jose Manuel Marino M.D.  Hematology/Oncology  [facility] - [address], [address]  [place] [address]  Phone: [phone]  Fax: [phone]

## 2022-04-04 ENCOUNTER — OFFICE VISIT (OUTPATIENT)
Dept: HEMATOLOGY/ONCOLOGY | Facility: CLINIC | Age: 57
End: 2022-04-04
Payer: COMMERCIAL

## 2022-04-04 DIAGNOSIS — Z86.39 HISTORY OF NON ANEMIC VITAMIN B12 DEFICIENCY: ICD-10-CM

## 2022-04-04 DIAGNOSIS — Z90.3 HISTORY OF SLEEVE GASTRECTOMY: ICD-10-CM

## 2022-04-04 DIAGNOSIS — D50.0 IRON DEFICIENCY ANEMIA DUE TO CHRONIC BLOOD LOSS: Primary | ICD-10-CM

## 2022-04-04 PROCEDURE — 1159F MED LIST DOCD IN RCRD: CPT | Mod: CPTII,95,, | Performed by: INTERNAL MEDICINE

## 2022-04-04 PROCEDURE — 1160F RVW MEDS BY RX/DR IN RCRD: CPT | Mod: CPTII,95,, | Performed by: INTERNAL MEDICINE

## 2022-04-04 PROCEDURE — 99214 PR OFFICE/OUTPT VISIT, EST, LEVL IV, 30-39 MIN: ICD-10-PCS | Mod: 95,,, | Performed by: INTERNAL MEDICINE

## 2022-04-04 PROCEDURE — 1160F PR REVIEW ALL MEDS BY PRESCRIBER/CLIN PHARMACIST DOCUMENTED: ICD-10-PCS | Mod: CPTII,95,, | Performed by: INTERNAL MEDICINE

## 2022-04-04 PROCEDURE — 99214 OFFICE O/P EST MOD 30 MIN: CPT | Mod: 95,,, | Performed by: INTERNAL MEDICINE

## 2022-04-04 PROCEDURE — 1159F PR MEDICATION LIST DOCUMENTED IN MEDICAL RECORD: ICD-10-PCS | Mod: CPTII,95,, | Performed by: INTERNAL MEDICINE

## 2022-04-04 NOTE — Clinical Note
1. Schedule labs cbc, ferritin, iron/tibc, B12 at [facility] in beginning of October 2022 2. Schedule f/u virtual visit 1-2 days after labs.  Thanks!

## 2022-04-07 ENCOUNTER — PATIENT OUTREACH (OUTPATIENT)
Dept: ADMINISTRATIVE | Facility: OTHER | Age: 57
End: 2022-04-07
Payer: COMMERCIAL

## 2022-04-07 NOTE — PROGRESS NOTES
Requested updates within Care Everywhere.  Patient's chart was reviewed for overdue ALONSO topics.  Health maintenance:updated  Immunizations:reconciled   Legacy:   Media:  Orders placed:  Tasked appts:  Labs Linked:  Upcoming appt:

## 2022-04-08 ENCOUNTER — OFFICE VISIT (OUTPATIENT)
Dept: CARDIOLOGY | Facility: CLINIC | Age: 57
End: 2022-04-08
Payer: COMMERCIAL

## 2022-04-08 DIAGNOSIS — I10 PRIMARY HYPERTENSION: ICD-10-CM

## 2022-04-08 DIAGNOSIS — E78.00 PURE HYPERCHOLESTEROLEMIA: ICD-10-CM

## 2022-04-08 DIAGNOSIS — M32.9 SYSTEMIC LUPUS ERYTHEMATOSUS, UNSPECIFIED SLE TYPE, UNSPECIFIED ORGAN INVOLVEMENT STATUS: ICD-10-CM

## 2022-04-08 DIAGNOSIS — R07.9 CHEST PAIN, UNSPECIFIED TYPE: ICD-10-CM

## 2022-04-08 DIAGNOSIS — R00.2 PALPITATIONS: ICD-10-CM

## 2022-04-08 DIAGNOSIS — G45.9 TIA (TRANSIENT ISCHEMIC ATTACK): ICD-10-CM

## 2022-04-08 DIAGNOSIS — C50.912 MALIGNANT NEOPLASM OF LEFT FEMALE BREAST, UNSPECIFIED ESTROGEN RECEPTOR STATUS, UNSPECIFIED SITE OF BREAST: Primary | ICD-10-CM

## 2022-04-08 PROCEDURE — 1159F MED LIST DOCD IN RCRD: CPT | Mod: CPTII,95,, | Performed by: INTERNAL MEDICINE

## 2022-04-08 PROCEDURE — 1159F PR MEDICATION LIST DOCUMENTED IN MEDICAL RECORD: ICD-10-PCS | Mod: CPTII,95,, | Performed by: INTERNAL MEDICINE

## 2022-04-08 PROCEDURE — 99214 PR OFFICE/OUTPT VISIT, EST, LEVL IV, 30-39 MIN: ICD-10-PCS | Mod: 95,,, | Performed by: INTERNAL MEDICINE

## 2022-04-08 PROCEDURE — 1160F RVW MEDS BY RX/DR IN RCRD: CPT | Mod: CPTII,95,, | Performed by: INTERNAL MEDICINE

## 2022-04-08 PROCEDURE — 99214 OFFICE O/P EST MOD 30 MIN: CPT | Mod: 95,,, | Performed by: INTERNAL MEDICINE

## 2022-04-08 PROCEDURE — 1160F PR REVIEW ALL MEDS BY PRESCRIBER/CLIN PHARMACIST DOCUMENTED: ICD-10-PCS | Mod: CPTII,95,, | Performed by: INTERNAL MEDICINE

## 2022-04-08 RX ORDER — ATORVASTATIN CALCIUM 20 MG/1
20 TABLET, FILM COATED ORAL DAILY
Qty: 90 TABLET | Refills: 3 | Status: SHIPPED | OUTPATIENT
Start: 2022-04-08 | End: 2023-08-22

## 2022-04-08 NOTE — PROGRESS NOTES
"Subjective:   Patient ID:  Tosha Kwok is a 57 y.o. female who presents for evaluation of No chief complaint on file.    The patient location is: home  The chief complaint leading to consultation is: chest pain, palpitations    Visit type: audiovisual    Face to Face time with patient: 30 minutes  30 minutes of total time spent on the encounter, which includes face to face time and non-face to face time preparing to see the patient (eg, review of tests), Obtaining and/or reviewing separately obtained history, Documenting clinical information in the electronic or other health record, Independently interpreting results (not separately reported) and communicating results to the patient/family/caregiver, or Care coordination (not separately reported).         Each patient to whom he or she provides medical services by telemedicine is:  (1) informed of the relationship between the physician and patient and the respective role of any other health care provider with respect to management of the patient; and (2) notified that he or she may decline to receive medical services by telemedicine and may withdraw from such care at any time.    PROBLEM LIST:  Breast cancer, left 2018  SLE  TIA (post-partum)  HTN  HLD      HPI 10/30/19: She presents today for evaluation of tachycardia. She reports that for the pat year or so she has had intermittent tachycardia. It comes on randomly and occurs 2-3 days a week. It will last for up to 30 minutes at a time. When she checks her pulse, it's around 100-120. Recently, the palpitations have been coming on more frequently and lasting longer. She has no associated symptoms with them, and they are non- exertional.  She has had a stress test in the past which was normal. She reports having a "TIA" in the 1990s after childbirth. She reports having numbness in her right and leg which resolved after a few months. She was on asa for a while but then stopped it after a few years. She was never " placed on statin therapy.    Notes:She has been having headaches and recently noticed chest tightness and palpitations. When she saw her PCP her BP was 182/84. She has been keeping a log of her home readings which have been running 130/80's. She has follow up with PCP in 2 weeks. The chest tightness occurs randomly and lasts for a few minutes at a time. No radiation or associated symptoms. Non-exertional. The palpitations occur daily. Watch reads heart rates 110-140. Symptoms sometimes occur together but not always. Walking on the treadmill for exercise. Had been on a steroid course post covid infection.     ECG 31-DEC-2021 17:29:22: Personally reviewed by me shows sinus tachycardia 110 bpm with left axis deviation    Echo 1/22:  Summary    · Concentric remodeling and normal systolic function.  · The estimated ejection fraction is 65%.  · Normal left ventricular diastolic function.  · Normal right ventricular size with normal right ventricular systolic function.  · Normal central venous pressure (3 mmHg).  · The estimated PA systolic pressure is 15 mmHg.    Carotid Us 11/19:  Conclusion    · There is 0-19% right Internal Carotid Stenosis.  · There is 20-39% left Internal Carotid Stenosis.    Event monitor 10/19:  Event Monitor    There were 12 transmissions sent.  The baseline transmission revealed normal sinus rhythm.  The remaining transmissions were for complaints of shortness of breath, palpitations, and routine test.  The transmissions revealed normal sinus rhythm and sinus tachycardia.  There were no significant arrhythmias observed.         Past Medical History:   Diagnosis Date    Anemia     Ankylosing spondylitis     Anxiety     Asthma     Cancer     left breast    Chronic constipation     Chronic insomnia     Edema     Fibromyalgia     Hypertension     Interstitial cystitis     Lupus     Migraine headache     PONV (postoperative nausea and vomiting)     Restless legs syndrome     Shingles      Stroke     post partum right sided numbness    TIA (transient ischemic attack)        Past Surgical History:   Procedure Laterality Date    BLADDER SURGERY      BREAST LUMPECTOMY  2018    BREAST RECONSTRUCTION      BREAST SURGERY  2018    reduction     SECTION      CHOLECYSTECTOMY      COLONOSCOPY N/A 10/2/2020    Procedure: COLONOSCOPY;  Surgeon: Guicho Hood MD;  Location: [facility] END[facility];  Service: Endoscopy;  Laterality: N/A;    EXCISION OF GRANULOMA Bilateral 2021    Procedure: EXCISION, GRANULOMA SCARS OF BREASTS;  Surgeon: Obed Palmer Jr., MD;  Location: [facility] OR;  Service: Plastics;  Laterality: Bilateral;    HYSTERECTOMY      LASER ABLATION      to back    SLEEVE GASTROPLASTY  2016    TOTAL REDUCTION MAMMOPLASTY Bilateral     two years ago    TUBAL LIGATION         Social History     Socioeconomic History    Marital status:    Tobacco Use    Smoking status: Never Smoker    Smokeless tobacco: Never Used   Substance and Sexual Activity    Alcohol use: Yes     Comment: occasionally    Drug use: No    Sexual activity: Yes     Partners: Male       Family History   Problem Relation Age of Onset    Hypertension Mother     Hypertension Brother     Breast cancer Maternal Aunt         x2     Heart attack Father     Hypertension Sister     Lupus Sister     Breast cancer Maternal Grandmother        Patient's Medications   New Prescriptions    No medications on file   Previous Medications    ALBUTEROL (PROAIR HFA) 90 MCG/ACTUATION INHALER    Inhale 2 puffs into the lungs every 4 (four) hours as needed.    ARIPIPRAZOLE (ABILIFY) 5 MG TAB    TAKE 1 TABLET BY MOUTH EVERY DAY    BUDESONIDE 180MCG (PULMICORT 180MCG) 180 MCG/ACTUATION AEPB    Inhale 2 puffs into the lungs once daily.    CETIRIZINE (ZYRTEC) 10 MG TABLET    TAKE 1 TABLET BY MOUTH EVERY DAY    CYANOCOBALAMIN 1,000 MCG/ML INJECTION    Inject 1 mL (1,000 mcg total) into the muscle every 28 days.     CYCLOBENZAPRINE (FLEXERIL) 10 MG TABLET    Take 10 mg by mouth 3 (three) times daily.    DICYCLOMINE (BENTYL) 20 MG TABLET    TAKE 1 TABLET BY MOUTH 3 (THREE) TIMES DAILY AS NEEDED FOR (ABDOMINAL CRAMPING) FOR UP TO 2 DAYS.    EPINEPHRINE (EPIPEN 2-DENNIS) 0.3 MG/0.3 ML ATIN    Inject 0.3 mLs (0.3 mg total) into the muscle once. for 1 dose    ESCITALOPRAM OXALATE (LEXAPRO) 20 MG TABLET    Take 20 mg by mouth once daily.    FENTANYL (DURAGESIC) 25 MCG/HR    apply 1 patch to skin every 72 hours as directed    HYDROMORPHONE (DILAUDID) 2 MG TABLET    take 1 tablet by mouth every 8 hours as needed for pain    HYDROXYCHLOROQUINE (PLAQUENIL) 200 MG TABLET    Take 200 mg by mouth once daily.    HYDROXYZINE PAMOATE (VISTARIL) 25 MG CAP    TAKE 1 CAPSULE (25 MG TOTAL) BY MOUTH EVERY 4 (FOUR) HOURS AS NEEDED (ITCHING).    NAPROXEN (NAPROSYN) 500 MG TABLET    Take 500 mg by mouth daily as needed.    OMEPRAZOLE (PRILOSEC) 20 MG CAPSULE    TAKE 1 CAPSULE BY MOUTH EVERY DAY    PULSE OXIMETER (PULSE OXIMETER) DEVICE    by Apply Externally route 2 (two) times a day. Use twice daily at 8 AM and 3 PM and [facility] as [facility].    SUMATRIPTAN (IMITREX) 50 MG TABLET    Take 1 tablet (50 mg total) by mouth once as needed for Migraine. May repeat once after 2 hours. Do not exceed 3-4 doses in one week. NEEDS APT FOR FURTHER REFILL    TIZANIDINE (ZANAFLEX) 4 MG TABLET    Take 4 mg by mouth 3 (three) times daily.    TOPIRAMATE (TOPAMAX) 100 MG TABLET    Take 1 tablet (100 mg total) by mouth 2 (two) times daily.    TRIAMCINOLONE ACETONIDE 0.1% (KENALOG) 0.1 % LOTN    Apply topically 2 (two) times daily.    ZOLPIDEM (AMBIEN) 10 MG TAB    nightly as needed.   Modified Medications    No medications on file   Discontinued Medications    No medications on file       Review of Systems   Constitutional: Negative for malaise/fatigue and weight gain.   HENT: Negative for hearing loss.    Eyes: Negative for visual disturbance.    Cardiovascular: Positive for chest pain and palpitations. Negative for claudication, dyspnea on exertion, leg swelling, near-syncope, orthopnea, paroxysmal nocturnal dyspnea and syncope.   Respiratory: Negative for cough, shortness of breath, sleep disturbances due to breathing, snoring and wheezing.    Endocrine: Negative for cold intolerance, heat intolerance, polydipsia, polyphagia and polyuria.   Hematologic/Lymphatic: Negative for bleeding problem. Does not bruise/bleed easily.   Skin: Negative for rash and suspicious lesions.   Musculoskeletal: Negative for arthritis, falls, joint pain, muscle weakness and myalgias.   Gastrointestinal: Negative for abdominal pain, change in bowel habit, constipation, diarrhea, heartburn, hematochezia, melena and nausea.   Genitourinary: Negative for hematuria and nocturia.   Neurological: Positive for headaches. Negative for excessive daytime sleepiness, dizziness, light-headedness, loss of balance and weakness.   Psychiatric/Behavioral: Negative for depression. The patient is not nervous/anxious.    Allergic/Immunologic: Negative for environmental allergies.       LMP 03/26/2012     Objective:       Lab Results   Component Value Date     01/01/2022    K 3.4 (L) 01/01/2022     01/01/2022    CO2 18 (L) 01/01/2022    BUN 13 01/01/2022    CREATININE 0.8 01/01/2022     (H) 01/01/2022    HGBA1C 5.4 10/13/2020    MG 2.0 01/01/2022    AST 25 12/31/2021    ALT 31 12/31/2021    ALBUMIN 4.7 12/31/2021    PROT 8.7 (H) 12/31/2021    BILITOT 0.3 12/31/2021    WBC 4.39 04/01/2022    HGB 12.5 04/01/2022    HCT 37.6 04/01/2022    MCV 86 04/01/2022     04/01/2022    INR 1.0 01/01/2022    TSH 1.070 12/08/2020    CHOL 209 (H) 01/01/2022    HDL 57 01/01/2022    LDLCALC 134.8 01/01/2022    TRIG 86 01/01/2022    BNP <10 12/31/2021       Assessment:     1. Chest pain: DSE for further evaluation.   2. Palpitations: 48 hour holter monitor ordered.   3.      HTN: Currently  keeping a home log and has follow up with PCP to review.  4.      HLD: H/p prio TIA. Start atorvastatin 20 mg daily. Repeat labs in 6 months  5.      History of breast cancer, left 2018 s/p XRT    Plan:     There are no diagnoses linked to this encounter.    Thank you for allowing me to participate in this patient's care. Please do not hesitate to contact me with any questions or concerns.

## 2022-05-05 ENCOUNTER — OFFICE VISIT (OUTPATIENT)
Dept: URGENT CARE | Facility: CLINIC | Age: 57
End: 2022-05-05
Payer: OTHER MISCELLANEOUS

## 2022-05-05 VITALS
WEIGHT: 198 LBS | OXYGEN SATURATION: 99 % | BODY MASS INDEX: 36.44 KG/M2 | HEART RATE: 95 BPM | TEMPERATURE: 99 F | DIASTOLIC BLOOD PRESSURE: 101 MMHG | RESPIRATION RATE: 16 BRPM | SYSTOLIC BLOOD PRESSURE: 150 MMHG | HEIGHT: 62 IN

## 2022-05-05 DIAGNOSIS — M25.511 ACUTE PAIN OF RIGHT SHOULDER: ICD-10-CM

## 2022-05-05 DIAGNOSIS — M25.521 RIGHT ELBOW PAIN: ICD-10-CM
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
## 2022-05-05 PROCEDURE — 99204 OFFICE O/P NEW MOD 45 MIN: CPT | Mod: S$GLB,,, | Performed by: NURSE PRACTITIONER

## 2022-05-05 PROCEDURE — 73030 XR SHOULDER TRAUMA 3 VIEW RIGHT: ICD-10-PCS | Mod: FY,RT,S$GLB, | Performed by: RADIOLOGY

## 2022-05-05 PROCEDURE — 73080 XR ELBOW COMPLETE 3 VIEW RIGHT: ICD-10-PCS | Mod: FY,RT,S$GLB, | Performed by: RADIOLOGY

## 2022-05-05 PROCEDURE — 73030 X-RAY EXAM OF SHOULDER: CPT | Mod: FY,RT,S$GLB, | Performed by: RADIOLOGY

## 2022-05-05 PROCEDURE — 73080 X-RAY EXAM OF ELBOW: CPT | Mod: FY,RT,S$GLB, | Performed by: RADIOLOGY

## 2022-05-05 PROCEDURE — 99204 PR OFFICE/OUTPT VISIT, NEW, LEVL IV, 45-59 MIN: ICD-10-PCS | Mod: S$GLB,,, | Performed by: NURSE PRACTITIONER

## 2022-05-05 RX ORDER — NAPROXEN 500 MG/1
500 TABLET ORAL 2 TIMES DAILY WITH MEALS
Qty: 30 TABLET | Refills: 0 | Status: SHIPPED | OUTPATIENT
Start: 2022-05-05 | End: 2022-08-08
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
## 2022-05-05 NOTE — LETTER
[facility] [place]  [address]  [place] [address]  Phone: [phone]  Fax: [phone]  [facility]: [phone]    Pt Name: Tosha Kwok  Injury Date: 05/04/2022   Employee ID:  Date of First Treatment: 05/05/2022   Company: [employer] to [id number]      Appointment Time: 03:20 PM Arrived: [id number]   Provider: GIBRAN Velasquez Time Out:[id number]     Office Treatment:   1. Work related injury    2. Acute pain of right shoulder    3. Right elbow pain      Medications Ordered This Encounter   Medications    naproxen (NAPROSYN) 500 MG tablet                 Return Appointment: Visit date not found at follow up in 1-2 days with [facility]

## 2022-05-13 ENCOUNTER — HOSPITAL ENCOUNTER (EMERGENCY)
Facility: HOSPITAL | Age: 57
Discharge: HOME | End: 2022-05-13
Attending: EMERGENCY MEDICINE
Payer: COMMERCIAL

## 2022-05-13 VITALS
HEART RATE: 90 BPM | OXYGEN SATURATION: 100 % | BODY MASS INDEX: 36.58 KG/M2 | DIASTOLIC BLOOD PRESSURE: 85 MMHG | WEIGHT: 200 LBS | RESPIRATION RATE: 24 BRPM | SYSTOLIC BLOOD PRESSURE: 165 MMHG | TEMPERATURE: 99 F

## 2022-05-13 DIAGNOSIS — S59.909A ELBOW INJURY: ICD-10-CM

## 2022-05-13 DIAGNOSIS — S49.90XA SHOULDER INJURY: ICD-10-CM

## 2022-05-13 PROCEDURE — 25000003 PHARM REV CODE 250: Performed by: NURSE PRACTITIONER

## 2022-05-13 PROCEDURE — 99285 EMERGENCY DEPT VISIT HI MDM: CPT | Mod: 25

## 2022-05-13 RX ORDER — METHOCARBAMOL 750 MG/1
750 TABLET, FILM COATED ORAL 3 TIMES DAILY
Qty: 15 TABLET | Refills: 0 | Status: SHIPPED | OUTPATIENT
Start: 2022-05-13 | End: 2022-05-18

## 2022-05-13 RX ORDER — METHOCARBAMOL 500 MG/1
500 TABLET, FILM COATED ORAL
Status: COMPLETED | OUTPATIENT
Start: 2022-05-13 | End: 2022-05-13

## 2022-05-13 RX ORDER — OXYCODONE AND ACETAMINOPHEN 5; 325 MG/1; MG/1
1 TABLET ORAL
Status: COMPLETED | OUTPATIENT
Start: 2022-05-13 | End: 2022-05-13

## 2022-05-13 RX ORDER — LIDOCAINE 50 MG/G
1 PATCH TOPICAL DAILY
Qty: 7 PATCH | Refills: 0 | Status: SHIPPED | OUTPATIENT
Start: 2022-05-13 | End: 2022-05-20

## 2022-05-13 RX ADMIN — METHOCARBAMOL TABLETS 500 MG: 500 TABLET, COATED ORAL at 06:05

## 2022-05-13 RX ADMIN — OXYCODONE HYDROCHLORIDE AND ACETAMINOPHEN 1 TABLET: 5; 325 TABLET ORAL at 06:05
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
## 2022-05-13 NOTE — Clinical Note
"Tosha Back" Sundar was seen and treated in our emergency department on 5/13/2022.  She may return to work on 05/17/2022.       If you have any questions or concerns, please don't hesitate to call.      Gisel Olivas RN    "

## 2022-05-13 NOTE — DISCHARGE INSTRUCTIONS

## 2022-05-13 NOTE — Clinical Note
"Tosha Back" Sundar was seen and treated in our emergency department on 5/13/2022.  She may return to work on 05/16/2022.       If you have any questions or concerns, please don't hesitate to call.      Gisel Odom RN    "

## 2022-06-04 ENCOUNTER — OFFICE VISIT (OUTPATIENT)
Dept: INTERNAL MEDICINE | Facility: CLINIC | Age: 57
End: 2022-06-04
Payer: COMMERCIAL

## 2022-06-04 DIAGNOSIS — M54.2 CERVICALGIA: ICD-10-CM

## 2022-06-04 DIAGNOSIS — S49.91XD INJURY OF RIGHT SHOULDER, SUBSEQUENT ENCOUNTER: Primary | ICD-10-CM

## 2022-06-04 DIAGNOSIS — R20.2 PARESTHESIA OF RIGHT UPPER EXTREMITY: ICD-10-CM

## 2022-06-04 PROCEDURE — 1160F RVW MEDS BY RX/DR IN RCRD: CPT | Mod: CPTII,95,, | Performed by: FAMILY MEDICINE

## 2022-06-04 PROCEDURE — 99213 PR OFFICE/OUTPT VISIT, EST, LEVL III, 20-29 MIN: ICD-10-PCS | Mod: 95,,, | Performed by: FAMILY MEDICINE

## 2022-06-04 PROCEDURE — 1159F MED LIST DOCD IN RCRD: CPT | Mod: CPTII,95,, | Performed by: FAMILY MEDICINE

## 2022-06-04 PROCEDURE — 1160F PR REVIEW ALL MEDS BY PRESCRIBER/CLIN PHARMACIST DOCUMENTED: ICD-10-PCS | Mod: CPTII,95,, | Performed by: FAMILY MEDICINE

## 2022-06-04 PROCEDURE — 99213 OFFICE O/P EST LOW 20 MIN: CPT | Mod: 95,,, | Performed by: FAMILY MEDICINE

## 2022-06-04 PROCEDURE — 1159F PR MEDICATION LIST DOCUMENTED IN MEDICAL RECORD: ICD-10-PCS | Mod: CPTII,95,, | Performed by: FAMILY MEDICINE

## 2022-06-04 RX ORDER — GABAPENTIN 300 MG/1
300 CAPSULE ORAL NIGHTLY
Qty: 90 CAPSULE | Refills: 0 | Status: SHIPPED | OUTPATIENT
Start: 2022-06-04 | End: 2022-08-08

## 2022-06-04 NOTE — LETTER
June 4, 2022      [facility]  [address]  [place] [address]  Phone: [phone]  Fax: [phone]       Patient: Tosha Kwok   YOB: 1965  Date of Visit: 06/04/2022    To Whom It May Concern:    Michelle Kwok  [facility] [employer] on 06/04/[facility]. She had a virtual visit and I am unable to comment on her work requirements since physical exam cannot be performed virtually. She reports debilitating symptoms that will require Orthopedic evaluation. Her date to see our Orthopedic doctor is pending at this time. Further instructions on rehabilitation and plan of care will be provided after her visit with Orthopedic team. She will follow up with her primary care doctor in the interim.    If you have any questions or concerns, or if I can be of further assistance, please do not hesitate to contact me.    Sincerely,      Katlyn Lundy MD
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
## 2022-06-08 ENCOUNTER — OFFICE VISIT (OUTPATIENT)
Dept: SPORTS MEDICINE | Facility: CLINIC | Age: 57
End: 2022-06-08
Payer: OTHER MISCELLANEOUS

## 2022-06-08 VITALS
WEIGHT: 199 LBS | SYSTOLIC BLOOD PRESSURE: 147 MMHG | HEIGHT: 62 IN | BODY MASS INDEX: 36.62 KG/M2 | HEART RATE: 97 BPM | DIASTOLIC BLOOD PRESSURE: 96 MMHG

## 2022-06-08 DIAGNOSIS — S49.91XD INJURY OF RIGHT SHOULDER, SUBSEQUENT ENCOUNTER: ICD-10-CM

## 2022-06-08 DIAGNOSIS — G54.2 CERVICAL NEUROPATHY: Primary | ICD-10-CM

## 2022-06-08 PROCEDURE — 99999 PR PBB SHADOW E&M-EST. PATIENT-LVL IV: CPT | Mod: PBBFAC,,, | Performed by: ORTHOPAEDIC SURGERY

## 2022-06-08 PROCEDURE — 99999 PR PBB SHADOW E&M-EST. PATIENT-LVL IV: ICD-10-PCS | Mod: PBBFAC,,, | Performed by: ORTHOPAEDIC SURGERY

## 2022-06-08 PROCEDURE — 99214 OFFICE O/P EST MOD 30 MIN: CPT | Mod: S$GLB,,, | Performed by: ORTHOPAEDIC SURGERY

## 2022-06-08 PROCEDURE — 99214 PR OFFICE/OUTPT VISIT, EST, LEVL IV, 30-39 MIN: ICD-10-PCS | Mod: S$GLB,,, | Performed by: ORTHOPAEDIC SURGERY
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
## 2022-06-09 ENCOUNTER — TELEPHONE (OUTPATIENT)
Dept: SPINE | Facility: CLINIC | Age: 57
End: 2022-06-09
Payer: COMMERCIAL

## 2022-06-23 ENCOUNTER — TELEPHONE (OUTPATIENT)
Dept: SPINE | Facility: CLINIC | Age: 57
End: 2022-06-23
Payer: COMMERCIAL

## 2022-06-23 NOTE — TELEPHONE ENCOUNTER
"From [facility].  [facility] to call [phone].  Patient "no showed" for appointment with Dr Gutierrez Brannon.  Called to see if she wanted to reschedule.  "

## 2022-06-24 ENCOUNTER — TELEPHONE (OUTPATIENT)
Dept: SPINE | Facility: CLINIC | Age: 57
End: 2022-06-24
Payer: COMMERCIAL

## 2022-06-24 NOTE — TELEPHONE ENCOUNTER
Patient returned call.  She states that she has filed with Worker's Comp.  Advised patient that the providers do not accept Worker's Comp.

## 2022-06-27 ENCOUNTER — TELEPHONE (OUTPATIENT)
Dept: URGENT CARE | Facility: CLINIC | Age: 57
End: 2022-06-27
Payer: COMMERCIAL
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
## 2022-06-29 ENCOUNTER — OFFICE VISIT (OUTPATIENT)
Dept: URGENT CARE | Facility: CLINIC | Age: 57
End: 2022-06-29
Payer: OTHER MISCELLANEOUS

## 2022-06-29 VITALS
HEART RATE: 87 BPM | OXYGEN SATURATION: 96 % | HEIGHT: 63 IN | WEIGHT: 201 LBS | SYSTOLIC BLOOD PRESSURE: 155 MMHG | TEMPERATURE: 97 F | DIASTOLIC BLOOD PRESSURE: 88 MMHG | BODY MASS INDEX: 35.61 KG/M2

## 2022-06-29 DIAGNOSIS — Z02.6 ENCOUNTER RELATED TO WORKER'S COMPENSATION CLAIM: Primary | ICD-10-CM

## 2022-06-29 DIAGNOSIS — M25.511 ACUTE PAIN OF RIGHT SHOULDER: ICD-10-CM

## 2022-06-29 DIAGNOSIS — S46.911A RIGHT SHOULDER STRAIN, INITIAL ENCOUNTER: ICD-10-CM

## 2022-06-29 PROCEDURE — 99213 OFFICE O/P EST LOW 20 MIN: CPT | Mod: S$GLB,,,

## 2022-06-29 PROCEDURE — 99213 PR OFFICE/OUTPT VISIT, EST, LEVL III, 20-29 MIN: ICD-10-PCS | Mod: S$GLB,,,

## 2022-06-29 RX ORDER — NAPROXEN 500 MG/1
500 TABLET ORAL 2 TIMES DAILY
Qty: 30 TABLET | Refills: 1 | Status: SHIPPED | OUTPATIENT
Start: 2022-06-29 | End: 2022-08-08
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
## 2022-07-06 DIAGNOSIS — Z86.39 HISTORY OF NON ANEMIC VITAMIN B12 DEFICIENCY: Primary | ICD-10-CM

## 2022-07-06 RX ORDER — CYANOCOBALAMIN 1000 UG/ML
1000 INJECTION, SOLUTION INTRAMUSCULAR; SUBCUTANEOUS
Status: CANCELLED
Start: 2022-10-05

## 2022-07-06 RX ORDER — CYANOCOBALAMIN 1000 UG/ML
1000 INJECTION, SOLUTION INTRAMUSCULAR; SUBCUTANEOUS
Status: CANCELLED
Start: 2022-08-10

## 2022-07-06 RX ORDER — CYANOCOBALAMIN 1000 UG/ML
1000 INJECTION, SOLUTION INTRAMUSCULAR; SUBCUTANEOUS
Status: CANCELLED
Start: 2022-07-13

## 2022-07-06 RX ORDER — CYANOCOBALAMIN 1000 UG/ML
1000 INJECTION, SOLUTION INTRAMUSCULAR; SUBCUTANEOUS
Status: CANCELLED
Start: 2022-09-07

## 2022-07-20 ENCOUNTER — TELEPHONE (OUTPATIENT)
Dept: URGENT CARE | Facility: CLINIC | Age: 57
End: 2022-07-20

## 2022-07-20 NOTE — TELEPHONE ENCOUNTER
Patient called to cancel her appointment for today and states that she just received a call to be scheduled  for PT Evaluation on 7/21/22 @ 3:30pm. Patient states that she will like to come in a week after she has started Theraphy. AFG

## 2022-07-25 ENCOUNTER — CLINICAL SUPPORT (OUTPATIENT)
Dept: REHABILITATION | Facility: HOSPITAL | Age: 57
End: 2022-07-25
Payer: OTHER MISCELLANEOUS

## 2022-07-25 DIAGNOSIS — M25.511 ACUTE PAIN OF RIGHT SHOULDER: ICD-10-CM

## 2022-07-25 PROCEDURE — 97110 THERAPEUTIC EXERCISES: CPT | Mod: PN

## 2022-07-25 PROCEDURE — 97161 PT EVAL LOW COMPLEX 20 MIN: CPT | Mod: PN

## 2022-07-25 PROCEDURE — 97140 MANUAL THERAPY 1/> REGIONS: CPT | Mod: PN
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
## 2022-08-03 ENCOUNTER — CLINICAL SUPPORT (OUTPATIENT)
Dept: REHABILITATION | Facility: HOSPITAL | Age: 57
End: 2022-08-03
Payer: OTHER MISCELLANEOUS

## 2022-08-03 DIAGNOSIS — M25.511 ACUTE PAIN OF RIGHT SHOULDER: Primary | ICD-10-CM

## 2022-08-03 PROCEDURE — 97110 THERAPEUTIC EXERCISES: CPT | Mod: PN

## 2022-08-03 PROCEDURE — 97140 MANUAL THERAPY 1/> REGIONS: CPT | Mod: PN

## 2022-08-08 ENCOUNTER — OFFICE VISIT (OUTPATIENT)
Dept: INTERNAL MEDICINE | Facility: CLINIC | Age: 57
End: 2022-08-08
Payer: COMMERCIAL

## 2022-08-08 ENCOUNTER — LAB VISIT (OUTPATIENT)
Dept: LAB | Facility: HOSPITAL | Age: 57
End: 2022-08-08
Attending: NURSE PRACTITIONER
Payer: COMMERCIAL

## 2022-08-08 VITALS
WEIGHT: 207.69 LBS | HEIGHT: 63 IN | BODY MASS INDEX: 36.8 KG/M2 | HEART RATE: 89 BPM | RESPIRATION RATE: 18 BRPM | OXYGEN SATURATION: 98 % | SYSTOLIC BLOOD PRESSURE: 138 MMHG | TEMPERATURE: 98 F | DIASTOLIC BLOOD PRESSURE: 86 MMHG

## 2022-08-08 DIAGNOSIS — Z76.89 ENCOUNTER TO ESTABLISH CARE: Primary | ICD-10-CM

## 2022-08-08 DIAGNOSIS — E66.09 CLASS 2 OBESITY DUE TO EXCESS CALORIES WITHOUT SERIOUS COMORBIDITY WITH BODY MASS INDEX (BMI) OF 35.0 TO 35.9 IN ADULT: ICD-10-CM

## 2022-08-08 DIAGNOSIS — R63.5 WEIGHT GAIN: ICD-10-CM

## 2022-08-08 DIAGNOSIS — I10 PRIMARY HYPERTENSION: ICD-10-CM

## 2022-08-08 DIAGNOSIS — D50.0 IRON DEFICIENCY ANEMIA DUE TO CHRONIC BLOOD LOSS: ICD-10-CM

## 2022-08-08 DIAGNOSIS — E88.819 INSULIN RESISTANCE: ICD-10-CM

## 2022-08-08 DIAGNOSIS — C50.912 MALIGNANT NEOPLASM OF LEFT FEMALE BREAST, UNSPECIFIED ESTROGEN RECEPTOR STATUS, UNSPECIFIED SITE OF BREAST: ICD-10-CM

## 2022-08-08 DIAGNOSIS — E78.00 PURE HYPERCHOLESTEROLEMIA: ICD-10-CM

## 2022-08-08 DIAGNOSIS — M32.9 SYSTEMIC LUPUS ERYTHEMATOSUS, UNSPECIFIED SLE TYPE, UNSPECIFIED ORGAN INVOLVEMENT STATUS: ICD-10-CM

## 2022-08-08 DIAGNOSIS — J45.909 ASTHMA, UNSPECIFIED ASTHMA SEVERITY, UNSPECIFIED WHETHER COMPLICATED, UNSPECIFIED WHETHER PERSISTENT: ICD-10-CM

## 2022-08-08 DIAGNOSIS — K21.9 GASTROESOPHAGEAL REFLUX DISEASE, UNSPECIFIED WHETHER ESOPHAGITIS PRESENT: ICD-10-CM

## 2022-08-08 PROBLEM — S46.912A LEFT SHOULDER STRAIN, INITIAL ENCOUNTER: Status: RESOLVED | Noted: 2020-07-10 | Resolved: 2022-08-08

## 2022-08-08 PROBLEM — U07.1 COVID-19: Status: RESOLVED | Noted: 2022-01-01 | Resolved: 2022-08-08

## 2022-08-08 PROBLEM — Z12.11 COLON CANCER SCREENING: Status: RESOLVED | Noted: 2020-10-02 | Resolved: 2022-08-08

## 2022-08-08 PROBLEM — E66.812 CLASS 2 OBESITY DUE TO EXCESS CALORIES WITHOUT SERIOUS COMORBIDITY WITH BODY MASS INDEX (BMI) OF 35.0 TO 35.9 IN ADULT: Status: ACTIVE | Noted: 2022-08-08

## 2022-08-08 LAB
CHOLEST SERPL-MCNC: 215 MG/DL (ref 120–199)
CHOLEST/HDLC SERPL: 3.8 {RATIO} (ref 2–5)
ESTIMATED AVG GLUCOSE: 108 MG/DL (ref 68–131)
HBA1C MFR BLD: 5.4 % (ref 4–5.6)
HDLC SERPL-MCNC: 56 MG/DL (ref 40–75)
HDLC SERPL: 26 % (ref 20–50)
LDLC SERPL CALC-MCNC: 132.6 MG/DL (ref 63–159)
NONHDLC SERPL-MCNC: 159 MG/DL
TRIGL SERPL-MCNC: 132 MG/DL (ref 30–150)
TSH SERPL DL<=0.005 MIU/L-ACNC: 1.48 UIU/ML (ref 0.4–4)

## 2022-08-08 PROCEDURE — 1160F RVW MEDS BY RX/DR IN RCRD: CPT | Mod: CPTII,S$GLB,, | Performed by: NURSE PRACTITIONER

## 2022-08-08 PROCEDURE — 99999 PR PBB SHADOW E&M-EST. PATIENT-LVL V: CPT | Mod: PBBFAC,,, | Performed by: NURSE PRACTITIONER

## 2022-08-08 PROCEDURE — 3075F SYST BP GE 130 - 139MM HG: CPT | Mod: CPTII,S$GLB,, | Performed by: NURSE PRACTITIONER

## 2022-08-08 PROCEDURE — 99214 PR OFFICE/OUTPT VISIT, EST, LEVL IV, 30-39 MIN: ICD-10-PCS | Mod: S$GLB,,, | Performed by: NURSE PRACTITIONER

## 2022-08-08 PROCEDURE — 3079F DIAST BP 80-89 MM HG: CPT | Mod: CPTII,S$GLB,, | Performed by: NURSE PRACTITIONER

## 2022-08-08 PROCEDURE — 3008F PR BODY MASS INDEX (BMI) DOCUMENTED: ICD-10-PCS | Mod: CPTII,S$GLB,, | Performed by: NURSE PRACTITIONER

## 2022-08-08 PROCEDURE — 80061 LIPID PANEL: CPT | Performed by: NURSE PRACTITIONER

## 2022-08-08 PROCEDURE — 1159F PR MEDICATION LIST DOCUMENTED IN MEDICAL RECORD: ICD-10-PCS | Mod: CPTII,S$GLB,, | Performed by: NURSE PRACTITIONER

## 2022-08-08 PROCEDURE — 99214 OFFICE O/P EST MOD 30 MIN: CPT | Mod: S$GLB,,, | Performed by: NURSE PRACTITIONER

## 2022-08-08 PROCEDURE — 36415 COLL VENOUS BLD VENIPUNCTURE: CPT | Mod: PO | Performed by: NURSE PRACTITIONER

## 2022-08-08 PROCEDURE — 83036 HEMOGLOBIN GLYCOSYLATED A1C: CPT | Performed by: NURSE PRACTITIONER

## 2022-08-08 PROCEDURE — 3008F BODY MASS INDEX DOCD: CPT | Mod: CPTII,S$GLB,, | Performed by: NURSE PRACTITIONER

## 2022-08-08 PROCEDURE — 3075F PR MOST RECENT SYSTOLIC BLOOD PRESS GE 130-139MM HG: ICD-10-PCS | Mod: CPTII,S$GLB,, | Performed by: NURSE PRACTITIONER

## 2022-08-08 PROCEDURE — 84443 ASSAY THYROID STIM HORMONE: CPT | Mod: PO | Performed by: NURSE PRACTITIONER

## 2022-08-08 PROCEDURE — 3079F PR MOST RECENT DIASTOLIC BLOOD PRESSURE 80-89 MM HG: ICD-10-PCS | Mod: CPTII,S$GLB,, | Performed by: NURSE PRACTITIONER

## 2022-08-08 PROCEDURE — 99999 PR PBB SHADOW E&M-EST. PATIENT-LVL V: ICD-10-PCS | Mod: PBBFAC,,, | Performed by: NURSE PRACTITIONER

## 2022-08-08 PROCEDURE — 1160F PR REVIEW ALL MEDS BY PRESCRIBER/CLIN PHARMACIST DOCUMENTED: ICD-10-PCS | Mod: CPTII,S$GLB,, | Performed by: NURSE PRACTITIONER

## 2022-08-08 PROCEDURE — 1159F MED LIST DOCD IN RCRD: CPT | Mod: CPTII,S$GLB,, | Performed by: NURSE PRACTITIONER

## 2022-08-08 RX ORDER — PANTOPRAZOLE SODIUM 40 MG/1
40 TABLET, DELAYED RELEASE ORAL DAILY
Qty: 90 TABLET | Refills: 1 | Status: SHIPPED | OUTPATIENT
Start: 2022-08-08 | End: 2022-11-14

## 2022-08-08 NOTE — ASSESSMENT & PLAN NOTE
Checking A1c today.  Patient reports that she was previously on metformin.  Discussed the option of Ozempic if covered by insurance.

## 2022-08-08 NOTE — ASSESSMENT & PLAN NOTE
No concerning findings on physical exam.  Checking some labs today due to concerns of weight gain.

## 2022-08-08 NOTE — PROGRESS NOTES
Subjective:       Patient ID: Tosha Kwok is a 57 y.o. female.    Chief Complaint: Establish Care    Mrs. Kwok presents to visit to establish care.  Recently moved to the area.  Previously seeing provider in [facility] near her work.  She works at a mental health facility.  [facility] no longer working for GERD. Has been on for years. GERD continues to bother her. Worsened with weight gain.  Sometimes wakes her up in the middle of the night.  Has had approximately 25 lb+ weight gain over the past year even with dieting and exercising.  She reports that a doctor that she is working with recommended trying wegovy.       Patient Active Problem List   Diagnosis    Atrophy of vulva    Ankylosing spondylitis    Chronic, continuous use of opioids    Fibromyalgia    Elevated d-dimer    History of sleeve gastrectomy    Intractable pain    De Quervain's tenosynovitis, right    Adhesive capsulitis of right shoulder    Anxiety    Asthma    Lumbosacral spondylosis    GERD (gastroesophageal reflux disease)    HTN (hypertension)    Hyperlipidemia    Insulin resistance    Large joint arthralgia of multiple sites    Lupus (systemic lupus erythematosus)    Malignant neoplasm of female breast    Migraine    Postmenopausal hormone replacement therapy    Vitamin D deficiency    Impingement syndrome of left shoulder    Limited range of motion (ROM) of shoulder    Encounter to establish care    Left foot pain    Vestibular hypofunction    Other iron deficiency anemias    Keloid scar of skin    Iron deficiency anemia due to chronic blood loss    Restless leg syndrome    Chest pain    Insomnia    Port-A-Cath in place    TIA (transient ischemic attack)    History of non anemic vitamin B12 deficiency    Acute pain of right shoulder    Weight gain    Class 2 obesity due to excess calories without serious comorbidity with body mass index (BMI) of 35.0 to 35.9 in adult       Family History   Problem Relation  Age of Onset    Hypertension Mother     Hypertension Brother     Breast cancer Maternal Aunt         x2     Heart attack Father     Hypertension Sister     Lupus Sister     Breast cancer Maternal Grandmother      Past Surgical History:   Procedure Laterality Date    BLADDER SURGERY      BREAST LUMPECTOMY  2018    BREAST RECONSTRUCTION      BREAST SURGERY  2018    reduction     SECTION      CHOLECYSTECTOMY      COLONOSCOPY N/A 10/2/2020    Procedure: COLONOSCOPY;  Surgeon: Guicho Hood MD;  Location: [facility] ENDO;  Service: Endoscopy;  Laterality: N/A;    EXCISION OF GRANULOMA Bilateral 2021    Procedure: EXCISION, GRANULOMA SCARS OF BREASTS;  Surgeon: Obed Palmer Jr., MD;  Location: [facility] OR;  Service: Plastics;  Laterality: Bilateral;    HYSTERECTOMY      LASER ABLATION      to back    SLEEVE GASTROPLASTY  2016    TOTAL REDUCTION MAMMOPLASTY Bilateral     two years ago    TUBAL LIGATION           Current Outpatient Medications:     albuterol (PROAIR HFA) 90 mcg/actuation inhaler, Inhale 2 puffs into the lungs every 4 (four) hours as needed., Disp: 18 g, Rfl: 3    atorvastatin (LIPITOR) 20 MG tablet, Take 1 tablet (20 mg total) by mouth once daily., Disp: 90 tablet, Rfl: 3    cetirizine (ZYRTEC) 10 MG tablet, TAKE 1 TABLET BY MOUTH EVERY DAY, Disp: 90 tablet, Rfl: 3    cyanocobalamin 1,000 mcg/mL injection, Inject 1 mL (1,000 mcg total) into the muscle every 28 days., Disp: 10 mL, Rfl: 1    EPINEPHrine (EPIPEN 2-DENNIS) 0.3 mg/0.3 mL AtIn, Inject 0.3 mLs (0.3 mg total) into the muscle once. for 1 dose, Disp: 2 each, Rfl: 3    hydroxychloroquine (PLAQUENIL) 200 mg tablet, Take 200 mg by mouth once daily., Disp: , Rfl: 1    hydrOXYzine pamoate (VISTARIL) 25 MG Cap, TAKE 1 CAPSULE (25 MG TOTAL) BY MOUTH EVERY 4 (FOUR) HOURS AS NEEDED FOR (ITCHING)., Disp: 90 capsule, Rfl: 3    sumatriptan (IMITREX) 50 MG tablet, Take 1 tablet (50 mg total) by mouth once as needed for  "Migraine. May repeat once after 2 hours. Do not exceed 3-4 doses in one week. NEEDS APT FOR FURTHER REFILL, Disp: 12 tablet, Rfl: 0    topiramate (TOPAMAX) 100 MG tablet, Take 1 tablet (100 mg total) by mouth 2 (two) times daily., Disp: 180 tablet, Rfl: 3    zolpidem (AMBIEN) 10 mg Tab, nightly as needed., Disp: , Rfl:     pantoprazole (PROTONIX) 40 MG tablet, Take 1 tablet (40 mg total) by mouth once daily., Disp: 90 tablet, Rfl: 1    Review of Systems   Constitutional: Positive for unexpected weight change. Negative for appetite change, chills, diaphoresis, fatigue and fever.   Respiratory: Negative for shortness of breath.    Cardiovascular: Negative for chest pain and palpitations.   Gastrointestinal: Positive for abdominal pain (GERD). Negative for diarrhea, nausea and vomiting.   Musculoskeletal: Positive for arthralgias. Negative for gait problem.   Neurological: Negative for dizziness, light-headedness and headaches.   Psychiatric/Behavioral: Negative for dysphoric mood. The patient is not nervous/anxious.        Objective:   /86   Pulse 89   Temp 98.1 °F (36.7 °C) (Temporal)   Resp 18   Ht 5' 3" (1.6 m)   Wt 94.2 kg (207 lb 10.8 oz)   LMP 03/26/2012   SpO2 98%   BMI 36.79 kg/m²      Physical Exam  Vitals reviewed.   Constitutional:       General: She is not in acute distress.     Appearance: Normal appearance. She is well-developed. She is not ill-appearing.   HENT:      Head: Normocephalic.   Cardiovascular:      Rate and Rhythm: Normal rate and regular rhythm.      Pulses: Normal pulses.      Heart sounds: Normal heart sounds. No murmur heard.    No friction rub. No gallop.   Pulmonary:      Effort: Pulmonary effort is normal. No respiratory distress.      Breath sounds: Normal breath sounds. No rales.   Abdominal:      Palpations: Abdomen is soft.      Tenderness: There is no abdominal tenderness. There is no guarding.   Skin:     General: Skin is warm and dry.      Coloration: Skin is " not pale.      Findings: No erythema.   Neurological:      Mental Status: She is alert and oriented to person, place, and time.         Assessment & Plan     Problem List Items Addressed This Visit        Pulmonary    Asthma    Current Assessment & Plan     Has not been taking Symbicort.  Stable on albuterol p.r.n..  No recent flare ups.              Cardiac/Vascular    HTN (hypertension)    Current Assessment & Plan     Blood pressure stable, but is on the higher end of acceptable range.  Discussed the possible need of medication if it continues to elevate.           Hyperlipidemia    Current Assessment & Plan     Lipid panel today.  Not currently on medications.           Relevant Orders    Lipid Panel       Immunology/Multi System    Lupus (systemic lupus erythematosus)    Current Assessment & Plan     Followed by rheumatology. On plaquenil.               Oncology    Malignant neoplasm of female breast    Current Assessment & Plan     Followed by breast specialty.            Iron deficiency anemia due to chronic blood loss    Current Assessment & Plan     Labs reviewed.  Followed by Hematology.              Endocrine    Insulin resistance    Current Assessment & Plan     Checking A1c today.  Patient reports that she was previously on metformin.  Discussed the option of Ozempic if covered by insurance.           Weight gain    Current Assessment & Plan     Checking labs today, but patient inquired about wegovy.  If labs normal, will try to send rx for wegovy.            Relevant Orders    TSH    Hemoglobin A1C    Class 2 obesity due to excess calories without serious comorbidity with body mass index (BMI) of 35.0 to 35.9 in adult    Current Assessment & Plan     Counseled on importance of diet and exercise in order to improve overall quality of life, and reduce risk of future comorbidities.                GI    GERD (gastroesophageal reflux disease)    Current Assessment & Plan     Changing to protonix.             "Relevant Medications    pantoprazole (PROTONIX) 40 MG tablet       Other    Encounter to establish care - Primary    Current Assessment & Plan     No concerning findings on physical exam.  Checking some labs today due to concerns of weight gain.               Follow up in about 6 months (around 2/8/2023), or if symptoms worsen or fail to improve.            Portions of this note may have been created with voice recognition software. Occasional "wrong-word" or "sound-a-like" substitutions may have occurred due to the inherent limitations of voice recognition software. Please, read the note carefully and recognize, using context, where substitutions have occurred.       "

## 2022-08-08 NOTE — ASSESSMENT & PLAN NOTE
Checking labs today, but patient inquired about wegovy.  If labs normal, will try to send rx for wegovy.

## 2022-08-08 NOTE — ASSESSMENT & PLAN NOTE
Blood pressure stable, but is on the higher end of acceptable range.  Discussed the possible need of medication if it continues to elevate.
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
## 2022-08-11 ENCOUNTER — PATIENT MESSAGE (OUTPATIENT)
Dept: INTERNAL MEDICINE | Facility: CLINIC | Age: 57
End: 2022-08-11
Payer: COMMERCIAL

## 2022-08-17 ENCOUNTER — CLINICAL SUPPORT (OUTPATIENT)
Dept: REHABILITATION | Facility: HOSPITAL | Age: 57
End: 2022-08-17
Payer: OTHER MISCELLANEOUS

## 2022-08-17 DIAGNOSIS — M25.511 ACUTE PAIN OF RIGHT SHOULDER: Primary | ICD-10-CM

## 2022-08-17 PROCEDURE — 97140 MANUAL THERAPY 1/> REGIONS: CPT | Mod: PN

## 2022-08-17 PROCEDURE — 97110 THERAPEUTIC EXERCISES: CPT | Mod: PN

## 2022-08-24 ENCOUNTER — TELEPHONE (OUTPATIENT)
Dept: URGENT CARE | Facility: CLINIC | Age: 57
End: 2022-08-24
Payer: COMMERCIAL

## 2022-08-24 NOTE — TELEPHONE ENCOUNTER
Called patient in reference to the missed appointments she have, left a voice message and call back number regarding missed [facility] health appointments. EVAN

## 2022-08-29 ENCOUNTER — CLINICAL SUPPORT (OUTPATIENT)
Dept: REHABILITATION | Facility: HOSPITAL | Age: 57
End: 2022-08-29
Payer: OTHER MISCELLANEOUS

## 2022-08-29 DIAGNOSIS — M25.511 ACUTE PAIN OF RIGHT SHOULDER: Primary | ICD-10-CM

## 2022-08-29 PROCEDURE — 97110 THERAPEUTIC EXERCISES: CPT | Mod: PN,CQ
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
## 2022-08-31 ENCOUNTER — CLINICAL SUPPORT (OUTPATIENT)
Dept: REHABILITATION | Facility: HOSPITAL | Age: 57
End: 2022-08-31
Payer: OTHER MISCELLANEOUS

## 2022-08-31 ENCOUNTER — OFFICE VISIT (OUTPATIENT)
Dept: URGENT CARE | Facility: CLINIC | Age: 57
End: 2022-08-31
Payer: OTHER MISCELLANEOUS

## 2022-08-31 VITALS
WEIGHT: 198 LBS | SYSTOLIC BLOOD PRESSURE: 147 MMHG | BODY MASS INDEX: 36.44 KG/M2 | RESPIRATION RATE: 18 BRPM | TEMPERATURE: 98 F | DIASTOLIC BLOOD PRESSURE: 87 MMHG | HEART RATE: 90 BPM | HEIGHT: 62 IN | OXYGEN SATURATION: 100 %

## 2022-08-31 DIAGNOSIS — M25.511 ACUTE PAIN OF RIGHT SHOULDER: Primary | ICD-10-CM

## 2022-08-31 DIAGNOSIS — S46.911D STRAIN OF RIGHT SHOULDER, SUBSEQUENT ENCOUNTER: Primary | ICD-10-CM

## 2022-08-31 DIAGNOSIS — G89.29 CHRONIC RIGHT SHOULDER PAIN: ICD-10-CM

## 2022-08-31 DIAGNOSIS — M25.511 CHRONIC RIGHT SHOULDER PAIN: ICD-10-CM

## 2022-08-31 DIAGNOSIS — Z02.6 ENCOUNTER RELATED TO WORKER'S COMPENSATION CLAIM: ICD-10-CM

## 2022-08-31 PROCEDURE — 99214 PR OFFICE/OUTPT VISIT, EST, LEVL IV, 30-39 MIN: ICD-10-PCS | Mod: S$GLB,,, | Performed by: PHYSICIAN ASSISTANT

## 2022-08-31 PROCEDURE — 99214 OFFICE O/P EST MOD 30 MIN: CPT | Mod: S$GLB,,, | Performed by: PHYSICIAN ASSISTANT

## 2022-08-31 PROCEDURE — 97110 THERAPEUTIC EXERCISES: CPT | Mod: PN,CQ

## 2022-08-31 PROCEDURE — 97140 MANUAL THERAPY 1/> REGIONS: CPT | Mod: PN,CQ

## 2022-08-31 RX ORDER — NAPROXEN 500 MG/1
500 TABLET ORAL 2 TIMES DAILY WITH MEALS
Qty: 30 TABLET | Refills: 0 | Status: SHIPPED | OUTPATIENT
Start: 2022-08-31 | End: 2023-02-20 | Stop reason: ALTCHOICE
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
## 2022-09-08 ENCOUNTER — CLINICAL SUPPORT (OUTPATIENT)
Dept: REHABILITATION | Facility: HOSPITAL | Age: 57
End: 2022-09-08
Payer: OTHER MISCELLANEOUS

## 2022-09-08 DIAGNOSIS — M25.511 ACUTE PAIN OF RIGHT SHOULDER: Primary | ICD-10-CM

## 2022-09-08 PROCEDURE — 97140 MANUAL THERAPY 1/> REGIONS: CPT | Mod: PN,CQ

## 2022-09-08 PROCEDURE — 97110 THERAPEUTIC EXERCISES: CPT | Mod: PN,CQ
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
## 2022-09-14 ENCOUNTER — OFFICE VISIT (OUTPATIENT)
Dept: URGENT CARE | Facility: CLINIC | Age: 57
End: 2022-09-14
Payer: OTHER MISCELLANEOUS

## 2022-09-14 VITALS
TEMPERATURE: 98 F | BODY MASS INDEX: 36.44 KG/M2 | OXYGEN SATURATION: 93 % | HEART RATE: 66 BPM | DIASTOLIC BLOOD PRESSURE: 86 MMHG | HEIGHT: 62 IN | SYSTOLIC BLOOD PRESSURE: 156 MMHG | WEIGHT: 198 LBS

## 2022-09-14 DIAGNOSIS — Z02.6 ENCOUNTER RELATED TO WORKER'S COMPENSATION CLAIM: Primary | ICD-10-CM

## 2022-09-14 DIAGNOSIS — S46.911D STRAIN OF RIGHT SHOULDER, SUBSEQUENT ENCOUNTER: ICD-10-CM

## 2022-09-14 PROCEDURE — 99214 PR OFFICE/OUTPT VISIT, EST, LEVL IV, 30-39 MIN: ICD-10-PCS | Mod: S$GLB,,,

## 2022-09-14 PROCEDURE — 99214 OFFICE O/P EST MOD 30 MIN: CPT | Mod: S$GLB,,,

## 2022-09-14 NOTE — PROGRESS NOTES
Subjective:       Patient ID: Tosha Kwok is a 57 y.o. female.    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 She is here for a follow up visit for rhe injury to her Right Shoulder. She continues to have pain and discomfort in the shoulder and states it wakes her at night 3-4 times a week. She explains sometimes it goes numb as she tries to move it. She is still going to the therapy and reports slight improvements. She was scheduled to go three times a week but had to reduce it to one because of the number of days she would be away from the office. Patient reports the fingers/hand goes numb sometime as well during therapy but she tries to push through it. Patient takes Gabapentin as prescribed but states it also also for the previously diagnosed condition. She has not been contacted about the MRI as of this date. Current pain score is 3/10. LRC     Constitution: Positive for activity change.   HENT: Negative.     Neck: neck negative.   Cardiovascular: Negative.    Eyes: Negative.    Respiratory: Negative.     Gastrointestinal: Negative.    Endocrine: negative.   Genitourinary: Negative.    Musculoskeletal:  Positive for pain, abnormal ROM of joint and muscle ache.   Skin: Negative.    Allergic/Immunologic: Negative.    Neurological:  Positive for numbness and tingling.   Hematologic/Lymphatic: Negative.       Objective:      Physical Exam  Vitals and nursing note reviewed.   Constitutional:       General: She is not in acute distress.  HENT:      Head: Normocephalic.   Eyes:      Conjunctiva/sclera: Conjunctivae normal.   Musculoskeletal:      Right shoulder: Tenderness present. No swelling or deformity. Decreased range of motion. Normal pulse.        Arms:       Cervical back: Full passive range of motion without pain and normal range of motion.      Comments: No gross deformity noted to the right shoulder.  Tender  to palpation to the right AC joint region and anterior lateral aspect of the right shoulder. Abduction and flexion to 85° with pain to right AC joint and anterior lateral aspect of the shoulder.  Positive impingement testing and empty can testing   Skin:     General: Skin is warm.      Capillary Refill: Capillary refill takes less than 2 seconds.   Neurological:      General: No focal deficit present.      Mental Status: She is alert and oriented to person, place, and time.   Psychiatric:         Attention and Perception: Attention normal.         Mood and Affect: Mood and affect normal.         Speech: Speech normal.         Behavior: Behavior normal. Behavior is cooperative.       Assessment:       1. Encounter related to worker's compensation claim    2. Strain of right shoulder, subsequent encounter          Plan:       Clinical examination is concerning for rotator cuff injury.  MRI has been ordered and I will follow up with her coordination team to check on the status of it is approval.  I have asked her to continue with physical therapy in the interim to see whether this will help with her range of motion.  Recheck in approximately 2 weeks.         Restrictions: No above the shoulder/overhead work, No lifting/pushing/pulling more than 10 lbs, Limited use of right hand and arm  Follow up in about 2 weeks (around 9/28/2022).
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
## 2022-09-16 ENCOUNTER — PATIENT MESSAGE (OUTPATIENT)
Dept: INTERNAL MEDICINE | Facility: CLINIC | Age: 57
End: 2022-09-16
Payer: COMMERCIAL
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
## 2022-09-19 ENCOUNTER — OFFICE VISIT (OUTPATIENT)
Dept: INTERNAL MEDICINE | Facility: CLINIC | Age: 57
End: 2022-09-19
Payer: COMMERCIAL

## 2022-09-19 DIAGNOSIS — E66.09 CLASS 2 OBESITY DUE TO EXCESS CALORIES WITHOUT SERIOUS COMORBIDITY WITH BODY MASS INDEX (BMI) OF 36.0 TO 36.9 IN ADULT: ICD-10-CM

## 2022-09-19 DIAGNOSIS — I10 PRIMARY HYPERTENSION: ICD-10-CM

## 2022-09-19 DIAGNOSIS — G47.00 INSOMNIA, UNSPECIFIED TYPE: Primary | ICD-10-CM

## 2022-09-19 DIAGNOSIS — E88.819 INSULIN RESISTANCE: ICD-10-CM

## 2022-09-19 PROCEDURE — 1160F PR REVIEW ALL MEDS BY PRESCRIBER/CLIN PHARMACIST DOCUMENTED: ICD-10-PCS | Mod: CPTII,95,, | Performed by: NURSE PRACTITIONER

## 2022-09-19 PROCEDURE — 3044F HG A1C LEVEL LT 7.0%: CPT | Mod: CPTII,95,, | Performed by: NURSE PRACTITIONER

## 2022-09-19 PROCEDURE — 1159F MED LIST DOCD IN RCRD: CPT | Mod: CPTII,95,, | Performed by: NURSE PRACTITIONER

## 2022-09-19 PROCEDURE — 99213 OFFICE O/P EST LOW 20 MIN: CPT | Mod: 95,,, | Performed by: NURSE PRACTITIONER

## 2022-09-19 PROCEDURE — 99213 PR OFFICE/OUTPT VISIT, EST, LEVL III, 20-29 MIN: ICD-10-PCS | Mod: 95,,, | Performed by: NURSE PRACTITIONER

## 2022-09-19 PROCEDURE — 1159F PR MEDICATION LIST DOCUMENTED IN MEDICAL RECORD: ICD-10-PCS | Mod: CPTII,95,, | Performed by: NURSE PRACTITIONER

## 2022-09-19 PROCEDURE — 3044F PR MOST RECENT HEMOGLOBIN A1C LEVEL <7.0%: ICD-10-PCS | Mod: CPTII,95,, | Performed by: NURSE PRACTITIONER

## 2022-09-19 PROCEDURE — 1160F RVW MEDS BY RX/DR IN RCRD: CPT | Mod: CPTII,95,, | Performed by: NURSE PRACTITIONER

## 2022-09-19 RX ORDER — ESZOPICLONE 1 MG/1
1 TABLET, FILM COATED ORAL NIGHTLY
Qty: 30 TABLET | Refills: 0 | Status: SHIPPED | OUTPATIENT
Start: 2022-09-19 | End: 2022-10-14

## 2022-09-19 RX ORDER — SEMAGLUTIDE 1.34 MG/ML
0.5 INJECTION, SOLUTION SUBCUTANEOUS WEEKLY
Qty: 1 PEN | Refills: 5
Start: 2022-09-19 | End: 2022-11-10 | Stop reason: SDUPTHER

## 2022-09-19 RX ORDER — TIZANIDINE 4 MG/1
4 TABLET ORAL 3 TIMES DAILY PRN
COMMUNITY
Start: 2022-09-16

## 2022-09-19 NOTE — ASSESSMENT & PLAN NOTE
Previously on Ambien.   reviewed.  Has not taken in several months.  Will trial low-dose Lunesta for now.

## 2022-09-19 NOTE — ASSESSMENT & PLAN NOTE
Discussed monitoring at home.  Most recent blood pressure is elevated.  Follow-up in office in 2 weeks for nurse visit.

## 2022-09-19 NOTE — PROGRESS NOTES
Subjective:       Patient ID: Tosha Kwok is a 57 y.o. female.    Chief Complaint: No chief complaint on file.    The patient location is: [place]  The chief complaint leading to consultation is: Insomnia    Visit type: audiovisual    Face to Face time with patient: 12 minutes  15 minutes of total time spent on the encounter, which includes face to face time and non-face to face time preparing to see the patient (eg, review of tests), Obtaining and/or reviewing separately obtained history, Documenting clinical information in the electronic or other health record, Independently interpreting results (not separately reported) and communicating results to the patient/family/caregiver, or Care coordination (not separately reported).       Each patient to whom he or she provides medical services by telemedicine is:  (1) informed of the relationship between the physician and patient and the respective role of any other health care provider with respect to management of the patient; and (2) notified that he or she may decline to receive medical services by telemedicine and may withdraw from such care at any time.    Notes:   Mrs Kwok presents to visit with complaint of insomnia.  She has not been sleeping much over the past month.  She goes sleep around 9:00 p.m., but wakes up at midnight and is not able to fall back asleep until 2-3:00 a.m..  She is no longer taken Ambien, due to side effects of sleep walking at night.  She has tried melatonin, NyQuil, and other over-the-counter supplements without any relief.  She was on Ambien for years previously.    Has also been doing okay on current doses Ozempic.  Would like to try to increase dose.            Patient Active Problem List   Diagnosis    Atrophy of vulva    Ankylosing spondylitis    Chronic, continuous use of opioids    Fibromyalgia    Elevated d-dimer    History of sleeve gastrectomy    Intractable pain    De Quervain's tenosynovitis, right    Adhesive  capsulitis of right shoulder    Anxiety    Asthma    Lumbosacral spondylosis    GERD (gastroesophageal reflux disease)    HTN (hypertension)    Hyperlipidemia    Insulin resistance    Large joint arthralgia of multiple sites    Lupus (systemic lupus erythematosus)    Malignant neoplasm of female breast    Migraine    Postmenopausal hormone replacement therapy    Vitamin D deficiency    Impingement syndrome of left shoulder    Limited range of motion (ROM) of shoulder    Encounter to establish care    Left foot pain    Vestibular hypofunction    Other iron deficiency anemias    Keloid scar of skin    Iron deficiency anemia due to chronic blood loss    Restless leg syndrome    Chest pain    Insomnia    Port-A-Cath in place    TIA (transient ischemic attack)    History of non anemic vitamin B12 deficiency    Acute pain of right shoulder    Weight gain    Class 2 obesity due to excess calories without serious comorbidity with body mass index (BMI) of 36.0 to 36.9 in adult       Family History   Problem Relation Age of Onset    Hypertension Mother     Hypertension Brother     Breast cancer Maternal Aunt         x2     Heart attack Father     Hypertension Sister     Lupus Sister     Breast cancer Maternal Grandmother      Past Surgical History:   Procedure Laterality Date    BLADDER SURGERY      BREAST LUMPECTOMY  2018    BREAST RECONSTRUCTION      BREAST SURGERY  2018    reduction     SECTION      CHOLECYSTECTOMY      COLONOSCOPY N/A 10/2/2020    Procedure: COLONOSCOPY;  Surgeon: Guicho Hood MD;  Location: [facility];  Service: Endoscopy;  Laterality: N/A;    EXCISION OF GRANULOMA Bilateral 2021    Procedure: EXCISION, GRANULOMA SCARS OF BREASTS;  Surgeon: Obed Palmer Jr., MD;  Location: [facility];  Service: Plastics;  Laterality: Bilateral;    HYSTERECTOMY      LASER ABLATION      to back    SLEEVE GASTROPLASTY  2016    TOTAL REDUCTION MAMMOPLASTY Bilateral     two years ago    TUBAL LIGATION            Current Outpatient Medications:     albuterol (PROAIR HFA) 90 mcg/actuation inhaler, Inhale 2 puffs into the lungs every 4 (four) hours as needed., Disp: 18 g, Rfl: 3    atorvastatin (LIPITOR) 20 MG tablet, Take 1 tablet (20 mg total) by mouth once daily., Disp: 90 tablet, Rfl: 3    cetirizine (ZYRTEC) 10 MG tablet, TAKE 1 TABLET BY MOUTH EVERY DAY, Disp: 90 tablet, Rfl: 3    cyanocobalamin 1,000 mcg/mL injection, Inject 1 mL (1,000 mcg total) into the muscle every 28 days., Disp: 10 mL, Rfl: 1    EPINEPHrine (EPIPEN 2-DENNIS) 0.3 mg/0.3 mL AtIn, Inject 0.3 mLs (0.3 mg total) into the muscle once. for 1 dose, Disp: 2 each, Rfl: 3    eszopiclone (LUNESTA) 1 MG Tab, Take 1 tablet (1 mg total) by mouth nightly., Disp: 30 tablet, Rfl: 0    hydroxychloroquine (PLAQUENIL) 200 mg tablet, Take 200 mg by mouth once daily., Disp: , Rfl: 1    hydrOXYzine pamoate (VISTARIL) 25 MG Cap, TAKE 1 CAPSULE (25 MG TOTAL) BY MOUTH EVERY 4 (FOUR) HOURS AS NEEDED FOR (ITCHING)., Disp: 90 capsule, Rfl: 3    naproxen (NAPROSYN) 500 MG tablet, Take 1 tablet (500 mg total) by mouth 2 (two) times daily with meals., Disp: 30 tablet, Rfl: 0    pantoprazole (PROTONIX) 40 MG tablet, Take 1 tablet (40 mg total) by mouth once daily., Disp: 90 tablet, Rfl: 1    semaglutide (OZEMPIC) 0.25 mg or 0.5 mg(2 mg/1.5 mL) pen injector, Inject 0.5 mg into the skin once a week., Disp: 1 pen, Rfl: 5    tiZANidine (ZANAFLEX) 4 MG tablet, Take 4 mg by mouth 3 (three) times daily as needed., Disp: , Rfl:     topiramate (TOPAMAX) 100 MG tablet, Take 1 tablet (100 mg total) by mouth 2 (two) times daily., Disp: 180 tablet, Rfl: 3    Review of Systems   Constitutional:  Positive for fatigue. Negative for activity change and unexpected weight change.   HENT:  Negative for hearing loss, rhinorrhea and trouble swallowing.    Eyes:  Negative for discharge and visual disturbance.   Respiratory:  Negative for chest tightness and wheezing.    Cardiovascular:  Negative  for chest pain and palpitations.   Gastrointestinal:  Negative for blood in stool, constipation, diarrhea and vomiting.   Endocrine: Negative for polydipsia and polyuria.   Genitourinary:  Negative for difficulty urinating, dysuria, hematuria and menstrual problem.   Musculoskeletal:  Negative for arthralgias, joint swelling and neck pain.   Neurological:  Negative for weakness and headaches.   Psychiatric/Behavioral:  Positive for sleep disturbance. Negative for confusion and dysphoric mood.      Objective:   LMP 03/26/2012      Physical Exam  Constitutional:       General: She is not in acute distress.     Appearance: Normal appearance. She is not ill-appearing.   Pulmonary:      Effort: Pulmonary effort is normal. No respiratory distress.   Neurological:      General: No focal deficit present.      Mental Status: She is alert and oriented to person, place, and time.   Psychiatric:         Mood and Affect: Mood normal.       Assessment & Plan     Problem List Items Addressed This Visit          Cardiac/Vascular    HTN (hypertension)    Current Assessment & Plan     Discussed monitoring at home.  Most recent blood pressure is elevated.  Follow-up in office in 2 weeks for nurse visit.            Endocrine    Insulin resistance    Relevant Medications    semaglutide (OZEMPIC) 0.25 mg or 0.5 mg(2 mg/1.5 mL) pen injector    Class 2 obesity due to excess calories without serious comorbidity with body mass index (BMI) of 36.0 to 36.9 in adult    Current Assessment & Plan     Increasing Ozempic.         Relevant Medications    semaglutide (OZEMPIC) 0.25 mg or 0.5 mg(2 mg/1.5 mL) pen injector       Other    Insomnia - Primary    Current Assessment & Plan     Previously on Ambien.   reviewed.  Has not taken in several months.  Will trial low-dose Lunesta for now.         Relevant Medications    eszopiclone (LUNESTA) 1 MG Tab     Follow up in about 1 month (around 10/19/2022) for insomnia. .            Portions of this  "note may have been created with voice recognition software. Occasional "wrong-word" or "sound-a-like" substitutions may have occurred due to the inherent limitations of voice recognition software. Please, read the note carefully and recognize, using context, where substitutions have occurred.         "

## 2022-09-20 ENCOUNTER — CLINICAL SUPPORT (OUTPATIENT)
Dept: REHABILITATION | Facility: HOSPITAL | Age: 57
End: 2022-09-20
Payer: OTHER MISCELLANEOUS

## 2022-09-20 DIAGNOSIS — M25.511 ACUTE PAIN OF RIGHT SHOULDER: Primary | ICD-10-CM

## 2022-09-20 PROCEDURE — 97110 THERAPEUTIC EXERCISES: CPT | Mod: PN

## 2022-09-20 PROCEDURE — 97140 MANUAL THERAPY 1/> REGIONS: CPT | Mod: PN

## 2022-09-28 ENCOUNTER — OFFICE VISIT (OUTPATIENT)
Dept: URGENT CARE | Facility: CLINIC | Age: 57
End: 2022-09-28
Payer: OTHER MISCELLANEOUS

## 2022-09-28 VITALS
TEMPERATURE: 99 F | RESPIRATION RATE: 18 BRPM | SYSTOLIC BLOOD PRESSURE: 155 MMHG | BODY MASS INDEX: 34.96 KG/M2 | HEIGHT: 62 IN | OXYGEN SATURATION: 100 % | HEART RATE: 83 BPM | DIASTOLIC BLOOD PRESSURE: 87 MMHG | WEIGHT: 190 LBS

## 2022-09-28 DIAGNOSIS — R20.0 NUMBNESS AND TINGLING OF RIGHT ARM: ICD-10-CM

## 2022-09-28 DIAGNOSIS — G89.29 CHRONIC RIGHT SHOULDER PAIN: ICD-10-CM

## 2022-09-28 DIAGNOSIS — R20.2 NUMBNESS AND TINGLING OF RIGHT ARM: ICD-10-CM

## 2022-09-28 DIAGNOSIS — M25.511 CHRONIC RIGHT SHOULDER PAIN: ICD-10-CM

## 2022-09-28 DIAGNOSIS — Z02.6 ENCOUNTER RELATED TO WORKER'S COMPENSATION CLAIM: Primary | ICD-10-CM

## 2022-09-28 DIAGNOSIS — S46.911D STRAIN OF RIGHT SHOULDER, SUBSEQUENT ENCOUNTER: ICD-10-CM
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
## 2022-09-28 PROCEDURE — 99214 PR OFFICE/OUTPT VISIT, EST, LEVL IV, 30-39 MIN: ICD-10-PCS | Mod: S$GLB,,, | Performed by: PHYSICIAN ASSISTANT

## 2022-09-28 PROCEDURE — 99214 OFFICE O/P EST MOD 30 MIN: CPT | Mod: S$GLB,,, | Performed by: PHYSICIAN ASSISTANT

## 2022-09-28 NOTE — PROGRESS NOTES
Subjective:       Patient ID: Tosha Kwok is a 57 y.o. female.    Chief Complaint: No chief complaint on file.    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 She is here for a follow up visit for rhe injury to her Right Shoulder. She continues to have pain and discomfort in the shoulder and states it wakes her at night 3-4 times a week. She explains sometimes it goes numb as she tries to move it. She is still going to the therapy and reports slight improvements. She was scheduled to go three times a week but had to reduce it to one because of the number of days she would be away from the office. Patient reports the fingers/hand goes numb sometime as well during therapy but she tries to push through it. Patient takes Gabapentin as prescribed but states it also also for the previously diagnosed condition. She has been contacted about the MRI as of this date. Current pain score is 3/10.LM       Patient presents for follow-up of right shoulder strain.  She reports mild improvement with physical therapy.  MRI was authorized however she has not scheduled yet.  She reports continued pain about the right neck and shoulder area as well as anterior shoulder.  She has difficulty raising her right arm above her head.  She also reports numbness in the right forearm and hand in no specific dermatomal distribution.  Says it happens at night mostly and resolves after she gets up and walks around for a few minutes.  She denies neck pain or neck injury. MEB    Constitution: Positive for activity change.   HENT: Negative.     Neck: neck negative.   Cardiovascular: Negative.    Eyes: Negative.    Respiratory: Negative.     Gastrointestinal: Negative.    Endocrine: negative.   Genitourinary: Negative.    Musculoskeletal:  Positive for pain, trauma, joint pain, abnormal ROM of joint and muscle ache.   Skin: Negative.    Allergic/Immunologic: Negative.    Neurological:  Positive for  numbness and tingling.   Hematologic/Lymphatic: Negative.    Psychiatric/Behavioral:  Negative for sleep disturbance.       Objective:      Physical Exam  Vitals and nursing note reviewed.   Constitutional:       General: She is not in acute distress.     Appearance: She is well-developed. She is not diaphoretic.   HENT:      Head: Normocephalic and atraumatic.      Right Ear: Hearing and external ear normal.      Left Ear: Hearing and external ear normal.      Nose: Nose normal. No nasal deformity.   Eyes:      General: Lids are normal. No scleral icterus.     Conjunctiva/sclera: Conjunctivae normal.   Neck:      Trachea: Trachea normal.   Cardiovascular:      Pulses: Normal pulses.   Pulmonary:      Effort: Pulmonary effort is normal. No respiratory distress.      Breath sounds: No stridor.   Musculoskeletal:      Right shoulder: Tenderness (Anterior aspect) present. No swelling, deformity or crepitus. Decreased range of motion (AROM:  Flexion 90°, abduction 90°, external rotation 80°, internal rotation sacrum (L T2)). Normal pulse.        Arms:       Cervical back: Normal range of motion.      Comments: Right shoulder:  O'Oc negative, empty can, Jorge, Neer  positive   Skin:     General: Skin is warm and dry.      Capillary Refill: Capillary refill takes less than 2 seconds.   Neurological:      Mental Status: She is alert. She is not disoriented.      GCS: GCS eye subscore is 4. GCS verbal subscore is 5. GCS motor subscore is 6.      Sensory: No sensory deficit.   Psychiatric:         Attention and Perception: She is attentive.         Speech: Speech normal.         Behavior: Behavior normal.       Assessment:       1. Encounter related to worker's compensation claim    2. Strain of right shoulder, subsequent encounter    3. Chronic right shoulder pain    4. Numbness and tingling of right arm    5. Work related injury          Plan:       Physical exam shows impingement of the right shoulder as well as  probable rotator cuff injury.  Patient also with intermittent radicular symptoms concerning for possible C-spine herniation.  Consider nerve conduction study.  Patient to schedule MRI right shoulder and follow-up after her study to review the results.  She will continue physical therapy and home exercises.  She will take naproxen twice daily as needed for pain.     Patient Instructions: Daily home exercises/warm soaks, Continue Physical Therapy (Complete MRI)   Restrictions: Limited use of right hand and arm, No above the shoulder/overhead work, No lifting/pushing/pulling more than 10 lbs  Follow up in about 1 week (around 10/5/2022).

## 2022-09-28 NOTE — LETTER
[facility]  [address] [address]  Phone: [phone]  Fax: [phone]  [facility] [employer]: [email or web address]    Pt Name: Tosha Kowk  Injury Date: 05/04/2022   Employee ID: [id number] Date of Treatment: 09/28/2022   Company: [employer]      Appointment Time: 03:45 PM Arrived: 4:06 PM   Provider: Oscar Davis PA-C Time Out: 4:56 PM     Office Treatment:   1. Encounter related to worker's compensation claim    2. Strain of right shoulder, subsequent encounter    3. Chronic right shoulder pain    4. Numbness and tingling of right arm    5. Work related injury          Patient Instructions: Daily home exercises/warm soaks, Continue Physical Therapy (Complete MRI)      Restrictions: Limited use of right hand and arm, No above the shoulder/overhead work, No lifting/pushing/pulling more than 10 lbs     Return Appointment: 10/5/2022 at 4:00 PM

## 2022-09-29 ENCOUNTER — CLINICAL SUPPORT (OUTPATIENT)
Dept: REHABILITATION | Facility: HOSPITAL | Age: 57
End: 2022-09-29
Payer: OTHER MISCELLANEOUS

## 2022-09-29 DIAGNOSIS — M25.511 ACUTE PAIN OF RIGHT SHOULDER: Primary | ICD-10-CM

## 2022-09-29 PROCEDURE — 97140 MANUAL THERAPY 1/> REGIONS: CPT | Mod: PN,CQ

## 2022-09-29 PROCEDURE — 97110 THERAPEUTIC EXERCISES: CPT | Mod: PN,CQ
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
## 2022-09-30 ENCOUNTER — TELEPHONE (OUTPATIENT)
Dept: URGENT CARE | Facility: CLINIC | Age: 57
End: 2022-09-30
Payer: COMMERCIAL

## 2022-09-30 DIAGNOSIS — F41.9 ANXIETY: Primary | ICD-10-CM

## 2022-09-30 RX ORDER — DIAZEPAM 5 MG/1
5 TABLET ORAL ONCE
Qty: 1 TABLET | Refills: 0 | Status: SHIPPED | OUTPATIENT
Start: 2022-09-30 | End: 2022-10-31

## 2022-09-30 NOTE — TELEPHONE ENCOUNTER
Called patient to verify pharmacy to send diazepam for anxiety prior to her MRI.  Pharmacy verified and discussed MRI procedure and to avoid driving for 10 hours after taking diazepam 5 mg.  Patient verbalized understanding.

## 2022-10-05 NOTE — PROGRESS NOTES
PATIENT: Tosha Kwok  MRN: [id number]  DATE: 10/6/2022    Diagnosis:   1. Iron deficiency anemia due to chronic blood loss    2. History of sleeve gastrectomy    3. History of non anemic vitamin B12 deficiency      Chief Complaint: iron deficiency anemia    Telemedicine visit:  The patient location is: home  The chief complaint leading to consultation is: iron deficiency anemia    Visit type: audio only    Face to Face time with patient: 10 minutes  15 minutes of total time spent on the encounter, which includes face to face time and non-face to face time preparing to see the patient (eg, review of tests), Obtaining and/or reviewing separately obtained history, Documenting clinical information in the electronic or other health record, Independently interpreting results (not separately reported) and communicating results to the patient/family/caregiver, or Care coordination (not separately reported).     Each patient to whom he or she provides medical services by telemedicine is:  (1) informed of the relationship between the physician and patient and the respective role of any other health care provider with respect to management of the patient; and (2) notified that he or she may decline to receive medical services by telemedicine and may withdraw from such care at any time.    Notes: see below    Subjective:    History of Present Illness: Ms. Kwok is a 57 y.o. female who presented in March 2021 for evaluation and management of anemia. She was referred by Dr. Gibson.    - labs since 2012 reveal an intermittent, mild normocytic anemia  - iron studies in December 2020 and March 2021 reveal a decreased ferritin/iron/saturated iron with increased total iron binding capacity.  - She had a documented decreased B12 level on 6/11/19.  - she has been taking oral iron for two years. She endorses constipation from this.  - she had a hysterectomy in 2018  - she has a history of sleeve gastrectomy in 2016.  - she received  ferric carboxymaltose x 2 doses in April/May 2021.    Interval history:  - she presents for a follow-up appointment for her anemia.  - today, she is doing well. She denies shortness of breath, chest pain, nausea, vomiting, diarrhea, constipation. She has intermittent itching related to the lupus.    Past medical, surgical, family, and social histories have been reviewed and updated below.    Past Medical History:   Past Medical History:   Diagnosis Date    Anemia     Ankylosing spondylitis     Anxiety     Asthma     Cancer 2018    left breast  lumpectomy, xrt    Chronic constipation     Chronic insomnia     Edema     Fibromyalgia     Hypertension     Insulin resistance     Interstitial cystitis     Lupus     Lupus     Migraine headache     PONV (postoperative nausea and vomiting)     Restless legs syndrome     Shingles     Stroke     post partum right sided numbness    TIA (transient ischemic attack)        Past Surgical History:   Past Surgical History:   Procedure Laterality Date    BLADDER SURGERY      BREAST LUMPECTOMY  2018    BREAST RECONSTRUCTION      BREAST SURGERY  2018    reduction     SECTION      CHOLECYSTECTOMY      COLONOSCOPY N/A 10/2/2020    Procedure: COLONOSCOPY;  Surgeon: Guicho Hood MD;  Location: [facility];  Service: Endoscopy;  Laterality: N/A;    EXCISION OF GRANULOMA Bilateral 2021    Procedure: EXCISION, GRANULOMA SCARS OF BREASTS;  Surgeon: Obed Palmer Jr., MD;  Location: [facility];  Service: Plastics;  Laterality: Bilateral;    HYSTERECTOMY      LASER ABLATION      to back    SLEEVE GASTROPLASTY  2016    TOTAL REDUCTION MAMMOPLASTY Bilateral     two years ago    TUBAL LIGATION         Family History:   Family History   Problem Relation Age of Onset    Hypertension Mother     Hypertension Brother     Breast cancer Maternal Aunt         x2     Heart attack Father     Hypertension Sister     Lupus Sister     Breast cancer Maternal Grandmother        Social  History:  reports that she has never smoked. She has never used smokeless tobacco. She reports current alcohol use. She reports that she does not use drugs.    Allergies:  Review of patient's allergies indicates:   Allergen Reactions    Carrot Swelling     angioedema    Morphine Anxiety     Hives   States she can take dilaudid and percocet without any problems    Erythromycin Other (See Comments)     Hives and cramps     Zofran (as hydrochloride) [ondansetron hcl] Hives     Local hive after IV injection       Medications:  Current Outpatient Medications   Medication Sig Dispense Refill    albuterol (PROAIR HFA) 90 mcg/actuation inhaler Inhale 2 puffs into the lungs every 4 (four) hours as needed. 18 g 3    atorvastatin (LIPITOR) 20 MG tablet Take 1 tablet (20 mg total) by mouth once daily. 90 tablet 3    cetirizine (ZYRTEC) 10 MG tablet TAKE 1 TABLET BY MOUTH EVERY DAY 90 tablet 3    cyanocobalamin 1,000 mcg/mL injection Inject 1 mL (1,000 mcg total) into the muscle every 28 days. 10 mL 1    diazePAM (VALIUM) 5 MG tablet Take 1 tablet (5 mg total) by mouth once. for 1 dose 1 tablet 0    EPINEPHrine (EPIPEN 2-DENNIS) 0.3 mg/0.3 mL AtIn Inject 0.3 mLs (0.3 mg total) into the muscle once. for 1 dose 2 each 3    eszopiclone (LUNESTA) 1 MG Tab Take 1 tablet (1 mg total) by mouth nightly. 30 tablet 0    fentaNYL (DURAGESIC) 25 mcg/hr Place 1 patch onto the skin every 72 hours as directed 10 patch 0    HYDROmorphone (DILAUDID) 2 MG tablet take 1 tablet by mouth every six to eight hours as needed for pain 105 tablet 0    hydroxychloroquine (PLAQUENIL) 200 mg tablet Take 200 mg by mouth once daily.  1    hydrOXYzine pamoate (VISTARIL) 25 MG Cap TAKE 1 CAPSULE (25 MG TOTAL) BY MOUTH EVERY 4 (FOUR) HOURS AS NEEDED FOR (ITCHING). 90 capsule 3    naproxen (NAPROSYN) 500 MG tablet Take 1 tablet (500 mg total) by mouth 2 (two) times daily with meals. 30 tablet 0    pantoprazole (PROTONIX) 40 MG tablet Take 1 tablet (40 mg total) by  mouth once daily. 90 tablet 1    semaglutide (OZEMPIC) 0.25 mg or 0.5 mg(2 mg/1.5 mL) pen injector Inject 0.5 mg into the skin once a week. 1 pen 5    tiZANidine (ZANAFLEX) 4 MG tablet Take 4 mg by mouth 3 (three) times daily as needed.      topiramate (TOPAMAX) 100 MG tablet Take 1 tablet (100 mg total) by mouth 2 (two) times daily. 180 tablet 3     No current facility-administered medications for this visit.       Review of Systems   Constitutional:  Negative for fatigue.   HENT:  Negative for sore throat.    Eyes:  Negative for visual disturbance.   Respiratory:  Negative for cough and shortness of breath.    Cardiovascular:  Negative for chest pain.   Gastrointestinal:  Negative for abdominal pain, diarrhea, nausea and vomiting.   Genitourinary:  Negative for dysuria.   Musculoskeletal:  Negative for back pain.   Skin:  Negative for rash.        itching   Neurological:  Negative for headaches.   Hematological:  Negative for adenopathy.   Psychiatric/Behavioral:  The patient is not nervous/anxious.    ECOG Performance Status:   ECOG SCORE 1     Objective:      Vitals:   There were no vitals filed for this visit.  BMI: There is no height or weight on file to calculate BMI.  Deferred due to telemedicine visit.    Physical Exam   Deferred due to telemedicine visit.    Laboratory Data:  Labs have been reviewed.    Lab Results   Component Value Date    WBC 4.65 10/04/2022    HGB 13.0 10/04/2022    HCT 38.7 10/04/2022    MCV 84 10/04/2022     10/04/2022           Imaging:    Assessment:       1. Iron deficiency anemia due to chronic blood loss    2. History of sleeve gastrectomy    3. History of non anemic vitamin B12 deficiency           Plan:     1. Iron deficiency anemia / history of B12 deficiency  - I have reviewed her chart  - labs since 2012 reveal an intermittent, mild normocytic anemia  - iron studies in December 2020 and March 2021 reveal a decreased ferritin/iron/saturated iron with increased total  iron binding capacity.  - She had a documented decreased B12 level on 6/11/19. She has a history of sleeve gastrectomy and takes B12 injections  - cause of iron deficiency may be history of abnormal uterine bleeding (had a hysterectomy in 2018) or poor absorption in setting of gastric sleeve  - she has been taking oral iron for almost 2 years without significant improvement in her iron studies and symptoms.  - I recommend ferric carboxymaltose x 2 doses  - she required a port-a-cath placement due to difficulty finding veins.  - she received ferric carboxymaltose x 2 doses in April/May 2021.  - Labs have been reviewed. Hemoglobin is normal at 13 g/dL. Ferritin is stable at 278 ng/mL.  - continue heparin flushes q3 months. Next one is scheduled on 1/4/23.  - return to clinic in 6 months with repeat labs.    2. Lupus  - on hydroxychloroquine  - has drug-induced neutropenia on intermittent labs.  - defer to rheumatology    3. History of gastric sleeve / B12 deficiency  - had a gastric sleeve in 2016  - B12 level in September 2021 revealed B12 deficiency  - follow-up B12 (10/4/22) is low-normal at 220 pg/mL  - I sent a refill of her B12 injections  - repeat B12 level in 6 months.    4. Advance Care Planning     Power of   After our discussion (at previous visit), the patient decided to complete a HCPOA and appointed her   , health care agent:  Dimitry Kwok ([phone]) .        - return to clinic in 6 months with repeat labs.    Jose Manuel Marino M.D.  Hematology/Oncology  [facility] - [address], [address]  [place] [address]  Phone: [phone]  Fax: [phone]

## 2022-10-06 ENCOUNTER — OFFICE VISIT (OUTPATIENT)
Dept: HEMATOLOGY/ONCOLOGY | Facility: CLINIC | Age: 57
End: 2022-10-06
Payer: COMMERCIAL

## 2022-10-06 DIAGNOSIS — Z86.39 HISTORY OF NON ANEMIC VITAMIN B12 DEFICIENCY: ICD-10-CM

## 2022-10-06 DIAGNOSIS — Z90.3 HISTORY OF SLEEVE GASTRECTOMY: ICD-10-CM

## 2022-10-06 DIAGNOSIS — D50.0 IRON DEFICIENCY ANEMIA DUE TO CHRONIC BLOOD LOSS: Primary | ICD-10-CM

## 2022-10-06 PROCEDURE — 1159F MED LIST DOCD IN RCRD: CPT | Mod: CPTII,95,, | Performed by: INTERNAL MEDICINE

## 2022-10-06 PROCEDURE — 3044F PR MOST RECENT HEMOGLOBIN A1C LEVEL <7.0%: ICD-10-PCS | Mod: CPTII,95,, | Performed by: INTERNAL MEDICINE

## 2022-10-06 PROCEDURE — 1160F PR REVIEW ALL MEDS BY PRESCRIBER/CLIN PHARMACIST DOCUMENTED: ICD-10-PCS | Mod: CPTII,95,, | Performed by: INTERNAL MEDICINE

## 2022-10-06 PROCEDURE — 3044F HG A1C LEVEL LT 7.0%: CPT | Mod: CPTII,95,, | Performed by: INTERNAL MEDICINE

## 2022-10-06 PROCEDURE — 99213 PR OFFICE/OUTPT VISIT, EST, LEVL III, 20-29 MIN: ICD-10-PCS | Mod: 95,,, | Performed by: INTERNAL MEDICINE

## 2022-10-06 PROCEDURE — 1159F PR MEDICATION LIST DOCUMENTED IN MEDICAL RECORD: ICD-10-PCS | Mod: CPTII,95,, | Performed by: INTERNAL MEDICINE

## 2022-10-06 PROCEDURE — 1160F RVW MEDS BY RX/DR IN RCRD: CPT | Mod: CPTII,95,, | Performed by: INTERNAL MEDICINE

## 2022-10-06 PROCEDURE — 99213 OFFICE O/P EST LOW 20 MIN: CPT | Mod: 95,,, | Performed by: INTERNAL MEDICINE

## 2022-10-06 RX ORDER — CYANOCOBALAMIN 1000 UG/ML
1000 INJECTION, SOLUTION INTRAMUSCULAR; SUBCUTANEOUS
Qty: 10 ML | Refills: 1 | Status: SHIPPED | OUTPATIENT
Start: 2022-10-06 | End: 2023-04-10 | Stop reason: SDUPTHER

## 2022-10-06 NOTE — Clinical Note
1. Schedule labs cbc, ferritin, iron/tibc, B12 at [facility] in beginning of April 2023  2. Schedule f/u virtual visit 1-2 days after labs.   Thanks!

## 2022-10-08 ENCOUNTER — PATIENT MESSAGE (OUTPATIENT)
Dept: INTERNAL MEDICINE | Facility: CLINIC | Age: 57
End: 2022-10-08
Payer: COMMERCIAL

## 2022-10-08 DIAGNOSIS — G47.00 INSOMNIA, UNSPECIFIED TYPE: ICD-10-CM
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
## 2022-10-13 ENCOUNTER — CLINICAL SUPPORT (OUTPATIENT)
Dept: REHABILITATION | Facility: HOSPITAL | Age: 57
End: 2022-10-13
Payer: OTHER MISCELLANEOUS

## 2022-10-13 DIAGNOSIS — M25.511 ACUTE PAIN OF RIGHT SHOULDER: Primary | ICD-10-CM

## 2022-10-13 PROCEDURE — 97110 THERAPEUTIC EXERCISES: CPT | Mod: PN

## 2022-10-13 PROCEDURE — 97140 MANUAL THERAPY 1/> REGIONS: CPT | Mod: PN

## 2022-10-14 ENCOUNTER — OFFICE VISIT (OUTPATIENT)
Dept: INTERNAL MEDICINE | Facility: CLINIC | Age: 57
End: 2022-10-14
Payer: COMMERCIAL

## 2022-10-14 ENCOUNTER — HOSPITAL ENCOUNTER (OUTPATIENT)
Dept: RADIOLOGY | Facility: HOSPITAL | Age: 57
Discharge: HOME | End: 2022-10-14
Attending: PHYSICIAN ASSISTANT
Payer: COMMERCIAL

## 2022-10-14 DIAGNOSIS — I10 PRIMARY HYPERTENSION: Primary | ICD-10-CM

## 2022-10-14 DIAGNOSIS — G47.00 INSOMNIA, UNSPECIFIED TYPE: ICD-10-CM

## 2022-10-14 DIAGNOSIS — F11.90 CHRONIC, CONTINUOUS USE OF OPIOIDS: Chronic | ICD-10-CM

## 2022-10-14 DIAGNOSIS — F41.9 ANXIETY: ICD-10-CM

## 2022-10-14 PROCEDURE — 3078F DIAST BP <80 MM HG: CPT | Mod: CPTII,S$GLB,, | Performed by: NURSE PRACTITIONER

## 2022-10-14 PROCEDURE — 99999 PR PBB SHADOW E&M-EST. PATIENT-LVL V: ICD-10-PCS | Mod: PBBFAC,,, | Performed by: NURSE PRACTITIONER

## 2022-10-14 PROCEDURE — 73221 MRI SHOULDER WITHOUT CONTRAST RIGHT: ICD-10-PCS | Mod: 26,RT,, | Performed by: RADIOLOGY

## 2022-10-14 PROCEDURE — 3044F HG A1C LEVEL LT 7.0%: CPT | Mod: CPTII,S$GLB,, | Performed by: NURSE PRACTITIONER

## 2022-10-14 PROCEDURE — 99214 OFFICE O/P EST MOD 30 MIN: CPT | Mod: S$GLB,,, | Performed by: NURSE PRACTITIONER

## 2022-10-14 PROCEDURE — 73221 MRI JOINT UPR EXTREM W/O DYE: CPT | Mod: 26,RT,, | Performed by: RADIOLOGY

## 2022-10-14 PROCEDURE — 3044F PR MOST RECENT HEMOGLOBIN A1C LEVEL <7.0%: ICD-10-PCS | Mod: CPTII,S$GLB,, | Performed by: NURSE PRACTITIONER

## 2022-10-14 PROCEDURE — 99214 PR OFFICE/OUTPT VISIT, EST, LEVL IV, 30-39 MIN: ICD-10-PCS | Mod: S$GLB,,, | Performed by: NURSE PRACTITIONER

## 2022-10-14 PROCEDURE — 99999 PR PBB SHADOW E&M-EST. PATIENT-LVL V: CPT | Mod: PBBFAC,,, | Performed by: NURSE PRACTITIONER

## 2022-10-14 PROCEDURE — 3078F PR MOST RECENT DIASTOLIC BLOOD PRESSURE < 80 MM HG: ICD-10-PCS | Mod: CPTII,S$GLB,, | Performed by: NURSE PRACTITIONER

## 2022-10-14 PROCEDURE — 3074F SYST BP LT 130 MM HG: CPT | Mod: CPTII,S$GLB,, | Performed by: NURSE PRACTITIONER

## 2022-10-14 PROCEDURE — 73221 MRI JOINT UPR EXTREM W/O DYE: CPT | Mod: TC,RT

## 2022-10-14 PROCEDURE — 3074F PR MOST RECENT SYSTOLIC BLOOD PRESSURE < 130 MM HG: ICD-10-PCS | Mod: CPTII,S$GLB,, | Performed by: NURSE PRACTITIONER

## 2022-10-14 RX ORDER — ESZOPICLONE 2 MG/1
2 TABLET, FILM COATED ORAL NIGHTLY
Qty: 30 TABLET | Refills: 2 | Status: SHIPPED | OUTPATIENT
Start: 2022-10-14 | End: 2023-01-28 | Stop reason: SDUPTHER

## 2022-10-14 RX ORDER — HYDROCHLOROTHIAZIDE 12.5 MG/1
12.5 TABLET ORAL DAILY
Qty: 30 TABLET | Refills: 0 | Status: SHIPPED | OUTPATIENT
Start: 2022-10-14 | End: 2022-10-31 | Stop reason: SDUPTHER

## 2022-10-14 NOTE — PROGRESS NOTES
Subjective:       Patient ID: Tsoha Kwok is a 57 y.o. female.    Chief Complaint: Insomnia and Follow-up    Mrs. Kwok presents to visit with complaint of continued insomnia. Wakes up around 12-2 am. Starts panicking in the middle of night due to thinking that she has to wake up in a couple of hours for work. Denies any side effects since trying lunesta. Does not have any anxiety during the day or outside of episodes when she wakes up from sleep. Was on lexapro in past without any side effects.     Patient Active Problem List   Diagnosis    Atrophy of vulva    Ankylosing spondylitis    Chronic, continuous use of opioids    Fibromyalgia    Elevated d-dimer    History of sleeve gastrectomy    Intractable pain    De Quervain's tenosynovitis, right    Adhesive capsulitis of right shoulder    Anxiety    Asthma    Lumbosacral spondylosis    GERD (gastroesophageal reflux disease)    HTN (hypertension)    Hyperlipidemia    Insulin resistance    Large joint arthralgia of multiple sites    Lupus (systemic lupus erythematosus)    Malignant neoplasm of female breast    Migraine    Postmenopausal hormone replacement therapy    Vitamin D deficiency    Impingement syndrome of left shoulder    Limited range of motion (ROM) of shoulder    Encounter to establish care    Left foot pain    Vestibular hypofunction    Other iron deficiency anemias    Keloid scar of skin    Iron deficiency anemia due to chronic blood loss    Restless leg syndrome    Chest pain    Insomnia    Port-A-Cath in place    TIA (transient ischemic attack)    History of non anemic vitamin B12 deficiency    Acute pain of right shoulder    Weight gain    Class 2 obesity due to excess calories without serious comorbidity with body mass index (BMI) of 36.0 to 36.9 in adult       Family History   Problem Relation Age of Onset    Hypertension Mother     Hypertension Brother     Breast cancer Maternal Aunt         x2     Heart attack Father     Hypertension Sister      Lupus Sister     Breast cancer Maternal Grandmother      Past Surgical History:   Procedure Laterality Date    BLADDER SURGERY      BREAST LUMPECTOMY  2018    BREAST RECONSTRUCTION      BREAST SURGERY  2018    reduction     SECTION      CHOLECYSTECTOMY      COLONOSCOPY N/A 10/2/2020    Procedure: COLONOSCOPY;  Surgeon: Guicho Hood MD;  Location: [facility];  Service: Endoscopy;  Laterality: N/A;    EXCISION OF GRANULOMA Bilateral 2021    Procedure: EXCISION, GRANULOMA SCARS OF BREASTS;  Surgeon: Obed Palmer Jr., MD;  Location: [facility];  Service: Plastics;  Laterality: Bilateral;    HYSTERECTOMY      LASER ABLATION      to back    SLEEVE GASTROPLASTY  2016    TOTAL REDUCTION MAMMOPLASTY Bilateral     two years ago    TUBAL LIGATION           Current Outpatient Medications:     albuterol (PROAIR HFA) 90 mcg/actuation inhaler, Inhale 2 puffs into the lungs every 4 (four) hours as needed., Disp: 18 g, Rfl: 3    atorvastatin (LIPITOR) 20 MG tablet, Take 1 tablet (20 mg total) by mouth once daily., Disp: 90 tablet, Rfl: 3    cetirizine (ZYRTEC) 10 MG tablet, TAKE 1 TABLET BY MOUTH EVERY DAY, Disp: 90 tablet, Rfl: 3    cyanocobalamin 1,000 mcg/mL injection, Inject 1 mL (1,000 mcg total) into the muscle every 28 days., Disp: 10 mL, Rfl: 1    diazePAM (VALIUM) 5 MG tablet, Take 1 tablet (5 mg total) by mouth once. for 1 dose, Disp: 1 tablet, Rfl: 0    EPINEPHrine (EPIPEN 2-DENNIS) 0.3 mg/0.3 mL AtIn, Inject 0.3 mLs (0.3 mg total) into the muscle once. for 1 dose, Disp: 2 each, Rfl: 3    eszopiclone (LUNESTA) 2 MG Tab, Take 1 tablet (2 mg total) by mouth nightly., Disp: 30 tablet, Rfl: 2    fentaNYL (DURAGESIC) 25 mcg/hr, Place 1 patch onto the skin every 72 hours as directed, Disp: 10 patch, Rfl: 0    hydroCHLOROthiazide (HYDRODIURIL) 12.5 MG Tab, Take 1 tablet (12.5 mg total) by mouth once daily., Disp: 30 tablet, Rfl: 0    HYDROmorphone (DILAUDID) 2 MG tablet, take 1 tablet by mouth every six to  "eight hours as needed for pain, Disp: 105 tablet, Rfl: 0    hydroxychloroquine (PLAQUENIL) 200 mg tablet, Take 200 mg by mouth once daily., Disp: , Rfl: 1    hydrOXYzine pamoate (VISTARIL) 25 MG Cap, TAKE 1 CAPSULE (25 MG TOTAL) BY MOUTH EVERY 4 (FOUR) HOURS AS NEEDED FOR (ITCHING)., Disp: 90 capsule, Rfl: 3    naproxen (NAPROSYN) 500 MG tablet, Take 1 tablet (500 mg total) by mouth 2 (two) times daily with meals., Disp: 30 tablet, Rfl: 0    pantoprazole (PROTONIX) 40 MG tablet, Take 1 tablet (40 mg total) by mouth once daily., Disp: 90 tablet, Rfl: 1    semaglutide (OZEMPIC) 0.25 mg or 0.5 mg(2 mg/1.5 mL) pen injector, Inject 0.5 mg into the skin once a week., Disp: 1 pen, Rfl: 5    tiZANidine (ZANAFLEX) 4 MG tablet, Take 4 mg by mouth 3 (three) times daily as needed., Disp: , Rfl:     topiramate (TOPAMAX) 100 MG tablet, Take 1 tablet (100 mg total) by mouth 2 (two) times daily., Disp: 180 tablet, Rfl: 3    Review of Systems   Constitutional:  Positive for fatigue. Negative for activity change and unexpected weight change.   HENT:  Negative for hearing loss, rhinorrhea and trouble swallowing.    Eyes:  Negative for discharge and visual disturbance.   Respiratory:  Negative for chest tightness and wheezing.    Cardiovascular:  Negative for chest pain and palpitations.   Gastrointestinal:  Negative for blood in stool, constipation, diarrhea and vomiting.   Endocrine: Negative for polydipsia and polyuria.   Genitourinary:  Negative for difficulty urinating, dysuria, hematuria and menstrual problem.   Musculoskeletal:  Negative for arthralgias, joint swelling and neck pain.   Neurological:  Negative for weakness and headaches.   Psychiatric/Behavioral:  Positive for sleep disturbance. Negative for confusion and dysphoric mood. The patient is nervous/anxious (in the middle of night).      Objective:   /79   Pulse 83   Temp 97.9 °F (36.6 °C) (Temporal)   Resp 16   Ht 5' 3" (1.6 m)   Wt 87 kg (191 lb 12.8 oz)  " " LMP 03/26/2012   SpO2 97%   BMI 33.98 kg/m²      Physical Exam  Vitals reviewed.   Constitutional:       General: She is not in acute distress.     Appearance: Normal appearance. She is well-developed. She is not ill-appearing.   HENT:      Head: Normocephalic.   Cardiovascular:      Rate and Rhythm: Normal rate and regular rhythm.      Pulses: Normal pulses.      Heart sounds: Normal heart sounds. No murmur heard.    No friction rub. No gallop.   Pulmonary:      Effort: Pulmonary effort is normal. No respiratory distress.      Breath sounds: Normal breath sounds. No rales.   Abdominal:      Palpations: Abdomen is soft.      Tenderness: There is no abdominal tenderness. There is no guarding.   Skin:     General: Skin is warm and dry.      Coloration: Skin is not pale.      Findings: No erythema.   Neurological:      Mental Status: She is alert and oriented to person, place, and time.   Psychiatric:         Mood and Affect: Mood normal.         Behavior: Behavior normal.       Assessment & Plan     Problem List Items Addressed This Visit          Psychiatric    Chronic, continuous use of opioids (Chronic)    Current Assessment & Plan     Discussed not taking opioids with lunesta due to sedative side effects         Anxiety    Current Assessment & Plan     Reports most of her anxiety is due to waking up in the middle of the night and not getting adequate sleep. Trial of increasing lunesta. If no improvement, will try SSRI.             Cardiac/Vascular    HTN (hypertension) - Primary    Relevant Medications    hydroCHLOROthiazide (HYDRODIURIL) 12.5 MG Tab       Other    Insomnia    Relevant Medications    eszopiclone (LUNESTA) 2 MG Tab       Follow up in about 3 months (around 1/14/2023).          Portions of this note may have been created with voice recognition software. Occasional "wrong-word" or "sound-a-like" substitutions may have occurred due to the inherent limitations of voice recognition software. Please, " read the note carefully and recognize, using context, where substitutions have occurred.

## 2022-10-21 ENCOUNTER — PATIENT MESSAGE (OUTPATIENT)
Dept: ADMINISTRATIVE | Facility: OTHER | Age: 57
End: 2022-10-21
Payer: COMMERCIAL

## 2022-10-21 ENCOUNTER — HOSPITAL ENCOUNTER (EMERGENCY)
Facility: HOSPITAL | Age: 57
Discharge: HOME | End: 2022-10-22
Attending: EMERGENCY MEDICINE
Payer: COMMERCIAL

## 2022-10-21 ENCOUNTER — OFFICE VISIT (OUTPATIENT)
Dept: URGENT CARE | Facility: CLINIC | Age: 57
End: 2022-10-21
Payer: OTHER MISCELLANEOUS

## 2022-10-21 VITALS
RESPIRATION RATE: 11 BRPM | HEART RATE: 100 BPM | WEIGHT: 185.19 LBS | SYSTOLIC BLOOD PRESSURE: 120 MMHG | TEMPERATURE: 99 F | BODY MASS INDEX: 32.8 KG/M2 | DIASTOLIC BLOOD PRESSURE: 66 MMHG | OXYGEN SATURATION: 94 %

## 2022-10-21 DIAGNOSIS — R07.9 CHEST PAIN: Primary | ICD-10-CM

## 2022-10-21 DIAGNOSIS — M25.511 CHRONIC RIGHT SHOULDER PAIN: ICD-10-CM

## 2022-10-21 DIAGNOSIS — S43.431D LABRAL TEAR OF SHOULDER, RIGHT, SUBSEQUENT ENCOUNTER: ICD-10-CM

## 2022-10-21 DIAGNOSIS — R20.0 NUMBNESS AND TINGLING OF RIGHT ARM: ICD-10-CM

## 2022-10-21 DIAGNOSIS — S46.911D STRAIN OF RIGHT SHOULDER, SUBSEQUENT ENCOUNTER: Primary | ICD-10-CM

## 2022-10-21 DIAGNOSIS — R20.2 NUMBNESS AND TINGLING OF RIGHT ARM: ICD-10-CM

## 2022-10-21 DIAGNOSIS — G89.29 CHRONIC RIGHT SHOULDER PAIN: ICD-10-CM

## 2022-10-21 LAB
ALBUMIN SERPL BCP-MCNC: 4.6 G/DL (ref 3.5–5.2)
ALP SERPL-CCNC: 80 U/L (ref 55–135)
ALT SERPL W/O P-5'-P-CCNC: 11 U/L (ref 10–44)
ANION GAP SERPL CALC-SCNC: 16 MMOL/L (ref 8–16)
AST SERPL-CCNC: 18 U/L (ref 10–40)
BASOPHILS # BLD AUTO: 0.05 K/UL (ref 0–0.2)
BASOPHILS NFR BLD: 0.8 % (ref 0–1.9)
BILIRUB SERPL-MCNC: 0.8 MG/DL (ref 0.1–1)
BNP SERPL-MCNC: <10 PG/ML (ref 0–99)
BUN SERPL-MCNC: 7 MG/DL (ref 6–20)
CALCIUM SERPL-MCNC: 10.1 MG/DL (ref 8.7–10.5)
CHLORIDE SERPL-SCNC: 102 MMOL/L (ref 95–110)
CO2 SERPL-SCNC: 23 MMOL/L (ref 23–29)
CREAT SERPL-MCNC: 0.8 MG/DL (ref 0.5–1.4)
D DIMER PPP IA.FEU-MCNC: 0.58 MG/L FEU
DIFFERENTIAL METHOD: NORMAL
EOSINOPHIL # BLD AUTO: 0.1 K/UL (ref 0–0.5)
EOSINOPHIL NFR BLD: 0.9 % (ref 0–8)
ERYTHROCYTE [DISTWIDTH] IN BLOOD BY AUTOMATED COUNT: 12.6 % (ref 11.5–14.5)
EST. GFR  (NO RACE VARIABLE): >60 ML/MIN/1.73 M^2
GLUCOSE SERPL-MCNC: 92 MG/DL (ref 70–110)
HCT VFR BLD AUTO: 39.5 % (ref 37–48.5)
HGB BLD-MCNC: 13.5 G/DL (ref 12–16)
IMM GRANULOCYTES # BLD AUTO: 0.02 K/UL (ref 0–0.04)
IMM GRANULOCYTES NFR BLD AUTO: 0.3 % (ref 0–0.5)
LYMPHOCYTES # BLD AUTO: 2.4 K/UL (ref 1–4.8)
LYMPHOCYTES NFR BLD: 37.5 % (ref 18–48)
MCH RBC QN AUTO: 28.6 PG (ref 27–31)
MCHC RBC AUTO-ENTMCNC: 34.2 G/DL (ref 32–36)
MCV RBC AUTO: 84 FL (ref 82–98)
MONOCYTES # BLD AUTO: 0.5 K/UL (ref 0.3–1)
MONOCYTES NFR BLD: 7.1 % (ref 4–15)
NEUTROPHILS # BLD AUTO: 3.4 K/UL (ref 1.8–7.7)
NEUTROPHILS NFR BLD: 53.4 % (ref 38–73)
NRBC BLD-RTO: 0 /100 WBC
PLATELET # BLD AUTO: 357 K/UL (ref 150–450)
PMV BLD AUTO: 9.3 FL (ref 9.2–12.9)
POTASSIUM SERPL-SCNC: 3.1 MMOL/L (ref 3.5–5.1)
PROT SERPL-MCNC: 8.2 G/DL (ref 6–8.4)
RBC # BLD AUTO: 4.72 M/UL (ref 4–5.4)
SODIUM SERPL-SCNC: 141 MMOL/L (ref 136–145)
TROPONIN I SERPL DL<=0.01 NG/ML-MCNC: 0.01 NG/ML (ref 0–0.03)
TROPONIN I SERPL DL<=0.01 NG/ML-MCNC: <0.006 NG/ML (ref 0–0.03)
WBC # BLD AUTO: 6.34 K/UL (ref 3.9–12.7)

## 2022-10-21 PROCEDURE — 93005 ELECTROCARDIOGRAM TRACING: CPT | Mod: ER

## 2022-10-21 PROCEDURE — 80053 COMPREHEN METABOLIC PANEL: CPT | Mod: ER | Performed by: EMERGENCY MEDICINE

## 2022-10-21 PROCEDURE — 93010 ELECTROCARDIOGRAM REPORT: CPT | Mod: ,,, | Performed by: INTERNAL MEDICINE

## 2022-10-21 PROCEDURE — 99214 PR OFFICE/OUTPT VISIT, EST, LEVL IV, 30-39 MIN: ICD-10-PCS | Mod: S$GLB,,, | Performed by: NURSE PRACTITIONER

## 2022-10-21 PROCEDURE — 83880 ASSAY OF NATRIURETIC PEPTIDE: CPT | Mod: ER | Performed by: EMERGENCY MEDICINE

## 2022-10-21 PROCEDURE — 99285 EMERGENCY DEPT VISIT HI MDM: CPT | Mod: 25,ER

## 2022-10-21 PROCEDURE — 25000003 PHARM REV CODE 250: Mod: ER | Performed by: EMERGENCY MEDICINE

## 2022-10-21 PROCEDURE — 63600175 PHARM REV CODE 636 W HCPCS: Mod: ER | Performed by: EMERGENCY MEDICINE

## 2022-10-21 PROCEDURE — 99214 OFFICE O/P EST MOD 30 MIN: CPT | Mod: S$GLB,,, | Performed by: NURSE PRACTITIONER

## 2022-10-21 PROCEDURE — 96365 THER/PROPH/DIAG IV INF INIT: CPT | Mod: ER

## 2022-10-21 PROCEDURE — 93010 EKG 12-LEAD: ICD-10-PCS | Mod: ,,, | Performed by: INTERNAL MEDICINE

## 2022-10-21 PROCEDURE — 85379 FIBRIN DEGRADATION QUANT: CPT | Mod: ER | Performed by: EMERGENCY MEDICINE

## 2022-10-21 PROCEDURE — 96375 TX/PRO/DX INJ NEW DRUG ADDON: CPT | Mod: ER

## 2022-10-21 PROCEDURE — 85025 COMPLETE CBC W/AUTO DIFF WBC: CPT | Mod: ER | Performed by: EMERGENCY MEDICINE

## 2022-10-21 PROCEDURE — 84484 ASSAY OF TROPONIN QUANT: CPT | Mod: 91,ER | Performed by: EMERGENCY MEDICINE

## 2022-10-21 RX ORDER — HYDROMORPHONE HYDROCHLORIDE 2 MG/ML
0.5 INJECTION, SOLUTION INTRAMUSCULAR; INTRAVENOUS; SUBCUTANEOUS
Status: COMPLETED | OUTPATIENT
Start: 2022-10-21 | End: 2022-10-21

## 2022-10-21 RX ORDER — ASPIRIN 325 MG
325 TABLET ORAL
Status: COMPLETED | OUTPATIENT
Start: 2022-10-21 | End: 2022-10-21

## 2022-10-21 RX ORDER — POTASSIUM CHLORIDE 20 MEQ/1
40 TABLET, EXTENDED RELEASE ORAL
Status: COMPLETED | OUTPATIENT
Start: 2022-10-21 | End: 2022-10-21

## 2022-10-21 RX ADMIN — ASPIRIN 325 MG: 325 TABLET ORAL at 09:10

## 2022-10-21 RX ADMIN — PROMETHAZINE HYDROCHLORIDE 12.5 MG: 25 INJECTION INTRAMUSCULAR; INTRAVENOUS at 09:10

## 2022-10-21 RX ADMIN — HYDROMORPHONE HYDROCHLORIDE 0.5 MG: 2 INJECTION INTRAMUSCULAR; INTRAVENOUS; SUBCUTANEOUS at 09:10

## 2022-10-21 RX ADMIN — POTASSIUM CHLORIDE 40 MEQ: 1500 TABLET, EXTENDED RELEASE ORAL at 09:10

## 2022-10-21 NOTE — PROGRESS NOTES
Subjective:       Patient ID: Tosha Kwok is a 57 y.o. female.    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 Pain score 3/10 with complaints of Constant Dull Aching pain of the RT Shoulder with Intermittent Numbness of RT Hand, No swelling, Tenderness to the Touch, RT Hand turns blue. RT Wrist improving. Taking Naproxen. SH    She completed PT last week. Had MRI of R shoulder. Still having pain and very limited ROM. Taking Naprosyn as prescribed. MWT    Shoulder Pain   Associated symptoms include a limited range of motion and numbness.   Wrist Pain   Associated symptoms include a limited range of motion and numbness.   Gastrointestinal:  Negative for abdominal pain, nausea and vomiting.   Musculoskeletal:  Positive for joint pain, abnormal ROM of joint and muscle ache. Negative for pain, trauma, joint swelling and muscle cramps.   Skin:  Negative for erythema and bruising.   Neurological:  Positive for numbness. Negative for tingling.      Objective:      Physical Exam  Constitutional:       General: She is not in acute distress.     Appearance: Normal appearance.   HENT:      Right Ear: External ear normal.      Left Ear: External ear normal.   Eyes:      Conjunctiva/sclera: Conjunctivae normal.   Cardiovascular:      Pulses: Normal pulses.   Pulmonary:      Effort: Pulmonary effort is normal.   Musculoskeletal:         General: Tenderness present.      Right shoulder: Tenderness present. Decreased range of motion. Decreased strength. Normal pulse.        Arms:       Comments: Very limited ROM to R shoulder. Positive impingement and empty can testing.  FROM to neck without pain.  Strong radial pulse. C/o numbness to hand.   Skin:     General: Skin is warm and dry.      Findings: No erythema.   Neurological:      General: No focal deficit present.      Mental  Status: She is alert and oriented to person, place, and time.   Psychiatric:         Mood and Affect: Mood normal.         Behavior: Behavior normal.         Thought Content: Thought content normal.         Judgment: Judgment normal.       Assessment:       1. Strain of right shoulder, subsequent encounter    2. Numbness and tingling of right arm    3. Chronic right shoulder pain    4. Labral tear of shoulder, right, subsequent encounter        Plan:     MRI Shoulder Without Contrast Right    Result Date: 10/17/2022  EXAMINATION: MRI SHOULDER WITHOUT CONTRAST RIGHT CLINICAL HISTORY: Shoulder pain, rotator cuff disorder suspected, xray done;  Pain in right shoulder TECHNIQUE: Multisequence, multiplanar MR imaging of the shoulder performed without contrast. COMPARISON: Radiographs and CT from May of this year FINDINGS: Rotator cuff: Supraspinatus: Intact without evidence of any significant tear.  Minimal articular sided fraying involving the distal aspect. Infraspinatus: Intact.  No tear. Subscapularis: Intact.  Redemonstration of lipoma along the anterior subscapularis measuring on the order of 6.3 cm.. Teres minor: Intact Muscle bulk is maintained. Labrum: Limited evaluation on this non-arthrographic study.  No perilabral cyst.  Degenerative changes with fraying of the labrum.  Probable tear involving the anterior superior aspect of the labrum. Biceps: Long head biceps tendon is intact. Bone: No fracture present.  Intraosseous cystic changes involving the humeral head.  Bone marrow signal is unremarkable. Acromioclavicular joint:Mild degenerative changes. Cartilage: Articular cartilage of the glenohumeral joint is preserved Miscellaneous: No significant joint effusion.  No subacromial bursal fluid present.     Degenerative changes, subscapularis lipoma, and other ancillary findings as above. Electronically signed by: Allan Ashford MD Date:    10/17/2022 Time:    06:50       I have reviewed the MRI report with   Nina and with the patient. It shows degenerative changes and fraying but also probable tear of the anterior superior aspect of the labrum. Since she has continued to have limited ROM and pain an Orthopedic referral has been made. She has Naprosyn to take at home.       Patient Instructions: Attention not to aggravate affected area, Daily home exercises/warm soaks, Referral to specialist to be scheduled, once authorized   Restrictions: No lifting/pushing/pulling more than 10 lbs, No above the shoulder/overhead work, Limited use of right hand and arm, Discharged to Ortho/Neuro/Opthomologist/Surgeon  Follow up in about 2 weeks (around 11/4/2022).

## 2022-10-21 NOTE — LETTER
[facility]  [address] [address]  Phone: [phone]  Fax: [phone]  [facility] [employer]: [email or web address]    Pt Name: Tosha Kwok  Injury Date: 05/04/2022   Employee ID: [id number] Date of Treatment: 10/21/2022   Company: [employer] reference to record [employer] [employer]      Appointment Time: 3:00pm Arrived: 5:54pm   Provider: Deb Anderson NP Time Out: 4:00pm     Office Treatment:   1. Strain of right shoulder, subsequent encounter    2. Numbness and tingling of right arm    3. Chronic right shoulder pain    4. Labral tear of shoulder, right, subsequent encounter          Patient Instructions: Attention not to aggravate affected area, Daily home exercises/warm soaks, Referral to specialist to be scheduled, once authorized      Restrictions: No lifting/pushing/pulling more than 10 lbs, No above the shoulder/overhead work, Limited use of right hand and arm, Discharged to Ortho/Neuro/Opthomologist/Surgeon     Return Appointment: 11/4/2022 at 4:00pm.    EC

## 2022-10-22 PROCEDURE — 63600175 PHARM REV CODE 636 W HCPCS: Mod: ER | Performed by: EMERGENCY MEDICINE

## 2022-10-22 RX ORDER — HEPARIN 100 UNIT/ML
5 SYRINGE INTRAVENOUS
Status: COMPLETED | OUTPATIENT
Start: 2022-10-22 | End: 2022-10-22

## 2022-10-22 RX ADMIN — HEPARIN 500 UNITS: 100 SYRINGE at 12:10

## 2022-10-22 NOTE — ED PROVIDER NOTES
Encounter Date: 10/21/2022       History     Chief Complaint   Patient presents with    Chest Pain     C/o chest pain onset today.      The history is provided by the patient.   Chest Pain  The current episode started today. Chest pain occurs constantly. The chest pain is unchanged. At its most intense, the chest pain is at 10/10. The chest pain is currently at 10/10. The quality of the pain is described as pleuritic and sharp. The pain does not radiate. Pertinent negatives for primary symptoms include no fever, no fatigue, no syncope, no shortness of breath, no cough, no wheezing, no palpitations, no abdominal pain and no dizziness.   Pertinent negatives for associated symptoms include no numbness. She tried nothing for the symptoms.   Review of patient's allergies indicates:   Allergen Reactions    Carrot Swelling     angioedema    Morphine Anxiety     Hives   States she can take dilaudid and percocet without any problems    Clindamycin     Macrolide antibiotics     Erythromycin Other (See Comments)     Hives and cramps     Zofran (as hydrochloride) [ondansetron hcl] Hives     Local hive after IV injection     Past Medical History:   Diagnosis Date    Anemia     Ankylosing spondylitis     Anxiety     Asthma     Cancer 2018    left breast  lumpectomy, xrt    Chronic constipation     Chronic insomnia     Edema     Fibromyalgia     Hypertension     Insulin resistance     Interstitial cystitis     Lupus     Lupus     Migraine headache     PONV (postoperative nausea and vomiting)     Restless legs syndrome     Shingles     Stroke 1998    post partum right sided numbness    TIA (transient ischemic attack)      Past Surgical History:   Procedure Laterality Date    BLADDER SURGERY      BREAST LUMPECTOMY  2018    BREAST RECONSTRUCTION      BREAST SURGERY  2018    reduction     SECTION      CHOLECYSTECTOMY      COLONOSCOPY N/A 10/2/2020    Procedure: COLONOSCOPY;  Surgeon: Guicho Hood MD;  Location: [facility]  ENDO;  Service: Endoscopy;  Laterality: N/A;    EXCISION OF GRANULOMA Bilateral 4/6/2021    Procedure: EXCISION, GRANULOMA SCARS OF BREASTS;  Surgeon: Obed Palmer Jr., MD;  Location: [facility];  Service: Plastics;  Laterality: Bilateral;    HYSTERECTOMY      LASER ABLATION      to back    SLEEVE GASTROPLASTY  05/2016    TOTAL REDUCTION MAMMOPLASTY Bilateral     two years ago    TUBAL LIGATION       Family History   Problem Relation Age of Onset    Hypertension Mother     Hypertension Brother     Breast cancer Maternal Aunt         x2     Heart attack Father     Hypertension Sister     Lupus Sister     Breast cancer Maternal Grandmother      Social History     Tobacco Use    Smoking status: Never    Smokeless tobacco: Never   Substance Use Topics    Alcohol use: Yes     Comment: occasionally    Drug use: No     Review of Systems   Constitutional:  Negative for fatigue and fever.   HENT:  Negative for congestion.    Eyes:  Negative for photophobia.   Respiratory:  Negative for cough, shortness of breath and wheezing.    Cardiovascular:  Positive for chest pain. Negative for palpitations and syncope.   Gastrointestinal:  Negative for abdominal pain.   Genitourinary:  Negative for dysuria.   Musculoskeletal:  Negative for back pain.   Skin:  Negative for rash.   Neurological:  Negative for dizziness and numbness.   Hematological:  Does not bruise/bleed easily.     Physical Exam     Initial Vitals [10/21/22 2048]   BP Pulse Resp Temp SpO2   (!) 166/94 (!) 116 18 98.8 °F (37.1 °C) 98 %      MAP       --         Physical Exam    Nursing note and vitals reviewed.  Constitutional: She appears well-developed and well-nourished. She appears distressed.   HENT:   Head: Normocephalic and atraumatic.   Mouth/Throat: Oropharynx is clear and moist.   Eyes: Conjunctivae and EOM are normal. Pupils are equal, round, and reactive to light.   Neck: Neck supple.   Normal range of motion.  Cardiovascular:  Normal rate, regular  rhythm and normal heart sounds.           Pulmonary/Chest: Breath sounds normal. No respiratory distress.   Abdominal: Abdomen is soft. Bowel sounds are normal. She exhibits no distension. There is no abdominal tenderness.   Musculoskeletal:         General: Normal range of motion.      Cervical back: Normal range of motion and neck supple.     Neurological: She is alert and oriented to person, place, and time. She has normal strength.   Skin: Skin is warm and dry.   Psychiatric: She has a normal mood and affect. Thought content normal.       ED Course   Procedures  Labs Reviewed   COMPREHENSIVE METABOLIC PANEL - Abnormal; Notable for the following components:       Result Value    Potassium 3.1 (*)     All other components within normal limits   D DIMER, QUANTITATIVE - Abnormal; Notable for the following components:    D-Dimer 0.58 (*)     All other components within normal limits   CBC W/ AUTO DIFFERENTIAL   TROPONIN I   B-TYPE NATRIURETIC PEPTIDE   TROPONIN I     Results for orders placed or performed during the hospital encounter of 10/21/22   CBC auto differential   Result Value Ref Range    WBC 6.34 3.90 - 12.70 K/uL    RBC 4.72 4.00 - 5.40 M/uL    Hemoglobin 13.5 12.0 - 16.0 g/dL    Hematocrit 39.5 37.0 - 48.5 %    MCV 84 82 - 98 fL    MCH 28.6 27.0 - 31.0 pg    MCHC 34.2 32.0 - 36.0 g/dL    RDW 12.6 11.5 - 14.5 %    Platelets 357 150 - 450 K/uL    MPV 9.3 9.2 - 12.9 fL    Immature Granulocytes 0.3 0.0 - 0.5 %    Gran # (ANC) 3.4 1.8 - 7.7 K/uL    Immature Grans (Abs) 0.02 0.00 - 0.04 K/uL    Lymph # 2.4 1.0 - 4.8 K/uL    Mono # 0.5 0.3 - 1.0 K/uL    Eos # 0.1 0.0 - 0.5 K/uL    Baso # 0.05 0.00 - 0.20 K/uL    nRBC 0 0 /100 WBC    Gran % 53.4 38.0 - 73.0 %    Lymph % 37.5 18.0 - 48.0 %    Mono % 7.1 4.0 - 15.0 %    Eosinophil % 0.9 0.0 - 8.0 %    Basophil % 0.8 0.0 - 1.9 %    Differential Method Automated    Comprehensive metabolic panel   Result Value Ref Range    Sodium 141 136 - 145 mmol/L    Potassium 3.1  (L) 3.5 - 5.1 mmol/L    Chloride 102 95 - 110 mmol/L    CO2 23 23 - 29 mmol/L    Glucose 92 70 - 110 mg/dL    BUN 7 6 - 20 mg/dL    Creatinine 0.8 0.5 - 1.4 mg/dL    Calcium 10.1 8.7 - 10.5 mg/dL    Total Protein 8.2 6.0 - 8.4 g/dL    Albumin 4.6 3.5 - 5.2 g/dL    Total Bilirubin 0.8 0.1 - 1.0 mg/dL    Alkaline Phosphatase 80 55 - 135 U/L    AST 18 10 - 40 U/L    ALT 11 10 - 44 U/L    Anion Gap 16 8 - 16 mmol/L    eGFR >60.0 >60 mL/min/1.73 m^2   Troponin I #1   Result Value Ref Range    Troponin I <0.006 0.000 - 0.026 ng/mL   BNP   Result Value Ref Range    BNP <10 0 - 99 pg/mL   Troponin I #2   Result Value Ref Range    Troponin I 0.006 0.000 - 0.026 ng/mL   D dimer, quantitative   Result Value Ref Range    D-Dimer 0.58 (H) <0.50 mg/L FEU       EKG Readings: (Independently Interpreted)   Initial Reading: No STEMI. Rhythm: Sinus Tachycardia. Heart Rate: 113. ST Segments: Non-Specific ST Segment Depression. T Waves: Normal. Axis: Left Axis Deviation. Clinical Impression: Sinus Tachycardia     Imaging Results              X-Ray Chest AP Portable (Final result)  Result time 10/21/22 21:37:49      Final result by Kateryna Jeffries MD (10/21/22 21:37:49)                   Impression:      No active or adverse finding      Electronically signed by: Kateryna Jeffries  Date:    10/21/2022  Time:    21:37               Narrative:    EXAMINATION:  XR CHEST AP PORTABLE    CLINICAL HISTORY:  Chest Pain;    TECHNIQUE:  Single view    COMPARISON:  December 2021    FINDINGS:  Mediastinal contour stable midline.  MediPort similar position.  Lungs well aerated.  No pleural effusion.                                  12:04 AM - Counseling: Spoke with the patient and discussed todays findings, in addition to providing specific details for the plan of care and counseling regarding the diagnosis and prognosis. Questions are answered at this time. I have discussed with patient and/or family/caretaker chest pain precautions,  specifically to return for worsening chest pain, shortness of breath, fever, or any concern.  I have low suspicion for cardiopulmonary, vascular, infectious, respiratory, or other emergent medical condition based on my evaluation in the ED.  Unable to perform CTA secondary to pressure injector needle unavailable. Pt also reports CP has resolved and is no longer SOB. I discussed this with patient and recommend close follow up with PCP/Cardiology for further evaluation as needed.       Medications   iohexoL (OMNIPAQUE 350) injection 100 mL (has no administration in time range)   aspirin tablet 325 mg (325 mg Oral Given 10/21/22 2104)   HYDROmorphone (PF) injection 0.5 mg (0.5 mg Intravenous Given 10/21/22 2105)   promethazine (PHENERGAN) 12.5 mg in dextrose 5 % 50 mL IVPB (0 mg Intravenous Stopped 10/21/22 2123)   potassium chloride SA CR tablet 40 mEq (40 mEq Oral Given 10/21/22 2158)                              Clinical Impression:   Final diagnoses:  [R07.9] Chest pain (Primary)        ED Disposition Condition    Discharge Stable          ED Prescriptions    None       Follow-up Information       Follow up With Specialties Details Why Contact Info    Antonieta Ariza NP Family Medicine In 3 days As needed [id number] [address] [address]  [phone]      Cardiology  Call in 3 days As needed for CP [facility] - Emergency Dept Emergency Medicine  If symptoms worsen [id number] [address] [address] [phone]             Arpit Talley MD  10/22/22 0006

## 2022-10-25 ENCOUNTER — TELEPHONE (OUTPATIENT)
Dept: CARDIOLOGY | Facility: CLINIC | Age: 57
End: 2022-10-25
Payer: COMMERCIAL

## 2022-10-25 DIAGNOSIS — R07.9 CHEST PAIN, UNSPECIFIED TYPE: Primary | ICD-10-CM

## 2022-10-30 VITALS
HEIGHT: 63 IN | HEART RATE: 83 BPM | RESPIRATION RATE: 16 BRPM | TEMPERATURE: 98 F | BODY MASS INDEX: 33.98 KG/M2 | DIASTOLIC BLOOD PRESSURE: 79 MMHG | WEIGHT: 191.81 LBS | SYSTOLIC BLOOD PRESSURE: 120 MMHG | OXYGEN SATURATION: 97 %

## 2022-10-30 NOTE — ASSESSMENT & PLAN NOTE
Reports most of her anxiety is due to waking up in the middle of the night and not getting adequate sleep. Trial of increasing lunesta. If no improvement, will try SSRI.

## 2022-10-31 ENCOUNTER — OFFICE VISIT (OUTPATIENT)
Dept: CARDIOLOGY | Facility: CLINIC | Age: 57
End: 2022-10-31
Payer: COMMERCIAL

## 2022-10-31 ENCOUNTER — HOSPITAL ENCOUNTER (OUTPATIENT)
Dept: CARDIOLOGY | Facility: CLINIC | Age: 57
Discharge: HOME | End: 2022-10-31
Payer: COMMERCIAL

## 2022-10-31 VITALS
HEIGHT: 62 IN | HEART RATE: 72 BPM | SYSTOLIC BLOOD PRESSURE: 166 MMHG | OXYGEN SATURATION: 100 % | WEIGHT: 189.63 LBS | DIASTOLIC BLOOD PRESSURE: 81 MMHG | BODY MASS INDEX: 34.89 KG/M2

## 2022-10-31 DIAGNOSIS — R07.9 CHEST PAIN, UNSPECIFIED TYPE: ICD-10-CM

## 2022-10-31 DIAGNOSIS — M32.9 SYSTEMIC LUPUS ERYTHEMATOSUS, UNSPECIFIED SLE TYPE, UNSPECIFIED ORGAN INVOLVEMENT STATUS: ICD-10-CM

## 2022-10-31 DIAGNOSIS — R07.89 CHEST HEAVINESS: ICD-10-CM

## 2022-10-31 DIAGNOSIS — G45.9 TIA (TRANSIENT ISCHEMIC ATTACK): Primary | ICD-10-CM

## 2022-10-31 DIAGNOSIS — E66.09 CLASS 2 OBESITY DUE TO EXCESS CALORIES WITHOUT SERIOUS COMORBIDITY WITH BODY MASS INDEX (BMI) OF 36.0 TO 36.9 IN ADULT: ICD-10-CM

## 2022-10-31 DIAGNOSIS — E78.00 PURE HYPERCHOLESTEROLEMIA: ICD-10-CM

## 2022-10-31 DIAGNOSIS — I10 PRIMARY HYPERTENSION: ICD-10-CM

## 2022-10-31 PROCEDURE — 99214 OFFICE O/P EST MOD 30 MIN: CPT | Mod: S$GLB,,, | Performed by: STUDENT IN AN ORGANIZED HEALTH CARE EDUCATION/TRAINING PROGRAM

## 2022-10-31 PROCEDURE — 3008F BODY MASS INDEX DOCD: CPT | Mod: CPTII,S$GLB,, | Performed by: STUDENT IN AN ORGANIZED HEALTH CARE EDUCATION/TRAINING PROGRAM

## 2022-10-31 PROCEDURE — 3008F PR BODY MASS INDEX (BMI) DOCUMENTED: ICD-10-PCS | Mod: CPTII,S$GLB,, | Performed by: STUDENT IN AN ORGANIZED HEALTH CARE EDUCATION/TRAINING PROGRAM

## 2022-10-31 PROCEDURE — 3079F DIAST BP 80-89 MM HG: CPT | Mod: CPTII,S$GLB,, | Performed by: STUDENT IN AN ORGANIZED HEALTH CARE EDUCATION/TRAINING PROGRAM

## 2022-10-31 PROCEDURE — 3079F PR MOST RECENT DIASTOLIC BLOOD PRESSURE 80-89 MM HG: ICD-10-PCS | Mod: CPTII,S$GLB,, | Performed by: STUDENT IN AN ORGANIZED HEALTH CARE EDUCATION/TRAINING PROGRAM

## 2022-10-31 PROCEDURE — 3077F PR MOST RECENT SYSTOLIC BLOOD PRESSURE >= 140 MM HG: ICD-10-PCS | Mod: CPTII,S$GLB,, | Performed by: STUDENT IN AN ORGANIZED HEALTH CARE EDUCATION/TRAINING PROGRAM

## 2022-10-31 PROCEDURE — 3077F SYST BP >= 140 MM HG: CPT | Mod: CPTII,S$GLB,, | Performed by: STUDENT IN AN ORGANIZED HEALTH CARE EDUCATION/TRAINING PROGRAM

## 2022-10-31 PROCEDURE — 99214 PR OFFICE/OUTPT VISIT, EST, LEVL IV, 30-39 MIN: ICD-10-PCS | Mod: S$GLB,,, | Performed by: STUDENT IN AN ORGANIZED HEALTH CARE EDUCATION/TRAINING PROGRAM

## 2022-10-31 PROCEDURE — 93010 EKG 12-LEAD: ICD-10-PCS | Mod: S$GLB,,, | Performed by: INTERNAL MEDICINE

## 2022-10-31 PROCEDURE — 1160F PR REVIEW ALL MEDS BY PRESCRIBER/CLIN PHARMACIST DOCUMENTED: ICD-10-PCS | Mod: CPTII,S$GLB,, | Performed by: STUDENT IN AN ORGANIZED HEALTH CARE EDUCATION/TRAINING PROGRAM

## 2022-10-31 PROCEDURE — 99999 PR PBB SHADOW E&M-EST. PATIENT-LVL III: CPT | Mod: PBBFAC,,, | Performed by: STUDENT IN AN ORGANIZED HEALTH CARE EDUCATION/TRAINING PROGRAM

## 2022-10-31 PROCEDURE — 3044F PR MOST RECENT HEMOGLOBIN A1C LEVEL <7.0%: ICD-10-PCS | Mod: CPTII,S$GLB,, | Performed by: STUDENT IN AN ORGANIZED HEALTH CARE EDUCATION/TRAINING PROGRAM

## 2022-10-31 PROCEDURE — 93005 ELECTROCARDIOGRAM TRACING: CPT | Mod: S$GLB,,, | Performed by: INTERNAL MEDICINE

## 2022-10-31 PROCEDURE — 3044F HG A1C LEVEL LT 7.0%: CPT | Mod: CPTII,S$GLB,, | Performed by: STUDENT IN AN ORGANIZED HEALTH CARE EDUCATION/TRAINING PROGRAM

## 2022-10-31 PROCEDURE — 1159F PR MEDICATION LIST DOCUMENTED IN MEDICAL RECORD: ICD-10-PCS | Mod: CPTII,S$GLB,, | Performed by: STUDENT IN AN ORGANIZED HEALTH CARE EDUCATION/TRAINING PROGRAM

## 2022-10-31 PROCEDURE — 99999 PR PBB SHADOW E&M-EST. PATIENT-LVL III: ICD-10-PCS | Mod: PBBFAC,,, | Performed by: STUDENT IN AN ORGANIZED HEALTH CARE EDUCATION/TRAINING PROGRAM

## 2022-10-31 PROCEDURE — 1159F MED LIST DOCD IN RCRD: CPT | Mod: CPTII,S$GLB,, | Performed by: STUDENT IN AN ORGANIZED HEALTH CARE EDUCATION/TRAINING PROGRAM

## 2022-10-31 PROCEDURE — 1160F RVW MEDS BY RX/DR IN RCRD: CPT | Mod: CPTII,S$GLB,, | Performed by: STUDENT IN AN ORGANIZED HEALTH CARE EDUCATION/TRAINING PROGRAM

## 2022-10-31 PROCEDURE — 93010 ELECTROCARDIOGRAM REPORT: CPT | Mod: S$GLB,,, | Performed by: INTERNAL MEDICINE

## 2022-10-31 PROCEDURE — 93005 EKG 12-LEAD: ICD-10-PCS | Mod: S$GLB,,, | Performed by: INTERNAL MEDICINE

## 2022-10-31 RX ORDER — METOPROLOL SUCCINATE 25 MG/1
25 TABLET, EXTENDED RELEASE ORAL DAILY
Qty: 90 TABLET | Refills: 2 | Status: SHIPPED | OUTPATIENT
Start: 2023-02-01 | End: 2023-08-22 | Stop reason: SDUPTHER

## 2022-10-31 RX ORDER — HYDROCHLOROTHIAZIDE 12.5 MG/1
25 TABLET ORAL DAILY
Qty: 180 TABLET | Refills: 3 | Status: SHIPPED | OUTPATIENT
Start: 2022-10-31 | End: 2023-08-22 | Stop reason: SDUPTHER

## 2022-10-31 NOTE — PROGRESS NOTES
Cardiology Clinic Note  Reason for Visit: Follow up     HPI:     Tosha Kwok is a 56 y.o. female who has a past medical history of Anemia, Ankylosing spondylitis, Anxiety, Asthma, Chronic constipation, Chronic insomnia, Edema, Fibromyalgia, Interstitial cystitis, Lupus, Migraine headache, PONV (postoperative nausea and vomiting), Restless legs syndrome, Shingles, Stroke (1998), and TIA (transient ischemic attack) (1998) who presents for follow up.     Last seen by Dr. oJhnson and that time time pt was complaining of chest pain which a  stress test was ordered. She also was complaining of palpitations for which a holter monitor was ordered. Neither studies have been done.    Pt seen in ED on 10/21 for chest pain. Heaviness was ongoing for 5 hrs. Trops negative x2. ECG was negative for any ST elevations.     Pt notes her HR has been elevated and having her heart racing. When she checks her watch, HR gets up to 110s. Pain also occurred 2 nights ago. Lasted for about 30 minutes. 5-6 episodes a day. Pt also reports recent chest heaviness which worsened and some associated right arm numbess.     Pt is compliant with her medications and denies any other issues at this time.     CT chest 2022  The heart is unremarkable.  There are no pericardial effusions.  There is no evidence of intracardiac thrombus.  The coronary arteries are unremarkable.      Echo 2022  Concentric remodeling and normal systolic function.  The estimated ejection fraction is 65%.  Normal left ventricular diastolic function.  Normal right ventricular size with normal right ventricular systolic function.  Normal central venous pressure (3 mmHg).  The estimated PA systolic pressure is 15 mmHg.       Event monitor 10/31/19   There were 12 transmissions sent.  The baseline transmission revealed normal sinus rhythm.  The remaining transmissions were for complaints of shortness of breath, palpitations, and routine test.  The transmissions revealed  normal sinus rhythm and sinus tachycardia.  There were no significant arrhythmias observed.      ROS:    Constitution: Negative for fever, chills, weight loss or gain.   HENT: Negative for sore throat, rhinorrhea, or headache.  Eyes: Negative for blurred or double vision.   Cardiovascular: See above  Pulmonary: Negative for SOB   Gastrointestinal: Negative for abdominal pain, nausea, vomiting, or diarrhea.   : Negative for dysuria.   Neurological: Negative for focal weakness or sensory changes.  PMH:     Past Medical History:   Diagnosis Date    Anemia     Ankylosing spondylitis     Anxiety     Asthma     Cancer 2018    left breast  lumpectomy, xrt    Chronic constipation     Chronic insomnia     Edema     Fibromyalgia     Hypertension     Insulin resistance     Interstitial cystitis     Lupus     Lupus     Migraine headache     PONV (postoperative nausea and vomiting)     Restless legs syndrome     Shingles     Stroke     post partum right sided numbness    TIA (transient ischemic attack)      Past Surgical History:   Procedure Laterality Date    BLADDER SURGERY      BREAST LUMPECTOMY  2018    BREAST RECONSTRUCTION      BREAST SURGERY  2018    reduction     SECTION      CHOLECYSTECTOMY      COLONOSCOPY N/A 10/2/2020    Procedure: COLONOSCOPY;  Surgeon: Guicho Hood MD;  Location: [facility];  Service: Endoscopy;  Laterality: N/A;    EXCISION OF GRANULOMA Bilateral 2021    Procedure: EXCISION, GRANULOMA SCARS OF BREASTS;  Surgeon: Obed Palmer Jr., MD;  Location: [facility];  Service: Plastics;  Laterality: Bilateral;    HYSTERECTOMY      LASER ABLATION      to back    SLEEVE GASTROPLASTY  2016    TOTAL REDUCTION MAMMOPLASTY Bilateral     two years ago    TUBAL LIGATION       Allergies:     Review of patient's allergies indicates:   Allergen Reactions    Carrot Swelling     angioedema    Morphine Anxiety     Hives   States she can take dilaudid and percocet without any problems     Clindamycin     Macrolide antibiotics     Erythromycin Other (See Comments)     Hives and cramps     Zofran (as hydrochloride) [ondansetron hcl] Hives     Local hive after IV injection     Medications:     Current Outpatient Medications on File Prior to Visit   Medication Sig Dispense Refill    albuterol (PROAIR HFA) 90 mcg/actuation inhaler Inhale 2 puffs into the lungs every 4 (four) hours as needed. 18 g 3    atorvastatin (LIPITOR) 20 MG tablet Take 1 tablet (20 mg total) by mouth once daily. 90 tablet 3    cetirizine (ZYRTEC) 10 MG tablet TAKE 1 TABLET BY MOUTH EVERY DAY 90 tablet 3    cyanocobalamin 1,000 mcg/mL injection Inject 1 mL (1,000 mcg total) into the muscle every 28 days. 10 mL 1    eszopiclone (LUNESTA) 2 MG Tab Take 1 tablet (2 mg total) by mouth nightly. 30 tablet 2    fentaNYL (DURAGESIC) 25 mcg/hr Place 1 patch onto the skin every 72 hours as directed 10 patch 0    hydroCHLOROthiazide (HYDRODIURIL) 12.5 MG Tab Take 1 tablet (12.5 mg total) by mouth once daily. 30 tablet 0    HYDROmorphone (DILAUDID) 2 MG tablet take 1 tablet by mouth every six to eight hours as needed for pain 105 tablet 0    hydroxychloroquine (PLAQUENIL) 200 mg tablet Take 200 mg by mouth once daily.  1    hydrOXYzine pamoate (VISTARIL) 25 MG Cap TAKE 1 CAPSULE (25 MG TOTAL) BY MOUTH EVERY 4 (FOUR) HOURS AS NEEDED FOR (ITCHING). 90 capsule 3    naproxen (NAPROSYN) 500 MG tablet Take 1 tablet (500 mg total) by mouth 2 (two) times daily with meals. 30 tablet 0    pantoprazole (PROTONIX) 40 MG tablet Take 1 tablet (40 mg total) by mouth once daily. 90 tablet 1    semaglutide (OZEMPIC) 0.25 mg or 0.5 mg(2 mg/1.5 mL) pen injector Inject 0.5 mg into the skin once a week. 1 pen 5    tiZANidine (ZANAFLEX) 4 MG tablet Take 4 mg by mouth 3 (three) times daily as needed.      topiramate (TOPAMAX) 100 MG tablet Take 1 tablet (100 mg total) by mouth 2 (two) times daily. (Patient taking differently: Take 100 mg by mouth as needed.) 180  "tablet 3    EPINEPHrine (EPIPEN 2-DENNIS) 0.3 mg/0.3 mL AtIn Inject 0.3 mLs (0.3 mg total) into the muscle once. for 1 dose 2 each 3    [DISCONTINUED] diazePAM (VALIUM) 5 MG tablet Take 1 tablet (5 mg total) by mouth once. for 1 dose (Patient not taking: Reported on 10/31/2022) 1 tablet 0     No current facility-administered medications on file prior to visit.     Social History:     Social History     Tobacco Use    Smoking status: Never    Smokeless tobacco: Never   Substance Use Topics    Alcohol use: Yes     Comment: occasionally     Family History:     Family History   Problem Relation Age of Onset    Hypertension Mother     Hypertension Brother     Breast cancer Maternal Aunt         x2     Heart attack Father     Hypertension Sister     Lupus Sister     Breast cancer Maternal Grandmother      Physical Exam:   BP (!) 166/81 (BP Location: Left arm, Patient Position: Sitting, BP Method: Medium (Automatic))   Pulse 72   Ht 5' 2" (1.575 m)   Wt 86 kg (189 lb 9.5 oz)   LMP 03/26/2012   SpO2 100%   BMI 34.68 kg/m²    Wt Readings from Last 4 Encounters:   10/31/22 86 kg (189 lb 9.5 oz)   10/21/22 84 kg (185 lb 3 oz)   10/14/22 87 kg (191 lb 12.8 oz)   10/04/22 91.2 kg (201 lb 1 oz)         Constitutional: No distress, conversant  HEENT: Sclera anicteric, PERRLA, EOMI  Neck: No JVD, no masses, good movement  CV: RRR, S1 and S2 normal, no additional heart sounds or murmurs. Pulses 2+ and equal bilaterally in radial arteries, Eliud's normal on right. Distal pulses are 2+ and equal in the femoral, DP and PT areas bilaterally  Pulm: Clear to auscultation bilaterally with symmetrical expansion. Chest wall palpated for reproduction of pain symptoms, and no pain was able to be produced on palpation or resistance exercises  GI: Abdomen soft, non-tender, good bowel sounds  Extremities: Both extremities intact and grossly normal, skin is warm, no edema noted  Skin: No ecchymosis, erythema, or ulcers  Psych: AOx3, appropriate " affect  Neuro: CNII-XII intact, no focal deficits      Labs:     Lab Results   Component Value Date     10/21/2022    K 3.1 (L) 10/21/2022     10/21/2022    CO2 23 10/21/2022    BUN 7 10/21/2022    CREATININE 0.8 10/21/2022    ANIONGAP 16 10/21/2022     Lab Results   Component Value Date    HGBA1C 5.4 08/08/2022     Lab Results   Component Value Date    BNP <10 10/21/2022    BNP <10 12/31/2021    BNP 15 09/25/2019    Lab Results   Component Value Date    WBC 6.34 10/21/2022    HGB 13.5 10/21/2022    HCT 39.5 10/21/2022     10/21/2022    GRAN 3.4 10/21/2022    GRAN 53.4 10/21/2022     Lab Results   Component Value Date    CHOL 215 (H) 08/08/2022    HDL 56 08/08/2022    LDLCALC 132.6 08/08/2022    TRIG 132 08/08/2022          Imaging:       EF   Date Value Ref Range Status   01/01/2022 65 % Final       EKG: normal sinus rhythm, no blocks or conduction defects, no ischemic changes  TTE: Reviewed   Coronary Angiography: N/A   Stress Test: N/A   Assessment:   Tosha Kwok is a 56 y.o. female who has a past medical history of Anemia, Ankylosing spondylitis, Anxiety, Asthma, Chronic constipation, Chronic insomnia, Edema, Fibromyalgia, Interstitial cystitis, Lupus, Migraine headache, PONV (postoperative nausea and vomiting), Restless legs syndrome, Shingles, Stroke (1998), and TIA (transient ischemic attack) (1998) who presents for follow up.     Plan:     1. Chest heaviness  -Stress ECHO ordered and scheduled. CT reviewed and no coronary calcifications noted.     2. Primary hypertension  -/81 in clinic today   -Pt enrolled in digital HTN   -Will increase HCTZ to 25 mg daily and start Metoprolol 25 mg daily     3. Pure hypercholesterolemia  -Atorvastatin 20 mg daily     4. Class 2 obesity due to excess calories without serious comorbidity with body mass index (BMI) of 36.0 to 36.9 in adult    5. TIA (transient ischemic attack)    6. Systemic lupus erythematosus, unspecified SLE type, unspecified  organ involvement status  -Per PCP and Rheum     Keep a home BP diary and bring to next visit  Discussed mediterranean diet  Discussed exercise therapy based on 150-min per week AHA recommendations for moderate intensity exercise/75 min for high-intensity exercise    Signed:  Jcarlos Lozano MD  Cardiology Fellow  Pager - [phone]  [facility]  10/31/2022 3:25 PM       93

## 2022-11-02 NOTE — PROGRESS NOTES
I have reviewed the notes, assessments, and/or procedures performed by fellow, I concur with her/his documentation of Tosha Kwok.

## 2022-11-10 ENCOUNTER — PATIENT MESSAGE (OUTPATIENT)
Dept: ADMINISTRATIVE | Facility: OTHER | Age: 57
End: 2022-11-10
Payer: COMMERCIAL

## 2022-11-10 ENCOUNTER — PATIENT MESSAGE (OUTPATIENT)
Dept: INTERNAL MEDICINE | Facility: CLINIC | Age: 57
End: 2022-11-10
Payer: COMMERCIAL

## 2022-11-11 ENCOUNTER — PATIENT MESSAGE (OUTPATIENT)
Dept: CARDIOLOGY | Facility: CLINIC | Age: 57
End: 2022-11-11
Payer: COMMERCIAL

## 2022-11-14 DIAGNOSIS — R07.89 CHEST DISCOMFORT: Primary | ICD-10-CM

## 2022-11-18 ENCOUNTER — PATIENT OUTREACH (OUTPATIENT)
Dept: ADMINISTRATIVE | Facility: HOSPITAL | Age: 57
End: 2022-11-18
Payer: COMMERCIAL

## 2022-11-18 NOTE — PROGRESS NOTES
HTN Report: Attempted to contact the patient to obtain a home BP reading, no answer, left voicemail.

## 2022-11-21 ENCOUNTER — HOSPITAL ENCOUNTER (OUTPATIENT)
Dept: RADIOLOGY | Facility: HOSPITAL | Age: 57
Discharge: HOME | End: 2022-11-21
Attending: ORTHOPAEDIC SURGERY
Payer: COMMERCIAL

## 2022-11-21 ENCOUNTER — OFFICE VISIT (OUTPATIENT)
Dept: SPORTS MEDICINE | Facility: CLINIC | Age: 57
End: 2022-11-21
Payer: OTHER MISCELLANEOUS

## 2022-11-21 VITALS
WEIGHT: 182 LBS | HEART RATE: 73 BPM | BODY MASS INDEX: 33.49 KG/M2 | DIASTOLIC BLOOD PRESSURE: 79 MMHG | HEIGHT: 62 IN | SYSTOLIC BLOOD PRESSURE: 128 MMHG

## 2022-11-21 DIAGNOSIS — S43.431A SLAP LESION, TYPE II, RIGHT, INITIAL ENCOUNTER: ICD-10-CM

## 2022-11-21 DIAGNOSIS — M25.511 RIGHT SHOULDER PAIN, UNSPECIFIED CHRONICITY: ICD-10-CM

## 2022-11-21 DIAGNOSIS — M25.511 RIGHT SHOULDER PAIN, UNSPECIFIED CHRONICITY: Primary | ICD-10-CM

## 2022-11-21 DIAGNOSIS — M54.12 NEUROPATHY, CERVICAL (RADICULAR): ICD-10-CM

## 2022-11-21 PROCEDURE — 99213 OFFICE O/P EST LOW 20 MIN: CPT | Mod: S$GLB,,, | Performed by: ORTHOPAEDIC SURGERY

## 2022-11-21 PROCEDURE — 99999 PR PBB SHADOW E&M-EST. PATIENT-LVL IV: ICD-10-PCS | Mod: PBBFAC,,, | Performed by: ORTHOPAEDIC SURGERY

## 2022-11-21 PROCEDURE — 99999 PR PBB SHADOW E&M-EST. PATIENT-LVL IV: CPT | Mod: PBBFAC,,, | Performed by: ORTHOPAEDIC SURGERY

## 2022-11-21 PROCEDURE — 73030 X-RAY EXAM OF SHOULDER: CPT | Mod: 26,RT,, | Performed by: RADIOLOGY

## 2022-11-21 PROCEDURE — 73030 X-RAY EXAM OF SHOULDER: CPT | Mod: TC,RT

## 2022-11-21 PROCEDURE — 73030 XR SHOULDER COMPLETE 2 OR MORE VIEWS RIGHT: ICD-10-PCS | Mod: 26,RT,, | Performed by: RADIOLOGY

## 2022-11-21 PROCEDURE — 99213 PR OFFICE/OUTPT VISIT, EST, LEVL III, 20-29 MIN: ICD-10-PCS | Mod: S$GLB,,, | Performed by: ORTHOPAEDIC SURGERY

## 2022-11-21 RX ORDER — ZOLPIDEM TARTRATE 10 MG
10 TABLET ORAL NIGHTLY PRN
COMMUNITY
Start: 2022-11-14 | End: 2023-02-08
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
## 2022-11-22 ENCOUNTER — TELEPHONE (OUTPATIENT)
Dept: PHYSICAL MEDICINE AND REHAB | Facility: CLINIC | Age: 57
End: 2022-11-22
Payer: COMMERCIAL

## 2022-11-22 NOTE — TELEPHONE ENCOUNTER
----- Message from Awa Sheth MA sent at 11/21/2022  4:43 PM CST -----  Good afternoon,    Dr. Kapadia requested me to send a message to you guys to get this patient scheduled for an EMG. Will you be able to assist.     Thank you,  Awa Sheth  Medical Assistant to Dr. Lux Kang

## 2022-11-22 NOTE — TELEPHONE ENCOUNTER
Mrs Kwok appointment  was scheduled our department first available.  I will place a reminder letter in the mail for her as well.

## 2022-11-23 ENCOUNTER — CLINICAL SUPPORT (OUTPATIENT)
Dept: CARDIOLOGY | Facility: HOSPITAL | Age: 57
End: 2022-11-23
Attending: INTERNAL MEDICINE
Payer: COMMERCIAL

## 2022-11-23 DIAGNOSIS — R07.9 CHEST PAIN, UNSPECIFIED TYPE: ICD-10-CM

## 2022-11-23 DIAGNOSIS — E78.00 PURE HYPERCHOLESTEROLEMIA: ICD-10-CM

## 2022-11-23 DIAGNOSIS — R00.2 PALPITATIONS: ICD-10-CM

## 2022-11-23 PROCEDURE — 93226 XTRNL ECG REC<48 HR SCAN A/R: CPT

## 2022-11-23 PROCEDURE — 93227 XTRNL ECG REC<48 HR R&I: CPT | Mod: ,,, | Performed by: INTERNAL MEDICINE

## 2022-11-23 PROCEDURE — 93227 HOLTER MONITOR - 48 HOUR (CUPID ONLY): ICD-10-PCS | Mod: ,,, | Performed by: INTERNAL MEDICINE

## 2022-11-30 ENCOUNTER — TELEPHONE (OUTPATIENT)
Dept: ORTHOPEDICS | Facility: CLINIC | Age: 57
End: 2022-11-30
Payer: COMMERCIAL

## 2022-11-30 DIAGNOSIS — M50.30 DDD (DEGENERATIVE DISC DISEASE), CERVICAL: Primary | ICD-10-CM

## 2022-12-02 LAB
OHS CV EVENT MONITOR DAY: 0
OHS CV HOLTER LENGTH DECIMAL HOURS: 24
OHS CV HOLTER LENGTH HOURS: 24
OHS CV HOLTER LENGTH MINUTES: 0
OHS CV HOLTER SINUS AVERAGE HR: 74
OHS CV HOLTER SINUS MAX HR: 110
OHS CV HOLTER SINUS MIN HR: 55

## 2022-12-20 ENCOUNTER — OFFICE VISIT (OUTPATIENT)
Dept: GASTROENTEROLOGY | Facility: CLINIC | Age: 57
End: 2022-12-20
Payer: COMMERCIAL

## 2022-12-20 DIAGNOSIS — R11.2 NAUSEA AND VOMITING, UNSPECIFIED VOMITING TYPE: Primary | ICD-10-CM

## 2022-12-20 DIAGNOSIS — K21.9 GASTROESOPHAGEAL REFLUX DISEASE, UNSPECIFIED WHETHER ESOPHAGITIS PRESENT: ICD-10-CM

## 2022-12-20 PROCEDURE — 1159F PR MEDICATION LIST DOCUMENTED IN MEDICAL RECORD: ICD-10-PCS | Mod: CPTII,95,, | Performed by: NURSE PRACTITIONER

## 2022-12-20 PROCEDURE — 1160F PR REVIEW ALL MEDS BY PRESCRIBER/CLIN PHARMACIST DOCUMENTED: ICD-10-PCS | Mod: CPTII,95,, | Performed by: NURSE PRACTITIONER

## 2022-12-20 PROCEDURE — 99204 PR OFFICE/OUTPT VISIT, NEW, LEVL IV, 45-59 MIN: ICD-10-PCS | Mod: 95,,, | Performed by: NURSE PRACTITIONER

## 2022-12-20 PROCEDURE — 99204 OFFICE O/P NEW MOD 45 MIN: CPT | Mod: 95,,, | Performed by: NURSE PRACTITIONER

## 2022-12-20 PROCEDURE — 1159F MED LIST DOCD IN RCRD: CPT | Mod: CPTII,95,, | Performed by: NURSE PRACTITIONER

## 2022-12-20 PROCEDURE — 1160F RVW MEDS BY RX/DR IN RCRD: CPT | Mod: CPTII,95,, | Performed by: NURSE PRACTITIONER

## 2022-12-20 PROCEDURE — 3044F PR MOST RECENT HEMOGLOBIN A1C LEVEL <7.0%: ICD-10-PCS | Mod: CPTII,95,, | Performed by: NURSE PRACTITIONER

## 2022-12-20 PROCEDURE — 3044F HG A1C LEVEL LT 7.0%: CPT | Mod: CPTII,95,, | Performed by: NURSE PRACTITIONER

## 2022-12-20 NOTE — PROGRESS NOTES
Clinic Consult:  [facility] Consultation Note    Reason for Consult:  The primary encounter diagnosis was Nausea and vomiting, unspecified vomiting type. A diagnosis of Gastroesophageal reflux disease, unspecified whether esophagitis present was also pertinent to this visit.    PCP: Antonieta Ariza   [address] / [place] [address]    HPI:  This is a 57 y.o. female here for evaluation of nausea and vomiting.   Nausea is constant. Vomiting comes and goes. She feels like food is not digesting down and backs up into her esophagus.   She has a hx of gastric sleeve surgery 5 years ago and tran 3 years ago. These symptoms started shortly after her tran.   Of note, she also started Ozempic 2 months ago but reports her symptoms started prior to starting it. She has not had an EGD since her sleeve gastrectomy. She has been on pantorpazole for a while for GERD.     Review of Systems   Constitutional:  Negative for fever, malaise/fatigue and weight loss.   HENT:  Negative for sore throat.    Respiratory:  Negative for cough and wheezing.    Cardiovascular:  Negative for chest pain and palpitations.   Gastrointestinal:  Positive for nausea and vomiting. Negative for abdominal pain, blood in stool, constipation, diarrhea, heartburn and melena.   Genitourinary:  Negative for dysuria and frequency.   Musculoskeletal:  Negative for back pain, joint pain, myalgias and neck pain.   Skin:  Negative for itching and rash.   Neurological:  Negative for dizziness, speech change, seizures, loss of consciousness and headaches.   Psychiatric/Behavioral:  Negative for depression and substance abuse. The patient is not nervous/anxious.      Medical History:  has a past medical history of Anemia, Ankylosing spondylitis, Anxiety, Asthma, Cancer (2018), Chronic constipation, Chronic insomnia, Edema, Fibromyalgia, Hypertension, Insulin resistance, Interstitial cystitis, Lupus, Lupus, Migraine headache, PONV (postoperative nausea  and vomiting), Restless legs syndrome, Shingles, Stroke (), and TIA (transient ischemic attack) ().    Surgical History:  has a past surgical history that includes Bladder surgery;  section; Tubal ligation; Laser ablation; Cholecystectomy; Hysterectomy; Sleeve Gastroplasty (2016); Breast surgery (2018); Total Reduction Mammoplasty (Bilateral); Colonoscopy (N/A, 10/2/2020); Excision of granuloma (Bilateral, 2021); Breast reconstruction; and Breast lumpectomy ().    Family History: family history includes Breast cancer in her maternal aunt and maternal grandmother; Heart attack in her father; Hypertension in her brother, mother, and sister; Lupus in her sister..     Social History:  reports that she has never smoked. She has never used smokeless tobacco. She reports current alcohol use. She reports that she does not use drugs.    Allergies: Reviewed    Home Medications:   Current Outpatient Medications on File Prior to Visit   Medication Sig Dispense Refill    albuterol (PROAIR HFA) 90 mcg/actuation inhaler Inhale 2 puffs into the lungs every 4 (four) hours as needed. 18 g 3    AMBIEN 10 mg Tab Take 10 mg by mouth nightly as needed.      atorvastatin (LIPITOR) 20 MG tablet Take 1 tablet (20 mg total) by mouth once daily. 90 tablet 3    cetirizine (ZYRTEC) 10 MG tablet TAKE 1 TABLET BY MOUTH EVERY DAY 90 tablet 3    cyanocobalamin 1,000 mcg/mL injection Inject 1 mL (1,000 mcg total) into the muscle every 28 days. 10 mL 1    EPINEPHrine (EPIPEN 2-DENNIS) 0.3 mg/0.3 mL AtIn Inject 0.3 mLs (0.3 mg total) into the muscle once. for 1 dose 2 each 3    eszopiclone (LUNESTA) 2 MG Tab Take 1 tablet (2 mg total) by mouth nightly. 30 tablet 2    fentaNYL (DURAGESIC) 25 mcg/hr Place 1 patch onto the skin every 72 hours as directed 10 patch 0    hydroCHLOROthiazide (HYDRODIURIL) 12.5 MG Tab Take 2 tablets (25 mg total) by mouth once daily. 180 tablet 3    HYDROmorphone (DILAUDID) 2 MG tablet take 1 tablet by  mouth every six to eight hours as needed for pain 105 tablet 0    HYDROmorphone (DILAUDID) 2 MG tablet Take 1 tablet (2 mg total) by mouth every 6 to 8 hours as needed for pain 105 tablet 0    hydroxychloroquine (PLAQUENIL) 200 mg tablet Take 200 mg by mouth once daily.  1    hydrOXYzine pamoate (VISTARIL) 25 MG Cap TAKE 1 CAPSULE (25 MG TOTAL) BY MOUTH EVERY 4 (FOUR) HOURS AS NEEDED FOR (ITCHING). 90 capsule 3    metoprolol succinate (TOPROL-XL) 25 MG 24 hr tablet Take 1 tablet (25 mg total) by mouth once daily. 90 tablet 3    naproxen (NAPROSYN) 500 MG tablet Take 1 tablet (500 mg total) by mouth 2 (two) times daily with meals. 30 tablet 0    pantoprazole (PROTONIX) 40 MG tablet TAKE 1 TABLET BY MOUTH EVERY DAY 90 tablet 1    semaglutide (OZEMPIC) 0.25 mg or 0.5 mg(2 mg/1.5 mL) pen injector Inject 0.5 mg into the skin once a week. 1 pen 5    tiZANidine (ZANAFLEX) 4 MG tablet Take 4 mg by mouth 3 (three) times daily as needed.      topiramate (TOPAMAX) 100 MG tablet Take 1 tablet (100 mg total) by mouth 2 (two) times daily. (Patient taking differently: Take 100 mg by mouth as needed.) 180 tablet 3     No current facility-administered medications on file prior to visit.       Physical Exam:  [facility] 03/26/2012   There is no height or weight on file to calculate BMI.  Physical Exam  Constitutional:       General: She is not in acute distress.  HENT:      Head: Normocephalic.   Neurological:      General: No focal deficit present.      Mental Status: She is alert.   Psychiatric:         Mood and Affect: Mood normal.         Judgment: Judgment normal.       Labs: Pertinent labs reviewed.  CRC Screening: up to date     Assessment:  1. Nausea and vomiting, unspecified vomiting type    2. Gastroesophageal reflux disease, unspecified whether esophagitis present      Recommendations:   EGD to assess anatomy  Continue pantoprazole.   GES if EGD is normal     Nausea and vomiting, unspecified vomiting type  -     Ambulatory  referral/consult to Gastroenterology  -     Case Request Endoscopy: ESOPHAGOGASTRODUODENOSCOPY (EGD)  -     Ambulatory referral/consult to Endo Procedure ; Future; Expected date: 12/21/2022    Gastroesophageal reflux disease, unspecified whether esophagitis present      Follow up to be determined after results/ procedure(s).    Thank you so much for allowing me to participate in the care of PENNY Richey

## 2022-12-21 ENCOUNTER — HOSPITAL ENCOUNTER (OUTPATIENT)
Dept: PREADMISSION TESTING | Facility: HOSPITAL | Age: 57
Discharge: HOME | End: 2022-12-21
Payer: COMMERCIAL

## 2022-12-21 DIAGNOSIS — R11.2 NAUSEA AND VOMITING, UNSPECIFIED VOMITING TYPE: ICD-10-CM

## 2023-01-19 PROBLEM — E66.01 SEVERE OBESITY: Status: ACTIVE | Noted: 2022-08-08

## 2023-02-08 ENCOUNTER — HOSPITAL ENCOUNTER (OUTPATIENT)
Dept: RADIOLOGY | Facility: HOSPITAL | Age: 58
Discharge: HOME | End: 2023-02-08
Attending: NURSE PRACTITIONER
Payer: COMMERCIAL

## 2023-02-08 ENCOUNTER — OFFICE VISIT (OUTPATIENT)
Dept: INTERNAL MEDICINE | Facility: CLINIC | Age: 58
End: 2023-02-08
Payer: COMMERCIAL

## 2023-02-08 VITALS
BODY MASS INDEX: 30.67 KG/M2 | SYSTOLIC BLOOD PRESSURE: 130 MMHG | HEIGHT: 62 IN | DIASTOLIC BLOOD PRESSURE: 84 MMHG | TEMPERATURE: 99 F | WEIGHT: 166.69 LBS | OXYGEN SATURATION: 97 % | HEART RATE: 72 BPM

## 2023-02-08 DIAGNOSIS — F41.9 ANXIETY: Primary | ICD-10-CM

## 2023-02-08 DIAGNOSIS — Z12.31 BREAST CANCER SCREENING BY MAMMOGRAM: ICD-10-CM

## 2023-02-08 DIAGNOSIS — I10 PRIMARY HYPERTENSION: ICD-10-CM

## 2023-02-08 DIAGNOSIS — G47.00 INSOMNIA, UNSPECIFIED TYPE: ICD-10-CM

## 2023-02-08 DIAGNOSIS — M26.609 TMJ (TEMPOROMANDIBULAR JOINT DISORDER): ICD-10-CM

## 2023-02-08 DIAGNOSIS — Z23 NEED FOR SHINGLES VACCINE: ICD-10-CM

## 2023-02-08 PROCEDURE — 90750 ZOSTER RECOMBINANT VACCINE: ICD-10-PCS | Mod: S$GLB,,, | Performed by: NURSE PRACTITIONER

## 2023-02-08 PROCEDURE — 3079F PR MOST RECENT DIASTOLIC BLOOD PRESSURE 80-89 MM HG: ICD-10-PCS | Mod: CPTII,S$GLB,, | Performed by: NURSE PRACTITIONER

## 2023-02-08 PROCEDURE — 77067 MAMMO DIGITAL SCREENING BILAT WITH TOMO: ICD-10-PCS | Mod: 26,,, | Performed by: RADIOLOGY

## 2023-02-08 PROCEDURE — 3079F DIAST BP 80-89 MM HG: CPT | Mod: CPTII,S$GLB,, | Performed by: NURSE PRACTITIONER

## 2023-02-08 PROCEDURE — 99214 PR OFFICE/OUTPT VISIT, EST, LEVL IV, 30-39 MIN: ICD-10-PCS | Mod: 25,S$GLB,, | Performed by: NURSE PRACTITIONER

## 2023-02-08 PROCEDURE — 99999 PR PBB SHADOW E&M-EST. PATIENT-LVL V: ICD-10-PCS | Mod: PBBFAC,,, | Performed by: NURSE PRACTITIONER

## 2023-02-08 PROCEDURE — 3075F SYST BP GE 130 - 139MM HG: CPT | Mod: CPTII,S$GLB,, | Performed by: NURSE PRACTITIONER

## 2023-02-08 PROCEDURE — 90471 IMMUNIZATION ADMIN: CPT | Mod: S$GLB,,, | Performed by: NURSE PRACTITIONER

## 2023-02-08 PROCEDURE — 90471 ZOSTER RECOMBINANT VACCINE: ICD-10-PCS | Mod: S$GLB,,, | Performed by: NURSE PRACTITIONER

## 2023-02-08 PROCEDURE — 3075F PR MOST RECENT SYSTOLIC BLOOD PRESS GE 130-139MM HG: ICD-10-PCS | Mod: CPTII,S$GLB,, | Performed by: NURSE PRACTITIONER

## 2023-02-08 PROCEDURE — 77067 SCR MAMMO BI INCL CAD: CPT | Mod: 26,,, | Performed by: RADIOLOGY

## 2023-02-08 PROCEDURE — 77063 BREAST TOMOSYNTHESIS BI: CPT | Mod: 26,,, | Performed by: RADIOLOGY

## 2023-02-08 PROCEDURE — 3008F BODY MASS INDEX DOCD: CPT | Mod: CPTII,S$GLB,, | Performed by: NURSE PRACTITIONER

## 2023-02-08 PROCEDURE — 99214 OFFICE O/P EST MOD 30 MIN: CPT | Mod: 25,S$GLB,, | Performed by: NURSE PRACTITIONER

## 2023-02-08 PROCEDURE — 99999 PR PBB SHADOW E&M-EST. PATIENT-LVL V: CPT | Mod: PBBFAC,,, | Performed by: NURSE PRACTITIONER

## 2023-02-08 PROCEDURE — 77067 SCR MAMMO BI INCL CAD: CPT | Mod: TC,PO

## 2023-02-08 PROCEDURE — 3008F PR BODY MASS INDEX (BMI) DOCUMENTED: ICD-10-PCS | Mod: CPTII,S$GLB,, | Performed by: NURSE PRACTITIONER

## 2023-02-08 PROCEDURE — 90750 HZV VACC RECOMBINANT IM: CPT | Mod: S$GLB,,, | Performed by: NURSE PRACTITIONER

## 2023-02-08 PROCEDURE — 77063 MAMMO DIGITAL SCREENING BILAT WITH TOMO: ICD-10-PCS | Mod: 26,,, | Performed by: RADIOLOGY

## 2023-02-08 RX ORDER — ESZOPICLONE 3 MG/1
3 TABLET, FILM COATED ORAL NIGHTLY
Qty: 30 TABLET | Refills: 0 | Status: SHIPPED | OUTPATIENT
Start: 2023-02-08 | End: 2023-03-08 | Stop reason: SDUPTHER

## 2023-02-08 RX ORDER — EPINEPHRINE 0.3 MG/.3ML
1 INJECTION SUBCUTANEOUS ONCE
Qty: 2 EACH | Refills: 3 | Status: CANCELLED | OUTPATIENT
Start: 2023-02-08 | End: 2023-02-08

## 2023-02-08 RX ORDER — ESCITALOPRAM OXALATE 10 MG/1
10 TABLET ORAL DAILY
Qty: 30 TABLET | Refills: 0 | Status: SHIPPED | OUTPATIENT
Start: 2023-02-08 | End: 2023-03-08

## 2023-02-08 NOTE — PROGRESS NOTES
Subjective:       Patient ID: Tosha Kwok is a 57 y.o. female.    Chief Complaint: Medication Problem    Mrs. Kwok presents to visit for routine medication refills. Continues to have anxiety/panic episodes. Worse at night. Lunesta has helped, but does not feel like it is strong enough. Has tried several medications in past: ambien (caused nighttime wandering), amitriptyline, Abilify, klonopin, trazodone, and effexor (hot flashes).     Also has been having problems with TMJ. Would like to see ENT for possible therapy or treatment options.     Patient Active Problem List   Diagnosis    Atrophy of vulva    Ankylosing spondylitis    Chronic, continuous use of opioids    Fibromyalgia    Elevated d-dimer    History of sleeve gastrectomy    Intractable pain    De Quervain's tenosynovitis, right    Adhesive capsulitis of right shoulder    Anxiety    Asthma    Lumbosacral spondylosis    GERD (gastroesophageal reflux disease)    Primary hypertension    Hyperlipidemia    Insulin resistance    Large joint arthralgia of multiple sites    Lupus (systemic lupus erythematosus)    Migraine    Postmenopausal hormone replacement therapy    Vitamin D deficiency    Impingement syndrome of left shoulder    Limited range of motion (ROM) of shoulder    Encounter to establish care    Left foot pain    Vestibular hypofunction    Other iron deficiency anemias    Keloid scar of skin    Iron deficiency anemia due to chronic blood loss    Restless leg syndrome    Chest pain    Insomnia    Port-A-Cath in place    TIA (transient ischemic attack)    History of non anemic vitamin B12 deficiency    Acute pain of right shoulder    Weight gain    Severe obesity    Nausea and vomiting    Hypokalemia    Gastritis    Hiatal hernia    Prolonged Q-T interval on ECG    Hospital discharge follow-up       Family History   Problem Relation Age of Onset    Hypertension Mother     Hypertension Brother     Breast cancer Maternal Aunt         x2     Heart  attack Father     Hypertension Sister     Lupus Sister     Breast cancer Maternal Grandmother      Past Surgical History:   Procedure Laterality Date    BLADDER SURGERY      BREAST LUMPECTOMY  2018    BREAST RECONSTRUCTION      BREAST SURGERY  2018    reduction     SECTION      CHOLECYSTECTOMY      COLONOSCOPY N/A 10/2/2020    Procedure: COLONOSCOPY;  Surgeon: Guicho Hood MD;  Location: [facility] ENDO;  Service: Endoscopy;  Laterality: N/A;    ESOPHAGOGASTRODUODENOSCOPY N/A 2023    Procedure: EGD (ESOPHAGOGASTRODUODENOSCOPY);  Surgeon: Belkis Mario MD;  Location: [facility] ENDO;  Service: Endoscopy;  Laterality: N/A;    EXCISION OF GRANULOMA Bilateral 2021    Procedure: EXCISION, GRANULOMA SCARS OF BREASTS;  Surgeon: Obed Palmer Jr., MD;  Location: [facility];  Service: Plastics;  Laterality: Bilateral;    HYSTERECTOMY      LASER ABLATION      to back    SLEEVE GASTROPLASTY  2016    TOTAL REDUCTION MAMMOPLASTY Bilateral     two years ago    TUBAL LIGATION           Current Outpatient Medications:     albuterol (PROAIR HFA) 90 mcg/actuation inhaler, Inhale 2 puffs into the lungs every 4 (four) hours as needed., Disp: 18 g, Rfl: 3    atorvastatin (LIPITOR) 20 MG tablet, Take 1 tablet (20 mg total) by mouth once daily., Disp: 90 tablet, Rfl: 3    cetirizine (ZYRTEC) 10 MG tablet, Take 1 tablet (10 mg total) by mouth once daily., Disp: 90 tablet, Rfl: 3    cyanocobalamin 1,000 mcg/mL injection, Inject 1 mL (1,000 mcg total) into the muscle every 28 days., Disp: 10 mL, Rfl: 1    hydroCHLOROthiazide (HYDRODIURIL) 12.5 MG Tab, Take 2 tablets (25 mg total) by mouth once daily., Disp: 180 tablet, Rfl: 3    HYDROmorphone (DILAUDID) 2 MG tablet, Take 1 tablet (2 mg total) by mouth every 6 to 8 hours as needed for pain, Disp: 105 tablet, Rfl: 0    hydroxychloroquine (PLAQUENIL) 200 mg tablet, Take 200 mg by mouth once daily., Disp: , Rfl: 1    hydrOXYzine pamoate (VISTARIL) 25 MG Cap, Take 1 capsule  (25 mg total) by mouth every 8 (eight) hours as needed (anxiety)., Disp: 90 capsule, Rfl: 3    metoprolol succinate (TOPROL-XL) 25 MG 24 hr tablet, Take 1 tablet (25 mg total) by mouth once daily., Disp: 90 tablet, Rfl: 3    tiZANidine (ZANAFLEX) 4 MG tablet, Take 4 mg by mouth 3 (three) times daily as needed., Disp: , Rfl:     topiramate (TOPAMAX) 100 MG tablet, Take 1 tablet (100 mg total) by mouth 2 (two) times daily., Disp: 180 tablet, Rfl: 3    ALPRAZolam (XANAX) 0.5 MG tablet, Take 1 tablet (0.5 mg total) by mouth 2 (two) times daily as needed for Anxiety (panic attacks)., Disp: 10 tablet, Rfl: 0    buPROPion (WELLBUTRIN SR) 150 MG TBSR 12 hr tablet, Take 1 tablet (150 mg total) by mouth 2 (two) times daily., Disp: 60 tablet, Rfl: 0    EPINEPHrine (EPIPEN 2-DENNIS) 0.3 mg/0.3 mL AtIn, Inject 0.3 mLs (0.3 mg total) into the muscle once. for 1 dose, Disp: 2 each, Rfl: 3    EScitalopram oxalate (LEXAPRO) 10 MG tablet, Take 1 tablet (10 mg total) by mouth once daily., Disp: 30 tablet, Rfl: 0    eszopiclone (LUNESTA) 3 mg Tab, Take 1 tablet (3 mg total) by mouth every evening., Disp: 30 tablet, Rfl: 0    pantoprazole (PROTONIX) 40 MG tablet, Take 1 tablet (40 mg total) by mouth 2 (two) times daily., Disp: 60 tablet, Rfl: 1    potassium chloride SA (K-DUR,KLOR-CON) 20 MEQ tablet, Take 1 tablet (20 mEq total) by mouth 2 (two) times daily for 7 days, THEN 1 tablet (20 mEq total) once daily for 21 days., Disp: 35 tablet, Rfl: 0    promethazine (PHENERGAN) 25 MG tablet, Take 1 tablet (25 mg total) by mouth every 6 (six) hours as needed for Nausea., Disp: 15 tablet, Rfl: 0    semaglutide (OZEMPIC) 0.25 mg or 0.5 mg(2 mg/1.5 mL) pen injector, INJECT 0.5 MG INTO THE SKIN ONCE A WEEK., Disp: 3 pen, Rfl: 1    Review of Systems   Constitutional:  Positive for fatigue. Negative for appetite change, chills and fever.   Eyes:  Negative for visual disturbance.   Respiratory:  Negative for shortness of breath.    Cardiovascular:   "Negative for chest pain and palpitations.   Neurological:  Negative for dizziness, light-headedness and headaches.   Psychiatric/Behavioral:  Positive for sleep disturbance. Negative for dysphoric mood. The patient is nervous/anxious.      Objective:   /84 (BP Location: Left arm, Patient Position: Sitting)   Pulse 72   Temp 98.8 °F (37.1 °C)   Ht 5' 2" (1.575 m)   Wt 75.6 kg (166 lb 10.7 oz)   LMP 03/26/2012   SpO2 97%   BMI 30.48 kg/m²      Physical Exam  Vitals reviewed.   Constitutional:       General: She is not in acute distress.     Appearance: Normal appearance. She is well-developed. She is not ill-appearing or diaphoretic.   HENT:      Head: Normocephalic.   Cardiovascular:      Rate and Rhythm: Normal rate and regular rhythm.      Pulses: Normal pulses.      Heart sounds: Normal heart sounds. No murmur heard.    No friction rub. No gallop.   Pulmonary:      Effort: Pulmonary effort is normal. No respiratory distress.      Breath sounds: Normal breath sounds. No rales.   Skin:     General: Skin is warm and dry.      Coloration: Skin is not pale.      Findings: No erythema.   Neurological:      Mental Status: She is alert and oriented to person, place, and time.       Assessment & Plan     Problem List Items Addressed This Visit          Psychiatric    Anxiety - Primary    Relevant Medications    EScitalopram oxalate (LEXAPRO) 10 MG tablet    -trial of lexapro for anxiety and prevention of panic episodes.        Cardiac/Vascular    Primary hypertension    Current Assessment & Plan     Blood pressure controlled. Continue current medications.             Other    Insomnia    Relevant Medications    eszopiclone (LUNESTA) 3 mg Tab    -increase in lunesta.     Other Visit Diagnoses       TMJ (temporomandibular joint disorder)        Relevant Orders    Ambulatory referral/consult to ENT    Need for shingles vaccine        Relevant Orders    (In Office Administered) Zoster Recombinant Vaccine " "(Completed)    Breast cancer screening by mammogram        Relevant Orders    Mammo Digital Screening Bilat w/ Andrea (Completed)          Follow up in about 1 month (around 3/8/2023) for anxiety. .            Portions of this note may have been created with voice recognition software. Occasional "wrong-word" or "sound-a-like" substitutions may have occurred due to the inherent limitations of voice recognition software. Please, read the note carefully and recognize, using context, where substitutions have occurred.       "

## 2023-02-09 DIAGNOSIS — R11.2 NAUSEA AND VOMITING, UNSPECIFIED VOMITING TYPE: Primary | ICD-10-CM

## 2023-02-10 DIAGNOSIS — I10 PRIMARY HYPERTENSION: ICD-10-CM

## 2023-02-10 RX ORDER — HYDROCHLOROTHIAZIDE 12.5 MG/1
TABLET ORAL
Qty: 30 TABLET | OUTPATIENT
Start: 2023-02-10

## 2023-02-10 NOTE — TELEPHONE ENCOUNTER
Refill Routing Note   Medication(s) are not appropriate for processing by [facility] for the following reason(s):       NPP    [facility] action(s):  Route              Medication Therapy Plan: NPP- Non participating provider    Appointments  past 12m or future 3m with PCP    Date Provider   Last Visit   2/8/2023 Antonieta Ariza NP   Next Visit   3/8/2023 Antonieta Ariza NP   ED visits in past 90 days: 0        Note composed:9:52 AM 02/10/2023

## 2023-02-14 ENCOUNTER — HOSPITAL ENCOUNTER (OUTPATIENT)
Facility: HOSPITAL | Age: 58
Discharge: HOME | End: 2023-02-17
Attending: EMERGENCY MEDICINE | Admitting: HOSPITALIST
Payer: COMMERCIAL

## 2023-02-14 DIAGNOSIS — E78.00 PURE HYPERCHOLESTEROLEMIA: ICD-10-CM

## 2023-02-14 DIAGNOSIS — K21.9 GASTROESOPHAGEAL REFLUX DISEASE, UNSPECIFIED WHETHER ESOPHAGITIS PRESENT: ICD-10-CM

## 2023-02-14 DIAGNOSIS — R11.2 NAUSEA AND VOMITING: ICD-10-CM

## 2023-02-14 DIAGNOSIS — R52 INTRACTABLE PAIN: ICD-10-CM

## 2023-02-14 DIAGNOSIS — G45.9 TIA (TRANSIENT ISCHEMIC ATTACK): ICD-10-CM

## 2023-02-14 DIAGNOSIS — R10.9 ABDOMINAL CRAMPING: ICD-10-CM

## 2023-02-14 DIAGNOSIS — R10.13 EPIGASTRIC PAIN: ICD-10-CM

## 2023-02-14 DIAGNOSIS — D50.0 IRON DEFICIENCY ANEMIA DUE TO CHRONIC BLOOD LOSS: ICD-10-CM

## 2023-02-14 DIAGNOSIS — M79.7 FIBROMYALGIA: Chronic | ICD-10-CM

## 2023-02-14 DIAGNOSIS — R11.2 INTRACTABLE VOMITING WITH NAUSEA: ICD-10-CM

## 2023-02-14 DIAGNOSIS — K29.70 GASTRITIS WITHOUT BLEEDING, UNSPECIFIED CHRONICITY, UNSPECIFIED GASTRITIS TYPE: Primary | ICD-10-CM

## 2023-02-14 DIAGNOSIS — K44.9 HIATAL HERNIA: ICD-10-CM

## 2023-02-14 DIAGNOSIS — Z90.3 HISTORY OF SLEEVE GASTRECTOMY: Chronic | ICD-10-CM

## 2023-02-14 DIAGNOSIS — G47.00 INSOMNIA, UNSPECIFIED TYPE: ICD-10-CM

## 2023-02-14 DIAGNOSIS — E66.01 SEVERE OBESITY: ICD-10-CM

## 2023-02-14 DIAGNOSIS — J45.909 ASTHMA, UNSPECIFIED ASTHMA SEVERITY, UNSPECIFIED WHETHER COMPLICATED, UNSPECIFIED WHETHER PERSISTENT: ICD-10-CM

## 2023-02-14 DIAGNOSIS — M32.9 SYSTEMIC LUPUS ERYTHEMATOSUS, UNSPECIFIED SLE TYPE, UNSPECIFIED ORGAN INVOLVEMENT STATUS: ICD-10-CM

## 2023-02-14 DIAGNOSIS — F41.9 ANXIETY: ICD-10-CM

## 2023-02-14 DIAGNOSIS — E87.6 HYPOKALEMIA: ICD-10-CM

## 2023-02-14 DIAGNOSIS — I10 PRIMARY HYPERTENSION: ICD-10-CM

## 2023-02-14 DIAGNOSIS — R07.9 CHEST PAIN: ICD-10-CM

## 2023-02-14 DIAGNOSIS — M25.511 ACUTE PAIN OF RIGHT SHOULDER: ICD-10-CM

## 2023-02-14 DIAGNOSIS — R11.2 NAUSEA AND VOMITING, UNSPECIFIED VOMITING TYPE: ICD-10-CM

## 2023-02-14 LAB
ALBUMIN SERPL BCP-MCNC: 4.4 G/DL (ref 3.5–5.2)
ALP SERPL-CCNC: 59 U/L (ref 55–135)
ALT SERPL W/O P-5'-P-CCNC: 8 U/L (ref 10–44)
ANION GAP SERPL CALC-SCNC: 16 MMOL/L (ref 8–16)
AST SERPL-CCNC: 11 U/L (ref 10–40)
BACTERIA #/AREA URNS AUTO: NORMAL /HPF
BASOPHILS # BLD AUTO: 0.03 K/UL (ref 0–0.2)
BASOPHILS NFR BLD: 0.4 % (ref 0–1.9)
BILIRUB SERPL-MCNC: 0.4 MG/DL (ref 0.1–1)
BILIRUB UR QL STRIP: ABNORMAL
BUN SERPL-MCNC: 11 MG/DL (ref 6–20)
CALCIUM SERPL-MCNC: 9.9 MG/DL (ref 8.7–10.5)
CHLORIDE SERPL-SCNC: 106 MMOL/L (ref 95–110)
CLARITY UR REFRACT.AUTO: ABNORMAL
CO2 SERPL-SCNC: 24 MMOL/L (ref 23–29)
COLOR UR AUTO: YELLOW
CREAT SERPL-MCNC: 0.9 MG/DL (ref 0.5–1.4)
CTP QC/QA: YES
CTP QC/QA: YES
DIFFERENTIAL METHOD: ABNORMAL
EOSINOPHIL # BLD AUTO: 0 K/UL (ref 0–0.5)
EOSINOPHIL NFR BLD: 0 % (ref 0–8)
ERYTHROCYTE [DISTWIDTH] IN BLOOD BY AUTOMATED COUNT: 12.6 % (ref 11.5–14.5)
EST. GFR  (NO RACE VARIABLE): >60 ML/MIN/1.73 M^2
GLUCOSE SERPL-MCNC: 112 MG/DL (ref 70–110)
GLUCOSE UR QL STRIP: NEGATIVE
HCT VFR BLD AUTO: 35.5 % (ref 37–48.5)
HGB BLD-MCNC: 12.4 G/DL (ref 12–16)
HGB UR QL STRIP: ABNORMAL
IMM GRANULOCYTES # BLD AUTO: 0.02 K/UL (ref 0–0.04)
IMM GRANULOCYTES NFR BLD AUTO: 0.3 % (ref 0–0.5)
KETONES UR QL STRIP: ABNORMAL
LEUKOCYTE ESTERASE UR QL STRIP: ABNORMAL
LIPASE SERPL-CCNC: 35 U/L (ref 4–60)
LYMPHOCYTES # BLD AUTO: 0.7 K/UL (ref 1–4.8)
LYMPHOCYTES NFR BLD: 9.6 % (ref 18–48)
MAGNESIUM SERPL-MCNC: 1.8 MG/DL (ref 1.6–2.6)
MCH RBC QN AUTO: 28.6 PG (ref 27–31)
MCHC RBC AUTO-ENTMCNC: 34.9 G/DL (ref 32–36)
MCV RBC AUTO: 82 FL (ref 82–98)
MICROSCOPIC COMMENT: NORMAL
MONOCYTES # BLD AUTO: 0.2 K/UL (ref 0.3–1)
MONOCYTES NFR BLD: 2.4 % (ref 4–15)
NEUTROPHILS # BLD AUTO: 6.6 K/UL (ref 1.8–7.7)
NEUTROPHILS NFR BLD: 87.3 % (ref 38–73)
NITRITE UR QL STRIP: NEGATIVE
NRBC BLD-RTO: 0 /100 WBC
PH UR STRIP: 8 [PH] (ref 5–8)
PLATELET # BLD AUTO: 384 K/UL (ref 150–450)
PMV BLD AUTO: 9.5 FL (ref 9.2–12.9)
POC MOLECULAR INFLUENZA A AGN: NEGATIVE
POC MOLECULAR INFLUENZA B AGN: NEGATIVE
POTASSIUM SERPL-SCNC: 2.7 MMOL/L (ref 3.5–5.1)
PROT SERPL-MCNC: 7.5 G/DL (ref 6–8.4)
PROT UR QL STRIP: ABNORMAL
RBC # BLD AUTO: 4.33 M/UL (ref 4–5.4)
RBC #/AREA URNS AUTO: 4 /HPF (ref 0–4)
SARS-COV-2 RDRP RESP QL NAA+PROBE: NEGATIVE
SODIUM SERPL-SCNC: 146 MMOL/L (ref 136–145)
SP GR UR STRIP: 1.01 (ref 1–1.03)
SQUAMOUS #/AREA URNS AUTO: 2 /HPF
URN SPEC COLLECT METH UR: ABNORMAL
UROBILINOGEN UR STRIP-ACNC: NEGATIVE EU/DL
WBC # BLD AUTO: 7.57 K/UL (ref 3.9–12.7)
WBC #/AREA URNS AUTO: 2 /HPF (ref 0–5)

## 2023-02-14 PROCEDURE — 96366 THER/PROPH/DIAG IV INF ADDON: CPT | Mod: ER

## 2023-02-14 PROCEDURE — 81000 URINALYSIS NONAUTO W/SCOPE: CPT | Mod: ER | Performed by: EMERGENCY MEDICINE

## 2023-02-14 PROCEDURE — 96368 THER/DIAG CONCURRENT INF: CPT | Mod: ER

## 2023-02-14 PROCEDURE — 93005 ELECTROCARDIOGRAM TRACING: CPT | Mod: ER

## 2023-02-14 PROCEDURE — 25000003 PHARM REV CODE 250: Mod: ER | Performed by: EMERGENCY MEDICINE

## 2023-02-14 PROCEDURE — 96376 TX/PRO/DX INJ SAME DRUG ADON: CPT | Mod: ER,59

## 2023-02-14 PROCEDURE — 96375 TX/PRO/DX INJ NEW DRUG ADDON: CPT | Mod: ER

## 2023-02-14 PROCEDURE — 93010 ELECTROCARDIOGRAM REPORT: CPT | Mod: ,,, | Performed by: INTERNAL MEDICINE

## 2023-02-14 PROCEDURE — 83735 ASSAY OF MAGNESIUM: CPT | Mod: ER | Performed by: EMERGENCY MEDICINE

## 2023-02-14 PROCEDURE — 85025 COMPLETE CBC W/AUTO DIFF WBC: CPT | Mod: ER | Performed by: EMERGENCY MEDICINE

## 2023-02-14 PROCEDURE — 96365 THER/PROPH/DIAG IV INF INIT: CPT | Mod: ER

## 2023-02-14 PROCEDURE — 63600175 PHARM REV CODE 636 W HCPCS: Mod: ER | Performed by: EMERGENCY MEDICINE

## 2023-02-14 PROCEDURE — 80053 COMPREHEN METABOLIC PANEL: CPT | Mod: ER | Performed by: EMERGENCY MEDICINE

## 2023-02-14 PROCEDURE — 87502 INFLUENZA DNA AMP PROBE: CPT | Mod: ER

## 2023-02-14 PROCEDURE — 83690 ASSAY OF LIPASE: CPT | Mod: ER | Performed by: EMERGENCY MEDICINE

## 2023-02-14 PROCEDURE — 93010 EKG 12-LEAD: ICD-10-PCS | Mod: ,,, | Performed by: INTERNAL MEDICINE

## 2023-02-14 PROCEDURE — 99285 EMERGENCY DEPT VISIT HI MDM: CPT | Mod: 25,ER

## 2023-02-14 RX ORDER — MORPHINE SULFATE 4 MG/ML
4 INJECTION, SOLUTION INTRAMUSCULAR; INTRAVENOUS
Status: COMPLETED | OUTPATIENT
Start: 2023-02-14 | End: 2023-02-14

## 2023-02-14 RX ORDER — POTASSIUM CHLORIDE 7.45 MG/ML
40 INJECTION INTRAVENOUS
Status: COMPLETED | OUTPATIENT
Start: 2023-02-14 | End: 2023-02-15

## 2023-02-14 RX ORDER — KETOROLAC TROMETHAMINE 30 MG/ML
30 INJECTION, SOLUTION INTRAMUSCULAR; INTRAVENOUS
Status: COMPLETED | OUTPATIENT
Start: 2023-02-14 | End: 2023-02-14

## 2023-02-14 RX ORDER — LORAZEPAM 2 MG/ML
1 INJECTION INTRAMUSCULAR
Status: COMPLETED | OUTPATIENT
Start: 2023-02-14 | End: 2023-02-14

## 2023-02-14 RX ADMIN — PROMETHAZINE HYDROCHLORIDE 12.5 MG: 25 INJECTION INTRAMUSCULAR; INTRAVENOUS at 08:02

## 2023-02-14 RX ADMIN — KETOROLAC TROMETHAMINE 30 MG: 30 INJECTION, SOLUTION INTRAMUSCULAR; INTRAVENOUS at 08:02

## 2023-02-14 RX ADMIN — LORAZEPAM 1 MG: 2 INJECTION INTRAMUSCULAR; INTRAVENOUS at 10:02

## 2023-02-14 RX ADMIN — POTASSIUM CHLORIDE 40 MEQ: 7.46 INJECTION, SOLUTION INTRAVENOUS at 10:02

## 2023-02-14 RX ADMIN — MORPHINE SULFATE 4 MG: 4 INJECTION INTRAVENOUS at 10:02

## 2023-02-14 RX ADMIN — PROMETHAZINE HYDROCHLORIDE 12.5 MG: 25 INJECTION INTRAMUSCULAR; INTRAVENOUS at 10:02

## 2023-02-14 RX ADMIN — SODIUM CHLORIDE 1000 ML: 9 INJECTION, SOLUTION INTRAVENOUS at 08:02

## 2023-02-15 PROBLEM — R11.2 INTRACTABLE VOMITING WITH NAUSEA: Status: ACTIVE | Noted: 2023-02-15

## 2023-02-15 PROBLEM — E87.6 HYPOKALEMIA: Status: ACTIVE | Noted: 2023-02-15

## 2023-02-15 PROBLEM — R10.13 EPIGASTRIC PAIN: Status: ACTIVE | Noted: 2023-02-15

## 2023-02-15 PROBLEM — R10.9 ABDOMINAL CRAMPING: Status: RESOLVED | Noted: 2023-02-15 | Resolved: 2023-02-15

## 2023-02-15 PROBLEM — R10.9 ABDOMINAL CRAMPING: Status: ACTIVE | Noted: 2023-02-15

## 2023-02-15 LAB
ANION GAP SERPL CALC-SCNC: 13 MMOL/L (ref 8–16)
ANION GAP SERPL CALC-SCNC: 14 MMOL/L (ref 8–16)
ANION GAP SERPL CALC-SCNC: 16 MMOL/L (ref 8–16)
BUN SERPL-MCNC: 3 MG/DL (ref 6–20)
BUN SERPL-MCNC: 5 MG/DL (ref 6–20)
BUN SERPL-MCNC: 8 MG/DL (ref 6–20)
CALCIUM SERPL-MCNC: 8.6 MG/DL (ref 8.7–10.5)
CALCIUM SERPL-MCNC: 8.8 MG/DL (ref 8.7–10.5)
CALCIUM SERPL-MCNC: 9.1 MG/DL (ref 8.7–10.5)
CHLORIDE SERPL-SCNC: 105 MMOL/L (ref 95–110)
CHLORIDE SERPL-SCNC: 107 MMOL/L (ref 95–110)
CHLORIDE SERPL-SCNC: 109 MMOL/L (ref 95–110)
CO2 SERPL-SCNC: 22 MMOL/L (ref 23–29)
CREAT SERPL-MCNC: 0.8 MG/DL (ref 0.5–1.4)
EST. GFR  (NO RACE VARIABLE): >60 ML/MIN/1.73 M^2
GLUCOSE SERPL-MCNC: 104 MG/DL (ref 70–110)
GLUCOSE SERPL-MCNC: 105 MG/DL (ref 70–110)
GLUCOSE SERPL-MCNC: 94 MG/DL (ref 70–110)
POTASSIUM SERPL-SCNC: 2.9 MMOL/L (ref 3.5–5.1)
POTASSIUM SERPL-SCNC: 3 MMOL/L (ref 3.5–5.1)
POTASSIUM SERPL-SCNC: 3.1 MMOL/L (ref 3.5–5.1)
SODIUM SERPL-SCNC: 142 MMOL/L (ref 136–145)
SODIUM SERPL-SCNC: 143 MMOL/L (ref 136–145)
SODIUM SERPL-SCNC: 145 MMOL/L (ref 136–145)

## 2023-02-15 PROCEDURE — 96366 THER/PROPH/DIAG IV INF ADDON: CPT

## 2023-02-15 PROCEDURE — 96366 THER/PROPH/DIAG IV INF ADDON: CPT | Mod: ER

## 2023-02-15 PROCEDURE — 96375 TX/PRO/DX INJ NEW DRUG ADDON: CPT

## 2023-02-15 PROCEDURE — 25000003 PHARM REV CODE 250: Mod: ER | Performed by: EMERGENCY MEDICINE

## 2023-02-15 PROCEDURE — 63600175 PHARM REV CODE 636 W HCPCS: Mod: ER | Performed by: EMERGENCY MEDICINE

## 2023-02-15 PROCEDURE — 63600175 PHARM REV CODE 636 W HCPCS: Performed by: EMERGENCY MEDICINE

## 2023-02-15 PROCEDURE — 96375 TX/PRO/DX INJ NEW DRUG ADDON: CPT | Mod: ER

## 2023-02-15 PROCEDURE — 80048 BASIC METABOLIC PNL TOTAL CA: CPT | Mod: 91,ER | Performed by: EMERGENCY MEDICINE

## 2023-02-15 PROCEDURE — 63600175 PHARM REV CODE 636 W HCPCS: Performed by: HOSPITALIST

## 2023-02-15 PROCEDURE — G0378 HOSPITAL OBSERVATION PER HR: HCPCS

## 2023-02-15 PROCEDURE — 80048 BASIC METABOLIC PNL TOTAL CA: CPT | Mod: ER | Performed by: EMERGENCY MEDICINE

## 2023-02-15 PROCEDURE — 80048 BASIC METABOLIC PNL TOTAL CA: CPT | Mod: 91 | Performed by: EMERGENCY MEDICINE

## 2023-02-15 PROCEDURE — 96361 HYDRATE IV INFUSION ADD-ON: CPT | Mod: ER

## 2023-02-15 PROCEDURE — 25000003 PHARM REV CODE 250: Performed by: EMERGENCY MEDICINE

## 2023-02-15 PROCEDURE — C9113 INJ PANTOPRAZOLE SODIUM, VIA: HCPCS | Performed by: HOSPITALIST

## 2023-02-15 RX ORDER — DIPHENHYDRAMINE HYDROCHLORIDE 50 MG/ML
25 INJECTION INTRAMUSCULAR; INTRAVENOUS
Status: COMPLETED | OUTPATIENT
Start: 2023-02-15 | End: 2023-02-15

## 2023-02-15 RX ORDER — SODIUM CHLORIDE, SODIUM LACTATE, POTASSIUM CHLORIDE, CALCIUM CHLORIDE 600; 310; 30; 20 MG/100ML; MG/100ML; MG/100ML; MG/100ML
1000 INJECTION, SOLUTION INTRAVENOUS CONTINUOUS
Status: ACTIVE | OUTPATIENT
Start: 2023-02-15 | End: 2023-02-15

## 2023-02-15 RX ORDER — ONDANSETRON 2 MG/ML
4 INJECTION INTRAMUSCULAR; INTRAVENOUS EVERY 6 HOURS PRN
Status: DISCONTINUED | OUTPATIENT
Start: 2023-02-15 | End: 2023-02-17 | Stop reason: HOSPADM

## 2023-02-15 RX ORDER — METOCLOPRAMIDE HYDROCHLORIDE 5 MG/ML
10 INJECTION INTRAMUSCULAR; INTRAVENOUS
Status: COMPLETED | OUTPATIENT
Start: 2023-02-15 | End: 2023-02-15

## 2023-02-15 RX ORDER — ALBUTEROL SULFATE 90 UG/1
2 AEROSOL, METERED RESPIRATORY (INHALATION) EVERY 4 HOURS PRN
Status: DISCONTINUED | OUTPATIENT
Start: 2023-02-15 | End: 2023-02-15

## 2023-02-15 RX ORDER — HYDRALAZINE HYDROCHLORIDE 20 MG/ML
10 INJECTION INTRAMUSCULAR; INTRAVENOUS EVERY 4 HOURS PRN
Status: DISCONTINUED | OUTPATIENT
Start: 2023-02-15 | End: 2023-02-17 | Stop reason: HOSPADM

## 2023-02-15 RX ORDER — IBUPROFEN 200 MG
16 TABLET ORAL
Status: DISCONTINUED | OUTPATIENT
Start: 2023-02-15 | End: 2023-02-17 | Stop reason: HOSPADM

## 2023-02-15 RX ORDER — NALOXONE HCL 0.4 MG/ML
0.02 VIAL (ML) INJECTION
Status: DISCONTINUED | OUTPATIENT
Start: 2023-02-15 | End: 2023-02-17 | Stop reason: HOSPADM

## 2023-02-15 RX ORDER — PANTOPRAZOLE SODIUM 40 MG/10ML
40 INJECTION, POWDER, LYOPHILIZED, FOR SOLUTION INTRAVENOUS 2 TIMES DAILY
Status: DISCONTINUED | OUTPATIENT
Start: 2023-02-15 | End: 2023-02-17 | Stop reason: HOSPADM

## 2023-02-15 RX ORDER — ALBUTEROL SULFATE 0.83 MG/ML
2.5 SOLUTION RESPIRATORY (INHALATION) EVERY 4 HOURS PRN
Status: DISCONTINUED | OUTPATIENT
Start: 2023-02-15 | End: 2023-02-17 | Stop reason: HOSPADM

## 2023-02-15 RX ORDER — TALC
6 POWDER (GRAM) TOPICAL NIGHTLY PRN
Status: DISCONTINUED | OUTPATIENT
Start: 2023-02-15 | End: 2023-02-17 | Stop reason: HOSPADM

## 2023-02-15 RX ORDER — SODIUM CHLORIDE 0.9 % (FLUSH) 0.9 %
10 SYRINGE (ML) INJECTION
Status: DISCONTINUED | OUTPATIENT
Start: 2023-02-15 | End: 2023-02-17 | Stop reason: HOSPADM

## 2023-02-15 RX ORDER — HYDROMORPHONE HYDROCHLORIDE 2 MG/1
2 TABLET ORAL EVERY 6 HOURS PRN
Status: DISCONTINUED | OUTPATIENT
Start: 2023-02-15 | End: 2023-02-15

## 2023-02-15 RX ORDER — PANTOPRAZOLE SODIUM 40 MG/1
40 TABLET, DELAYED RELEASE ORAL DAILY
Status: DISCONTINUED | OUTPATIENT
Start: 2023-02-16 | End: 2023-02-15

## 2023-02-15 RX ORDER — ACETAMINOPHEN 325 MG/1
650 TABLET ORAL EVERY 8 HOURS PRN
Status: DISCONTINUED | OUTPATIENT
Start: 2023-02-15 | End: 2023-02-17 | Stop reason: HOSPADM

## 2023-02-15 RX ORDER — LABETALOL HYDROCHLORIDE 5 MG/ML
10 INJECTION, SOLUTION INTRAVENOUS EVERY 4 HOURS PRN
Status: DISCONTINUED | OUTPATIENT
Start: 2023-02-15 | End: 2023-02-17 | Stop reason: HOSPADM

## 2023-02-15 RX ORDER — POTASSIUM CHLORIDE 7.45 MG/ML
10 INJECTION INTRAVENOUS
Status: COMPLETED | OUTPATIENT
Start: 2023-02-15 | End: 2023-02-15

## 2023-02-15 RX ORDER — HALOPERIDOL 5 MG/ML
5 INJECTION INTRAMUSCULAR
Status: COMPLETED | OUTPATIENT
Start: 2023-02-15 | End: 2023-02-15

## 2023-02-15 RX ORDER — GLUCAGON 1 MG
1 KIT INJECTION
Status: DISCONTINUED | OUTPATIENT
Start: 2023-02-15 | End: 2023-02-17 | Stop reason: HOSPADM

## 2023-02-15 RX ORDER — LORAZEPAM 2 MG/ML
1 INJECTION INTRAMUSCULAR
Status: COMPLETED | OUTPATIENT
Start: 2023-02-15 | End: 2023-02-15

## 2023-02-15 RX ORDER — SODIUM CHLORIDE 9 MG/ML
1000 INJECTION, SOLUTION INTRAVENOUS
Status: COMPLETED | OUTPATIENT
Start: 2023-02-15 | End: 2023-02-15

## 2023-02-15 RX ORDER — IBUPROFEN 200 MG
24 TABLET ORAL
Status: DISCONTINUED | OUTPATIENT
Start: 2023-02-15 | End: 2023-02-17 | Stop reason: HOSPADM

## 2023-02-15 RX ORDER — LABETALOL HYDROCHLORIDE 5 MG/ML
10 INJECTION, SOLUTION INTRAVENOUS
Status: DISPENSED | OUTPATIENT
Start: 2023-02-15 | End: 2023-02-15

## 2023-02-15 RX ORDER — SODIUM CHLORIDE 0.9 % (FLUSH) 0.9 %
10 SYRINGE (ML) INJECTION EVERY 12 HOURS PRN
Status: DISCONTINUED | OUTPATIENT
Start: 2023-02-15 | End: 2023-02-17 | Stop reason: HOSPADM

## 2023-02-15 RX ORDER — FENTANYL CITRATE 50 UG/ML
50 INJECTION, SOLUTION INTRAMUSCULAR; INTRAVENOUS
Status: COMPLETED | OUTPATIENT
Start: 2023-02-15 | End: 2023-02-15

## 2023-02-15 RX ORDER — POTASSIUM CHLORIDE 7.45 MG/ML
20 INJECTION INTRAVENOUS
Status: COMPLETED | OUTPATIENT
Start: 2023-02-15 | End: 2023-02-15

## 2023-02-15 RX ORDER — HYDROMORPHONE HYDROCHLORIDE 2 MG/ML
1 INJECTION, SOLUTION INTRAMUSCULAR; INTRAVENOUS; SUBCUTANEOUS EVERY 6 HOURS PRN
Status: DISCONTINUED | OUTPATIENT
Start: 2023-02-15 | End: 2023-02-16

## 2023-02-15 RX ADMIN — DIPHENHYDRAMINE HYDROCHLORIDE 25 MG: 50 INJECTION INTRAMUSCULAR; INTRAVENOUS at 11:02

## 2023-02-15 RX ADMIN — DIPHENHYDRAMINE HYDROCHLORIDE 25 MG: 50 INJECTION INTRAMUSCULAR; INTRAVENOUS at 05:02

## 2023-02-15 RX ADMIN — HALOPERIDOL LACTATE 5 MG: 5 INJECTION, SOLUTION INTRAMUSCULAR at 02:02

## 2023-02-15 RX ADMIN — HALOPERIDOL LACTATE 5 MG: 5 INJECTION, SOLUTION INTRAMUSCULAR at 12:02

## 2023-02-15 RX ADMIN — SODIUM CHLORIDE 1000 ML: 9 INJECTION, SOLUTION INTRAVENOUS at 12:02

## 2023-02-15 RX ADMIN — METOCLOPRAMIDE 10 MG: 5 INJECTION, SOLUTION INTRAMUSCULAR; INTRAVENOUS at 11:02

## 2023-02-15 RX ADMIN — SODIUM CHLORIDE, POTASSIUM CHLORIDE, SODIUM LACTATE AND CALCIUM CHLORIDE 1000 ML: 600; 310; 30; 20 INJECTION, SOLUTION INTRAVENOUS at 01:02

## 2023-02-15 RX ADMIN — METOCLOPRAMIDE 10 MG: 5 INJECTION, SOLUTION INTRAMUSCULAR; INTRAVENOUS at 05:02

## 2023-02-15 RX ADMIN — PROMETHAZINE HYDROCHLORIDE 12.5 MG: 25 INJECTION INTRAMUSCULAR; INTRAVENOUS at 07:02

## 2023-02-15 RX ADMIN — SODIUM CHLORIDE 1000 ML: 9 INJECTION, SOLUTION INTRAVENOUS at 05:02

## 2023-02-15 RX ADMIN — PROMETHAZINE HYDROCHLORIDE 12.5 MG: 25 INJECTION INTRAMUSCULAR; INTRAVENOUS at 03:02

## 2023-02-15 RX ADMIN — PROMETHAZINE HYDROCHLORIDE 12.5 MG: 25 INJECTION INTRAMUSCULAR; INTRAVENOUS at 04:02

## 2023-02-15 RX ADMIN — PANTOPRAZOLE SODIUM 40 MG: 40 INJECTION, POWDER, FOR SOLUTION INTRAVENOUS at 09:02

## 2023-02-15 RX ADMIN — POTASSIUM BICARBONATE 40 MEQ: 391 TABLET, EFFERVESCENT ORAL at 04:02

## 2023-02-15 RX ADMIN — FENTANYL CITRATE 50 MCG: 50 INJECTION INTRAMUSCULAR; INTRAVENOUS at 03:02

## 2023-02-15 RX ADMIN — HYDROMORPHONE HYDROCHLORIDE 1 MG: 2 INJECTION INTRAMUSCULAR; INTRAVENOUS; SUBCUTANEOUS at 07:02

## 2023-02-15 RX ADMIN — POTASSIUM CHLORIDE 10 MEQ: 7.46 INJECTION, SOLUTION INTRAVENOUS at 05:02

## 2023-02-15 RX ADMIN — LORAZEPAM 1 MG: 2 INJECTION INTRAMUSCULAR; INTRAVENOUS at 08:02

## 2023-02-15 RX ADMIN — POTASSIUM CHLORIDE 10 MEQ: 7.46 INJECTION, SOLUTION INTRAVENOUS at 01:02

## 2023-02-15 RX ADMIN — POTASSIUM CHLORIDE 20 MEQ: 7.46 INJECTION, SOLUTION INTRAVENOUS at 05:02

## 2023-02-15 NOTE — PLAN OF CARE
Discussed poc with pt, pt verbalized understanding    Purposeful rounding every 2hours    VS wnl  Pain and nausea under control with PRN meds  Mediport in place to RCW    IVFs  Accurate I&Os  Bed locked at lowest position  Call light within reach    Chart check complete  Will cont with POC

## 2023-02-15 NOTE — ED PROVIDER NOTES
Encounter Date: 2/14/2023       History     Chief Complaint   Patient presents with    Abdominal Cramping     C/o abd cramping with nausea and vomiting     The history is provided by the patient.   Abdominal Cramping  The primary symptoms of the illness include abdominal pain and vomiting. The primary symptoms of the illness do not include fever, fatigue, shortness of breath, nausea, diarrhea, hematemesis, hematochezia, dysuria, vaginal discharge or vaginal bleeding. The current episode started today. The onset of the illness was gradual. The problem has not changed since onset.  The abdominal pain began 6 to 12 hours ago. The pain came on gradually. The abdominal pain has been gradually worsening since its onset. The abdominal pain is generalized (worse in epigastric area and with vomiting). The abdominal pain does not radiate. Pain scale: moderate.   The vomiting began today. Vomiting occurs 2 to 5 times per day. The emesis contains stomach contents.   The patient states that she believes she is currently not pregnant. The patient has not had a change in bowel habit. Additional symptoms associated with the illness include anorexia. Symptoms associated with the illness do not include chills, diaphoresis, heartburn, constipation, urgency, hematuria, frequency or back pain. Associated medical issues comments: fibromyalgia, htn, lupus, insomnia, anxiety, bladder surgery, cholecystectomy.   Review of patient's allergies indicates:   Allergen Reactions    Carrot Swelling     angioedema    Morphine Anxiety     Hives   States she can take dilaudid and percocet without any problems    Clindamycin     Macrolide antibiotics     Erythromycin Other (See Comments)     Hives and cramps     Zofran (as hydrochloride) [ondansetron hcl] Hives     Local hive after IV injection     Past Medical History:   Diagnosis Date    Anemia     Ankylosing spondylitis     Anxiety     Asthma     Cancer 2018    left breast  lumpectomy, xrt     Chronic constipation     Chronic insomnia     Edema     Fibromyalgia     Hypertension     Insulin resistance     Interstitial cystitis     Lupus     Lupus     Migraine headache     PONV (postoperative nausea and vomiting)     Restless legs syndrome     Shingles     Stroke     post partum right sided numbness    TIA (transient ischemic attack)      Past Surgical History:   Procedure Laterality Date    BLADDER SURGERY      BREAST LUMPECTOMY  2018    BREAST RECONSTRUCTION      BREAST SURGERY  2018    reduction     SECTION      CHOLECYSTECTOMY      COLONOSCOPY N/A 10/2/2020    Procedure: COLONOSCOPY;  Surgeon: Guicho Hood MD;  Location: [facility] ENDO;  Service: Endoscopy;  Laterality: N/A;    EXCISION OF GRANULOMA Bilateral 2021    Procedure: EXCISION, GRANULOMA SCARS OF BREASTS;  Surgeon: Obed Palmer Jr., MD;  Location: [facility] OR;  Service: Plastics;  Laterality: Bilateral;    HYSTERECTOMY      LASER ABLATION      to back    SLEEVE GASTROPLASTY  2016    TOTAL REDUCTION MAMMOPLASTY Bilateral     two years ago    TUBAL LIGATION       Family History   Problem Relation Age of Onset    Hypertension Mother     Hypertension Brother     Breast cancer Maternal Aunt         x2     Heart attack Father     Hypertension Sister     Lupus Sister     Breast cancer Maternal Grandmother      Social History     Tobacco Use    Smoking status: Never    Smokeless tobacco: Never   Substance Use Topics    Alcohol use: Yes     Comment: occasionally    Drug use: No     Review of Systems   Constitutional:  Negative for chills, diaphoresis, fatigue and fever.   HENT:  Negative for sore throat.    Respiratory:  Negative for shortness of breath.    Cardiovascular:  Negative for chest pain.   Gastrointestinal:  Positive for abdominal pain, anorexia and vomiting. Negative for constipation, diarrhea, heartburn, hematemesis, hematochezia and nausea.   Genitourinary:  Negative for dysuria, frequency, hematuria, urgency,  vaginal bleeding and vaginal discharge.   Musculoskeletal:  Negative for back pain.   Skin:  Negative for rash.   Neurological:  Negative for weakness.   Hematological:  Does not bruise/bleed easily.   All other systems reviewed and are negative.    Physical Exam     Initial Vitals   BP Pulse Resp Temp SpO2   02/14/23 2132 02/14/23 2009 02/14/23 2009 02/15/23 0018 02/14/23 2009   (!) 142/109 95 20 99 °F (37.2 °C) 100 %      MAP       --                Physical Exam    Nursing note and vitals reviewed.  Constitutional: She appears well-developed and well-nourished. She appears ill (pt actively vomiting on exam).   HENT:   Head: Normocephalic and atraumatic.   Right Ear: Hearing normal.   Left Ear: Hearing normal.   Nose: Nose normal.   Mouth/Throat: Uvula is midline, oropharynx is clear and moist and mucous membranes are normal. No oropharyngeal exudate.   Eyes: Conjunctivae, EOM and lids are normal. Pupils are equal, round, and reactive to light.   Neck: Trachea normal and phonation normal. Neck supple. No thyromegaly present.   Normal range of motion.   Full passive range of motion without pain.     Cardiovascular:  Normal rate, regular rhythm, normal heart sounds and intact distal pulses.     Exam reveals no gallop and no friction rub.       No murmur heard.  Pulmonary/Chest: Breath sounds normal. No respiratory distress. She has no wheezes. She has no rhonchi. She exhibits no tenderness.   Abdominal: Abdomen is soft. Bowel sounds are normal. She exhibits no distension. There is generalized abdominal tenderness (worse in epigastric area). There is no rebound and no guarding.   Musculoskeletal:         General: No tenderness or edema. Normal range of motion.      Right wrist: Normal. Normal pulse.      Left wrist: Normal. Normal pulse.      Right hand: Normal. Normal capillary refill. Normal pulse.      Left hand: Normal. Normal capillary refill. Normal pulse.      Cervical back: Normal, full passive range of  motion without pain, normal range of motion and neck supple.      Thoracic back: Normal.      Lumbar back: Normal.      Right ankle: Normal. Normal pulse.      Right Achilles Tendon: Normal.      Left ankle: Normal. Normal pulse.      Left Achilles Tendon: Normal.      Right foot: Normal. Normal capillary refill. Normal pulse.      Left foot: Normal. Normal capillary refill. Normal pulse.     Lymphadenopathy:     She has no cervical adenopathy.   Neurological: She is alert and oriented to person, place, and time. She has normal strength. No cranial nerve deficit or sensory deficit. GCS eye subscore is 4. GCS verbal subscore is 5. GCS motor subscore is 6.   No acute focal neuro deficits   Skin: Skin is warm, dry and intact. Capillary refill takes less than 2 seconds. No rash noted.   Psychiatric: She has a normal mood and affect. Her behavior is normal. Judgment and thought content normal.       ED Course   Critical Care    Date/Time: 2/15/2023 5:19 AM  Performed by: Gt Martines Jr., MD  Authorized by: Gt Martines Jr., MD   Direct patient critical care time: 10 minutes  Additional history critical care time: 10 minutes  Ordering / reviewing critical care time: 10 minutes  Documentation critical care time: 10 minutes  Consulting other physicians critical care time: 10 minutes  Consult with family critical care time: 10 minutes  Other critical care time: 15 minutes  Total critical care time (exclusive of procedural time) : 75 minutes  Critical care time was exclusive of separately billable procedures and treating other patients and teaching time.  Critical care was necessary to treat or prevent imminent or life-threatening deterioration of the following conditions: metabolic crisis (hypokalemia, intractable emesis).  Critical care was time spent personally by me on the following activities: blood draw for specimens, development of treatment plan with patient or surrogate, discussions with consultants,  interpretation of cardiac output measurements, evaluation of patient's response to treatment, examination of patient, obtaining history from patient or surrogate, ordering and performing treatments and interventions, ordering and review of laboratory studies, ordering and review of radiographic studies, pulse oximetry, re-evaluation of patient's condition and review of old charts.      Labs Reviewed   CBC W/ AUTO DIFFERENTIAL - Abnormal; Notable for the following components:       Result Value    Hematocrit 35.5 (*)     Lymph # 0.7 (*)     Mono # 0.2 (*)     Gran % 87.3 (*)     Lymph % 9.6 (*)     Mono % 2.4 (*)     All other components within normal limits   COMPREHENSIVE METABOLIC PANEL - Abnormal; Notable for the following components:    Sodium 146 (*)     Potassium 2.7 (*)     Glucose 112 (*)     ALT 8 (*)     All other components within normal limits    Narrative:     Potassium   critical result(s) called and verbal readback obtained   from Gorge Ozuna by CG4 02/14/2023 21:40   URINALYSIS, REFLEX TO URINE CULTURE - Abnormal; Notable for the following components:    Appearance, UA Hazy (*)     Protein, UA Trace (*)     Ketones, UA 2+ (*)     Bilirubin (UA) 1+ (*)     Occult Blood UA 2+ (*)     Leukocytes, UA Trace (*)     All other components within normal limits    Narrative:     Specimen Source->Urine   BASIC METABOLIC PANEL - Abnormal; Notable for the following components:    Potassium 3.1 (*)     CO2 22 (*)     All other components within normal limits   LIPASE   MAGNESIUM   MAGNESIUM   URINALYSIS MICROSCOPIC    Narrative:     Specimen Source->Urine   SARS-COV-2 RDRP GENE   POCT INFLUENZA A/B MOLECULAR     EKG Readings: (Independently Interpreted)   Initial Reading: No STEMI. Rhythm: Normal Sinus Rhythm. Heart Rate: 81. Ectopy: No Ectopy. Conduction: Normal. ST Segments: Normal ST Segments. T Waves: Normal. Axis: Normal. Other Findings: Prolonged QT Interval. Clinical Impression: Normal Sinus Rhythm      Imaging Results              X-Ray Abdomen Flat And Erect (Final result)  Result time 02/14/23 21:25:33      Final result by Viv Murguia MD (02/14/23 21:25:33)                   Impression:      No evidence for bowel obstruction.  Right upper quadrant surgical clips.      Electronically signed by: Blade Nam  Date:    02/14/2023  Time:    21:25               Narrative:    EXAMINATION:  XR ABDOMEN FLAT AND ERECT    CLINICAL HISTORY:  Abdominal Pain;    TECHNIQUE:  Flat and erect AP views of the abdomen were performed.    COMPARISON:  None    FINDINGS:  No evidence for distended loops of bowel or air-fluid levels.  No evidence for free air.  No abnormal calcifications seen.  No acute osseous injury.  Right upper quadrant surgical clips.                                       Medications   metoclopramide HCl injection 10 mg (has no administration in time range)   diphenhydrAMINE injection 25 mg (has no administration in time range)   potassium chloride 10 mEq in 100 mL IVPB (has no administration in time range)   0.9%  NaCl infusion (has no administration in time range)   sodium chloride 0.9% bolus 1,000 mL 1,000 mL (0 mLs Intravenous Stopped 2/14/23 2152)   ketorolac injection 30 mg (30 mg Intravenous Given 2/14/23 2044)   promethazine (PHENERGAN) 12.5 mg in dextrose 5 % (D5W) 50 mL IVPB (0 mg Intravenous Stopped 2/14/23 2151)   morphine injection 4 mg (4 mg Intravenous Given 2/14/23 2218)   potassium chloride 10 mEq in 100 mL IVPB (0 mEq Intravenous Stopped 2/15/23 0305)   promethazine (PHENERGAN) 12.5 mg in dextrose 5 % (D5W) 50 mL IVPB (0 mg Intravenous Stopped 2/14/23 2247)   LORazepam injection 1 mg (1 mg Intravenous Given 2/14/23 2256)   haloperidol lactate injection 5 mg (5 mg Intravenous Given 2/15/23 0019)   0.9%  NaCl infusion (0 mLs Intravenous Stopped 2/15/23 0443)   promethazine (PHENERGAN) 12.5 mg in dextrose 5 % (D5W) 50 mL IVPB (0 mg Intravenous Stopped 2/15/23 0341)   fentaNYL 50 mcg/mL  injection 50 mcg (50 mcg Intravenous Given 2/15/23 0359)   potassium bicarbonate disintegrating tablet 40 mEq (40 mEq Oral Given 2/15/23 0431)                    Vitals:    02/14/23 2251 02/14/23 2258 02/14/23 2317 02/14/23 2346   BP:   (!) 177/96 (!) 176/95   Pulse: 90 92 91 92   Resp: 18 18 (!) 22 20   Temp:       TempSrc:       SpO2: 100% 97% 96% 96%   Weight:       Height:        02/15/23 0018 02/15/23 0019 02/15/23 0033 02/15/23 0102   BP:  (!) 170/86 (!) 161/77 118/60   Pulse:  86 89 103   Resp:  10  14   Temp: 99 °F (37.2 °C)      TempSrc: Oral      SpO2:  100% 96% 98%   Weight:       Height:        02/15/23 0132 02/15/23 0201 02/15/23 0232 02/15/23 0332   BP: 123/64 (!) 141/67 136/69 (!) 164/78   Pulse: 92 89 91 89   Resp: 16 18 18    Temp:       TempSrc:       SpO2: 97% 98% 98% 98%   Weight:       Height:        02/15/23 0358 02/15/23 0401 02/15/23 0432   BP: (!) 192/93 (!) 172/83 (!) 145/80   Pulse: 93 91 91   Resp: 18  16   Temp:      TempSrc:      SpO2: 100% 99% 97%   Weight:      Height:          Results for orders placed or performed during the hospital encounter of 02/14/23   CBC W/ AUTO DIFFERENTIAL   Result Value Ref Range    WBC 7.57 3.90 - 12.70 K/uL    RBC 4.33 4.00 - 5.40 M/uL    Hemoglobin 12.4 12.0 - 16.0 g/dL    Hematocrit 35.5 (L) 37.0 - 48.5 %    MCV 82 82 - 98 fL    MCH 28.6 27.0 - 31.0 pg    MCHC 34.9 32.0 - 36.0 g/dL    RDW 12.6 11.5 - 14.5 %    Platelets 384 150 - 450 K/uL    MPV 9.5 9.2 - 12.9 fL    Immature Granulocytes 0.3 0.0 - 0.5 %    Gran # (ANC) 6.6 1.8 - 7.7 K/uL    Immature Grans (Abs) 0.02 0.00 - 0.04 K/uL    Lymph # 0.7 (L) 1.0 - 4.8 K/uL    Mono # 0.2 (L) 0.3 - 1.0 K/uL    Eos # 0.0 0.0 - 0.5 K/uL    Baso # 0.03 0.00 - 0.20 K/uL    nRBC 0 0 /100 WBC    Gran % 87.3 (H) 38.0 - 73.0 %    Lymph % 9.6 (L) 18.0 - 48.0 %    Mono % 2.4 (L) 4.0 - 15.0 %    Eosinophil % 0.0 0.0 - 8.0 %    Basophil % 0.4 0.0 - 1.9 %    Differential Method Automated    Comp. Metabolic Panel   Result  Value Ref Range    Sodium 146 (H) 136 - 145 mmol/L    Potassium 2.7 (LL) 3.5 - 5.1 mmol/L    Chloride 106 95 - 110 mmol/L    CO2 24 23 - 29 mmol/L    Glucose 112 (H) 70 - 110 mg/dL    BUN 11 6 - 20 mg/dL    Creatinine 0.9 0.5 - 1.4 mg/dL    Calcium 9.9 8.7 - 10.5 mg/dL    Total Protein 7.5 6.0 - 8.4 g/dL    Albumin 4.4 3.5 - 5.2 g/dL    Total Bilirubin 0.4 0.1 - 1.0 mg/dL    Alkaline Phosphatase 59 55 - 135 U/L    AST 11 10 - 40 U/L    ALT 8 (L) 10 - 44 U/L    Anion Gap 16 8 - 16 mmol/L    eGFR >60.0 >60 mL/min/1.73 m^2   Lipase   Result Value Ref Range    Lipase 35 4 - 60 U/L   Urinalysis, Reflex to Urine Culture Urine, Clean Catch    Specimen: Urine   Result Value Ref Range    Specimen UA Urine, Clean Catch     Color, UA Yellow Yellow, Straw, Katia    Appearance, UA Hazy (A) Clear    pH, UA 8.0 5.0 - 8.0    Specific Gravity, UA 1.015 1.005 - 1.030    Protein, UA Trace (A) Negative    Glucose, UA Negative Negative    Ketones, UA 2+ (A) Negative    Bilirubin (UA) 1+ (A) Negative    Occult Blood UA 2+ (A) Negative    Nitrite, UA Negative Negative    Urobilinogen, UA Negative <2.0 EU/dL    Leukocytes, UA Trace (A) Negative   Magnesium   Result Value Ref Range    Magnesium 1.8 1.6 - 2.6 mg/dL   Urinalysis Microscopic   Result Value Ref Range    RBC, UA 4 0 - 4 /hpf    WBC, UA 2 0 - 5 /hpf    Bacteria Occasional None-Occ /hpf    Squam Epithel, UA 2 /hpf    Microscopic Comment SEE COMMENT    Basic metabolic panel   Result Value Ref Range    Sodium 145 136 - 145 mmol/L    Potassium 3.1 (L) 3.5 - 5.1 mmol/L    Chloride 109 95 - 110 mmol/L    CO2 22 (L) 23 - 29 mmol/L    Glucose 105 70 - 110 mg/dL    BUN 8 6 - 20 mg/dL    Creatinine 0.8 0.5 - 1.4 mg/dL    Calcium 8.8 8.7 - 10.5 mg/dL    Anion Gap 14 8 - 16 mmol/L    eGFR >60.0 >60 mL/min/1.73 m^2   POCT COVID-19 Rapid Screening   Result Value Ref Range    POC Rapid COVID Negative Negative     Acceptable Yes    POCT Influenza A/B Molecular   Result Value Ref  Range    POC Molecular Influenza A Ag Negative Negative, Not Reported    POC Molecular Influenza B Ag Negative Negative, Not Reported     Acceptable Yes          Imaging Results              X-Ray Abdomen Flat And Erect (Final result)  Result time 02/14/23 21:25:33      Final result by Viv Murguia MD (02/14/23 21:25:33)                   Impression:      No evidence for bowel obstruction.  Right upper quadrant surgical clips.      Electronically signed by: Blade Nam  Date:    02/14/2023  Time:    21:25               Narrative:    EXAMINATION:  XR ABDOMEN FLAT AND ERECT    CLINICAL HISTORY:  Abdominal Pain;    TECHNIQUE:  Flat and erect AP views of the abdomen were performed.    COMPARISON:  None    FINDINGS:  No evidence for distended loops of bowel or air-fluid levels.  No evidence for free air.  No abnormal calcifications seen.  No acute osseous injury.  Right upper quadrant surgical clips.                                      Medications   metoclopramide HCl injection 10 mg (has no administration in time range)   diphenhydrAMINE injection 25 mg (has no administration in time range)   potassium chloride 10 mEq in 100 mL IVPB (has no administration in time range)   0.9%  NaCl infusion (has no administration in time range)   sodium chloride 0.9% bolus 1,000 mL 1,000 mL (0 mLs Intravenous Stopped 2/14/23 2152)   ketorolac injection 30 mg (30 mg Intravenous Given 2/14/23 2044)   promethazine (PHENERGAN) 12.5 mg in dextrose 5 % (D5W) 50 mL IVPB (0 mg Intravenous Stopped 2/14/23 2151)   morphine injection 4 mg (4 mg Intravenous Given 2/14/23 2218)   potassium chloride 10 mEq in 100 mL IVPB (0 mEq Intravenous Stopped 2/15/23 0305)   promethazine (PHENERGAN) 12.5 mg in dextrose 5 % (D5W) 50 mL IVPB (0 mg Intravenous Stopped 2/14/23 2247)   LORazepam injection 1 mg (1 mg Intravenous Given 2/14/23 2256)   haloperidol lactate injection 5 mg (5 mg Intravenous Given 2/15/23 0019)   0.9%  NaCl infusion (0  mLs Intravenous Stopped 2/15/23 7313)   promethazine (PHENERGAN) 12.5 mg in dextrose 5 % (D5W) 50 mL IVPB (0 mg Intravenous Stopped 2/15/23 7091)   fentaNYL 50 mcg/mL injection 50 mcg (50 mcg Intravenous Given 2/15/23 8394)   potassium bicarbonate disintegrating tablet 40 mEq (40 mEq Oral Given 2/15/23 9211)         12:01 AM  - Re-evaluation:  The patient is resting comfortably and is in no acute distress. Still nauseated and in pain. Discussed test results and notified of pending labs. Answered questions at this time.     4:28 AM Re-evaluation. Dr. Martines reassessed the pt. The pt is resting comfortably and is in no acute distress.  Pt states she still feels nauseated but significantly better.  Will attempt PO challenge.  Discussed available test results and notified pt of pending labs. Answered any questions at this time. Awaiting repeat potassium.    4:52 AM Re-evaluation. Dr. Martines reassessed the pt. She's unable to tolerate PO and retching/dry-heaving.   Potassium improved to 3.1.  Will discuss c  for admission for hypokalemia, intractable vomiting and inability to tolerate PO.  Unable to get CT abd due to CT nonfunctioning here in [facility].      5:12 AM - CONSULT: Dr. Martines discussed the case with Dr. Bernal. Agrees with current management. Recommends observation and will see pt in hospital room.  Admitting Service: hospital medicine   Admitting Physician: Dr. Bernal   Admit to obs med/tele    5:12 AM - Re-evaluation:  Discussed test results, shared treatment plan, and the need for admission with patient and family. They understand and agree to the plan as discussed. Answered questions at this time.     All historical, clinical, radiographic, and laboratory findings were reviewed with the patient/family in detail along with the indications for transport to the facility in [place] in order to receive further evaluation and treatment.  All remaining questions and concerns were addressed at  this time and the patient/family communicates understanding and agrees to proceed accordingly.  Similarly, all pertinent details of the encounter were discussed with Dr. Bernal who agrees to receive the patient at [facility] for further care as outlined above.  The patient will be transferred by [facility] ambulance services secondary to a need for ongoing ivf, potassium, cardiac monitoring, pulse ox en route.  Gt Martines MD  5:12 AM                 Medication List        ASK your doctor about these medications      albuterol 90 mcg/actuation inhaler  Commonly known as: PROAIR HFA  Inhale 2 puffs into the lungs every 4 (four) hours as needed.     atorvastatin 20 MG tablet  Commonly known as: LIPITOR  Take 1 tablet (20 mg total) by mouth once daily.     cetirizine 10 MG tablet  Commonly known as: ZYRTEC  Take 1 tablet (10 mg total) by mouth once daily.     cyanocobalamin 1,000 mcg/mL injection  Inject 1 mL (1,000 mcg total) into the muscle every 28 days.     EPINEPHrine 0.3 mg/0.3 mL Atin  Commonly known as: EPIPEN 2-DENNIS  Inject 0.3 mLs (0.3 mg total) into the muscle once. for 1 dose     EScitalopram oxalate 10 MG tablet  Commonly known as: LEXAPRO  Take 1 tablet (10 mg total) by mouth once daily.     eszopiclone 3 mg Tab  Commonly known as: LUNESTA  Take 1 tablet (3 mg total) by mouth every evening.     hydroCHLOROthiazide 12.5 MG Tab  Commonly known as: HYDRODIURIL  Take 2 tablets (25 mg total) by mouth once daily.     HYDROmorphone 2 MG tablet  Commonly known as: DILAUDID  Take 1 tablet (2 mg total) by mouth every 6 to 8 hours as needed for pain     hydrOXYchloroQUINE 200 mg tablet  Commonly known as: PLAQUENIL     hydrOXYzine pamoate 25 MG Cap  Commonly known as: VISTARIL  Take 1 capsule (25 mg total) by mouth every 8 (eight) hours as needed (anxiety).     metoprolol succinate 25 MG 24 hr tablet  Commonly known as: TOPROL-XL  Take 1 tablet (25 mg total) by mouth once daily.     naproxen 500 MG  tablet  Commonly known as: NAPROSYN  Take 1 tablet (500 mg total) by mouth 2 (two) times daily with meals.     OZEMPIC 0.25 mg or 0.5 mg(2 mg/1.5 mL) pen injector  Generic drug: semaglutide  Inject 0.5 mg into the skin once a week.     pantoprazole 40 MG tablet  Commonly known as: PROTONIX  TAKE 1 TABLET BY MOUTH EVERY DAY     tiZANidine 4 MG tablet  Commonly known as: ZANAFLEX     topiramate 100 MG tablet  Commonly known as: TOPAMAX  Take 1 tablet (100 mg total) by mouth 2 (two) times daily.             Current Discharge Medication List            ED Diagnosis  1. Intractable vomiting with nausea    2. Hypokalemia    3. Abdominal cramping    4. Nausea and vomiting, unspecified vomiting type                  Clinical Impression:   Final diagnoses:  [E87.6] Hypokalemia  [R10.9] Abdominal cramping  [R11.2] Nausea and vomiting, unspecified vomiting type  [R11.2] Intractable vomiting with nausea (Primary)        ED Disposition Condition    Observation Stable                Gt Martines Jr., MD  02/15/23 1752

## 2023-02-15 NOTE — ED PROVIDER NOTES
ED progress note:    February 15, 2023.  11:10 AM    Patient admitted to hospital medicine for intractable nausea vomiting.  Currently waiting inpatient bed.  Patient reported in ED.  Patient has been given multiple emetics and still continues to have emesis.  She states that she was driving to a restaurant yesterday when she started having a panic attack.  She reports that she had carpopedal spasm to her right hand.  Following she started having repeated bouts of emesis.  Patient reports that she feels nauseated.  She initially had a headache yesterday but does not have 1 today.  It was not associated with any neck pain, difficulty talking, difficulty swallowing, or weakness on 1 side.  She reports that she continues to have this nauseous feeling.  No history of diabetes or marijuana use.  Pain is in the epigastrium.  It does not radiate.  Nothing makes it worse, nothing makes it better.  Patient is able to pass flatus.    Patient was noted to have low potassium.  Potassium was replaced.    Vital signs reviewed.  Stable.  Mildly elevated blood pressure.  HEENT: No nystagmus noted to the head.  Mucous membranes moist  Heart: Regular rate and rhythm without murmur  Lungs:  CTA  Abdomen: Soft, no rebound, no guarding, no masses.  Neuro:  Cranial nerves 2-12 intact.  GCS 15.  No nystagmus     We may have CT support currently.  Will attempt CT scan.  Will repeat BMP.  Will add fluid replacement    Assessment plan:  Intractable nausea vomiting-possible CT scan if scanner is working.  Repeat beta metabolic panel.  Fluid replacement    Imaging Results              CT Abdomen Pelvis  Without Contrast (Final result)  Result time 02/15/23 11:59:00      Final result by DAGOBERTO Nunes Sr., MD (02/15/23 11:59:00)                   Impression:      1. There is a small hiatal hernia.  2. There are surgical changes in the stomach.  3. There is a tiny pericardial effusion.  4. There is a streaky opacity in the right lower lobe.   This is characteristic of subsegmental atelectasis or scarring.  All CT scans at this facility use dose modulation, iterative reconstruction, and/or weight base dosing when appropriate to reduce radiation dose when appropriate to reduce radiation dose to as low as reasonably achievable.      Electronically signed by: Gorge Nunes MD  Date:    02/15/2023  Time:    11:59               Narrative:    EXAMINATION:  CT ABDOMEN PELVIS WITHOUT CONTRAST    CLINICAL HISTORY:  Nausea/vomiting;    TECHNIQUE:  Standard abdomen and pelvis CT protocol without oral or IV contrast was performed.    COMPARISON:  None    FINDINGS:  Finding: The size of the heart is within normal limits.  There is a tiny pericardial effusion.  There is a streaky opacity in the right lower lobe.  There are minimal dependent atelectatic changes in the right lung.  There is no pneumothorax or pleural effusion.    Incidental note is made of a 4 mm oval shaped area of hypodensity in the inferior aspect of the right lobe of the liver.  The gallbladder is absent.  There are surgical clips in the gallbladder fossa.  The pancreas, spleen, adrenals, and kidneys are normal in appearance. The ureters and the urinary bladder are normal in appearance.  The uterus is absent.  There are surgical changes in the stomach.  There is a small hiatal hernia.  The appendix is normal in appearance.  It has a diameter of 6 mm.  There is no free fluid within the abdomen or pelvis. There is no pneumoperitoneum.                                       CT Head Without Contrast (Final result)  Result time 02/15/23 11:49:23      Final result by Zackery Ramos MD (02/15/23 11:49:23)                   Impression:      No acute abnormality.    All CT scans at this facility use dose modulation, iterative reconstruction, and/or weight based dosing when appropriate to reduce radiation dose to as low as reasonable achievable.      Electronically signed by: Zackery Ramos  MD  Date:    02/15/2023  Time:    11:49               Narrative:    EXAMINATION:  CT HEAD WITHOUT CONTRAST    CLINICAL HISTORY:  Syncope, recurrent; Nausea with vomiting, unspecified    TECHNIQUE:  Low dose axial CT images obtained throughout the head without intravenous contrast. Sagittal and coronal reconstructions were performed.    All CT scans at this facility use dose modulation, iterative reconstruction, and/or weight based dosing when appropriate to reduce radiation dose to as low as reasonable achievable.    COMPARISON:  02/17/2020    FINDINGS:  Intracranial compartment:    The brain parenchyma appears normal. No parenchymal mass, hemorrhage, edema or major vascular distribution infarct.    Ventricles and sulci are normal in size for age without evidence of hydrocephalus.    No extra-axial blood or fluid collections.    Skull/extracranial contents (limited evaluation): No fracture. Mastoid air cells and paranasal sinuses are essentially clear.                                       X-Ray Abdomen Flat And Erect (Final result)  Result time 02/14/23 21:25:33      Final result by Viv Murguia MD (02/14/23 21:25:33)                   Impression:      No evidence for bowel obstruction.  Right upper quadrant surgical clips.      Electronically signed by: Blade Nam  Date:    02/14/2023  Time:    21:25               Narrative:    EXAMINATION:  XR ABDOMEN FLAT AND ERECT    CLINICAL HISTORY:  Abdominal Pain;    TECHNIQUE:  Flat and erect AP views of the abdomen were performed.    COMPARISON:  None    FINDINGS:  No evidence for distended loops of bowel or air-fluid levels.  No evidence for free air.  No abnormal calcifications seen.  No acute osseous injury.  Right upper quadrant surgical clips.                                     Latest Reference Range & Units 02/15/23 11:45   Sodium 136 - 145 mmol/L 142   Potassium 3.5 - 5.1 mmol/L 3.0 (L)   Chloride 95 - 110 mmol/L 107   CO2 23 - 29 mmol/L 22 (L)   Anion Gap 8  - 16 mmol/L 13   BUN 6 - 20 mg/dL 5 (L)   Creatinine 0.5 - 1.4 mg/dL 0.8   eGFR >60 mL/min/1.73 m^2 >60.0   Glucose 70 - 110 mg/dL 104   Calcium 8.7 - 10.5 mg/dL 9.1   (L): Data is abnormally low       Pepper Richmond, DO  02/15/23 1114       Pepper iRchmond, DO  02/15/23 1231

## 2023-02-16 ENCOUNTER — ANESTHESIA (OUTPATIENT)
Dept: ENDOSCOPY | Facility: HOSPITAL | Age: 58
End: 2023-02-16
Payer: COMMERCIAL

## 2023-02-16 ENCOUNTER — ANESTHESIA EVENT (OUTPATIENT)
Dept: ENDOSCOPY | Facility: HOSPITAL | Age: 58
End: 2023-02-16
Payer: COMMERCIAL

## 2023-02-16 PROBLEM — C50.919 MALIGNANT NEOPLASM OF FEMALE BREAST: Status: RESOLVED | Noted: 2019-07-22 | Resolved: 2023-02-16

## 2023-02-16 LAB
ANION GAP SERPL CALC-SCNC: 12 MMOL/L (ref 8–16)
BASOPHILS # BLD AUTO: 0.04 K/UL (ref 0–0.2)
BASOPHILS NFR BLD: 0.8 % (ref 0–1.9)
BUN SERPL-MCNC: 5 MG/DL (ref 6–20)
CALCIUM SERPL-MCNC: 8.8 MG/DL (ref 8.7–10.5)
CHLORIDE SERPL-SCNC: 105 MMOL/L (ref 95–110)
CO2 SERPL-SCNC: 24 MMOL/L (ref 23–29)
CREAT SERPL-MCNC: 0.8 MG/DL (ref 0.5–1.4)
DIFFERENTIAL METHOD: ABNORMAL
EOSINOPHIL # BLD AUTO: 0 K/UL (ref 0–0.5)
EOSINOPHIL NFR BLD: 0.4 % (ref 0–8)
ERYTHROCYTE [DISTWIDTH] IN BLOOD BY AUTOMATED COUNT: 12.9 % (ref 11.5–14.5)
EST. GFR  (NO RACE VARIABLE): >60 ML/MIN/1.73 M^2
GLUCOSE SERPL-MCNC: 91 MG/DL (ref 70–110)
HCT VFR BLD AUTO: 31.1 % (ref 37–48.5)
HGB BLD-MCNC: 10.6 G/DL (ref 12–16)
IMM GRANULOCYTES # BLD AUTO: 0.02 K/UL (ref 0–0.04)
IMM GRANULOCYTES NFR BLD AUTO: 0.4 % (ref 0–0.5)
LYMPHOCYTES # BLD AUTO: 2.2 K/UL (ref 1–4.8)
LYMPHOCYTES NFR BLD: 46.5 % (ref 18–48)
MAGNESIUM SERPL-MCNC: 1.6 MG/DL (ref 1.6–2.6)
MCH RBC QN AUTO: 28.7 PG (ref 27–31)
MCHC RBC AUTO-ENTMCNC: 34.1 G/DL (ref 32–36)
MCV RBC AUTO: 84 FL (ref 82–98)
MONOCYTES # BLD AUTO: 0.4 K/UL (ref 0.3–1)
MONOCYTES NFR BLD: 8.2 % (ref 4–15)
NEUTROPHILS # BLD AUTO: 2.1 K/UL (ref 1.8–7.7)
NEUTROPHILS NFR BLD: 43.7 % (ref 38–73)
NRBC BLD-RTO: 0 /100 WBC
PLATELET # BLD AUTO: 273 K/UL (ref 150–450)
PMV BLD AUTO: 9.2 FL (ref 9.2–12.9)
POTASSIUM SERPL-SCNC: 2.7 MMOL/L (ref 3.5–5.1)
POTASSIUM SERPL-SCNC: 3.6 MMOL/L (ref 3.5–5.1)
RBC # BLD AUTO: 3.69 M/UL (ref 4–5.4)
SODIUM SERPL-SCNC: 141 MMOL/L (ref 136–145)
WBC # BLD AUTO: 4.73 K/UL (ref 3.9–12.7)

## 2023-02-16 PROCEDURE — 88342 CHG IMMUNOCYTOCHEMISTRY: ICD-10-PCS | Mod: 26,,, | Performed by: PATHOLOGY

## 2023-02-16 PROCEDURE — 25000003 PHARM REV CODE 250: Performed by: HOSPITALIST

## 2023-02-16 PROCEDURE — 63600175 PHARM REV CODE 636 W HCPCS: Performed by: NURSE ANESTHETIST, CERTIFIED REGISTERED

## 2023-02-16 PROCEDURE — 43239 EGD BIOPSY SINGLE/MULTIPLE: CPT | Mod: ,,, | Performed by: INTERNAL MEDICINE

## 2023-02-16 PROCEDURE — 37000008 HC ANESTHESIA 1ST 15 MINUTES: Performed by: INTERNAL MEDICINE

## 2023-02-16 PROCEDURE — 88305 TISSUE EXAM BY PATHOLOGIST: CPT | Performed by: PATHOLOGY

## 2023-02-16 PROCEDURE — 88305 TISSUE EXAM BY PATHOLOGIST: CPT | Mod: 26,,, | Performed by: PATHOLOGY

## 2023-02-16 PROCEDURE — 84132 ASSAY OF SERUM POTASSIUM: CPT | Mod: 59 | Performed by: INTERNAL MEDICINE

## 2023-02-16 PROCEDURE — 99223 PR INITIAL HOSPITAL CARE,LEVL III: ICD-10-PCS | Mod: ,,, | Performed by: INTERNAL MEDICINE

## 2023-02-16 PROCEDURE — 43239 EGD BIOPSY SINGLE/MULTIPLE: CPT | Performed by: INTERNAL MEDICINE

## 2023-02-16 PROCEDURE — 83735 ASSAY OF MAGNESIUM: CPT | Performed by: HOSPITALIST

## 2023-02-16 PROCEDURE — 85025 COMPLETE CBC W/AUTO DIFF WBC: CPT | Performed by: HOSPITALIST

## 2023-02-16 PROCEDURE — 99223 1ST HOSP IP/OBS HIGH 75: CPT | Mod: ,,, | Performed by: INTERNAL MEDICINE

## 2023-02-16 PROCEDURE — 96367 TX/PROPH/DG ADDL SEQ IV INF: CPT | Mod: 59

## 2023-02-16 PROCEDURE — 80048 BASIC METABOLIC PNL TOTAL CA: CPT | Performed by: HOSPITALIST

## 2023-02-16 PROCEDURE — 96366 THER/PROPH/DIAG IV INF ADDON: CPT | Mod: 59

## 2023-02-16 PROCEDURE — 63600175 PHARM REV CODE 636 W HCPCS: Performed by: HOSPITALIST

## 2023-02-16 PROCEDURE — G0378 HOSPITAL OBSERVATION PER HR: HCPCS

## 2023-02-16 PROCEDURE — 43239 PR EGD, FLEX, W/BIOPSY, SGL/MULTI: ICD-10-PCS | Mod: ,,, | Performed by: INTERNAL MEDICINE

## 2023-02-16 PROCEDURE — 88342 IMHCHEM/IMCYTCHM 1ST ANTB: CPT | Mod: 26,,, | Performed by: PATHOLOGY

## 2023-02-16 PROCEDURE — 27201012 HC FORCEPS, HOT/COLD, DISP: Performed by: INTERNAL MEDICINE

## 2023-02-16 PROCEDURE — C9113 INJ PANTOPRAZOLE SODIUM, VIA: HCPCS | Performed by: HOSPITALIST

## 2023-02-16 PROCEDURE — 25000003 PHARM REV CODE 250: Performed by: NURSE ANESTHETIST, CERTIFIED REGISTERED

## 2023-02-16 PROCEDURE — 96376 TX/PRO/DX INJ SAME DRUG ADON: CPT | Mod: 59

## 2023-02-16 PROCEDURE — 88305 TISSUE EXAM BY PATHOLOGIST: ICD-10-PCS | Mod: 26,,, | Performed by: PATHOLOGY

## 2023-02-16 PROCEDURE — 63600175 PHARM REV CODE 636 W HCPCS: Performed by: INTERNAL MEDICINE

## 2023-02-16 RX ORDER — POTASSIUM CHLORIDE 20 MEQ/1
40 TABLET, EXTENDED RELEASE ORAL ONCE
Status: COMPLETED | OUTPATIENT
Start: 2023-02-16 | End: 2023-02-16

## 2023-02-16 RX ORDER — POTASSIUM CHLORIDE 20 MEQ/1
40 TABLET, EXTENDED RELEASE ORAL EVERY 4 HOURS
Status: DISPENSED | OUTPATIENT
Start: 2023-02-16 | End: 2023-02-16

## 2023-02-16 RX ORDER — POTASSIUM CHLORIDE 7.45 MG/ML
10 INJECTION INTRAVENOUS ONCE
Status: COMPLETED | OUTPATIENT
Start: 2023-02-16 | End: 2023-02-16

## 2023-02-16 RX ORDER — HYDROMORPHONE HYDROCHLORIDE 2 MG/1
2 TABLET ORAL EVERY 4 HOURS PRN
Status: DISCONTINUED | OUTPATIENT
Start: 2023-02-16 | End: 2023-02-16

## 2023-02-16 RX ORDER — LIDOCAINE HYDROCHLORIDE 20 MG/ML
INJECTION INTRAVENOUS
Status: DISCONTINUED | OUTPATIENT
Start: 2023-02-16 | End: 2023-02-16

## 2023-02-16 RX ORDER — HYDROMORPHONE HYDROCHLORIDE 2 MG/1
2 TABLET ORAL EVERY 8 HOURS PRN
Status: DISCONTINUED | OUTPATIENT
Start: 2023-02-16 | End: 2023-02-17 | Stop reason: HOSPADM

## 2023-02-16 RX ORDER — ALPRAZOLAM 0.5 MG/1
0.5 TABLET ORAL ONCE
Status: COMPLETED | OUTPATIENT
Start: 2023-02-17 | End: 2023-02-16

## 2023-02-16 RX ORDER — MAGNESIUM SULFATE HEPTAHYDRATE 40 MG/ML
2 INJECTION, SOLUTION INTRAVENOUS ONCE
Status: COMPLETED | OUTPATIENT
Start: 2023-02-16 | End: 2023-02-16

## 2023-02-16 RX ORDER — PROPOFOL 10 MG/ML
VIAL (ML) INTRAVENOUS
Status: DISCONTINUED | OUTPATIENT
Start: 2023-02-16 | End: 2023-02-16

## 2023-02-16 RX ORDER — POTASSIUM CHLORIDE 7.45 MG/ML
10 INJECTION INTRAVENOUS ONCE
Status: DISCONTINUED | OUTPATIENT
Start: 2023-02-16 | End: 2023-02-16

## 2023-02-16 RX ORDER — SODIUM CHLORIDE 9 MG/ML
INJECTION, SOLUTION INTRAVENOUS
Status: DISCONTINUED | OUTPATIENT
Start: 2023-02-16 | End: 2023-02-17 | Stop reason: HOSPADM

## 2023-02-16 RX ADMIN — PROPOFOL 20 MG: 10 INJECTION, EMULSION INTRAVENOUS at 03:02

## 2023-02-16 RX ADMIN — HYDROMORPHONE HYDROCHLORIDE 1 MG: 2 INJECTION INTRAMUSCULAR; INTRAVENOUS; SUBCUTANEOUS at 04:02

## 2023-02-16 RX ADMIN — POTASSIUM CHLORIDE 40 MEQ: 1500 TABLET, EXTENDED RELEASE ORAL at 06:02

## 2023-02-16 RX ADMIN — LIDOCAINE HYDROCHLORIDE 100 MG: 20 INJECTION INTRAVENOUS at 03:02

## 2023-02-16 RX ADMIN — MAGNESIUM SULFATE HEPTAHYDRATE 2 G: 40 INJECTION, SOLUTION INTRAVENOUS at 10:02

## 2023-02-16 RX ADMIN — ALPRAZOLAM 0.5 MG: 0.5 TABLET ORAL at 11:02

## 2023-02-16 RX ADMIN — PROPOFOL 80 MG: 10 INJECTION, EMULSION INTRAVENOUS at 03:02

## 2023-02-16 RX ADMIN — HYDROMORPHONE HYDROCHLORIDE 1 MG: 2 INJECTION INTRAMUSCULAR; INTRAVENOUS; SUBCUTANEOUS at 02:02

## 2023-02-16 RX ADMIN — POTASSIUM CHLORIDE 10 MEQ: 7.46 INJECTION, SOLUTION INTRAVENOUS at 08:02

## 2023-02-16 RX ADMIN — PROMETHAZINE HYDROCHLORIDE 12.5 MG: 25 INJECTION INTRAMUSCULAR; INTRAVENOUS at 04:02

## 2023-02-16 RX ADMIN — POTASSIUM CHLORIDE 10 MEQ: 7.46 INJECTION, SOLUTION INTRAVENOUS at 07:02

## 2023-02-16 RX ADMIN — PANTOPRAZOLE SODIUM 40 MG: 40 INJECTION, POWDER, FOR SOLUTION INTRAVENOUS at 09:02

## 2023-02-16 RX ADMIN — PROMETHAZINE HYDROCHLORIDE 12.5 MG: 25 INJECTION INTRAMUSCULAR; INTRAVENOUS at 02:02

## 2023-02-16 NOTE — ASSESSMENT & PLAN NOTE
Will plan on EGD for further evaluation. Timing will depend on ability to correct hypokalemia.    If EGD unrevealing, need to consider gastroparesis in the setting of chronic opioid use and Ozempic.   Agree with Protonix bid.   Antiemetics as needed.   Further recommendations after EGD.

## 2023-02-16 NOTE — HOSPITAL COURSE
56 y/o female presented as a transfer from [facility] for worsening abdominal pain and intractable n/v. She states she always has some n/v at baseline for a while now, but it worsened over the past 2 weeks. She had hypokalemia on admission, repleted and improved. GI consulted and performed an EGD 2/16/23, findings consistent with mild gastritis and hiatal hernia. Recommended PPI BID. Will need to follow up with GI in 2 weeks. Gastric biopsies taken, will need to follow up for pathology.   Patient tolerated diet with very little nausea. Potassium level 3.0 on discharge, will need to follow up with PCP for repeat labs.   Will send with prescriptions for Protonix BID and Phenergan as needed for nausea. Instructed to eat a bland diet and avoid spicy and fried foods. Take small bites and sips.   Will need follow up with GI, Dr. Mario in 1/2 weeks after discharge.  Follow up with PCP in 3-5 days after discharge for repeat labs.     Patient seen and examined prior to discharge.   All questions and concerns were addressed prior to discharge.

## 2023-02-16 NOTE — H&P
[facility] Medicine  History & Physical    Patient Name: Tosha Kwok  MRN: [id number]  Patient Class: OP- Observation  Admission Date: 2/14/2023  Attending Physician: Payton Fernandez MD   Primary Care Provider: Antonieta Ariza NP         Patient information was obtained from patient, spouse/SO, past medical records and ER records.     Subjective:     Principal Problem:Epigastric pain    Chief Complaint:   Chief Complaint   Patient presents with    Abdominal Cramping     C/o abd cramping with nausea and vomiting        HPI: 56 y/o female with PMHx of HTN, anemia, migraine headache, breast cancer, SLE on hydroxychloroquine, fibromyalgia, chronic insomnia who presented as a transfer from [facility] ER for worsening epigastric abdominal pain, intractable nausea and vomiting. Patient reports intermittent episodes of non-radiating epigastric pain for the past several months, worsening over the past week with nausea, vomiting, decreased PO intake. She denies chest pain, SOB, fevers, focal weakness/numbness, dysuria, constipation or diarrhea. Patient was evaluated by [facility] in Dec '22 with plans for EGD, but not scheduled. Hospital medicine consulted for admission, will place in observation status for further management.      Past Medical History:   Diagnosis Date    Anemia     Ankylosing spondylitis     Anxiety     Asthma     Cancer 2018    left breast  lumpectomy, xrt    Chronic constipation     Chronic insomnia     Edema     Fibromyalgia     Hypertension     Insulin resistance     Interstitial cystitis     Lupus     Lupus     Migraine headache     PONV (postoperative nausea and vomiting)     Restless legs syndrome     Shingles     Stroke 1998    post partum right sided numbness    TIA (transient ischemic attack) 1998       Past Surgical History:   Procedure Laterality Date    BLADDER SURGERY      BREAST LUMPECTOMY  2018    BREAST RECONSTRUCTION      BREAST SURGERY   2018    reduction     SECTION      CHOLECYSTECTOMY      COLONOSCOPY N/A 10/2/2020    Procedure: COLONOSCOPY;  Surgeon: Guicho Hood MD;  Location: [facility] ENDO;  Service: Endoscopy;  Laterality: N/A;    EXCISION OF GRANULOMA Bilateral 2021    Procedure: EXCISION, GRANULOMA SCARS OF BREASTS;  Surgeon: Obed Palmer Jr., MD;  Location: [facility] OR;  Service: Plastics;  Laterality: Bilateral;    HYSTERECTOMY      LASER ABLATION      to back    SLEEVE GASTROPLASTY  2016    TOTAL REDUCTION MAMMOPLASTY Bilateral     two years ago    TUBAL LIGATION         Review of patient's allergies indicates:   Allergen Reactions    Carrot Swelling     angioedema    Morphine Anxiety     Hives   States she can take dilaudid and percocet without any problems    Clindamycin     Macrolide antibiotics     Erythromycin Other (See Comments)     Hives and cramps     Zofran (as hydrochloride) [ondansetron hcl] Hives     Local hive after IV injection       No current facility-administered medications on file prior to encounter.     Current Outpatient Medications on File Prior to Encounter   Medication Sig    cyanocobalamin 1,000 mcg/mL injection Inject 1 mL (1,000 mcg total) into the muscle every 28 days.    eszopiclone (LUNESTA) 3 mg Tab Take 1 tablet (3 mg total) by mouth every evening.    hydroCHLOROthiazide (HYDRODIURIL) 12.5 MG Tab Take 2 tablets (25 mg total) by mouth once daily.    metoprolol succinate (TOPROL-XL) 25 MG 24 hr tablet Take 1 tablet (25 mg total) by mouth once daily.    semaglutide (OZEMPIC) 0.25 mg or 0.5 mg(2 mg/1.5 mL) pen injector Inject 0.5 mg into the skin once a week.    albuterol (PROAIR HFA) 90 mcg/actuation inhaler Inhale 2 puffs into the lungs every 4 (four) hours as needed.    atorvastatin (LIPITOR) 20 MG tablet Take 1 tablet (20 mg total) by mouth once daily.    cetirizine (ZYRTEC) 10 MG tablet Take 1 tablet (10 mg total) by mouth once daily.    EPINEPHrine (EPIPEN  2-DENNIS) 0.3 mg/0.3 mL AtIn Inject 0.3 mLs (0.3 mg total) into the muscle once. for 1 dose    EScitalopram oxalate (LEXAPRO) 10 MG tablet Take 1 tablet (10 mg total) by mouth once daily.    HYDROmorphone (DILAUDID) 2 MG tablet Take 1 tablet (2 mg total) by mouth every 6 to 8 hours as needed for pain    hydroxychloroquine (PLAQUENIL) 200 mg tablet Take 200 mg by mouth once daily.    hydrOXYzine pamoate (VISTARIL) 25 MG Cap Take 1 capsule (25 mg total) by mouth every 8 (eight) hours as needed (anxiety).    naproxen (NAPROSYN) 500 MG tablet Take 1 tablet (500 mg total) by mouth 2 (two) times daily with meals.    pantoprazole (PROTONIX) 40 MG tablet TAKE 1 TABLET BY MOUTH EVERY DAY    tiZANidine (ZANAFLEX) 4 MG tablet Take 4 mg by mouth 3 (three) times daily as needed.    topiramate (TOPAMAX) 100 MG tablet Take 1 tablet (100 mg total) by mouth 2 (two) times daily. (Patient taking differently: Take 100 mg by mouth as needed.)     Family History       Problem Relation (Age of Onset)    Breast cancer Maternal Aunt, Maternal Grandmother    Heart attack Father    Hypertension Mother, Brother, Sister    Lupus Sister          Tobacco Use    Smoking status: Never    Smokeless tobacco: Never   Substance and Sexual Activity    Alcohol use: Yes     Comment: occasionally    Drug use: No    Sexual activity: Yes     Partners: Male     Review of Systems   All other systems reviewed and are negative.  Objective:     Vital Signs (Most Recent):  Temp: 98.2 °F (36.8 °C) (02/15/23 1623)  Pulse: 81 (02/15/23 1623)  Resp: 18 (02/15/23 1623)  BP: (!) 178/94 (02/15/23 1623)  SpO2: 98 % (02/15/23 1623)   Vital Signs (24h Range):  Temp:  [98 °F (36.7 °C)-99 °F (37.2 °C)] 98.2 °F (36.8 °C)  Pulse:  [] 81  Resp:  [10-24] 18  SpO2:  [95 %-100 %] 98 %  BP: (118-194)/() 178/94     Weight: 72.6 kg (160 lb)  Body mass index is 29.26 kg/m².    Physical Exam  Vitals and nursing note reviewed.   Constitutional:       General: She is  not in acute distress.     Appearance: Normal appearance. She is normal weight. She is ill-appearing and diaphoretic.   HENT:      Head: Normocephalic and atraumatic.      Nose: Nose normal.      Mouth/Throat:      Mouth: Mucous membranes are dry.      Pharynx: Oropharynx is clear.   Eyes:      Extraocular Movements: Extraocular movements intact.      Pupils: Pupils are equal, round, and reactive to light.   Cardiovascular:      Pulses: Normal pulses.      Heart sounds: Normal heart sounds.   Pulmonary:      Effort: Pulmonary effort is normal. No respiratory distress.      Breath sounds: Normal breath sounds. No wheezing, rhonchi or rales.   Abdominal:      General: Abdomen is flat. Bowel sounds are normal. There is no distension.      Palpations: Abdomen is soft.      Tenderness: There is abdominal tenderness. There is no guarding.   Musculoskeletal:         General: No deformity or signs of injury.   Neurological:      General: No focal deficit present.      Mental Status: She is alert and oriented to person, place, and time.   Psychiatric:         Mood and Affect: Mood normal.         Behavior: Behavior normal.         CRANIAL NERVES     CN III, IV, VI   Pupils are equal, round, and reactive to light.     Significant Labs: All pertinent labs within the past 24 hours have been reviewed.  CBC:   Recent Labs   Lab 02/14/23 2048   WBC 7.57   HGB 12.4   HCT 35.5*        CMP:   Recent Labs   Lab 02/14/23  2048 02/15/23  0353 02/15/23  1145   * 145 142   K 2.7* 3.1* 3.0*    109 107   CO2 24 22* 22*   * 105 104   BUN 11 8 5*   CREATININE 0.9 0.8 0.8   CALCIUM 9.9 8.8 9.1   PROT 7.5  --   --    ALBUMIN 4.4  --   --    BILITOT 0.4  --   --    ALKPHOS 59  --   --    AST 11  --   --    ALT 8*  --   --    ANIONGAP 16 14 13       Significant Imaging: I have reviewed all pertinent imaging results/findings within the past 24 hours.    CT Abdomen Pelvis  Without Contrast   Final Result      1. There is  a small hiatal hernia.   2. There are surgical changes in the stomach.   3. There is a tiny pericardial effusion.   4. There is a streaky opacity in the right lower lobe.  This is characteristic of subsegmental atelectasis or scarring.   All CT scans at this facility use dose modulation, iterative reconstruction, and/or weight base dosing when appropriate to reduce radiation dose when appropriate to reduce radiation dose to as low as reasonably achievable.         Electronically signed by: Gorge Nunes MD   Date:    02/15/2023   Time:    11:59      CT Head Without Contrast   Final Result      No acute abnormality.      All CT scans at this facility use dose modulation, iterative reconstruction, and/or weight based dosing when appropriate to reduce radiation dose to as low as reasonable achievable.         Electronically signed by: Zackery Ramos MD   Date:    02/15/2023   Time:    11:49      X-Ray Abdomen Flat And Erect   Final Result      No evidence for bowel obstruction.  Right upper quadrant surgical clips.         Electronically signed by: Blade Nam   Date:    02/14/2023   Time:    21:25            Assessment/Plan:     * Epigastric pain  Ongoing issue for the past several months  Worsening over the past week with nausea, vomiting, decreased PO intake  Previously seen outpatient by [facility], planned for EGD but not scheduled  Will place on CLD for now, NPO after midnight, consult placed    Chronic, continuous use of opioids  On chronic narcotics, hx of SLE, fibromyalgia, breast cancer  Denies constipation, reports normal BM today  [place], pescribed dilaudid 2 mg PO q6-8h PRN  IV pain meds for now, transition to PO when appropriate    GERD (gastroesophageal reflux disease)  Restart protonix  Plan as above    Hypokalemia  Due to GI loss  Check Mg  Replete, monitor    Primary hypertension  BP elevated on arrival  Provide adequate pain control  Hold home PO meds for now  PRN IV hydralazine and  labetalol    Lupus (systemic lupus erythematosus)  Hold plaquenil  Not currently on steroids    Fibromyalgia  Plan as above    VTE Risk Mitigation (From admission, onward)         Ordered     IP VTE HIGH RISK PATIENT  Once         02/15/23 1834     Place sequential compression device  Until discontinued         02/15/23 1834     Reason for No Pharmacological VTE Prophylaxis  Once        Question:  Reasons:  Answer:  Risk of Bleeding    02/15/23 1834     Place sequential compression device  Until discontinued         02/15/23 1633                   Luis Ramsey MD  Department of Hospital Medicine   'Pending sale to [facility]

## 2023-02-16 NOTE — HPI
58 y/o female with PMHx of HTN, anemia, migraine headache, breast cancer, SLE on hydroxychloroquine, fibromyalgia, chronic insomnia who presented as a transfer from [facility] for worsening epigastric abdominal pain, intractable nausea and vomiting. Patient reports intermittent episodes of non-radiating epigastric pain for the past several months, worsening over the past week with nausea, vomiting, decreased PO intake. She denies chest pain, SOB, fevers, focal weakness/numbness, dysuria, constipation or diarrhea. Patient was evaluated by [facility] in Dec '22 with plans for EGD, but not scheduled. Hospital medicine consulted for admission, will place in observation status for further management.

## 2023-02-16 NOTE — ASSESSMENT & PLAN NOTE
BP elevated on arrival  Provide adequate pain control  Hold home PO meds for now  PRN IV hydralazine and labetalol

## 2023-02-16 NOTE — ASSESSMENT & PLAN NOTE
Ongoing issue for the past several months  Worsening over the past week with nausea, vomiting, decreased PO intake  Previously seen outpatient by [facility], planned for EGD but not scheduled  Will place on CLD for now, NPO after midnight, consult placed

## 2023-02-16 NOTE — [employer] PATIENT INSTRUCTIONS
Discharge Summary/Instructions after an Endoscopic Procedure  Patient Name: Tosha Kwok  Patient MRN: [id number]  Patient YOB: 1965  Thursday, February 16, 2023 Belkis Mario MD  Dear patient,  As a result of recent federal legislation (The Federal Cures Act), you may   receive lab or pathology results from your procedure in your [facility]   account before your physician is able to contact you. Your physician or   their representative will relay the results to you with their   recommendations at their soonest availability.  Thank you,  RESTRICTIONS:  During your procedure today, you received medications for sedation.  These   medications may affect your judgment, balance and coordination.  Therefore,   for 24 hours, you have the following restrictions:   - DO NOT drive a car, operate machinery, make legal/financial decisions,   sign important papers or drink alcohol.    ACTIVITY:  Today: no heavy lifting, straining or running due to procedural   sedation/anesthesia.  The following day: return to full activity including work.  DIET:  Eat and drink normally unless instructed otherwise.     TREATMENT FOR COMMON SIDE EFFECTS:  - Mild abdominal pain, nausea, belching, bloating or excessive gas:  rest,   eat lightly and use a heating pad.  - Sore Throat: treat with throat lozenges and/or gargle with warm salt   water.  - Because air was used during the procedure, expelling large amounts of air   from your rectum or belching is normal.  - If a bowel prep was taken, you may not have a bowel movement for 1-3 days.    This is normal.  SYMPTOMS TO WATCH FOR AND REPORT TO YOUR PHYSICIAN:  1. Abdominal pain or bloating, other than gas cramps.  2. Chest pain.  3. Back pain.  4. Signs of infection such as: chills or fever occurring within 24 hours   after the procedure.  5. Rectal bleeding, which would show as bright red, maroon, or black stools.   (A tablespoon of blood from the rectum is not serious, especially  if   hemorrhoids are present.)  6. Vomiting.  7. Weakness or dizziness.  GO DIRECTLY TO THE NEAREST EMERGENCY ROOM IF YOU HAVE ANY OF THE FOLLOWING:      Difficulty breathing              Chills and/or fever over 101 F   Persistent vomiting and/or vomiting blood   Severe abdominal pain   Severe chest pain   Black, tarry stools   Bleeding- more than one tablespoon   Any other symptom or condition that you feel may need urgent attention  Your doctor recommends these additional instructions:  If any biopsies were taken, your doctors clinic will contact you in 1 to 2   weeks with any results.  - Discharge patient to home (via wheelchair).   - Resume previous diet.   - Continue present medications.   - Await pathology results.   - Telephone GI clinic for pathology results in 2 weeks.   - Patient has a contact number available for emergencies.  The signs and   symptoms of potential delayed complications were discussed with the   patient.  Return to normal activities tomorrow.  Written discharge   instructions were provided to the patient.  For questions, problems or results please call your physician Belkis Mario MD at Work:  [phone]  If you have any questions about the above instructions, call the GI   department at [phone] or call the endoscopy unit at [phone]   from 7am until 3 pm.  [facility], EMERGENCY ROOM PHONE NUMBER:   [phone]  IF A COMPLICATION OR EMERGENCY SITUATION ARISES AND YOU ARE UNABLE TO REACH   YOUR PHYSICIAN - GO DIRECTLY TO THE EMERGENCY ROOM.  I have read or have had read to me these discharge instructions for my   procedure and have received a written copy.  I understand these   instructions and will follow-up with my physician if I have any questions.     __________________________________       _____________________________________  Nurse Signature                                          Patient/Designated   Responsible Party  Signature  MD Belkis Epps MD  2/16/2023 3:46:34 PM  [employer]

## 2023-02-16 NOTE — PLAN OF CARE
Problem: Adult Inpatient Plan of Care  Goal: Plan of Care Review  Outcome: Ongoing, Progressing  Goal: Patient-Specific Goal (Individualized)  Outcome: Ongoing, Progressing  Goal: Absence of Hospital-Acquired Illness or Injury  Outcome: Ongoing, Progressing  Goal: Optimal Comfort and Wellbeing  Outcome: Ongoing, Progressing  Goal: Readiness for Transition of Care  Outcome: Ongoing, Progressing     Problem: Infection  Goal: Absence of Infection Signs and Symptoms  Outcome: Ongoing, Progressing     Problem: Pain Acute  Goal: Acceptable Pain Control and Functional Ability  Outcome: Ongoing, Progressing        SUBJECTIVE:   Samy Worthy is a 55 year old male who presents to clinic today for the following health issues:      ENT Symptoms             Symptoms: cc Present Absent Comment   Fever/Chills   x    Fatigue   x    Muscle Aches   x    Eye Irritation   x    Sneezing   x    Nasal Mario/Drg  x  A lot of post nasal drainage    Sinus Pressure/Pain   x    Loss of smell   x    Dental pain   x    Sore Throat  x  Only for the first few days   Swollen Glands   x    Ear Pain/Fullness   x    Cough  x  Worse at night - productive    Wheeze  x     Chest Pain   x    Shortness of breath   x    Rash   x    Other         Symptom duration:  x 2 weeks   Symptom severity:  moderate - slightly worse    Treatments tried:  Claritin, Mucinex, Thermaflu   Contacts:  none       Problem list and histories reviewed & adjusted, as indicated.  Additional history: as documented    Patient Active Problem List   Diagnosis     Hyperlipidemia LDL goal <160     Past Surgical History:   Procedure Laterality Date     COLONOSCOPY N/A 7/31/2017    Procedure: COMBINED COLONOSCOPY, SINGLE OR MULTIPLE BIOPSY/POLYPECTOMY BY BIOPSY;;  Surgeon: Krunal Renae MD;  Location: [facility]     VASECTOMY  [id number]       Social History   Substance Use Topics     Smoking status: Former Smoker     Types: Cigarettes     Quit date: 4/10/2003     Smokeless tobacco: Never Used     Alcohol use Yes      Comment: Occasional     Family History   Problem Relation Age of Onset     Neurologic Disorder Daughter      migraines     Musculoskeletal Disorder Sister      Neurologic Disorder Son      Unknown/Adopted Other          Current Outpatient Prescriptions   Medication Sig Dispense Refill     azithromycin (ZITHROMAX) 250 MG tablet Two tablets first day, then one tablet daily for four days. 6 tablet 0     guaiFENesin-codeine (ROBITUSSIN AC) 100-10 MG/5ML SOLN solution Take 5 mLs by mouth every 4 hours as needed for cough 120 mL 0     albuterol (PROAIR HFA/PROVENTIL HFA/VENTOLIN HFA)  "108 (90 BASE) MCG/ACT Inhaler Inhale 2 puffs into the lungs every 6 hours as needed for shortness of breath / dyspnea or wheezing 1 Inhaler 1     NO ACTIVE MEDICATIONS        Allergies   Allergen Reactions     Nka [No Known Allergies]        Reviewed and updated as needed this visit by clinical staff  Tobacco  Allergies  Meds  Problems  Med Hx  Surg Hx  Fam Hx  Soc Hx        Reviewed and updated as needed this visit by Provider  Tobacco  Allergies  Meds  Problems  Med Hx  Surg Hx  Fam Hx  Soc Hx          ROS:  Constitutional, HEENT, cardiovascular, pulmonary, gi and gu systems are negative, except as otherwise noted.    OBJECTIVE:     /88 (BP Location: Right arm, Patient Position: Chair, Cuff Size: Adult Large)  Pulse 80  Temp 97.1  F (36.2  C) (Tympanic)  Resp 20  Ht 5' 10\" (1.778 m)  Wt 245 lb 12.8 oz (111.5 kg)  SpO2 97%  BMI 35.27 kg/m2  Body mass index is 35.27 kg/(m^2).  GENERAL APPEARANCE: healthy, alert and no distress  EYES: Eyes grossly normal to inspection, PERRL and conjunctivae and sclerae normal  HENT: ear canals and TM's normal and nose and mouth without ulcers or lesions  NECK: no adenopathy, no asymmetry, masses, or scars and thyroid normal to palpation  RESP: expiratory wheezes throughout upper lobes   CV: regular rates and rhythm, normal S1 S2, no S3 or S4 and no murmur, click or rub  ABDOMEN: soft, nontender, without hepatosplenomegaly or masses and bowel sounds normal  MS: extremities normal- no gross deformities noted  NEURO: Normal strength and tone, mentation intact and speech normal    Diagnostic Test Results:  CXR - independently read by HUANG Cohen CNP - no opacities or infectious process noted - will await final radiologist read     ASSESSMENT/PLAN:     1. Acute bronchitis with symptoms > 10 days  Patient has had for 2 weeks, cough worsening. Afebrile. Chest x-ray independently read by HUANG Cohen CNP - not noting any acute process.   - " azithromycin (ZITHROMAX) 250 MG tablet; Two tablets first day, then one tablet daily for four days.  Dispense: 6 tablet; Refill: 0  - guaiFENesin-codeine (ROBITUSSIN AC) 100-10 MG/5ML SOLN solution; Take 5 mLs by mouth every 4 hours as needed for cough  Dispense: 120 mL; Refill: 0  - albuterol (PROAIR HFA/PROVENTIL HFA/VENTOLIN HFA) 108 (90 BASE) MCG/ACT Inhaler; Inhale 2 puffs into the lungs every 6 hours as needed for shortness of breath / dyspnea or wheezing  Dispense: 1 Inhaler; Refill: 1    2. Cough    - XR Chest 2 Views; Future    Patient Instructions   Xray  - not noting any significant infection - will await radiologist readings and update you anything different     Start Zithromax and take for 5 days     Robitussin at night for cough    Elevate head of bed at night and use a cool mist vaporizer    Return to clinic if symptoms not improving or worsening   Bronchitis, Antibiotic Treatment (Adult)    Bronchitis is an infection of the air passages (bronchial tubes) in your lungs. It often occurs when you have a cold. This illness is contagious during the first few days and is spread through the air by coughing and sneezing, or by direct contact (touching the sick person and then touching your own eyes, nose, or mouth).  Symptoms of bronchitis include cough with mucus (phlegm) and low-grade fever. Bronchitis usually lasts 7 to 14 days. Mild cases can be treated with simple home remedies. More severe infection is treated with an antibiotic.  Home care  Follow these guidelines when caring for yourself at home:    If your symptoms are severe, rest at home for the first 2 to 3 days. When you go back to your usual activities, don't let yourself get too tired.    Do not smoke. Also avoid being exposed to secondhand smoke.    You may use over-the-counter medicines to control fever or pain, unless another medicine was prescribed. (Note: If you have chronic liver or kidney disease or have ever had a stomach ulcer or  gastrointestinal bleeding, talk with your healthcare provider before using these medicines. Also talk to your provider if you are taking medicine to prevent blood clots.) Aspirin should never be given to anyone younger than 18 years of age who is ill with a viral infection or fever. It may cause severe liver or brain damage.    Your appetite may be poor, so a light diet is fine. Avoid dehydration by drinking 6 to 8 glasses of fluids per day (such as water, soft drinks, sports drinks, juices, tea, or soup). Extra fluids will help loosen secretions in the nose and lungs.    Over-the-counter cough, cold, and sore-throat medicines will not shorten the length of the illness, but they may be helpful to reduce symptoms. (Note: Do not use decongestants if you have high blood pressure.)    Finish all antibiotic medicine. Do this even if you are feeling better after only a few days.  Follow-up care  Follow up with your healthcare provider, or as advised. If you had an X-ray or ECG (electrocardiogram), a specialist will review it. You will be notified of any new findings that may affect your care.  Note: If you are age 65 or older, or if you have a chronic lung disease or condition that affects your immune system, or you smoke, talk to your healthcare provider about having pneumococcal vaccinations and a yearly influenza vaccination (flu shot).  When to seek medical advice  Call your healthcare provider right away if any of these occur:    Fever of 100.4 F (38 C) or higher    Coughing up increased amounts of colored sputum    Weakness, drowsiness, headache, facial pain, ear pain, or a stiff neck  Call 911, or get immediate medical care  Contact emergency services right away if any of these occur.    Coughing up blood    Worsening weakness, drowsiness, headache, or stiff neck    Trouble breathing, wheezing, or pain with breathing  Date Last Reviewed: 9/13/2015 2000-2017 [employer]. [address] [facility] [facility],  [place] [address]. All rights reserved. This information is not intended as a substitute for professional medical care. Always follow your healthcare professional's instructions.            HUANG Carroll [facility]

## 2023-02-16 NOTE — ASSESSMENT & PLAN NOTE
Chronic, controlled  -Latest blood pressure and vitals reviewed-   Temp:  [97.9 °F (36.6 °C)-98.6 °F (37 °C)]   Pulse:  [71-98]   Resp:  [18-20]   BP: ()/()   SpO2:  [90 %-99 %] .   -Home meds for hypertension were reviewed and noted below.   Hypertension Medications             hydroCHLOROthiazide (HYDRODIURIL) 12.5 MG Tab Take 2 tablets (25 mg total) by mouth once daily.    metoprolol succinate (TOPROL-XL) 25 MG 24 hr tablet Take 1 tablet (25 mg total) by mouth once daily.        -While in the hospital, will manage blood pressure as follows; Continue home antihypertensive regimen  -Will utilize PRN blood pressure medication only if patient's blood pressure greater than  180/110 and she develops symptoms such as worsening chest pain or shortness of breath.  -optimize pain control

## 2023-02-16 NOTE — ASSESSMENT & PLAN NOTE
-On chronic narcotics, hx of SLE, fibromyalgia, breast cancer  -Denies constipation  -[place]-PD[place], pescribed dilaudid 2 mg PO q6-8h PRN  -d/c IV dilaudid and restart home regimen  -follows pain management, Dr. Rae, f/u OP

## 2023-02-16 NOTE — ANESTHESIA PREPROCEDURE EVALUATION
02/16/2023  Tosha Kwok is a 57 y.o., female.    Pre-op Assessment    I have reviewed the Patient Summary Reports.    I have reviewed the Nursing Notes. I have reviewed the NPO Status.   I have reviewed the Medications.     Review of Systems  Anesthesia Hx:  nausea Denies Family Hx of Anesthesia complications.   Denies Personal Hx of Anesthesia complications.   Social:  Non-Smoker    Hematology/Oncology:     Oncology Normal    -- Anemia: Hematology Comments: Sees Dr Marino    EENT/Dental:EENT/Dental Normal   Cardiovascular:   Hypertension    Pulmonary:   Asthma    Renal/:  Renal/ Normal  Interstitial cystitis   Hepatic/GI:  Hepatic/GI Normal    Musculoskeletal:   Arthritis  Fibromyalgia   Neurological:   TIA, Denies CVA. Neuromuscular Disease,  Headaches TIA 1988 -no problems since  fibromyalgia   Endocrine:  Endocrine Normal    Dermatological:  Skin Normal    Psych:  Psychiatric Normal           Physical Exam  General:  Well nourished and Obesity      Airway/Jaw/Neck:  Airway Findings: Mouth Opening: Normal   Mallampati: II     Dental:  Dental Findings: In tact          Mental Status:  Mental Status Findings:  Cooperative, Alert and Oriented         Anesthesia Plan  Type of Anesthesia, risks & benefits discussed:  Anesthesia Type:  MAC    Patient's Preference:   Plan Factors:          Intra-op Monitoring Plan: standard ASA monitors  Intra-op Monitoring Plan Comments:   Post Op Pain Control Plan: multimodal analgesia  Post Op Pain Control Plan Comments:     Induction:   IV  Beta Blocker:         Informed Consent: Informed consent signed with the Patient and all parties understand the risks and agree with anesthesia plan.  All questions answered.  Anesthesia consent signed with patient.  ASA Score: 3     Day of Surgery Review of History & Physical:        Anesthesia Plan Notes: Recent labs in  [facility]        Ready For Surgery From Anesthesia Perspective.           Physical Exam  General: Well nourished and Obesity    Airway:  Mallampati: II   Mouth Opening: Normal    Dental:  In tact          Anesthesia Plan  Type of Anesthesia, risks & benefits discussed:    Anesthesia Type: MAC  Intra-op Monitoring Plan: standard ASA monitors  Post Op Pain Control Plan: multimodal analgesia  Induction:  IV  Informed Consent: Informed consent signed with the Patient and all parties understand the risks and agree with anesthesia plan.  All questions answered.   ASA Score: 3  Anesthesia Plan Notes: Recent labs in [facility]    Ready For Surgery From Anesthesia Perspective.       .

## 2023-02-16 NOTE — PROGRESS NOTES
[facility] Medicine  Progress Note    Patient Name: Tosha Kwok  MRN: [id number]  Patient Class: OP- Observation   Admission Date: 2/14/2023  Length of Stay: 0 days  Attending Physician: Payton Fernandez MD  Primary Care Provider: Antonieta Ariza NP        Subjective:     Principal Problem:Epigastric pain      HPI:  56 y/o female with PMHx of HTN, anemia, migraine headache, breast cancer, SLE on hydroxychloroquine, fibromyalgia, chronic insomnia who presented as a transfer from [facility] for worsening epigastric abdominal pain, intractable nausea and vomiting. Patient reports intermittent episodes of non-radiating epigastric pain for the past several months, worsening over the past week with nausea, vomiting, decreased PO intake. She denies chest pain, SOB, fevers, focal weakness/numbness, dysuria, constipation or diarrhea. Patient was evaluated by [facility] in Dec '22 with plans for EGD, but not scheduled. Hospital medicine consulted for admission, will place in observation status for further management.      Overview/Hospital Course:  56 y/o female presented as a transfer from [facility] for worsening abdominal pain and intractable n/v. She states she always has some n/v at baseline for a while now, but it worsened over the past 2 weeks. GI consulted and performed an EGD 2/16/23, findings consistent with mild gastritis and hiatal hernia. Recommended PPI BID. Will need to follow up with GI in 2 weeks. Gastric biopsies taken, will need to follow up for pathology.       Interval History: awake, alert, sitting up in bed, NAD,  at bedside. NPO for EGD, denies any further n/v since last night. K 2.7, cont repletion. Repeat labs in am.    Review of Systems   Constitutional:  Negative for fatigue and fever.   Respiratory:  Negative for cough and shortness of breath.    Cardiovascular:  Negative for chest pain and palpitations.   Gastrointestinal:  Negative for abdominal pain, nausea and  vomiting.   All other systems reviewed and are negative.  Objective:     Vital Signs (Most Recent):  Temp: 98.3 °F (36.8 °C) (02/16/23 1631)  Pulse: 93 (02/16/23 1631)  Resp: 18 (02/16/23 1646)  BP: (!) 179/114 (02/16/23 1631)  SpO2: 99 % (02/16/23 1631)   Vital Signs (24h Range):  Temp:  [97.9 °F (36.6 °C)-98.6 °F (37 °C)] 98.3 °F (36.8 °C)  Pulse:  [71-98] 93  Resp:  [18-20] 18  SpO2:  [90 %-99 %] 99 %  BP: ()/() 179/114     Weight: 76.3 kg (168 lb 3.4 oz)  Body mass index is 30.77 kg/m².    Intake/Output Summary (Last 24 hours) at 2/16/2023 1651  Last data filed at 2/15/2023 1926  Gross per 24 hour   Intake 205.21 ml   Output --   Net 205.21 ml      Physical Exam  Vitals and nursing note reviewed.   Constitutional:       Appearance: She is obese.   Cardiovascular:      Rate and Rhythm: Normal rate and regular rhythm.      Heart sounds: No murmur heard.  Pulmonary:      Effort: Pulmonary effort is normal.      Breath sounds: Normal breath sounds.   Abdominal:      General: Bowel sounds are normal.      Palpations: Abdomen is soft.   Musculoskeletal:         General: Normal range of motion.   Skin:     General: Skin is warm and dry.   Neurological:      General: No focal deficit present.      Mental Status: She is alert and oriented to person, place, and time.   Psychiatric:         Mood and Affect: Mood normal.         Behavior: Behavior normal.       Significant Labs: All pertinent labs within the past 24 hours have been reviewed.  BMP:   Recent Labs   Lab 02/16/23 0527 02/16/23  1039   GLU 91  --      --    K 2.7* 3.6     --    CO2 24  --    BUN 5*  --    CREATININE 0.8  --    CALCIUM 8.8  --    MG 1.6  --      CBC:   Recent Labs   Lab 02/14/23 2048 02/16/23 0527   WBC 7.57 4.73   HGB 12.4 10.6*   HCT 35.5* 31.1*    273     CMP:   Recent Labs   Lab 02/14/23  2048 02/15/23  0353 02/15/23  1145 02/15/23  2223 02/16/23  0527 02/16/23  1039   *   < > 142 143 141  --    K 2.7*    < > 3.0* 2.9* 2.7* 3.6      < > 107 105 105  --    CO2 24   < > 22* 22* 24  --    *   < > 104 94 91  --    BUN 11   < > 5* 3* 5*  --    CREATININE 0.9   < > 0.8 0.8 0.8  --    CALCIUM 9.9   < > 9.1 8.6* 8.8  --    PROT 7.5  --   --   --   --   --    ALBUMIN 4.4  --   --   --   --   --    BILITOT 0.4  --   --   --   --   --    ALKPHOS 59  --   --   --   --   --    AST 11  --   --   --   --   --    ALT 8*  --   --   --   --   --    ANIONGAP 16   < > 13 16 12  --     < > = values in this interval not displayed.     Urine Culture: No results for input(s): LABURIN in the last 48 hours.  Urine Studies:   Recent Labs   Lab 02/14/23  2584   COLORU Yellow   APPEARANCEUA Hazy*   PHUR 8.0   SPECGRAV 1.015   PROTEINUA Trace*   GLUCUA Negative   KETONESU 2+*   BILIRUBINUA 1+*   OCCULTUA 2+*   NITRITE Negative   UROBILINOGEN Negative   LEUKOCYTESUR Trace*   RBCUA 4   WBCUA 2   BACTERIA Occasional   SQUAMEPITHEL 2       Significant Imaging: I have reviewed all pertinent imaging results/findings within the past 24 hours.      Assessment/Plan:      Cardiac/Vascular  Primary hypertension  Chronic, controlled  -Latest blood pressure and vitals reviewed-   Temp:  [97.9 °F (36.6 °C)-98.6 °F (37 °C)]   Pulse:  [71-98]   Resp:  [18-20]   BP: ()/()   SpO2:  [90 %-99 %] .   -Home meds for hypertension were reviewed and noted below.   Hypertension Medications             hydroCHLOROthiazide (HYDRODIURIL) 12.5 MG Tab Take 2 tablets (25 mg total) by mouth once daily.    metoprolol succinate (TOPROL-XL) 25 MG 24 hr tablet Take 1 tablet (25 mg total) by mouth once daily.        -While in the hospital, will manage blood pressure as follows; Continue home antihypertensive regimen  -Will utilize PRN blood pressure medication only if patient's blood pressure greater than  180/110 and she develops symptoms such as worsening chest pain or shortness of breath.  -optimize pain control    Renal/  Hypokalemia  -r/t GI loss  -K  2.7, repleted, repeat 3.6  -Mg 1.6  -repleted   -repeat labs in am    GI  * Epigastric pain  -worsening over the past 2 weeks  -Previously seen outpatient by [facility], planned for EGD but not scheduled  -GI consulted and EGD done 2/16 revealing mild gastritis and hiatal hernia  -antiemetics PRN, nausea and vomiting improved    GERD (gastroesophageal reflux disease)  -change PPI to BID  -f/u OP    Orthopedic  Fibromyalgia  -Plan as above    Other  Lupus (systemic lupus erythematosus)  -Hold plaquenil  -not on steroids currently  -f/u OP    Chronic, continuous use of opioids  -On chronic narcotics, hx of SLE, fibromyalgia, breast cancer  -Denies constipation  -[place]-PD[place], pescribed dilaudid 2 mg PO q6-8h PRN  -d/c IV dilaudid and restart home regimen  -follows pain management, Dr. Rae, f/u OP      VTE Risk Mitigation (From admission, onward)         Ordered     IP VTE HIGH RISK PATIENT  Once         02/15/23 1834     Place sequential compression device  Until discontinued         02/15/23 1834     Reason for No Pharmacological VTE Prophylaxis  Once        Question:  Reasons:  Answer:  Risk of Bleeding    02/15/23 1834     Place sequential compression device  Until discontinued         02/15/23 1633                Discharge Planning   JULIO:      Code Status: Full Code   Is the patient medically ready for discharge?:     Reason for patient still in hospital (select all that apply): Patient trending condition, Laboratory test and Treatment  Discharge Plan A: Home with family        Plan to d/c in am if symptoms improved and tolerating diet.     Rupal Alonso NP  Department of Hospital Medicine   [facility] - Med Surg

## 2023-02-16 NOTE — HPI
The patient presented to the ER for epigastric pain, nausea and vomiting. She has been having intermittent episodes for several months, but this episode was worse than usual. This episode started about two days ago. She denies hematemesis. The epigastric pain doesn't radiate. Symptoms are worse with eating. She has frequent heartburn she treats with Prilosec. She denies a change in bowel habits but reports chronic constipation. No recent hematochezia or melena. She denies MJ use but takes Dilaudid PRN at home, and started Ozempic about three months ago for weight loss. She had a sleeve gastrectomy around 2015. Work up revealed hypokalemia with low/normal BUN and creatinine. Some hypotension. WBC count normal and afebrile. Hgb 10.6. AXR and CT scan were unremarkable as it relates to her symptoms.

## 2023-02-16 NOTE — PLAN OF CARE
Discussed poc with pt, pt verbalized understanding    Purposeful rounding every 2hours    VS wnl  Cardiac monitoring in use, pt is NSR, tele monitor # [id number]  Fall precautions in place, remains injury free  Pain and nausea under control with PRN meds    Bed locked at lowest position  Call light within reach    Chart check complete  Will cont with POC

## 2023-02-16 NOTE — BRIEF OP NOTE
EGD completed and showed mild gastritis and hiatal hernia.    Recommendations:    - Maximize antacid therapy- PPI BID  - Reflux precautions  - Follow-up pathology of gastric biopsies  - Follow-up in GI clinic         Belkis Mario MD  Gastroenterology

## 2023-02-16 NOTE — ASSESSMENT & PLAN NOTE
On chronic narcotics, hx of SLE, fibromyalgia, breast cancer  Denies constipation, reports normal BM today  [place]-PDMP reviewed, pescribed dilaudid 2 mg PO q6-8h PRN  IV pain meds for now, transition to PO when appropriate

## 2023-02-16 NOTE — ASSESSMENT & PLAN NOTE
-worsening over the past 2 weeks  -Previously seen outpatient by [facility], planned for EGD but not scheduled  -GI consulted and EGD done 2/16 revealing mild gastritis and hiatal hernia  -antiemetics PRN, nausea and vomiting improved

## 2023-02-16 NOTE — TRANSFER OF CARE
"Anesthesia Transfer of Care Note    Patient: Tosha Kwok    Procedure(s) Performed: Procedure(s) (LRB):  EGD (ESOPHAGOGASTRODUODENOSCOPY) (N/A)    Patient location: GI    Anesthesia Type: MAC    Transport from OR: Transported from OR on room air with adequate spontaneous ventilation    Post pain: adequate analgesia    Post assessment: no apparent anesthetic complications    Post vital signs: stable    Level of consciousness: awake and alert    Nausea/Vomiting: no nausea/vomiting    Complications: none    Transfer of care protocol was followed      Last vitals:   Visit Vitals  /78 (BP Location: Left arm)   Pulse 71   Temp 37 °C (98.6 °F) (Temporal)   Resp 18   Ht 5' 2" (1.575 m)   Wt 76.3 kg (168 lb 3.4 oz)   LMP 03/26/2012   SpO2 98%   Breastfeeding No   BMI 30.77 kg/m²     "

## 2023-02-16 NOTE — PLAN OF CARE
[facility]  Initial Discharge Assessment       Primary Care Provider: Antonieta Ariza NP    Admission Diagnosis: Hypokalemia [E87.6]  Abdominal cramping [R10.9]  Nausea and vomiting [R11.2]  Intractable vomiting with nausea [R11.2]  Nausea and vomiting, unspecified vomiting type [R11.2]    Admission Date: 2/14/2023  Expected Discharge Date: per attending    Discharge Barriers Identified: None    Payor: BLUE CROSS BLUE SHIELD / Plan: BCBS ALL OUT OF STATE / Product Type: PPO /     Extended Emergency Contact Information  Primary Emergency Contact: Jose Manuel Kwokdel  Address: [address] [place] [address] [facility]  Home Phone: [phone]  Mobile Phone: [phone]  Relation: Spouse    Discharge Plan A: Home with family         [id number] - [place] - [address] [address] [place]  [address] [address] [place]  [place] [address]  Phone: [phone] Fax: [phone]      Initial Assessment (most recent)       Adult Discharge Assessment - 02/16/23 0912          Discharge Assessment    Assessment Type Discharge Planning Assessment     Confirmed/corrected address, phone number and insurance Yes     Confirmed Demographics Correct on Facesheet     Source of Information patient     When was your last doctors appointment? 02/07/23     Reason For Admission epigastric pain     People in Home spouse     Do you expect to return to your current living situation? Yes     Do you have help at home or someone to help you manage your care at home? Yes     Who are your caregiver(s) and their phone number(s)? spouse     Prior to hospitilization cognitive status: Alert/Oriented     Current cognitive status: Alert/Oriented     Equipment Currently Used at Home none     Patient currently being followed by outpatient case management? Unable to determine (comments)     Do you currently have service(s) that help you manage your care at home? No     Do you take prescription medications? Yes     Do you  have prescription coverage? Yes     Coverage blue cross blue shield     Do you have any problems affording any of your prescribed medications? No     Is the patient taking medications as prescribed? yes     Who is going to help you get home at discharge? spouse     How do you get to doctors appointments? car, drives self;family or friend will provide     Are you on dialysis? No     Do you take coumadin? No     Discharge Plan A Home with family     DME Needed Upon Discharge  none     Discharge Barriers Identified None                      SW will f/u as needed.

## 2023-02-16 NOTE — PLAN OF CARE
Dr Mario came to bedside and discussed findings. NO N/V,  no abdominal pain, no GI bleeding, and vitals stable.  Pt discharged from unit.

## 2023-02-16 NOTE — NURSING
Pt is resting in bed. No signs of distress noted. Pt had critical potassium result. Helio Bernal MD and Slade Bernal NP notified. Pt remains free from falls. Q12 chart check complete.

## 2023-02-16 NOTE — PLAN OF CARE
Pt report given via phone to receiving pt room nurse .  Pt alert c/o very mild nausea upon leaving endoscopy.  A cool wet towel was placed on neck to help relieve nausea.  Nausea later ended and pt was taken to room 507.

## 2023-02-16 NOTE — SUBJECTIVE & OBJECTIVE
Past Medical History:   Diagnosis Date    Anemia     Ankylosing spondylitis     Anxiety     Asthma     Cancer 2018    left breast  lumpectomy, xrt    Chronic constipation     Chronic insomnia     Edema     Fibromyalgia     Hypertension     Insulin resistance     Interstitial cystitis     Lupus     Lupus     Migraine headache     PONV (postoperative nausea and vomiting)     Restless legs syndrome     Shingles     Stroke     post partum right sided numbness    TIA (transient ischemic attack)        Past Surgical History:   Procedure Laterality Date    BLADDER SURGERY      BREAST LUMPECTOMY  2018    BREAST RECONSTRUCTION      BREAST SURGERY  2018    reduction     SECTION      CHOLECYSTECTOMY      COLONOSCOPY N/A 10/2/2020    Procedure: COLONOSCOPY;  Surgeon: Guicho Hood MD;  Location: [facility] ENDO;  Service: Endoscopy;  Laterality: N/A;    EXCISION OF GRANULOMA Bilateral 2021    Procedure: EXCISION, GRANULOMA SCARS OF BREASTS;  Surgeon: Obed Palmer Jr., MD;  Location: [facility] OR;  Service: Plastics;  Laterality: Bilateral;    HYSTERECTOMY      LASER ABLATION      to back    SLEEVE GASTROPLASTY  2016    TOTAL REDUCTION MAMMOPLASTY Bilateral     two years ago    TUBAL LIGATION         Review of patient's allergies indicates:   Allergen Reactions    Carrot Swelling     angioedema    Morphine Anxiety     Hives   States she can take dilaudid and percocet without any problems    Clindamycin     Macrolide antibiotics     Erythromycin Other (See Comments)     Hives and cramps     Zofran (as hydrochloride) [ondansetron hcl] Hives     Local hive after IV injection       No current facility-administered medications on file prior to encounter.     Current Outpatient Medications on File Prior to Encounter   Medication Sig    cyanocobalamin 1,000 mcg/mL injection Inject 1 mL (1,000 mcg total) into the muscle every 28 days.    eszopiclone (LUNESTA) 3 mg Tab Take 1 tablet (3 mg total) by mouth every  evening.    hydroCHLOROthiazide (HYDRODIURIL) 12.5 MG Tab Take 2 tablets (25 mg total) by mouth once daily.    metoprolol succinate (TOPROL-XL) 25 MG 24 hr tablet Take 1 tablet (25 mg total) by mouth once daily.    semaglutide (OZEMPIC) 0.25 mg or 0.5 mg(2 mg/1.5 mL) pen injector Inject 0.5 mg into the skin once a week.    albuterol (PROAIR HFA) 90 mcg/actuation inhaler Inhale 2 puffs into the lungs every 4 (four) hours as needed.    atorvastatin (LIPITOR) 20 MG tablet Take 1 tablet (20 mg total) by mouth once daily.    cetirizine (ZYRTEC) 10 MG tablet Take 1 tablet (10 mg total) by mouth once daily.    EPINEPHrine (EPIPEN 2-DENNIS) 0.3 mg/0.3 mL AtIn Inject 0.3 mLs (0.3 mg total) into the muscle once. for 1 dose    EScitalopram oxalate (LEXAPRO) 10 MG tablet Take 1 tablet (10 mg total) by mouth once daily.    HYDROmorphone (DILAUDID) 2 MG tablet Take 1 tablet (2 mg total) by mouth every 6 to 8 hours as needed for pain    hydroxychloroquine (PLAQUENIL) 200 mg tablet Take 200 mg by mouth once daily.    hydrOXYzine pamoate (VISTARIL) 25 MG Cap Take 1 capsule (25 mg total) by mouth every 8 (eight) hours as needed (anxiety).    naproxen (NAPROSYN) 500 MG tablet Take 1 tablet (500 mg total) by mouth 2 (two) times daily with meals.    pantoprazole (PROTONIX) 40 MG tablet TAKE 1 TABLET BY MOUTH EVERY DAY    tiZANidine (ZANAFLEX) 4 MG tablet Take 4 mg by mouth 3 (three) times daily as needed.    topiramate (TOPAMAX) 100 MG tablet Take 1 tablet (100 mg total) by mouth 2 (two) times daily. (Patient taking differently: Take 100 mg by mouth as needed.)     Family History       Problem Relation (Age of Onset)    Breast cancer Maternal Aunt, Maternal Grandmother    Heart attack Father    Hypertension Mother, Brother, Sister    Lupus Sister          Tobacco Use    Smoking status: Never    Smokeless tobacco: Never   Substance and Sexual Activity    Alcohol use: Yes     Comment: occasionally    Drug use: No    Sexual activity: Yes      Partners: Male     Review of Systems   All other systems reviewed and are negative.  Objective:     Vital Signs (Most Recent):  Temp: 98.2 °F (36.8 °C) (02/15/23 1623)  Pulse: 81 (02/15/23 1623)  Resp: 18 (02/15/23 1623)  BP: (!) 178/94 (02/15/23 1623)  SpO2: 98 % (02/15/23 1623)   Vital Signs (24h Range):  Temp:  [98 °F (36.7 °C)-99 °F (37.2 °C)] 98.2 °F (36.8 °C)  Pulse:  [] 81  Resp:  [10-24] 18  SpO2:  [95 %-100 %] 98 %  BP: (118-194)/() 178/94     Weight: 72.6 kg (160 lb)  Body mass index is 29.26 kg/m².    Physical Exam  Vitals and nursing note reviewed.   Constitutional:       General: She is not in acute distress.     Appearance: Normal appearance. She is normal weight. She is ill-appearing and diaphoretic.   HENT:      Head: Normocephalic and atraumatic.      Nose: Nose normal.      Mouth/Throat:      Mouth: Mucous membranes are dry.      Pharynx: Oropharynx is clear.   Eyes:      Extraocular Movements: Extraocular movements intact.      Pupils: Pupils are equal, round, and reactive to light.   Cardiovascular:      Pulses: Normal pulses.      Heart sounds: Normal heart sounds.   Pulmonary:      Effort: Pulmonary effort is normal. No respiratory distress.      Breath sounds: Normal breath sounds. No wheezing, rhonchi or rales.   Abdominal:      General: Abdomen is flat. Bowel sounds are normal. There is no distension.      Palpations: Abdomen is soft.      Tenderness: There is abdominal tenderness. There is no guarding.   Musculoskeletal:         General: No deformity or signs of injury.   Neurological:      General: No focal deficit present.      Mental Status: She is alert and oriented to person, place, and time.   Psychiatric:         Mood and Affect: Mood normal.         Behavior: Behavior normal.         CRANIAL NERVES     CN III, IV, VI   Pupils are equal, round, and reactive to light.     Significant Labs: All pertinent labs within the past 24 hours have been reviewed.  CBC:   Recent Labs    Lab 02/14/23 2048   WBC 7.57   HGB 12.4   HCT 35.5*        CMP:   Recent Labs   Lab 02/14/23  2048 02/15/23  0353 02/15/23  1145   * 145 142   K 2.7* 3.1* 3.0*    109 107   CO2 24 22* 22*   * 105 104   BUN 11 8 5*   CREATININE 0.9 0.8 0.8   CALCIUM 9.9 8.8 9.1   PROT 7.5  --   --    ALBUMIN 4.4  --   --    BILITOT 0.4  --   --    ALKPHOS 59  --   --    AST 11  --   --    ALT 8*  --   --    ANIONGAP 16 14 13       Significant Imaging: I have reviewed all pertinent imaging results/findings within the past 24 hours.    CT Abdomen Pelvis  Without Contrast   Final Result      1. There is a small hiatal hernia.   2. There are surgical changes in the stomach.   3. There is a tiny pericardial effusion.   4. There is a streaky opacity in the right lower lobe.  This is characteristic of subsegmental atelectasis or scarring.   All CT scans at this facility use dose modulation, iterative reconstruction, and/or weight base dosing when appropriate to reduce radiation dose when appropriate to reduce radiation dose to as low as reasonably achievable.         Electronically signed by: Gorge Nunes MD   Date:    02/15/2023   Time:    11:59      CT Head Without Contrast   Final Result      No acute abnormality.      All CT scans at this facility use dose modulation, iterative reconstruction, and/or weight based dosing when appropriate to reduce radiation dose to as low as reasonable achievable.         Electronically signed by: Zackery Ramos MD   Date:    02/15/2023   Time:    11:49      X-Ray Abdomen Flat And Erect   Final Result      No evidence for bowel obstruction.  Right upper quadrant surgical clips.         Electronically signed by: Blade Nam   Date:    02/14/2023   Time:    21:25

## 2023-02-16 NOTE — SUBJECTIVE & OBJECTIVE
Interval History: awake, alert, sitting up in bed, NAD,  at bedside. NPO for EGD, denies any further n/v since last night. K 2.7, cont repletion. Repeat labs in am.    Review of Systems   Constitutional:  Negative for fatigue and fever.   Respiratory:  Negative for cough and shortness of breath.    Cardiovascular:  Negative for chest pain and palpitations.   Gastrointestinal:  Negative for abdominal pain, nausea and vomiting.   All other systems reviewed and are negative.  Objective:     Vital Signs (Most Recent):  Temp: 98.3 °F (36.8 °C) (02/16/23 1631)  Pulse: 93 (02/16/23 1631)  Resp: 18 (02/16/23 1646)  BP: (!) 179/114 (02/16/23 1631)  SpO2: 99 % (02/16/23 1631)   Vital Signs (24h Range):  Temp:  [97.9 °F (36.6 °C)-98.6 °F (37 °C)] 98.3 °F (36.8 °C)  Pulse:  [71-98] 93  Resp:  [18-20] 18  SpO2:  [90 %-99 %] 99 %  BP: ()/() 179/114     Weight: 76.3 kg (168 lb 3.4 oz)  Body mass index is 30.77 kg/m².    Intake/Output Summary (Last 24 hours) at 2/16/2023 1651  Last data filed at 2/15/2023 1926  Gross per 24 hour   Intake 205.21 ml   Output --   Net 205.21 ml      Physical Exam  Vitals and nursing note reviewed.   Constitutional:       Appearance: She is obese.   Cardiovascular:      Rate and Rhythm: Normal rate and regular rhythm.      Heart sounds: No murmur heard.  Pulmonary:      Effort: Pulmonary effort is normal.      Breath sounds: Normal breath sounds.   Abdominal:      General: Bowel sounds are normal.      Palpations: Abdomen is soft.   Musculoskeletal:         General: Normal range of motion.   Skin:     General: Skin is warm and dry.   Neurological:      General: No focal deficit present.      Mental Status: She is alert and oriented to person, place, and time.   Psychiatric:         Mood and Affect: Mood normal.         Behavior: Behavior normal.       Significant Labs: All pertinent labs within the past 24 hours have been reviewed.  BMP:   Recent Labs   Lab 02/16/23  2042  02/16/23  1039   GLU 91  --      --    K 2.7* 3.6     --    CO2 24  --    BUN 5*  --    CREATININE 0.8  --    CALCIUM 8.8  --    MG 1.6  --      CBC:   Recent Labs   Lab 02/14/23 2048 02/16/23 0527   WBC 7.57 4.73   HGB 12.4 10.6*   HCT 35.5* 31.1*    273     CMP:   Recent Labs   Lab 02/14/23  2048 02/15/23  0353 02/15/23  1145 02/15/23  2223 02/16/23 0527 02/16/23  1039   *   < > 142 143 141  --    K 2.7*   < > 3.0* 2.9* 2.7* 3.6      < > 107 105 105  --    CO2 24   < > 22* 22* 24  --    *   < > 104 94 91  --    BUN 11   < > 5* 3* 5*  --    CREATININE 0.9   < > 0.8 0.8 0.8  --    CALCIUM 9.9   < > 9.1 8.6* 8.8  --    PROT 7.5  --   --   --   --   --    ALBUMIN 4.4  --   --   --   --   --    BILITOT 0.4  --   --   --   --   --    ALKPHOS 59  --   --   --   --   --    AST 11  --   --   --   --   --    ALT 8*  --   --   --   --   --    ANIONGAP 16   < > 13 16 12  --     < > = values in this interval not displayed.     Urine Culture: No results for input(s): LABURIN in the last 48 hours.  Urine Studies:   Recent Labs   Lab 02/14/23 2153   COLORU Yellow   APPEARANCEUA Hazy*   PHUR 8.0   SPECGRAV 1.015   PROTEINUA Trace*   GLUCUA Negative   KETONESU 2+*   BILIRUBINUA 1+*   OCCULTUA 2+*   NITRITE Negative   UROBILINOGEN Negative   LEUKOCYTESUR Trace*   RBCUA 4   WBCUA 2   BACTERIA Occasional   SQUAMEPITHEL 2       Significant Imaging: I have reviewed all pertinent imaging results/findings within the past 24 hours.

## 2023-02-16 NOTE — CONSULTS
[facility]  Gastroenterology  Consult Note    Patient Name: Tosha Kwok  MRN: [id number]  Admission Date: 2/14/2023  Hospital Length of Stay: 0 days  Code Status: Full Code   Attending Provider: Payton Fernandez MD   Consulting Provider: Walter Peter PA-C  Primary Care Physician: Antonieta Ariza NP  Principal Problem:Epigastric pain    Inpatient consult to Gastroenterology  Consult performed by: Walter Peter PA-C  Consult ordered by: Luis Ramsey MD  Reason for consult: N/V/Abd pain        Subjective:     HPI:  The patient presented to the ER for epigastric pain, nausea and vomiting. She has been having intermittent episodes for several months, but this episode was worse than usual. This episode started about two days ago. She denies hematemesis. The epigastric pain doesn't radiate. Symptoms are worse with eating. She has frequent heartburn she treats with Prilosec. She denies a change in bowel habits but reports chronic constipation. No recent hematochezia or melena. She denies MJ use but takes Dilaudid PRN at home, and started Ozempic about three months ago for weight loss. She had a sleeve gastrectomy around 2015. Work up revealed hypokalemia with low/normal BUN and creatinine. Some hypotension. WBC count normal and afebrile. Hgb 10.6. AXR and CT scan were unremarkable as it relates to her symptoms.       Past Medical History:   Diagnosis Date    Anemia     Ankylosing spondylitis     Anxiety     Asthma     Cancer 2018    left breast  lumpectomy, xrt    Chronic constipation     Chronic insomnia     Edema     Fibromyalgia     Hypertension     Insulin resistance     Interstitial cystitis     Lupus     Lupus     Migraine headache     PONV (postoperative nausea and vomiting)     Restless legs syndrome     Shingles     Stroke 1998    post partum right sided numbness    TIA (transient ischemic attack) 1998       Past Surgical History:   Procedure Laterality Date    BLADDER  SURGERY      BREAST LUMPECTOMY  2018    BREAST RECONSTRUCTION      BREAST SURGERY  2018    reduction     SECTION      CHOLECYSTECTOMY      COLONOSCOPY N/A 10/2/2020    Procedure: COLONOSCOPY;  Surgeon: Guicho Hood MD;  Location: [facility];  Service: Endoscopy;  Laterality: N/A;    EXCISION OF GRANULOMA Bilateral 2021    Procedure: EXCISION, GRANULOMA SCARS OF BREASTS;  Surgeon: Obed Palmer Jr., MD;  Location: [facility] OR;  Service: Plastics;  Laterality: Bilateral;    HYSTERECTOMY      LASER ABLATION      to back    SLEEVE GASTROPLASTY  2016    TOTAL REDUCTION MAMMOPLASTY Bilateral     two years ago    TUBAL LIGATION         Review of patient's allergies indicates:   Allergen Reactions    Carrot Swelling     angioedema    Morphine Anxiety     Hives   States she can take dilaudid and percocet without any problems    Clindamycin     Macrolide antibiotics     Erythromycin Other (See Comments)     Hives and cramps     Zofran (as hydrochloride) [ondansetron hcl] Hives     Local hive after IV injection     Family History       Problem Relation (Age of Onset)    Breast cancer Maternal Aunt, Maternal Grandmother    Heart attack Father    Hypertension Mother, Brother, Sister    Lupus Sister          Tobacco Use    Smoking status: Never    Smokeless tobacco: Never   Substance and Sexual Activity    Alcohol use: Yes     Comment: occasionally    Drug use: No    Sexual activity: Yes     Partners: Male     Review of Systems   Constitutional:  Negative for fever.   HENT:  Negative for hearing loss.    Eyes:  Negative for visual disturbance.   Respiratory:  Negative for cough and shortness of breath.    Cardiovascular:  Negative for chest pain.   Gastrointestinal:         As per HPI.   Genitourinary:  Negative for dysuria, frequency and hematuria.   Musculoskeletal:  Positive for arthralgias.   Skin:  Negative for rash.   Neurological:  Negative for seizures, syncope, numbness and  headaches.   Hematological:  Does not bruise/bleed easily.   Psychiatric/Behavioral:  Negative for confusion.    Objective:     Vital Signs (Most Recent):  Temp: 98.6 °F (37 °C) (02/16/23 0746)  Pulse: 79 (02/16/23 0746)  Resp: 18 (02/16/23 0746)  BP: 126/75 (02/16/23 0746)  SpO2: (!) 94 % (02/16/23 0746)   Vital Signs (24h Range):  Temp:  [97.9 °F (36.6 °C)-98.8 °F (37.1 °C)] 98.6 °F (37 °C)  Pulse:  [78-98] 79  Resp:  [17-23] 18  SpO2:  [90 %-99 %] 94 %  BP: ()/(55-94) 126/75     Weight: 76.3 kg (168 lb 3.4 oz) (02/16/23 0013)  Body mass index is 30.77 kg/m².      Intake/Output Summary (Last 24 hours) at 2/16/2023 0835  Last data filed at 2/15/2023 1926  Gross per 24 hour   Intake 1205.21 ml   Output --   Net 1205.21 ml       Lines/Drains/Airways       Central Venous Catheter Line  Duration             Port A Cath Single Lumen 04/15/21 1200 right atrial 671 days                    Physical Exam  Constitutional:       General: She is not in acute distress.     Appearance: Normal appearance. She is well-developed. She is not ill-appearing.   HENT:      Head: Normocephalic and atraumatic.   Eyes:      Extraocular Movements: Extraocular movements intact.   Cardiovascular:      Rate and Rhythm: Normal rate and regular rhythm.      Heart sounds: No murmur heard.  Pulmonary:      Effort: Pulmonary effort is normal. No respiratory distress.      Breath sounds: Normal breath sounds. No wheezing.   Abdominal:      General: Bowel sounds are normal. There is no distension.      Palpations: Abdomen is soft. There is no mass.      Tenderness: There is abdominal tenderness (mild) in the epigastric area and left upper quadrant. There is no guarding or rebound.   Musculoskeletal:      Cervical back: Normal range of motion and neck supple.      Right lower leg: No edema.      Left lower leg: No edema.   Skin:     General: Skin is warm and dry.      Findings: No rash.   Neurological:      Mental Status: She is alert and  oriented to person, place, and time.      Cranial Nerves: No cranial nerve deficit.   Psychiatric:         Behavior: Behavior normal.       Significant Labs:  CBC:   Recent Labs   Lab 02/14/23 2048 02/16/23  0527   WBC 7.57 4.73   HGB 12.4 10.6*   HCT 35.5* 31.1*    273     CMP:   Recent Labs   Lab 02/14/23  2048 02/15/23  0353 02/16/23  0527   *   < > 91   CALCIUM 9.9   < > 8.8   ALBUMIN 4.4  --   --    PROT 7.5  --   --    *   < > 141   K 2.7*   < > 2.7*   CO2 24   < > 24      < > 105   BUN 11   < > 5*   CREATININE 0.9   < > 0.8   ALKPHOS 59  --   --    ALT 8*  --   --    AST 11  --   --    BILITOT 0.4  --   --     < > = values in this interval not displayed.     Coagulation: No results for input(s): PT, INR, APTT in the last 48 hours.    Significant Imaging:  Imaging results within the past 24 hours have been reviewed.    Assessment/Plan:     * Epigastric pain  Will plan on EGD for further evaluation. Timing will depend on ability to correct hypokalemia.    If EGD unrevealing, need to consider gastroparesis in the setting of chronic opioid use and Ozempic.   Agree with Protonix bid.   Antiemetics as needed.   Further recommendations after EGD.       Nausea and vomiting  See plan under epigastric pain.     Hypokalemia  Management and monitoring per [facility] team.     Chronic, continuous use of opioids  Minimize use.         Thank you for your consult. I will follow-up with patient. Please contact us if you have any additional questions.    Walter Peter PA-C  Gastroenterology  [facility]

## 2023-02-16 NOTE — SUBJECTIVE & OBJECTIVE
Past Medical History:   Diagnosis Date    Anemia     Ankylosing spondylitis     Anxiety     Asthma     Cancer 2018    left breast  lumpectomy, xrt    Chronic constipation     Chronic insomnia     Edema     Fibromyalgia     Hypertension     Insulin resistance     Interstitial cystitis     Lupus     Lupus     Migraine headache     PONV (postoperative nausea and vomiting)     Restless legs syndrome     Shingles     Stroke     post partum right sided numbness    TIA (transient ischemic attack)        Past Surgical History:   Procedure Laterality Date    BLADDER SURGERY      BREAST LUMPECTOMY  2018    BREAST RECONSTRUCTION      BREAST SURGERY  2018    reduction     SECTION      CHOLECYSTECTOMY      COLONOSCOPY N/A 10/2/2020    Procedure: COLONOSCOPY;  Surgeon: Guicho Hood MD;  Location: [facility] ENDO;  Service: Endoscopy;  Laterality: N/A;    EXCISION OF GRANULOMA Bilateral 2021    Procedure: EXCISION, GRANULOMA SCARS OF BREASTS;  Surgeon: Obed Palmer Jr., MD;  Location: [facility] OR;  Service: Plastics;  Laterality: Bilateral;    HYSTERECTOMY      LASER ABLATION      to back    SLEEVE GASTROPLASTY  2016    TOTAL REDUCTION MAMMOPLASTY Bilateral     two years ago    TUBAL LIGATION         Review of patient's allergies indicates:   Allergen Reactions    Carrot Swelling     angioedema    Morphine Anxiety     Hives   States she can take dilaudid and percocet without any problems    Clindamycin     Macrolide antibiotics     Erythromycin Other (See Comments)     Hives and cramps     Zofran (as hydrochloride) [ondansetron hcl] Hives     Local hive after IV injection     Family History       Problem Relation (Age of Onset)    Breast cancer Maternal Aunt, Maternal Grandmother    Heart attack Father    Hypertension Mother, Brother, Sister    Lupus Sister          Tobacco Use    Smoking status: Never    Smokeless tobacco: Never   Substance and Sexual Activity    Alcohol use: Yes     Comment: occasionally     Drug use: No    Sexual activity: Yes     Partners: Male     Review of Systems   Constitutional:  Negative for fever.   HENT:  Negative for hearing loss.    Eyes:  Negative for visual disturbance.   Respiratory:  Negative for cough and shortness of breath.    Cardiovascular:  Negative for chest pain.   Gastrointestinal:         As per HPI.   Genitourinary:  Negative for dysuria, frequency and hematuria.   Musculoskeletal:  Positive for arthralgias.   Skin:  Negative for rash.   Neurological:  Negative for seizures, syncope, numbness and headaches.   Hematological:  Does not bruise/bleed easily.   Psychiatric/Behavioral:  Negative for confusion.    Objective:     Vital Signs (Most Recent):  Temp: 98.6 °F (37 °C) (02/16/23 0746)  Pulse: 79 (02/16/23 0746)  Resp: 18 (02/16/23 0746)  BP: 126/75 (02/16/23 0746)  SpO2: (!) 94 % (02/16/23 0746)   Vital Signs (24h Range):  Temp:  [97.9 °F (36.6 °C)-98.8 °F (37.1 °C)] 98.6 °F (37 °C)  Pulse:  [78-98] 79  Resp:  [17-23] 18  SpO2:  [90 %-99 %] 94 %  BP: ()/(55-94) 126/75     Weight: 76.3 kg (168 lb 3.4 oz) (02/16/23 0013)  Body mass index is 30.77 kg/m².      Intake/Output Summary (Last 24 hours) at 2/16/2023 0835  Last data filed at 2/15/2023 1926  Gross per 24 hour   Intake 1205.21 ml   Output --   Net 1205.21 ml       Lines/Drains/Airways       Central Venous Catheter Line  Duration             Port A Cath Single Lumen 04/15/21 1200 right atrial 671 days                    Physical Exam  Constitutional:       General: She is not in acute distress.     Appearance: Normal appearance. She is well-developed. She is not ill-appearing.   HENT:      Head: Normocephalic and atraumatic.   Eyes:      Extraocular Movements: Extraocular movements intact.   Cardiovascular:      Rate and Rhythm: Normal rate and regular rhythm.      Heart sounds: No murmur heard.  Pulmonary:      Effort: Pulmonary effort is normal. No respiratory distress.      Breath sounds: Normal breath sounds.  No wheezing.   Abdominal:      General: Bowel sounds are normal. There is no distension.      Palpations: Abdomen is soft. There is no mass.      Tenderness: There is abdominal tenderness (mild) in the epigastric area and left upper quadrant. There is no guarding or rebound.   Musculoskeletal:      Cervical back: Normal range of motion and neck supple.      Right lower leg: No edema.      Left lower leg: No edema.   Skin:     General: Skin is warm and dry.      Findings: No rash.   Neurological:      Mental Status: She is alert and oriented to person, place, and time.      Cranial Nerves: No cranial nerve deficit.   Psychiatric:         Behavior: Behavior normal.       Significant Labs:  CBC:   Recent Labs   Lab 02/14/23 2048 02/16/23  0527   WBC 7.57 4.73   HGB 12.4 10.6*   HCT 35.5* 31.1*    273     CMP:   Recent Labs   Lab 02/14/23  2048 02/15/23  0353 02/16/23  0527   *   < > 91   CALCIUM 9.9   < > 8.8   ALBUMIN 4.4  --   --    PROT 7.5  --   --    *   < > 141   K 2.7*   < > 2.7*   CO2 24   < > 24      < > 105   BUN 11   < > 5*   CREATININE 0.9   < > 0.8   ALKPHOS 59  --   --    ALT 8*  --   --    AST 11  --   --    BILITOT 0.4  --   --     < > = values in this interval not displayed.     Coagulation: No results for input(s): PT, INR, APTT in the last 48 hours.    Significant Imaging:  Imaging results within the past 24 hours have been reviewed.

## 2023-02-17 ENCOUNTER — PATIENT OUTREACH (OUTPATIENT)
Dept: ADMINISTRATIVE | Facility: HOSPITAL | Age: 58
End: 2023-02-17

## 2023-02-17 VITALS
OXYGEN SATURATION: 96 % | DIASTOLIC BLOOD PRESSURE: 63 MMHG | TEMPERATURE: 99 F | SYSTOLIC BLOOD PRESSURE: 104 MMHG | HEIGHT: 62 IN | WEIGHT: 168.19 LBS | BODY MASS INDEX: 30.95 KG/M2 | RESPIRATION RATE: 17 BRPM | HEART RATE: 67 BPM

## 2023-02-17 PROBLEM — K44.9 HIATAL HERNIA: Status: ACTIVE | Noted: 2023-02-17

## 2023-02-17 PROBLEM — K29.70 GASTRITIS: Status: ACTIVE | Noted: 2023-02-15

## 2023-02-17 LAB
ANION GAP SERPL CALC-SCNC: 12 MMOL/L (ref 8–16)
BASOPHILS # BLD AUTO: 0.03 K/UL (ref 0–0.2)
BASOPHILS NFR BLD: 0.8 % (ref 0–1.9)
BUN SERPL-MCNC: 8 MG/DL (ref 6–20)
CALCIUM SERPL-MCNC: 8.7 MG/DL (ref 8.7–10.5)
CHLORIDE SERPL-SCNC: 105 MMOL/L (ref 95–110)
CO2 SERPL-SCNC: 23 MMOL/L (ref 23–29)
CREAT SERPL-MCNC: 0.7 MG/DL (ref 0.5–1.4)
DIFFERENTIAL METHOD: ABNORMAL
EOSINOPHIL # BLD AUTO: 0 K/UL (ref 0–0.5)
EOSINOPHIL NFR BLD: 1 % (ref 0–8)
ERYTHROCYTE [DISTWIDTH] IN BLOOD BY AUTOMATED COUNT: 12.7 % (ref 11.5–14.5)
EST. GFR  (NO RACE VARIABLE): >60 ML/MIN/1.73 M^2
GLUCOSE SERPL-MCNC: 89 MG/DL (ref 70–110)
HCT VFR BLD AUTO: 31.6 % (ref 37–48.5)
HGB BLD-MCNC: 10.4 G/DL (ref 12–16)
IMM GRANULOCYTES # BLD AUTO: 0.01 K/UL (ref 0–0.04)
IMM GRANULOCYTES NFR BLD AUTO: 0.3 % (ref 0–0.5)
LYMPHOCYTES # BLD AUTO: 1.8 K/UL (ref 1–4.8)
LYMPHOCYTES NFR BLD: 47.2 % (ref 18–48)
MAGNESIUM SERPL-MCNC: 2 MG/DL (ref 1.6–2.6)
MCH RBC QN AUTO: 28.3 PG (ref 27–31)
MCHC RBC AUTO-ENTMCNC: 32.9 G/DL (ref 32–36)
MCV RBC AUTO: 86 FL (ref 82–98)
MONOCYTES # BLD AUTO: 0.2 K/UL (ref 0.3–1)
MONOCYTES NFR BLD: 6 % (ref 4–15)
NEUTROPHILS # BLD AUTO: 1.7 K/UL (ref 1.8–7.7)
NEUTROPHILS NFR BLD: 44.7 % (ref 38–73)
NRBC BLD-RTO: 0 /100 WBC
PLATELET # BLD AUTO: 282 K/UL (ref 150–450)
PMV BLD AUTO: 9.7 FL (ref 9.2–12.9)
POTASSIUM SERPL-SCNC: 3 MMOL/L (ref 3.5–5.1)
RBC # BLD AUTO: 3.68 M/UL (ref 4–5.4)
SODIUM SERPL-SCNC: 140 MMOL/L (ref 136–145)
WBC # BLD AUTO: 3.81 K/UL (ref 3.9–12.7)

## 2023-02-17 PROCEDURE — 63600175 PHARM REV CODE 636 W HCPCS: Performed by: HOSPITALIST

## 2023-02-17 PROCEDURE — 80048 BASIC METABOLIC PNL TOTAL CA: CPT | Performed by: HOSPITALIST

## 2023-02-17 PROCEDURE — 25000003 PHARM REV CODE 250: Performed by: NURSE PRACTITIONER

## 2023-02-17 PROCEDURE — 83735 ASSAY OF MAGNESIUM: CPT | Performed by: HOSPITALIST

## 2023-02-17 PROCEDURE — 94799 UNLISTED PULMONARY SVC/PX: CPT

## 2023-02-17 PROCEDURE — 63600175 PHARM REV CODE 636 W HCPCS: Performed by: INTERNAL MEDICINE

## 2023-02-17 PROCEDURE — G0378 HOSPITAL OBSERVATION PER HR: HCPCS

## 2023-02-17 PROCEDURE — 25000003 PHARM REV CODE 250: Performed by: HOSPITALIST

## 2023-02-17 PROCEDURE — C9113 INJ PANTOPRAZOLE SODIUM, VIA: HCPCS | Performed by: HOSPITALIST

## 2023-02-17 PROCEDURE — 99900035 HC TECH TIME PER 15 MIN (STAT)

## 2023-02-17 PROCEDURE — 85025 COMPLETE CBC W/AUTO DIFF WBC: CPT | Performed by: HOSPITALIST

## 2023-02-17 RX ORDER — POTASSIUM CHLORIDE 20 MEQ/1
20 TABLET, EXTENDED RELEASE ORAL DAILY
Status: DISCONTINUED | OUTPATIENT
Start: 2023-02-18 | End: 2023-02-17 | Stop reason: HOSPADM

## 2023-02-17 RX ORDER — LANOLIN ALCOHOL/MO/W.PET/CERES
400 CREAM (GRAM) TOPICAL ONCE
Status: COMPLETED | OUTPATIENT
Start: 2023-02-17 | End: 2023-02-17

## 2023-02-17 RX ORDER — POTASSIUM CHLORIDE 20 MEQ/1
40 TABLET, EXTENDED RELEASE ORAL ONCE
Status: COMPLETED | OUTPATIENT
Start: 2023-02-17 | End: 2023-02-17

## 2023-02-17 RX ORDER — PANTOPRAZOLE SODIUM 40 MG/1
40 TABLET, DELAYED RELEASE ORAL 2 TIMES DAILY
Qty: 60 TABLET | Refills: 1 | Status: SHIPPED | OUTPATIENT
Start: 2023-02-17 | End: 2024-03-07

## 2023-02-17 RX ORDER — PROMETHAZINE HYDROCHLORIDE 25 MG/1
25 TABLET ORAL EVERY 6 HOURS PRN
Qty: 15 TABLET | Refills: 0 | Status: SHIPPED | OUTPATIENT
Start: 2023-02-17

## 2023-02-17 RX ORDER — HEPARIN 100 UNIT/ML
5 SYRINGE INTRAVENOUS ONCE
Status: COMPLETED | OUTPATIENT
Start: 2023-02-17 | End: 2023-02-17

## 2023-02-17 RX ADMIN — POTASSIUM CHLORIDE 40 MEQ: 1500 TABLET, EXTENDED RELEASE ORAL at 10:02

## 2023-02-17 RX ADMIN — HEPARIN 500 UNITS: 100 SYRINGE at 10:02

## 2023-02-17 RX ADMIN — Medication 400 MG: at 10:02

## 2023-02-17 RX ADMIN — PANTOPRAZOLE SODIUM 40 MG: 40 INJECTION, POWDER, FOR SOLUTION INTRAVENOUS at 08:02

## 2023-02-17 NOTE — PLAN OF CARE
[facility] - Med Surg  Discharge Final Note    Primary Care Provider: Antonieta Ariza NP    Expected Discharge Date: 2/17/2023    Final Discharge Note (most recent)       Final Note - 02/17/23 0928          Final Note    Assessment Type Final Discharge Note     Anticipated Discharge Disposition Home or Self Care     Hospital Resources/Appts/Education Provided Appointments scheduled by Navigator/Coordinator;Appointments scheduled and added to AVS        Post-Acute Status    Discharge Delays None known at this time                   Sw scheduled PCP appointment on 2/20/23; appointment added to AVS.     No additional d/c needs at this time.

## 2023-02-17 NOTE — DISCHARGE SUMMARY
[facility] Medicine  Discharge Summary      Patient Name: Tosha Kwok  MRN: [id number]  SHANICE: [id number]  Patient Class: OP- Observation  Admission Date: 2/14/2023  Hospital Length of Stay: 0 days  Discharge Date and Time: 2/17/2023 11:20 AM  Attending Physician: Debra att. providers found   Discharging Provider: Rupal Alonso NP  Primary Care Provider: Antonieta Ariza NP    Primary Care Team: [facility] MEDICINE B    HPI:   58 y/o female with PMHx of HTN, anemia, migraine headache, breast cancer, SLE on hydroxychloroquine, fibromyalgia, chronic insomnia who presented as a transfer from [facility] for worsening epigastric abdominal pain, intractable nausea and vomiting. Patient reports intermittent episodes of non-radiating epigastric pain for the past several months, worsening over the past week with nausea, vomiting, decreased PO intake. She denies chest pain, SOB, fevers, focal weakness/numbness, dysuria, constipation or diarrhea. Patient was evaluated by [facility] in Dec '22 with plans for EGD, but not scheduled. Hospital medicine consulted for admission, will place in observation status for further management.      Procedure(s) (LRB):  EGD (ESOPHAGOGASTRODUODENOSCOPY) (N/A)      Hospital Course:   58 y/o female presented as a transfer from [facility] for worsening abdominal pain and intractable n/v. She states she always has some n/v at baseline for a while now, but it worsened over the past 2 weeks. She had hypokalemia on admission, repleted and improved. GI consulted and performed an EGD 2/16/23, findings consistent with mild gastritis and hiatal hernia. Recommended PPI BID. Will need to follow up with GI in 2 weeks. Gastric biopsies taken, will need to follow up for pathology.   Patient tolerated diet with very little nausea. Potassium level 3.0 on discharge, will need to follow up with PCP for repeat labs.   Will send with prescriptions for Protonix BID and Phenergan as needed for  nausea. Instructed to eat a bland diet and avoid spicy and fried foods. Take small bites and sips.   Will need follow up with GI, Dr. Mario in 1/2 weeks after discharge.  Follow up with PCP in 3-5 days after discharge for repeat labs.     Patient seen and examined prior to discharge.   All questions and concerns were addressed prior to discharge.         Goals of Care Treatment Preferences:  Code Status: Full Code    Health care agent: Dimitry Kwok ([phone])  Health care agent number: No value filed.                   Consults:   Consults (From admission, onward)          Status Ordering Provider     Inpatient consult to Gastroenterology  Once        Provider:  (Not yet assigned)    Completed MOISÉS ELIAS            Cardiac/Vascular  Primary hypertension  Chronic, controlled  -Latest blood pressure and vitals reviewed-   Temp:  [97.9 °F (36.6 °C)-98.6 °F (37 °C)]   Pulse:  [71-98]   Resp:  [18-20]   BP: ()/()   SpO2:  [90 %-99 %] .   -Home meds for hypertension were reviewed and noted below.   Hypertension Medications               hydroCHLOROthiazide (HYDRODIURIL) 12.5 MG Tab Take 2 tablets (25 mg total) by mouth once daily.    metoprolol succinate (TOPROL-XL) 25 MG 24 hr tablet Take 1 tablet (25 mg total) by mouth once daily.          -While in the hospital, will manage blood pressure as follows; Continue home antihypertensive regimen  -Will utilize PRN blood pressure medication only if patient's blood pressure greater than  180/110 and she develops symptoms such as worsening chest pain or shortness of breath.  -optimize pain control    Renal/  Hypokalemia  -r/t GI loss  -K 2.7, repleted, repeat 3.6  -Mg 1.6  -repleted   -repeat labs in am    GI  * Gastritis  -worsening over the past 2 weeks  -Previously seen outpatient by [facility], planned for EGD but not scheduled  -GI consulted and EGD done 2/16 revealing mild gastritis and hiatal hernia  -antiemetics PRN, nausea and vomiting  improved    GERD (gastroesophageal reflux disease)  -change PPI to BID  -f/u OP    Orthopedic  Fibromyalgia  -Plan as above    Other  Lupus (systemic lupus erythematosus)  -Hold plaquenil  -not on steroids currently  -f/u OP    Chronic, continuous use of opioids  -On chronic narcotics, hx of SLE, fibromyalgia, breast cancer  -Denies constipation  -[place]-PDMP [place], pescribed dilaudid 2 mg PO q6-8h PRN  -d/c IV dilaudid and restart home regimen  -follows pain management, Dr. Rae, f/u OP      Final Active Diagnoses:    Diagnosis Date Noted POA    PRINCIPAL PROBLEM:  Gastritis [K29.70] 02/15/2023 Yes    Nausea and vomiting [R11.2] 02/15/2023 Yes    GERD (gastroesophageal reflux disease) [K21.9] 07/22/2019 Yes    Chronic, continuous use of opioids [F11.90] 09/27/2018 Yes     Chronic    Primary hypertension [I10] 07/11/2013 Yes    Hypokalemia [E87.6] 02/15/2023 Yes    Fibromyalgia [M79.7] 09/27/2018 Yes     Chronic    Lupus (systemic lupus erythematosus) [M32.9] 05/09/2014 Yes    Hiatal hernia [K44.9] 02/17/2023 Yes      Problems Resolved During this Admission:    Diagnosis Date Noted Date Resolved POA    Malignant neoplasm of female breast [C50.919] 07/22/2019 02/16/2023 Yes    Abdominal cramping [R10.9] 02/15/2023 02/15/2023 Yes       Discharged Condition: good    Disposition: Home or Self Care    Follow Up:   Follow-up Information       Antonieta Ariza NP. Schedule an appointment as soon as possible for a visit in 3 day(s).    Specialty: Family Medicine  Why: call office and schedule hospital follow up in 3-5 days  Contact information:  [address] [address] [place] [address] [phone]               Belkis Mario MD. Schedule an appointment as soon as possible for a visit in 1 week(s).    Specialties: Gastroenterology, Internal Medicine  Why: call office and schedule a hospital follow up in 1-2 weeks  Contact information:  [address] [place] [address] [address] Aurelia G[address]LE [address] [phone]                            Patient Instructions:      Ambulatory referral/consult to [facility]   Standing Status: Future   Referral Priority: Routine Referral Type: Consultation   Referral Reason: Specialty Services Required   Number of Visits Requested: 1     Diet Adult Regular   Order Comments: [place] diet, avoid spicy and fried foods.  Take small bites and sips for meals     Notify your health care provider if you experience any of the following:  temperature >100.4     Notify your health care provider if you experience any of the following:  persistent nausea and vomiting or diarrhea     Activity as tolerated       Significant Diagnostic Studies: Labs: BMP:   Recent Labs   Lab 02/15/23  2223 02/16/23  0527 02/16/23  1039 02/17/23  0510   GLU 94 91  --  89    141  --  140   K 2.9* 2.7* 3.6 3.0*    105  --  105   CO2 22* 24  --  23   BUN 3* 5*  --  8   CREATININE 0.8 0.8  --  0.7   CALCIUM 8.6* 8.8  --  8.7   MG  --  1.6  --  2.0   , CMP   Recent Labs   Lab 02/15/23  2223 02/16/23  0527 02/16/23  1039 02/17/23  0510    141  --  140   K 2.9* 2.7* 3.6 3.0*    105  --  105   CO2 22* 24  --  23   GLU 94 91  --  89   BUN 3* 5*  --  8   CREATININE 0.8 0.8  --  0.7   CALCIUM 8.6* 8.8  --  8.7   ANIONGAP 16 12  --  12   , CBC   Recent Labs   Lab 02/16/23 0527 02/17/23  0510   WBC 4.73 3.81*   HGB 10.6* 10.4*   HCT 31.1* 31.6*    282   , and Troponin No results for input(s): TROPONINI in the last 168 hours.  Microbiology: Blood Culture No results found for: LABBLOO  Radiology: X-Ray: CXR: X-Ray Chest 1 View (CXR): No results found for this visit on 02/14/23.    Pending Diagnostic Studies:       Procedure Component Value Units Date/Time    Specimen to Pathology, Surgery Gastrointestinal tract [[id number]] Collected: 02/16/23 1545    Order Status: Sent Lab Status: In process Updated: 02/17/23 0942    Specimen: Tissue            Medications:  Reconciled Home Medications:      Medication List         START taking these medications      promethazine 25 MG tablet  Commonly known as: PHENERGAN  Take 1 tablet (25 mg total) by mouth every 6 (six) hours as needed for Nausea.            CHANGE how you take these medications      pantoprazole 40 MG tablet  Commonly known as: PROTONIX  Take 1 tablet (40 mg total) by mouth 2 (two) times daily.  What changed: when to take this     topiramate 100 MG tablet  Commonly known as: TOPAMAX  Take 1 tablet (100 mg total) by mouth 2 (two) times daily.  What changed:   when to take this  reasons to take this            CONTINUE taking these medications      albuterol 90 mcg/actuation inhaler  Commonly known as: PROAIR HFA  Inhale 2 puffs into the lungs every 4 (four) hours as needed.     atorvastatin 20 MG tablet  Commonly known as: LIPITOR  Take 1 tablet (20 mg total) by mouth once daily.     cetirizine 10 MG tablet  Commonly known as: ZYRTEC  Take 1 tablet (10 mg total) by mouth once daily.     cyanocobalamin 1,000 mcg/mL injection  Inject 1 mL (1,000 mcg total) into the muscle every 28 days.     EPINEPHrine 0.3 mg/0.3 mL Atin  Commonly known as: EPIPEN 2-DENNIS  Inject 0.3 mLs (0.3 mg total) into the muscle once. for 1 dose     EScitalopram oxalate 10 MG tablet  Commonly known as: LEXAPRO  Take 1 tablet (10 mg total) by mouth once daily.     eszopiclone 3 mg Tab  Commonly known as: LUNESTA  Take 1 tablet (3 mg total) by mouth every evening.     hydroCHLOROthiazide 12.5 MG Tab  Commonly known as: HYDRODIURIL  Take 2 tablets (25 mg total) by mouth once daily.     HYDROmorphone 2 MG tablet  Commonly known as: DILAUDID  Take 1 tablet (2 mg total) by mouth every 6 to 8 hours as needed for pain     hydrOXYchloroQUINE 200 mg tablet  Commonly known as: PLAQUENIL  Take 200 mg by mouth once daily.     hydrOXYzine pamoate 25 MG Cap  Commonly known as: VISTARIL  Take 1 capsule (25 mg total) by mouth every 8 (eight) hours as needed (anxiety).     metoprolol succinate 25 MG 24 hr  tablet  Commonly known as: TOPROL-XL  Take 1 tablet (25 mg total) by mouth once daily.     naproxen 500 MG tablet  Commonly known as: NAPROSYN  Take 1 tablet (500 mg total) by mouth 2 (two) times daily with meals.     OZEMPIC 0.25 mg or 0.5 mg(2 mg/1.5 mL) pen injector  Generic drug: semaglutide  Inject 0.5 mg into the skin once a week.     tiZANidine 4 MG tablet  Commonly known as: ZANAFLEX  Take 4 mg by mouth 3 (three) times daily as needed.              Indwelling Lines/Drains at time of discharge:   Lines/Drains/Airways       Central Venous Catheter Line  Duration             Port A Cath Single Lumen 04/15/21 1200 right atrial 672 days                    Time spent on the discharge of patient: 20 minutes         Rupal Alonso NP  Department of Hospital Medicine  [facility]

## 2023-02-17 NOTE — ANESTHESIA POSTPROCEDURE EVALUATION
Anesthesia Post Evaluation    Patient: Tosha Kwok    Procedure(s) Performed: Procedure(s) (LRB):  EGD (ESOPHAGOGASTRODUODENOSCOPY) (N/A)    Final Anesthesia Type: MAC      Patient location during evaluation: GI PACU  Patient participation: Yes- Able to Participate  Level of consciousness: awake and alert  Post-procedure vital signs: reviewed and stable  Airway patency: patent      Anesthetic complications: no      Cardiovascular status: blood pressure returned to baseline  Respiratory status: unassisted and spontaneous ventilation  Hydration status: euvolemic  Follow-up not needed.          Vitals Value Taken Time   /63 02/17/23 0713   Temp 37 °C (98.6 °F) 02/17/23 0713   Pulse 67 02/17/23 0713   Resp 17 02/17/23 0713   SpO2 96 % 02/17/23 0713         Event Time   Out of Recovery 02/16/2023 16:28:06         Pain/Tessa Score: Pain Rating Prior to Med Admin: 7 (2/16/2023  4:46 PM)  Pain Rating Post Med Admin: 5 (2/16/2023  5:16 PM)  Tessa Score: 10 (2/16/2023  4:08 PM)

## 2023-02-17 NOTE — PLAN OF CARE
Call button within reach. Anxiety meds given once per MD order. No complaints of pain. All active orders reviewed  Problem: Adult Inpatient Plan of Care  Goal: Plan of Care Review  Outcome: Ongoing, Progressing  Goal: Patient-Specific Goal (Individualized)  Outcome: Ongoing, Progressing  Goal: Absence of Hospital-Acquired Illness or Injury  Outcome: Ongoing, Progressing  Goal: Optimal Comfort and Wellbeing  Outcome: Ongoing, Progressing  Goal: Readiness for Transition of Care  Outcome: Ongoing, Progressing     Problem: Infection  Goal: Absence of Infection Signs and Symptoms  Outcome: Ongoing, Progressing     Problem: Pain Acute  Goal: Acceptable Pain Control and Functional Ability  Outcome: Ongoing, Progressing

## 2023-02-20 ENCOUNTER — HOSPITAL ENCOUNTER (OUTPATIENT)
Dept: CARDIOLOGY | Facility: HOSPITAL | Age: 58
Discharge: HOME | End: 2023-02-20
Payer: COMMERCIAL

## 2023-02-20 ENCOUNTER — OFFICE VISIT (OUTPATIENT)
Dept: INTERNAL MEDICINE | Facility: CLINIC | Age: 58
End: 2023-02-20
Payer: COMMERCIAL

## 2023-02-20 VITALS
RESPIRATION RATE: 18 BRPM | TEMPERATURE: 99 F | DIASTOLIC BLOOD PRESSURE: 70 MMHG | HEIGHT: 62 IN | BODY MASS INDEX: 29.98 KG/M2 | HEART RATE: 87 BPM | OXYGEN SATURATION: 98 % | SYSTOLIC BLOOD PRESSURE: 118 MMHG | WEIGHT: 162.94 LBS

## 2023-02-20 DIAGNOSIS — M32.9 SYSTEMIC LUPUS ERYTHEMATOSUS, UNSPECIFIED SLE TYPE, UNSPECIFIED ORGAN INVOLVEMENT STATUS: ICD-10-CM

## 2023-02-20 DIAGNOSIS — K44.9 HIATAL HERNIA: ICD-10-CM

## 2023-02-20 DIAGNOSIS — K29.00 ACUTE GASTRITIS WITHOUT HEMORRHAGE, UNSPECIFIED GASTRITIS TYPE: ICD-10-CM

## 2023-02-20 DIAGNOSIS — R94.31 PROLONGED Q-T INTERVAL ON ECG: ICD-10-CM

## 2023-02-20 DIAGNOSIS — F41.9 ANXIETY: ICD-10-CM

## 2023-02-20 DIAGNOSIS — D50.0 IRON DEFICIENCY ANEMIA DUE TO CHRONIC BLOOD LOSS: ICD-10-CM

## 2023-02-20 DIAGNOSIS — E78.00 PURE HYPERCHOLESTEROLEMIA: ICD-10-CM

## 2023-02-20 DIAGNOSIS — Z09 HOSPITAL DISCHARGE FOLLOW-UP: Primary | ICD-10-CM

## 2023-02-20 DIAGNOSIS — K21.9 GASTROESOPHAGEAL REFLUX DISEASE, UNSPECIFIED WHETHER ESOPHAGITIS PRESENT: ICD-10-CM

## 2023-02-20 DIAGNOSIS — I10 PRIMARY HYPERTENSION: ICD-10-CM

## 2023-02-20 DIAGNOSIS — E87.6 HYPOKALEMIA: ICD-10-CM

## 2023-02-20 PROCEDURE — 93010 EKG 12-LEAD: ICD-10-PCS | Mod: ,,, | Performed by: INTERNAL MEDICINE

## 2023-02-20 PROCEDURE — 99999 PR PBB SHADOW E&M-EST. PATIENT-LVL V: ICD-10-PCS | Mod: PBBFAC,,, | Performed by: NURSE PRACTITIONER

## 2023-02-20 PROCEDURE — 93005 ELECTROCARDIOGRAM TRACING: CPT | Mod: PO

## 2023-02-20 PROCEDURE — 99495 TCM SERVICES (MODERATE COMPLEXITY): ICD-10-PCS | Mod: S$PBB,,, | Performed by: NURSE PRACTITIONER

## 2023-02-20 PROCEDURE — 99495 TRANSJ CARE MGMT MOD F2F 14D: CPT | Mod: S$PBB,,, | Performed by: NURSE PRACTITIONER

## 2023-02-20 PROCEDURE — 93010 ELECTROCARDIOGRAM REPORT: CPT | Mod: ,,, | Performed by: INTERNAL MEDICINE

## 2023-02-20 PROCEDURE — 99999 PR PBB SHADOW E&M-EST. PATIENT-LVL V: CPT | Mod: PBBFAC,,, | Performed by: NURSE PRACTITIONER

## 2023-02-20 RX ORDER — ALPRAZOLAM 0.5 MG/1
0.5 TABLET ORAL 2 TIMES DAILY PRN
Qty: 10 TABLET | Refills: 0 | Status: SHIPPED | OUTPATIENT
Start: 2023-02-20 | End: 2023-03-08 | Stop reason: ALTCHOICE

## 2023-02-20 RX ORDER — BUPROPION HYDROCHLORIDE 150 MG/1
150 TABLET, EXTENDED RELEASE ORAL 2 TIMES DAILY
Qty: 60 TABLET | Refills: 0 | Status: SHIPPED | OUTPATIENT
Start: 2023-02-20 | End: 2023-03-08 | Stop reason: SINTOL

## 2023-02-20 RX ORDER — POTASSIUM CHLORIDE 20 MEQ/1
TABLET, EXTENDED RELEASE ORAL
Qty: 35 TABLET | Refills: 0 | Status: SHIPPED | OUTPATIENT
Start: 2023-02-20 | End: 2023-03-08

## 2023-02-20 NOTE — ASSESSMENT & PLAN NOTE
Changing from lexapro to Wellbutrin due to concern of QT prolongation on recent EKG after only two doses. Xanax prn for panic episodes.

## 2023-02-20 NOTE — PROGRESS NOTES
Subjective:       Patient ID: Tosha Kwok is a 57 y.o. female.    Chief Complaint: Hospital Follow Up    Mrs. Kwok presents to visit, escorted by , for hospital discharge follow up. HPI and discharge summary copied below. She does continue to have anxiousness and intermittent abdominal pain. Needs to follow up with GI. Labs, imaging and records reviewed.       HPI and discharge summary copied from hospital encounter:   56 y/o female with PMHx of HTN, anemia, migraine headache, breast cancer, SLE on hydroxychloroquine, fibromyalgia, chronic insomnia who presented as a transfer from [facility] for worsening epigastric abdominal pain, intractable nausea and vomiting. Patient reports intermittent episodes of non-radiating epigastric pain for the past several months, worsening over the past week with nausea, vomiting, decreased PO intake. She denies chest pain, SOB, fevers, focal weakness/numbness, dysuria, constipation or diarrhea. Patient was evaluated by [facility] in Dec '22 with plans for EGD, but not scheduled. Hospital medicine consulted for admission, will place in observation status for further management.        Procedure(s) (LRB):  EGD (ESOPHAGOGASTRODUODENOSCOPY) (N/A)       Hospital Course:   56 y/o female presented as a transfer from [facility] for worsening abdominal pain and intractable n/v. She states she always has some n/v at baseline for a while now, but it worsened over the past 2 weeks. She had hypokalemia on admission, repleted and improved. GI consulted and performed an EGD 2/16/23, findings consistent with mild gastritis and hiatal hernia. Recommended PPI BID. Will need to follow up with GI in 2 weeks. Gastric biopsies taken, will need to follow up for pathology.   Patient tolerated diet with very little nausea. Potassium level 3.0 on discharge, will need to follow up with PCP for repeat labs.   Will send with prescriptions for Protonix BID and Phenergan as needed for nausea. Instructed to  eat a bland diet and avoid spicy and fried foods. Take small bites and sips.   Will need follow up with GI, Dr. Mario in 1/2 weeks after discharge.  Follow up with PCP in 3-5 days after discharge for repeat labs.      Patient seen and examined prior to discharge.   All questions and concerns were addressed prior to discharge.    Transitional Care Note    Family and/or Caretaker present at visit?  Yes.  Diagnostic tests reviewed/disposition: I have reviewed all completed as well as pending diagnostic tests at the time of discharge.  Disease/illness education: Gastritis, hiatal hernia, GERD, prolonged QT, hypokalemia  Home health/community services discussion/referrals: Patient does not have home health established from hospital visit.  They do not need home health.  If needed, we will set up home health for the patient.   Establishment or re-establishment of referral orders for community resources: No other necessary community resources.   Discussion with other health care providers: No discussion with other health care providers necessary.           Patient Active Problem List   Diagnosis    Atrophy of vulva    Ankylosing spondylitis    Chronic, continuous use of opioids    Fibromyalgia    Elevated d-dimer    History of sleeve gastrectomy    Intractable pain    De Quervain's tenosynovitis, right    Adhesive capsulitis of right shoulder    Anxiety    Asthma    Lumbosacral spondylosis    GERD (gastroesophageal reflux disease)    Primary hypertension    Hyperlipidemia    Insulin resistance    Large joint arthralgia of multiple sites    Lupus (systemic lupus erythematosus)    Migraine    Postmenopausal hormone replacement therapy    Vitamin D deficiency    Impingement syndrome of left shoulder    Limited range of motion (ROM) of shoulder    Encounter to establish care    Left foot pain    Vestibular hypofunction    Other iron deficiency anemias    Keloid scar of skin    Iron deficiency anemia due to chronic blood loss     Restless leg syndrome    Chest pain    Insomnia    Port-A-Cath in place    TIA (transient ischemic attack)    History of non anemic vitamin B12 deficiency    Acute pain of right shoulder    Weight gain    Severe obesity    Nausea and vomiting    Hypokalemia    Gastritis    Hiatal hernia    Prolonged Q-T interval on ECG    Hospital discharge follow-up       Family History   Problem Relation Age of Onset    Hypertension Mother     Hypertension Brother     Breast cancer Maternal Aunt         x2     Heart attack Father     Hypertension Sister     Lupus Sister     Breast cancer Maternal Grandmother      Past Surgical History:   Procedure Laterality Date    BLADDER SURGERY      BREAST LUMPECTOMY  2018    BREAST RECONSTRUCTION      BREAST SURGERY  2018    reduction     SECTION      CHOLECYSTECTOMY      COLONOSCOPY N/A 10/2/2020    Procedure: COLONOSCOPY;  Surgeon: Guicho Hood MD;  Location: [facility] ENDO;  Service: Endoscopy;  Laterality: N/A;    ESOPHAGOGASTRODUODENOSCOPY N/A 2023    Procedure: EGD (ESOPHAGOGASTRODUODENOSCOPY);  Surgeon: Belkis Mario MD;  Location: [facility] ENDO;  Service: Endoscopy;  Laterality: N/A;    EXCISION OF GRANULOMA Bilateral 2021    Procedure: EXCISION, GRANULOMA SCARS OF BREASTS;  Surgeon: Obed Palmer Jr., MD;  Location: [facility] OR;  Service: Plastics;  Laterality: Bilateral;    HYSTERECTOMY      LASER ABLATION      to back    SLEEVE GASTROPLASTY  2016    TOTAL REDUCTION MAMMOPLASTY Bilateral     two years ago    TUBAL LIGATION           Current Outpatient Medications:     albuterol (PROAIR HFA) 90 mcg/actuation inhaler, Inhale 2 puffs into the lungs every 4 (four) hours as needed., Disp: 18 g, Rfl: 3    atorvastatin (LIPITOR) 20 MG tablet, Take 1 tablet (20 mg total) by mouth once daily., Disp: 90 tablet, Rfl: 3    cetirizine (ZYRTEC) 10 MG tablet, Take 1 tablet (10 mg total) by mouth once daily., Disp: 90 tablet, Rfl: 3    cyanocobalamin 1,000 mcg/mL  injection, Inject 1 mL (1,000 mcg total) into the muscle every 28 days., Disp: 10 mL, Rfl: 1    EPINEPHrine (EPIPEN 2-DENNIS) 0.3 mg/0.3 mL AtIn, Inject 0.3 mLs (0.3 mg total) into the muscle once. for 1 dose, Disp: 2 each, Rfl: 3    EScitalopram oxalate (LEXAPRO) 10 MG tablet, Take 1 tablet (10 mg total) by mouth once daily. (Patient not taking: Reported on 3/8/2023), Disp: 30 tablet, Rfl: 0    eszopiclone (LUNESTA) 3 mg Tab, Take 1 tablet (3 mg total) by mouth every evening., Disp: 30 tablet, Rfl: 0    hydroCHLOROthiazide (HYDRODIURIL) 12.5 MG Tab, Take 2 tablets (25 mg total) by mouth once daily., Disp: 180 tablet, Rfl: 3    HYDROmorphone (DILAUDID) 2 MG tablet, Take 1 tablet (2 mg total) by mouth every 6 to 8 hours as needed for pain, Disp: 105 tablet, Rfl: 0    hydrOXYzine pamoate (VISTARIL) 25 MG Cap, Take 1 capsule (25 mg total) by mouth every 8 (eight) hours as needed (anxiety)., Disp: 90 capsule, Rfl: 3    pantoprazole (PROTONIX) 40 MG tablet, Take 1 tablet (40 mg total) by mouth 2 (two) times daily., Disp: 60 tablet, Rfl: 1    promethazine (PHENERGAN) 25 MG tablet, Take 1 tablet (25 mg total) by mouth every 6 (six) hours as needed for Nausea., Disp: 15 tablet, Rfl: 0    tiZANidine (ZANAFLEX) 4 MG tablet, Take 4 mg by mouth 3 (three) times daily as needed., Disp: , Rfl:     topiramate (TOPAMAX) 100 MG tablet, Take 1 tablet (100 mg total) by mouth 2 (two) times daily., Disp: 180 tablet, Rfl: 3    ALPRAZolam (XANAX) 0.5 MG tablet, Take 1 tablet (0.5 mg total) by mouth 2 (two) times daily as needed for Anxiety (panic attacks)., Disp: 10 tablet, Rfl: 0    buPROPion (WELLBUTRIN SR) 150 MG TBSR 12 hr tablet, Take 1 tablet (150 mg total) by mouth 2 (two) times daily., Disp: 60 tablet, Rfl: 0    HYDROmorphone (DILAUDID) 2 MG tablet, take 1 tablet by mouth every 6-8 hours as needed for pain, Disp: 105 tablet, Rfl: 0    hydroxychloroquine (PLAQUENIL) 200 mg tablet, Take 200 mg by mouth once daily., Disp: , Rfl: 1     "metoprolol succinate (TOPROL-XL) 25 MG 24 hr tablet, Take 1 tablet (25 mg total) by mouth once daily., Disp: 90 tablet, Rfl: 3    potassium chloride SA (K-DUR,KLOR-CON) 20 MEQ tablet, Take 1 tablet (20 mEq total) by mouth 2 (two) times daily for 7 days, THEN 1 tablet (20 mEq total) once daily for 21 days., Disp: 35 tablet, Rfl: 0    semaglutide (OZEMPIC) 0.25 mg or 0.5 mg(2 mg/1.5 mL) pen injector, INJECT 0.5 MG INTO THE SKIN ONCE A WEEK., Disp: 3 pen, Rfl: 1    Review of Systems   Constitutional:  Positive for appetite change and fatigue. Negative for chills and fever.   Respiratory:  Negative for cough and shortness of breath.    Cardiovascular:  Negative for chest pain and palpitations.   Gastrointestinal:  Positive for abdominal pain, nausea and vomiting. Negative for blood in stool, constipation and diarrhea.   Neurological:  Negative for dizziness, light-headedness and headaches.   Psychiatric/Behavioral:  The patient is nervous/anxious.      Objective:   /70   Pulse 87   Temp 98.8 °F (37.1 °C) (Temporal)   Resp 18   Ht 5' 2" (1.575 m)   Wt 73.9 kg (162 lb 14.7 oz)   LMP 03/26/2012   SpO2 98%   BMI 29.80 kg/m²      Physical Exam  Vitals reviewed.   Constitutional:       General: She is not in acute distress.     Appearance: Normal appearance. She is well-developed. She is not ill-appearing or diaphoretic.   HENT:      Head: Normocephalic.   Cardiovascular:      Rate and Rhythm: Normal rate and regular rhythm.      Pulses: Normal pulses.      Heart sounds: Normal heart sounds. No murmur heard.    No friction rub. No gallop.   Pulmonary:      Effort: Pulmonary effort is normal. No respiratory distress.      Breath sounds: Normal breath sounds. No rales.   Skin:     General: Skin is warm and dry.      Coloration: Skin is not pale.      Findings: No erythema.   Neurological:      Mental Status: She is alert and oriented to person, place, and time.   Psychiatric:         Mood and Affect: Mood normal. "       Assessment & Plan     Problem List Items Addressed This Visit          Psychiatric    Anxiety    Current Assessment & Plan     Changing from lexapro to Wellbutrin due to concern of QT prolongation on recent EKG after only two doses. Xanax prn for panic episodes.          Relevant Medications    buPROPion (WELLBUTRIN SR) 150 MG TBSR 12 hr tablet    ALPRAZolam (XANAX) 0.5 MG tablet       Cardiac/Vascular    Primary hypertension    Current Assessment & Plan     Blood pressure stable. Continue current medications.          Hyperlipidemia    Current Assessment & Plan     On statin.          Prolonged Q-T interval on ECG    Current Assessment & Plan     Repeating today. Avoiding SSRI for now.          Relevant Orders    EKG 12-lead (Completed)       Renal/    Hypokalemia    Current Assessment & Plan     Repeating today.         Relevant Medications    potassium chloride SA (K-DUR,KLOR-CON) 20 MEQ tablet    Other Relevant Orders    BASIC METABOLIC PANEL (Completed)       Immunology/Multi System    Lupus (systemic lupus erythematosus)    Current Assessment & Plan     On plaquenil. Held in hospital. Follow up with rheumatology.             Oncology    Iron deficiency anemia due to chronic blood loss    Current Assessment & Plan     Followed by hem/onc. Labs reviewed from hospital encounter.             GI    GERD (gastroesophageal reflux disease)    Current Assessment & Plan     Continue protonix BID. Referral sent to GI for hospital follow up.          Relevant Orders    Ambulatory referral/consult to Gastroenterology    Gastritis    Current Assessment & Plan     Continue protonix BID. Referral sent to GI for hospital follow up.          Relevant Orders    Ambulatory referral/consult to Gastroenterology    Hiatal hernia    Current Assessment & Plan     Continue protonix BID. Referral sent to GI for hospital follow up.             Other    Hospital discharge follow-up - Primary    Current Assessment & Plan      "Records reviewed. Changing anxiety medication due to concerns of EKG side effects with lexapro. Repeating labs and EKG today.          Relevant Orders    CBC Auto Differential (Completed)    BASIC METABOLIC PANEL (Completed)       Follow up in about 2 weeks (around 3/6/2023) for wellbutrin. .          Portions of this note may have been created with voice recognition software. Occasional "wrong-word" or "sound-a-like" substitutions may have occurred due to the inherent limitations of voice recognition software. Please, read the note carefully and recognize, using context, where substitutions have occurred.       "

## 2023-02-24 LAB
FINAL PATHOLOGIC DIAGNOSIS: NORMAL
Lab: NORMAL

## 2023-02-27 ENCOUNTER — HOSPITAL ENCOUNTER (OUTPATIENT)
Dept: RADIOLOGY | Facility: HOSPITAL | Age: 58
Discharge: HOME | End: 2023-02-27
Attending: NURSE PRACTITIONER
Payer: COMMERCIAL

## 2023-02-27 ENCOUNTER — OFFICE VISIT (OUTPATIENT)
Dept: GASTROENTEROLOGY | Facility: CLINIC | Age: 58
End: 2023-02-27
Payer: COMMERCIAL

## 2023-02-27 ENCOUNTER — TELEPHONE (OUTPATIENT)
Dept: PREADMISSION TESTING | Facility: HOSPITAL | Age: 58
End: 2023-02-27

## 2023-02-27 ENCOUNTER — HOSPITAL ENCOUNTER (OUTPATIENT)
Dept: PREADMISSION TESTING | Facility: HOSPITAL | Age: 58
Discharge: HOME | End: 2023-02-27
Attending: COLON & RECTAL SURGERY
Payer: COMMERCIAL

## 2023-02-27 VITALS
HEART RATE: 77 BPM | BODY MASS INDEX: 30.34 KG/M2 | SYSTOLIC BLOOD PRESSURE: 116 MMHG | HEIGHT: 62 IN | DIASTOLIC BLOOD PRESSURE: 72 MMHG | WEIGHT: 164.88 LBS

## 2023-02-27 DIAGNOSIS — K21.9 GASTROESOPHAGEAL REFLUX DISEASE, UNSPECIFIED WHETHER ESOPHAGITIS PRESENT: ICD-10-CM

## 2023-02-27 DIAGNOSIS — K29.00 ACUTE GASTRITIS WITHOUT HEMORRHAGE, UNSPECIFIED GASTRITIS TYPE: ICD-10-CM

## 2023-02-27 DIAGNOSIS — R10.84 GENERALIZED ABDOMINAL PAIN: Primary | ICD-10-CM

## 2023-02-27 DIAGNOSIS — R11.2 NAUSEA AND VOMITING, UNSPECIFIED VOMITING TYPE: ICD-10-CM

## 2023-02-27 DIAGNOSIS — R10.84 GENERALIZED ABDOMINAL PAIN: ICD-10-CM

## 2023-02-27 PROCEDURE — 74019 RADEX ABDOMEN 2 VIEWS: CPT | Mod: TC

## 2023-02-27 PROCEDURE — 99999 PR PBB SHADOW E&M-EST. PATIENT-LVL V: CPT | Mod: PBBFAC,,, | Performed by: NURSE PRACTITIONER

## 2023-02-27 PROCEDURE — 99214 PR OFFICE/OUTPT VISIT, EST, LEVL IV, 30-39 MIN: ICD-10-PCS | Mod: S$PBB,,, | Performed by: NURSE PRACTITIONER

## 2023-02-27 PROCEDURE — 99214 OFFICE O/P EST MOD 30 MIN: CPT | Mod: S$PBB,,, | Performed by: NURSE PRACTITIONER

## 2023-02-27 PROCEDURE — 74019 RADEX ABDOMEN 2 VIEWS: CPT | Mod: 26,,, | Performed by: RADIOLOGY

## 2023-02-27 PROCEDURE — 99999 PR PBB SHADOW E&M-EST. PATIENT-LVL V: ICD-10-PCS | Mod: PBBFAC,,, | Performed by: NURSE PRACTITIONER

## 2023-02-27 PROCEDURE — 74019 XR ABDOMEN FLAT AND ERECT: ICD-10-PCS | Mod: 26,,, | Performed by: RADIOLOGY

## 2023-02-27 NOTE — PROGRESS NOTES
Clinic Consult:  [facility] Consultation Note    Reason for Consult:  The primary encounter diagnosis was Generalized abdominal pain. Diagnoses of Gastroesophageal reflux disease, unspecified whether esophagitis present and Acute gastritis without hemorrhage, unspecified gastritis type were also pertinent to this visit.    [facility]za   [address] [facility] [address] [facility] [id number]    HPI:  This is a 57 y.o. female here for evaluation of the above  Pt is here with her .  She states that she was seen at [facility] ED a few weeks ago and ultimately transferred to [facility]   She states that she began having lower abdominal pain with severe nausea and vomiting to the point of not being about to tolerate PO intake.   An EGD was completed and noted gastritis without any significant on the pathology.   Since that time, the nausea has continued but the vomiting has improved.    She is tolerating some PO intake, but only bland diet and small amounts.   Continues with some lower abdominal pain but not as severe as previously.   She admits to a hx of constipation. Does not take any medication for that.   No melena or hematochezia.         Review of Systems   Constitutional:  Negative for chills, fever, malaise/fatigue and weight loss.   Respiratory:  Negative for cough.    Cardiovascular:  Negative for chest pain.   Gastrointestinal:         Per HPI   Musculoskeletal:  Negative for myalgias.   Skin:  Negative for itching and rash.   Neurological:  Negative for headaches.   Psychiatric/Behavioral:  The patient is not nervous/anxious.      Medical History:   Past Medical History:   Diagnosis Date    Anemia     Ankylosing spondylitis     Anxiety     Asthma     Cancer 2018    left breast  lumpectomy, xrt    Chronic constipation     Chronic insomnia     Edema     Fibromyalgia     Hypertension     Insulin resistance     Interstitial cystitis     Lupus     Lupus     Migraine headache     PONV (postoperative nausea  and vomiting)     Restless legs syndrome     Shingles     Stroke     post partum right sided numbness    TIA (transient ischemic attack)        Surgical History:  Past Surgical History:   Procedure Laterality Date    BLADDER SURGERY      BREAST LUMPECTOMY  2018    BREAST RECONSTRUCTION      BREAST SURGERY  2018    reduction     SECTION      CHOLECYSTECTOMY      COLONOSCOPY N/A 10/2/2020    Procedure: COLONOSCOPY;  Surgeon: Guicho Hood MD;  Location: [facility] ENDO;  Service: Endoscopy;  Laterality: N/A;    ESOPHAGOGASTRODUODENOSCOPY N/A 2023    Procedure: EGD (ESOPHAGOGASTRODUODENOSCOPY);  Surgeon: Belkis Mario MD;  Location: [facility] ENDO;  Service: Endoscopy;  Laterality: N/A;    EXCISION OF GRANULOMA Bilateral 2021    Procedure: EXCISION, GRANULOMA SCARS OF BREASTS;  Surgeon: Obed Palmer Jr., MD;  Location: [facility];  Service: Plastics;  Laterality: Bilateral;    HYSTERECTOMY      LASER ABLATION      to back    SLEEVE GASTROPLASTY  2016    TOTAL REDUCTION MAMMOPLASTY Bilateral     two years ago    TUBAL LIGATION         Family History:   Family History   Problem Relation Age of Onset    Hypertension Mother     Hypertension Brother     Breast cancer Maternal Aunt         x2     Heart attack Father     Hypertension Sister     Lupus Sister     Breast cancer Maternal Grandmother        Social History:   Social History     Tobacco Use    Smoking status: Never    Smokeless tobacco: Never   Substance Use Topics    Alcohol use: Yes     Comment: occasionally    Drug use: No       Allergies: Reviewed    Home Medications:   Current Outpatient Medications on File Prior to Visit   Medication Sig Dispense Refill    albuterol (PROAIR HFA) 90 mcg/actuation inhaler Inhale 2 puffs into the lungs every 4 (four) hours as needed. 18 g 3    ALPRAZolam (XANAX) 0.5 MG tablet Take 1 tablet (0.5 mg total) by mouth 2 (two) times daily as needed for Anxiety (panic attacks). 10 tablet 0    atorvastatin  (LIPITOR) 20 MG tablet Take 1 tablet (20 mg total) by mouth once daily. 90 tablet 3    buPROPion (WELLBUTRIN SR) 150 MG TBSR 12 hr tablet Take 1 tablet (150 mg total) by mouth 2 (two) times daily. 60 tablet 0    cetirizine (ZYRTEC) 10 MG tablet Take 1 tablet (10 mg total) by mouth once daily. 90 tablet 3    cyanocobalamin 1,000 mcg/mL injection Inject 1 mL (1,000 mcg total) into the muscle every 28 days. 10 mL 1    EScitalopram oxalate (LEXAPRO) 10 MG tablet Take 1 tablet (10 mg total) by mouth once daily. 30 tablet 0    eszopiclone (LUNESTA) 3 mg Tab Take 1 tablet (3 mg total) by mouth every evening. 30 tablet 0    hydroCHLOROthiazide (HYDRODIURIL) 12.5 MG Tab Take 2 tablets (25 mg total) by mouth once daily. 180 tablet 3    HYDROmorphone (DILAUDID) 2 MG tablet Take 1 tablet (2 mg total) by mouth every 6 to 8 hours as needed for pain 105 tablet 0    hydroxychloroquine (PLAQUENIL) 200 mg tablet Take 200 mg by mouth once daily.  1    hydrOXYzine pamoate (VISTARIL) 25 MG Cap Take 1 capsule (25 mg total) by mouth every 8 (eight) hours as needed (anxiety). 90 capsule 3    metoprolol succinate (TOPROL-XL) 25 MG 24 hr tablet Take 1 tablet (25 mg total) by mouth once daily. 90 tablet 3    pantoprazole (PROTONIX) 40 MG tablet Take 1 tablet (40 mg total) by mouth 2 (two) times daily. 60 tablet 1    potassium chloride SA (K-DUR,KLOR-CON) 20 MEQ tablet Take 1 tablet (20 mEq total) by mouth 2 (two) times daily for 7 days, THEN 1 tablet (20 mEq total) once daily for 21 days. 35 tablet 0    promethazine (PHENERGAN) 25 MG tablet Take 1 tablet (25 mg total) by mouth every 6 (six) hours as needed for Nausea. 15 tablet 0    semaglutide (OZEMPIC) 0.25 mg or 0.5 mg(2 mg/1.5 mL) pen injector Inject 0.5 mg into the skin once a week. 1 pen 5    tiZANidine (ZANAFLEX) 4 MG tablet Take 4 mg by mouth 3 (three) times daily as needed.      topiramate (TOPAMAX) 100 MG tablet Take 1 tablet (100 mg total) by mouth 2 (two) times daily. 180 tablet 3  "   EPINEPHrine (EPIPEN 2-DENNIS) 0.3 mg/0.3 mL AtIn Inject 0.3 mLs (0.3 mg total) into the muscle once. for 1 dose 2 each 3     No current facility-administered medications on file prior to visit.       Physical Exam:  Vital Signs:  /72 (BP Location: Left arm, Patient Position: Sitting, BP Method: Medium (Manual))   Pulse 77   Ht 5' 2" (1.575 m)   Wt 74.8 kg (164 lb 14.5 oz)   LMP 03/26/2012   BMI 30.16 kg/m²   Body mass index is 30.16 kg/m².  Physical Exam  Vitals reviewed.   Constitutional:       Appearance: She is well-developed.   HENT:      Head: Normocephalic.   Eyes:      General: No scleral icterus.  Cardiovascular:      Rate and Rhythm: Normal rate.   Pulmonary:      Effort: Pulmonary effort is normal.   Abdominal:      General: There is no distension.      Palpations: Abdomen is soft.      Tenderness: There is no abdominal tenderness.   Musculoskeletal:         General: Normal range of motion.      Cervical back: Normal range of motion.   Skin:     General: Skin is dry.   Neurological:      Mental Status: She is alert and oriented to person, place, and time.       Labs: Pertinent labs reviewed.      Assessment:  1. Generalized abdominal pain    2. Gastroesophageal reflux disease, unspecified whether esophagitis present    3. Acute gastritis without hemorrhage, unspecified gastritis type         Recommendations:  Unclear etiology of the abdominal pain, however, I question if associated to underlying constipation given her history and the location of the pain.  WIll check x-ray to determine stool burden.   Will get repeat labs given the noted hypokalemia while in the ED.    Encouraged a continued bland diet   Can start a trial of probiotics once daily   Continue PPI daily     Follow up to be determined by results of above.          Thank you so much for allowing me to participate in the care of PENNY Bautista    "

## 2023-03-01 ENCOUNTER — TELEPHONE (OUTPATIENT)
Dept: GASTROENTEROLOGY | Facility: CLINIC | Age: 58
End: 2023-03-01
Payer: COMMERCIAL

## 2023-03-01 NOTE — TELEPHONE ENCOUNTER
----- Message from Leah Garcia sent at 3/1/2023  4:04 PM CST -----  Contact: Tosha Arnold was returning the call from Fannie regarding her test results. Please call her back at [phone].     Thanks  TS

## 2023-03-01 NOTE — TELEPHONE ENCOUNTER
Spoke with patient and gave results of test. Patient verbalized understanding to all with no questions or concerns at this time. Advised patient to contact the office as needed.

## 2023-03-08 ENCOUNTER — LAB VISIT (OUTPATIENT)
Dept: LAB | Facility: HOSPITAL | Age: 58
End: 2023-03-08
Attending: NURSE PRACTITIONER
Payer: COMMERCIAL

## 2023-03-08 ENCOUNTER — OFFICE VISIT (OUTPATIENT)
Dept: INTERNAL MEDICINE | Facility: CLINIC | Age: 58
End: 2023-03-08
Payer: COMMERCIAL

## 2023-03-08 VITALS
TEMPERATURE: 98 F | BODY MASS INDEX: 29.9 KG/M2 | OXYGEN SATURATION: 97 % | WEIGHT: 162.5 LBS | SYSTOLIC BLOOD PRESSURE: 110 MMHG | DIASTOLIC BLOOD PRESSURE: 76 MMHG | HEIGHT: 62 IN | HEART RATE: 86 BPM

## 2023-03-08 DIAGNOSIS — E88.819 INSULIN RESISTANCE: ICD-10-CM

## 2023-03-08 DIAGNOSIS — G47.00 INSOMNIA, UNSPECIFIED TYPE: ICD-10-CM

## 2023-03-08 DIAGNOSIS — E87.6 HYPOKALEMIA: ICD-10-CM

## 2023-03-08 DIAGNOSIS — I10 PRIMARY HYPERTENSION: ICD-10-CM

## 2023-03-08 DIAGNOSIS — F41.9 ANXIETY: Primary | ICD-10-CM

## 2023-03-08 DIAGNOSIS — E66.09 CLASS 2 OBESITY DUE TO EXCESS CALORIES WITHOUT SERIOUS COMORBIDITY WITH BODY MASS INDEX (BMI) OF 36.0 TO 36.9 IN ADULT: ICD-10-CM

## 2023-03-08 DIAGNOSIS — M32.9 SYSTEMIC LUPUS ERYTHEMATOSUS, UNSPECIFIED SLE TYPE, UNSPECIFIED ORGAN INVOLVEMENT STATUS: ICD-10-CM

## 2023-03-08 DIAGNOSIS — R94.31 PROLONGED Q-T INTERVAL ON ECG: ICD-10-CM

## 2023-03-08 PROBLEM — Z09 HOSPITAL DISCHARGE FOLLOW-UP: Status: RESOLVED | Noted: 2023-02-20 | Resolved: 2023-03-08

## 2023-03-08 PROBLEM — Z76.89 ENCOUNTER TO ESTABLISH CARE: Status: RESOLVED | Noted: 2020-10-02 | Resolved: 2023-03-08

## 2023-03-08 LAB
ANION GAP SERPL CALC-SCNC: 14 MMOL/L (ref 8–16)
BUN SERPL-MCNC: 11 MG/DL (ref 6–20)
CALCIUM SERPL-MCNC: 9.7 MG/DL (ref 8.7–10.5)
CHLORIDE SERPL-SCNC: 107 MMOL/L (ref 95–110)
CO2 SERPL-SCNC: 23 MMOL/L (ref 23–29)
CREAT SERPL-MCNC: 0.9 MG/DL (ref 0.5–1.4)
EST. GFR  (NO RACE VARIABLE): >60 ML/MIN/1.73 M^2
GLUCOSE SERPL-MCNC: 86 MG/DL (ref 70–110)
POTASSIUM SERPL-SCNC: 3.2 MMOL/L (ref 3.5–5.1)
SODIUM SERPL-SCNC: 144 MMOL/L (ref 136–145)

## 2023-03-08 PROCEDURE — 1160F RVW MEDS BY RX/DR IN RCRD: CPT | Mod: CPTII,S$GLB,, | Performed by: NURSE PRACTITIONER

## 2023-03-08 PROCEDURE — 3078F PR MOST RECENT DIASTOLIC BLOOD PRESSURE < 80 MM HG: ICD-10-PCS | Mod: CPTII,S$GLB,, | Performed by: NURSE PRACTITIONER

## 2023-03-08 PROCEDURE — 3074F SYST BP LT 130 MM HG: CPT | Mod: CPTII,S$GLB,, | Performed by: NURSE PRACTITIONER

## 2023-03-08 PROCEDURE — 36415 COLL VENOUS BLD VENIPUNCTURE: CPT | Mod: PO | Performed by: NURSE PRACTITIONER

## 2023-03-08 PROCEDURE — 1159F MED LIST DOCD IN RCRD: CPT | Mod: CPTII,S$GLB,, | Performed by: NURSE PRACTITIONER

## 2023-03-08 PROCEDURE — 99214 PR OFFICE/OUTPT VISIT, EST, LEVL IV, 30-39 MIN: ICD-10-PCS | Mod: S$GLB,,, | Performed by: NURSE PRACTITIONER

## 2023-03-08 PROCEDURE — 99999 PR PBB SHADOW E&M-EST. PATIENT-LVL IV: CPT | Mod: PBBFAC,,, | Performed by: NURSE PRACTITIONER

## 2023-03-08 PROCEDURE — 1159F PR MEDICATION LIST DOCUMENTED IN MEDICAL RECORD: ICD-10-PCS | Mod: CPTII,S$GLB,, | Performed by: NURSE PRACTITIONER

## 2023-03-08 PROCEDURE — 3078F DIAST BP <80 MM HG: CPT | Mod: CPTII,S$GLB,, | Performed by: NURSE PRACTITIONER

## 2023-03-08 PROCEDURE — 3074F PR MOST RECENT SYSTOLIC BLOOD PRESSURE < 130 MM HG: ICD-10-PCS | Mod: CPTII,S$GLB,, | Performed by: NURSE PRACTITIONER

## 2023-03-08 PROCEDURE — 80048 BASIC METABOLIC PNL TOTAL CA: CPT | Mod: PO | Performed by: NURSE PRACTITIONER

## 2023-03-08 PROCEDURE — 1160F PR REVIEW ALL MEDS BY PRESCRIBER/CLIN PHARMACIST DOCUMENTED: ICD-10-PCS | Mod: CPTII,S$GLB,, | Performed by: NURSE PRACTITIONER

## 2023-03-08 PROCEDURE — 3008F PR BODY MASS INDEX (BMI) DOCUMENTED: ICD-10-PCS | Mod: CPTII,S$GLB,, | Performed by: NURSE PRACTITIONER

## 2023-03-08 PROCEDURE — 3008F BODY MASS INDEX DOCD: CPT | Mod: CPTII,S$GLB,, | Performed by: NURSE PRACTITIONER

## 2023-03-08 PROCEDURE — 99999 PR PBB SHADOW E&M-EST. PATIENT-LVL IV: ICD-10-PCS | Mod: PBBFAC,,, | Performed by: NURSE PRACTITIONER

## 2023-03-08 PROCEDURE — 99214 OFFICE O/P EST MOD 30 MIN: CPT | Mod: S$GLB,,, | Performed by: NURSE PRACTITIONER

## 2023-03-08 RX ORDER — POTASSIUM CHLORIDE 750 MG/1
TABLET, EXTENDED RELEASE ORAL
Qty: 49 TABLET | Refills: 0 | Status: SHIPPED | OUTPATIENT
Start: 2023-03-08 | End: 2023-04-10

## 2023-03-08 RX ORDER — SEMAGLUTIDE 1.34 MG/ML
0.5 INJECTION, SOLUTION SUBCUTANEOUS WEEKLY
Qty: 3 PEN | Refills: 1 | Status: SHIPPED | OUTPATIENT
Start: 2023-03-08 | End: 2023-08-22

## 2023-03-08 RX ORDER — CLONAZEPAM 0.5 MG/1
0.5 TABLET ORAL 2 TIMES DAILY PRN
Qty: 20 TABLET | Refills: 0 | Status: SHIPPED | OUTPATIENT
Start: 2023-03-08 | End: 2023-06-06 | Stop reason: SDUPTHER

## 2023-03-08 RX ORDER — ESZOPICLONE 3 MG/1
3 TABLET, FILM COATED ORAL NIGHTLY PRN
Qty: 30 TABLET | Refills: 5 | Status: SHIPPED | OUTPATIENT
Start: 2023-03-08 | End: 2023-04-03 | Stop reason: ALTCHOICE

## 2023-03-08 NOTE — PROGRESS NOTES
Subjective:       Patient ID: Tosha Kwok is a 57 y.o. female.    Chief Complaint: Hospital Follow Up    Mrs. Kwok presents a visit after starting Wellbutrin for anxiety.  She reports that she had to stop taking due to hallucinations.  She reports thinking that there were spiders all of her bed in floor, and also was attempting this we have up something that was not there.  Was restarted on Lexapro at visit prior to hospital admission, but had noted prolonged QT interval changes on EKG so medications stopped.  She was previously on Lexapro from [id number] as well as Effexor.  Unsure why she had stopped each medication, but using caution with SSRIs due to risk of QT interval changes.  Continues to have intermittent panic episodes at night due to waking up at 1-2 am on some nights. Xanax has helped during panic episodes. Feels that she does not have any problems during the day, mostly at night. Has had to take a couple of days since  EKG repeated at last visit and showed a septal infarct that was not previously present.  She is currently on the wait list for cardiology.  She also thinks that she is having a lupus flare up so has follow-up with Rheumatology on 03/13.       Since last visit, she did follow-up with GI and continues to be on Phenergan p.r.n. for nausea as well as starting probiotics.  Has follow-up again in 1 month. No longer having abdominal pain, only nausea.       Patient Active Problem List   Diagnosis    Atrophy of vulva    Ankylosing spondylitis    Chronic, continuous use of opioids    Fibromyalgia    Elevated d-dimer    History of sleeve gastrectomy    Intractable pain    De Quervain's tenosynovitis, right    Adhesive capsulitis of right shoulder    Anxiety    Asthma    Lumbosacral spondylosis    GERD (gastroesophageal reflux disease)    Primary hypertension    Hyperlipidemia    Insulin resistance    Large joint arthralgia of multiple sites    Lupus (systemic lupus erythematosus)    Migraine     Postmenopausal hormone replacement therapy    Vitamin D deficiency    Impingement syndrome of left shoulder    Limited range of motion (ROM) of shoulder    Left foot pain    Vestibular hypofunction    Other iron deficiency anemias    Keloid scar of skin    Iron deficiency anemia due to chronic blood loss    Restless leg syndrome    Chest pain    Insomnia    Port-A-Cath in place    TIA (transient ischemic attack)    History of non anemic vitamin B12 deficiency    Acute pain of right shoulder    Weight gain    Severe obesity    Nausea and vomiting    Hypokalemia    Gastritis    Hiatal hernia    Prolonged Q-T interval on ECG       Family History   Problem Relation Age of Onset    Hypertension Mother     Hypertension Brother     Breast cancer Maternal Aunt         x2     Heart attack Father     Hypertension Sister     Lupus Sister     Breast cancer Maternal Grandmother      Past Surgical History:   Procedure Laterality Date    BLADDER SURGERY      BREAST LUMPECTOMY  2018    BREAST RECONSTRUCTION      BREAST SURGERY  2018    reduction     SECTION      CHOLECYSTECTOMY      COLONOSCOPY N/A 10/2/2020    Procedure: COLONOSCOPY;  Surgeon: Guicho Hood MD;  Location: [facility];  Service: Endoscopy;  Laterality: N/A;    ESOPHAGOGASTRODUODENOSCOPY N/A 2023    Procedure: EGD (ESOPHAGOGASTRODUODENOSCOPY);  Surgeon: Belkis Mario MD;  Location: [facility];  Service: Endoscopy;  Laterality: N/A;    EXCISION OF GRANULOMA Bilateral 2021    Procedure: EXCISION, GRANULOMA SCARS OF BREASTS;  Surgeon: Obed Palmer Jr., MD;  Location: [facility];  Service: Plastics;  Laterality: Bilateral;    HYSTERECTOMY      LASER ABLATION      to back    SLEEVE GASTROPLASTY  2016    TOTAL REDUCTION MAMMOPLASTY Bilateral     two years ago    TUBAL LIGATION           Current Outpatient Medications:     albuterol (PROAIR HFA) 90 mcg/actuation inhaler, Inhale 2 puffs into the lungs every 4 (four) hours as needed., Disp: 18  g, Rfl: 3    atorvastatin (LIPITOR) 20 MG tablet, Take 1 tablet (20 mg total) by mouth once daily., Disp: 90 tablet, Rfl: 3    cetirizine (ZYRTEC) 10 MG tablet, Take 1 tablet (10 mg total) by mouth once daily., Disp: 90 tablet, Rfl: 3    clonazePAM (KLONOPIN) 0.5 MG tablet, Take 1 tablet (0.5 mg total) by mouth 2 (two) times daily as needed for Anxiety (panic attacks)., Disp: 20 tablet, Rfl: 0    cyanocobalamin 1,000 mcg/mL injection, Inject 1 mL (1,000 mcg total) into the muscle every 28 days., Disp: 10 mL, Rfl: 1    EPINEPHrine (EPIPEN 2-DENNIS) 0.3 mg/0.3 mL AtIn, Inject 0.3 mLs (0.3 mg total) into the muscle once. for 1 dose, Disp: 2 each, Rfl: 3    eszopiclone (LUNESTA) 3 mg Tab, Take 1 tablet (3 mg total) by mouth nightly as needed (insomnia)., Disp: 30 tablet, Rfl: 5    hydroCHLOROthiazide (HYDRODIURIL) 12.5 MG Tab, Take 2 tablets (25 mg total) by mouth once daily., Disp: 180 tablet, Rfl: 3    HYDROmorphone (DILAUDID) 2 MG tablet, Take 1 tablet (2 mg total) by mouth every 6 to 8 hours as needed for pain, Disp: 105 tablet, Rfl: 0    HYDROmorphone (DILAUDID) 2 MG tablet, take 1 tablet by mouth every 6-8 hours as needed for pain, Disp: 105 tablet, Rfl: 0    hydroxychloroquine (PLAQUENIL) 200 mg tablet, Take 200 mg by mouth once daily., Disp: , Rfl: 1    hydrOXYzine pamoate (VISTARIL) 25 MG Cap, Take 1 capsule (25 mg total) by mouth every 8 (eight) hours as needed (anxiety)., Disp: 90 capsule, Rfl: 3    metoprolol succinate (TOPROL-XL) 25 MG 24 hr tablet, Take 1 tablet (25 mg total) by mouth once daily., Disp: 90 tablet, Rfl: 3    pantoprazole (PROTONIX) 40 MG tablet, Take 1 tablet (40 mg total) by mouth 2 (two) times daily., Disp: 60 tablet, Rfl: 1    potassium chloride SA (K-DUR,KLOR-CON) 20 MEQ tablet, Take 1 tablet (20 mEq total) by mouth 2 (two) times daily for 7 days, THEN 1 tablet (20 mEq total) once daily for 21 days., Disp: 35 tablet, Rfl: 0    promethazine (PHENERGAN) 25 MG tablet, Take 1 tablet (25 mg  "total) by mouth every 6 (six) hours as needed for Nausea., Disp: 15 tablet, Rfl: 0    semaglutide (OZEMPIC) 0.25 mg or 0.5 mg(2 mg/1.5 mL) pen injector, Inject 0.5 mg into the skin once a week., Disp: 3 pen, Rfl: 1    tiZANidine (ZANAFLEX) 4 MG tablet, Take 4 mg by mouth 3 (three) times daily as needed., Disp: , Rfl:     topiramate (TOPAMAX) 100 MG tablet, Take 1 tablet (100 mg total) by mouth 2 (two) times daily., Disp: 180 tablet, Rfl: 3    Review of Systems   Constitutional:  Negative for appetite change, chills, fatigue and fever.   Respiratory:  Negative for shortness of breath.    Cardiovascular:  Negative for chest pain and palpitations.   Gastrointestinal:  Positive for nausea. Negative for abdominal pain, diarrhea and vomiting.   Neurological:  Negative for dizziness, light-headedness and headaches.   Psychiatric/Behavioral:  Positive for sleep disturbance. Negative for dysphoric mood, self-injury and suicidal ideas. The patient is nervous/anxious.      Objective:   /76 (BP Location: Left arm, Patient Position: Sitting, BP Method: Medium (Manual))   Pulse 86   Temp 97.5 °F (36.4 °C)   Ht 5' 2" (1.575 m)   Wt 73.7 kg (162 lb 7.7 oz)   LMP 03/26/2012   SpO2 97%   BMI 29.72 kg/m²      Physical Exam  Constitutional:       General: She is not in acute distress.     Appearance: Normal appearance. She is not ill-appearing.   Cardiovascular:      Rate and Rhythm: Normal rate and regular rhythm.      Pulses: Normal pulses.      Heart sounds: Normal heart sounds. No murmur heard.    No friction rub. No gallop.   Pulmonary:      Effort: Pulmonary effort is normal. No respiratory distress.      Breath sounds: Normal breath sounds.   Skin:     General: Skin is warm and dry.      Coloration: Skin is not pale.      Findings: No erythema.   Neurological:      Mental Status: She is alert and oriented to person, place, and time.   Psychiatric:         Mood and Affect: Mood normal.         Behavior: Behavior " normal.       Assessment & Plan     Problem List Items Addressed This Visit          Psychiatric    Anxiety - Primary    Current Assessment & Plan     Holding off on starting different SSRI until she is cleared by Cardiology.  He does not typically have anxiety problems during the day, mostly at night.  Will change to Klonopin since that it tends to be little bit longer acting, and has tolerated in the past.         Relevant Medications    clonazePAM (KLONOPIN) 0.5 MG tablet       Cardiac/Vascular    Primary hypertension    Current Assessment & Plan     Blood pressure well controlled.  Continue current medications.         Prolonged Q-T interval on ECG    Current Assessment & Plan     Repeat EKG was normal sinus rhythm with septal infarct changes.  Patient is on wait list for cardiology appointment.            Renal/    Hypokalemia    Current Assessment & Plan     Only took potassium for a week due to pharmacy only giving limited supply.  Will recheck today and determine if patient needs to be on daily supplement.         Relevant Orders    BASIC METABOLIC PANEL       Immunology/Multi System    Lupus (systemic lupus erythematosus)    Current Assessment & Plan     Has upcoming appointment with Rheumatology due to feeling that she is having a lupus flare up.            Endocrine    Insulin resistance    Relevant Medications    semaglutide (OZEMPIC) 0.25 mg or 0.5 mg(2 mg/1.5 mL) pen injector       Other    Insomnia    Relevant Medications    eszopiclone (LUNESTA) 3 mg Tab     Other Visit Diagnoses       Class 2 obesity due to excess calories without serious comorbidity with body mass index (BMI) of 36.0 to 36.9 in adult        Relevant Medications    semaglutide (OZEMPIC) 0.25 mg or 0.5 mg(2 mg/1.5 mL) pen injector          Follow up in about 3 months (around 6/8/2023), or if symptoms worsen or fail to improve.            Portions of this note may have been created with voice recognition software. Occasional  ""wrong-word" or "sound-a-like" substitutions may have occurred due to the inherent limitations of voice recognition software. Please, read the note carefully and recognize, using context, where substitutions have occurred.       "

## 2023-03-08 NOTE — ASSESSMENT & PLAN NOTE
Records reviewed. Changing anxiety medication due to concerns of EKG side effects with lexapro. Repeating labs and EKG today.

## 2023-03-08 NOTE — ASSESSMENT & PLAN NOTE
Holding off on starting different SSRI until she is cleared by Cardiology.  He does not typically have anxiety problems during the day, mostly at night.  Will change to Klonopin since that it tends to be little bit longer acting, and has tolerated in the past.

## 2023-03-08 NOTE — ASSESSMENT & PLAN NOTE
Only took potassium for a week due to pharmacy only giving limited supply.  Will recheck today and determine if patient needs to be on daily supplement.

## 2023-03-08 NOTE — ASSESSMENT & PLAN NOTE
Repeat EKG was normal sinus rhythm with septal infarct changes.  Patient is on wait list for cardiology appointment.

## 2023-03-09 ENCOUNTER — OFFICE VISIT (OUTPATIENT)
Dept: CARDIOLOGY | Facility: CLINIC | Age: 58
End: 2023-03-09
Payer: COMMERCIAL

## 2023-03-09 VITALS
OXYGEN SATURATION: 97 % | DIASTOLIC BLOOD PRESSURE: 90 MMHG | BODY MASS INDEX: 29.56 KG/M2 | HEART RATE: 87 BPM | SYSTOLIC BLOOD PRESSURE: 148 MMHG | WEIGHT: 161.63 LBS

## 2023-03-09 DIAGNOSIS — Z95.828 PORT-A-CATH IN PLACE: ICD-10-CM

## 2023-03-09 DIAGNOSIS — E78.00 PURE HYPERCHOLESTEROLEMIA: ICD-10-CM

## 2023-03-09 DIAGNOSIS — R07.89 OTHER CHEST PAIN: Primary | ICD-10-CM

## 2023-03-09 DIAGNOSIS — R00.2 PALPITATION: ICD-10-CM

## 2023-03-09 DIAGNOSIS — M32.9 SYSTEMIC LUPUS ERYTHEMATOSUS, UNSPECIFIED SLE TYPE, UNSPECIFIED ORGAN INVOLVEMENT STATUS: ICD-10-CM

## 2023-03-09 DIAGNOSIS — I10 PRIMARY HYPERTENSION: ICD-10-CM

## 2023-03-09 DIAGNOSIS — G45.9 TIA (TRANSIENT ISCHEMIC ATTACK): ICD-10-CM

## 2023-03-09 PROCEDURE — 99999 PR PBB SHADOW E&M-EST. PATIENT-LVL IV: CPT | Mod: PBBFAC,,, | Performed by: INTERNAL MEDICINE

## 2023-03-09 PROCEDURE — 3077F PR MOST RECENT SYSTOLIC BLOOD PRESSURE >= 140 MM HG: ICD-10-PCS | Mod: CPTII,S$GLB,, | Performed by: INTERNAL MEDICINE

## 2023-03-09 PROCEDURE — 1159F MED LIST DOCD IN RCRD: CPT | Mod: CPTII,S$GLB,, | Performed by: INTERNAL MEDICINE

## 2023-03-09 PROCEDURE — 3008F BODY MASS INDEX DOCD: CPT | Mod: CPTII,S$GLB,, | Performed by: INTERNAL MEDICINE

## 2023-03-09 PROCEDURE — 3080F DIAST BP >= 90 MM HG: CPT | Mod: CPTII,S$GLB,, | Performed by: INTERNAL MEDICINE

## 2023-03-09 PROCEDURE — 99215 PR OFFICE/OUTPT VISIT, EST, LEVL V, 40-54 MIN: ICD-10-PCS | Mod: S$GLB,,, | Performed by: INTERNAL MEDICINE

## 2023-03-09 PROCEDURE — 99999 PR PBB SHADOW E&M-EST. PATIENT-LVL IV: ICD-10-PCS | Mod: PBBFAC,,, | Performed by: INTERNAL MEDICINE

## 2023-03-09 PROCEDURE — 3080F PR MOST RECENT DIASTOLIC BLOOD PRESSURE >= 90 MM HG: ICD-10-PCS | Mod: CPTII,S$GLB,, | Performed by: INTERNAL MEDICINE

## 2023-03-09 PROCEDURE — 1160F RVW MEDS BY RX/DR IN RCRD: CPT | Mod: CPTII,S$GLB,, | Performed by: INTERNAL MEDICINE

## 2023-03-09 PROCEDURE — 3008F PR BODY MASS INDEX (BMI) DOCUMENTED: ICD-10-PCS | Mod: CPTII,S$GLB,, | Performed by: INTERNAL MEDICINE

## 2023-03-09 PROCEDURE — 99215 OFFICE O/P EST HI 40 MIN: CPT | Mod: S$GLB,,, | Performed by: INTERNAL MEDICINE

## 2023-03-09 PROCEDURE — 3077F SYST BP >= 140 MM HG: CPT | Mod: CPTII,S$GLB,, | Performed by: INTERNAL MEDICINE

## 2023-03-09 PROCEDURE — 1160F PR REVIEW ALL MEDS BY PRESCRIBER/CLIN PHARMACIST DOCUMENTED: ICD-10-PCS | Mod: CPTII,S$GLB,, | Performed by: INTERNAL MEDICINE

## 2023-03-09 PROCEDURE — 1159F PR MEDICATION LIST DOCUMENTED IN MEDICAL RECORD: ICD-10-PCS | Mod: CPTII,S$GLB,, | Performed by: INTERNAL MEDICINE

## 2023-03-09 NOTE — PROGRESS NOTES
Subjective:   Patient ID:  Tosha Kwok is a 57 y.o. female who presents for follow up of No chief complaint on file.      56 yo female, came in for chest pain and abnl EKG  PMH lupus since , h/oTIA in , preDM, HTn HLD, anxiety. Fibromyalgia, No h/o MI and Ca. 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 Occasional chest pain lasted for hours at rest. Few times a week. No SOB.  Some palpitation. Few times a week.   Ekg NSR q wave on V1 and V2 old   echo normal biv function   HOLTER wnl.         Past Medical History:   Diagnosis Date    Anemia     Ankylosing spondylitis     Anxiety     Asthma     Cancer 2018    left breast  lumpectomy, xrt    Chronic constipation     Chronic insomnia     Edema     Fibromyalgia     Hypertension     Insulin resistance     Interstitial cystitis     Lupus     Lupus     Migraine headache     PONV (postoperative nausea and vomiting)     Restless legs syndrome     Shingles     Stroke     post partum right sided numbness    TIA (transient ischemic attack)        Past Surgical History:   Procedure Laterality Date    BLADDER SURGERY      BREAST LUMPECTOMY  2018    BREAST RECONSTRUCTION      BREAST SURGERY  2018    reduction     SECTION      CHOLECYSTECTOMY      COLONOSCOPY N/A 10/2/2020    Procedure: COLONOSCOPY;  Surgeon: Guicho Hood MD;  Location: [facility];  Service: Endoscopy;  Laterality: N/A;    ESOPHAGOGASTRODUODENOSCOPY N/A 2023    Procedure: EGD (ESOPHAGOGASTRODUODENOSCOPY);  Surgeon: Belkis Mario MD;  Location: [facility];  Service: Endoscopy;  Laterality: N/A;    EXCISION OF GRANULOMA Bilateral 2021    Procedure: EXCISION, GRANULOMA SCARS OF BREASTS;  Surgeon: Obed Palmer Jr., MD;  Location: [facility];  Service: Plastics;  Laterality: Bilateral;    HYSTERECTOMY      LASER ABLATION      to back    SLEEVE GASTROPLASTY  2016    TOTAL REDUCTION MAMMOPLASTY Bilateral     two years ago    TUBAL LIGATION         Social  History     Tobacco Use    Smoking status: Never    Smokeless tobacco: Never   Substance Use Topics    Alcohol use: Yes     Comment: occasionally    Drug use: No       Family History   Problem Relation Age of Onset    Hypertension Mother     Hypertension Brother     Breast cancer Maternal Aunt         x2     Heart attack Father     Hypertension Sister     Lupus Sister     Breast cancer Maternal Grandmother          Review of Systems   Constitutional: Negative for decreased appetite, diaphoresis, fever, malaise/fatigue and night sweats.   HENT:  Negative for nosebleeds.    Eyes:  Negative for blurred vision and double vision.   Cardiovascular:  Positive for chest pain and palpitations. Negative for claudication, dyspnea on exertion, irregular heartbeat, leg swelling, near-syncope, orthopnea, paroxysmal nocturnal dyspnea and syncope.   Respiratory:  Negative for cough, shortness of breath, sleep disturbances due to breathing, snoring, sputum production and wheezing.    Endocrine: Negative for cold intolerance and polyuria.   Hematologic/Lymphatic: Does not bruise/bleed easily.   Skin:  Negative for rash.   Musculoskeletal:  Negative for back pain, falls, joint pain, joint swelling and neck pain.   Gastrointestinal:  Negative for abdominal pain, heartburn, nausea and vomiting.   Genitourinary:  Negative for dysuria, frequency and hematuria.   Neurological:  Negative for difficulty with concentration, dizziness, focal weakness, headaches, light-headedness, numbness, seizures and weakness.   Psychiatric/Behavioral:  Negative for depression, memory loss and substance abuse. The patient does not have insomnia.    Allergic/Immunologic: Negative for HIV exposure and hives.     Objective:   Physical Exam  HENT:      Head: Normocephalic.   Eyes:      Pupils: Pupils are equal, round, and reactive to light.   Neck:      Thyroid: No thyromegaly.      Vascular: Normal carotid pulses. No carotid bruit or JVD.   Cardiovascular:       Rate and Rhythm: Normal rate and regular rhythm. No extrasystoles are present.     Chest Wall: PMI is not displaced.      Pulses: Normal pulses.      Heart sounds: Normal heart sounds. No murmur heard.    No gallop. No S3 sounds.   Pulmonary:      Effort: No respiratory distress.      Breath sounds: Normal breath sounds. No stridor.   Chest:      Comments: Diffuse tender ness on both chest   Abdominal:      General: Bowel sounds are normal.      Palpations: Abdomen is soft.      Tenderness: There is no abdominal tenderness. There is no rebound.   Musculoskeletal:         General: Normal range of motion.   Skin:     Findings: No rash.   Neurological:      Mental Status: She is alert and oriented to person, place, and time.   Psychiatric:         Behavior: Behavior normal.       Lab Results   Component Value Date    CHOL 215 (H) 08/08/2022    CHOL 209 (H) 01/01/2022    CHOL 191 10/13/2020     Lab Results   Component Value Date    HDL 56 08/08/2022    HDL 57 01/01/2022    HDL 61 10/13/2020     Lab Results   Component Value Date    LDLCALC 132.6 08/08/2022    LDLCALC 134.8 01/01/2022    LDLCALC 117.2 10/13/2020     Lab Results   Component Value Date    TRIG 132 08/08/2022    TRIG 86 01/01/2022    TRIG 64 10/13/2020     Lab Results   Component Value Date    CHOLHDL 26.0 08/08/2022    CHOLHDL 27.3 01/01/2022    CHOLHDL 31.9 10/13/2020       Chemistry        Component Value Date/Time     03/08/2023 0933    K 3.2 (L) 03/08/2023 0933     03/08/2023 0933    CO2 23 03/08/2023 0933    BUN 11 03/08/2023 0933    CREATININE 0.9 03/08/2023 0933    GLU 86 03/08/2023 0933        Component Value Date/Time    CALCIUM 9.7 03/08/2023 0933    ALKPHOS 57 02/27/2023 0837    AST 11 02/27/2023 0837    ALT 7 (L) 02/27/2023 0837    BILITOT 0.4 02/27/2023 0837    ESTGFRAFRICA >60 01/01/2022 0639    EGFRNONAA >60 01/01/2022 0639          Lab Results   Component Value Date    HGBA1C 5.4 08/08/2022     Lab Results   Component Value Date     TSH 1.476 08/08/2022     Lab Results   Component Value Date    INR 1.0 01/01/2022    INR 0.9 06/29/2015     Lab Results   Component Value Date    WBC 3.86 (L) 02/27/2023    HGB 12.2 02/27/2023    HCT 38.6 02/27/2023    MCV 89 02/27/2023     02/27/2023     BMP  Sodium   Date Value Ref Range Status   03/08/2023 144 136 - 145 mmol/L Final     Potassium   Date Value Ref Range Status   03/08/2023 3.2 (L) 3.5 - 5.1 mmol/L Final     Chloride   Date Value Ref Range Status   03/08/2023 107 95 - 110 mmol/L Final     CO2   Date Value Ref Range Status   03/08/2023 23 23 - 29 mmol/L Final     BUN   Date Value Ref Range Status   03/08/2023 11 6 - 20 mg/dL Final     Creatinine   Date Value Ref Range Status   03/08/2023 0.9 0.5 - 1.4 mg/dL Final     Calcium   Date Value Ref Range Status   03/08/2023 9.7 8.7 - 10.5 mg/dL Final     Anion Gap   Date Value Ref Range Status   03/08/2023 14 8 - 16 mmol/L Final     eGFR if    Date Value Ref Range Status   01/01/2022 >60 >60 mL/min/1.73 m^2 Final     eGFR if non    Date Value Ref Range Status   01/01/2022 >60 >60 mL/min/1.73 m^2 Final     Comment:     Calculation used to obtain the estimated glomerular filtration  rate (eGFR) is the CKD-EPI equation.        BNP  @LABRCNTIP(BNP,BNPTRIAGEBLO)@  @LABRCNTIP(troponini)@  Estimated Creatinine Clearance: 66.2 mL/min (based on SCr of 0.9 mg/dL).  No results found in the last 24 hours.  No results found in the last 24 hours.  No results found in the last 24 hours.    Assessment:      1. Other chest pain    2. TIA (transient ischemic attack)    3. Primary hypertension    4. Pure hypercholesterolemia    5. Port-A-Cath in place    6. Systemic lupus erythematosus, unspecified SLE type, unspecified organ involvement status    7. Palpitation        Plan:   Phar MPI for chest pain to r/o ischemia (hip pain and back pain)  BARDY for palpitation  Continue HCTZ and metoprolol    Counseled DASH  Check Lipid profile  with PCP in 6 months  Recommend heart-healthy diet, weight control and regular exercise.  Stephanie. Risk modification.   I have reviewed all pertinent labs and cardiac studies independently. Plans and recommendations have been formulated under my direct supervision. All questions answered and patient voiced understanding.   If symptoms persist go to the ED  RTC in `12 months

## 2023-03-13 ENCOUNTER — OFFICE VISIT (OUTPATIENT)
Dept: RHEUMATOLOGY | Facility: CLINIC | Age: 58
End: 2023-03-13
Payer: COMMERCIAL

## 2023-03-13 ENCOUNTER — INFUSION (OUTPATIENT)
Dept: INFUSION THERAPY | Facility: HOSPITAL | Age: 58
End: 2023-03-13
Attending: PHYSICIAN ASSISTANT
Payer: COMMERCIAL

## 2023-03-13 ENCOUNTER — LAB VISIT (OUTPATIENT)
Dept: LAB | Facility: HOSPITAL | Age: 58
End: 2023-03-13
Payer: COMMERCIAL

## 2023-03-13 ENCOUNTER — LAB VISIT (OUTPATIENT)
Dept: LAB | Facility: HOSPITAL | Age: 58
End: 2023-03-13
Attending: PHYSICIAN ASSISTANT
Payer: COMMERCIAL

## 2023-03-13 ENCOUNTER — PATIENT MESSAGE (OUTPATIENT)
Dept: RHEUMATOLOGY | Facility: CLINIC | Age: 58
End: 2023-03-13

## 2023-03-13 VITALS
SYSTOLIC BLOOD PRESSURE: 162 MMHG | WEIGHT: 168 LBS | HEART RATE: 81 BPM | HEIGHT: 62 IN | BODY MASS INDEX: 30.91 KG/M2 | DIASTOLIC BLOOD PRESSURE: 85 MMHG

## 2023-03-13 DIAGNOSIS — M32.9 SYSTEMIC LUPUS ERYTHEMATOSUS, UNSPECIFIED SLE TYPE, UNSPECIFIED ORGAN INVOLVEMENT STATUS: Primary | ICD-10-CM

## 2023-03-13 DIAGNOSIS — Z51.81 MEDICATION MONITORING ENCOUNTER: ICD-10-CM

## 2023-03-13 DIAGNOSIS — Z79.899 HIGH RISK MEDICATION USE: ICD-10-CM

## 2023-03-13 DIAGNOSIS — D84.9 IMMUNOCOMPROMISED: ICD-10-CM

## 2023-03-13 DIAGNOSIS — M32.9 SYSTEMIC LUPUS ERYTHEMATOSUS, UNSPECIFIED SLE TYPE, UNSPECIFIED ORGAN INVOLVEMENT STATUS: ICD-10-CM

## 2023-03-13 DIAGNOSIS — D50.0 IRON DEFICIENCY ANEMIA DUE TO CHRONIC BLOOD LOSS: Primary | ICD-10-CM

## 2023-03-13 LAB
ALBUMIN SERPL BCP-MCNC: 3.9 G/DL (ref 3.5–5.2)
ALP SERPL-CCNC: 53 U/L (ref 55–135)
ALT SERPL W/O P-5'-P-CCNC: 9 U/L (ref 10–44)
ANION GAP SERPL CALC-SCNC: 13 MMOL/L (ref 8–16)
AST SERPL-CCNC: 12 U/L (ref 10–40)
BACTERIA #/AREA URNS HPF: NORMAL /HPF
BASOPHILS # BLD AUTO: 0.02 K/UL (ref 0–0.2)
BASOPHILS NFR BLD: 0.4 % (ref 0–1.9)
BILIRUB SERPL-MCNC: 0.3 MG/DL (ref 0.1–1)
BUN SERPL-MCNC: 10 MG/DL (ref 6–20)
CALCIUM SERPL-MCNC: 8.9 MG/DL (ref 8.7–10.5)
CHLORIDE SERPL-SCNC: 107 MMOL/L (ref 95–110)
CO2 SERPL-SCNC: 23 MMOL/L (ref 23–29)
CREAT SERPL-MCNC: 0.7 MG/DL (ref 0.5–1.4)
CREAT UR-MCNC: 85 MG/DL (ref 15–325)
CRP SERPL-MCNC: 2.4 MG/L (ref 0–8.2)
DIFFERENTIAL METHOD: ABNORMAL
EOSINOPHIL # BLD AUTO: 0.1 K/UL (ref 0–0.5)
EOSINOPHIL NFR BLD: 1.6 % (ref 0–8)
ERYTHROCYTE [DISTWIDTH] IN BLOOD BY AUTOMATED COUNT: 13.1 % (ref 11.5–14.5)
EST. GFR  (NO RACE VARIABLE): >60 ML/MIN/1.73 M^2
GLUCOSE SERPL-MCNC: 88 MG/DL (ref 70–110)
HCT VFR BLD AUTO: 32.6 % (ref 37–48.5)
HGB BLD-MCNC: 11.2 G/DL (ref 12–16)
IMM GRANULOCYTES # BLD AUTO: 0.01 K/UL (ref 0–0.04)
IMM GRANULOCYTES NFR BLD AUTO: 0.2 % (ref 0–0.5)
LYMPHOCYTES # BLD AUTO: 1.7 K/UL (ref 1–4.8)
LYMPHOCYTES NFR BLD: 34.6 % (ref 18–48)
MCH RBC QN AUTO: 28.9 PG (ref 27–31)
MCHC RBC AUTO-ENTMCNC: 34.4 G/DL (ref 32–36)
MCV RBC AUTO: 84 FL (ref 82–98)
MICROSCOPIC COMMENT: NORMAL
MONOCYTES # BLD AUTO: 0.3 K/UL (ref 0.3–1)
MONOCYTES NFR BLD: 6.1 % (ref 4–15)
NEUTROPHILS # BLD AUTO: 2.8 K/UL (ref 1.8–7.7)
NEUTROPHILS NFR BLD: 57.1 % (ref 38–73)
NRBC BLD-RTO: 0 /100 WBC
PLATELET # BLD AUTO: 293 K/UL (ref 150–450)
PMV BLD AUTO: 9.4 FL (ref 9.2–12.9)
POTASSIUM SERPL-SCNC: 2.8 MMOL/L (ref 3.5–5.1)
PROT SERPL-MCNC: 6.8 G/DL (ref 6–8.4)
PROT UR-MCNC: <7 MG/DL (ref 0–15)
PROT/CREAT UR: NORMAL MG/G{CREAT} (ref 0–0.2)
RBC # BLD AUTO: 3.87 M/UL (ref 4–5.4)
RBC #/AREA URNS HPF: 4 /HPF (ref 0–4)
SODIUM SERPL-SCNC: 143 MMOL/L (ref 136–145)
SQUAMOUS #/AREA URNS HPF: 6 /HPF
WBC # BLD AUTO: 4.92 K/UL (ref 3.9–12.7)
WBC #/AREA URNS HPF: 1 /HPF (ref 0–5)

## 2023-03-13 PROCEDURE — 3008F PR BODY MASS INDEX (BMI) DOCUMENTED: ICD-10-PCS | Mod: CPTII,S$GLB,, | Performed by: PHYSICIAN ASSISTANT

## 2023-03-13 PROCEDURE — 82570 ASSAY OF URINE CREATININE: CPT | Performed by: PHYSICIAN ASSISTANT

## 2023-03-13 PROCEDURE — 3079F DIAST BP 80-89 MM HG: CPT | Mod: CPTII,S$GLB,, | Performed by: PHYSICIAN ASSISTANT

## 2023-03-13 PROCEDURE — 99999 PR PBB SHADOW E&M-EST. PATIENT-LVL V: CPT | Mod: PBBFAC,,, | Performed by: PHYSICIAN ASSISTANT

## 2023-03-13 PROCEDURE — 3077F PR MOST RECENT SYSTOLIC BLOOD PRESSURE >= 140 MM HG: ICD-10-PCS | Mod: CPTII,S$GLB,, | Performed by: PHYSICIAN ASSISTANT

## 2023-03-13 PROCEDURE — 86225 DNA ANTIBODY NATIVE: CPT | Performed by: PHYSICIAN ASSISTANT

## 2023-03-13 PROCEDURE — 1160F RVW MEDS BY RX/DR IN RCRD: CPT | Mod: CPTII,S$GLB,, | Performed by: PHYSICIAN ASSISTANT

## 2023-03-13 PROCEDURE — 99205 OFFICE O/P NEW HI 60 MIN: CPT | Mod: S$GLB,,, | Performed by: PHYSICIAN ASSISTANT

## 2023-03-13 PROCEDURE — 3008F BODY MASS INDEX DOCD: CPT | Mod: CPTII,S$GLB,, | Performed by: PHYSICIAN ASSISTANT

## 2023-03-13 PROCEDURE — 63600175 PHARM REV CODE 636 W HCPCS: Performed by: PHYSICIAN ASSISTANT

## 2023-03-13 PROCEDURE — 36591 DRAW BLOOD OFF VENOUS DEVICE: CPT

## 2023-03-13 PROCEDURE — 99205 PR OFFICE/OUTPT VISIT, NEW, LEVL V, 60-74 MIN: ICD-10-PCS | Mod: S$GLB,,, | Performed by: PHYSICIAN ASSISTANT

## 2023-03-13 PROCEDURE — 1159F MED LIST DOCD IN RCRD: CPT | Mod: CPTII,S$GLB,, | Performed by: PHYSICIAN ASSISTANT

## 2023-03-13 PROCEDURE — 1160F PR REVIEW ALL MEDS BY PRESCRIBER/CLIN PHARMACIST DOCUMENTED: ICD-10-PCS | Mod: CPTII,S$GLB,, | Performed by: PHYSICIAN ASSISTANT

## 2023-03-13 PROCEDURE — A4216 STERILE WATER/SALINE, 10 ML: HCPCS | Performed by: PHYSICIAN ASSISTANT

## 2023-03-13 PROCEDURE — 80053 COMPREHEN METABOLIC PANEL: CPT | Performed by: PHYSICIAN ASSISTANT

## 2023-03-13 PROCEDURE — 85025 COMPLETE CBC W/AUTO DIFF WBC: CPT | Performed by: PHYSICIAN ASSISTANT

## 2023-03-13 PROCEDURE — 25000003 PHARM REV CODE 250: Performed by: PHYSICIAN ASSISTANT

## 2023-03-13 PROCEDURE — 86140 C-REACTIVE PROTEIN: CPT | Performed by: PHYSICIAN ASSISTANT

## 2023-03-13 PROCEDURE — 86480 TB TEST CELL IMMUN MEASURE: CPT | Performed by: PHYSICIAN ASSISTANT

## 2023-03-13 PROCEDURE — 80074 ACUTE HEPATITIS PANEL: CPT | Performed by: PHYSICIAN ASSISTANT

## 2023-03-13 PROCEDURE — 3079F PR MOST RECENT DIASTOLIC BLOOD PRESSURE 80-89 MM HG: ICD-10-PCS | Mod: CPTII,S$GLB,, | Performed by: PHYSICIAN ASSISTANT

## 2023-03-13 PROCEDURE — 85652 RBC SED RATE AUTOMATED: CPT | Performed by: PHYSICIAN ASSISTANT

## 2023-03-13 PROCEDURE — 81000 URINALYSIS NONAUTO W/SCOPE: CPT | Performed by: PHYSICIAN ASSISTANT

## 2023-03-13 PROCEDURE — 1159F PR MEDICATION LIST DOCUMENTED IN MEDICAL RECORD: ICD-10-PCS | Mod: CPTII,S$GLB,, | Performed by: PHYSICIAN ASSISTANT

## 2023-03-13 PROCEDURE — 86160 COMPLEMENT ANTIGEN: CPT | Mod: 59 | Performed by: PHYSICIAN ASSISTANT

## 2023-03-13 PROCEDURE — 99999 PR PBB SHADOW E&M-EST. PATIENT-LVL V: ICD-10-PCS | Mod: PBBFAC,,, | Performed by: PHYSICIAN ASSISTANT

## 2023-03-13 PROCEDURE — 3077F SYST BP >= 140 MM HG: CPT | Mod: CPTII,S$GLB,, | Performed by: PHYSICIAN ASSISTANT

## 2023-03-13 PROCEDURE — 36415 COLL VENOUS BLD VENIPUNCTURE: CPT | Performed by: PHYSICIAN ASSISTANT

## 2023-03-13 PROCEDURE — 86160 COMPLEMENT ANTIGEN: CPT | Performed by: PHYSICIAN ASSISTANT

## 2023-03-13 RX ORDER — SODIUM CHLORIDE 0.9 % (FLUSH) 0.9 %
10 SYRINGE (ML) INJECTION
Status: CANCELLED | OUTPATIENT
Start: 2023-04-01

## 2023-03-13 RX ORDER — HEPARIN 100 UNIT/ML
500 SYRINGE INTRAVENOUS
Status: CANCELLED | OUTPATIENT
Start: 2023-04-01

## 2023-03-13 RX ORDER — SODIUM CHLORIDE 0.9 % (FLUSH) 0.9 %
10 SYRINGE (ML) INJECTION
Status: CANCELLED | OUTPATIENT
Start: 2023-03-13

## 2023-03-13 RX ORDER — SODIUM CHLORIDE 0.9 % (FLUSH) 0.9 %
10 SYRINGE (ML) INJECTION
Status: DISCONTINUED | OUTPATIENT
Start: 2023-03-13 | End: 2023-03-13 | Stop reason: HOSPADM

## 2023-03-13 RX ORDER — HEPARIN 100 UNIT/ML
500 SYRINGE INTRAVENOUS
Status: DISCONTINUED | OUTPATIENT
Start: 2023-03-13 | End: 2023-03-13 | Stop reason: HOSPADM

## 2023-03-13 RX ORDER — HEPARIN 100 UNIT/ML
500 SYRINGE INTRAVENOUS
Status: CANCELLED | OUTPATIENT
Start: 2023-03-13

## 2023-03-13 RX ADMIN — HEPARIN 500 UNITS: 100 SYRINGE at 02:03

## 2023-03-13 RX ADMIN — Medication 10 ML: at 02:03

## 2023-03-13 NOTE — NURSING
"Pt here for labs from port and Mediport Flush. Right chestwall mediport accessed with a 20g 1" [place] via sterile technique.  Excellent blood return noted.  Flushed with 10ml NS and 5 ml heparin solution.  Needle D/C, site without redness, swelling, or drainage noted.  Dressing applied.  Patient tolerated well.    "

## 2023-03-13 NOTE — PROGRESS NOTES
Subjective:      Patient ID: Tosha Kwok is a 58 y.o. female.    Chief Complaint: Lupus, Fibromyalgia, and ankylosing spondylitis      HPI   Tosha Kwok  is a 58 y.o. female who comes in today to reestablish care with Rheumatology.  She was previously treated years ago by Dr. Richardson for AS, and initially responded well to Humira but later had waning therapeutic effects.  Not currently on anything for ankylosing spondylitis.  Has chronic low back pain for years.  Also with overlapping fibromyalgia and spondylosis which complicate her assessment and response to therapy.  She is in chronic pain management Dr. Storm Rae.    In 2017 started seeing DR. Oc Jaquez at [facility] who diagnosed her w SLE per report.  On Plaquenil 200 mg daily.  Tells me her last eye exam was about a year ago.  They did Plaquenil monitoring and there were no signs of maculopathy.  Went to lens [facility] doctors about a month ago.  Was told she did not need to do Plaquenil monitoring since she was no longer taking it.  She has been off Plaquenil only recently d/t last appt w Dr. Jaquez being about 1.5 yrs ago.  At 1 point was on methotrexate but tells me she weaned down and off with Dr. Jaquez.  In 2016 had an episode of pleurisy.     Today, pain level is 6/10.  She has overlapping fibromyalgia and spondylosis of her spine.  She is under the care of Dr. Rae in pain management.  She is complaining of hair loss.  Also complaining of right 3rd MCP joint pain as well as left 2nd MCP joint pain.  Also complaining of bilateral elbow pain and multiple PIP joint pain.  She states morning stiffness of greater than 30 minutes.    Also with chest pain.  Saw cardiology on Friday.  She has had an EKG.  They are planning to get a stress test as well as a heart monitor.  Denies SOB.    Also has numbness and tingling in both forearms and hands.  Dr. Kapadia ordered an EMG nerve conduction study.  This was being done under workman's comp and she is had  trouble getting it approved.    Patient denies fevers, chills, photosensitivity, eye pain, shortness of breath, chest pain, hematuria, blood in the stool, rash, sicca symptoms, raynauds, finger ulcerations.  Rheumatologic systems otherwise negative.    Serologies/Labs:  unavailable  Current Treatment:  Plaquenil 200 mg daily  Previous Treatment:   MTX - weaned down and off  Humira - for AS      Current Outpatient Medications:     albuterol (PROAIR HFA) 90 mcg/actuation inhaler, Inhale 2 puffs into the lungs every 4 hours as needed., Disp: 18 g, Rfl: 3    atorvastatin (LIPITOR) 20 MG tablet, Take 1 tablet (20 mg total) by mouth once daily., Disp: 90 tablet, Rfl: 3    cetirizine (ZYRTEC) 10 MG tablet, Take 1 tablet (10 mg total) by mouth once daily., Disp: 90 tablet, Rfl: 3    clonazePAM (KLONOPIN) 0.5 MG tablet, Take 1 tablet (0.5 mg total) by mouth 2 (two) times daily as needed for Anxiety (panic attacks)., Disp: 20 tablet, Rfl: 0    cyanocobalamin 1,000 mcg/mL injection, Inject 1 mL (1,000 mcg total) into the muscle every 28 days., Disp: 10 mL, Rfl: 1    eszopiclone (LUNESTA) 3 mg Tab, Take 1 tablet (3 mg total) by mouth nightly as needed (insomnia)., Disp: 30 tablet, Rfl: 5    hydroCHLOROthiazide (HYDRODIURIL) 12.5 MG Tab, Take 2 tablets (25 mg total) by mouth once daily., Disp: 180 tablet, Rfl: 3    HYDROmorphone (DILAUDID) 2 MG tablet, Take 1 tablet (2 mg total) by mouth every 6 to 8 hours as needed for pain, Disp: 105 tablet, Rfl: 0    HYDROmorphone (DILAUDID) 2 MG tablet, take 1 tablet by mouth every 6-8 hours as needed for pain, Disp: 105 tablet, Rfl: 0    HYDROmorphone (DILAUDID) 2 MG tablet, Take 1 tablet (2 mg total) by mouth every 6 to 8 hours as needed for pain, Disp: 105 tablet, Rfl: 0    hydroxychloroquine (PLAQUENIL) 200 mg tablet, Take 200 mg by mouth once daily., Disp: , Rfl: 1    hydrOXYzine pamoate (VISTARIL) 25 MG Cap, Take 1 capsule (25 mg total) by mouth every 8 (eight) hours as needed  (anxiety)., Disp: 90 capsule, Rfl: 3    metoprolol succinate (TOPROL-XL) 25 MG 24 hr tablet, Take 1 tablet (25 mg total) by mouth once daily., Disp: 90 tablet, Rfl: 2    pantoprazole (PROTONIX) 40 MG tablet, Take 1 tablet (40 mg total) by mouth 2 (two) times daily., Disp: 60 tablet, Rfl: 1    potassium chloride SA (K-DUR,KLOR-CON M) 10 MEQ tablet, Take 2 tablets (20 mEq total) by mouth 2 (two) times daily for 7 days, THEN 1 tablet (10 mEq total) once daily for 21 days., Disp: 49 tablet, Rfl: 0    promethazine (PHENERGAN) 25 MG tablet, Take 1 tablet (25 mg total) by mouth every 6 (six) hours as needed for Nausea., Disp: 15 tablet, Rfl: 0    semaglutide (OZEMPIC) 0.25 mg or 0.5 mg(2 mg/1.5 mL) pen injector, Inject 0.5 mg into the skin once a week., Disp: 3 pen, Rfl: 1    tiZANidine (ZANAFLEX) 4 MG tablet, Take 4 mg by mouth 3 (three) times daily as needed., Disp: , Rfl:     topiramate (TOPAMAX) 100 MG tablet, Take 1 tablet (100 mg total) by mouth 2 (two) times daily., Disp: 180 tablet, Rfl: 3    EPINEPHrine (EPIPEN 2-DENNIS) 0.3 mg/0.3 mL AtIn, Inject 0.3 mLs (0.3 mg total) into the muscle once. for 1 dose, Disp: 2 each, Rfl: 3  No current facility-administered medications for this visit.    Facility-Administered Medications Ordered in Other Visits:     heparin, porcine (PF) 100 unit/mL injection flush 500 Units, 500 Units, Intravenous, PRN, Caprice Oakley PA-C, 500 Units at 03/13/23 1405    sodium chloride 0.9% flush 10 mL, 10 mL, Intravenous, PRN, Caprice Oakley PA-C, 10 mL at 03/13/23 1405    Past Medical History:   Diagnosis Date    Anemia     Ankylosing spondylitis     Anxiety     Asthma     Cancer 2018    left breast  lumpectomy, xrt    Chronic constipation     Chronic insomnia     Edema     Fibromyalgia     Hypertension     Insulin resistance     Interstitial cystitis     Lupus     Lupus     Migraine headache     PONV (postoperative nausea and vomiting)     Restless legs syndrome     Shingles      Stroke 1998    post partum right sided numbness    TIA (transient ischemic attack) 1998     Family History   Problem Relation Age of Onset    Hypertension Mother     Hypertension Brother     Breast cancer Maternal Aunt         x2     Heart attack Father     Hypertension Sister     Lupus Sister     Breast cancer Maternal Grandmother      Social History     Socioeconomic History    Marital status:    Tobacco Use    Smoking status: Never    Smokeless tobacco: Never   Substance and Sexual Activity    Alcohol use: Yes     Comment: occasionally    Drug use: No    Sexual activity: Yes     Partners: Male     Social Determinants of Health     Financial Resource Strain: Low Risk     Difficulty of Paying Living Expenses: Not hard at all   Food Insecurity: No Food Insecurity    Worried About Running Out of Food in the Last Year: Never true    Ran Out of Food in the Last Year: Never true   Transportation Needs: No Transportation Needs    Lack of Transportation (Medical): No    Lack of Transportation (Non-Medical): No   Physical Activity: Insufficiently Active    Days of Exercise per Week: 2 days    Minutes of Exercise per Session: 30 min   Stress: Stress Concern Present    Feeling of Stress : Very much   Social Connections: Unknown    Frequency of Communication with Friends and Family: Twice a week    Frequency of Social Gatherings with Friends and Family: Twice a week    Active Member of Clubs or Organizations: Yes    Attends Club or Organization Meetings: More than 4 times per year    Marital Status:    Housing Stability: Low Risk     Unable to Pay for Housing in the Last Year: No    Number of Places Lived in the Last Year: 1    Unstable Housing in the Last Year: No     Review of patient's allergies indicates:   Allergen Reactions    Carrot Swelling     angioedema    Morphine Anxiety     Hives   States she can take dilaudid and percocet without any problems    Clindamycin     Macrolide antibiotics      "Erythromycin Other (See Comments)     Hives and cramps     Zofran (as hydrochloride) [ondansetron hcl] Hives     Local hive after IV injection       Objective:   BP (!) 162/85   Pulse 81   Ht 5' 2" (1.575 m)   Wt 76.2 kg (167 lb 15.9 oz)   LMP 03/26/2012   BMI 30.73 kg/m²   Immunization History   Administered Date(s) Administered    COVID-19, MRNA, LN-S, PF (MODERNA FULL 0.5 ML DOSE) 03/31/2021, 04/28/2021    Influenza - Quadrivalent - PF *Preferred* (6 months and older) 09/25/2015, 11/02/2016, 12/04/2019, 10/15/2020, 10/15/2021, 09/21/2022    Influenza - Trivalent - PF (ADULT) 10/31/2014    PPD Test 09/02/2015, 02/21/2017    Pneumococcal Conjugate - 13 Valent 11/02/2016    Pneumococcal Polysaccharide - 23 Valent 09/03/2019    Tdap 12/04/2019    Zoster Recombinant 02/08/2023       Physical Exam   Constitutional: She is oriented to person, place, and time. No distress.   HENT:   Head: Normocephalic and atraumatic.   Pulmonary/Chest: Effort normal.   Abdominal: She exhibits no distension.   Musculoskeletal:         General: No swelling or tenderness. Normal range of motion.      Cervical back: Normal range of motion.   Lymphadenopathy:     She has no cervical adenopathy.   Neurological: She is alert and oriented to person, place, and time.   Skin: Skin is warm and dry. No rash noted.   Psychiatric: Mood normal.   Nursing note and vitals reviewed.    TTP right 3rd MCP and left 2nd MCP  TTP multiple PIPs and bilat elbows  100% fist formation      Recent Results (from the past 672 hour(s))   POCT COVID-19 Rapid Screening    Collection Time: 02/14/23  8:28 PM   Result Value Ref Range    POC Rapid COVID Negative Negative     Acceptable Yes    POCT Influenza A/B Molecular    Collection Time: 02/14/23  8:40 PM   Result Value Ref Range    POC Molecular Influenza A Ag Negative Negative, Not Reported    POC Molecular Influenza B Ag Negative Negative, Not Reported     Acceptable Yes    CBC W/ " AUTO DIFFERENTIAL    Collection Time: 02/14/23  8:48 PM   Result Value Ref Range    WBC 7.57 3.90 - 12.70 K/uL    RBC 4.33 4.00 - 5.40 M/uL    Hemoglobin 12.4 12.0 - 16.0 g/dL    Hematocrit 35.5 (L) 37.0 - 48.5 %    MCV 82 82 - 98 fL    MCH 28.6 27.0 - 31.0 pg    MCHC 34.9 32.0 - 36.0 g/dL    RDW 12.6 11.5 - 14.5 %    Platelets 384 150 - 450 K/uL    MPV 9.5 9.2 - 12.9 fL    Immature Granulocytes 0.3 0.0 - 0.5 %    Gran # (ANC) 6.6 1.8 - 7.7 K/uL    Immature Grans (Abs) 0.02 0.00 - 0.04 K/uL    Lymph # 0.7 (L) 1.0 - 4.8 K/uL    Mono # 0.2 (L) 0.3 - 1.0 K/uL    Eos # 0.0 0.0 - 0.5 K/uL    Baso # 0.03 0.00 - 0.20 K/uL    nRBC 0 0 /100 WBC    Gran % 87.3 (H) 38.0 - 73.0 %    Lymph % 9.6 (L) 18.0 - 48.0 %    Mono % 2.4 (L) 4.0 - 15.0 %    Eosinophil % 0.0 0.0 - 8.0 %    Basophil % 0.4 0.0 - 1.9 %    Differential Method Automated    Comp. Metabolic Panel    Collection Time: 02/14/23  8:48 PM   Result Value Ref Range    Sodium 146 (H) 136 - 145 mmol/L    Potassium 2.7 (LL) 3.5 - 5.1 mmol/L    Chloride 106 95 - 110 mmol/L    CO2 24 23 - 29 mmol/L    Glucose 112 (H) 70 - 110 mg/dL    BUN 11 6 - 20 mg/dL    Creatinine 0.9 0.5 - 1.4 mg/dL    Calcium 9.9 8.7 - 10.5 mg/dL    Total Protein 7.5 6.0 - 8.4 g/dL    Albumin 4.4 3.5 - 5.2 g/dL    Total Bilirubin 0.4 0.1 - 1.0 mg/dL    Alkaline Phosphatase 59 55 - 135 U/L    AST 11 10 - 40 U/L    ALT 8 (L) 10 - 44 U/L    Anion Gap 16 8 - 16 mmol/L    eGFR >60.0 >60 mL/min/1.73 m^2   Lipase    Collection Time: 02/14/23  8:48 PM   Result Value Ref Range    Lipase 35 4 - 60 U/L   Magnesium    Collection Time: 02/14/23  8:48 PM   Result Value Ref Range    Magnesium 1.8 1.6 - 2.6 mg/dL   Urinalysis, Reflex to Urine Culture Urine, Clean Catch    Collection Time: 02/14/23  9:53 PM    Specimen: Urine   Result Value Ref Range    Specimen UA Urine, Clean Catch     Color, UA Yellow Yellow, Straw, Katia    Appearance, UA Hazy (A) Clear    pH, UA 8.0 5.0 - 8.0    Specific Gravity, UA 1.015 1.005 -  1.030    Protein, UA Trace (A) Negative    Glucose, UA Negative Negative    Ketones, UA 2+ (A) Negative    Bilirubin (UA) 1+ (A) Negative    Occult Blood UA 2+ (A) Negative    Nitrite, UA Negative Negative    Urobilinogen, UA Negative <2.0 EU/dL    Leukocytes, UA Trace (A) Negative   Urinalysis Microscopic    Collection Time: 02/14/23  9:53 PM   Result Value Ref Range    RBC, UA 4 0 - 4 /hpf    WBC, UA 2 0 - 5 /hpf    Bacteria Occasional None-Occ /hpf    Squam Epithel, UA 2 /hpf    Microscopic Comment SEE COMMENT    Basic metabolic panel    Collection Time: 02/15/23  3:53 AM   Result Value Ref Range    Sodium 145 136 - 145 mmol/L    Potassium 3.1 (L) 3.5 - 5.1 mmol/L    Chloride 109 95 - 110 mmol/L    CO2 22 (L) 23 - 29 mmol/L    Glucose 105 70 - 110 mg/dL    BUN 8 6 - 20 mg/dL    Creatinine 0.8 0.5 - 1.4 mg/dL    Calcium 8.8 8.7 - 10.5 mg/dL    Anion Gap 14 8 - 16 mmol/L    eGFR >60.0 >60 mL/min/1.73 m^2   Basic metabolic panel    Collection Time: 02/15/23 11:45 AM   Result Value Ref Range    Sodium 142 136 - 145 mmol/L    Potassium 3.0 (L) 3.5 - 5.1 mmol/L    Chloride 107 95 - 110 mmol/L    CO2 22 (L) 23 - 29 mmol/L    Glucose 104 70 - 110 mg/dL    BUN 5 (L) 6 - 20 mg/dL    Creatinine 0.8 0.5 - 1.4 mg/dL    Calcium 9.1 8.7 - 10.5 mg/dL    Anion Gap 13 8 - 16 mmol/L    eGFR >60.0 >60 mL/min/1.73 m^2   Basic metabolic panel    Collection Time: 02/15/23 10:23 PM   Result Value Ref Range    Sodium 143 136 - 145 mmol/L    Potassium 2.9 (L) 3.5 - 5.1 mmol/L    Chloride 105 95 - 110 mmol/L    CO2 22 (L) 23 - 29 mmol/L    Glucose 94 70 - 110 mg/dL    BUN 3 (L) 6 - 20 mg/dL    Creatinine 0.8 0.5 - 1.4 mg/dL    Calcium 8.6 (L) 8.7 - 10.5 mg/dL    Anion Gap 16 8 - 16 mmol/L    eGFR >60 >60 mL/min/1.73 m^2   Basic Metabolic Panel (BMP)    Collection Time: 02/16/23  5:27 AM   Result Value Ref Range    Sodium 141 136 - 145 mmol/L    Potassium 2.7 (LL) 3.5 - 5.1 mmol/L    Chloride 105 95 - 110 mmol/L    CO2 24 23 - 29 mmol/L     "Glucose 91 70 - 110 mg/dL    BUN 5 (L) 6 - 20 mg/dL    Creatinine 0.8 0.5 - 1.4 mg/dL    Calcium 8.8 8.7 - 10.5 mg/dL    Anion Gap 12 8 - 16 mmol/L    eGFR >60 >60 mL/min/1.73 m^2   Magnesium    Collection Time: 02/16/23  5:27 AM   Result Value Ref Range    Magnesium 1.6 1.6 - 2.6 mg/dL   CBC with Automated Differential    Collection Time: 02/16/23  5:27 AM   Result Value Ref Range    WBC 4.73 3.90 - 12.70 K/uL    RBC 3.69 (L) 4.00 - 5.40 M/uL    Hemoglobin 10.6 (L) 12.0 - 16.0 g/dL    Hematocrit 31.1 (L) 37.0 - 48.5 %    MCV 84 82 - 98 fL    MCH 28.7 27.0 - 31.0 pg    MCHC 34.1 32.0 - 36.0 g/dL    RDW 12.9 11.5 - 14.5 %    Platelets 273 150 - 450 K/uL    MPV 9.2 9.2 - 12.9 fL    Immature Granulocytes 0.4 0.0 - 0.5 %    Gran # (ANC) 2.1 1.8 - 7.7 K/uL    Immature Grans (Abs) 0.02 0.00 - 0.04 K/uL    Lymph # 2.2 1.0 - 4.8 K/uL    Mono # 0.4 0.3 - 1.0 K/uL    Eos # 0.0 0.0 - 0.5 K/uL    Baso # 0.04 0.00 - 0.20 K/uL    nRBC 0 0 /100 WBC    Gran % 43.7 38.0 - 73.0 %    Lymph % 46.5 18.0 - 48.0 %    Mono % 8.2 4.0 - 15.0 %    Eosinophil % 0.4 0.0 - 8.0 %    Basophil % 0.8 0.0 - 1.9 %    Differential Method Automated    Potassium    Collection Time: 02/16/23 10:39 AM   Result Value Ref Range    Potassium 3.6 3.5 - 5.1 mmol/L   Specimen to Pathology, Surgery Gastrointestinal tract    Collection Time: 02/16/23  3:45 PM   Result Value Ref Range    Final Pathologic Diagnosis       RELIAPATH DIAGNOSIS:  STOMACH, BIOPSY:  - Antral mucosa with mild chronic inactive gastritis.  - No Helicobacter pylori organisms identified by *[facility].  - Negative for intestinal metaplasia, dysplasia, or malignancy.  Tomas Guy M.D.  Report attached.  Performing site:  [address] [address]  "Disclaimer:  This case diagnosis was rendered completely by the outside  consultation pathologist and the case is electronically signed by an [facility]  pathologist listed below solely to release the report into the " "medical  record."      Disclaimer       Unless the case is a 'gross only' or additional testing only, the final  diagnosis for each specimen is based on a microscopic examination of  appropriate tissue sections.     Basic Metabolic Panel (BMP)    Collection Time: 02/17/23  5:10 AM   Result Value Ref Range    Sodium 140 136 - 145 mmol/L    Potassium 3.0 (L) 3.5 - 5.1 mmol/L    Chloride 105 95 - 110 mmol/L    CO2 23 23 - 29 mmol/L    Glucose 89 70 - 110 mg/dL    BUN 8 6 - 20 mg/dL    Creatinine 0.7 0.5 - 1.4 mg/dL    Calcium 8.7 8.7 - 10.5 mg/dL    Anion Gap 12 8 - 16 mmol/L    eGFR >60 >60 mL/min/1.73 m^2   Magnesium    Collection Time: 02/17/23  5:10 AM   Result Value Ref Range    Magnesium 2.0 1.6 - 2.6 mg/dL   CBC with Automated Differential    Collection Time: 02/17/23  5:10 AM   Result Value Ref Range    WBC 3.81 (L) 3.90 - 12.70 K/uL    RBC 3.68 (L) 4.00 - 5.40 M/uL    Hemoglobin 10.4 (L) 12.0 - 16.0 g/dL    Hematocrit 31.6 (L) 37.0 - 48.5 %    MCV 86 82 - 98 fL    MCH 28.3 27.0 - 31.0 pg    MCHC 32.9 32.0 - 36.0 g/dL    RDW 12.7 11.5 - 14.5 %    Platelets 282 150 - 450 K/uL    MPV 9.7 9.2 - 12.9 fL    Immature Granulocytes 0.3 0.0 - 0.5 %    Gran # (ANC) 1.7 (L) 1.8 - 7.7 K/uL    Immature Grans (Abs) 0.01 0.00 - 0.04 K/uL    Lymph # 1.8 1.0 - 4.8 K/uL    Mono # 0.2 (L) 0.3 - 1.0 K/uL    Eos # 0.0 0.0 - 0.5 K/uL    Baso # 0.03 0.00 - 0.20 K/uL    nRBC 0 0 /100 WBC    Gran % 44.7 38.0 - 73.0 %    Lymph % 47.2 18.0 - 48.0 %    Mono % 6.0 4.0 - 15.0 %    Eosinophil % 1.0 0.0 - 8.0 %    Basophil % 0.8 0.0 - 1.9 %    Differential Method Automated    CBC Auto Differential    Collection Time: 02/20/23 11:03 AM   Result Value Ref Range    WBC 5.49 3.90 - 12.70 K/uL    RBC 4.50 4.00 - 5.40 M/uL    Hemoglobin 12.9 12.0 - 16.0 g/dL    Hematocrit 38.5 37.0 - 48.5 %    MCV 86 82 - 98 fL    MCH 28.7 27.0 - 31.0 pg    MCHC 33.5 32.0 - 36.0 g/dL    RDW 12.9 11.5 - 14.5 %    Platelets 346 150 - 450 K/uL    MPV 9.2 9.2 - 12.9 fL "    Immature Granulocytes 0.2 0.0 - 0.5 %    Gran # (ANC) 2.7 1.8 - 7.7 K/uL    Immature Grans (Abs) 0.01 0.00 - 0.04 K/uL    Lymph # 2.3 1.0 - 4.8 K/uL    Mono # 0.3 0.3 - 1.0 K/uL    Eos # 0.1 0.0 - 0.5 K/uL    Baso # 0.04 0.00 - 0.20 K/uL    nRBC 0 0 /100 WBC    Gran % 49.5 38.0 - 73.0 %    Lymph % 41.9 18.0 - 48.0 %    Mono % 5.5 4.0 - 15.0 %    Eosinophil % 2.2 0.0 - 8.0 %    Basophil % 0.7 0.0 - 1.9 %    Differential Method Automated    BASIC METABOLIC PANEL    Collection Time: 02/20/23 11:03 AM   Result Value Ref Range    Sodium 140 136 - 145 mmol/L    Potassium 3.1 (L) 3.5 - 5.1 mmol/L    Chloride 105 95 - 110 mmol/L    CO2 23 23 - 29 mmol/L    Glucose 86 70 - 110 mg/dL    BUN 9 6 - 20 mg/dL    Creatinine 0.8 0.5 - 1.4 mg/dL    Calcium 9.6 8.7 - 10.5 mg/dL    Anion Gap 12 8 - 16 mmol/L    eGFR >60.0 >60 mL/min/1.73 m^2   Basic Metabolic Panel    Collection Time: 02/27/23  8:37 AM   Result Value Ref Range    Sodium 140 136 - 145 mmol/L    Potassium 3.5 3.5 - 5.1 mmol/L    Chloride 104 95 - 110 mmol/L    CO2 27 23 - 29 mmol/L    Glucose 101 70 - 110 mg/dL    BUN 11 6 - 20 mg/dL    Creatinine 0.9 0.5 - 1.4 mg/dL    Calcium 10.1 8.7 - 10.5 mg/dL    Anion Gap 9 8 - 16 mmol/L    eGFR >60.0 >60 mL/min/1.73 m^2   Hepatic Function Panel    Collection Time: 02/27/23  8:37 AM   Result Value Ref Range    Total Protein 7.0 6.0 - 8.4 g/dL    Albumin 4.3 3.5 - 5.2 g/dL    Total Bilirubin 0.4 0.1 - 1.0 mg/dL    Bilirubin, Direct 0.1 0.1 - 0.3 mg/dL    AST 11 10 - 40 U/L    ALT 7 (L) 10 - 44 U/L    Alkaline Phosphatase 57 55 - 135 U/L   CBC Auto Differential    Collection Time: 02/27/23  8:37 AM   Result Value Ref Range    WBC 3.86 (L) 3.90 - 12.70 K/uL    RBC 4.34 4.00 - 5.40 M/uL    Hemoglobin 12.2 12.0 - 16.0 g/dL    Hematocrit 38.6 37.0 - 48.5 %    MCV 89 82 - 98 fL    MCH 28.1 27.0 - 31.0 pg    MCHC 31.6 (L) 32.0 - 36.0 g/dL    RDW 13.3 11.5 - 14.5 %    Platelets 305 150 - 450 K/uL    MPV 10.7 9.2 - 12.9 fL    Immature  Granulocytes 0.3 0.0 - 0.5 %    Gran # (ANC) 1.8 1.8 - 7.7 K/uL    Immature Grans (Abs) 0.01 0.00 - 0.04 K/uL    Lymph # 1.7 1.0 - 4.8 K/uL    Mono # 0.3 0.3 - 1.0 K/uL    Eos # 0.1 0.0 - 0.5 K/uL    Baso # 0.02 0.00 - 0.20 K/uL    nRBC 0 0 /100 WBC    Gran % 47.7 38.0 - 73.0 %    Lymph % 42.7 18.0 - 48.0 %    Mono % 6.5 4.0 - 15.0 %    Eosinophil % 2.3 0.0 - 8.0 %    Basophil % 0.5 0.0 - 1.9 %    Differential Method Automated    BASIC METABOLIC PANEL    Collection Time: 03/08/23  9:33 AM   Result Value Ref Range    Sodium 144 136 - 145 mmol/L    Potassium 3.2 (L) 3.5 - 5.1 mmol/L    Chloride 107 95 - 110 mmol/L    CO2 23 23 - 29 mmol/L    Glucose 86 70 - 110 mg/dL    BUN 11 6 - 20 mg/dL    Creatinine 0.9 0.5 - 1.4 mg/dL    Calcium 9.7 8.7 - 10.5 mg/dL    Anion Gap 14 8 - 16 mmol/L    eGFR >60.0 >60 mL/min/1.73 m^2   Comprehensive Metabolic Panel    Collection Time: 03/13/23  2:15 PM   Result Value Ref Range    Sodium 143 136 - 145 mmol/L    Potassium 2.8 (L) 3.5 - 5.1 mmol/L    Chloride 107 95 - 110 mmol/L    CO2 23 23 - 29 mmol/L    Glucose 88 70 - 110 mg/dL    BUN 10 6 - 20 mg/dL    Creatinine 0.7 0.5 - 1.4 mg/dL    Calcium 8.9 8.7 - 10.5 mg/dL    Total Protein 6.8 6.0 - 8.4 g/dL    Albumin 3.9 3.5 - 5.2 g/dL    Total Bilirubin 0.3 0.1 - 1.0 mg/dL    Alkaline Phosphatase 53 (L) 55 - 135 U/L    AST 12 10 - 40 U/L    ALT 9 (L) 10 - 44 U/L    Anion Gap 13 8 - 16 mmol/L    eGFR >60 >60 mL/min/1.73 m^2   CBC Auto Differential    Collection Time: 03/13/23  2:15 PM   Result Value Ref Range    WBC 4.92 3.90 - 12.70 K/uL    RBC 3.87 (L) 4.00 - 5.40 M/uL    Hemoglobin 11.2 (L) 12.0 - 16.0 g/dL    Hematocrit 32.6 (L) 37.0 - 48.5 %    MCV 84 82 - 98 fL    MCH 28.9 27.0 - 31.0 pg    MCHC 34.4 32.0 - 36.0 g/dL    RDW 13.1 11.5 - 14.5 %    Platelets 293 150 - 450 K/uL    MPV 9.4 9.2 - 12.9 fL    Immature Granulocytes 0.2 0.0 - 0.5 %    Gran # (ANC) 2.8 1.8 - 7.7 K/uL    Immature Grans (Abs) 0.01 0.00 - 0.04 K/uL    Lymph # 1.7  1.0 - 4.8 K/uL    Mono # 0.3 0.3 - 1.0 K/uL    Eos # 0.1 0.0 - 0.5 K/uL    Baso # 0.02 0.00 - 0.20 K/uL    nRBC 0 0 /100 WBC    Gran % 57.1 38.0 - 73.0 %    Lymph % 34.6 18.0 - 48.0 %    Mono % 6.1 4.0 - 15.0 %    Eosinophil % 1.6 0.0 - 8.0 %    Basophil % 0.4 0.0 - 1.9 %    Differential Method Automated    C-Reactive Protein    Collection Time: 03/13/23  2:15 PM   Result Value Ref Range    CRP 2.4 0.0 - 8.2 mg/L   Urinalysis Microscopic    Collection Time: 03/13/23  2:15 PM   Result Value Ref Range    RBC, UA 4 0 - 4 /hpf    WBC, UA 1 0 - 5 /hpf    Bacteria Rare None-Occ /hpf    Squam Epithel, UA 6 /hpf    Microscopic Comment SEE COMMENT        No results found for: TBGOLDPLUS   Lab Results   Component Value Date    HEPCAB Negative 10/13/2020      Imaging  I have personally reviewed images and reports as below.  I agree with the interpretation.  X-Ray Chest AP Portable  Order: [id number]  Status: Final result     Visible to patient: Yes (seen)     Next appt: Today at 04:10 PM in Lab (LABORATORY, [facility])     0 Result Notes  Details    Reading Physician Reading Date Result Priority   Kateryna Jeffries MD  [phone] 10/21/2022 STAT     Narrative & Impression  EXAMINATION:  XR CHEST AP PORTABLE     CLINICAL HISTORY:  Chest Pain;     TECHNIQUE:  Single view     COMPARISON:  December 2021     FINDINGS:  Mediastinal contour stable midline.  MediPort similar position.  Lungs well aerated.  No pleural effusion.     Impression:     No active or adverse finding        Electronically signed by: Kateryna Jeffries  Date:                                            10/21/2022  Time:                                           21:37         Assessment:     1. Systemic lupus erythematosus, unspecified SLE type, unspecified organ involvement status    2. Medication monitoring encounter    3. High risk medication use    4. Immunocompromised            Plan:     Tosha was seen today for lupus, fibromyalgia and ankylosing  spondylitis.    Diagnoses and all orders for this visit:    Systemic lupus erythematosus, unspecified SLE type, unspecified organ involvement status  -     Ambulatory referral/consult to Ophthalmology; Future  -     Comprehensive Metabolic Panel; Standing  -     CBC Auto Differential; Standing  -     Sedimentation rate; Standing  -     C-Reactive Protein; Standing  -     Anti-DNA Ab, Double-Stranded; Standing  -     Protein/Creatinine Ratio, Urine; Standing  -     C4 Complement; Standing  -     C3 Complement; Standing  -     Urinalysis Microscopic; Standing  -     HLA B27 Antigen; Future  -     Quantiferon Gold TB; Standing  -     Hepatitis Panel, Acute; Standing    Medication monitoring encounter    High risk medication use    Immunocompromised        Reported h/o SLE  C/o current flare  SLE labs today  No leukopenia/thrombocytopenia  UA clear  CRP WNL  Will get updated TB and hep panel as well  Discussed PDN taper - pt deferred for now  Reconsider PDN taper pending labs  Consider Chest CT  C/w Plaquenil 200 mg daily  Dose is 5.24mg/kg  6-7 yrs therapy (started in 2016)  Last plaq eye exam one year ago  Optho ref for plaquenil monitoring  Drug therapy requiring intensive monitoring for toxicity  High Risk Medication Monitoring encounter  No current medication related issues, no evidence of toxicity  I ordered labs for toxicity monitoring, have personally reviewed the findings, and discussed them with the patient.  Pending labs will be sent via the portal  Compromised immune system secondary to autoimmune disease and/or use of immunosuppressive drugs.  Monitor carefully for infections.  Advised patient to get immediate medical care if any infection arises.  Also advised strict adherence age-appropriate vaccinations and cancer screenings with PCP.  Patient advised to hold DMARD and/or biologic therapy for signs of infection or for surgery. If you are unsure what to do please call our office for instruction.[facility] [phone]  Return to clinic: 3-4 weeks - vv to discuss labs and f/u after optho ref    75 minutes of total time spent on the encounter, which includes face to face time and non-face to face time preparing to see the patient (eg, review of tests), Obtaining and/or reviewing separately obtained history, Documenting clinical information in the electronic or other health record, Independently interpreting results (not separately reported) and communicating results to the patient/family/caregiver, or Care coordination (not separately reported).     Follow up in about 4 weeks (around 4/10/2023).    The patient understands, chooses and consents to this plan and accepts all   the risks which include but are not limited to the risks mentioned above.     Disclaimer: This note was prepared using a voice recognition system and is likely to have sound alike errors within the text.

## 2023-03-14 LAB
C3 SERPL-MCNC: 131 MG/DL (ref 50–180)
C4 SERPL-MCNC: 40 MG/DL (ref 11–44)
DSDNA AB SER-ACNC: NORMAL [IU]/ML
ERYTHROCYTE [SEDIMENTATION RATE] IN BLOOD BY PHOTOMETRIC METHOD: 10 MM/HR (ref 0–36)
HAV IGM SERPL QL IA: NORMAL
HBV CORE IGM SERPL QL IA: NORMAL
HBV SURFACE AG SERPL QL IA: NORMAL
HCV AB SERPL QL IA: NORMAL

## 2023-03-14 RX ORDER — HYDROXYCHLOROQUINE SULFATE 200 MG/1
200 TABLET, FILM COATED ORAL DAILY
Qty: 30 TABLET | Refills: 1 | Status: SHIPPED | OUTPATIENT
Start: 2023-03-14 | End: 2023-08-07 | Stop reason: SDUPTHER

## 2023-03-15 LAB
GAMMA INTERFERON BACKGROUND BLD IA-ACNC: 0 IU/ML
M TB IFN-G CD4+ BCKGRND COR BLD-ACNC: 0.01 IU/ML
MITOGEN IGNF BCKGRD COR BLD-ACNC: 10 IU/ML
TB GOLD PLUS: NEGATIVE
TB2 - NIL: 0 IU/ML

## 2023-03-29 ENCOUNTER — HOSPITAL ENCOUNTER (OUTPATIENT)
Dept: RADIOLOGY | Facility: HOSPITAL | Age: 58
Discharge: HOME | End: 2023-03-29
Attending: INTERNAL MEDICINE
Payer: COMMERCIAL

## 2023-03-29 ENCOUNTER — PATIENT MESSAGE (OUTPATIENT)
Dept: INTERNAL MEDICINE | Facility: CLINIC | Age: 58
End: 2023-03-29
Payer: COMMERCIAL

## 2023-03-29 ENCOUNTER — HOSPITAL ENCOUNTER (OUTPATIENT)
Dept: CARDIOLOGY | Facility: HOSPITAL | Age: 58
Discharge: HOME | End: 2023-03-29
Attending: INTERNAL MEDICINE
Payer: COMMERCIAL

## 2023-03-29 DIAGNOSIS — R07.89 OTHER CHEST PAIN: ICD-10-CM

## 2023-03-29 DIAGNOSIS — R00.2 PALPITATION: ICD-10-CM

## 2023-03-29 LAB
CV STRESS BASE HR: 69 BPM
DIASTOLIC BLOOD PRESSURE: 97 MMHG
NUC REST EJECTION FRACTION: 86
NUC STRESS EJECTION FRACTION: 91 %
OHS CV CPX 85 PERCENT MAX PREDICTED HEART RATE MALE: 133
OHS CV CPX ESTIMATED METS: 1
OHS CV CPX MAX PREDICTED HEART RATE: 157
OHS CV CPX PATIENT IS FEMALE: 1
OHS CV CPX PATIENT IS MALE: 0
OHS CV CPX PEAK DIASTOLIC BLOOD PRESSURE: 89 MMHG
OHS CV CPX PEAK HEAR RATE: 103 BPM
OHS CV CPX PEAK RATE PRESSURE PRODUCT: NORMAL
OHS CV CPX PEAK SYSTOLIC BLOOD PRESSURE: 149 MMHG
OHS CV CPX PERCENT MAX PREDICTED HEART RATE ACHIEVED: 66
OHS CV CPX RATE PRESSURE PRODUCT PRESENTING: NORMAL
SYSTOLIC BLOOD PRESSURE: 157 MMHG

## 2023-03-29 PROCEDURE — 78452 HT MUSCLE IMAGE SPECT MULT: CPT | Mod: 26,,, | Performed by: INTERNAL MEDICINE

## 2023-03-29 PROCEDURE — 93016 CV STRESS TEST SUPVJ ONLY: CPT | Mod: ,,, | Performed by: INTERNAL MEDICINE

## 2023-03-29 PROCEDURE — A9502 TC99M TETROFOSMIN: HCPCS

## 2023-03-29 PROCEDURE — 93248 CV CARDIAC MONITOR - 3-15 DAY ADULT (CUPID ONLY): ICD-10-PCS | Mod: ,,, | Performed by: INTERNAL MEDICINE

## 2023-03-29 PROCEDURE — 93018 NUCLEAR STRESS - CARDIOLOGY INTERPRETED (CUPID ONLY): ICD-10-PCS | Mod: ,,, | Performed by: INTERNAL MEDICINE

## 2023-03-29 PROCEDURE — 93017 CV STRESS TEST TRACING ONLY: CPT

## 2023-03-29 PROCEDURE — 93248 EXT ECG>7D<15D REV&INTERPJ: CPT | Mod: ,,, | Performed by: INTERNAL MEDICINE

## 2023-03-29 PROCEDURE — 78452 NUCLEAR STRESS - CARDIOLOGY INTERPRETED (CUPID ONLY): ICD-10-PCS | Mod: 26,,, | Performed by: INTERNAL MEDICINE

## 2023-03-29 PROCEDURE — 63600175 PHARM REV CODE 636 W HCPCS: Performed by: INTERNAL MEDICINE

## 2023-03-29 PROCEDURE — 78452 HT MUSCLE IMAGE SPECT MULT: CPT

## 2023-03-29 PROCEDURE — 93018 CV STRESS TEST I&R ONLY: CPT | Mod: ,,, | Performed by: INTERNAL MEDICINE

## 2023-03-29 PROCEDURE — 93016 NUCLEAR STRESS - CARDIOLOGY INTERPRETED (CUPID ONLY): ICD-10-PCS | Mod: ,,, | Performed by: INTERNAL MEDICINE

## 2023-03-29 RX ORDER — REGADENOSON 0.08 MG/ML
0.4 INJECTION, SOLUTION INTRAVENOUS ONCE
Status: COMPLETED | OUTPATIENT
Start: 2023-03-29 | End: 2023-03-29

## 2023-03-29 RX ADMIN — REGADENOSON 0.4 MG: 0.08 INJECTION, SOLUTION INTRAVENOUS at 09:03

## 2023-03-30 ENCOUNTER — TELEPHONE (OUTPATIENT)
Dept: CARDIOLOGY | Facility: CLINIC | Age: 58
End: 2023-03-30
Payer: COMMERCIAL

## 2023-03-30 NOTE — TELEPHONE ENCOUNTER
Spoke with pt in regards to nuclear stress test results. Informed pt that her stress test was normal. Pt verbalized she understood results.             ----- Message from Micah Ireland MD sent at 3/30/2023 10:53 AM CDT -----  Nuke stress test normal

## 2023-04-03 ENCOUNTER — OFFICE VISIT (OUTPATIENT)
Dept: INTERNAL MEDICINE | Facility: CLINIC | Age: 58
End: 2023-04-03
Payer: COMMERCIAL

## 2023-04-03 DIAGNOSIS — G47.00 INSOMNIA, UNSPECIFIED TYPE: ICD-10-CM

## 2023-04-03 DIAGNOSIS — F41.9 ANXIETY: Primary | ICD-10-CM

## 2023-04-03 PROCEDURE — 1160F RVW MEDS BY RX/DR IN RCRD: CPT | Mod: CPTII,95,, | Performed by: NURSE PRACTITIONER

## 2023-04-03 PROCEDURE — 1160F PR REVIEW ALL MEDS BY PRESCRIBER/CLIN PHARMACIST DOCUMENTED: ICD-10-PCS | Mod: CPTII,95,, | Performed by: NURSE PRACTITIONER

## 2023-04-03 PROCEDURE — 1159F PR MEDICATION LIST DOCUMENTED IN MEDICAL RECORD: ICD-10-PCS | Mod: CPTII,95,, | Performed by: NURSE PRACTITIONER

## 2023-04-03 PROCEDURE — 99214 OFFICE O/P EST MOD 30 MIN: CPT | Mod: 95,,, | Performed by: NURSE PRACTITIONER

## 2023-04-03 PROCEDURE — 1159F MED LIST DOCD IN RCRD: CPT | Mod: CPTII,95,, | Performed by: NURSE PRACTITIONER

## 2023-04-03 PROCEDURE — 99214 PR OFFICE/OUTPT VISIT, EST, LEVL IV, 30-39 MIN: ICD-10-PCS | Mod: 95,,, | Performed by: NURSE PRACTITIONER

## 2023-04-03 RX ORDER — SUVOREXANT 10 MG/1
1 TABLET, FILM COATED ORAL NIGHTLY PRN
Qty: 30 TABLET | Refills: 0 | Status: SHIPPED | OUTPATIENT
Start: 2023-04-03 | End: 2023-08-22 | Stop reason: ALTCHOICE

## 2023-04-03 NOTE — PROGRESS NOTES
Subjective:       Patient ID: Tosha Kwok is a 58 y.o. female.    Chief Complaint: No chief complaint on file.    The patient location is: [place]   The chief complaint leading to consultation is: insomnia    Visit type: audiovisual    Face to Face time with patient: 12 minutes  20 minutes of total time spent on the encounter, which includes face to face time and non-face to face time preparing to see the patient (eg, review of tests), Obtaining and/or reviewing separately obtained history, Documenting clinical information in the electronic or other health record, Independently interpreting results (not separately reported) and communicating results to the patient/family/caregiver, or Care coordination (not separately reported).     Each patient to whom he or she provides medical services by telemedicine is:  (1) informed of the relationship between the physician and patient and the respective role of any other health care provider with respect to management of the patient; and (2) notified that he or she may decline to receive medical services by telemedicine and may withdraw from such care at any time.    Notes:   Mrs. Kwok presents to visit for complaint of insomnia and anxiety. Feels like they are both rebounding and causing the other. No relief with lunesta anymore. Previously worked in [employer]. [place]ines to have anxiety, worse at night, but now becoming more frequent during day. Has not seen psychiatry in past. Has tried several mediations in past. Med list reviewed; previous medications include, amitriptyline, abilify, ambien, and lunesta.     Patient Active Problem List   Diagnosis    Atrophy of vulva    Ankylosing spondylitis    Chronic, continuous use of opioids    Fibromyalgia    Elevated d-dimer    History of sleeve gastrectomy    Intractable pain    De Quervain's tenosynovitis, right    Adhesive capsulitis of right shoulder    Anxiety    Asthma    Lumbosacral spondylosis    GERD (gastroesophageal  reflux disease)    Primary hypertension    Hyperlipidemia    Insulin resistance    Large joint arthralgia of multiple sites    Lupus (systemic lupus erythematosus)    Migraine    Postmenopausal hormone replacement therapy    Vitamin D deficiency    Impingement syndrome of left shoulder    Limited range of motion (ROM) of shoulder    Left foot pain    Vestibular hypofunction    Other iron deficiency anemias    Keloid scar of skin    Iron deficiency anemia due to chronic blood loss    Restless leg syndrome    Chest pain    Insomnia    Port-A-Cath in place    TIA (transient ischemic attack)    History of non anemic vitamin B12 deficiency    Acute pain of right shoulder    Weight gain    Severe obesity    Nausea and vomiting    Hypokalemia    Gastritis    Hiatal hernia    Prolonged Q-T interval on ECG    Palpitation       Family History   Problem Relation Age of Onset    Hypertension Mother     Hypertension Brother     Breast cancer Maternal Aunt         x2     Heart attack Father     Hypertension Sister     Lupus Sister     Breast cancer Maternal Grandmother      Past Surgical History:   Procedure Laterality Date    BLADDER SURGERY      BREAST LUMPECTOMY  2018    BREAST RECONSTRUCTION      BREAST SURGERY  2018    reduction     SECTION      CHOLECYSTECTOMY      COLONOSCOPY N/A 10/2/2020    Procedure: COLONOSCOPY;  Surgeon: Guicho Hood MD;  Location: [facility];  Service: Endoscopy;  Laterality: N/A;    ESOPHAGOGASTRODUODENOSCOPY N/A 2023    Procedure: EGD (ESOPHAGOGASTRODUODENOSCOPY);  Surgeon: Belkis Mario MD;  Location: [facility];  Service: Endoscopy;  Laterality: N/A;    EXCISION OF GRANULOMA Bilateral 2021    Procedure: EXCISION, GRANULOMA SCARS OF BREASTS;  Surgeon: Obed Palmer Jr., MD;  Location: [facility];  Service: Plastics;  Laterality: Bilateral;    HYSTERECTOMY      LASER ABLATION      to back    SLEEVE GASTROPLASTY  2016    TOTAL REDUCTION MAMMOPLASTY Bilateral     two  years ago    TUBAL LIGATION           Current Outpatient Medications:     albuterol (PROAIR HFA) 90 mcg/actuation inhaler, Inhale 2 puffs into the lungs every 4 hours as needed., Disp: 18 g, Rfl: 3    atorvastatin (LIPITOR) 20 MG tablet, Take 1 tablet (20 mg total) by mouth once daily., Disp: 90 tablet, Rfl: 3    cetirizine (ZYRTEC) 10 MG tablet, Take 1 tablet (10 mg total) by mouth once daily., Disp: 90 tablet, Rfl: 3    clonazePAM (KLONOPIN) 0.5 MG tablet, Take 1 tablet (0.5 mg total) by mouth 2 (two) times daily as needed for Anxiety (panic attacks)., Disp: 20 tablet, Rfl: 0    cyanocobalamin 1,000 mcg/mL injection, Inject 1 mL (1,000 mcg total) into the muscle every 28 days., Disp: 10 mL, Rfl: 1    EPINEPHrine (EPIPEN 2-DENNIS) 0.3 mg/0.3 mL AtIn, Inject 0.3 mLs (0.3 mg total) into the muscle once. for 1 dose, Disp: 2 each, Rfl: 3    hydroCHLOROthiazide (HYDRODIURIL) 12.5 MG Tab, Take 2 tablets (25 mg total) by mouth once daily., Disp: 180 tablet, Rfl: 3    HYDROmorphone (DILAUDID) 2 MG tablet, Take 1 tablet (2 mg total) by mouth every 6 to 8 hours as needed for pain, Disp: 105 tablet, Rfl: 0    HYDROmorphone (DILAUDID) 2 MG tablet, take 1 tablet by mouth every 6-8 hours as needed for pain, Disp: 105 tablet, Rfl: 0    HYDROmorphone (DILAUDID) 2 MG tablet, Take 1 tablet (2 mg total) by mouth every 6 to 8 hours as needed for pain, Disp: 105 tablet, Rfl: 0    hydroxychloroquine (PLAQUENIL) 200 mg tablet, Take 200 mg by mouth once daily., Disp: , Rfl: 1    hydrOXYchloroQUINE (PLAQUENIL) 200 mg tablet, Take 1 tablet (200 mg total) by mouth once daily., Disp: 30 tablet, Rfl: 1    hydrOXYzine pamoate (VISTARIL) 25 MG Cap, Take 1 capsule (25 mg total) by mouth every 8 (eight) hours as needed (anxiety)., Disp: 90 capsule, Rfl: 3    metoprolol succinate (TOPROL-XL) 25 MG 24 hr tablet, Take 1 tablet (25 mg total) by mouth once daily., Disp: 90 tablet, Rfl: 2    pantoprazole (PROTONIX) 40 MG tablet, Take 1 tablet (40 mg total)  by mouth 2 (two) times daily., Disp: 60 tablet, Rfl: 1    potassium chloride SA (K-DUR,KLOR-CON M) 10 MEQ tablet, Take 2 tablets (20 mEq total) by mouth 2 (two) times daily for 7 days, THEN 1 tablet (10 mEq total) once daily for 21 days., Disp: 49 tablet, Rfl: 0    promethazine (PHENERGAN) 25 MG tablet, Take 1 tablet (25 mg total) by mouth every 6 (six) hours as needed for Nausea., Disp: 15 tablet, Rfl: 0    semaglutide (OZEMPIC) 0.25 mg or 0.5 mg(2 mg/1.5 mL) pen injector, Inject 0.5 mg into the skin once a week., Disp: 3 pen, Rfl: 1    suvorexant (BELSOMRA) 10 mg Tab, Take 1 tablet by mouth nightly as needed (insomnia.)., Disp: 30 tablet, Rfl: 0    tiZANidine (ZANAFLEX) 4 MG tablet, Take 4 mg by mouth 3 (three) times daily as needed., Disp: , Rfl:     topiramate (TOPAMAX) 100 MG tablet, Take 1 tablet (100 mg total) by mouth 2 (two) times daily., Disp: 180 tablet, Rfl: 3    Review of Systems   Constitutional:  Negative for activity change and unexpected weight change.   HENT:  Negative for hearing loss, rhinorrhea and trouble swallowing.    Eyes:  Negative for discharge and visual disturbance.   Respiratory:  Negative for chest tightness and wheezing.    Cardiovascular:  Negative for chest pain and palpitations.   Gastrointestinal:  Negative for blood in stool, constipation, diarrhea and vomiting.   Endocrine: Negative for polydipsia and polyuria.   Genitourinary:  Negative for difficulty urinating, dysuria, hematuria and menstrual problem.   Musculoskeletal:  Negative for arthralgias, joint swelling and neck pain.   Neurological:  Negative for weakness and headaches.   Psychiatric/Behavioral:  Positive for sleep disturbance. Negative for confusion and dysphoric mood. The patient is nervous/anxious.      Objective:   LMP 03/26/2012      Physical Exam  Constitutional:       General: She is not in acute distress.     Appearance: Normal appearance. She is not ill-appearing.   Pulmonary:      Effort: Pulmonary effort  "is normal. No respiratory distress.   Neurological:      General: No focal deficit present.      Mental Status: She is alert and oriented to person, place, and time.   Psychiatric:         Mood and Affect: Mood normal.       Assessment & Plan     Problem List Items Addressed This Visit          Psychiatric    Anxiety - Primary    Overview     Has tried lexapro (possible EKG changes), wellbutrin (hallucination), abilify, effexor, and hydroxyzine in past.            Current Assessment & Plan     Held off on SSRI due to last hospital encounter with possible EKG changes after starting lexapro. Klonopin prn at night. Referral to psych to see what other recommendations for anxiety since patient has tried several medications in past.            Relevant Orders    Ambulatory referral/consult to Psychiatry       Other    Insomnia    Current Assessment & Plan     No longer having improvement with lunesta. Has significant anxiety about sleep. Wakes up in the middle of night and unable to go back to sleep. Has panic episodes, worse at night if she is worrying about waking up in a few hours. Referral to psych since it seems that anxiety is actually what is causing problems with sleep. Trial of belsomra.           Relevant Medications    suvorexant (BELSOMRA) 10 mg Tab    Other Relevant Orders    Ambulatory referral/consult to Psychiatry       Follow up in 1 month (on 5/3/2023), or if symptoms worsen or fail to improve.          Portions of this note may have been created with voice recognition software. Occasional "wrong-word" or "sound-a-like" substitutions may have occurred due to the inherent limitations of voice recognition software. Please, read the note carefully and recognize, using context, where substitutions have occurred.       "

## 2023-04-07 NOTE — ASSESSMENT & PLAN NOTE
Held off on SSRI due to last hospital encounter with possible EKG changes after starting lexapro. Klonopin prn at night. Referral to psych to see what other recommendations for anxiety since patient has tried several medications in past.

## 2023-04-07 NOTE — ASSESSMENT & PLAN NOTE
No longer having improvement with lunesta. Has significant anxiety about sleep. Wakes up in the middle of night and unable to go back to sleep. Has panic episodes, worse at night if she is worrying about waking up in a few hours. Referral to psych since it seems that anxiety is actually what is causing problems with sleep. Trial of belsomra.

## 2023-04-10 ENCOUNTER — TELEPHONE (OUTPATIENT)
Dept: HEMATOLOGY/ONCOLOGY | Facility: CLINIC | Age: 58
End: 2023-04-10

## 2023-04-10 NOTE — TELEPHONE ENCOUNTER
Informed pt that Dr Marino stated he reviewed her labs and vit b12 level is still low so he will sent in more b12 injections and would like to see her in person in 6 months. Pt verbalized understanding. Confirmed lab appt with pt for 10/10/23 and dr hopper for 10/11/23 at 1pm.

## 2023-04-12 ENCOUNTER — PATIENT MESSAGE (OUTPATIENT)
Dept: PSYCHIATRY | Facility: CLINIC | Age: 58
End: 2023-04-12
Payer: COMMERCIAL

## 2023-04-13 ENCOUNTER — PATIENT MESSAGE (OUTPATIENT)
Dept: INTERNAL MEDICINE | Facility: CLINIC | Age: 58
End: 2023-04-13
Payer: COMMERCIAL

## 2023-04-18 ENCOUNTER — PATIENT MESSAGE (OUTPATIENT)
Dept: RHEUMATOLOGY | Facility: CLINIC | Age: 58
End: 2023-04-18
Payer: COMMERCIAL

## 2023-04-18 ENCOUNTER — HOSPITAL ENCOUNTER (EMERGENCY)
Facility: HOSPITAL | Age: 58
Discharge: HOME | End: 2023-04-18
Attending: EMERGENCY MEDICINE
Payer: COMMERCIAL

## 2023-04-18 VITALS
RESPIRATION RATE: 13 BRPM | OXYGEN SATURATION: 97 % | DIASTOLIC BLOOD PRESSURE: 70 MMHG | SYSTOLIC BLOOD PRESSURE: 125 MMHG | HEIGHT: 62 IN | TEMPERATURE: 100 F | WEIGHT: 161.63 LBS | BODY MASS INDEX: 29.74 KG/M2 | HEART RATE: 98 BPM

## 2023-04-18 DIAGNOSIS — U07.1 COVID-19 VIRUS DETECTED: ICD-10-CM

## 2023-04-18 DIAGNOSIS — U07.1 COVID-19 VIRUS INFECTION: ICD-10-CM

## 2023-04-18 DIAGNOSIS — R07.9 CHEST PAIN: Primary | ICD-10-CM

## 2023-04-18 DIAGNOSIS — R51.9 NONINTRACTABLE HEADACHE, UNSPECIFIED CHRONICITY PATTERN, UNSPECIFIED HEADACHE TYPE: ICD-10-CM

## 2023-04-18 DIAGNOSIS — R50.9 FEVER, UNSPECIFIED FEVER CAUSE: ICD-10-CM

## 2023-04-18 LAB
ALBUMIN SERPL BCP-MCNC: 3.7 G/DL (ref 3.5–5.2)
ALP SERPL-CCNC: 79 U/L (ref 55–135)
ALT SERPL W/O P-5'-P-CCNC: 35 U/L (ref 10–44)
ANION GAP SERPL CALC-SCNC: 10 MMOL/L (ref 8–16)
AST SERPL-CCNC: 26 U/L (ref 10–40)
BASOPHILS # BLD AUTO: 0.03 K/UL (ref 0–0.2)
BASOPHILS NFR BLD: 0.5 % (ref 0–1.9)
BILIRUB SERPL-MCNC: 0.3 MG/DL (ref 0.1–1)
BNP SERPL-MCNC: 10 PG/ML (ref 0–99)
BUN SERPL-MCNC: 11 MG/DL (ref 6–20)
CALCIUM SERPL-MCNC: 9 MG/DL (ref 8.7–10.5)
CHLORIDE SERPL-SCNC: 104 MMOL/L (ref 95–110)
CK SERPL-CCNC: 35 U/L (ref 20–180)
CO2 SERPL-SCNC: 27 MMOL/L (ref 23–29)
CREAT SERPL-MCNC: 0.8 MG/DL (ref 0.5–1.4)
CTP QC/QA: YES
CTP QC/QA: YES
DIFFERENTIAL METHOD: ABNORMAL
EOSINOPHIL # BLD AUTO: 0.1 K/UL (ref 0–0.5)
EOSINOPHIL NFR BLD: 1.1 % (ref 0–8)
ERYTHROCYTE [DISTWIDTH] IN BLOOD BY AUTOMATED COUNT: 13.4 % (ref 11.5–14.5)
EST. GFR  (NO RACE VARIABLE): >60 ML/MIN/1.73 M^2
GLUCOSE SERPL-MCNC: 89 MG/DL (ref 70–110)
HCT VFR BLD AUTO: 31.5 % (ref 37–48.5)
HGB BLD-MCNC: 10.7 G/DL (ref 12–16)
IMM GRANULOCYTES # BLD AUTO: 0.03 K/UL (ref 0–0.04)
IMM GRANULOCYTES NFR BLD AUTO: 0.5 % (ref 0–0.5)
LYMPHOCYTES # BLD AUTO: 1 K/UL (ref 1–4.8)
LYMPHOCYTES NFR BLD: 14.6 % (ref 18–48)
MCH RBC QN AUTO: 28.9 PG (ref 27–31)
MCHC RBC AUTO-ENTMCNC: 34 G/DL (ref 32–36)
MCV RBC AUTO: 85 FL (ref 82–98)
MONOCYTES # BLD AUTO: 0.5 K/UL (ref 0.3–1)
MONOCYTES NFR BLD: 8.1 % (ref 4–15)
NEUTROPHILS # BLD AUTO: 5 K/UL (ref 1.8–7.7)
NEUTROPHILS NFR BLD: 75.2 % (ref 38–73)
NRBC BLD-RTO: 0 /100 WBC
PLATELET # BLD AUTO: 298 K/UL (ref 150–450)
PMV BLD AUTO: 9.5 FL (ref 9.2–12.9)
POC MOLECULAR INFLUENZA A AGN: NEGATIVE
POC MOLECULAR INFLUENZA B AGN: NEGATIVE
POTASSIUM SERPL-SCNC: 2.9 MMOL/L (ref 3.5–5.1)
PROT SERPL-MCNC: 6.4 G/DL (ref 6–8.4)
RBC # BLD AUTO: 3.7 M/UL (ref 4–5.4)
SARS-COV-2 RDRP RESP QL NAA+PROBE: POSITIVE
SODIUM SERPL-SCNC: 141 MMOL/L (ref 136–145)
TROPONIN I SERPL DL<=0.01 NG/ML-MCNC: <0.006 NG/ML (ref 0–0.03)
WBC # BLD AUTO: 6.65 K/UL (ref 3.9–12.7)

## 2023-04-18 PROCEDURE — 99285 EMERGENCY DEPT VISIT HI MDM: CPT | Mod: 25,ER

## 2023-04-18 PROCEDURE — 87502 INFLUENZA DNA AMP PROBE: CPT | Mod: ER

## 2023-04-18 PROCEDURE — 84484 ASSAY OF TROPONIN QUANT: CPT | Mod: ER | Performed by: EMERGENCY MEDICINE

## 2023-04-18 PROCEDURE — 93010 ELECTROCARDIOGRAM REPORT: CPT | Mod: ,,, | Performed by: INTERNAL MEDICINE

## 2023-04-18 PROCEDURE — 63600175 PHARM REV CODE 636 W HCPCS: Mod: ER | Performed by: EMERGENCY MEDICINE

## 2023-04-18 PROCEDURE — 25000003 PHARM REV CODE 250: Mod: ER | Performed by: EMERGENCY MEDICINE

## 2023-04-18 PROCEDURE — 93010 EKG 12-LEAD: ICD-10-PCS | Mod: ,,, | Performed by: INTERNAL MEDICINE

## 2023-04-18 PROCEDURE — 82550 ASSAY OF CK (CPK): CPT | Mod: ER | Performed by: EMERGENCY MEDICINE

## 2023-04-18 PROCEDURE — 83880 ASSAY OF NATRIURETIC PEPTIDE: CPT | Mod: ER | Performed by: EMERGENCY MEDICINE

## 2023-04-18 PROCEDURE — 85025 COMPLETE CBC W/AUTO DIFF WBC: CPT | Mod: ER | Performed by: EMERGENCY MEDICINE

## 2023-04-18 PROCEDURE — 80053 COMPREHEN METABOLIC PANEL: CPT | Mod: ER | Performed by: EMERGENCY MEDICINE

## 2023-04-18 PROCEDURE — 96368 THER/DIAG CONCURRENT INF: CPT | Mod: ER

## 2023-04-18 PROCEDURE — 96365 THER/PROPH/DIAG IV INF INIT: CPT | Mod: ER

## 2023-04-18 PROCEDURE — 93005 ELECTROCARDIOGRAM TRACING: CPT | Mod: ER

## 2023-04-18 RX ORDER — BUTALBITAL, ACETAMINOPHEN AND CAFFEINE 50; 325; 40 MG/1; MG/1; MG/1
1 TABLET ORAL EVERY 4 HOURS PRN
Qty: 15 TABLET | Refills: 0 | Status: SHIPPED | OUTPATIENT
Start: 2023-04-18 | End: 2023-05-04 | Stop reason: SDUPTHER

## 2023-04-18 RX ORDER — HEPARIN 100 UNIT/ML
SYRINGE INTRAVENOUS
Status: DISCONTINUED
Start: 2023-04-18 | End: 2023-04-18 | Stop reason: HOSPADM

## 2023-04-18 RX ORDER — IBUPROFEN 200 MG
600 TABLET ORAL
Status: COMPLETED | OUTPATIENT
Start: 2023-04-18 | End: 2023-04-18

## 2023-04-18 RX ORDER — HEPARIN 100 UNIT/ML
5 SYRINGE INTRAVENOUS
Status: COMPLETED | OUTPATIENT
Start: 2023-04-18 | End: 2023-04-18

## 2023-04-18 RX ORDER — PROMETHAZINE HYDROCHLORIDE AND DEXTROMETHORPHAN HYDROBROMIDE 6.25; 15 MG/5ML; MG/5ML
5 SYRUP ORAL EVERY 6 HOURS PRN
Qty: 120 ML | Refills: 0 | Status: SHIPPED | OUTPATIENT
Start: 2023-04-18 | End: 2023-04-28

## 2023-04-18 RX ORDER — POTASSIUM CHLORIDE 7.45 MG/ML
10 INJECTION INTRAVENOUS
Status: COMPLETED | OUTPATIENT
Start: 2023-04-18 | End: 2023-04-18

## 2023-04-18 RX ADMIN — HEPARIN 500 UNITS: 100 SYRINGE at 12:04

## 2023-04-18 RX ADMIN — POTASSIUM BICARBONATE 40 MEQ: 391 TABLET, EFFERVESCENT ORAL at 11:04

## 2023-04-18 RX ADMIN — IBUPROFEN 600 MG: 200 TABLET, FILM COATED ORAL at 10:04

## 2023-04-18 RX ADMIN — POTASSIUM CHLORIDE 10 MEQ: 7.46 INJECTION, SOLUTION INTRAVENOUS at 11:04

## 2023-04-18 RX ADMIN — PROMETHAZINE HYDROCHLORIDE 12.5 MG: 25 INJECTION INTRAMUSCULAR; INTRAVENOUS at 11:04

## 2023-04-18 NOTE — ED PROVIDER NOTES
Encounter Date: 2023       History     Chief Complaint   Patient presents with    Chest Pain     For the past several days with intermittent sob. Also reports productive cough. Hx of lupus     The history is provided by the patient.   Chest Pain  The current episode started yesterday. Chest pain occurs constantly. The chest pain is unchanged. The pain is associated with coughing. Primary symptoms include a fever and cough. Pertinent negatives for primary symptoms include no shortness of breath, no nausea and no vomiting.   Pertinent negatives for associated symptoms include no weakness.   Review of patient's allergies indicates:   Allergen Reactions    Carrot Swelling     angioedema    Morphine Anxiety     Hives   States she can take dilaudid and percocet without any problems    Clindamycin     Macrolide antibiotics     Erythromycin Other (See Comments)     Hives and cramps     Zofran (as hydrochloride) [ondansetron hcl] Hives     Local hive after IV injection     Past Medical History:   Diagnosis Date    Anemia     Ankylosing spondylitis     Anxiety     Asthma     Cancer 2018    left breast  lumpectomy, xrt    Chronic constipation     Chronic insomnia     Edema     Fibromyalgia     Hypertension     Insulin resistance     Interstitial cystitis     Lupus     Lupus     Migraine headache     PONV (postoperative nausea and vomiting)     Restless legs syndrome     Shingles     Stroke     post partum right sided numbness    TIA (transient ischemic attack)      Past Surgical History:   Procedure Laterality Date    BLADDER SURGERY      BREAST LUMPECTOMY  2018    BREAST RECONSTRUCTION      BREAST SURGERY  2018    reduction     SECTION      CHOLECYSTECTOMY      COLONOSCOPY N/A 10/2/2020    Procedure: COLONOSCOPY;  Surgeon: Guicho Hood MD;  Location: [facility];  Service: Endoscopy;  Laterality: N/A;    ESOPHAGOGASTRODUODENOSCOPY N/A 2023    Procedure: EGD (ESOPHAGOGASTRODUODENOSCOPY);  Surgeon:  Belkis Mario MD;  Location: [facility] ENDO;  Service: Endoscopy;  Laterality: N/A;    EXCISION OF GRANULOMA Bilateral 4/6/2021    Procedure: EXCISION, GRANULOMA SCARS OF BREASTS;  Surgeon: Obed Palmer Jr., MD;  Location: [facility] OR;  Service: Plastics;  Laterality: Bilateral;    HYSTERECTOMY      LASER ABLATION      to back    SLEEVE GASTROPLASTY  05/2016    TOTAL REDUCTION MAMMOPLASTY Bilateral     two years ago    TUBAL LIGATION       Family History   Problem Relation Age of Onset    Hypertension Mother     Hypertension Brother     Breast cancer Maternal Aunt         x2     Heart attack Father     Hypertension Sister     Lupus Sister     Breast cancer Maternal Grandmother      Social History     Tobacco Use    Smoking status: Never    Smokeless tobacco: Never   Substance Use Topics    Alcohol use: Yes     Comment: occasionally    Drug use: No     Review of Systems   Constitutional:  Positive for fever.   HENT:  Negative for sore throat.    Respiratory:  Positive for cough. Negative for shortness of breath.    Cardiovascular:  Positive for chest pain.   Gastrointestinal:  Negative for nausea and vomiting.   Genitourinary:  Negative for dysuria.   Musculoskeletal:  Negative for back pain.   Skin:  Negative for rash.   Neurological:  Negative for weakness.   Hematological:  Does not bruise/bleed easily.     Physical Exam     Initial Vitals [04/18/23 0858]   BP Pulse Resp Temp SpO2   121/61 93 19 99.5 °F (37.5 °C) 100 %      MAP       --         Physical Exam    Nursing note and vitals reviewed.  Constitutional: She appears well-developed and well-nourished. No distress.   HENT:   Head: Normocephalic and atraumatic.   Mouth/Throat: Oropharynx is clear and moist.   Eyes: Conjunctivae and EOM are normal. Pupils are equal, round, and reactive to light.   Neck: Neck supple.   Normal range of motion.  Cardiovascular:  Normal rate, regular rhythm and normal heart sounds.     Exam reveals no gallop and no friction  rub.       No murmur heard.  Pulmonary/Chest: Breath sounds normal. No respiratory distress. She has no wheezes. She has no rhonchi. She has no rales.   Abdominal: Abdomen is soft. Bowel sounds are normal. She exhibits no distension and no mass. There is no abdominal tenderness. There is no rebound and no guarding.   Musculoskeletal:         General: No tenderness or edema. Normal range of motion.      Cervical back: Normal range of motion and neck supple.     Neurological: She is alert and oriented to person, place, and time. She has normal strength.   Skin: Skin is warm and dry. No rash noted.   Psychiatric: She has a normal mood and affect. Thought content normal.       ED Course   Procedures  Labs Reviewed   CBC W/ AUTO DIFFERENTIAL - Abnormal; Notable for the following components:       Result Value    RBC 3.70 (*)     Hemoglobin 10.7 (*)     Hematocrit 31.5 (*)     Gran % 75.2 (*)     Lymph % 14.6 (*)     All other components within normal limits   COMPREHENSIVE METABOLIC PANEL - Abnormal; Notable for the following components:    Potassium 2.9 (*)     All other components within normal limits   SARS-COV-2 RDRP GENE - Abnormal; Notable for the following components:    POC Rapid COVID Positive (*)     All other components within normal limits    Narrative:     This test utilizes isothermal nucleic acid amplification technology to detect the SARS-CoV-2 RdRp nucleic acid segment. The analytical sensitivity (limit of detection) is 500 copies/swab.     A POSITIVE result is indicative of the presence of SARS-CoV-2 RNA; clinical correlation with patient history and other diagnostic information is necessary to determine patient infection status.    A NEGATIVE result means that SARS-CoV-2 nucleic acids are not present above the limit of detection. A NEGATIVE result should be treated as presumptive. It does not rule out the possibility of COVID-19 and should not be the sole basis for treatment decisions. If COVID-19 is  strongly suspected based on clinical and exposure history, re-testing using an alternate molecular assay should be considered.     This test is only for use under the Food and Drug Administration s Emergency Use Authorization (EUA).     Commercial kits are provided by [employer]. Performance characteristics of the EUA have been independently verified by [facility] Department of Pathology and Laboratory Medicine.   _________________________________________________________________   The authorized Fact Sheet for Healthcare Providers and the authorized Fact Sheet for Patients of the ID NOW COVID-19 are available on the FDA website:    https://www.fda.gov/media/472936/download      https://www.fda.gov/media/336934/download      B-TYPE NATRIURETIC PEPTIDE   CK   TROPONIN I   URINALYSIS, REFLEX TO URINE CULTURE   POCT INFLUENZA A/B MOLECULAR     EKG Readings: (Independently Interpreted)   Rhythm: Normal Sinus Rhythm. Heart Rate: 90. Ectopy: No Ectopy. Conduction: Normal. ST Segments: Normal ST Segments. T Waves: Normal. Clinical Impression: Normal Sinus Rhythm   ECG Results              EKG 12-lead (In process)  Result time 04/18/23 09:36:19      In process by Interface, Lab In [facility] (04/18/23 09:36:19)                   Narrative:    Test Reason : R07.9,    Vent. Rate : 090 BPM     Atrial Rate : 090 BPM     P-R Int : 168 ms          QRS Dur : 084 ms      QT Int : 364 ms       P-R-T Axes : 042 -23 037 degrees     QTc Int : 445 ms    Normal sinus rhythm  Normal ECG  When compared with ECG of 29-MAR-2023 08:58,  No significant change was found    Referred By: [email or web address]   SELF           Confirmed By:                                   Imaging Results              X-Ray Chest AP Portable (Final result)  Result time 04/18/23 09:29:48      Final result by DAGOBERTO Nunes Sr., MD (04/18/23 09:29:48)                   Impression:      1. The lungs are clear.  2. A right subclavian venous line remains in  place.  .      Electronically signed by: Gorge Nunes MD  Date:    04/18/2023  Time:    09:29               Narrative:    EXAMINATION:  XR CHEST AP PORTABLE    CLINICAL HISTORY:  chest pain;    COMPARISON:  10/21/2022    FINDINGS:  A right subclavian venous line remains in place.  The size of the heart is normal. The lungs are clear. There is no pneumothorax.  The costophrenic angles are sharp.                                       Medications   heparin, porcine (PF) (heparin flush 100 units/mL) 100 unit/mL injection flush (has no administration in time range)   ibuprofen tablet 600 mg (600 mg Oral Given 4/18/23 1038)   potassium chloride 10 mEq in 100 mL IVPB (0 mEq Intravenous Stopped 4/18/23 1233)   potassium bicarbonate disintegrating tablet 40 mEq (40 mEq Oral Given 4/18/23 1103)   promethazine (PHENERGAN) 12.5 mg in dextrose 5 % (D5W) 50 mL IVPB (0 mg Intravenous Stopped 4/18/23 1141)   heparin, porcine (PF) 100 unit/mL injection flush 500 Units (500 Units Intravenous Given 4/18/23 1232)     Medical Decision Making:   Initial Assessment:   Headache, weakness, cough.  Exposed to someone with COVID at work  Differential Diagnosis:   COVID, FLU, pneumonia  Clinical Tests:   Lab Tests: Ordered and Reviewed  The following lab test(s) were unremarkable: CBC, CMP and Urinalysis  Radiological Study: Ordered and Reviewed  Medical Tests: Ordered and Reviewed  ED Management:  Labs and imaging reviewed by me.  No acute findings except for covid +, potassium of 2.9. Will replace potassium.  Considered admission, but patient is feeling better, and ready to go home.  Paxlovid has interaction with her home meds.  Will prescribe fioricet and promethazine DM for symptomatic treatment                          Clinical Impression:   Final diagnoses:  [R07.9] Chest pain (Primary)  [U07.1] COVID-19 virus infection  [R50.9] Fever, unspecified fever cause  [R51.9] Nonintractable headache, unspecified chronicity pattern,  unspecified headache type        ED Disposition Condition    Discharge Stable          ED Prescriptions       Medication Sig Dispense Start Date End Date Auth. Provider    promethazine-dextromethorphan (PROMETHAZINE-DM) 6.25-15 mg/5 mL Syrp Take 5 mLs by mouth every 6 (six) hours as needed. 120 mL 4/18/2023 4/28/2023 Gurjit Mccall MD    butalbital-acetaminophen-caffeine -40 mg (FIORICET, ESGIC) -40 mg per tablet Take 1 tablet by mouth every 4 (four) hours as needed for Pain. 15 tablet 4/18/2023 5/18/2023 Gurjit Mccall MD          Follow-up Information       Follow up With Specialties Details Why Contact Info    Antonieta Ariza NP Family Medicine   [address] [address] [address]  [phone]               Gurjit Mccall MD  04/18/23 6801

## 2023-04-18 NOTE — Clinical Note
"Tosha Back" Sundar was seen and treated in our emergency department on 4/18/2023.  She may return to work on 04/24/2023.  Pt tested POSITIVE for COVID-19 on 4/18/2023.     If you have any questions or concerns, please don't hesitate to call.       RN    "

## 2023-04-21 ENCOUNTER — NURSE TRIAGE (OUTPATIENT)
Dept: ADMINISTRATIVE | Facility: CLINIC | Age: 58
End: 2023-04-21
Payer: COMMERCIAL

## 2023-04-21 ENCOUNTER — OFFICE VISIT (OUTPATIENT)
Dept: INTERNAL MEDICINE | Facility: CLINIC | Age: 58
End: 2023-04-21
Payer: COMMERCIAL

## 2023-04-21 ENCOUNTER — HOSPITAL ENCOUNTER (OUTPATIENT)
Dept: RADIOLOGY | Facility: HOSPITAL | Age: 58
Discharge: HOME | End: 2023-04-21
Attending: NURSE PRACTITIONER
Payer: COMMERCIAL

## 2023-04-21 DIAGNOSIS — T14.8XXA BRUISING: ICD-10-CM

## 2023-04-21 DIAGNOSIS — R06.02 SHORTNESS OF BREATH: ICD-10-CM

## 2023-04-21 DIAGNOSIS — U07.1 COVID: Primary | ICD-10-CM

## 2023-04-21 DIAGNOSIS — U07.1 COVID: ICD-10-CM

## 2023-04-21 DIAGNOSIS — E87.6 HYPOKALEMIA: ICD-10-CM

## 2023-04-21 DIAGNOSIS — J45.909 ASTHMA, UNSPECIFIED ASTHMA SEVERITY, UNSPECIFIED WHETHER COMPLICATED, UNSPECIFIED WHETHER PERSISTENT: ICD-10-CM

## 2023-04-21 DIAGNOSIS — D50.0 IRON DEFICIENCY ANEMIA DUE TO CHRONIC BLOOD LOSS: ICD-10-CM

## 2023-04-21 LAB
OHS CV HOLTER SINUS AVERAGE HR: 83
OHS CV HOLTER SINUS MAX HR: 139
OHS CV HOLTER SINUS MIN HR: 55

## 2023-04-21 PROCEDURE — 1159F PR MEDICATION LIST DOCUMENTED IN MEDICAL RECORD: ICD-10-PCS | Mod: CPTII,95,, | Performed by: NURSE PRACTITIONER

## 2023-04-21 PROCEDURE — 1160F PR REVIEW ALL MEDS BY PRESCRIBER/CLIN PHARMACIST DOCUMENTED: ICD-10-PCS | Mod: CPTII,95,, | Performed by: NURSE PRACTITIONER

## 2023-04-21 PROCEDURE — 1160F RVW MEDS BY RX/DR IN RCRD: CPT | Mod: CPTII,95,, | Performed by: NURSE PRACTITIONER

## 2023-04-21 PROCEDURE — 1159F MED LIST DOCD IN RCRD: CPT | Mod: CPTII,95,, | Performed by: NURSE PRACTITIONER

## 2023-04-21 PROCEDURE — 71046 XR CHEST PA AND LATERAL: ICD-10-PCS | Mod: 26,,, | Performed by: RADIOLOGY

## 2023-04-21 PROCEDURE — 71046 X-RAY EXAM CHEST 2 VIEWS: CPT | Mod: TC,PO

## 2023-04-21 PROCEDURE — 71046 X-RAY EXAM CHEST 2 VIEWS: CPT | Mod: 26,,, | Performed by: RADIOLOGY

## 2023-04-21 PROCEDURE — 99214 OFFICE O/P EST MOD 30 MIN: CPT | Mod: 95,,, | Performed by: NURSE PRACTITIONER

## 2023-04-21 PROCEDURE — 99214 PR OFFICE/OUTPT VISIT, EST, LEVL IV, 30-39 MIN: ICD-10-PCS | Mod: 95,,, | Performed by: NURSE PRACTITIONER

## 2023-04-21 RX ORDER — POTASSIUM CHLORIDE 20 MEQ/1
20 TABLET, EXTENDED RELEASE ORAL DAILY
Qty: 30 TABLET | Refills: 0 | Status: SHIPPED | OUTPATIENT
Start: 2023-04-21 | End: 2023-05-21

## 2023-04-21 RX ORDER — DEXAMETHASONE 6 MG/1
6 TABLET ORAL DAILY
Qty: 10 TABLET | Refills: 0 | Status: SHIPPED | OUTPATIENT
Start: 2023-04-21 | End: 2023-05-01

## 2023-04-21 NOTE — ASSESSMENT & PLAN NOTE
Lab Results   Component Value Date    WBC 6.65 04/18/2023    HGB 10.7 (L) 04/18/2023    HCT 31.5 (L) 04/18/2023    MCV 85 04/18/2023     04/18/2023   Reviewed ER labs. Slightly decrease in H/H from baseline. Repeat CBC today. Followed by hem/onc

## 2023-04-21 NOTE — ASSESSMENT & PLAN NOTE
Lab Results   Component Value Date    K 2.9 (L) 04/18/2023   Reviewed ER note and labs. Received K IV and PO in ER. Repeat CMP

## 2023-04-21 NOTE — TELEPHONE ENCOUNTER
[place]    PCP:  Antonieta Epperson, NP    Pt responded to Covid [facility] message.  States fever (101), Rt elbow pain, Rt elbow swollen and antecubital bruised ~ the size of a dime with petechiae around that bruise, intermittent CP (when not coughing/described as tightness), tachycardia (not > 110 per Apple Watch), intermittent palpitations (she reports that she can feel her heart beating fast at times), intermittent SOB, HA, and body aches.  H/O FM, Asthma, TIA, and SLE.  She reports was seen in the ED and had labs drawn but were not drawn peripherally.  Labs were drawn from Port-a-cath.  She reports another bruise is appearing on her FA as well.  She reports bruises are not painful to touch though.  She reports that her H&H was low.  Symptoms began Monday and tested + 4/18.  SpO2: 98% CA: 86.  Per protocol, care advised is go to office now.  VV appt scheduled for today at 0940.  Instructed to call OOC with worsening of symptoms and/or questions/concerns.  Verbalizes understanding.     Reason for Disposition   MILD difficulty breathing (e.g., minimal/no SOB at rest, SOB with walking, pulse <100)   SEVERE pain and not improved 2 hours after pain medicine/ice packs    Additional Information   Negative: SEVERE difficulty breathing (e.g., struggling for each breath, speaks in single words)   Negative: Difficult to awaken or acting confused (e.g., disoriented, slurred speech)   Negative: Bluish (or gray) lips or face now   Negative: Shock suspected (e.g., cold/pale/clammy skin, too weak to stand, low BP, rapid pulse)   Negative: Sounds like a life-threatening emergency to the triager   Negative: SEVERE or constant chest pain or pressure  (Exception: Mild central chest pain, present only when coughing.)   Negative: MODERATE difficulty breathing (e.g., speaks in phrases, SOB even at rest, pulse 100-120)   Negative: Headache and stiff neck (can't touch chin to chest)   Negative: Oxygen level (e.g., pulse oximetry) 90 percent or  lower   Negative: Chest pain or pressure  (Exception: MILD central chest pain, present only when coughing.)   Negative: Patient sounds very sick or weak to the triager   Negative: Shock suspected (e.g., cold/pale/clammy skin, too weak to stand, low BP, rapid pulse)   Negative: Fever and purple or blood-colored spots or dots   Negative: Sounds like a life-threatening emergency to the triager   Negative: Dizziness or lightheadedness   Negative: Bruise on head, face, chest, or abdomen and taking Coumadin (warfarin) or other strong blood thinner, or known bleeding disorder (e.g., thrombocytopenia)   Negative: Unexplained bleeding from another site (e.g., gums, nose, urine) as well   Negative: Patient sounds very sick or weak to the triager    Protocols used: Coronavirus (COVID-19) Diagnosed or [id number], Bruises-A-OH

## 2023-04-21 NOTE — PROGRESS NOTES
The patient location is: [place]  The chief complaint leading to consultation is: COVID    Visit type: audiovisual    Face to Face time with patient: 9:36 AM - 9:53 Am  34 minutes of total time spent on the encounter, which includes face to face time and non-face to face time preparing to see the patient (eg, review of tests), Obtaining and/or reviewing separately obtained history, Documenting clinical information in the electronic or other health record, Independently interpreting results (not separately reported) and communicating results to the patient/family/caregiver, or Care coordination (not separately reported).       Each patient to whom he or she provides medical services by telemedicine is:  (1) informed of the relationship between the physician and patient and the respective role of any other health care provider with respect to management of the patient; and (2) notified that he or she may decline to receive medical services by telemedicine and may withdraw from such care at any time.      Subjective:       Patient ID: Tosha Kwok is a 58 y.o. female.    Chief Complaint: Fever    Mrs. Kwok is seen as a virtual visit for persistent fever, chest tightness, and shortness of breath. She was seen in Er on 4/18/23 and tested positive for COVID. Symptoms started 4/17/2023. ER labs revealed slight worsening in anemia and hypokalemia (2.9). She received IV and PO potassium. Lungs were clear on chest xray. She was unable to receive Paxlovid due to interaction with home medications.    She is on day 4 of COVID. She was been rotating Tylenol and ibuprofen for persistent fever. Temp of 101 this morning. Continues with chest tightness or shortness of breath and states slight worsening. Using albuterol inhaler which does provide some relief. Pulse ox at home 98% with heart rate 84.     She also developed quarter size bruise to left AC yesterday. Denies TTP or drainage. She had blood drawn in ER but it was drawn  through her port. She was also experiencing pain and swelling to left elbow. Swelling has now resolved, but continues with TTP. Denies warmth or redness.    Fever   This is a recurrent problem. The current episode started in the past 7 days. The problem has been waxing and waning. The maximum temperature noted was 101 to 101.9 F. The temperature was taken using an oral thermometer. Associated symptoms include abdominal pain, chest pain, congestion, coughing, headaches, muscle aches, nausea, vomiting and wheezing. Pertinent negatives include no diarrhea, ear pain, rash, sleepiness, sore throat or urinary pain. She has tried NSAIDs, acetaminophen and fluids for the symptoms. The treatment provided mild relief.   Shortness of Breath  This is a new problem. The current episode started in the past 7 days. The problem has been gradually worsening. Associated symptoms include abdominal pain, chest pain, a fever, headaches, vomiting and wheezing. Pertinent negatives include no ear pain, hemoptysis, rash or sore throat. She has tried beta agonist inhalers and prescription cough suppressants for the symptoms. The treatment provided mild relief. Her past medical history is significant for asthma.     Patient Active Problem List   Diagnosis    Atrophy of vulva    Ankylosing spondylitis    Chronic, continuous use of opioids    Fibromyalgia    Elevated d-dimer    History of sleeve gastrectomy    Intractable pain    De Quervain's tenosynovitis, right    Adhesive capsulitis of right shoulder    Anxiety    Asthma    Lumbosacral spondylosis    GERD (gastroesophageal reflux disease)    Primary hypertension    Hyperlipidemia    Insulin resistance    Large joint arthralgia of multiple sites    Lupus (systemic lupus erythematosus)    Migraine    Postmenopausal hormone replacement therapy    Vitamin D deficiency    Impingement syndrome of left shoulder    Limited range of motion (ROM) of shoulder    Left foot pain    Vestibular  hypofunction    Other iron deficiency anemias    Keloid scar of skin    Iron deficiency anemia due to chronic blood loss    Restless leg syndrome    Chest pain    Insomnia    Port-A-Cath in place    TIA (transient ischemic attack)    History of non anemic vitamin B12 deficiency    Acute pain of right shoulder    Weight gain    Severe obesity    Nausea and vomiting    Hypokalemia    Gastritis    Hiatal hernia    Prolonged Q-T interval on ECG    Palpitation       Family History   Problem Relation Age of Onset    Hypertension Mother     Hypertension Brother     Breast cancer Maternal Aunt         x2     Heart attack Father     Hypertension Sister     Lupus Sister     Breast cancer Maternal Grandmother      Past Surgical History:   Procedure Laterality Date    BLADDER SURGERY      BREAST LUMPECTOMY  2018    BREAST RECONSTRUCTION      BREAST SURGERY  2018    reduction     SECTION      CHOLECYSTECTOMY      COLONOSCOPY N/A 10/2/2020    Procedure: COLONOSCOPY;  Surgeon: Guicho Hood MD;  Location: [facility] ENDO;  Service: Endoscopy;  Laterality: N/A;    ESOPHAGOGASTRODUODENOSCOPY N/A 2023    Procedure: EGD (ESOPHAGOGASTRODUODENOSCOPY);  Surgeon: Belkis Mario MD;  Location: [facility] ENDO;  Service: Endoscopy;  Laterality: N/A;    EXCISION OF GRANULOMA Bilateral 2021    Procedure: EXCISION, GRANULOMA SCARS OF BREASTS;  Surgeon: Obed Palmer Jr., MD;  Location: [facility] OR;  Service: Plastics;  Laterality: Bilateral;    HYSTERECTOMY      LASER ABLATION      to back    SLEEVE GASTROPLASTY  2016    TOTAL REDUCTION MAMMOPLASTY Bilateral     two years ago    TUBAL LIGATION           Current Outpatient Medications:     albuterol (PROAIR HFA) 90 mcg/actuation inhaler, Inhale 2 puffs into the lungs every 4 hours as needed., Disp: 18 g, Rfl: 3    atorvastatin (LIPITOR) 20 MG tablet, Take 1 tablet (20 mg total) by mouth once daily., Disp: 90 tablet, Rfl: 3    butalbital-acetaminophen-caffeine -40 mg  (FIORICET, ESGIC) -40 mg per tablet, Take 1 tablet by mouth every 4 (four) hours as needed for Pain., Disp: 15 tablet, Rfl: 0    cetirizine (ZYRTEC) 10 MG tablet, Take 1 tablet (10 mg total) by mouth once daily., Disp: 90 tablet, Rfl: 3    clonazePAM (KLONOPIN) 0.5 MG tablet, Take 1 tablet (0.5 mg total) by mouth 2 (two) times daily as needed for Anxiety (panic attacks)., Disp: 20 tablet, Rfl: 0    cyanocobalamin 1,000 mcg/mL injection, Inject 1 mL (1,000 mcg total) into the muscle every 28 days., Disp: 10 mL, Rfl: 1    dexAMETHasone (DECADRON) 6 MG tablet, Take 1 tablet (6 mg total) by mouth once daily. for 10 days, Disp: 10 tablet, Rfl: 0    EPINEPHrine (EPIPEN 2-DENNIS) 0.3 mg/0.3 mL AtIn, Inject 0.3 mLs (0.3 mg total) into the muscle once. for 1 dose, Disp: 2 each, Rfl: 3    hydroCHLOROthiazide (HYDRODIURIL) 12.5 MG Tab, Take 2 tablets (25 mg total) by mouth once daily., Disp: 180 tablet, Rfl: 3    HYDROmorphone (DILAUDID) 2 MG tablet, Take 1 tablet (2 mg total) by mouth every 6 to 8 hours as needed for pain, Disp: 105 tablet, Rfl: 0    HYDROmorphone (DILAUDID) 2 MG tablet, take 1 tablet by mouth every 6-8 hours as needed for pain, Disp: 105 tablet, Rfl: 0    HYDROmorphone (DILAUDID) 2 MG tablet, Take 1 tablet (2 mg total) by mouth every 6 to 8 hours as needed for pain., Disp: 105 tablet, Rfl: 0    hydroxychloroquine (PLAQUENIL) 200 mg tablet, Take 200 mg by mouth once daily., Disp: , Rfl: 1    hydrOXYchloroQUINE (PLAQUENIL) 200 mg tablet, Take 1 tablet (200 mg total) by mouth once daily., Disp: 30 tablet, Rfl: 1    hydrOXYzine pamoate (VISTARIL) 25 MG Cap, Take 1 capsule (25 mg total) by mouth every 8 (eight) hours as needed (anxiety)., Disp: 90 capsule, Rfl: 3    metoprolol succinate (TOPROL-XL) 25 MG 24 hr tablet, Take 1 tablet (25 mg total) by mouth once daily., Disp: 90 tablet, Rfl: 2    pantoprazole (PROTONIX) 40 MG tablet, Take 1 tablet (40 mg total) by mouth 2 (two) times daily., Disp: 60 tablet, Rfl:  1    potassium chloride SA (K-DUR,KLOR-CON) 20 MEQ tablet, Take 1 tablet by mouth twice daily x 7 days, then take 1 tablet by mouth daily, Disp: 30 tablet, Rfl: 0    promethazine (PHENERGAN) 25 MG tablet, Take 1 tablet (25 mg total) by mouth every 6 (six) hours as needed for Nausea., Disp: 15 tablet, Rfl: 0    promethazine-dextromethorphan (PROMETHAZINE-DM) 6.25-15 mg/5 mL Syrp, Take 5 mLs by mouth every 6 (six) hours as needed., Disp: 120 mL, Rfl: 0    semaglutide (OZEMPIC) 0.25 mg or 0.5 mg(2 mg/1.5 mL) pen injector, Inject 0.5 mg into the skin once a week., Disp: 3 pen, Rfl: 1    suvorexant (BELSOMRA) 10 mg Tab, Take 1 tablet by mouth nightly as needed (insomnia.)., Disp: 30 tablet, Rfl: 0    tiZANidine (ZANAFLEX) 4 MG tablet, Take 4 mg by mouth 3 (three) times daily as needed., Disp: , Rfl:     topiramate (TOPAMAX) 100 MG tablet, Take 1 tablet (100 mg total) by mouth 2 (two) times daily., Disp: 180 tablet, Rfl: 3    zolpidem (AMBIEN) 10 mg Tab, Take 1 tablet by mouth at bedtime as needed May cause drowsiness, use caution/ for sleep only, Disp: 30 tablet, Rfl: 5    Review of Systems   Constitutional:  Positive for fever.   HENT:  Positive for congestion. Negative for ear pain and sore throat.    Respiratory:  Positive for cough, shortness of breath and wheezing. Negative for hemoptysis.    Cardiovascular:  Positive for chest pain.   Gastrointestinal:  Positive for abdominal pain, nausea and vomiting. Negative for diarrhea.   Genitourinary:  Negative for dysuria.   Skin:  Negative for rash.   Neurological:  Positive for headaches.     Objective:   LMP 03/26/2012      Physical Exam  Constitutional:       General: She is not in acute distress.     Appearance: She is ill-appearing. She is not toxic-appearing.   HENT:      Head: Normocephalic.   Eyes:      Conjunctiva/sclera: Conjunctivae normal.   Pulmonary:      Effort: Pulmonary effort is normal. No respiratory distress.      Comments: Able to talk in complete  sentences without becoming winded  Musculoskeletal:         General: Tenderness present.      Left elbow: No swelling. Normal range of motion. Tenderness (to self palpation) present.   Skin:     Findings: Bruising present.          Neurological:      Mental Status: She is alert and oriented to person, place, and time.       Assessment & Plan     1. COVID  Comments:  Remains febrile with chest pain and SOB on day 4 of COVID. Chest pain was present during ER visit and cardiac work up unremarkable. Pulse ox 98% and able to speak in complete sentences. Will repeat labs and CXR. If no sign of pneumonia, will treat with oral decadron. Strict ER precautions discussed.  Orders:  -     X-Ray Chest PA And Lateral; Future; Expected date: 04/21/2023    2. Shortness of breath  -     X-Ray Chest PA And Lateral; Future; Expected date: 04/21/2023    3. Asthma, unspecified asthma severity, unspecified whether complicated, unspecified whether persistent  Assessment & Plan:  Continue albuterol PRN. Consider starting oral steroid pending CXR results      4. Hypokalemia  Assessment & Plan:  Lab Results   Component Value Date    K 2.9 (L) 04/18/2023   Reviewed ER note and labs. Received K IV and PO in ER. Repeat CMP     Orders:  -     COMPREHENSIVE METABOLIC PANEL; Future; Expected date: 04/21/2023    5. Iron deficiency anemia due to chronic blood loss  Assessment & Plan:  Lab Results   Component Value Date    WBC 6.65 04/18/2023    HGB 10.7 (L) 04/18/2023    HCT 31.5 (L) 04/18/2023    MCV 85 04/18/2023     04/18/2023   Reviewed ER labs. Slightly decrease in H/H from baseline. Repeat CBC today. Followed by hem/onc    Orders:  -     CBC W/ AUTO DIFFERENTIAL; Future; Expected date: 04/21/2023    6. Bruising  Assessment & Plan:  New onset bruising without injury or trauma. Reviewed CBC completed in ER. Anemia noted. Plt WNL. Repeat CBC today   Orders:  -     CBC W/ AUTO DIFFERENTIAL; Future; Expected date: 04/21/2023      CXR no  "acute findings, lungs clear. Anemia improved and platelets still WNL. WBC normal. Potassium improved from ER, but still low at 3.2. Start potassium supplement, rx sent. Follow up with PCP already scheduled on 5/3/2023.     Start decadron 6 mg daily x 10 days for COVID symptoms. ER precautions reviewed.    Lab Results   Component Value Date    K 3.3 (L) 04/21/2023     Lab Results   Component Value Date    WBC 5.11 04/21/2023    HGB 11.9 (L) 04/21/2023    HCT 35.8 (L) 04/21/2023    MCV 86 04/21/2023     04/21/2023       X-Ray Chest PA And Lateral  Narrative: EXAMINATION:  XR CHEST PA AND LATERAL    CLINICAL HISTORY:  COVID-19    TECHNIQUE:  PA and lateral views of the chest were performed.    COMPARISON:  04/18/2023    FINDINGS:  The cardiac and mediastinal silhouettes appear within normal limits. Right-sided MediPort catheter appears unchanged in position.  The lungs are clear bilaterally.  No acute osseous findings demonstrated.  Impression: No acute findings.    Electronically signed by: Edgardo Roberts MD  Date:    04/21/2023  Time:    11:17        PENNY Coon      Portions of this note may have been created with voice recognition software. Occasional "wrong-word" or "sound-a-like" substitutions may have occurred due to the inherent limitations of voice recognition software. Please, read the note carefully and recognize, using context, where substitutions have occurred.     "

## 2023-04-24 ENCOUNTER — TELEPHONE (OUTPATIENT)
Dept: CARDIOLOGY | Facility: CLINIC | Age: 58
End: 2023-04-24
Payer: COMMERCIAL

## 2023-04-24 NOTE — TELEPHONE ENCOUNTER
Spoke with pt in regards to holter monitor results. Pt verbalized she understood results.           ----- Message from Micah Ireland MD sent at 4/24/2023  1:40 PM CDT -----  2 weeks monitor showed rare arrhythmia. Normal study

## 2023-05-04 DIAGNOSIS — E87.6 HYPOKALEMIA: ICD-10-CM

## 2023-05-04 RX ORDER — POTASSIUM CHLORIDE 20 MEQ/1
20 TABLET, EXTENDED RELEASE ORAL DAILY
Qty: 30 TABLET | Refills: 0 | OUTPATIENT
Start: 2023-05-04 | End: 2023-06-03

## 2023-05-07 RX ORDER — BUTALBITAL, ACETAMINOPHEN AND CAFFEINE 50; 325; 40 MG/1; MG/1; MG/1
1 TABLET ORAL EVERY 4 HOURS PRN
Qty: 15 TABLET | Refills: 0 | Status: SHIPPED | OUTPATIENT
Start: 2023-05-07 | End: 2023-06-06

## 2023-05-17 ENCOUNTER — PATIENT MESSAGE (OUTPATIENT)
Dept: PHARMACY | Facility: CLINIC | Age: 58
End: 2023-05-17
Payer: COMMERCIAL

## 2023-05-29 ENCOUNTER — PATIENT MESSAGE (OUTPATIENT)
Dept: PHARMACY | Facility: CLINIC | Age: 58
End: 2023-05-29
Payer: COMMERCIAL

## 2023-05-31 ENCOUNTER — TELEPHONE (OUTPATIENT)
Dept: PHARMACY | Facility: CLINIC | Age: 58
End: 2023-05-31
Payer: COMMERCIAL

## 2023-06-06 DIAGNOSIS — F41.9 ANXIETY: ICD-10-CM

## 2023-06-12 RX ORDER — CLONAZEPAM 0.5 MG/1
0.5 TABLET ORAL 2 TIMES DAILY PRN
Qty: 20 TABLET | Refills: 0 | Status: SHIPPED | OUTPATIENT
Start: 2023-06-12 | End: 2023-08-22 | Stop reason: SDUPTHER

## 2023-06-16 ENCOUNTER — PATIENT MESSAGE (OUTPATIENT)
Dept: INTERNAL MEDICINE | Facility: CLINIC | Age: 58
End: 2023-06-16
Payer: COMMERCIAL

## 2023-07-02 ENCOUNTER — PATIENT MESSAGE (OUTPATIENT)
Dept: HEMATOLOGY/ONCOLOGY | Facility: CLINIC | Age: 58
End: 2023-07-02
Payer: COMMERCIAL

## 2023-07-05 ENCOUNTER — PATIENT OUTREACH (OUTPATIENT)
Dept: ADMINISTRATIVE | Facility: HOSPITAL | Age: 58
End: 2023-07-05
Payer: COMMERCIAL

## 2023-07-05 ENCOUNTER — TELEPHONE (OUTPATIENT)
Dept: INTERNAL MEDICINE | Facility: CLINIC | Age: 58
End: 2023-07-05
Payer: COMMERCIAL

## 2023-07-05 VITALS — DIASTOLIC BLOOD PRESSURE: 78 MMHG | SYSTOLIC BLOOD PRESSURE: 132 MMHG

## 2023-07-13 ENCOUNTER — OFFICE VISIT (OUTPATIENT)
Dept: RHEUMATOLOGY | Facility: CLINIC | Age: 58
End: 2023-07-13
Payer: COMMERCIAL

## 2023-07-13 DIAGNOSIS — Z79.899 HIGH RISK MEDICATION USE: ICD-10-CM

## 2023-07-13 DIAGNOSIS — M79.641 PAIN IN BOTH HANDS: ICD-10-CM

## 2023-07-13 DIAGNOSIS — M45.9 ANKYLOSING SPONDYLITIS, UNSPECIFIED SITE OF SPINE: ICD-10-CM

## 2023-07-13 DIAGNOSIS — M32.9 SYSTEMIC LUPUS ERYTHEMATOSUS, UNSPECIFIED SLE TYPE, UNSPECIFIED ORGAN INVOLVEMENT STATUS: Primary | ICD-10-CM

## 2023-07-13 DIAGNOSIS — Z51.81 MEDICATION MONITORING ENCOUNTER: ICD-10-CM

## 2023-07-13 DIAGNOSIS — M79.642 PAIN IN BOTH HANDS: ICD-10-CM

## 2023-07-13 DIAGNOSIS — D84.9 IMMUNOCOMPROMISED: ICD-10-CM

## 2023-07-13 PROCEDURE — 1160F RVW MEDS BY RX/DR IN RCRD: CPT | Mod: CPTII,95,, | Performed by: PHYSICIAN ASSISTANT

## 2023-07-13 PROCEDURE — 99214 PR OFFICE/OUTPT VISIT, EST, LEVL IV, 30-39 MIN: ICD-10-PCS | Mod: 95,,, | Performed by: PHYSICIAN ASSISTANT

## 2023-07-13 PROCEDURE — 99214 OFFICE O/P EST MOD 30 MIN: CPT | Mod: 95,,, | Performed by: PHYSICIAN ASSISTANT

## 2023-07-13 PROCEDURE — 1159F PR MEDICATION LIST DOCUMENTED IN MEDICAL RECORD: ICD-10-PCS | Mod: CPTII,95,, | Performed by: PHYSICIAN ASSISTANT

## 2023-07-13 PROCEDURE — 1160F PR REVIEW ALL MEDS BY PRESCRIBER/CLIN PHARMACIST DOCUMENTED: ICD-10-PCS | Mod: CPTII,95,, | Performed by: PHYSICIAN ASSISTANT

## 2023-07-13 PROCEDURE — 1159F MED LIST DOCD IN RCRD: CPT | Mod: CPTII,95,, | Performed by: PHYSICIAN ASSISTANT

## 2023-07-13 NOTE — PROGRESS NOTES
The patient location is: work  The chief complaint leading to consultation is: f/u    Visit type: audiovisual    Face to Face time with patient: 15 min  30 minutes of total time spent on the encounter, which includes face to face time and non-face to face time preparing to see the patient (eg, review of tests), Obtaining and/or reviewing separately obtained history, Documenting clinical information in the electronic or other health record, Independently interpreting results (not separately reported) and communicating results to the patient/family/caregiver, or Care coordination (not separately reported).         Each patient to whom he or she provides medical services by telemedicine is:  (1) informed of the relationship between the physician and patient and the respective role of any other health care provider with respect to management of the patient; and (2) notified that he or she may decline to receive medical services by telemedicine and may withdraw from such care at any time.    Notes:     Subjective:      Patient ID: Tosha Kwok is a 58 y.o. female.    Chief Complaint: No chief complaint on file.      HPI   Tosha Kwok  is a 58 y.o. female seen today for follow-up ankylosing spondylitis and SLE.  She was previously treated years ago by Dr. Richardson for AS, and initially responded well to Humira but later had waning therapeutic effects.  Not currently on anything for ankylosing spondylitis.  Has chronic low back pain for years.  Also with overlapping fibromyalgia and spondylosis which complicate her assessment and response to therapy.  She is in chronic pain management Dr. Storm Rae.    In 2017 started seeing DR. Oc Jaquez at [facility] who diagnosed her w SLE per report.  On Plaquenil 200 mg daily.  Tells me her last eye exam was about a year ago.  They did Plaquenil monitoring and there were no signs of maculopathy    C/o right hip pain in the right buttock and laterally.  It is non radiating.   Also w increase in hand pain and left shoulder pain.  C/o am stiffness for 45 min or greater.  Had hand swelling but has improved over last couple days.  In 2016 had an episode of pleurisy.     Today, pain level is 6/10.  She has overlapping fibromyalgia and spondylosis of her spine.  She is under the care of Dr. Rae in pain management.  She is complaining of hair loss.  Also complaining of right 3rd MCP joint pain as well as left 2nd MCP joint pain.  Also complaining of bilateral elbow pain and multiple PIP joint pain.  She states morning stiffness of greater than 30 minutes.    Also has numbness and tingling in both forearms and hands.  Dr. Kapadia ordered an EMG nerve conduction study.  This was being done under workman's comp and she is had trouble getting it approved.    Patient c/o chest pain.  Did heart monitor couple mos ago.  Has seen cardiology and everything checked out ok per pt.  No associated SOB.  Did ekg and stress test earlier this year.  Had flare of malar rash last month.  Has improved.  Also, w c/o photosensitivity and xerostomia.  She denies fevers, chills,  eye pain, shortness of breath, hematuria, blood in the stool, raynauds, finger ulcerations.  Rheumatologic systems otherwise negative.    Serologies/Labs:  Neg ds dna3/13/23  Current Treatment:  Plaquenil 200 mg daily  Previous Treatment:   MTX - weaned down and off  Humira - for AS      Current Outpatient Medications:     albuterol (PROAIR HFA) 90 mcg/actuation inhaler, Inhale 2 puffs into the lungs every 4 hours as needed., Disp: 18 g, Rfl: 3    atorvastatin (LIPITOR) 20 MG tablet, Take 1 tablet (20 mg total) by mouth once daily., Disp: 90 tablet, Rfl: 3    cetirizine (ZYRTEC) 10 MG tablet, Take 1 tablet (10 mg total) by mouth once daily., Disp: 90 tablet, Rfl: 3    clonazePAM (KLONOPIN) 0.5 MG tablet, Take 1 tablet (0.5 mg total) by mouth 2 (two) times daily as needed for Anxiety (panic attacks)., Disp: 20 tablet, Rfl: 0    cyanocobalamin  1,000 mcg/mL injection, Inject 1 mL (1,000 mcg total) into the muscle every 28 days., Disp: 10 mL, Rfl: 1    EPINEPHrine (EPIPEN 2-DENNIS) 0.3 mg/0.3 mL AtIn, Inject 0.3 mLs (0.3 mg total) into the muscle once. for 1 dose, Disp: 2 each, Rfl: 3    hydroCHLOROthiazide (HYDRODIURIL) 12.5 MG Tab, Take 2 tablets (25 mg total) by mouth once daily., Disp: 180 tablet, Rfl: 3    HYDROmorphone (DILAUDID) 2 MG tablet, Take 1 tablet (2 mg total) by mouth every 6 to 8 hours as needed for pain, Disp: 105 tablet, Rfl: 0    HYDROmorphone (DILAUDID) 2 MG tablet, take 1 tablet by mouth every 6-8 hours as needed for pain, Disp: 105 tablet, Rfl: 0    HYDROmorphone (DILAUDID) 2 MG tablet, Take 1 tablet (2 mg total) by mouth every 6 to 8 hours as needed for pain., Disp: 105 tablet, Rfl: 0    HYDROmorphone (DILAUDID) 2 MG tablet, Take 1 tablet by mouth every six to eight hours as needed for pain, Disp: 105 tablet, Rfl: 0    hydroxychloroquine (PLAQUENIL) 200 mg tablet, Take 200 mg by mouth once daily., Disp: , Rfl: 1    hydrOXYchloroQUINE (PLAQUENIL) 200 mg tablet, Take 1 tablet (200 mg total) by mouth once daily., Disp: 30 tablet, Rfl: 1    hydrOXYzine pamoate (VISTARIL) 25 MG Cap, Take 1 capsule (25 mg total) by mouth every 8 (eight) hours as needed (anxiety)., Disp: 90 capsule, Rfl: 3    metoprolol succinate (TOPROL-XL) 25 MG 24 hr tablet, Take 1 tablet (25 mg total) by mouth once daily., Disp: 90 tablet, Rfl: 2    pantoprazole (PROTONIX) 40 MG tablet, Take 1 tablet (40 mg total) by mouth 2 (two) times daily., Disp: 60 tablet, Rfl: 1    promethazine (PHENERGAN) 25 MG tablet, Take 1 tablet (25 mg total) by mouth every 6 (six) hours as needed for Nausea., Disp: 15 tablet, Rfl: 0    semaglutide (OZEMPIC) 0.25 mg or 0.5 mg (2 mg/3 mL) pen injector, Inject 0.5MG into the skin once a week, Disp: 9 mL, Rfl: 1    semaglutide (OZEMPIC) 0.25 mg or 0.5 mg(2 mg/1.5 mL) pen injector, Inject 0.5 mg into the skin once a week., Disp: 3 pen, Rfl: 1     suvorexant (BELSOMRA) 10 mg Tab, Take 1 tablet by mouth nightly as needed (insomnia.)., Disp: 30 tablet, Rfl: 0    tiZANidine (ZANAFLEX) 4 MG tablet, Take 4 mg by mouth 3 (three) times daily as needed., Disp: , Rfl:     tiZANidine (ZANAFLEX) 4 MG tablet, Take 1 tablet by mouth three times a day as needed 3 MOS SUPPLY WITH EACH REFILL/ /May cause drowsiness, use caution/ For muscle spasms, Disp: 270 tablet, Rfl: 3    topiramate (TOPAMAX) 100 MG tablet, Take 1 tablet (100 mg total) by mouth 2 (two) times daily., Disp: 180 tablet, Rfl: 3    zolpidem (AMBIEN) 10 mg Tab, Take 1 tablet by mouth at bedtime as needed May cause drowsiness, use caution/ for sleep only, Disp: 30 tablet, Rfl: 5    Past Medical History:   Diagnosis Date    Anemia     Ankylosing spondylitis     Anxiety     Asthma     Cancer 2018    left breast  lumpectomy, xrt    Chronic constipation     Chronic insomnia     Edema     Fibromyalgia     Hypertension     Insulin resistance     Interstitial cystitis     Lupus     Lupus     Migraine headache     PONV (postoperative nausea and vomiting)     Restless legs syndrome     Shingles     Stroke 1998    post partum right sided numbness    TIA (transient ischemic attack) 1998     Family History   Problem Relation Age of Onset    Hypertension Mother     Hypertension Brother     Breast cancer Maternal Aunt         x2     Heart attack Father     Hypertension Sister     Lupus Sister     Breast cancer Maternal Grandmother      Social History     Socioeconomic History    Marital status:    Tobacco Use    Smoking status: Never    Smokeless tobacco: Never   Substance and Sexual Activity    Alcohol use: Yes     Comment: occasionally    Drug use: No    Sexual activity: Yes     Partners: Male     Social Determinants of Health     Financial Resource Strain: Low Risk     Difficulty of Paying Living Expenses: Not hard at all   Food Insecurity: No Food Insecurity    Worried About Running Out of Food in the Last Year:  Never true    Ran Out of Food in the Last Year: Never true   Transportation Needs: No Transportation Needs    Lack of Transportation (Medical): No    Lack of Transportation (Non-Medical): No   Physical Activity: Insufficiently Active    Days of Exercise per Week: 2 days    Minutes of Exercise per Session: 20 min   Stress: Unknown    Feeling of Stress : Patient refused   Social Connections: Unknown    Frequency of Communication with Friends and Family: Three times a week    Frequency of Social Gatherings with Friends and Family: Three times a week    Active Member of Clubs or Organizations: Yes    Attends Club or Organization Meetings: 1 to 4 times per year    Marital Status:    Housing Stability: Low Risk     Unable to Pay for Housing in the Last Year: No    Number of Places Lived in the Last Year: 1    Unstable Housing in the Last Year: No     Review of patient's allergies indicates:   Allergen Reactions    Carrot Swelling     angioedema    Morphine Anxiety     Hives   States she can take dilaudid and percocet without any problems    Clindamycin     Macrolide antibiotics     Erythromycin Other (See Comments)     Hives and cramps     Zofran (as hydrochloride) [ondansetron hcl] Hives     Local hive after IV injection       Objective:   LMP 03/26/2012   Immunization History   Administered Date(s) Administered    COVID-19, MRNA, LN-S, PF (MODERNA FULL 0.5 ML DOSE) 03/31/2021, 04/28/2021    Influenza - Quadrivalent - PF *Preferred* (6 months and older) 09/25/2015, 11/02/2016, 12/04/2019, 10/15/2020, 10/15/2021, 09/21/2022    Influenza - Trivalent - PF (ADULT) 10/31/2014    PPD Test 09/02/2015, 02/21/2017    Pneumococcal Conjugate - 13 Valent 11/02/2016    Pneumococcal Polysaccharide - 23 Valent 09/03/2019    Tdap 12/04/2019    Zoster Recombinant 02/08/2023       Physical Exam   Constitutional: She is oriented to person, place, and time. No distress.   HENT:   Head: Normocephalic and atraumatic.   Pulmonary/Chest:  Effort normal.   Musculoskeletal:      Cervical back: Normal range of motion.   Neurological: She is alert and oriented to person, place, and time.   Psychiatric: Mood normal.       Recent Results (from the past 672 hour(s))   CBC Auto Differential    Collection Time: 06/30/23 10:15 AM   Result Value Ref Range    WBC 3.47 (L) 3.90 - 12.70 K/uL    RBC 3.70 (L) 4.00 - 5.40 M/uL    Hemoglobin 10.9 (L) 12.0 - 16.0 g/dL    Hematocrit 32.3 (L) 37.0 - 48.5 %    MCV 87 82 - 98 fL    MCH 29.5 27.0 - 31.0 pg    MCHC 33.7 32.0 - 36.0 g/dL    RDW 13.6 11.5 - 14.5 %    Platelets 305 150 - 450 K/uL    MPV 9.3 9.2 - 12.9 fL    Immature Granulocytes 0.3 0.0 - 0.5 %    Gran # (ANC) 1.7 (L) 1.8 - 7.7 K/uL    Immature Grans (Abs) 0.01 0.00 - 0.04 K/uL    Lymph # 1.2 1.0 - 4.8 K/uL    Mono # 0.3 0.3 - 1.0 K/uL    Eos # 0.2 0.0 - 0.5 K/uL    Baso # 0.02 0.00 - 0.20 K/uL    nRBC 0 0 /100 WBC    Gran % 49.0 38.0 - 73.0 %    Lymph % 34.0 18.0 - 48.0 %    Mono % 9.8 4.0 - 15.0 %    Eosinophil % 6.3 0.0 - 8.0 %    Basophil % 0.6 0.0 - 1.9 %    Differential Method Automated    Ferritin    Collection Time: 06/30/23 10:15 AM   Result Value Ref Range    Ferritin 118 20.0 - 300.0 ng/mL   Iron and TIBC    Collection Time: 06/30/23 10:15 AM   Result Value Ref Range    Iron 96 30 - 160 ug/dL    Transferrin 262 200 - 375 mg/dL    TIBC 388 250 - 450 ug/dL    Saturated Iron 25 20 - 50 %   VITAMIN B12    Collection Time: 06/30/23 10:15 AM   Result Value Ref Range    Vitamin B-12 231 210 - 950 pg/mL       Lab Results   Component Value Date    TBGOLDPLUS Negative 03/13/2023      Lab Results   Component Value Date    HEPAIGM Non-reactive 03/13/2023    HEPBIGM Non-reactive 03/13/2023    HEPCAB Non-reactive 03/13/2023      Imaging  I have personally reviewed images and reports as below.  I agree with the interpretation.  X-Ray Chest AP Portable  Order: [id number]  Status: Final result     Visible to patient: Yes (seen)     Next appt: Today at 04:10 PM in Lab  (LABORATORY, [facility])     0 Result Notes  Details    Reading Physician Reading Date Result Priority   Kateryna Jeffries MD  [phone] 10/21/2022 STAT     Narrative & Impression  EXAMINATION:  XR CHEST AP PORTABLE     CLINICAL HISTORY:  Chest Pain;     TECHNIQUE:  Single view     COMPARISON:  December 2021     FINDINGS:  Mediastinal contour stable midline.  MediPort similar position.  Lungs well aerated.  No pleural effusion.     Impression:     No active or adverse finding        Electronically signed by: Kateryna Jeffries  Date:                                            10/21/2022  Time:                                           21:37         Assessment:     1. Systemic lupus erythematosus, unspecified SLE type, unspecified organ involvement status    2. Medication monitoring encounter    3. High risk medication use    4. Immunocompromised    5. Ankylosing spondylitis, unspecified site of spine    6. Pain in both hands              Plan:     Diagnoses and all orders for this visit:    Systemic lupus erythematosus, unspecified SLE type, unspecified organ involvement status  -     CT Chest Without Contrast; Future  -     CHERI Screen w/Reflex; Future  -     Thiopurine Methyltrans (TPMT) Genotyping; Future    Medication monitoring encounter    High risk medication use    Immunocompromised    Ankylosing spondylitis, unspecified site of spine    Pain in both hands  -     X-Ray Hand 3 View Bilateral; Future  -     Rheumatoid Factor; Future  -     CYCLIC CITRUL PEPTIDE ANTIBODY, IGG; Future          Reported h/o SLE  C/o current flare  SLE labs today w CHERI today  Check TPMT  DDx includes Lupus arthritis and peripheral arthritis in AS  Labs today  Xray hands as well.  TB and hep panel updated 3/2023  Discussed PDN taper vs IM inj - pt deferred for now  Chest CT for continued chest pain and h/o pleurisy  C/w Plaquenil 200 mg daily  Dose is 5.24mg/kg  6-7 yrs therapy (started in 2016)  Last plaq eye exam one year ago  Adrian  ref for plaquenil monitoring  History of AS  HLA B27 today w Reg 4.  Check RF/CCP for completeness  Waning therapeutic effect on humira in past  Not currently being treated  Drug therapy requiring intensive monitoring for toxicity  High Risk Medication Monitoring encounter  No current medication related issues, no evidence of toxicity  I ordered labs for toxicity monitoring, have personally reviewed the findings, and discussed them with the patient.  Pending labs will be sent via the portal  Compromised immune system secondary to autoimmune disease and/or use of immunosuppressive drugs.  Monitor carefully for infections.  Advised patient to get immediate medical care if any infection arises.  Also advised strict adherence age-appropriate vaccinations and cancer screenings with PCP.  Patient advised to hold DMARD and/or biologic therapy for signs of infection or for surgery. If you are unsure what to do please call our office for instruction.[facility] [phone]  Return to clinic: 3-4 weeks - vv to discuss labs and f/u after optho ref      No follow-ups on file.    The patient understands, chooses and consents to this plan and accepts all   the risks which include but are not limited to the risks mentioned above.     Disclaimer: This note was prepared using a voice recognition system and is likely to have sound alike errors within the text.

## 2023-07-13 NOTE — Clinical Note
SLE labs, HLA B27, CHERI, RF, CCP - in the next 3-5 days.  Gets them drawn from a port - infusion draws -[facility] vs [facility] infusion  Xray hands when she gets BW Schedule CT chest F/u 2 mos w sle labs one week prior

## 2023-07-14 ENCOUNTER — TELEPHONE (OUTPATIENT)
Dept: RHEUMATOLOGY | Facility: CLINIC | Age: 58
End: 2023-07-14
Payer: COMMERCIAL

## 2023-07-14 NOTE — TELEPHONE ENCOUNTER
----- Message from Caprice Oakley PA-C sent at 7/13/2023 12:03 PM CDT -----  SLE labs, HLA B27, CHERI, RF, CCP - in the next 3-5 days.  Gets them drawn from a port - infusion draws -[facility] vs [facility] infusion    Xray hands when she gets BW  Schedule CT chest  F/u 2 mos w sle labs one week prior

## 2023-07-19 ENCOUNTER — HOSPITAL ENCOUNTER (OUTPATIENT)
Dept: RADIOLOGY | Facility: HOSPITAL | Age: 58
Discharge: HOME | End: 2023-07-19
Attending: PHYSICIAN ASSISTANT
Payer: COMMERCIAL

## 2023-07-19 DIAGNOSIS — M79.641 PAIN IN BOTH HANDS: ICD-10-CM

## 2023-07-19 DIAGNOSIS — M79.642 PAIN IN BOTH HANDS: ICD-10-CM

## 2023-07-19 PROCEDURE — 73130 X-RAY EXAM OF HAND: CPT | Mod: 26,LT,, | Performed by: RADIOLOGY

## 2023-07-19 PROCEDURE — 73130 X-RAY EXAM OF HAND: CPT | Mod: 26,RT,, | Performed by: RADIOLOGY

## 2023-07-19 PROCEDURE — 73130 XR HAND COMPLETE 3 VIEWS BILATERAL: ICD-10-PCS | Mod: 26,RT,, | Performed by: RADIOLOGY

## 2023-07-19 PROCEDURE — 73130 X-RAY EXAM OF HAND: CPT | Mod: TC,50,PO

## 2023-08-07 DIAGNOSIS — F41.9 ANXIETY: ICD-10-CM

## 2023-08-07 DIAGNOSIS — M32.9 SYSTEMIC LUPUS ERYTHEMATOSUS, UNSPECIFIED SLE TYPE, UNSPECIFIED ORGAN INVOLVEMENT STATUS: ICD-10-CM

## 2023-08-07 RX ORDER — CLONAZEPAM 0.5 MG/1
0.5 TABLET ORAL 2 TIMES DAILY PRN
Qty: 20 TABLET | Refills: 0 | OUTPATIENT
Start: 2023-08-07

## 2023-08-07 RX ORDER — HYDROXYCHLOROQUINE SULFATE 200 MG/1
200 TABLET, FILM COATED ORAL DAILY
Qty: 30 TABLET | Refills: 1 | Status: SHIPPED | OUTPATIENT
Start: 2023-08-07 | End: 2024-02-22 | Stop reason: SDUPTHER

## 2023-08-10 ENCOUNTER — OFFICE VISIT (OUTPATIENT)
Dept: SLEEP MEDICINE | Facility: CLINIC | Age: 58
End: 2023-08-10
Payer: COMMERCIAL

## 2023-08-10 VITALS — WEIGHT: 170 LBS | HEIGHT: 62 IN | BODY MASS INDEX: 31.28 KG/M2

## 2023-08-10 DIAGNOSIS — F51.05 INSOMNIA DUE TO OTHER MENTAL DISORDER: Primary | ICD-10-CM

## 2023-08-10 DIAGNOSIS — M32.9 SYSTEMIC LUPUS ERYTHEMATOSUS, UNSPECIFIED SLE TYPE, UNSPECIFIED ORGAN INVOLVEMENT STATUS: ICD-10-CM

## 2023-08-10 DIAGNOSIS — F41.0 ANXIETY ATTACK: ICD-10-CM

## 2023-08-10 DIAGNOSIS — I10 PRIMARY HYPERTENSION: ICD-10-CM

## 2023-08-10 DIAGNOSIS — F99 INSOMNIA DUE TO OTHER MENTAL DISORDER: Primary | ICD-10-CM

## 2023-08-10 DIAGNOSIS — Z90.3 HISTORY OF SLEEVE GASTRECTOMY: Chronic | ICD-10-CM

## 2023-08-10 DIAGNOSIS — G25.81 RESTLESS LEG SYNDROME: ICD-10-CM

## 2023-08-10 DIAGNOSIS — E78.00 PURE HYPERCHOLESTEROLEMIA: ICD-10-CM

## 2023-08-10 DIAGNOSIS — R91.1 PULMONARY NODULE: ICD-10-CM

## 2023-08-10 DIAGNOSIS — J45.909 ASTHMA, UNSPECIFIED ASTHMA SEVERITY, UNSPECIFIED WHETHER COMPLICATED, UNSPECIFIED WHETHER PERSISTENT: ICD-10-CM

## 2023-08-10 PROCEDURE — 1160F PR REVIEW ALL MEDS BY PRESCRIBER/CLIN PHARMACIST DOCUMENTED: ICD-10-PCS | Mod: CPTII,95,, | Performed by: INTERNAL MEDICINE

## 2023-08-10 PROCEDURE — 99204 PR OFFICE/OUTPT VISIT, NEW, LEVL IV, 45-59 MIN: ICD-10-PCS | Mod: 95,,, | Performed by: INTERNAL MEDICINE

## 2023-08-10 PROCEDURE — 1160F RVW MEDS BY RX/DR IN RCRD: CPT | Mod: CPTII,95,, | Performed by: INTERNAL MEDICINE

## 2023-08-10 PROCEDURE — 1159F PR MEDICATION LIST DOCUMENTED IN MEDICAL RECORD: ICD-10-PCS | Mod: CPTII,95,, | Performed by: INTERNAL MEDICINE

## 2023-08-10 PROCEDURE — 99204 OFFICE O/P NEW MOD 45 MIN: CPT | Mod: 95,,, | Performed by: INTERNAL MEDICINE

## 2023-08-10 PROCEDURE — 1159F MED LIST DOCD IN RCRD: CPT | Mod: CPTII,95,, | Performed by: INTERNAL MEDICINE

## 2023-08-10 PROCEDURE — 3008F BODY MASS INDEX DOCD: CPT | Mod: CPTII,95,, | Performed by: INTERNAL MEDICINE

## 2023-08-10 PROCEDURE — 3008F PR BODY MASS INDEX (BMI) DOCUMENTED: ICD-10-PCS | Mod: CPTII,95,, | Performed by: INTERNAL MEDICINE

## 2023-08-10 NOTE — PROGRESS NOTES
The patient location is: [facility]  The chief complaint leading to consultation is: Insomnia    Visit type: audiovisual    Face to Face time with patient: 19 minutes  40 minutes of total time spent on the encounter, which includes face to face time and non-face to face time preparing to see the patient (eg, review of tests), Obtaining and/or reviewing separately obtained history, Documenting clinical information in the electronic or other health record, Independently interpreting results (not separately reported) and communicating results to the patient/family/caregiver, or Care coordination (not separately reported).         Each patient to whom he or she provides medical services by telemedicine is:  (1) informed of the relationship between the physician and patient and the respective role of any other health care provider with respect to management of the patient; and (2) notified that he or she may decline to receive medical services by telemedicine and may withdraw from such care at any time.    Notes:                                    Pulmonary Outpatient  Visit     Subjective:       Patient ID: Tosha Kwok is a 58 y.o. female.    Social History     Tobacco Use   Smoking Status Never   Smokeless Tobacco Never            Chief Complaint: Insomnia      Tosha Kwok is 58 y.o.  Referred by Antonieta Ariza NP  Insomnia  Problems going to sleep and staying asleep  Bed time 08:30 pm, SOL: 2-3 hrs  Asleep 12 MN  Ambien  Awake 2 am  Get up read on I pad or TV  Fatigue  12 hour shift: 7am -7 pm  Going on 3-4 months  Tried Belsomra, Ambien  Ambien > 10 year  PSG:Wilfred Robertsnet: 5 years:   Trouble staying asleep  Lupus and fibromyalgia    Meds: Hydromorphone< Klonopin  Seen psychiatry: anxiety attacks: appt in November              Review of Systems   Constitutional:  Positive for fatigue.   Respiratory:  Positive for snoring. Negative for somnolence.    Psychiatric/Behavioral:  The patient is  nervous/anxious.    All other systems reviewed and are negative.      Outpatient Encounter Medications as of 8/10/2023   Medication Sig Dispense Refill    albuterol (PROAIR HFA) 90 mcg/actuation inhaler Inhale 2 puffs into the lungs every 4 hours as needed. 18 g 3    cetirizine (ZYRTEC) 10 MG tablet Take 1 tablet (10 mg total) by mouth once daily. 90 tablet 3    clonazePAM (KLONOPIN) 0.5 MG tablet Take 1 tablet (0.5 mg total) by mouth 2 (two) times daily as needed for Anxiety (panic attacks). 20 tablet 0    cyanocobalamin 1,000 mcg/mL injection Inject 1 mL (1,000 mcg total) into the muscle every 28 days. 10 mL 1    hydroCHLOROthiazide (HYDRODIURIL) 12.5 MG Tab Take 2 tablets (25 mg total) by mouth once daily. 180 tablet 3    HYDROmorphone (DILAUDID) 2 MG tablet Take 1 tablet (2 mg total) by mouth every 6 to 8 hours as needed for pain 105 tablet 0    HYDROmorphone (DILAUDID) 2 MG tablet take 1 tablet by mouth every 6-8 hours as needed for pain 105 tablet 0    HYDROmorphone (DILAUDID) 2 MG tablet Take 1 tablet (2 mg total) by mouth every 6 to 8 hours as needed for pain. 105 tablet 0    HYDROmorphone (DILAUDID) 2 MG tablet Take 1 tablet by mouth every six to eight hours as needed for pain 105 tablet 0    hydroxychloroquine (PLAQUENIL) 200 mg tablet Take 200 mg by mouth once daily.  1    hydrOXYchloroQUINE (PLAQUENIL) 200 mg tablet Take 1 tablet (200 mg total) by mouth once daily. 30 tablet 1    hydrOXYzine pamoate (VISTARIL) 25 MG Cap Take 1 capsule (25 mg total) by mouth every 8 (eight) hours as needed (anxiety). 90 capsule 3    metoprolol succinate (TOPROL-XL) 25 MG 24 hr tablet Take 1 tablet (25 mg total) by mouth once daily. 90 tablet 2    pantoprazole (PROTONIX) 40 MG tablet Take 1 tablet (40 mg total) by mouth 2 (two) times daily. 60 tablet 1    promethazine (PHENERGAN) 25 MG tablet Take 1 tablet (25 mg total) by mouth every 6 (six) hours as needed for Nausea. 15 tablet 0    semaglutide (OZEMPIC) 0.25 mg or 0.5  mg(2 mg/1.5 mL) pen injector Inject 0.5 mg into the skin once a week. 3 pen 1    suvorexant (BELSOMRA) 10 mg Tab Take 1 tablet by mouth nightly as needed (insomnia.). 30 tablet 0    tiZANidine (ZANAFLEX) 4 MG tablet Take 4 mg by mouth 3 (three) times daily as needed.      tiZANidine (ZANAFLEX) 4 MG tablet Take 1 tablet by mouth three times a day as needed 3 MOS SUPPLY WITH EACH REFILL/ /May cause drowsiness, use caution/ For muscle spasms 270 tablet 3    topiramate (TOPAMAX) 100 MG tablet Take 1 tablet (100 mg total) by mouth 2 (two) times daily. 180 tablet 3    zolpidem (AMBIEN) 10 mg Tab Take 1 tablet by mouth at bedtime as needed May cause drowsiness, use caution/ for sleep only 30 tablet 5    atorvastatin (LIPITOR) 20 MG tablet Take 1 tablet (20 mg total) by mouth once daily. 90 tablet 3    EPINEPHrine (EPIPEN 2-DENNIS) 0.3 mg/0.3 mL AtIn Inject 0.3 mLs (0.3 mg total) into the muscle once. for 1 dose 2 each 3    semaglutide (OZEMPIC) 0.25 mg or 0.5 mg (2 mg/3 mL) pen injector Inject 0.5MG into the skin once a week 9 mL 1     No facility-administered encounter medications on file as of 8/10/2023.       The following portions of the patient's history were reviewed and updated as appropriate: She  has a past medical history of Anemia, Ankylosing spondylitis, Anxiety, Asthma, Cancer (), Chronic constipation, Chronic insomnia, Edema, Fibromyalgia, Hypertension, Insulin resistance, Interstitial cystitis, Lupus, Lupus, Migraine headache, PONV (postoperative nausea and vomiting), Restless legs syndrome, Shingles, Stroke (), and TIA (transient ischemic attack) ().  She does not have any pertinent problems on file.  She  has a past surgical history that includes Bladder surgery;  section; Tubal ligation; Laser ablation; Cholecystectomy; Hysterectomy; Sleeve Gastroplasty (2016); Breast surgery (2018); Total Reduction Mammoplasty (Bilateral); Colonoscopy (N/A, 10/2/2020); Excision of granuloma  (Bilateral, 4/6/2021); Breast reconstruction; Breast lumpectomy (2018); and Esophagogastroduodenoscopy (N/A, 2/16/2023).  Her family history includes Breast cancer in her maternal aunt and maternal grandmother; Heart attack in her father; Hypertension in her brother, mother, and sister; Lupus in her sister.  She  reports that she has never smoked. She has never used smokeless tobacco. She reports current alcohol use. She reports that she does not use drugs.  She has a current medication list which includes the following prescription(s): albuterol, cetirizine, clonazepam, cyanocobalamin, hydrochlorothiazide, hydromorphone, hydromorphone, hydromorphone, hydromorphone, hydroxychloroquine, hydroxychloroquine, hydroxyzine pamoate, metoprolol succinate, pantoprazole, promethazine, ozempic, belsomra, tizanidine, tizanidine, topiramate, zolpidem, atorvastatin, epinephrine, and ozempic.  Current Outpatient Medications on File Prior to Visit   Medication Sig Dispense Refill    albuterol (PROAIR HFA) 90 mcg/actuation inhaler Inhale 2 puffs into the lungs every 4 hours as needed. 18 g 3    cetirizine (ZYRTEC) 10 MG tablet Take 1 tablet (10 mg total) by mouth once daily. 90 tablet 3    clonazePAM (KLONOPIN) 0.5 MG tablet Take 1 tablet (0.5 mg total) by mouth 2 (two) times daily as needed for Anxiety (panic attacks). 20 tablet 0    cyanocobalamin 1,000 mcg/mL injection Inject 1 mL (1,000 mcg total) into the muscle every 28 days. 10 mL 1    hydroCHLOROthiazide (HYDRODIURIL) 12.5 MG Tab Take 2 tablets (25 mg total) by mouth once daily. 180 tablet 3    HYDROmorphone (DILAUDID) 2 MG tablet Take 1 tablet (2 mg total) by mouth every 6 to 8 hours as needed for pain 105 tablet 0    HYDROmorphone (DILAUDID) 2 MG tablet take 1 tablet by mouth every 6-8 hours as needed for pain 105 tablet 0    HYDROmorphone (DILAUDID) 2 MG tablet Take 1 tablet (2 mg total) by mouth every 6 to 8 hours as needed for pain. 105 tablet 0    HYDROmorphone  (DILAUDID) 2 MG tablet Take 1 tablet by mouth every six to eight hours as needed for pain 105 tablet 0    hydroxychloroquine (PLAQUENIL) 200 mg tablet Take 200 mg by mouth once daily.  1    hydrOXYchloroQUINE (PLAQUENIL) 200 mg tablet Take 1 tablet (200 mg total) by mouth once daily. 30 tablet 1    hydrOXYzine pamoate (VISTARIL) 25 MG Cap Take 1 capsule (25 mg total) by mouth every 8 (eight) hours as needed (anxiety). 90 capsule 3    metoprolol succinate (TOPROL-XL) 25 MG 24 hr tablet Take 1 tablet (25 mg total) by mouth once daily. 90 tablet 2    pantoprazole (PROTONIX) 40 MG tablet Take 1 tablet (40 mg total) by mouth 2 (two) times daily. 60 tablet 1    promethazine (PHENERGAN) 25 MG tablet Take 1 tablet (25 mg total) by mouth every 6 (six) hours as needed for Nausea. 15 tablet 0    semaglutide (OZEMPIC) 0.25 mg or 0.5 mg(2 mg/1.5 mL) pen injector Inject 0.5 mg into the skin once a week. 3 pen 1    suvorexant (BELSOMRA) 10 mg Tab Take 1 tablet by mouth nightly as needed (insomnia.). 30 tablet 0    tiZANidine (ZANAFLEX) 4 MG tablet Take 4 mg by mouth 3 (three) times daily as needed.      tiZANidine (ZANAFLEX) 4 MG tablet Take 1 tablet by mouth three times a day as needed 3 MOS SUPPLY WITH EACH REFILL/ /May cause drowsiness, use caution/ For muscle spasms 270 tablet 3    topiramate (TOPAMAX) 100 MG tablet Take 1 tablet (100 mg total) by mouth 2 (two) times daily. 180 tablet 3    zolpidem (AMBIEN) 10 mg Tab Take 1 tablet by mouth at bedtime as needed May cause drowsiness, use caution/ for sleep only 30 tablet 5    atorvastatin (LIPITOR) 20 MG tablet Take 1 tablet (20 mg total) by mouth once daily. 90 tablet 3    EPINEPHrine (EPIPEN 2-DENNIS) 0.3 mg/0.3 mL AtIn Inject 0.3 mLs (0.3 mg total) into the muscle once. for 1 dose 2 each 3    semaglutide (OZEMPIC) 0.25 mg or 0.5 mg (2 mg/3 mL) pen injector Inject 0.5MG into the skin once a week 9 mL 1     No current facility-administered medications on file prior to visit.      She is allergic to carrot, morphine, clindamycin, macrolide antibiotics, erythromycin, and zofran (as hydrochloride) [ondansetron hcl]..      BP Readings from Last 3 Encounters:   07/05/23 132/78   04/18/23 125/70   03/13/23 (!) 162/85     Snoring / Sleep:     [place] Questionnaire (validated PAPO screening questionnaire)    yes -- Snoring/apnea    yes -- Fatigue    Body mass index is 31.09 kg/m².  (>25 is overweight, >30 is obese)    Blood Pressure = normal blood pressure  (PreHTN 120-139/80-89, Stg1 140-159/90-99, Stg2 >160/>100)  [place] = 2 of three PAPO categories are positive (high risk is 2-3 positive categories)     [place] Sleepiness Scale TOTAL =      EPWORTH SLEEPINESS SCALE 8/10/2023   Sitting and reading 0   Watching TV 0   Sitting, inactive in a public place (e.g. a theatre or a meeting) 0   As a passenger in a car for an hour without a break 1   Lying down to rest in the afternoon when circumstances permit 1   Sitting and talking to someone 0   Sitting quietly after a lunch without alcohol 0   In a car, while stopped for a few minutes in traffic 0   Total score 2      Please rate the CURRENT (i.e. LAST 2 WEEKS) SEVERITY of your insomnia problem(s).  1. Difficulty falling asleep: Very severe  2. Difficulty staying asleep: Very severe  3. Problem waking up too early: Very severe  4. How SATISFIED/DISSATISFIED are you with your CURRENT sleep pattern?: Very dissatisfied  5. How NOTICEABLE to others do you think your sleep problem is in terms of impairing the quality of your life?: Somewhat  6.  How WORRIED/DISTRESSED are you about your current sleep problem?: Very much worried  7. To what extent do you consider your sleep problem to INTERFERE with your daily functioning (e.g. daytime fatigue, mood, ability to function at work/daily chores, concentration, memory, mood, etc.) CURRENTLY?: Very much interfering  Total Score: 26  Interpretation: Clinical insomnia (severe)      (validated sleepiness  "questionnaire with a higher score indicating greater sleepiness; range 0-24)  No flowsheet data found.    STOP-Bang Questionnaire (validated PAPO screening questionnaire)  Negative unless checked off.  [x] Snoring    [x]  Tired/Fatigued/Sleepy  [] Obstruction (apneas/choking)  [] Pressure (HTN)  [] BMI >35  [x] Age >50  [] Neck >40 cm  [] Gender male   STOP-Bang = 3 (low risk 0-2,high risk 3-8)           MMRC Dyspnea Scale (4 is worst)     [] MMRC 0: Dyspneic on strenuous excercise (0 points)    [] MMRC 1: Dyspneic on walking a slight hill (0 points)    [] MMRC 2: Dyspneic on walking level ground; must stop occasionally due to breathlessness (1 point)    [] MMRC 3: Must stop for breathlessness after walking 100 yards or after a few minutes (2 points)    [] MMRC 4: Cannot leave house; breathless on dressing/undressing (3 points)                     No flowsheet data found.     Please rate the CURRENT (i.e. LAST 2 WEEKS) SEVERITY of your insomnia problem(s).  1. Difficulty falling asleep: Very severe  2. Difficulty staying asleep: Very severe  3. Problem waking up too early: Very severe  4. How SATISFIED/DISSATISFIED are you with your CURRENT sleep pattern?: Very dissatisfied  5. How NOTICEABLE to others do you think your sleep problem is in terms of impairing the quality of your life?: Somewhat  6.  How WORRIED/DISTRESSED are you about your current sleep problem?: Very much worried  7. To what extent do you consider your sleep problem to INTERFERE with your daily functioning (e.g. daytime fatigue, mood, ability to function at work/daily chores, concentration, memory, mood, etc.) CURRENTLY?: Very much interfering  Total Score: 26  Interpretation: Clinical insomnia (severe)        Objective:     Vital Signs (Most Recent)  Height and Weight  Height: 5' 2" (157.5 cm)  Weight: 77.1 kg (170 lb)  BSA (Calculated - sq m): 1.84 sq meters  BMI (Calculated): 31.1  Weight in (lb) to have BMI = 25: 136.4]  Wt Readings from Last 2 " "Encounters:   08/10/23 77.1 kg (170 lb)   06/30/23 70.3 kg (155 lb)       Physical Exam  Vitals and nursing note reviewed.   HENT:      Head: Normocephalic.   Eyes:      Pupils: Pupils are equal, round, and reactive to light.   Neurological:      General: No focal deficit present.      Mental Status: She is alert.          Laboratory  Lab Results   Component Value Date    WBC 3.47 (L) 06/30/2023    RBC 3.70 (L) 06/30/2023    HGB 10.9 (L) 06/30/2023    HCT 32.3 (L) 06/30/2023    MCV 87 06/30/2023    MCH 29.5 06/30/2023    MCHC 33.7 06/30/2023    RDW 13.6 06/30/2023     06/30/2023    MPV 9.3 06/30/2023    GRAN 1.7 (L) 06/30/2023    GRAN 49.0 06/30/2023    LYMPH 1.2 06/30/2023    LYMPH 34.0 06/30/2023    MONO 0.3 06/30/2023    MONO 9.8 06/30/2023    EOS 0.2 06/30/2023    BASO 0.02 06/30/2023    EOSINOPHIL 6.3 06/30/2023    BASOPHIL 0.6 06/30/2023       BMP  Lab Results   Component Value Date     04/21/2023    K 3.3 (L) 04/21/2023     04/21/2023    CO2 25 04/21/2023    BUN 10 04/21/2023    CREATININE 0.8 04/21/2023    CALCIUM 9.5 04/21/2023    ANIONGAP 11 04/21/2023    ESTGFRAFRICA >60 01/01/2022    EGFRNONAA >60 01/01/2022    AST 16 04/21/2023    ALT 28 04/21/2023    PROT 7.1 04/21/2023          No results found for: "IGE"     No results found for: "ASPERGILLUS"  No results found for: "AFUMIGATUSCL"     No results found for: "ACE"     Diagnostic Results:  I have personally reviewed today the following studies:    X-Ray Hand 3 View Bilateral  Narrative: EXAM: XR HAND COMPLETE 3 VIEWS BILATERAL    CLINICAL HISTORY: Bilateral hand pain.    FINDINGS: 3 views bilateral hands.  Scattered mild osteoarthritis and osteopenia.  No fracture is evident.  Joint alignment is anatomic.  The joint spaces in both hands are relatively well maintained.  No discrete articular erosions or other radiographic evidence of inflammatory arthropathy.  No soft tissue calcifications or other significant soft tissue " abnormality.  Impression:  Unremarkable bilateral hand series.  No radiographic evidence of erosive arthropathy.    Finalized on: 7/19/2023 2:11 PM By:  Zackery Ramos MD  [facility]# [id number]      2023-07-19 14:14:[id number]    [facility]       Assessment/Plan:     Problem List Items Addressed This Visit       History of sleeve gastrectomy (Chronic)    Relevant Orders    FERRITIN    Restless leg syndrome    Hyperlipidemia    Primary hypertension    Lupus (systemic lupus erythematosus)    Insomnia - Primary    Relevant Orders    Polysomnogram (CPAP will be added if patient meets diagnostic criteria.)    FERRITIN    Asthma    Pulmonary nodule     Stable 3 mm pulmonary nodule in the right upper lobe          Other Visit Diagnoses       Anxiety attack        Relevant Orders    Ambulatory referral/consult to Psychology           Discussed Sleep hygiene  Avoid clock watching  Information on [facility] [place]  Sleep restriction     Delay bed time 10 pm    No clock watching    Wake up 0500 am         Follow up in about 8 weeks (around 10/5/2023), or Sleep diary, Sleep restriction, Send SDI, PSG, ferritin, No clock watch.    This note was prepared using voice recognition system and is likely to have sound alike errors that may have been overlooked even after proof reading.  Please call me with any questions    Discussed diagnosis, its evaluation, treatment and usual course. All questions answered.    Thank you for the courtesy of participating in the care of this patient    Geoff Candelaria MD      Personal Diagnostic Review  []  CXR    []  ECHO    []  ONSAT    []  6MWD    []  LABS    []  CHEST CT    []  PET CT    []  Biopsy results              Problems Address                                                 Amount and/or Complexity                                                                      Risk       3           [] 2 or more self-limited or minor problems                      [] Limited                                                                         [] Low                  [] 1 stable chronic illness                                                  Any combination of the two                                               OTC drugs                  []Acute, uncomplicated illness or injury                            Review of prior external notes from unique source           Minor surgery with no risk factors                                                                                                               [] 1 []2  []3+                                                                                                              Review of results from each unique test                                                                                                               [] 1 []2  [] 3+                                                                                                              Order of each unique test                                                                                                               [] 1 []2  [] 3+                                                                                                              Or                                                                                                             [] Assessment requiring an independent historian      4            [] One or more chronic illness with exacerbation,              [] Moderate                                                                      [] Moderate                 Progression, or side effects of treatment                            -test documents or independent historians                        Prescription drug management                []  2 or more sable chronic illnesses                                    [] Independent interpretation of tests                              Minor surgery with identifiable risk                [] 1 undiagnosed new problem with uncertain prognosis    []  Discussion or management of test results                    elective major surgery                [] 1 acute illness with                systemic symptoms                                                                                                                                                              [] 1 acute complicated injury                                                                                                                                          Elective major surgery                                                                                                                                                                                                                                                                                                                                                                                                  5            [] 1 or more chronic illnesses with severe exacerbation,     [] Extensive(two from below)                                         [] High                                                                                                               [] Independent interpretation of results                         Drug therapy requiring intensive                                                                                                               []Discussion of management or test interpretation           monitoring                                                                                                                                                                                                       Decision to de-escalate care                 [] 1 acute or chronic illness or injury that poses a threat                                                                                               Decision regarding hospitalization

## 2023-08-11 ENCOUNTER — PATIENT MESSAGE (OUTPATIENT)
Dept: PSYCHIATRY | Facility: CLINIC | Age: 58
End: 2023-08-11
Payer: COMMERCIAL

## 2023-08-11 ENCOUNTER — TELEPHONE (OUTPATIENT)
Dept: PSYCHIATRY | Facility: CLINIC | Age: 58
End: 2023-08-11
Payer: COMMERCIAL

## 2023-08-11 NOTE — TELEPHONE ENCOUNTER
----- Message from Luisa Holland sent at 8/11/2023  1:16 PM CDT -----  Contact: Tosha  .Type:  Patient Returning Call    Who Called:Tosha  Who Left Message for Patient:Nurse  Does the patient know what this is regarding?:Yes  Would the patient rather a call back or a response via [facility]? Call back   Best Call Back Number:[phone]  Additional Information: NA                    Thanks  KT

## 2023-08-15 DIAGNOSIS — G47.33 OSA (OBSTRUCTIVE SLEEP APNEA): ICD-10-CM

## 2023-08-15 DIAGNOSIS — F51.05 INSOMNIA DUE TO OTHER MENTAL DISORDER: Primary | ICD-10-CM

## 2023-08-15 DIAGNOSIS — F99 INSOMNIA DUE TO OTHER MENTAL DISORDER: Primary | ICD-10-CM

## 2023-08-15 NOTE — PROGRESS NOTES
Inlab PSG was denied      Orders Placed This Encounter   Procedures    Home Sleep Study     Standing Status:   Future     Standing Expiration Date:   8/14/2024     Scheduling Instructions:      3 nights

## 2023-08-20 ENCOUNTER — PATIENT MESSAGE (OUTPATIENT)
Dept: SLEEP MEDICINE | Facility: CLINIC | Age: 58
End: 2023-08-20
Payer: COMMERCIAL

## 2023-08-22 ENCOUNTER — OFFICE VISIT (OUTPATIENT)
Dept: INTERNAL MEDICINE | Facility: CLINIC | Age: 58
End: 2023-08-22
Payer: COMMERCIAL

## 2023-08-22 ENCOUNTER — LAB VISIT (OUTPATIENT)
Dept: LAB | Facility: HOSPITAL | Age: 58
End: 2023-08-22
Attending: NURSE PRACTITIONER
Payer: COMMERCIAL

## 2023-08-22 VITALS
OXYGEN SATURATION: 98 % | TEMPERATURE: 99 F | DIASTOLIC BLOOD PRESSURE: 88 MMHG | WEIGHT: 183.63 LBS | HEART RATE: 83 BPM | SYSTOLIC BLOOD PRESSURE: 132 MMHG | BODY MASS INDEX: 33.59 KG/M2

## 2023-08-22 DIAGNOSIS — F99 INSOMNIA DUE TO OTHER MENTAL DISORDER: ICD-10-CM

## 2023-08-22 DIAGNOSIS — R53.83 FATIGUE, UNSPECIFIED TYPE: ICD-10-CM

## 2023-08-22 DIAGNOSIS — M47.817 SPONDYLOSIS OF LUMBOSACRAL REGION, UNSPECIFIED SPINAL OSTEOARTHRITIS COMPLICATION STATUS: ICD-10-CM

## 2023-08-22 DIAGNOSIS — F51.05 INSOMNIA DUE TO OTHER MENTAL DISORDER: ICD-10-CM

## 2023-08-22 DIAGNOSIS — F41.9 ANXIETY: ICD-10-CM

## 2023-08-22 DIAGNOSIS — R53.83 FATIGUE, UNSPECIFIED TYPE: Primary | ICD-10-CM

## 2023-08-22 DIAGNOSIS — E66.01 SEVERE OBESITY: ICD-10-CM

## 2023-08-22 DIAGNOSIS — I10 PRIMARY HYPERTENSION: ICD-10-CM

## 2023-08-22 DIAGNOSIS — E78.00 PURE HYPERCHOLESTEROLEMIA: ICD-10-CM

## 2023-08-22 LAB
ALBUMIN SERPL BCP-MCNC: 3.9 G/DL (ref 3.5–5.2)
ALP SERPL-CCNC: 101 U/L (ref 55–135)
ALT SERPL W/O P-5'-P-CCNC: 42 U/L (ref 10–44)
ANION GAP SERPL CALC-SCNC: 11 MMOL/L (ref 8–16)
AST SERPL-CCNC: 23 U/L (ref 10–40)
BASOPHILS # BLD AUTO: 0.03 K/UL (ref 0–0.2)
BASOPHILS NFR BLD: 0.7 % (ref 0–1.9)
BILIRUB SERPL-MCNC: 0.4 MG/DL (ref 0.1–1)
BUN SERPL-MCNC: 9 MG/DL (ref 6–20)
CALCIUM SERPL-MCNC: 9.6 MG/DL (ref 8.7–10.5)
CHLORIDE SERPL-SCNC: 105 MMOL/L (ref 95–110)
CO2 SERPL-SCNC: 23 MMOL/L (ref 23–29)
CREAT SERPL-MCNC: 0.7 MG/DL (ref 0.5–1.4)
DIFFERENTIAL METHOD: ABNORMAL
EOSINOPHIL # BLD AUTO: 0.2 K/UL (ref 0–0.5)
EOSINOPHIL NFR BLD: 3.7 % (ref 0–8)
ERYTHROCYTE [DISTWIDTH] IN BLOOD BY AUTOMATED COUNT: 12.9 % (ref 11.5–14.5)
EST. GFR  (NO RACE VARIABLE): >60 ML/MIN/1.73 M^2
GLUCOSE SERPL-MCNC: 92 MG/DL (ref 70–110)
HCT VFR BLD AUTO: 42.6 % (ref 37–48.5)
HGB BLD-MCNC: 13.8 G/DL (ref 12–16)
IMM GRANULOCYTES # BLD AUTO: 0.01 K/UL (ref 0–0.04)
IMM GRANULOCYTES NFR BLD AUTO: 0.2 % (ref 0–0.5)
LYMPHOCYTES # BLD AUTO: 1.3 K/UL (ref 1–4.8)
LYMPHOCYTES NFR BLD: 29.3 % (ref 18–48)
MCH RBC QN AUTO: 28.3 PG (ref 27–31)
MCHC RBC AUTO-ENTMCNC: 32.4 G/DL (ref 32–36)
MCV RBC AUTO: 88 FL (ref 82–98)
MONOCYTES # BLD AUTO: 0.3 K/UL (ref 0.3–1)
MONOCYTES NFR BLD: 5.5 % (ref 4–15)
NEUTROPHILS # BLD AUTO: 2.8 K/UL (ref 1.8–7.7)
NEUTROPHILS NFR BLD: 60.6 % (ref 38–73)
NRBC BLD-RTO: 0 /100 WBC
PLATELET # BLD AUTO: 357 K/UL (ref 150–450)
PMV BLD AUTO: 8.9 FL (ref 9.2–12.9)
POTASSIUM SERPL-SCNC: 3.8 MMOL/L (ref 3.5–5.1)
PROT SERPL-MCNC: 7.1 G/DL (ref 6–8.4)
RBC # BLD AUTO: 4.87 M/UL (ref 4–5.4)
SODIUM SERPL-SCNC: 139 MMOL/L (ref 136–145)
TSH SERPL DL<=0.005 MIU/L-ACNC: 0.67 UIU/ML (ref 0.4–4)
WBC # BLD AUTO: 4.54 K/UL (ref 3.9–12.7)

## 2023-08-22 PROCEDURE — 36415 COLL VENOUS BLD VENIPUNCTURE: CPT | Mod: PO | Performed by: NURSE PRACTITIONER

## 2023-08-22 PROCEDURE — 83540 ASSAY OF IRON: CPT | Performed by: NURSE PRACTITIONER

## 2023-08-22 PROCEDURE — 99999 PR PBB SHADOW E&M-EST. PATIENT-LVL V: ICD-10-PCS | Mod: PBBFAC,,, | Performed by: NURSE PRACTITIONER

## 2023-08-22 PROCEDURE — 80053 COMPREHEN METABOLIC PANEL: CPT | Mod: PO | Performed by: NURSE PRACTITIONER

## 2023-08-22 PROCEDURE — 3079F PR MOST RECENT DIASTOLIC BLOOD PRESSURE 80-89 MM HG: ICD-10-PCS | Mod: CPTII,S$GLB,, | Performed by: NURSE PRACTITIONER

## 2023-08-22 PROCEDURE — 84443 ASSAY THYROID STIM HORMONE: CPT | Mod: PO | Performed by: NURSE PRACTITIONER

## 2023-08-22 PROCEDURE — 3079F DIAST BP 80-89 MM HG: CPT | Mod: CPTII,S$GLB,, | Performed by: NURSE PRACTITIONER

## 2023-08-22 PROCEDURE — 99214 OFFICE O/P EST MOD 30 MIN: CPT | Mod: S$GLB,,, | Performed by: NURSE PRACTITIONER

## 2023-08-22 PROCEDURE — 3075F SYST BP GE 130 - 139MM HG: CPT | Mod: CPTII,S$GLB,, | Performed by: NURSE PRACTITIONER

## 2023-08-22 PROCEDURE — 80061 LIPID PANEL: CPT | Performed by: NURSE PRACTITIONER

## 2023-08-22 PROCEDURE — 84466 ASSAY OF TRANSFERRIN: CPT | Performed by: NURSE PRACTITIONER

## 2023-08-22 PROCEDURE — 3008F BODY MASS INDEX DOCD: CPT | Mod: CPTII,S$GLB,, | Performed by: NURSE PRACTITIONER

## 2023-08-22 PROCEDURE — 85025 COMPLETE CBC W/AUTO DIFF WBC: CPT | Mod: PO | Performed by: NURSE PRACTITIONER

## 2023-08-22 PROCEDURE — 99214 PR OFFICE/OUTPT VISIT, EST, LEVL IV, 30-39 MIN: ICD-10-PCS | Mod: S$GLB,,, | Performed by: NURSE PRACTITIONER

## 2023-08-22 PROCEDURE — 3075F PR MOST RECENT SYSTOLIC BLOOD PRESS GE 130-139MM HG: ICD-10-PCS | Mod: CPTII,S$GLB,, | Performed by: NURSE PRACTITIONER

## 2023-08-22 PROCEDURE — 99999 PR PBB SHADOW E&M-EST. PATIENT-LVL V: CPT | Mod: PBBFAC,,, | Performed by: NURSE PRACTITIONER

## 2023-08-22 PROCEDURE — 3008F PR BODY MASS INDEX (BMI) DOCUMENTED: ICD-10-PCS | Mod: CPTII,S$GLB,, | Performed by: NURSE PRACTITIONER

## 2023-08-22 RX ORDER — METOPROLOL SUCCINATE 25 MG/1
25 TABLET, EXTENDED RELEASE ORAL DAILY
Qty: 90 TABLET | Refills: 2 | Status: SHIPPED | OUTPATIENT
Start: 2023-08-22 | End: 2024-08-21

## 2023-08-22 RX ORDER — HYDROCHLOROTHIAZIDE 12.5 MG/1
12.5 TABLET ORAL DAILY
Qty: 90 TABLET | Refills: 3 | Status: SHIPPED | OUTPATIENT
Start: 2023-08-22

## 2023-08-22 RX ORDER — CLONAZEPAM 0.5 MG/1
0.5 TABLET ORAL 2 TIMES DAILY PRN
Qty: 20 TABLET | Refills: 0 | Status: SHIPPED | OUTPATIENT
Start: 2023-08-22 | End: 2023-12-06 | Stop reason: SDUPTHER

## 2023-08-22 RX ORDER — ATORVASTATIN CALCIUM 20 MG/1
20 TABLET, FILM COATED ORAL DAILY
Qty: 90 TABLET | Refills: 3 | Status: SHIPPED | OUTPATIENT
Start: 2023-08-22 | End: 2024-08-21

## 2023-08-22 RX ORDER — SEMAGLUTIDE 0.25 MG/.5ML
0.25 INJECTION, SOLUTION SUBCUTANEOUS
Qty: 2 ML | Refills: 0 | Status: SHIPPED | OUTPATIENT
Start: 2023-08-22 | End: 2023-11-09

## 2023-08-22 NOTE — PROGRESS NOTES
Subjective:       Patient ID: Tosha Kwok is a 58 y.o. female.    Chief Complaint: Follow-up (Follow up on blood pressure and sleep issues)    Mrs. Kwok presents to visit for multiple complaints. Would like to discuss options for weight loss. Previously on ozempic, has not been able to get for past couple of months. Does water aerobics, but has difficulty with exercise due to back pain.  Currently being managed by pain management with dilaudid. Also continues to have problems with sleep due to anxiety. Intermittent use of klonopin and ambien. Only uses ambien if she is unable to sleep by 10 pm. Has upcoming appt with psychiatry in December. Also recently saw sleep medicine with plans for possible sleep study. Counseled on healthy sleep habits and not clock watching at night. Continues to have significant fatigue.        Patient Active Problem List   Diagnosis    Atrophy of vulva    Ankylosing spondylitis    Chronic, continuous use of opioids    Fibromyalgia    Elevated d-dimer    History of sleeve gastrectomy    Intractable pain    De Quervain's tenosynovitis, right    Adhesive capsulitis of right shoulder    Anxiety    Asthma    Lumbosacral spondylosis    GERD (gastroesophageal reflux disease)    Primary hypertension    Hyperlipidemia    Insulin resistance    Large joint arthralgia of multiple sites    Lupus (systemic lupus erythematosus)    Migraine    Postmenopausal hormone replacement therapy    Vitamin D deficiency    Impingement syndrome of left shoulder    Limited range of motion (ROM) of shoulder    Left foot pain    Vestibular hypofunction    Other iron deficiency anemias    Keloid scar of skin    Iron deficiency anemia due to chronic blood loss    Restless leg syndrome    Chest pain    Insomnia    Port-A-Cath in place    TIA (transient ischemic attack)    History of non anemic vitamin B12 deficiency    Acute pain of right shoulder    Weight gain    Severe obesity     Nausea and vomiting    Hypokalemia    Gastritis    Hiatal hernia    Prolonged Q-T interval on ECG    Palpitation    Pulmonary nodule       Family History   Problem Relation Age of Onset    Hypertension Mother     Hypertension Brother     Breast cancer Maternal Aunt         x2     Heart attack Father     Hypertension Sister     Lupus Sister     Breast cancer Maternal Grandmother      Past Surgical History:   Procedure Laterality Date    BLADDER SURGERY      BREAST LUMPECTOMY  2018    BREAST RECONSTRUCTION      BREAST SURGERY  2018    reduction     SECTION      CHOLECYSTECTOMY      COLONOSCOPY N/A 10/2/2020    Procedure: COLONOSCOPY;  Surgeon: Guicho Hood MD;  Location: [facility] ENDO;  Service: Endoscopy;  Laterality: N/A;    ESOPHAGOGASTRODUODENOSCOPY N/A 2023    Procedure: EGD (ESOPHAGOGASTRODUODENOSCOPY);  Surgeon: Belkis Mario MD;  Location: [facility] ENDO;  Service: Endoscopy;  Laterality: N/A;    EXCISION OF GRANULOMA Bilateral 2021    Procedure: EXCISION, GRANULOMA SCARS OF BREASTS;  Surgeon: Obed Palmer Jr., MD;  Location: [facility] OR;  Service: Plastics;  Laterality: Bilateral;    HYSTERECTOMY      LASER ABLATION      to back    SLEEVE GASTROPLASTY  2016    TOTAL REDUCTION MAMMOPLASTY Bilateral     two years ago    TUBAL LIGATION           Current Outpatient Medications:     albuterol (PROAIR HFA) 90 mcg/actuation inhaler, Inhale 2 puffs into the lungs every 4 hours as needed., Disp: 18 g, Rfl: 3    cetirizine (ZYRTEC) 10 MG tablet, Take 1 tablet (10 mg total) by mouth once daily., Disp: 90 tablet, Rfl: 3    cyanocobalamin 1,000 mcg/mL injection, Inject 1 mL (1,000 mcg total) into the muscle every 28 days., Disp: 10 mL, Rfl: 1    HYDROmorphone (DILAUDID) 2 MG tablet, Take 1 tablet (2 mg total) by mouth every 6 to 8 hours as needed for pain, Disp: 105 tablet, Rfl: 0    HYDROmorphone (DILAUDID) 2 MG tablet, Take 1 tablet (2 mg total) by mouth every  6 to 8 hours as needed for pain., Disp: 105 tablet, Rfl: 0    HYDROmorphone (DILAUDID) 2 MG tablet, Take 1 tablet by mouth every six to eight hours as needed for pain, Disp: 105 tablet, Rfl: 0    hydrOXYchloroQUINE (PLAQUENIL) 200 mg tablet, Take 1 tablet (200 mg total) by mouth once daily., Disp: 30 tablet, Rfl: 1    hydrOXYzine pamoate (VISTARIL) 25 MG Cap, Take 1 capsule (25 mg total) by mouth every 8 (eight) hours as needed (anxiety)., Disp: 90 capsule, Rfl: 3    pantoprazole (PROTONIX) 40 MG tablet, Take 1 tablet (40 mg total) by mouth 2 (two) times daily., Disp: 60 tablet, Rfl: 1    promethazine (PHENERGAN) 25 MG tablet, Take 1 tablet (25 mg total) by mouth every 6 (six) hours as needed for Nausea., Disp: 15 tablet, Rfl: 0    tiZANidine (ZANAFLEX) 4 MG tablet, Take 4 mg by mouth 3 (three) times daily as needed., Disp: , Rfl:     topiramate (TOPAMAX) 100 MG tablet, Take 1 tablet (100 mg total) by mouth 2 (two) times daily., Disp: 180 tablet, Rfl: 3    zolpidem (AMBIEN) 10 mg Tab, Take 1 tablet by mouth at bedtime as needed May cause drowsiness, use caution/ for sleep only, Disp: 30 tablet, Rfl: 5    zolpidem (AMBIEN) 10 mg Tab, Take 1 tablet by mouth at bedtime as needed May cause drowsiness, use caution/ for sleep only, Disp: 30 tablet, Rfl: 5    atorvastatin (LIPITOR) 20 MG tablet, Take 1 tablet (20 mg total) by mouth once daily., Disp: 90 tablet, Rfl: 3    clonazePAM (KLONOPIN) 0.5 MG tablet, Take 1 tablet (0.5 mg total) by mouth 2 (two) times daily as needed for Anxiety (panic attacks)., Disp: 20 tablet, Rfl: 0    EPINEPHrine (EPIPEN 2-DENNIS) 0.3 mg/0.3 mL AtIn, Inject 0.3 mLs (0.3 mg total) into the muscle once. for 1 dose, Disp: 2 each, Rfl: 3    hydroCHLOROthiazide (HYDRODIURIL) 12.5 MG Tab, Take 1 tablet (12.5 mg total) by mouth once daily., Disp: 90 tablet, Rfl: 3    HYDROmorphone (DILAUDID) 2 MG tablet, take 1 tablet by mouth every 6-8 hours as needed for pain, Disp: 105 tablet, Rfl: 0     metoprolol succinate (TOPROL-XL) 25 MG 24 hr tablet, Take 1 tablet (25 mg total) by mouth once daily., Disp: 90 tablet, Rfl: 2    semaglutide, weight loss, (WEGOVY) 0.25 mg/0.5 mL PnIj, Inject 0.25 mg into the skin every 7 days., Disp: 2 mL, Rfl: 0    Review of Systems   Constitutional:  Positive for fatigue. Negative for appetite change, chills and fever.   Respiratory:  Negative for shortness of breath.    Cardiovascular:  Negative for chest pain and palpitations.   Gastrointestinal:  Negative for abdominal pain, diarrhea, nausea and vomiting.   Musculoskeletal:  Positive for arthralgias and back pain.   Neurological:  Negative for dizziness, light-headedness and headaches.   Psychiatric/Behavioral:  Positive for sleep disturbance. Negative for dysphoric mood, self-injury and suicidal ideas. The patient is nervous/anxious.        Objective:   /88   Pulse 83   Temp 98.6 °F (37 °C)   Wt 83.3 kg (183 lb 10.3 oz)   LMP 03/26/2012   SpO2 98%   BMI 33.59 kg/m²      Physical Exam  Constitutional:       General: She is not in acute distress.     Appearance: Normal appearance. She is not ill-appearing.   Cardiovascular:      Rate and Rhythm: Normal rate and regular rhythm.      Pulses: Normal pulses.      Heart sounds: Normal heart sounds. No murmur heard.     No friction rub. No gallop.   Pulmonary:      Effort: Pulmonary effort is normal. No respiratory distress.      Breath sounds: Normal breath sounds.   Skin:     General: Skin is warm and dry.      Coloration: Skin is not pale.      Findings: No erythema.   Neurological:      Mental Status: She is alert and oriented to person, place, and time.   Psychiatric:         Mood and Affect: Mood normal.         Behavior: Behavior normal.       Assessment & Plan     Problem List Items Addressed This Visit          Neuro    Lumbosacral spondylosis    Overview     Overview:   EMG UE normal 2015    Overview:   EMG LE normal 2015         Current Assessment & Plan      Followed by pain management. On dilaudid.  reviewed. Discussed the high quantity of narcotics that she is currently being prescribed. Patient reports that she has a large supply of unused pills at home. Recommend discussing with pain management provider to reduce prescribed number if she is not actually using the large amt being given.             Psychiatric    Anxiety    Overview     Has tried lexapro (possible EKG changes), wellbutrin (hallucination), abilify, effexor, and hydroxyzine in past.          Current Assessment & Plan     Has upcoming appt with psychiatry due to problems with tolerance of several medications in past. Klonopin prn.          Relevant Medications    clonazePAM (KLONOPIN) 0.5 MG tablet       Cardiac/Vascular    Primary hypertension    Current Assessment & Plan     Blood pressure stable. Continue current medications.         Relevant Medications    hydroCHLOROthiazide (HYDRODIURIL) 12.5 MG Tab    metoprolol succinate (TOPROL-XL) 25 MG 24 hr tablet    Hyperlipidemia    Current Assessment & Plan     Lipid panel ordered. On statin.          Relevant Medications    atorvastatin (LIPITOR) 20 MG tablet    Other Relevant Orders    Lipid Panel (Completed)       Endocrine    Severe obesity    Current Assessment & Plan     Counseled on importance of diet and exercise in order to improve overall quality of life, and reduce risk of future comorbidities.  Trial of wegovy           Relevant Medications    semaglutide, weight loss, (WEGOVY) 0.25 mg/0.5 mL PnIj       Other    Insomnia    Current Assessment & Plan     ambien prescribed intermittently by pain management. Keep follow up with sleep medicine. Possibly having sleep study in future.           Other Visit Diagnoses       Fatigue, unspecified type    -  Primary    Relevant Orders    CBC Auto Differential (Completed)    COMPREHENSIVE METABOLIC PANEL (Completed)    IRON AND TIBC (Completed)    TSH (Completed)          Follow up in about 1 month  "(around 9/22/2023) for wegovy.            Portions of this note may have been created with voice recognition software. Occasional "wrong-word" or "sound-a-like" substitutions may have occurred due to the inherent limitations of voice recognition software. Please, read the note carefully and recognize, using context, where substitutions have occurred.       "

## 2023-08-23 LAB
CHOLEST SERPL-MCNC: 267 MG/DL (ref 120–199)
CHOLEST/HDLC SERPL: 4.5 {RATIO} (ref 2–5)
HDLC SERPL-MCNC: 59 MG/DL (ref 40–75)
HDLC SERPL: 22.1 % (ref 20–50)
IRON SERPL-MCNC: 152 UG/DL (ref 30–160)
LDLC SERPL CALC-MCNC: 178.8 MG/DL (ref 63–159)
NONHDLC SERPL-MCNC: 208 MG/DL
SATURATED IRON: 34 % (ref 20–50)
TOTAL IRON BINDING CAPACITY: 441 UG/DL (ref 250–450)
TRANSFERRIN SERPL-MCNC: 298 MG/DL (ref 200–375)
TRIGL SERPL-MCNC: 146 MG/DL (ref 30–150)

## 2023-08-25 ENCOUNTER — OFFICE VISIT (OUTPATIENT)
Dept: PSYCHIATRY | Facility: CLINIC | Age: 58
End: 2023-08-25
Payer: COMMERCIAL

## 2023-08-25 DIAGNOSIS — F41.0 PANIC DISORDER WITHOUT AGORAPHOBIA: Primary | ICD-10-CM

## 2023-08-25 DIAGNOSIS — G47.00 INSOMNIA, UNSPECIFIED TYPE: ICD-10-CM

## 2023-08-25 DIAGNOSIS — F41.0 ANXIETY ATTACK: ICD-10-CM

## 2023-08-25 PROCEDURE — 99499 UNLISTED E&M SERVICE: CPT | Mod: 95,,, | Performed by: SOCIAL WORKER

## 2023-08-25 PROCEDURE — 90791 PSYCH DIAGNOSTIC EVALUATION: CPT | Mod: 95,,, | Performed by: SOCIAL WORKER

## 2023-08-25 PROCEDURE — 99499 NO LOS: ICD-10-PCS | Mod: 95,,, | Performed by: SOCIAL WORKER

## 2023-08-25 PROCEDURE — 90791 PR PSYCHIATRIC DIAGNOSTIC EVALUATION: ICD-10-PCS | Mod: 95,,, | Performed by: SOCIAL WORKER

## 2023-08-25 NOTE — PROGRESS NOTES
Pt was located in a nail salon at the start of her visit and was rescheduled to 3:00 today. No LOS.
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
## 2023-09-02 NOTE — ASSESSMENT & PLAN NOTE
ambien prescribed intermittently by pain management. Keep follow up with sleep medicine. Possibly having sleep study in future.

## 2023-09-02 NOTE — ASSESSMENT & PLAN NOTE
Has upcoming appt with psychiatry due to problems with tolerance of several medications in past. Klonopin prn.

## 2023-09-02 NOTE — ASSESSMENT & PLAN NOTE
Counseled on importance of diet and exercise in order to improve overall quality of life, and reduce risk of future comorbidities.  Trial of wegovy

## 2023-09-02 NOTE — ASSESSMENT & PLAN NOTE
Followed by pain management. On dilaudid.  reviewed. Discussed the high quantity of narcotics that she is currently being prescribed. Patient reports that she has a large supply of unused pills at home. Recommend discussing with pain management provider to reduce prescribed number if she is not actually using the large amt being given.

## 2023-09-23 ENCOUNTER — HOSPITAL ENCOUNTER (EMERGENCY)
Facility: HOSPITAL | Age: 58
Discharge: HOME | End: 2023-09-23
Attending: EMERGENCY MEDICINE
Payer: COMMERCIAL

## 2023-09-23 VITALS
HEIGHT: 62 IN | RESPIRATION RATE: 16 BRPM | HEART RATE: 80 BPM | TEMPERATURE: 98 F | BODY MASS INDEX: 33.26 KG/M2 | OXYGEN SATURATION: 97 % | SYSTOLIC BLOOD PRESSURE: 108 MMHG | WEIGHT: 180.75 LBS | DIASTOLIC BLOOD PRESSURE: 61 MMHG

## 2023-09-23 DIAGNOSIS — R07.9 CHEST PAIN: ICD-10-CM

## 2023-09-23 DIAGNOSIS — R07.89 ATYPICAL CHEST PAIN: ICD-10-CM

## 2023-09-23 DIAGNOSIS — B34.9 NONSPECIFIC SYNDROME SUGGESTIVE OF VIRAL ILLNESS: Primary | ICD-10-CM

## 2023-09-23 LAB
ALBUMIN SERPL BCP-MCNC: 3.8 G/DL (ref 3.5–5.2)
ALP SERPL-CCNC: 102 U/L (ref 55–135)
ALT SERPL W/O P-5'-P-CCNC: 42 U/L (ref 10–44)
ANION GAP SERPL CALC-SCNC: 12 MMOL/L (ref 8–16)
AST SERPL-CCNC: 23 U/L (ref 10–40)
BASOPHILS # BLD AUTO: 0.03 K/UL (ref 0–0.2)
BASOPHILS NFR BLD: 0.5 % (ref 0–1.9)
BILIRUB SERPL-MCNC: 0.2 MG/DL (ref 0.1–1)
BUN SERPL-MCNC: 15 MG/DL (ref 6–20)
CALCIUM SERPL-MCNC: 9.1 MG/DL (ref 8.7–10.5)
CHLORIDE SERPL-SCNC: 111 MMOL/L (ref 95–110)
CO2 SERPL-SCNC: 21 MMOL/L (ref 23–29)
CREAT SERPL-MCNC: 0.9 MG/DL (ref 0.5–1.4)
CTP QC/QA: YES
CTP QC/QA: YES
DIFFERENTIAL METHOD: ABNORMAL
EOSINOPHIL # BLD AUTO: 0.2 K/UL (ref 0–0.5)
EOSINOPHIL NFR BLD: 3.9 % (ref 0–8)
ERYTHROCYTE [DISTWIDTH] IN BLOOD BY AUTOMATED COUNT: 13 % (ref 11.5–14.5)
EST. GFR  (NO RACE VARIABLE): >60 ML/MIN/1.73 M^2
GLUCOSE SERPL-MCNC: 150 MG/DL (ref 70–110)
HCT VFR BLD AUTO: 34.8 % (ref 37–48.5)
HGB BLD-MCNC: 11.5 G/DL (ref 12–16)
IMM GRANULOCYTES # BLD AUTO: 0.01 K/UL (ref 0–0.04)
IMM GRANULOCYTES NFR BLD AUTO: 0.2 % (ref 0–0.5)
LYMPHOCYTES # BLD AUTO: 2.2 K/UL (ref 1–4.8)
LYMPHOCYTES NFR BLD: 35.8 % (ref 18–48)
MCH RBC QN AUTO: 28.5 PG (ref 27–31)
MCHC RBC AUTO-ENTMCNC: 33 G/DL (ref 32–36)
MCV RBC AUTO: 86 FL (ref 82–98)
MONOCYTES # BLD AUTO: 0.5 K/UL (ref 0.3–1)
MONOCYTES NFR BLD: 7.4 % (ref 4–15)
NEUTROPHILS # BLD AUTO: 3.2 K/UL (ref 1.8–7.7)
NEUTROPHILS NFR BLD: 52.2 % (ref 38–73)
NRBC BLD-RTO: 0 /100 WBC
PLATELET # BLD AUTO: 309 K/UL (ref 150–450)
PMV BLD AUTO: 9.2 FL (ref 9.2–12.9)
POC MOLECULAR INFLUENZA A AGN: NEGATIVE
POC MOLECULAR INFLUENZA B AGN: NEGATIVE
POTASSIUM SERPL-SCNC: 3.4 MMOL/L (ref 3.5–5.1)
PROT SERPL-MCNC: 6.9 G/DL (ref 6–8.4)
RBC # BLD AUTO: 4.04 M/UL (ref 4–5.4)
SARS-COV-2 RDRP RESP QL NAA+PROBE: NEGATIVE
SODIUM SERPL-SCNC: 144 MMOL/L (ref 136–145)
TROPONIN I SERPL DL<=0.01 NG/ML-MCNC: <0.006 NG/ML (ref 0–0.03)
WBC # BLD AUTO: 6.12 K/UL (ref 3.9–12.7)

## 2023-09-23 PROCEDURE — 93005 ELECTROCARDIOGRAM TRACING: CPT | Mod: ER

## 2023-09-23 PROCEDURE — 87502 INFLUENZA DNA AMP PROBE: CPT | Mod: ER

## 2023-09-23 PROCEDURE — 63600175 PHARM REV CODE 636 W HCPCS: Mod: ER | Performed by: EMERGENCY MEDICINE

## 2023-09-23 PROCEDURE — 93010 EKG 12-LEAD: ICD-10-PCS | Mod: ,,, | Performed by: INTERNAL MEDICINE

## 2023-09-23 PROCEDURE — 96374 THER/PROPH/DIAG INJ IV PUSH: CPT | Mod: ER

## 2023-09-23 PROCEDURE — 84484 ASSAY OF TROPONIN QUANT: CPT | Mod: ER | Performed by: EMERGENCY MEDICINE

## 2023-09-23 PROCEDURE — 93010 ELECTROCARDIOGRAM REPORT: CPT | Mod: ,,, | Performed by: INTERNAL MEDICINE

## 2023-09-23 PROCEDURE — 25000003 PHARM REV CODE 250: Mod: ER | Performed by: EMERGENCY MEDICINE

## 2023-09-23 PROCEDURE — 80053 COMPREHEN METABOLIC PANEL: CPT | Mod: ER | Performed by: EMERGENCY MEDICINE

## 2023-09-23 PROCEDURE — 96361 HYDRATE IV INFUSION ADD-ON: CPT | Mod: ER

## 2023-09-23 PROCEDURE — 87635 SARS-COV-2 COVID-19 AMP PRB: CPT | Mod: ER | Performed by: EMERGENCY MEDICINE

## 2023-09-23 PROCEDURE — 99285 EMERGENCY DEPT VISIT HI MDM: CPT | Mod: 25,ER

## 2023-09-23 PROCEDURE — 96375 TX/PRO/DX INJ NEW DRUG ADDON: CPT | Mod: ER

## 2023-09-23 PROCEDURE — 85025 COMPLETE CBC W/AUTO DIFF WBC: CPT | Mod: ER | Performed by: EMERGENCY MEDICINE

## 2023-09-23 RX ORDER — METOCLOPRAMIDE HYDROCHLORIDE 5 MG/ML
10 INJECTION INTRAMUSCULAR; INTRAVENOUS
Status: COMPLETED | OUTPATIENT
Start: 2023-09-23 | End: 2023-09-23

## 2023-09-23 RX ORDER — HEPARIN 100 UNIT/ML
5 SYRINGE INTRAVENOUS
Status: COMPLETED | OUTPATIENT
Start: 2023-09-23 | End: 2023-09-23

## 2023-09-23 RX ORDER — HYDROMORPHONE HYDROCHLORIDE 2 MG/ML
0.5 INJECTION, SOLUTION INTRAMUSCULAR; INTRAVENOUS; SUBCUTANEOUS
Status: COMPLETED | OUTPATIENT
Start: 2023-09-23 | End: 2023-09-23

## 2023-09-23 RX ORDER — PROMETHAZINE HYDROCHLORIDE 25 MG/1
25 TABLET ORAL EVERY 6 HOURS PRN
Qty: 15 TABLET | Refills: 0 | Status: SHIPPED | OUTPATIENT
Start: 2023-09-23 | End: 2023-09-30

## 2023-09-23 RX ADMIN — HEPARIN 500 UNITS: 100 SYRINGE at 06:09

## 2023-09-23 RX ADMIN — SODIUM CHLORIDE, POTASSIUM CHLORIDE, SODIUM LACTATE AND CALCIUM CHLORIDE 1000 ML: 600; 310; 30; 20 INJECTION, SOLUTION INTRAVENOUS at 04:09

## 2023-09-23 RX ADMIN — METOCLOPRAMIDE 10 MG: 5 INJECTION, SOLUTION INTRAMUSCULAR; INTRAVENOUS at 04:09

## 2023-09-23 RX ADMIN — HYDROMORPHONE HYDROCHLORIDE 0.5 MG: 2 INJECTION INTRAMUSCULAR; INTRAVENOUS; SUBCUTANEOUS at 04:09

## 2023-09-23 RX ADMIN — POTASSIUM BICARBONATE 20 MEQ: 391 TABLET, EFFERVESCENT ORAL at 05:09

## 2023-09-23 NOTE — ED PROVIDER NOTES
History     Chief Complaint   Patient presents with    Lupus     HPI:  Tosha Kwok is a 58 y.o. female with PMH as below who presents to the [facility] emergency department for evaluation of nausea, vomiting, watery large volume diarrhea, dizziness, and chest pain x1 day. She's been exposed to COVID-19 at her workplace, [employer]. She has no other complaints.     External medical record reviewed: Note from rheum clinic shows treatment for SLE, AS, and fibromyalgia.      PCP: Antonieta Ariza NP    Review of patient's allergies indicates:   Allergen Reactions    Carrot Swelling     angioedema    Morphine Anxiety     Hives   States she can take dilaudid and percocet without any problems    Clindamycin     Macrolide antibiotics     Erythromycin Other (See Comments)     Hives and cramps     Zofran (as hydrochloride) [ondansetron hcl] Hives     Local hive after IV injection      Past Medical History:   Diagnosis Date    Anemia     Ankylosing spondylitis     Anxiety     Asthma     Cancer 2018    left breast  lumpectomy, xrt    Chronic constipation     Chronic insomnia     Edema     Fibromyalgia     Hypertension     Insulin resistance     Interstitial cystitis     Lupus     Lupus     Migraine headache     PONV (postoperative nausea and vomiting)     Restless legs syndrome     Shingles     Stroke     post partum right sided numbness    TIA (transient ischemic attack)      Past Surgical History:   Procedure Laterality Date    BLADDER SURGERY      BREAST LUMPECTOMY  2018    BREAST RECONSTRUCTION      BREAST SURGERY  2018    reduction     SECTION      CHOLECYSTECTOMY      COLONOSCOPY N/A 10/2/2020    Procedure: COLONOSCOPY;  Surgeon: Guicho Hood MD;  Location: [facility];  Service: Endoscopy;  Laterality: N/A;    ESOPHAGOGASTRODUODENOSCOPY N/A 2023    Procedure: EGD (ESOPHAGOGASTRODUODENOSCOPY);  Surgeon: Belkis Mario MD;  Location: [facility];  Service: Endoscopy;   Laterality: N/A;    EXCISION OF GRANULOMA Bilateral 4/6/2021    Procedure: EXCISION, GRANULOMA SCARS OF BREASTS;  Surgeon: Obed Palmer Jr., MD;  Location: [facility];  Service: Plastics;  Laterality: Bilateral;    HYSTERECTOMY      LASER ABLATION      to back    SLEEVE GASTROPLASTY  05/2016    TOTAL REDUCTION MAMMOPLASTY Bilateral     two years ago    TUBAL LIGATION         Family History   Problem Relation Age of Onset    Hypertension Mother     Hypertension Brother     Breast cancer Maternal Aunt         x2     Heart attack Father     Hypertension Sister     Lupus Sister     Breast cancer Maternal Grandmother      Social History     Tobacco Use    Smoking status: Never    Smokeless tobacco: Never   Substance and Sexual Activity    Alcohol use: Yes     Comment: occasionally    Drug use: No    Sexual activity: Yes     Partners: Male      Review of Systems     Review of Systems   Constitutional: Negative.  Negative for fever.   HENT: Negative.     Eyes: Negative.    Respiratory: Negative.     Cardiovascular: Negative.    Gastrointestinal:  Positive for diarrhea, nausea and vomiting. Negative for abdominal pain.   Endocrine: Negative.    Genitourinary: Negative.  Negative for dysuria.   Musculoskeletal: Negative.    Skin: Negative.    Allergic/Immunologic: Negative.    Neurological: Negative.    Hematological: Negative.    Psychiatric/Behavioral: Negative.     All other systems reviewed and are negative.       Physical Exam     Initial Vitals   BP Pulse Resp Temp SpO2   09/23/23 0406 09/23/23 0351 09/23/23 0351 09/23/23 0351 09/23/23 0351   121/70 88 20 98.1 °F (36.7 °C) 96 %      MAP       --                 Nursing notes and vital signs reviewed.  Constitutional: Patient is in mild distress.   Head: Normocephalic. Atraumatic.   Eyes:  Conjunctivae are not pale. No scleral icterus.   ENT: Mucous membranes moist.   Neck: Supple.   Cardiovascular: Regular rate. Regular rhythm. No murmurs, rubs, or gallops.   "  Pulmonary: No respiratory distress. Clear to auscultation bilaterally.    Abdominal: Soft. Non-distended. Nontender.   Musculoskeletal: Moves all extremities. No obvious deformities.   Skin: Warm and dry.   Neurological:  Alert, awake, and appropriate. Normal speech. No acute lateralizing neurologic deficits appreciated.   Psychiatric: Normal affect.       ED Course   Procedures  Vitals:    09/23/23 0351 09/23/23 0406 09/23/23 0453   BP:  121/70    Pulse: 88     Resp: 20  20   Temp: 98.1 °F (36.7 °C)     TempSrc: Oral     SpO2: 96%     Weight: 82 kg (180 lb 12.4 oz)     Height: 5' 2" (1.575 m)       Lab Results Interpreted as Abnormal:  Labs Reviewed   CBC W/ AUTO DIFFERENTIAL - Abnormal; Notable for the following components:       Result Value    Hemoglobin 11.5 (*)     Hematocrit 34.8 (*)     All other components within normal limits   COMPREHENSIVE METABOLIC PANEL - Abnormal; Notable for the following components:    Potassium 3.4 (*)     Chloride 111 (*)     CO2 21 (*)     Glucose 150 (*)     All other components within normal limits   TROPONIN I   SARS-COV-2 RDRP GENE   POCT INFLUENZA A/B MOLECULAR      All Lab Results:  Results for orders placed or performed during the hospital encounter of 09/23/23   CBC auto differential   Result Value Ref Range    WBC 6.12 3.90 - 12.70 K/uL    RBC 4.04 4.00 - 5.40 M/uL    Hemoglobin 11.5 (L) 12.0 - 16.0 g/dL    Hematocrit 34.8 (L) 37.0 - 48.5 %    MCV 86 82 - 98 fL    MCH 28.5 27.0 - 31.0 pg    MCHC 33.0 32.0 - 36.0 g/dL    RDW 13.0 11.5 - 14.5 %    Platelets 309 150 - 450 K/uL    MPV 9.2 9.2 - 12.9 fL    Immature Granulocytes 0.2 0.0 - 0.5 %    Gran # (ANC) 3.2 1.8 - 7.7 K/uL    Immature Grans (Abs) 0.01 0.00 - 0.04 K/uL    Lymph # 2.2 1.0 - 4.8 K/uL    Mono # 0.5 0.3 - 1.0 K/uL    Eos # 0.2 0.0 - 0.5 K/uL    Baso # 0.03 0.00 - 0.20 K/uL    nRBC 0 0 /100 WBC    Gran % 52.2 38.0 - 73.0 %    Lymph % 35.8 18.0 - 48.0 %    Mono % 7.4 4.0 - 15.0 %    Eosinophil % 3.9 0.0 - 8.0 " %    Basophil % 0.5 0.0 - 1.9 %    Differential Method Automated    Comprehensive metabolic panel   Result Value Ref Range    Sodium 144 136 - 145 mmol/L    Potassium 3.4 (L) 3.5 - 5.1 mmol/L    Chloride 111 (H) 95 - 110 mmol/L    CO2 21 (L) 23 - 29 mmol/L    Glucose 150 (H) 70 - 110 mg/dL    BUN 15 6 - 20 mg/dL    Creatinine 0.9 0.5 - 1.4 mg/dL    Calcium 9.1 8.7 - 10.5 mg/dL    Total Protein 6.9 6.0 - 8.4 g/dL    Albumin 3.8 3.5 - 5.2 g/dL    Total Bilirubin 0.2 0.1 - 1.0 mg/dL    Alkaline Phosphatase 102 55 - 135 U/L    AST 23 10 - 40 U/L    ALT 42 10 - 44 U/L    eGFR >60.0 >60 mL/min/1.73 m^2    Anion Gap 12 8 - 16 mmol/L   Troponin I   Result Value Ref Range    Troponin I <0.006 0.000 - 0.026 ng/mL   POCT COVID-19 Rapid Screening   Result Value Ref Range    POC Rapid COVID Negative Negative     Acceptable Yes    POCT Influenza A/B Molecular   Result Value Ref Range    POC Molecular Influenza A Ag Negative Negative, Not Reported    POC Molecular Influenza B Ag Negative Negative, Not Reported     Acceptable Yes      Imaging Results              X-Ray Chest AP Portable (In process)                    ED Physician's independent review of the above imaging: no acute findings.    The EKG was ordered, reviewed, and independently interpreted by the ED Physician:  Rhythm: normal sinus  Rate: 91 bpm  No ST-T changes concerning for acute ischemia  Normal axis.   Normal intervals.      The emergency physician reviewed the vital signs and test results, which are outlined above.     ED Discussion     Patient's evaluation in the ED does not suggest any emergent or life-threatening medical conditions requiring immediate intervention beyond what was provided in the ED, and I believe patient is safe for discharge. Regardless, an unremarkable evaluation in the ED does not preclude the development or presence of a serious or life-threatening condition. As such, patient was given return instructions  for any change or worsening of symptoms.              ED Medication(s) Administered:  Medications   lactated ringers bolus 1,000 mL (1,000 mLs Intravenous New Bag 9/23/23 5767)   HYDROmorphone (PF) injection 0.5 mg (0.5 mg Intravenous Given 9/23/23 2493)   metoclopramide HCl injection 10 mg (10 mg Intravenous Given 9/23/23 0682)   potassium bicarbonate disintegrating tablet 20 mEq (20 mEq Oral Given 9/23/23 2865)       Prescription Management: I performed a review of the patient's current Rx medication list as input by nursing staff.    Patient's Medications   New Prescriptions    PROMETHAZINE (PHENERGAN) 25 MG TABLET    Take 1 tablet (25 mg total) by mouth every 6 (six) hours as needed for Nausea.   Previous Medications    ALBUTEROL (PROAIR HFA) 90 MCG/ACTUATION INHALER    Inhale 2 puffs into the lungs every 4 hours as needed.    ATORVASTATIN (LIPITOR) 20 MG TABLET    Take 1 tablet (20 mg total) by mouth once daily.    CETIRIZINE (ZYRTEC) 10 MG TABLET    Take 1 tablet (10 mg total) by mouth once daily.    CLONAZEPAM (KLONOPIN) 0.5 MG TABLET    Take 1 tablet (0.5 mg total) by mouth 2 (two) times daily as needed for Anxiety (panic attacks).    CYANOCOBALAMIN 1,000 MCG/ML INJECTION    Inject 1 mL (1,000 mcg total) into the muscle every 28 days.    EPINEPHRINE (EPIPEN 2-DENNIS) 0.3 MG/0.3 ML ATIN    Inject 0.3 mLs (0.3 mg total) into the muscle once. for 1 dose    HYDROCHLOROTHIAZIDE (HYDRODIURIL) 12.5 MG TAB    Take 1 tablet (12.5 mg total) by mouth once daily.    HYDROMORPHONE (DILAUDID) 2 MG TABLET    Take 1 tablet (2 mg total) by mouth every 6 to 8 hours as needed for pain    HYDROMORPHONE (DILAUDID) 2 MG TABLET    take 1 tablet by mouth every 6-8 hours as needed for pain    HYDROMORPHONE (DILAUDID) 2 MG TABLET    Take 1 tablet (2 mg total) by mouth every 6 to 8 hours as needed for pain.    HYDROMORPHONE (DILAUDID) 2 MG TABLET    Take 1 tablet by mouth every six to eight hours as needed for pain    HYDROXYCHLOROQUINE  (PLAQUENIL) 200 MG TABLET    Take 1 tablet (200 mg total) by mouth once daily.    HYDROXYZINE PAMOATE (VISTARIL) 25 MG CAP    Take 1 capsule (25 mg total) by mouth every 8 (eight) hours as needed (anxiety).    METOPROLOL SUCCINATE (TOPROL-XL) 25 MG 24 HR TABLET    Take 1 tablet (25 mg total) by mouth once daily.    PANTOPRAZOLE (PROTONIX) 40 MG TABLET    Take 1 tablet (40 mg total) by mouth 2 (two) times daily.    PROMETHAZINE (PHENERGAN) 25 MG TABLET    Take 1 tablet (25 mg total) by mouth every 6 (six) hours as needed for Nausea.    SEMAGLUTIDE, WEIGHT LOSS, (WEGOVY) 0.25 MG/0.5 ML PNIJ    Inject 0.25 mg into the skin every 7 days.    TIZANIDINE (ZANAFLEX) 4 MG TABLET    Take 4 mg by mouth 3 (three) times daily as needed.    TOPIRAMATE (TOPAMAX) 100 MG TABLET    Take 1 tablet (100 mg total) by mouth 2 (two) times daily.    ZOLPIDEM (AMBIEN) 10 MG TAB    Take 1 tablet by mouth at bedtime as needed May cause drowsiness, use caution/ for sleep only    ZOLPIDEM (AMBIEN) 10 MG TAB    Take 1 tablet by mouth at bedtime as needed May cause drowsiness, use caution/ for sleep only   Modified Medications    No medications on file   Discontinued Medications    No medications on file         Follow-up Information       Schedule an appointment as soon as possible for a visit  with Antonieta Ariza NP.    Specialty: Family Medicine  Contact information:  [address] [address] [address]  [phone]               [facility] - Emergency Dept.    Specialty: Emergency Medicine  Why: As needed, If symptoms worsen  Contact information:  [address] [facility] [address]  [facility] [id number] [phone]                          Clinical Impression       ICD-10-CM ICD-9-CM   1. Nonspecific syndrome suggestive of viral illness  B34.9 079.99   2. Atypical chest pain  R07.89 786.59   3. Chest pain  R07.9 786.50      ED Disposition Condition    Discharge Stable             Ash Campbell MD  09/23/23 0602

## 2023-10-10 ENCOUNTER — LAB VISIT (OUTPATIENT)
Dept: LAB | Facility: HOSPITAL | Age: 58
End: 2023-10-10
Attending: INTERNAL MEDICINE
Payer: COMMERCIAL

## 2023-10-10 DIAGNOSIS — D50.0 IRON DEFICIENCY ANEMIA DUE TO CHRONIC BLOOD LOSS: ICD-10-CM

## 2023-10-10 DIAGNOSIS — Z86.39 HISTORY OF NON ANEMIC VITAMIN B12 DEFICIENCY: ICD-10-CM

## 2023-10-10 LAB
BASOPHILS # BLD AUTO: 0.04 K/UL (ref 0–0.2)
BASOPHILS NFR BLD: 1 % (ref 0–1.9)
DIFFERENTIAL METHOD: ABNORMAL
EOSINOPHIL # BLD AUTO: 0.1 K/UL (ref 0–0.5)
EOSINOPHIL NFR BLD: 2.7 % (ref 0–8)
ERYTHROCYTE [DISTWIDTH] IN BLOOD BY AUTOMATED COUNT: 13.3 % (ref 11.5–14.5)
FERRITIN SERPL-MCNC: 179 NG/ML (ref 20–300)
HCT VFR BLD AUTO: 36.9 % (ref 37–48.5)
HGB BLD-MCNC: 12.4 G/DL (ref 12–16)
IMM GRANULOCYTES # BLD AUTO: 0.01 K/UL (ref 0–0.04)
IMM GRANULOCYTES NFR BLD AUTO: 0.2 % (ref 0–0.5)
IRON SERPL-MCNC: 109 UG/DL (ref 30–160)
LYMPHOCYTES # BLD AUTO: 1.6 K/UL (ref 1–4.8)
LYMPHOCYTES NFR BLD: 40 % (ref 18–48)
MCH RBC QN AUTO: 28.6 PG (ref 27–31)
MCHC RBC AUTO-ENTMCNC: 33.6 G/DL (ref 32–36)
MCV RBC AUTO: 85 FL (ref 82–98)
MONOCYTES # BLD AUTO: 0.3 K/UL (ref 0.3–1)
MONOCYTES NFR BLD: 7 % (ref 4–15)
NEUTROPHILS # BLD AUTO: 2 K/UL (ref 1.8–7.7)
NEUTROPHILS NFR BLD: 49.1 % (ref 38–73)
NRBC BLD-RTO: 0 /100 WBC
PLATELET # BLD AUTO: 306 K/UL (ref 150–450)
PMV BLD AUTO: 8.9 FL (ref 9.2–12.9)
RBC # BLD AUTO: 4.34 M/UL (ref 4–5.4)
SATURATED IRON: 25 % (ref 20–50)
TOTAL IRON BINDING CAPACITY: 435 UG/DL (ref 250–450)
TRANSFERRIN SERPL-MCNC: 294 MG/DL (ref 200–375)
VIT B12 SERPL-MCNC: 282 PG/ML (ref 210–950)
WBC # BLD AUTO: 4.02 K/UL (ref 3.9–12.7)

## 2023-10-10 PROCEDURE — 85025 COMPLETE CBC W/AUTO DIFF WBC: CPT | Performed by: INTERNAL MEDICINE

## 2023-10-10 PROCEDURE — 36415 COLL VENOUS BLD VENIPUNCTURE: CPT | Performed by: INTERNAL MEDICINE

## 2023-10-10 PROCEDURE — 82728 ASSAY OF FERRITIN: CPT | Performed by: INTERNAL MEDICINE

## 2023-10-10 PROCEDURE — 84466 ASSAY OF TRANSFERRIN: CPT | Performed by: INTERNAL MEDICINE

## 2023-10-10 PROCEDURE — 82607 VITAMIN B-12: CPT | Performed by: INTERNAL MEDICINE

## 2023-10-10 PROCEDURE — 83540 ASSAY OF IRON: CPT | Performed by: INTERNAL MEDICINE

## 2023-10-31 ENCOUNTER — OFFICE VISIT (OUTPATIENT)
Dept: HEMATOLOGY/ONCOLOGY | Facility: CLINIC | Age: 58
End: 2023-10-31
Payer: COMMERCIAL

## 2023-10-31 VITALS
WEIGHT: 186.06 LBS | OXYGEN SATURATION: 100 % | SYSTOLIC BLOOD PRESSURE: 147 MMHG | BODY MASS INDEX: 34.03 KG/M2 | DIASTOLIC BLOOD PRESSURE: 81 MMHG | HEART RATE: 77 BPM

## 2023-10-31 DIAGNOSIS — D50.0 IRON DEFICIENCY ANEMIA DUE TO CHRONIC BLOOD LOSS: Primary | ICD-10-CM

## 2023-10-31 DIAGNOSIS — Z86.39 HISTORY OF NON ANEMIC VITAMIN B12 DEFICIENCY: ICD-10-CM

## 2023-10-31 DIAGNOSIS — Z90.3 HISTORY OF SLEEVE GASTRECTOMY: ICD-10-CM

## 2023-10-31 PROCEDURE — 1159F MED LIST DOCD IN RCRD: CPT | Mod: CPTII,S$GLB,, | Performed by: INTERNAL MEDICINE

## 2023-10-31 PROCEDURE — 3008F PR BODY MASS INDEX (BMI) DOCUMENTED: ICD-10-PCS | Mod: CPTII,S$GLB,, | Performed by: INTERNAL MEDICINE

## 2023-10-31 PROCEDURE — 99214 PR OFFICE/OUTPT VISIT, EST, LEVL IV, 30-39 MIN: ICD-10-PCS | Mod: S$GLB,,, | Performed by: INTERNAL MEDICINE

## 2023-10-31 PROCEDURE — 1159F PR MEDICATION LIST DOCUMENTED IN MEDICAL RECORD: ICD-10-PCS | Mod: CPTII,S$GLB,, | Performed by: INTERNAL MEDICINE

## 2023-10-31 PROCEDURE — 3077F PR MOST RECENT SYSTOLIC BLOOD PRESSURE >= 140 MM HG: ICD-10-PCS | Mod: CPTII,S$GLB,, | Performed by: INTERNAL MEDICINE

## 2023-10-31 PROCEDURE — 3008F BODY MASS INDEX DOCD: CPT | Mod: CPTII,S$GLB,, | Performed by: INTERNAL MEDICINE

## 2023-10-31 PROCEDURE — 3079F PR MOST RECENT DIASTOLIC BLOOD PRESSURE 80-89 MM HG: ICD-10-PCS | Mod: CPTII,S$GLB,, | Performed by: INTERNAL MEDICINE

## 2023-10-31 PROCEDURE — 1160F RVW MEDS BY RX/DR IN RCRD: CPT | Mod: CPTII,S$GLB,, | Performed by: INTERNAL MEDICINE

## 2023-10-31 PROCEDURE — 3077F SYST BP >= 140 MM HG: CPT | Mod: CPTII,S$GLB,, | Performed by: INTERNAL MEDICINE

## 2023-10-31 PROCEDURE — 99999 PR PBB SHADOW E&M-EST. PATIENT-LVL IV: ICD-10-PCS | Mod: PBBFAC,,, | Performed by: INTERNAL MEDICINE

## 2023-10-31 PROCEDURE — 99214 OFFICE O/P EST MOD 30 MIN: CPT | Mod: S$GLB,,, | Performed by: INTERNAL MEDICINE

## 2023-10-31 PROCEDURE — 3079F DIAST BP 80-89 MM HG: CPT | Mod: CPTII,S$GLB,, | Performed by: INTERNAL MEDICINE

## 2023-10-31 PROCEDURE — 99999 PR PBB SHADOW E&M-EST. PATIENT-LVL IV: CPT | Mod: PBBFAC,,, | Performed by: INTERNAL MEDICINE

## 2023-10-31 PROCEDURE — 1160F PR REVIEW ALL MEDS BY PRESCRIBER/CLIN PHARMACIST DOCUMENTED: ICD-10-PCS | Mod: CPTII,S$GLB,, | Performed by: INTERNAL MEDICINE

## 2023-10-31 RX ORDER — EPINEPHRINE 0.3 MG/.3ML
0.3 INJECTION SUBCUTANEOUS ONCE AS NEEDED
Status: CANCELLED | OUTPATIENT
Start: 2023-12-07

## 2023-10-31 RX ORDER — HEPARIN 100 UNIT/ML
500 SYRINGE INTRAVENOUS
Status: CANCELLED | OUTPATIENT
Start: 2023-12-07

## 2023-10-31 RX ORDER — DIPHENHYDRAMINE HYDROCHLORIDE 50 MG/ML
50 INJECTION INTRAMUSCULAR; INTRAVENOUS ONCE AS NEEDED
Status: CANCELLED | OUTPATIENT
Start: 2023-11-07

## 2023-10-31 RX ORDER — CYANOCOBALAMIN 1000 UG/ML
1000 INJECTION, SOLUTION INTRAMUSCULAR; SUBCUTANEOUS
Qty: 10 ML | Refills: 1 | Status: SHIPPED | OUTPATIENT
Start: 2023-10-31

## 2023-10-31 RX ORDER — SODIUM CHLORIDE 0.9 % (FLUSH) 0.9 %
10 SYRINGE (ML) INJECTION
Status: CANCELLED | OUTPATIENT
Start: 2023-12-07

## 2023-10-31 RX ORDER — SODIUM CHLORIDE 0.9 % (FLUSH) 0.9 %
10 SYRINGE (ML) INJECTION
Status: CANCELLED | OUTPATIENT
Start: 2023-11-07

## 2023-10-31 RX ORDER — EPINEPHRINE 0.3 MG/.3ML
0.3 INJECTION SUBCUTANEOUS ONCE AS NEEDED
Status: CANCELLED | OUTPATIENT
Start: 2023-11-07

## 2023-10-31 RX ORDER — HEPARIN 100 UNIT/ML
500 SYRINGE INTRAVENOUS
Status: CANCELLED | OUTPATIENT
Start: 2023-11-07

## 2023-10-31 RX ORDER — DIPHENHYDRAMINE HYDROCHLORIDE 50 MG/ML
50 INJECTION INTRAMUSCULAR; INTRAVENOUS ONCE AS NEEDED
Status: CANCELLED | OUTPATIENT
Start: 2023-12-07

## 2023-10-31 NOTE — PROGRESS NOTES
PATIENT: Tosha Kwok  MRN: [id number]  DATE: 10/31/2023    Diagnosis:   1. Iron deficiency anemia due to chronic blood loss    2. History of sleeve gastrectomy    3. History of non anemic vitamin B12 deficiency      Chief Complaint: Anemia        Subjective:    History of Present Illness: Ms. Kwok is a 58 y.o. female who presented in March 2021 for evaluation and management of anemia. She was referred by Dr. Gibson.    - labs since 2012 reveal an intermittent, mild normocytic anemia  - iron studies in December 2020 and March 2021 reveal a decreased ferritin/iron/saturated iron with increased total iron binding capacity.  - She had a documented decreased B12 level on 6/11/19.  - she has been taking oral iron for two years. She endorses constipation from this.  - she had a hysterectomy in 2018  - she has a history of sleeve gastrectomy in 2016.  - she received ferric carboxymaltose x 2 doses in April/May 2021.    Interval history:  - she presents for a follow-up appointment for her anemia.  - today, she endorses fatigue, itching, arthralgias. She is still taking B12 injections. She is still taking oral iron.      Past medical, surgical, family, and social histories have been reviewed and updated below.    Past Medical History:   Past Medical History:   Diagnosis Date    Anemia     Ankylosing spondylitis     Anxiety     Asthma     Cancer 2018    left breast  lumpectomy, xrt    Chronic constipation     Chronic insomnia     Edema     Fibromyalgia     Hypertension     Insulin resistance     Interstitial cystitis     Lupus     Lupus     Migraine headache     PONV (postoperative nausea and vomiting)     Restless legs syndrome     Shingles     Stroke 1998    post partum right sided numbness    TIA (transient ischemic attack) 1998       Past Surgical History:   Past Surgical History:   Procedure Laterality Date    BLADDER SURGERY      BREAST LUMPECTOMY  2018    BREAST RECONSTRUCTION      BREAST SURGERY  2018    reduction      SECTION      CHOLECYSTECTOMY      COLONOSCOPY N/A 10/2/2020    Procedure: COLONOSCOPY;  Surgeon: Guicho Hood MD;  Location: [facility] ENDO;  Service: Endoscopy;  Laterality: N/A;    ESOPHAGOGASTRODUODENOSCOPY N/A 2023    Procedure: EGD (ESOPHAGOGASTRODUODENOSCOPY);  Surgeon: Belkis Mario MD;  Location: [facility] ENDO;  Service: Endoscopy;  Laterality: N/A;    EXCISION OF GRANULOMA Bilateral 2021    Procedure: EXCISION, GRANULOMA SCARS OF BREASTS;  Surgeon: Obed Palmer Jr., MD;  Location: [facility] OR;  Service: Plastics;  Laterality: Bilateral;    HYSTERECTOMY      LASER ABLATION      to back    SLEEVE GASTROPLASTY  2016    TOTAL REDUCTION MAMMOPLASTY Bilateral     two years ago    TUBAL LIGATION         Family History:   Family History   Problem Relation Age of Onset    Hypertension Mother     Hypertension Brother     Breast cancer Maternal Aunt         x2     Heart attack Father     Hypertension Sister     Lupus Sister     Breast cancer Maternal Grandmother        Social History:  reports that she has never smoked. She has never used smokeless tobacco. She reports current alcohol use. She reports that she does not use drugs.    Allergies:  Review of patient's allergies indicates:   Allergen Reactions    Carrot Swelling     angioedema    Morphine Anxiety     Hives   States she can take dilaudid and percocet without any problems    Clindamycin     Macrolide antibiotics     Erythromycin Other (See Comments)     Hives and cramps     Zofran (as hydrochloride) [ondansetron hcl] Hives     Local hive after IV injection       Medications:  Current Outpatient Medications   Medication Sig Dispense Refill    albuterol (PROAIR HFA) 90 mcg/actuation inhaler Inhale 2 puffs into the lungs every 4 hours as needed. 18 g 3    atorvastatin (LIPITOR) 20 MG tablet Take 1 tablet (20 mg total) by mouth once daily. 90 tablet 3    cetirizine (ZYRTEC) 10 MG tablet Take 1 tablet (10 mg total) by mouth once daily.  90 tablet 3    clonazePAM (KLONOPIN) 0.5 MG tablet Take 1 tablet (0.5 mg total) by mouth 2 (two) times daily as needed for Anxiety (panic attacks). 20 tablet 0    cyanocobalamin 1,000 mcg/mL injection Inject 1 mL (1,000 mcg total) into the muscle every 28 days. 10 mL 1    EPINEPHrine (EPIPEN 2-DENNIS) 0.3 mg/0.3 mL AtIn Inject 0.3 mLs (0.3 mg total) into the muscle once. for 1 dose 2 each 3    hydroCHLOROthiazide (HYDRODIURIL) 12.5 MG Tab Take 1 tablet (12.5 mg total) by mouth once daily. 90 tablet 3    HYDROmorphone (DILAUDID) 2 MG tablet Take 1 tablet (2 mg total) by mouth every 6 to 8 hours as needed for pain 105 tablet 0    HYDROmorphone (DILAUDID) 2 MG tablet take 1 tablet by mouth every 6-8 hours as needed for pain 105 tablet 0    HYDROmorphone (DILAUDID) 2 MG tablet Take 1 tablet (2 mg total) by mouth every 6 to 8 hours as needed for pain. 105 tablet 0    HYDROmorphone (DILAUDID) 2 MG tablet Take 1 tablet by mouth every six to eight hours as needed for pain 105 tablet 0    hydrOXYchloroQUINE (PLAQUENIL) 200 mg tablet Take 1 tablet (200 mg total) by mouth once daily. 30 tablet 1    hydrOXYzine pamoate (VISTARIL) 25 MG Cap Take 1 capsule (25 mg total) by mouth every 8 (eight) hours as needed (anxiety). 90 capsule 3    metoprolol succinate (TOPROL-XL) 25 MG 24 hr tablet Take 1 tablet (25 mg total) by mouth once daily. 90 tablet 2    pantoprazole (PROTONIX) 40 MG tablet Take 1 tablet (40 mg total) by mouth 2 (two) times daily. 60 tablet 1    promethazine (PHENERGAN) 25 MG tablet Take 1 tablet (25 mg total) by mouth every 6 (six) hours as needed for Nausea. 15 tablet 0    semaglutide, weight loss, (WEGOVY) 0.25 mg/0.5 mL PnIj Inject 0.25 mg into the skin every 7 days. 2 mL 0    tiZANidine (ZANAFLEX) 4 MG tablet Take 4 mg by mouth 3 (three) times daily as needed.      tiZANidine (ZANAFLEX) 4 MG tablet Take 1 tablet by mouth three times a day as needed. May cause drowsiness, use caution/ For muscle spasms 270 tablet 3     topiramate (TOPAMAX) 100 MG tablet Take 1 tablet (100 mg total) by mouth 2 (two) times daily. 180 tablet 3    zolpidem (AMBIEN) 10 mg Tab Take 1 tablet by mouth at bedtime as needed May cause drowsiness, use caution/ for sleep only 30 tablet 5    zolpidem (AMBIEN) 10 mg Tab Take 1 tablet by mouth at bedtime as needed May cause drowsiness, use caution/ for sleep only 30 tablet 5     No current facility-administered medications for this visit.       Review of Systems   Constitutional:  Positive for fatigue.   HENT:  Negative for sore throat.    Eyes:  Negative for visual disturbance.   Respiratory:  Negative for cough and shortness of breath.    Cardiovascular:  Negative for chest pain.   Gastrointestinal:  Negative for abdominal pain, diarrhea, nausea and vomiting.   Genitourinary:  Negative for dysuria.   Musculoskeletal:  Positive for arthralgias. Negative for back pain.   Skin:  Negative for rash.        itching   Neurological:  Negative for headaches.   Hematological:  Negative for adenopathy.   Psychiatric/Behavioral:  The patient is not nervous/anxious.      ECOG Performance Status:   ECOG SCORE 1     Objective:      Vitals:   Vitals:    10/31/23 1101   BP: (!) 147/81   Pulse: 77   SpO2: 100%   Weight: 84.4 kg (186 lb 1.1 oz)     BMI: Body mass index is 34.03 kg/m².      Physical Exam  Vitals and nursing note reviewed.   Constitutional:       Appearance: She is well-developed.   HENT:      Head: Normocephalic and atraumatic.   Eyes:      Pupils: Pupils are equal, round, and reactive to light.   Cardiovascular:      Rate and Rhythm: Normal rate and regular rhythm.   Pulmonary:      Effort: Pulmonary effort is normal.      Breath sounds: Normal breath sounds.   Abdominal:      General: Bowel sounds are normal.      Palpations: Abdomen is soft.   Musculoskeletal:         General: Normal range of motion.      Cervical back: Normal range of motion and neck supple.   Skin:     General: Skin is warm and dry.    Neurological:      Mental Status: She is alert and oriented to person, place, and time.   Psychiatric:         Behavior: Behavior normal.         Thought Content: Thought content normal.         Judgment: Judgment normal.          Laboratory Data:  Labs have been reviewed.    Lab Results   Component Value Date    WBC 4.02 10/10/2023    HGB 12.4 10/10/2023    HCT 36.9 (L) 10/10/2023    MCV 85 10/10/2023     10/10/2023           Imaging:    Assessment:       1. Iron deficiency anemia due to chronic blood loss    2. History of sleeve gastrectomy    3. History of non anemic vitamin B12 deficiency           Plan:     1. Iron deficiency anemia / history of B12 deficiency  - I have reviewed her chart  - labs since 2012 reveal an intermittent, mild normocytic anemia  - iron studies in December 2020 and March 2021 reveal a decreased ferritin/iron/saturated iron with increased total iron binding capacity.  - She had a documented decreased B12 level on 6/11/19. She has a history of sleeve gastrectomy and takes B12 injections  - cause of iron deficiency may be history of abnormal uterine bleeding (had a hysterectomy in 2018) or poor absorption in setting of gastric sleeve  - she has been taking oral iron for almost 2 years without significant improvement in her iron studies and symptoms.  - I recommend ferric carboxymaltose x 2 doses  - she required a port-a-cath placement due to difficulty finding veins.  - she received ferric carboxymaltose x 2 doses in April/May 2021.  - Labs have been reviewed. Hemoglobin is normal at 12.4 g/dL. Ferritin has decreased to 179 ng/mL. Total iron binding capacity is borderline-elevated.  - she would like to proceed with more IV iron to see if her fatigue improves. I will get it approved.  - continue heparin flushes q3 months.   - return to clinic in 6 months with repeat labs.    2. Lupus  - on hydroxychloroquine  - has drug-induced neutropenia on intermittent labs.  - defer to  rheumatology    3. History of gastric sleeve / B12 deficiency  - had a gastric sleeve in 2016  - B12 level in September 2021 revealed B12 deficiency  - follow-up B12 (10/10/23) is 282 pg/mL  - I sent a refill of her B12 injections  - repeat B12 level in 6 months.    4. Advance Care Planning     Power of   After our discussion (at previous visit), the patient decided to complete a HCPOA and appointed her   , health care agent:  Dimitry Kwok ([phone]) .        - return to clinic in 6 months with repeat labs.    Jose Manuel Marino M.D.  Hematology/Oncology  [facility] - [address], [address]  [place] [address]  Phone: [phone]  Fax: [phone]

## 2023-11-03 ENCOUNTER — TELEPHONE (OUTPATIENT)
Dept: INFUSION THERAPY | Facility: HOSPITAL | Age: 58
End: 2023-11-03
Payer: COMMERCIAL

## 2023-11-06 ENCOUNTER — TELEPHONE (OUTPATIENT)
Dept: INFUSION THERAPY | Facility: HOSPITAL | Age: 58
End: 2023-11-06
Payer: COMMERCIAL

## 2023-11-06 ENCOUNTER — PATIENT MESSAGE (OUTPATIENT)
Dept: INTERNAL MEDICINE | Facility: CLINIC | Age: 58
End: 2023-11-06
Payer: COMMERCIAL

## 2023-11-06 NOTE — TELEPHONE ENCOUNTER
Called the patient to schedule iron infusions. Pt unable to schedule on many dates available due to work schedule. Pt asked if infusions can be done in [place] where she works. Let the patient know I would ask Dr. Marino, and will call her back

## 2023-11-06 NOTE — TELEPHONE ENCOUNTER
Returned call to patient. Let the patient know that Dr. Marino does not have signing privileges at [facility]. Pt would need to establish care with provider that has priv/cred at [facility] if she wishes to have infusions there. Pt states she will keep care at [place]. Confirmed infusion appts for   12/6 at 130p  Skip one week due to pt being out of town  12/20 at 130p

## 2023-11-09 ENCOUNTER — LAB VISIT (OUTPATIENT)
Dept: LAB | Facility: HOSPITAL | Age: 58
End: 2023-11-09
Attending: NURSE PRACTITIONER
Payer: COMMERCIAL

## 2023-11-09 ENCOUNTER — OFFICE VISIT (OUTPATIENT)
Dept: INTERNAL MEDICINE | Facility: CLINIC | Age: 58
End: 2023-11-09
Payer: COMMERCIAL

## 2023-11-09 VITALS
WEIGHT: 184.5 LBS | BODY MASS INDEX: 33.95 KG/M2 | SYSTOLIC BLOOD PRESSURE: 136 MMHG | TEMPERATURE: 99 F | HEART RATE: 101 BPM | HEIGHT: 62 IN | DIASTOLIC BLOOD PRESSURE: 82 MMHG | OXYGEN SATURATION: 96 % | RESPIRATION RATE: 16 BRPM

## 2023-11-09 DIAGNOSIS — R35.89 POLYURIA: ICD-10-CM

## 2023-11-09 DIAGNOSIS — R35.0 URINARY FREQUENCY: ICD-10-CM

## 2023-11-09 DIAGNOSIS — E66.01 SEVERE OBESITY: Primary | ICD-10-CM

## 2023-11-09 DIAGNOSIS — I10 PRIMARY HYPERTENSION: ICD-10-CM

## 2023-11-09 DIAGNOSIS — E88.819 INSULIN RESISTANCE: ICD-10-CM

## 2023-11-09 LAB
ALBUMIN SERPL BCP-MCNC: 4.3 G/DL (ref 3.5–5.2)
ALP SERPL-CCNC: 85 U/L (ref 55–135)
ALT SERPL W/O P-5'-P-CCNC: 20 U/L (ref 10–44)
ANION GAP SERPL CALC-SCNC: 12 MMOL/L (ref 8–16)
AST SERPL-CCNC: 18 U/L (ref 10–40)
BILIRUB SERPL-MCNC: 0.6 MG/DL (ref 0.1–1)
BUN SERPL-MCNC: 12 MG/DL (ref 6–20)
CALCIUM SERPL-MCNC: 9.7 MG/DL (ref 8.7–10.5)
CHLORIDE SERPL-SCNC: 105 MMOL/L (ref 95–110)
CO2 SERPL-SCNC: 24 MMOL/L (ref 23–29)
CREAT SERPL-MCNC: 0.8 MG/DL (ref 0.5–1.4)
EST. GFR  (NO RACE VARIABLE): >60 ML/MIN/1.73 M^2
ESTIMATED AVG GLUCOSE: 103 MG/DL (ref 68–131)
GLUCOSE SERPL-MCNC: 89 MG/DL (ref 70–110)
HBA1C MFR BLD: 5.2 % (ref 4–5.6)
POTASSIUM SERPL-SCNC: 3.8 MMOL/L (ref 3.5–5.1)
PROT SERPL-MCNC: 7.8 G/DL (ref 6–8.4)
SODIUM SERPL-SCNC: 141 MMOL/L (ref 136–145)

## 2023-11-09 PROCEDURE — 1159F MED LIST DOCD IN RCRD: CPT | Mod: CPTII,S$GLB,, | Performed by: NURSE PRACTITIONER

## 2023-11-09 PROCEDURE — 36415 COLL VENOUS BLD VENIPUNCTURE: CPT | Mod: PO | Performed by: NURSE PRACTITIONER

## 2023-11-09 PROCEDURE — 1159F PR MEDICATION LIST DOCUMENTED IN MEDICAL RECORD: ICD-10-PCS | Mod: CPTII,S$GLB,, | Performed by: NURSE PRACTITIONER

## 2023-11-09 PROCEDURE — 99214 PR OFFICE/OUTPT VISIT, EST, LEVL IV, 30-39 MIN: ICD-10-PCS | Mod: S$GLB,,, | Performed by: NURSE PRACTITIONER

## 2023-11-09 PROCEDURE — 99999 PR PBB SHADOW E&M-EST. PATIENT-LVL V: ICD-10-PCS | Mod: PBBFAC,,, | Performed by: NURSE PRACTITIONER

## 2023-11-09 PROCEDURE — 99999 PR PBB SHADOW E&M-EST. PATIENT-LVL V: CPT | Mod: PBBFAC,,, | Performed by: NURSE PRACTITIONER

## 2023-11-09 PROCEDURE — 3075F SYST BP GE 130 - 139MM HG: CPT | Mod: CPTII,S$GLB,, | Performed by: NURSE PRACTITIONER

## 2023-11-09 PROCEDURE — 3079F DIAST BP 80-89 MM HG: CPT | Mod: CPTII,S$GLB,, | Performed by: NURSE PRACTITIONER

## 2023-11-09 PROCEDURE — 83036 HEMOGLOBIN GLYCOSYLATED A1C: CPT | Performed by: NURSE PRACTITIONER

## 2023-11-09 PROCEDURE — 1160F PR REVIEW ALL MEDS BY PRESCRIBER/CLIN PHARMACIST DOCUMENTED: ICD-10-PCS | Mod: CPTII,S$GLB,, | Performed by: NURSE PRACTITIONER

## 2023-11-09 PROCEDURE — 1160F RVW MEDS BY RX/DR IN RCRD: CPT | Mod: CPTII,S$GLB,, | Performed by: NURSE PRACTITIONER

## 2023-11-09 PROCEDURE — 80053 COMPREHEN METABOLIC PANEL: CPT | Mod: PO | Performed by: NURSE PRACTITIONER

## 2023-11-09 PROCEDURE — 3075F PR MOST RECENT SYSTOLIC BLOOD PRESS GE 130-139MM HG: ICD-10-PCS | Mod: CPTII,S$GLB,, | Performed by: NURSE PRACTITIONER

## 2023-11-09 PROCEDURE — 3008F BODY MASS INDEX DOCD: CPT | Mod: CPTII,S$GLB,, | Performed by: NURSE PRACTITIONER

## 2023-11-09 PROCEDURE — 3008F PR BODY MASS INDEX (BMI) DOCUMENTED: ICD-10-PCS | Mod: CPTII,S$GLB,, | Performed by: NURSE PRACTITIONER

## 2023-11-09 PROCEDURE — 3079F PR MOST RECENT DIASTOLIC BLOOD PRESSURE 80-89 MM HG: ICD-10-PCS | Mod: CPTII,S$GLB,, | Performed by: NURSE PRACTITIONER

## 2023-11-09 PROCEDURE — 99214 OFFICE O/P EST MOD 30 MIN: CPT | Mod: S$GLB,,, | Performed by: NURSE PRACTITIONER

## 2023-11-09 RX ORDER — LIRAGLUTIDE 6 MG/ML
INJECTION, SOLUTION SUBCUTANEOUS
Qty: 3 ML | Refills: 0 | Status: SHIPPED | OUTPATIENT
Start: 2023-11-09 | End: 2024-01-03 | Stop reason: SDUPTHER

## 2023-11-09 NOTE — ASSESSMENT & PLAN NOTE
Counseled on importance of diet and exercise. Encouraged patient to have an active lifestyle with regular exercise with healthy eating. Unable to start Wegovy due to supply issues. Try sending Saxenda.

## 2023-11-09 NOTE — PROGRESS NOTES
Subjective:       Patient ID: Tosha Kwok is a 58 y.o. female.    Chief Complaint: Medication Problem (Unable to get Wegovy)    Mrs. Kwok presents to clinic to discuss medication options. She was previously on Ozempic for insulin resistance which was unfortunately no longer covered by insurance. Unable to tolerate metformin. At last clinic visit (8/22/23), she was prescribed Wegovy for obesity, but has been unable to fill due to supply issues. Since being off Ozempic, she has been experiencing excessive thirst, urinary frequency, fatigue, frequent headaches, and occasional blurred vision. Last A1c 5.4.    BP initially elevated, improved with repeat readings. Monitors BP at home with readings in 110-120/60s      Patient Active Problem List   Diagnosis    Atrophy of vulva    Ankylosing spondylitis    Chronic, continuous use of opioids    Fibromyalgia    Elevated d-dimer    History of sleeve gastrectomy    Intractable pain    De Quervain's tenosynovitis, right    Adhesive capsulitis of right shoulder    Anxiety    Asthma    Lumbosacral spondylosis    GERD (gastroesophageal reflux disease)    Primary hypertension    Hyperlipidemia    Insulin resistance    Large joint arthralgia of multiple sites    Lupus (systemic lupus erythematosus)    Migraine    Postmenopausal hormone replacement therapy    Vitamin D deficiency    Impingement syndrome of left shoulder    Limited range of motion (ROM) of shoulder    Left foot pain    Vestibular hypofunction    Other iron deficiency anemias    Keloid scar of skin    Iron deficiency anemia due to chronic blood loss    Restless leg syndrome    Chest pain    Insomnia    Port-A-Cath in place    TIA (transient ischemic attack)    History of non anemic vitamin B12 deficiency    Acute pain of right shoulder    Weight gain    Severe obesity    Nausea and vomiting    Hypokalemia    Gastritis    Hiatal hernia    Prolonged Q-T interval on ECG    Palpitation    Pulmonary nodule       Family  History   Problem Relation Age of Onset    Hypertension Mother     Hypertension Brother     Breast cancer Maternal Aunt         x2     Heart attack Father     Hypertension Sister     Lupus Sister     Breast cancer Maternal Grandmother      Past Surgical History:   Procedure Laterality Date    BLADDER SURGERY      BREAST LUMPECTOMY  2018    BREAST RECONSTRUCTION      BREAST SURGERY  2018    reduction     SECTION      CHOLECYSTECTOMY      COLONOSCOPY N/A 10/2/2020    Procedure: COLONOSCOPY;  Surgeon: Guicho Hood MD;  Location: [facility] ENDO;  Service: Endoscopy;  Laterality: N/A;    ESOPHAGOGASTRODUODENOSCOPY N/A 2023    Procedure: EGD (ESOPHAGOGASTRODUODENOSCOPY);  Surgeon: Belkis Mario MD;  Location: [facility] ENDO;  Service: Endoscopy;  Laterality: N/A;    EXCISION OF GRANULOMA Bilateral 2021    Procedure: EXCISION, GRANULOMA SCARS OF BREASTS;  Surgeon: Obed Palmer Jr., MD;  Location: [facility] OR;  Service: Plastics;  Laterality: Bilateral;    HYSTERECTOMY      LASER ABLATION      to back    SLEEVE GASTROPLASTY  2016    TOTAL REDUCTION MAMMOPLASTY Bilateral     two years ago    TUBAL LIGATION           Current Outpatient Medications:     albuterol (PROAIR HFA) 90 mcg/actuation inhaler, Inhale 2 puffs into the lungs every 4 hours as needed., Disp: 18 g, Rfl: 3    atorvastatin (LIPITOR) 20 MG tablet, Take 1 tablet (20 mg total) by mouth once daily., Disp: 90 tablet, Rfl: 3    cetirizine (ZYRTEC) 10 MG tablet, Take 1 tablet (10 mg total) by mouth once daily., Disp: 90 tablet, Rfl: 3    clonazePAM (KLONOPIN) 0.5 MG tablet, Take 1 tablet (0.5 mg total) by mouth 2 (two) times daily as needed for Anxiety (panic attacks)., Disp: 20 tablet, Rfl: 0    cyanocobalamin 1,000 mcg/mL injection, Inject 1 mL (1,000 mcg total) into the muscle every 28 days., Disp: 10 mL, Rfl: 1    hydroCHLOROthiazide (HYDRODIURIL) 12.5 MG Tab, Take 1 tablet (12.5 mg total) by mouth once daily., Disp: 90 tablet, Rfl: 3     HYDROmorphone (DILAUDID) 2 MG tablet, Take 1 tablet by mouth every 6 to 8 hours as needed for pain, Disp: 105 tablet, Rfl: 0    hydrOXYchloroQUINE (PLAQUENIL) 200 mg tablet, Take 1 tablet (200 mg total) by mouth once daily., Disp: 30 tablet, Rfl: 1    hydrOXYzine pamoate (VISTARIL) 25 MG Cap, Take 1 capsule (25 mg total) by mouth every 8 (eight) hours as needed (anxiety)., Disp: 90 capsule, Rfl: 3    metoprolol succinate (TOPROL-XL) 25 MG 24 hr tablet, Take 1 tablet (25 mg total) by mouth once daily., Disp: 90 tablet, Rfl: 2    pantoprazole (PROTONIX) 40 MG tablet, Take 1 tablet (40 mg total) by mouth 2 (two) times daily., Disp: 60 tablet, Rfl: 1    promethazine (PHENERGAN) 25 MG tablet, Take 1 tablet (25 mg total) by mouth every 6 (six) hours as needed for Nausea., Disp: 15 tablet, Rfl: 0    tiZANidine (ZANAFLEX) 4 MG tablet, Take 4 mg by mouth 3 (three) times daily as needed., Disp: , Rfl:     topiramate (TOPAMAX) 100 MG tablet, Take 1 tablet (100 mg total) by mouth 2 (two) times daily., Disp: 180 tablet, Rfl: 3    zolpidem (AMBIEN) 10 mg Tab, Take 1 tablet by mouth at bedtime as needed May cause drowsiness, use caution/ for sleep only, Disp: 30 tablet, Rfl: 5    EPINEPHrine (EPIPEN 2-DENNIS) 0.3 mg/0.3 mL AtIn, Inject 0.3 mLs (0.3 mg total) into the muscle once. for 1 dose, Disp: 2 each, Rfl: 3    HYDROmorphone (DILAUDID) 2 MG tablet, Take 1 tablet (2 mg total) by mouth every 6 to 8 hours as needed for pain (Patient not taking: Reported on 11/9/2023), Disp: 105 tablet, Rfl: 0    HYDROmorphone (DILAUDID) 2 MG tablet, take 1 tablet by mouth every 6-8 hours as needed for pain, Disp: 105 tablet, Rfl: 0    HYDROmorphone (DILAUDID) 2 MG tablet, Take 1 tablet (2 mg total) by mouth every 6 to 8 hours as needed for pain. (Patient not taking: Reported on 11/9/2023), Disp: 105 tablet, Rfl: 0    liraglutide, weight loss, (SAXENDA) 3 mg/0.5 mL (18 mg/3 mL) PnIj, Inject 0.6 mg daily x 1 week then 1.2 mg daily x 1 week then 1.8 mg  "daily x 1 week then 2.4 mg x 1 week then continue 3 mg daily, Disp: 3 mL, Rfl: 0    tiZANidine (ZANAFLEX) 4 MG tablet, Take 1 tablet by mouth three times a day as needed. May cause drowsiness, use caution/ For muscle spasms (Patient not taking: Reported on 11/9/2023), Disp: 270 tablet, Rfl: 3    zolpidem (AMBIEN) 10 mg Tab, Take 1 tablet by mouth at bedtime as needed May cause drowsiness, use caution/ for sleep only (Patient not taking: Reported on 11/9/2023), Disp: 30 tablet, Rfl: 5    Review of Systems   Constitutional:  Positive for fatigue. Negative for appetite change, chills and fever.   Eyes:  Positive for visual disturbance.   Respiratory:  Negative for shortness of breath.    Cardiovascular:  Negative for chest pain and palpitations.   Gastrointestinal:  Negative for abdominal pain, diarrhea, nausea and vomiting.   Endocrine: Positive for polydipsia and polyuria.   Musculoskeletal:  Positive for arthralgias and back pain.   Neurological:  Positive for headaches. Negative for dizziness and light-headedness.       Objective:   /82 (BP Location: Left arm, Patient Position: Sitting, BP Method: Medium (Manual))   Pulse 101   Temp 99.3 °F (37.4 °C) (Temporal)   Resp 16   Ht 5' 2" (1.575 m)   Wt 83.7 kg (184 lb 8.4 oz)   LMP 03/26/2012   SpO2 96%   BMI 33.75 kg/m²      Physical Exam  Constitutional:       General: She is not in acute distress.     Appearance: Normal appearance. She is obese. She is not ill-appearing.   Eyes:      Conjunctiva/sclera: Conjunctivae normal.   Cardiovascular:      Rate and Rhythm: Normal rate.   Pulmonary:      Effort: Pulmonary effort is normal. No respiratory distress.   Neurological:      Mental Status: She is alert and oriented to person, place, and time.      Coordination: Coordination normal.      Gait: Gait normal.   Psychiatric:         Behavior: Behavior normal.         Assessment & Plan     1. Severe obesity  Assessment & Plan:  Counseled on importance of diet " "and exercise. Encouraged patient to have an active lifestyle with regular exercise with healthy eating. Unable to start Wegovy due to supply issues. Try sending Saxenda.     Orders:  -     liraglutide, weight loss, (SAXENDA) 3 mg/0.5 mL (18 mg/3 mL) PnIj; Inject 0.6 mg daily x 1 week  then 1.2 mg daily x 1 week  then 1.8 mg daily x 1 week  then 2.4 mg x 1 week  then continue 3 mg daily  Dispense: 3 mL; Refill: 0    2. Insulin resistance  Assessment & Plan:  Repeat A1c today. Unable to tolerate metformin. Previously on Ozempic which was beneficial. Unfortunately insurance no longer cover. Wegovy on back order. Will see if Saxenda is covered.    Orders:  -     HEMOGLOBIN A1C; Future; Expected date: 11/09/2023    3. Primary hypertension  Assessment & Plan:  BP initially elevated, improved with repeat readings. Continue current medications as prescribed. Continue to monitor BP at home. Notify clinic if home readings >140/90      4. Polyuria  -     HEMOGLOBIN A1C; Future; Expected date: 11/09/2023  -     COMPREHENSIVE METABOLIC PANEL; Future; Expected date: 11/09/2023            PENNY Coon      Portions of this note may have been created with voice recognition software. Occasional "wrong-word" or "sound-a-like" substitutions may have occurred due to the inherent limitations of voice recognition software. Please, read the note carefully and recognize, using context, where substitutions have occurred.     "

## 2023-11-09 NOTE — ASSESSMENT & PLAN NOTE
BP initially elevated, improved with repeat readings. Continue current medications as prescribed. Continue to monitor BP at home. Notify clinic if home readings >140/90

## 2023-11-13 ENCOUNTER — PATIENT OUTREACH (OUTPATIENT)
Dept: ADMINISTRATIVE | Facility: HOSPITAL | Age: 58
End: 2023-11-13
Payer: COMMERCIAL

## 2023-11-13 ENCOUNTER — PATIENT MESSAGE (OUTPATIENT)
Dept: ADMINISTRATIVE | Facility: HOSPITAL | Age: 58
End: 2023-11-13
Payer: COMMERCIAL

## 2023-12-06 ENCOUNTER — TELEPHONE (OUTPATIENT)
Dept: HEMATOLOGY/ONCOLOGY | Facility: CLINIC | Age: 58
End: 2023-12-06
Payer: COMMERCIAL

## 2023-12-06 ENCOUNTER — INFUSION (OUTPATIENT)
Dept: INFUSION THERAPY | Facility: HOSPITAL | Age: 58
End: 2023-12-06
Attending: INTERNAL MEDICINE
Payer: COMMERCIAL

## 2023-12-06 ENCOUNTER — HOSPITAL ENCOUNTER (EMERGENCY)
Facility: HOSPITAL | Age: 58
Discharge: HOME | End: 2023-12-06
Attending: STUDENT IN AN ORGANIZED HEALTH CARE EDUCATION/TRAINING PROGRAM
Payer: COMMERCIAL

## 2023-12-06 VITALS
OXYGEN SATURATION: 100 % | HEART RATE: 91 BPM | HEIGHT: 62 IN | DIASTOLIC BLOOD PRESSURE: 93 MMHG | RESPIRATION RATE: 20 BRPM | TEMPERATURE: 98 F | BODY MASS INDEX: 29.44 KG/M2 | WEIGHT: 160 LBS | SYSTOLIC BLOOD PRESSURE: 157 MMHG

## 2023-12-06 VITALS
HEART RATE: 75 BPM | BODY MASS INDEX: 29.26 KG/M2 | SYSTOLIC BLOOD PRESSURE: 144 MMHG | OXYGEN SATURATION: 97 % | TEMPERATURE: 99 F | DIASTOLIC BLOOD PRESSURE: 78 MMHG | RESPIRATION RATE: 20 BRPM | WEIGHT: 160 LBS

## 2023-12-06 DIAGNOSIS — R05.9 COUGH: ICD-10-CM

## 2023-12-06 DIAGNOSIS — B34.9 VIRAL ILLNESS: Primary | ICD-10-CM

## 2023-12-06 DIAGNOSIS — D50.0 IRON DEFICIENCY ANEMIA DUE TO CHRONIC BLOOD LOSS: Primary | ICD-10-CM

## 2023-12-06 DIAGNOSIS — F41.9 ANXIETY: ICD-10-CM

## 2023-12-06 LAB
CTP QC/QA: YES
CTP QC/QA: YES
POC MOLECULAR INFLUENZA A AGN: NEGATIVE
POC MOLECULAR INFLUENZA B AGN: NEGATIVE
SARS-COV-2 RDRP RESP QL NAA+PROBE: NEGATIVE

## 2023-12-06 PROCEDURE — A4216 STERILE WATER/SALINE, 10 ML: HCPCS | Performed by: INTERNAL MEDICINE

## 2023-12-06 PROCEDURE — 87502 INFLUENZA DNA AMP PROBE: CPT

## 2023-12-06 PROCEDURE — 93005 ELECTROCARDIOGRAM TRACING: CPT

## 2023-12-06 PROCEDURE — 96367 TX/PROPH/DG ADDL SEQ IV INF: CPT

## 2023-12-06 PROCEDURE — 93010 EKG 12-LEAD: ICD-10-PCS | Mod: ,,, | Performed by: INTERNAL MEDICINE

## 2023-12-06 PROCEDURE — 99283 EMERGENCY DEPT VISIT LOW MDM: CPT | Mod: 25

## 2023-12-06 PROCEDURE — 93010 ELECTROCARDIOGRAM REPORT: CPT | Mod: ,,, | Performed by: INTERNAL MEDICINE

## 2023-12-06 PROCEDURE — 63600175 PHARM REV CODE 636 W HCPCS: Performed by: INTERNAL MEDICINE

## 2023-12-06 PROCEDURE — 25000003 PHARM REV CODE 250: Performed by: INTERNAL MEDICINE

## 2023-12-06 PROCEDURE — 96365 THER/PROPH/DIAG IV INF INIT: CPT

## 2023-12-06 PROCEDURE — 87635 SARS-COV-2 COVID-19 AMP PRB: CPT | Performed by: STUDENT IN AN ORGANIZED HEALTH CARE EDUCATION/TRAINING PROGRAM

## 2023-12-06 RX ORDER — EPINEPHRINE 0.3 MG/.3ML
0.3 INJECTION SUBCUTANEOUS ONCE AS NEEDED
Status: DISCONTINUED | OUTPATIENT
Start: 2023-12-06 | End: 2023-12-06 | Stop reason: HOSPADM

## 2023-12-06 RX ORDER — SODIUM CHLORIDE 0.9 % (FLUSH) 0.9 %
10 SYRINGE (ML) INJECTION
Status: DISCONTINUED | OUTPATIENT
Start: 2023-12-06 | End: 2023-12-06 | Stop reason: HOSPADM

## 2023-12-06 RX ORDER — DIPHENHYDRAMINE HYDROCHLORIDE 50 MG/ML
50 INJECTION INTRAMUSCULAR; INTRAVENOUS ONCE AS NEEDED
Status: DISCONTINUED | OUTPATIENT
Start: 2023-12-06 | End: 2023-12-06 | Stop reason: HOSPADM

## 2023-12-06 RX ORDER — HEPARIN 100 UNIT/ML
500 SYRINGE INTRAVENOUS
Status: DISCONTINUED | OUTPATIENT
Start: 2023-12-06 | End: 2023-12-06 | Stop reason: HOSPADM

## 2023-12-06 RX ADMIN — SODIUM CHLORIDE: 9 INJECTION, SOLUTION INTRAVENOUS at 02:12

## 2023-12-06 RX ADMIN — FERRIC CARBOXYMALTOSE INJECTION 750 MG: 50 INJECTION, SOLUTION INTRAVENOUS at 02:12

## 2023-12-06 RX ADMIN — HEPARIN 500 UNITS: 100 SYRINGE at 03:12

## 2023-12-06 RX ADMIN — Medication 10 ML: at 03:12

## 2023-12-06 NOTE — NURSING
Pt tolerated Injectafer  infusion well. Patient was observed 30 minutes post treatment.  No adverse reaction noted.   PAD flushed with heparin  and de-accessed per protocol.  Patient left clinic in no acute distress.

## 2023-12-06 NOTE — ED PROVIDER NOTES
NAME:  Tosha Kwok  CSN:     [id number]  MRN:    [id number]  ADMIT DATE: 12/6/2023        eMERGENCY dEPARTMENT eNCOUnter    CHIEF COMPLAINT    Chief Complaint   Patient presents with    Sore Throat    Cough     Patient reports sore throat, fever, congestion, cough with green sputum since Saturday. She reports increased pain to her chest when coughing. She reports mild dyspnea. She has a hx of asthma       HPI    Tosha Kwok is a 58 y.o. female with a past medical history of  has a past medical history of Anemia, Ankylosing spondylitis, Anxiety, Asthma, Cancer (2018), Chronic constipation, Chronic insomnia, Edema, Fibromyalgia, Hypertension, Insulin resistance, Interstitial cystitis, Lupus, Lupus, Migraine headache, PONV (postoperative nausea and vomiting), Restless legs syndrome, Shingles, Stroke (1998), and TIA (transient ischemic attack) (1998).     she presents to the ED due to sore throat, cough congestion and generalized chest discomfort.  Has been using her inhaler as needed.  Has been taking over the counter cold remedy.  States she was concerned with the chest discomfort as she has had pericarditis in the past.  States it does not remind her of this over with her lupus she wanted to make sure nothing else was contributing.  No known sick contacts.    HPI       PAST MEDICAL HISTORY  Past Medical History:   Diagnosis Date    Anemia     Ankylosing spondylitis     Anxiety     Asthma     Cancer 2018    left breast  lumpectomy, xrt    Chronic constipation     Chronic insomnia     Edema     Fibromyalgia     Hypertension     Insulin resistance     Interstitial cystitis     Lupus     Lupus     Migraine headache     PONV (postoperative nausea and vomiting)     Restless legs syndrome     Shingles     Stroke 1998    post partum right sided numbness    TIA (transient ischemic attack) 1998       SURGICAL HISTORY    Past Surgical History:   Procedure Laterality Date    BLADDER SURGERY      BREAST LUMPECTOMY  2018     BREAST RECONSTRUCTION      BREAST SURGERY  2018    reduction     SECTION      CHOLECYSTECTOMY      COLONOSCOPY N/A 10/2/2020    Procedure: COLONOSCOPY;  Surgeon: Guicho Hood MD;  Location: [facility] ENDO;  Service: Endoscopy;  Laterality: N/A;    ESOPHAGOGASTRODUODENOSCOPY N/A 2023    Procedure: EGD (ESOPHAGOGASTRODUODENOSCOPY);  Surgeon: Belkis Mario MD;  Location: [facility] ENDO;  Service: Endoscopy;  Laterality: N/A;    EXCISION OF GRANULOMA Bilateral 2021    Procedure: EXCISION, GRANULOMA SCARS OF BREASTS;  Surgeon: Obed Palmer Jr., MD;  Location: [facility] OR;  Service: Plastics;  Laterality: Bilateral;    HYSTERECTOMY      LASER ABLATION      to back    SLEEVE GASTROPLASTY  2016    TOTAL REDUCTION MAMMOPLASTY Bilateral     two years ago    TUBAL LIGATION         FAMILY HISTORY    Family History   Problem Relation Age of Onset    Hypertension Mother     Hypertension Brother     Breast cancer Maternal Aunt         x2     Heart attack Father     Hypertension Sister     Lupus Sister     Breast cancer Maternal Grandmother        SOCIAL HISTORY    Social History     Socioeconomic History    Marital status: Legally    Tobacco Use    Smoking status: Never    Smokeless tobacco: Never   Substance and Sexual Activity    Alcohol use: Yes     Comment: occasionally    Drug use: No    Sexual activity: Yes     Partners: Male     Social Determinants of Health     Financial Resource Strain: Low Risk  (2023)    Overall Financial Resource Strain (CARDIA)     Difficulty of Paying Living Expenses: Not hard at all   Food Insecurity: No Food Insecurity (2023)    Hunger Vital Sign     Worried About Running Out of Food in the Last Year: Never true     Ran Out of Food in the Last Year: Never true   Transportation Needs: No Transportation Needs (2023)    PRAPARE - Transportation     Lack of Transportation (Medical): No     Lack of Transportation (Non-Medical): No   Physical Activity:  Insufficiently Active (4/21/2023)    Exercise Vital Sign     Days of Exercise per Week: 2 days     Minutes of Exercise per Session: 20 min   Stress: Unknown (4/21/2023)    [employer] - Occupational Stress Questionnaire     Feeling of Stress : Patient refused   Social Connections: Unknown (4/21/2023)    Social Connection and Isolation Panel [NHANES]     Frequency of Communication with Friends and Family: Three times a week     Frequency of Social Gatherings with Friends and Family: Three times a week     Active Member of Clubs or Organizations: Yes     Attends Club or Organization Meetings: 1 to 4 times per year     Marital Status:    Housing Stability: Low Risk  (4/21/2023)    Housing Stability Vital Sign     Unable to Pay for Housing in the Last Year: No     Number of Places Lived in the Last Year: 1     Unstable Housing in the Last Year: No       MEDICATIONS  Current Outpatient Medications   Medication Instructions    albuterol (PROAIR HFA) 90 mcg/actuation inhaler Inhale 2 puffs into the lungs every 4 hours as needed.    atorvastatin (LIPITOR) 20 mg, Oral, Daily    cetirizine (ZYRTEC) 10 mg, Oral, Daily    clonazePAM (KLONOPIN) 0.5 mg, Oral, 2 times daily PRN    cyanocobalamin 1,000 mcg, Intramuscular, Every 28 days    EPINEPHrine (EPIPEN 2-DENNIS) 0.3 mg, Intramuscular, Once    hydroCHLOROthiazide (HYDRODIURIL) 12.5 mg, Oral, Daily    HYDROmorphone (DILAUDID) 2 MG tablet Take 1 tablet (2 mg total) by mouth every 6 to 8 hours as needed for pain    HYDROmorphone (DILAUDID) 2 MG tablet take 1 tablet by mouth every 6-8 hours as needed for pain    HYDROmorphone (DILAUDID) 2 MG tablet Take 1 tablet (2 mg total) by mouth every 6 to 8 hours as needed for pain.    HYDROmorphone (DILAUDID) 2 MG tablet Take 1 tablet by mouth every 6 to 8 hours as needed for pain    hydroxychloroquine (PLAQUENIL) 200 mg, Oral, Daily    hydrOXYzine pamoate (VISTARIL) 25 mg, Oral, Every 8 hours PRN     liraglutide, weight loss, (SAXENDA) 3 mg/0.5 mL (18 mg/3 mL) PnIj Inject 0.6 mg daily x 1 week<BR>then 1.2 mg daily x 1 week<BR>then 1.8 mg daily x 1 week<BR>then 2.4 mg x 1 week<BR>then continue 3 mg daily    metoprolol succinate (TOPROL-XL) 25 mg, Oral, Daily    pantoprazole (PROTONIX) 40 mg, Oral, 2 times daily    promethazine (PHENERGAN) 25 mg, Oral, Every 6 hours PRN    tiZANidine (ZANAFLEX) 4 MG tablet Take 1 tablet by mouth three times a day as needed. May cause drowsiness, use caution/ For muscle spasms    tiZANidine (ZANAFLEX) 4 mg, Oral, 3 times daily PRN    topiramate (TOPAMAX) 100 mg, Oral, 2 times daily    zolpidem (AMBIEN) 10 mg Tab Take 1 tablet by mouth at bedtime as needed May cause drowsiness, use caution/ for sleep only    zolpidem (AMBIEN) 10 mg Tab Take 1 tablet by mouth at bedtime as needed May cause drowsiness, use caution/ for sleep only       ALLERGIES    Review of patient's allergies indicates:   Allergen Reactions    Carrot Swelling     angioedema    Morphine Anxiety     Hives   States she can take dilaudid and percocet without any problems    Clindamycin     Macrolide antibiotics     Erythromycin Other (See Comments)     Hives and cramps     Zofran (as hydrochloride) [ondansetron hcl] Hives     Local hive after IV injection         REVIEW OF SYSTEMS   Review of Systems       PHYSICAL EXAM    Reviewed Triage Note    VITAL SIGNS:   ED Triage Vitals [12/06/23 0624]   Enc Vitals Group      /83      Pulse 85      Resp 18      Temp 98.8 °F (37.1 °C)      Temp Source Oral      SpO2 98 %      Weight 160 lb      Height       Head Circumference       Peak Flow       Pain Score       Pain Loc       Pain Edu?       Excl. in GC?        Patient Vitals for the past 24 hrs:   BP Temp Temp src Pulse Resp SpO2 Weight   12/06/23 0827 -- -- -- 75 20 97 % --   12/06/23 0802 (!) 144/78 -- -- -- -- -- --   12/06/23 0702 136/81 -- -- 77 19 98 % --   12/06/23 0624 139/83 98.8 °F (37.1 °C) Oral 85 18 98 %  72.6 kg (160 lb)       Physical Exam    Nursing note and vitals reviewed.  Constitutional: She appears well-developed and well-nourished.   HENT:   Head: Normocephalic and atraumatic.   Mouth/Throat: Oropharynx is clear and moist.   Nasal congestion noted   Eyes: EOM are normal. Pupils are equal, round, and reactive to light.   Neck: Neck supple.   Normal range of motion.  Cardiovascular:  Normal rate, regular rhythm, normal heart sounds and intact distal pulses.     Exam reveals no friction rub.       No murmur heard.  Pulmonary/Chest: Breath sounds normal. No respiratory distress.   Abdominal: Abdomen is soft. There is no abdominal tenderness.   Musculoskeletal:         General: Normal range of motion.      Cervical back: Normal range of motion and neck supple.     Neurological: She is alert and oriented to person, place, and time.   Skin: Skin is warm and dry.   Psychiatric: She has a normal mood and affect.                EKG     Interpreted by EM physician if performed:               LABS  Pertinent labs reviewed. (See chart for details)   Labs Reviewed   SARS-COV-2 RDRP GENE   POCT INFLUENZA A/B MOLECULAR         RADIOLOGY          Imaging Results    None           PROCEDURES    Procedures      ED COURSE & MEDICAL DECISION MAKING    Pertinent Labs & Imaging studies reviewed. (See chart for details and specific orders.)          Summary of review of records:   Known history of lupus.  Follows with rheumatology for this.  Is on Plaquenil.  Reports chest pain at this visit in July has had cardiac monitoring in the past with EKG and stress test as well.  CT chest was ordered at that time for continued chest pain and history of pleurisy, however, she did not get this done.    Nuclear stress test from March of this year without any acute findings.     ROR shows admission in January of 2022- no clear e/o pericarditis. Was covid+ at that time.    Medical Decision Making  58-year-old female presents with 5 days of  cough, congestion, now with chest discomfort.  Afebrile.    Differential diagnosis includes but is not limited to:  Viral illness, costochondritis, less likely pericarditis    Problems Addressed:  Viral illness: acute illness or injury     Details: Low suspicion for pneumonia given history and symptoms.  EKG reassuring.  Likely secondary to costochondritis currently and advised adding in an anti-inflammatory such as ibuprofen or Aleve to assist with her symptoms.  She did inquire about steroid burst for possible lupus flare and I did defer to rheumatology for this which she states she will be    Amount and/or Complexity of Data Reviewed  External Data Reviewed: labs and notes.  Labs: ordered. Decision-making details documented in ED Course.  ECG/medicine tests: ordered and independent interpretation performed. Decision-making details documented in ED Course.          Medications - No data to display    ED Course as of 12/06/23 0842   Wed Dec 06, 2023   0711 SARS-CoV-2 RNA, Amplification, Qual: Negative [HL]   0751 SARS-CoV-2 RNA, Amplification, Qual: Negative [HL]   0833 POC Molecular Influenza A Ag: Negative [HL]   0833 POC Molecular Influenza B Ag: Negative [HL]      ED Course User Index  [HL] Anjelica Galaviz,      Reasons to return discussed.  All questions addressed and patient stable at time of discharge.        FINAL IMPRESSION    Final diagnoses:  [R05.9] Cough  [B34.9] Viral illness (Primary)       DISPOSITION  Patient discharge in stable condition        ED Prescriptions    None       Follow-up Information       Follow up With Specialties Details Why Contact Antonieta Taylor NP Family Medicine Schedule an appointment as soon as possible for a visit in 2 days  [address] [address] [address]  [phone]                DISCLAIMER: This note was prepared with [employer] voice recognition transcription software. Garbled syntax, mangled pronouns, and other bizarre constructions may be  attributed to that software system.             Anjelica Galaviz,   12/07/23 0807

## 2023-12-08 RX ORDER — CLONAZEPAM 0.5 MG/1
0.5 TABLET ORAL 2 TIMES DAILY PRN
Qty: 20 TABLET | Refills: 0 | Status: SHIPPED | OUTPATIENT
Start: 2023-12-08 | End: 2024-02-05 | Stop reason: ALTCHOICE

## 2023-12-20 ENCOUNTER — INFUSION (OUTPATIENT)
Dept: INFUSION THERAPY | Facility: HOSPITAL | Age: 58
End: 2023-12-20
Attending: INTERNAL MEDICINE
Payer: COMMERCIAL

## 2023-12-20 VITALS
TEMPERATURE: 98 F | HEART RATE: 83 BPM | DIASTOLIC BLOOD PRESSURE: 78 MMHG | SYSTOLIC BLOOD PRESSURE: 171 MMHG | OXYGEN SATURATION: 100 % | RESPIRATION RATE: 18 BRPM

## 2023-12-20 DIAGNOSIS — D50.0 IRON DEFICIENCY ANEMIA DUE TO CHRONIC BLOOD LOSS: Primary | ICD-10-CM

## 2023-12-20 PROCEDURE — 25000003 PHARM REV CODE 250: Performed by: INTERNAL MEDICINE

## 2023-12-20 PROCEDURE — A4216 STERILE WATER/SALINE, 10 ML: HCPCS | Performed by: INTERNAL MEDICINE

## 2023-12-20 PROCEDURE — 63600175 PHARM REV CODE 636 W HCPCS: Mod: JZ,JG | Performed by: INTERNAL MEDICINE

## 2023-12-20 PROCEDURE — 96365 THER/PROPH/DIAG IV INF INIT: CPT

## 2023-12-20 RX ORDER — DIPHENHYDRAMINE HYDROCHLORIDE 50 MG/ML
50 INJECTION INTRAMUSCULAR; INTRAVENOUS ONCE AS NEEDED
Status: DISCONTINUED | OUTPATIENT
Start: 2023-12-20 | End: 2023-12-20 | Stop reason: HOSPADM

## 2023-12-20 RX ORDER — SODIUM CHLORIDE 0.9 % (FLUSH) 0.9 %
10 SYRINGE (ML) INJECTION
Status: DISCONTINUED | OUTPATIENT
Start: 2023-12-20 | End: 2023-12-20 | Stop reason: HOSPADM

## 2023-12-20 RX ORDER — HEPARIN 100 UNIT/ML
500 SYRINGE INTRAVENOUS
Status: DISCONTINUED | OUTPATIENT
Start: 2023-12-20 | End: 2023-12-20 | Stop reason: HOSPADM

## 2023-12-20 RX ORDER — EPINEPHRINE 0.3 MG/.3ML
0.3 INJECTION SUBCUTANEOUS ONCE AS NEEDED
Status: DISCONTINUED | OUTPATIENT
Start: 2023-12-20 | End: 2023-12-20 | Stop reason: HOSPADM

## 2023-12-20 RX ADMIN — FERRIC CARBOXYMALTOSE INJECTION 750 MG: 50 INJECTION, SOLUTION INTRAVENOUS at 01:12

## 2023-12-20 RX ADMIN — SODIUM CHLORIDE: 9 INJECTION, SOLUTION INTRAVENOUS at 01:12

## 2023-12-20 RX ADMIN — HEPARIN 500 UNITS: 100 SYRINGE at 02:12

## 2023-12-20 RX ADMIN — Medication 10 ML: at 02:12

## 2023-12-20 NOTE — PLAN OF CARE
Pt tolerated Injectafer infusion 2 of 2 well.  No adverse reaction noted. No complaints at this time.   Port flushed with NS and Heparin and de-accessed per protocol. Patient left clinic in no acute distress.

## 2024-01-03 DIAGNOSIS — E66.01 SEVERE OBESITY: ICD-10-CM

## 2024-01-05 RX ORDER — LIRAGLUTIDE 6 MG/ML
INJECTION, SOLUTION SUBCUTANEOUS
Qty: 3 ML | Refills: 0 | Status: SHIPPED | OUTPATIENT
Start: 2024-01-05 | End: 2024-01-25 | Stop reason: SDUPTHER

## 2024-01-25 ENCOUNTER — PATIENT MESSAGE (OUTPATIENT)
Dept: INTERNAL MEDICINE | Facility: CLINIC | Age: 59
End: 2024-01-25
Payer: COMMERCIAL

## 2024-01-25 DIAGNOSIS — E66.01 SEVERE OBESITY: ICD-10-CM

## 2024-01-26 RX ORDER — LIRAGLUTIDE 6 MG/ML
INJECTION, SOLUTION SUBCUTANEOUS
Qty: 3 ML | Refills: 0 | Status: SHIPPED | OUTPATIENT
Start: 2024-01-26 | End: 2024-03-01 | Stop reason: SDUPTHER

## 2024-01-29 ENCOUNTER — TELEPHONE (OUTPATIENT)
Dept: PSYCHIATRY | Facility: CLINIC | Age: 59
End: 2024-01-29
Payer: COMMERCIAL

## 2024-01-31 ENCOUNTER — TELEPHONE (OUTPATIENT)
Dept: PSYCHIATRY | Facility: CLINIC | Age: 59
End: 2024-01-31
Payer: COMMERCIAL

## 2024-01-31 NOTE — TELEPHONE ENCOUNTER
----- Message from Trinh Renae sent at 1/31/2024  2:59 PM CST -----  .Type:  Patient Returning Call    Who Called:.Tosha Kwok   Who Left Message for Patient:Li Jordan  Does the patient know what this is regarding?:  Would the patient rather a call back or a response via [facility]? Call back  Best Call Back Number:.[phone]    Additional Information:

## 2024-02-01 ENCOUNTER — TELEPHONE (OUTPATIENT)
Dept: PSYCHIATRY | Facility: CLINIC | Age: 59
End: 2024-02-01
Payer: COMMERCIAL

## 2024-02-02 NOTE — PROGRESS NOTES
PSYCHIATRIC EVALUATION     Disclaimer: Evaluation and treatment is based on information presented to date. Any new information may affect assessment and findings.     Name: Tosha Kwok  Age: 58 y.o.  : 1965    Preferred Name: Tosha    Referring provider: Tamia Martinez LCSW    Chief Complaint:  Anxiety, Insomnia    History of Present Illness:   Pt was referred by Tamia Martinez LCSW for medication management of anxiety. Pt had an initial visit with Ms. Martinez on 23 for therapy and is also interested in medication management. She has been prescribed Ambien 10mg qhs off and on since , but it causes sleep-walking and eating at night. She describes it feels like a dream, but she can recall it. Pt is now living alone, so she is more worried about wandering outside in middle of night or other risks. She was prescribed Klonopin 0.5mg qhs (3-4 months) to hold her over to this appointment but reports it isn't effective for sleep and causes anxiety with panic symptoms. She has taken Lunesta in the past that helped with onset but not middle insomnia and Elavil while she was taking Ambien. She has also tried melatonin in the past.    Pt reports significant anxiety/nervousness, insomnia, and fatigue with frequent panic episodes (increased HR, fear, headache, shaking, nausea, and SOB) that last 30-40 minutes. The episodes occur less frequently now than in the past. 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 She was prescribed Lexapro at that time, but it was discontinued because it caused heart problems. She was prescribed Wellbutrin for anxiety/panic when she was in nursing school, but it caused VH (spiders on wall).      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 She has been there for the past year. She is diagnosed with systemic lupus erythematosus, unspecified SLE type, unspecified organ involvement status and fibromyalgia  diagnosed in . She has stopped working for periods of time on disability from her illnesses and was told at one point she wouldn't return to work but did. Her work is currently as needed but her hours average full time. She is able to take time off work when she is in pain and fatigued. In the past, pt has worked in "[employer] with [employer]. She has been an RN for the past 30 years after earning her BS in nursing from [employer]. Pt is currently  from her second , their divorce is pending. She has 3 children with her first  (daughter age 37, sons ages 30 and 26). She has 7 grandchildren. Pt grew up in [place] with her parents and 4 siblings. Pt's father  last week. She remains fairly close with her family.     ADHD: denied   Depressive Disorder: sleep change, tired/fatigued, tearfulness/crying   Anxiety Disorder: anxiety/nervousness, panic attacks, fatigue, sleep problems   Panic Disorder: nervous, rapid heart rate, feels afraid, reports headache, shaky, nausea, and shortness of breath   Manic Disorder: denied   Psychotic Disorder: denied   Substance Use:  denied     Review of Systems   Constitutional:  Positive for fatigue. Negative for activity change, appetite change and unexpected weight change.   Respiratory:  Negative for shortness of breath.    Psychiatric/Behavioral:  Positive for sleep disturbance. Negative for agitation, behavioral problems, confusion, decreased concentration, dysphoric mood, hallucinations, self-injury and suicidal ideas. The patient is nervous/anxious. The patient is not hyperactive.       Nutritional Screening: Considering the patient's height and weight, medications, medical history and preferences, should a referral be made to the dietitian? no    Constitutional:  Vitals:  Most recent vital signs were reviewed.   Last 3 sets of Vitals        2023     1:41 PM 2023     1:00 PM 2024     1:08 PM   Vitals - 1  "value per visit   SYSTOLIC 157 184    DIASTOLIC 93 87    Pulse 91 84    Temp 98.2 °F (36.8 °C) 97.7 °F (36.5 °C)    Resp 20 18    SPO2 100 % 100 %    Weight (lb) 160  160.05   Weight (kg) 72.576  72.6   Height 5' 2" (1.575 m)  5' 2" (1.575 m)   BMI (Calculated) 29.3  29.3   Pain Score Five Zero           Psychiatric:  Oriented: x 3 / including: Date: 2/5/24; and aware meeting with [facility] [place]   Attitude: cooperative   Eye Contact: good   Behavior: wnl   Mood: "good because finally got in here"  Affect: appropriate range   Attention: intact   Concentration: grossly intact   Thought Process: goal directed   Speech: intelligible  Volume: WNL   Quantity: WNL   Rhythm: WNL  Insight: fair to good   Threats: no SI / HI   Memory: Grossly intact  Psychosis: denies all   Estimate of Intellectual Function: average   Judgment (to simple situation): good   Relevant Elements of Neurological Exam: normal gait     Medical history:   Past Medical History:   Diagnosis Date    Anemia     Ankylosing spondylitis     Anxiety     Asthma     Cancer 2018    left breast  lumpectomy, xrt    Chronic constipation     Chronic insomnia     Edema     Fibromyalgia     Hypertension     Insulin resistance     Interstitial cystitis     Lupus     Lupus     Migraine headache     PONV (postoperative nausea and vomiting)     Restless legs syndrome     Shingles     Stroke 1998    post partum right sided numbness    TIA (transient ischemic attack) 1998      Family History:  Family History   Problem Relation Age of Onset    Hypertension Mother     Hypertension Brother     Breast cancer Maternal Aunt         x2     Heart attack Father     Hypertension Sister     Lupus Sister     Breast cancer Maternal Grandmother       Family history of psychiatric illness: None known.    PSYCHO-SOCIAL DEVELOPMENT HISTORY:   Social History     Socioeconomic History    Marital status: Legally    Tobacco Use    Smoking status: Never    Smokeless tobacco: " Never   Substance and Sexual Activity    Alcohol use: Yes     Comment: occasionally    Drug use: No    Sexual activity: Yes     Partners: Male     Social Determinants of Health     Financial Resource Strain: Low Risk  (4/21/2023)    Overall Financial Resource Strain (CARDIA)     Difficulty of Paying Living Expenses: Not hard at all   Food Insecurity: No Food Insecurity (4/21/2023)    Hunger Vital Sign     Worried About Running Out of Food in the Last Year: Never true     Ran Out of Food in the Last Year: Never true   Transportation Needs: No Transportation Needs (4/21/2023)    PRAPARE - Transportation     Lack of Transportation (Medical): No     Lack of Transportation (Non-Medical): No   Physical Activity: Insufficiently Active (4/21/2023)    Exercise Vital Sign     Days of Exercise per Week: 2 days     Minutes of Exercise per Session: 20 min   Stress: Patient Declined (4/21/2023)    [employer] - Occupational Stress Questionnaire     Feeling of Stress : Patient declined   Social Connections: Unknown (4/21/2023)    Social Connection and Isolation Panel [NHANES]     Frequency of Communication with Friends and Family: Three times a week     Frequency of Social Gatherings with Friends and Family: Three times a week     Active Member of Clubs or Organizations: Yes     Attends Club or Organization Meetings: 1 to 4 times per year     Marital Status:    Housing Stability: Low Risk  (4/21/2023)    Housing Stability Vital Sign     Unable to Pay for Housing in the Last Year: No     Number of Places Lived in the Last Year: 1     Unstable Housing in the Last Year: No      Allergy Review:   Review of patient's allergies indicates:   Allergen Reactions    Carrot Swelling     angioedema    Morphine Anxiety     Hives   States she can take dilaudid and percocet without any problems    Clindamycin     Macrolide antibiotics     Erythromycin Other (See Comments)     Hives and cramps     Zofran (as  hydrochloride) [ondansetron hcl] Hives     Local hive after IV injection      Medical Problem List:   Patient Active Problem List   Diagnosis    Atrophy of vulva    Ankylosing spondylitis    Chronic, continuous use of opioids    Fibromyalgia    Elevated d-dimer    History of sleeve gastrectomy    Intractable pain    De Quervain's tenosynovitis, right    Adhesive capsulitis of right shoulder    Anxiety    Asthma    Lumbosacral spondylosis    GERD (gastroesophageal reflux disease)    Primary hypertension    Hyperlipidemia    Insulin resistance    Large joint arthralgia of multiple sites    Lupus (systemic lupus erythematosus)    Migraine    Postmenopausal hormone replacement therapy    Vitamin D deficiency    Impingement syndrome of left shoulder    Limited range of motion (ROM) of shoulder    Left foot pain    Vestibular hypofunction    Other iron deficiency anemias    Keloid scar of skin    Iron deficiency anemia due to chronic blood loss    Restless leg syndrome    Chest pain    Insomnia    Port-A-Cath in place    TIA (transient ischemic attack)    History of non anemic vitamin B12 deficiency    Acute pain of right shoulder    Weight gain    Severe obesity    Nausea and vomiting    Hypokalemia    Gastritis    Hiatal hernia    Prolonged Q-T interval on ECG    Palpitation    Pulmonary nodule      Encounter Diagnoses   Name Primary?    Panic disorder without agoraphobia     Insomnia, unspecified type Yes    Anxiety attack       IMPRESSIONS/PLAN:  Pt meets diagnostic criteria for Insomnia and Panic Disorder without agoraphobia.    Medication Management: Continue current medications. Discussed risks, benefits, and alternatives to treatment plan documented above with patient. I answered all patient questions related to this plan, and patient expressed understanding and agreement.      Follow up in about 4 weeks (around 3/4/2024) for Medication follow up.     Medication List with Changes/Refills   New Medications     ALPRAZOLAM (XANAX) 0.25 MG TABLET    Take 1 tablet (0.25 mg total) by mouth 2 (two) times daily as needed for Anxiety or Insomnia.    MIRTAZAPINE (REMERON) 15 MG TABLET    Take 1 tablet (15 mg total) by mouth every evening.    POTASSIUM CHLORIDE SA (K-DUR,KLOR-CON) 20 MEQ TABLET    Take 1 tablet (20 mEq total) by mouth 2 (two) times daily.   Current Medications    ALBUTEROL (PROAIR HFA) 90 MCG/ACTUATION INHALER    Inhale 2 puffs into the lungs every 4 hours as needed.    ATORVASTATIN (LIPITOR) 20 MG TABLET    Take 1 tablet (20 mg total) by mouth once daily.    CETIRIZINE (ZYRTEC) 10 MG TABLET    Take 1 tablet (10 mg total) by mouth once daily.    CYANOCOBALAMIN 1,000 MCG/ML INJECTION    Inject 1 mL (1,000 mcg total) into the muscle every 28 days.    EPINEPHRINE (EPIPEN 2-DENNIS) 0.3 MG/0.3 ML ATIN    Inject 0.3 mLs (0.3 mg total) into the muscle once. for 1 dose    HYDROCHLOROTHIAZIDE (HYDRODIURIL) 12.5 MG TAB    Take 1 tablet (12.5 mg total) by mouth once daily.    HYDROMORPHONE (DILAUDID) 2 MG TABLET    take 1 tablet by mouth every 6-8 hours as needed for pain    HYDROMORPHONE (DILAUDID) 2 MG TABLET    Take 1 tablet by mouth every six to eight hours as needed for pain    HYDROXYCHLOROQUINE (PLAQUENIL) 200 MG TABLET    Take 1 tablet (200 mg total) by mouth once daily.    HYDROXYZINE PAMOATE (VISTARIL) 25 MG CAP    Take 1 capsule (25 mg total) by mouth every 8 (eight) hours as needed (anxiety).    LIRAGLUTIDE, WEIGHT LOSS, (SAXENDA) 3 MG/0.5 ML (18 MG/3 ML) PNIJ    Inject 0.6 mg daily x 1 week  then 1.2 mg daily x 1 week  then 1.8 mg daily x 1 week  then 2.4 mg x 1 week  then continue 3 mg daily    METOPROLOL SUCCINATE (TOPROL-XL) 25 MG 24 HR TABLET    Take 1 tablet (25 mg total) by mouth once daily.    PANTOPRAZOLE (PROTONIX) 40 MG TABLET    Take 1 tablet (40 mg total) by mouth 2 (two) times daily.    PROMETHAZINE (PHENERGAN) 25 MG TABLET    Take 1 tablet (25 mg total) by mouth every 6 (six) hours as needed for  Nausea.    TIZANIDINE (ZANAFLEX) 4 MG TABLET    Take 4 mg by mouth 3 (three) times daily as needed.    TIZANIDINE (ZANAFLEX) 4 MG TABLET    Take 1 tablet by mouth three times a day as needed, May cause drowsiness, use caution/ For muscle spasms    TOPIRAMATE (TOPAMAX) 100 MG TABLET    Take 1 tablet (100 mg total) by mouth 2 (two) times daily.    ZOLPIDEM (AMBIEN) 10 MG TAB    Take 1 tablet by mouth at bedtime as needed May cause drowsiness, use caution/ for sleep only    ZOLPIDEM (AMBIEN) 10 MG TAB    Take 1 tablet by mouth at bedtime as needed May cause drowsiness, use caution/ for sleep only    ZOLPIDEM (AMBIEN) 10 MG TAB    Take 1 tablet by mouth at bedtime as needed May cause drowsiness, use caution/ for sleep only   Changed and/or Refilled Medications    Modified Medication Previous Medication    SECUKINUMAB (COSENTYX PEN, 2 PENS,) 150 MG/ML PNIJ secukinumab (COSENTYX PEN, 2 PENS,) 150 mg/mL PnIj       Inject 150 mg into the skin every 28 days.    Inject 150 mg into the skin every 28 days.    SECUKINUMAB (COSENTYX PEN, 2 PENS,) 150 MG/ML PNIJ secukinumab (COSENTYX PEN, 2 PENS,) 150 mg/mL PnIj       150 mg SQ q 7 days x 5 doses, then 150 mg SQ q 4 weeks thereafter.    150 mg SQ q 7 days x 5 doses, then 150 mg SQ q 4 weeks thereafter.   Discontinued Medications    CLONAZEPAM (KLONOPIN) 0.5 MG TABLET    Take 1 tablet (0.5 mg total) by mouth 2 (two) times daily as needed for Anxiety (panic attacks).    HYDROMORPHONE (DILAUDID) 2 MG TABLET    Take 1 tablet (2 mg total) by mouth every 6 to 8 hours as needed for pain    HYDROMORPHONE (DILAUDID) 2 MG TABLET    Take 1 tablet (2 mg total) by mouth every 6 to 8 hours as needed for pain.    TIZANIDINE (ZANAFLEX) 4 MG TABLET    Take 1 tablet by mouth three times a day as needed. May cause drowsiness, use caution/ For muscle spasms    ZOLPIDEM (AMBIEN) 10 MG TAB    Take 1 tablet by mouth at bedtime as needed May cause drowsiness, use caution/ for sleep only      Time spent  with pt including note preparation: 70 minutes     Linda Nuñez, PhD, MP  Medical Psychologist

## 2024-02-05 ENCOUNTER — OFFICE VISIT (OUTPATIENT)
Dept: PSYCHIATRY | Facility: CLINIC | Age: 59
End: 2024-02-05
Payer: COMMERCIAL

## 2024-02-05 DIAGNOSIS — G47.00 INSOMNIA, UNSPECIFIED TYPE: Primary | ICD-10-CM

## 2024-02-05 DIAGNOSIS — F41.0 PANIC DISORDER WITHOUT AGORAPHOBIA: ICD-10-CM

## 2024-02-05 DIAGNOSIS — F41.0 ANXIETY ATTACK: ICD-10-CM

## 2024-02-05 PROCEDURE — 99215 OFFICE O/P EST HI 40 MIN: CPT | Mod: S$GLB,,, | Performed by: PSYCHOLOGIST

## 2024-02-05 PROCEDURE — 99999 PR PBB SHADOW E&M-EST. PATIENT-LVL II: CPT | Mod: PBBFAC,,, | Performed by: PSYCHOLOGIST

## 2024-02-05 RX ORDER — MIRTAZAPINE 7.5 MG/1
7.5 TABLET, FILM COATED ORAL NIGHTLY
Qty: 30 TABLET | Refills: 2 | Status: SHIPPED | OUTPATIENT
Start: 2024-02-05 | End: 2024-02-14

## 2024-02-05 RX ORDER — ALPRAZOLAM 0.25 MG/1
0.25 TABLET ORAL 2 TIMES DAILY PRN
Qty: 60 TABLET | Refills: 2 | Status: SHIPPED | OUTPATIENT
Start: 2024-02-05 | End: 2024-03-04

## 2024-02-12 ENCOUNTER — HOSPITAL ENCOUNTER (OUTPATIENT)
Dept: RADIOLOGY | Facility: HOSPITAL | Age: 59
Discharge: HOME | End: 2024-02-12
Attending: NURSE PRACTITIONER
Payer: COMMERCIAL

## 2024-02-12 VITALS — BODY MASS INDEX: 29.45 KG/M2 | WEIGHT: 160.06 LBS | HEIGHT: 62 IN

## 2024-02-12 DIAGNOSIS — Z12.31 ENCOUNTER FOR SCREENING MAMMOGRAM FOR BREAST CANCER: ICD-10-CM

## 2024-02-12 PROCEDURE — 77063 BREAST TOMOSYNTHESIS BI: CPT | Mod: 26,,, | Performed by: RADIOLOGY

## 2024-02-12 PROCEDURE — 77067 SCR MAMMO BI INCL CAD: CPT | Mod: 26,,, | Performed by: RADIOLOGY

## 2024-02-12 PROCEDURE — 77067 SCR MAMMO BI INCL CAD: CPT | Mod: TC,PO

## 2024-02-13 ENCOUNTER — PATIENT MESSAGE (OUTPATIENT)
Dept: PSYCHIATRY | Facility: CLINIC | Age: 59
End: 2024-02-13
Payer: COMMERCIAL

## 2024-02-13 ENCOUNTER — OFFICE VISIT (OUTPATIENT)
Dept: RHEUMATOLOGY | Facility: CLINIC | Age: 59
End: 2024-02-13
Payer: COMMERCIAL

## 2024-02-13 DIAGNOSIS — M32.9 SYSTEMIC LUPUS ERYTHEMATOSUS, UNSPECIFIED SLE TYPE, UNSPECIFIED ORGAN INVOLVEMENT STATUS: Primary | ICD-10-CM

## 2024-02-13 DIAGNOSIS — D84.821 IMMUNOCOMPROMISED STATE DUE TO DRUG THERAPY: ICD-10-CM

## 2024-02-13 DIAGNOSIS — Z51.81 MEDICATION MONITORING ENCOUNTER: ICD-10-CM

## 2024-02-13 DIAGNOSIS — M45.9 ANKYLOSING SPONDYLITIS, UNSPECIFIED SITE OF SPINE: ICD-10-CM

## 2024-02-13 DIAGNOSIS — Z79.899 IMMUNOCOMPROMISED STATE DUE TO DRUG THERAPY: ICD-10-CM

## 2024-02-13 DIAGNOSIS — Z79.899 HIGH RISK MEDICATION USE: ICD-10-CM

## 2024-02-13 PROCEDURE — 1160F RVW MEDS BY RX/DR IN RCRD: CPT | Mod: CPTII,95,, | Performed by: PHYSICIAN ASSISTANT

## 2024-02-13 PROCEDURE — 1159F MED LIST DOCD IN RCRD: CPT | Mod: CPTII,95,, | Performed by: PHYSICIAN ASSISTANT

## 2024-02-13 PROCEDURE — 99215 OFFICE O/P EST HI 40 MIN: CPT | Mod: 95,,, | Performed by: PHYSICIAN ASSISTANT

## 2024-02-13 RX ORDER — SECUKINUMAB 150 MG/ML
150 INJECTION SUBCUTANEOUS
Qty: 1 ML | Refills: 5 | Status: SHIPPED | OUTPATIENT
Start: 2024-02-13 | End: 2024-02-13 | Stop reason: SDUPTHER

## 2024-02-13 RX ORDER — SECUKINUMAB 150 MG/ML
INJECTION SUBCUTANEOUS
Qty: 5 ML | Refills: 0 | Status: SHIPPED | OUTPATIENT
Start: 2024-02-13 | End: 2024-02-13 | Stop reason: SDUPTHER

## 2024-02-13 RX ORDER — SECUKINUMAB 150 MG/ML
INJECTION SUBCUTANEOUS
Qty: 5 ML | Refills: 0 | Status: ACTIVE | OUTPATIENT
Start: 2024-02-13

## 2024-02-13 RX ORDER — SECUKINUMAB 150 MG/ML
150 INJECTION SUBCUTANEOUS
Qty: 1 ML | Refills: 5 | Status: ACTIVE | OUTPATIENT
Start: 2024-02-13 | End: 2024-05-03 | Stop reason: SDUPTHER

## 2024-02-13 NOTE — PROGRESS NOTES
The patient location is: work  The chief complaint leading to consultation is: f/u    Visit type: audiovisual    Face to Face time with patient: 25 min  35 minutes of total time spent on the encounter, which includes face to face time and non-face to face time preparing to see the patient (eg, review of tests), Obtaining and/or reviewing separately obtained history, Documenting clinical information in the electronic or other health record, Independently interpreting results (not separately reported) and communicating results to the patient/family/caregiver, or Care coordination (not separately reported).     Each patient to whom he or she provides medical services by telemedicine is:  (1) informed of the relationship between the physician and patient and the respective role of any other health care provider with respect to management of the patient; and (2) notified that he or she may decline to receive medical services by telemedicine and may withdraw from such care at any time.      Subjective:      Patient ID: Tosha Kwok is a 58 y.o. female.    Chief Complaint: No chief complaint on file.      HPI   Tosha Kwok  is a 58 y.o. female seen today for follow-up ankylosing spondylitis and SLE.  C/o increasing pain in the wintertime.  Pain is 10/10 in the last 3 days (ranging from 7-10/10).  Prolonged am stiffness > 1 hour.  Describes inflammatory back pain.    She was previously treated years ago by Dr. Richardson for AS, and initially responded well to Humira but later had waning therapeutic effects.  Not currently on anything for ankylosing spondylitis.  Has chronic low back pain for years.  Also with overlapping fibromyalgia and spondylosis which complicate her assessment and response to therapy.  She is in chronic pain management Dr. Storm Rae.    In 2017 started seeing DR. Oc Jaquez at [facility] who diagnosed her w SLE per report.  On Plaquenil 200 mg daily.  Tells me her last eye exam was about a  year ago.  They did Plaquenil monitoring and there were no signs of maculopathy    C/o right hip pain in the right buttock and laterally.  It is non radiating.  Also w increase in hand pain and left shoulder pain.  C/o am stiffness for 45 min or greater.  Had hand swelling but has improved over last couple days.  In 2016 had an episode of pleurisy.     Also complaining of hand pain and stiffness bilaterally with periodic swelling.  A morning stiffness lasting greater than an hour.    Patient c/o chest pain.  Did heart monitor 2023 and states everything checked out ok w Cards.  No associated SOB.  Did ekg 2023 w NSR - I personally reviewed those external results.  Also, w c/o photosensitivity and xerostomia.  She denies fevers, chills,  eye pain, shortness of breath, hematuria, blood in the stool, raynauds, finger ulcerations.  Rheumatologic systems otherwise negative.    Serologies/Labs:  Neg ds dna3/13/23  Current Treatment:  Plaquenil 200 mg daily  Previous Treatment:   MTX - weaned down and off  Humira - for AS      Current Outpatient Medications:     albuterol (PROAIR HFA) 90 mcg/actuation inhaler, Inhale 2 puffs into the lungs every 4 hours as needed., Disp: 18 g, Rfl: 3    ALPRAZolam (XANAX) 0.25 MG tablet, Take 1 tablet (0.25 mg total) by mouth 2 (two) times daily as needed for Anxiety or Insomnia., Disp: 60 tablet, Rfl: 2    atorvastatin (LIPITOR) 20 MG tablet, Take 1 tablet (20 mg total) by mouth once daily., Disp: 90 tablet, Rfl: 3    cetirizine (ZYRTEC) 10 MG tablet, Take 1 tablet (10 mg total) by mouth once daily., Disp: 90 tablet, Rfl: 3    cyanocobalamin 1,000 mcg/mL injection, Inject 1 mL (1,000 mcg total) into the muscle every 28 days., Disp: 10 mL, Rfl: 1    EPINEPHrine (EPIPEN 2-DENNIS) 0.3 mg/0.3 mL AtIn, Inject 0.3 mLs (0.3 mg total) into the muscle once. for 1 dose, Disp: 2 each, Rfl: 3    hydroCHLOROthiazide (HYDRODIURIL) 12.5 MG Tab, Take 1 tablet (12.5 mg total) by mouth once daily., Disp: 90  tablet, Rfl: 3    HYDROmorphone (DILAUDID) 2 MG tablet, take 1 tablet by mouth every 6-8 hours as needed for pain, Disp: 105 tablet, Rfl: 0    HYDROmorphone (DILAUDID) 2 MG tablet, Take 1 tablet by mouth every six to eight hours as needed for pain, Disp: 105 tablet, Rfl: 0    hydrOXYchloroQUINE (PLAQUENIL) 200 mg tablet, Take 1 tablet (200 mg total) by mouth once daily., Disp: 30 tablet, Rfl: 1    hydrOXYzine pamoate (VISTARIL) 25 MG Cap, Take 1 capsule (25 mg total) by mouth every 8 (eight) hours as needed (anxiety)., Disp: 90 capsule, Rfl: 3    liraglutide, weight loss, (SAXENDA) 3 mg/0.5 mL (18 mg/3 mL) PnIj, Inject 0.6 mg daily x 1 week then 1.2 mg daily x 1 week then 1.8 mg daily x 1 week then 2.4 mg x 1 week then continue 3 mg daily, Disp: 3 mL, Rfl: 0    metoprolol succinate (TOPROL-XL) 25 MG 24 hr tablet, Take 1 tablet (25 mg total) by mouth once daily., Disp: 90 tablet, Rfl: 2    mirtazapine (REMERON) 7.5 MG Tab, Take 1 tablet (7.5 mg total) by mouth every evening., Disp: 30 tablet, Rfl: 2    pantoprazole (PROTONIX) 40 MG tablet, Take 1 tablet (40 mg total) by mouth 2 (two) times daily., Disp: 60 tablet, Rfl: 1    promethazine (PHENERGAN) 25 MG tablet, Take 1 tablet (25 mg total) by mouth every 6 (six) hours as needed for Nausea., Disp: 15 tablet, Rfl: 0    secukinumab (COSENTYX PEN, 2 PENS,) 150 mg/mL PnIj, Inject 150 mg into the skin every 28 days., Disp: 1 mL, Rfl: 5    secukinumab (COSENTYX PEN, 2 PENS,) 150 mg/mL PnIj, 150 mg SQ q 7 days x 5 doses, then 150 mg SQ q 4 weeks thereafter., Disp: 5 mL, Rfl: 0    tiZANidine (ZANAFLEX) 4 MG tablet, Take 4 mg by mouth 3 (three) times daily as needed., Disp: , Rfl:     tiZANidine (ZANAFLEX) 4 MG tablet, Take 1 tablet by mouth three times a day as needed, May cause drowsiness, use caution/ For muscle spasms, Disp: 270 tablet, Rfl: 3    topiramate (TOPAMAX) 100 MG tablet, Take 1 tablet (100 mg total) by mouth 2 (two) times daily., Disp: 180 tablet, Rfl: 3     zolpidem (AMBIEN) 10 mg Tab, Take 1 tablet by mouth at bedtime as needed May cause drowsiness, use caution/ for sleep only, Disp: 30 tablet, Rfl: 5    zolpidem (AMBIEN) 10 mg Tab, Take 1 tablet by mouth at bedtime as needed May cause drowsiness, use caution/ for sleep only, Disp: 30 tablet, Rfl: 5    zolpidem (AMBIEN) 10 mg Tab, Take 1 tablet by mouth at bedtime as needed May cause drowsiness, use caution/ for sleep only, Disp: 30 tablet, Rfl: 5    Past Medical History:   Diagnosis Date    Anemia     Ankylosing spondylitis     Anxiety     Asthma     Cancer 2018    left breast  lumpectomy, xrt    Chronic constipation     Chronic insomnia     Edema     Fibromyalgia     Hypertension     Insulin resistance     Interstitial cystitis     Lupus     Lupus     Migraine headache     PONV (postoperative nausea and vomiting)     Restless legs syndrome     Shingles     Stroke 1998    post partum right sided numbness    TIA (transient ischemic attack) 1998     Family History   Problem Relation Age of Onset    Hypertension Mother     Hypertension Brother     Breast cancer Maternal Aunt         x2     Heart attack Father     Hypertension Sister     Lupus Sister     Breast cancer Maternal Grandmother      Social History     Socioeconomic History    Marital status: Legally    Tobacco Use    Smoking status: Never    Smokeless tobacco: Never   Substance and Sexual Activity    Alcohol use: Yes     Comment: occasionally    Drug use: No    Sexual activity: Yes     Partners: Male     Social Determinants of Health     Financial Resource Strain: Low Risk  (4/21/2023)    Overall Financial Resource Strain (CARDIA)     Difficulty of Paying Living Expenses: Not hard at all   Food Insecurity: No Food Insecurity (4/21/2023)    Hunger Vital Sign     Worried About Running Out of Food in the Last Year: Never true     Ran Out of Food in the Last Year: Never true   Transportation Needs: No Transportation Needs (4/21/2023)    PRAPARE -  Transportation     Lack of Transportation (Medical): No     Lack of Transportation (Non-Medical): No   Physical Activity: Insufficiently Active (4/21/2023)    Exercise Vital Sign     Days of Exercise per Week: 2 days     Minutes of Exercise per Session: 20 min   Stress: Patient Declined (4/21/2023)    [employer] [employer] - Occupational Stress Questionnaire     Feeling of Stress : Patient declined   Social Connections: Unknown (4/21/2023)    Social Connection and Isolation Panel [NHANES]     Frequency of Communication with Friends and Family: Three times a week     Frequency of Social Gatherings with Friends and Family: Three times a week     Active Member of Clubs or Organizations: Yes     Attends Club or Organization Meetings: 1 to 4 times per year     Marital Status:    Housing Stability: Low Risk  (4/21/2023)    Housing Stability Vital Sign     Unable to Pay for Housing in the Last Year: No     Number of Places Lived in the Last Year: 1     Unstable Housing in the Last Year: No     Review of patient's allergies indicates:   Allergen Reactions    Carrot Swelling     angioedema    Morphine Anxiety     Hives   States she can take dilaudid and percocet without any problems    Clindamycin     Macrolide antibiotics     Erythromycin Other (See Comments)     Hives and cramps     Zofran (as hydrochloride) [ondansetron hcl] Hives     Local hive after IV injection       Objective:   LMP 03/26/2012   Immunization History   Administered Date(s) Administered    COVID-19, MRNA, LN-S, PF (MODERNA FULL 0.5 ML DOSE) 03/31/2021, 04/28/2021    Influenza - Quadrivalent - PF *Preferred* (6 months and older) 09/25/2015, 11/02/2016, 12/04/2019, 10/15/2020, 10/15/2021, 09/21/2022    Influenza - Trivalent - PF (ADULT) 10/31/2014    PPD Test 09/02/2015, 02/21/2017    Pneumococcal Conjugate - 13 Valent 11/02/2016    Pneumococcal Polysaccharide - 23 Valent 09/03/2019    Tdap 12/04/2019    Zoster Recombinant  02/08/2023       Physical Exam   Constitutional: She is oriented to person, place, and time. No distress.   HENT:   Head: Normocephalic and atraumatic.   Pulmonary/Chest: Effort normal.   Musculoskeletal:      Cervical back: Normal range of motion.   Neurological: She is alert and oriented to person, place, and time.   Psychiatric: Mood normal.         No results found for this or any previous visit (from the past 672 hour(s)).      Lab Results   Component Value Date    TBGOLDPLUS Negative 03/13/2023      Lab Results   Component Value Date    HEPAIGM Non-reactive 03/13/2023    HEPBIGM Non-reactive 03/13/2023    HEPCAB Non-reactive 03/13/2023      Imaging  I have personally reviewed images and reports as below.  I agree with the interpretation.  X-Ray Chest AP Portable  Order: [id number]  Status: Final result     Visible to patient: Yes (seen)     Next appt: Today at 04:10 PM in Lab (LABORATORY, [facility])     0 Result Notes  Details    Reading Physician Reading Date Result Priority   Kateryna Jeffries MD  [phone] 10/21/2022 STAT     Narrative & Impression  EXAMINATION:  XR CHEST AP PORTABLE     CLINICAL HISTORY:  Chest Pain;     TECHNIQUE:  Single view     COMPARISON:  December 2021     FINDINGS:  Mediastinal contour stable midline.  MediPort similar position.  Lungs well aerated.  No pleural effusion.     Impression:     No active or adverse finding        Electronically signed by: Kateryna Jeffries  Date:                                            10/21/2022  Time:                                           21:37         Assessment:     1. Systemic lupus erythematosus, unspecified SLE type, unspecified organ involvement status    2. Ankylosing spondylitis, unspecified site of spine    3. High risk medication use    4. Medication monitoring encounter    5. Immunocompromised state due to drug therapy        Plan:     Diagnoses and all orders for this visit:    Systemic lupus erythematosus, unspecified SLE type,  unspecified organ involvement status  -     Ambulatory referral/consult to Ophthalmology; Future    Ankylosing spondylitis, unspecified site of spine  -     Discontinue: secukinumab (COSENTYX PEN, 2 PENS,) 150 mg/mL PnIj; 150 mg SQ q 7 days x 5 doses, then 150 mg SQ q 4 weeks thereafter.  -     Discontinue: secukinumab (COSENTYX PEN, 2 PENS,) 150 mg/mL PnIj; Inject 150 mg into the skin every 28 days.  -     secukinumab (COSENTYX PEN, 2 PENS,) 150 mg/mL PnIj; Inject 150 mg into the skin every 28 days.  -     secukinumab (COSENTYX PEN, 2 PENS,) 150 mg/mL PnIj; 150 mg SQ q 7 days x 5 doses, then 150 mg SQ q 4 weeks thereafter.    High risk medication use  -     Hepatitis C Antibody; Standing  -     Hepatitis B Core Antibody, Total; Standing  -     Hepatitis B Surface Antigen; Standing  -     Hepatitis B Surface Ab, Qualitative; Standing    Medication monitoring encounter    Immunocompromised state due to drug therapy  -     Hepatitis C Antibody; Standing  -     Hepatitis B Core Antibody, Total; Standing  -     Hepatitis B Surface Antigen; Standing  -     Hepatitis B Surface Ab, Qualitative; Standing    SLE  C/o current flare in pain/arthritis sxs  SLE labs tomorrow - scheduled in [facility]  Check TPMT  DDx includes Lupus arthritis and peripheral arthritis in AS  Medrol Dosepak to start after labs are drawn  Prednisone 5 mg daily p.r.n. thereafter for lupus or ankylosing spondylitis flare  Chest pain w increased heart rate periodically  improved  F/u cardiology  Low threshold to check CT Chest  EKG 12/2023 showed NSR  C/w Plaquenil 200 mg bid  Dose is 5.24mg/kg  8 yrs therapy (started in 2016)  Last plaq eye exam > 1 year ago - forgot to call to schedule - asking for [facility] referral  Optho ref for plaquenil monitoring  AS  Pain is inflammatory in nature  Update tuberculosis and hepatitis labs tomorrow  HLA B27 w labs tomorrow  Waning therapeutic effect on humira in past  Avoid TNF in this patient w history of  Lupus  Not currently on tx  Oral NSAIDs contraindicated w history of gastric sleeve  Start Cosentyx pending lab review  Drug therapy requiring intensive monitoring for toxicity  High Risk Medication Monitoring encounter  No current medication related issues, no evidence of toxicity  I ordered labs for toxicity monitoring, have personally reviewed the findings, and discussed them with the patient.  Pending labs will be sent via the portal  Compromised immune system secondary to autoimmune disease and/or use of immunosuppressive drugs.  Monitor carefully for infections.  Advised patient to get immediate medical care if any infection arises.  Also advised strict adherence age-appropriate vaccinations and cancer screenings with PCP.  Patient advised to hold DMARD and/or biologic therapy for signs of infection or for surgery. If you are unsure what to do please call our office for instruction.[facility] [phone]  Return to clinic: 4 mos w SLE labs prior - in person visit    The patient understands, chooses and consents to this plan and accepts all the risks which include but are not limited to the risks mentioned above.     Disclaimer: This note was prepared using a voice recognition system and is likely to have sound alike errors within the text.

## 2024-02-14 ENCOUNTER — LAB VISIT (OUTPATIENT)
Dept: LAB | Facility: HOSPITAL | Age: 59
End: 2024-02-14
Attending: PHYSICIAN ASSISTANT
Payer: COMMERCIAL

## 2024-02-14 DIAGNOSIS — M32.9 SYSTEMIC LUPUS ERYTHEMATOSUS, UNSPECIFIED SLE TYPE, UNSPECIFIED ORGAN INVOLVEMENT STATUS: Primary | ICD-10-CM

## 2024-02-14 DIAGNOSIS — Z79.899 IMMUNOCOMPROMISED STATE DUE TO DRUG THERAPY: ICD-10-CM

## 2024-02-14 DIAGNOSIS — Z79.899 HIGH RISK MEDICATION USE: ICD-10-CM

## 2024-02-14 DIAGNOSIS — E87.6 HYPOKALEMIA: Primary | ICD-10-CM

## 2024-02-14 DIAGNOSIS — M32.9 SYSTEMIC LUPUS ERYTHEMATOSUS, UNSPECIFIED SLE TYPE, UNSPECIFIED ORGAN INVOLVEMENT STATUS: ICD-10-CM

## 2024-02-14 DIAGNOSIS — F41.0 PANIC DISORDER WITHOUT AGORAPHOBIA: ICD-10-CM

## 2024-02-14 DIAGNOSIS — D84.821 IMMUNOCOMPROMISED STATE DUE TO DRUG THERAPY: ICD-10-CM

## 2024-02-14 DIAGNOSIS — G47.00 INSOMNIA, UNSPECIFIED TYPE: ICD-10-CM

## 2024-02-14 DIAGNOSIS — M45.9 ANKYLOSING SPONDYLITIS, UNSPECIFIED SITE OF SPINE: ICD-10-CM

## 2024-02-14 LAB
ALBUMIN SERPL BCP-MCNC: 3.9 G/DL (ref 3.5–5.2)
ALP SERPL-CCNC: 88 U/L (ref 55–135)
ALT SERPL W/O P-5'-P-CCNC: 37 U/L (ref 10–44)
ANION GAP SERPL CALC-SCNC: 14 MMOL/L (ref 8–16)
AST SERPL-CCNC: 22 U/L (ref 10–40)
BACTERIA #/AREA URNS AUTO: NORMAL /HPF
BASOPHILS # BLD AUTO: 0.04 K/UL (ref 0–0.2)
BASOPHILS NFR BLD: 0.8 % (ref 0–1.9)
BILIRUB SERPL-MCNC: 0.3 MG/DL (ref 0.1–1)
BUN SERPL-MCNC: 7 MG/DL (ref 6–20)
CALCIUM SERPL-MCNC: 9.2 MG/DL (ref 8.7–10.5)
CHLORIDE SERPL-SCNC: 105 MMOL/L (ref 95–110)
CO2 SERPL-SCNC: 24 MMOL/L (ref 23–29)
CREAT SERPL-MCNC: 0.8 MG/DL (ref 0.5–1.4)
CREAT UR-MCNC: 149.2 MG/DL (ref 15–325)
CRP SERPL-MCNC: 1.5 MG/L (ref 0–8.2)
DIFFERENTIAL METHOD BLD: NORMAL
EOSINOPHIL # BLD AUTO: 0.1 K/UL (ref 0–0.5)
EOSINOPHIL NFR BLD: 1.6 % (ref 0–8)
ERYTHROCYTE [DISTWIDTH] IN BLOOD BY AUTOMATED COUNT: 13.2 % (ref 11.5–14.5)
EST. GFR  (NO RACE VARIABLE): >60 ML/MIN/1.73 M^2
GLUCOSE SERPL-MCNC: 116 MG/DL (ref 70–110)
HCT VFR BLD AUTO: 40.1 % (ref 37–48.5)
HGB BLD-MCNC: 13.5 G/DL (ref 12–16)
HYALINE CASTS UR QL AUTO: 0 /LPF
IMM GRANULOCYTES # BLD AUTO: 0.01 K/UL (ref 0–0.04)
IMM GRANULOCYTES NFR BLD AUTO: 0.2 % (ref 0–0.5)
LYMPHOCYTES # BLD AUTO: 2 K/UL (ref 1–4.8)
LYMPHOCYTES NFR BLD: 39.8 % (ref 18–48)
MCH RBC QN AUTO: 29.2 PG (ref 27–31)
MCHC RBC AUTO-ENTMCNC: 33.7 G/DL (ref 32–36)
MCV RBC AUTO: 87 FL (ref 82–98)
MICROSCOPIC COMMENT: NORMAL
MONOCYTES # BLD AUTO: 0.3 K/UL (ref 0.3–1)
MONOCYTES NFR BLD: 4.9 % (ref 4–15)
NEUTROPHILS # BLD AUTO: 2.7 K/UL (ref 1.8–7.7)
NEUTROPHILS NFR BLD: 52.7 % (ref 38–73)
NRBC BLD-RTO: 0 /100 WBC
PLATELET # BLD AUTO: 281 K/UL (ref 150–450)
PMV BLD AUTO: 9.3 FL (ref 9.2–12.9)
POTASSIUM SERPL-SCNC: 2.8 MMOL/L (ref 3.5–5.1)
PROT SERPL-MCNC: 7.1 G/DL (ref 6–8.4)
PROT UR-MCNC: 14 MG/DL (ref 0–15)
PROT/CREAT UR: 0.09 MG/G{CREAT} (ref 0–0.2)
RBC # BLD AUTO: 4.62 M/UL (ref 4–5.4)
RBC #/AREA URNS AUTO: 0 /HPF (ref 0–4)
SODIUM SERPL-SCNC: 143 MMOL/L (ref 136–145)
WBC # BLD AUTO: 5.08 K/UL (ref 3.9–12.7)
WBC #/AREA URNS AUTO: 2 /HPF (ref 0–5)

## 2024-02-14 PROCEDURE — 81000 URINALYSIS NONAUTO W/SCOPE: CPT | Mod: PO | Performed by: PHYSICIAN ASSISTANT

## 2024-02-14 PROCEDURE — 86480 TB TEST CELL IMMUN MEASURE: CPT | Performed by: PHYSICIAN ASSISTANT

## 2024-02-14 PROCEDURE — 84156 ASSAY OF PROTEIN URINE: CPT | Mod: PO | Performed by: PHYSICIAN ASSISTANT

## 2024-02-14 PROCEDURE — 87340 HEPATITIS B SURFACE AG IA: CPT | Performed by: PHYSICIAN ASSISTANT

## 2024-02-14 PROCEDURE — 36415 COLL VENOUS BLD VENIPUNCTURE: CPT | Mod: PO | Performed by: PHYSICIAN ASSISTANT

## 2024-02-14 PROCEDURE — 86706 HEP B SURFACE ANTIBODY: CPT | Performed by: PHYSICIAN ASSISTANT

## 2024-02-14 PROCEDURE — 86704 HEP B CORE ANTIBODY TOTAL: CPT | Performed by: PHYSICIAN ASSISTANT

## 2024-02-14 PROCEDURE — 86140 C-REACTIVE PROTEIN: CPT | Mod: PO | Performed by: PHYSICIAN ASSISTANT

## 2024-02-14 PROCEDURE — 0034U TPMT NUDT15 GENES: CPT | Performed by: PHYSICIAN ASSISTANT

## 2024-02-14 PROCEDURE — 86160 COMPLEMENT ANTIGEN: CPT | Performed by: PHYSICIAN ASSISTANT

## 2024-02-14 PROCEDURE — 85025 COMPLETE CBC W/AUTO DIFF WBC: CPT | Mod: PO | Performed by: PHYSICIAN ASSISTANT

## 2024-02-14 PROCEDURE — 86225 DNA ANTIBODY NATIVE: CPT | Performed by: PHYSICIAN ASSISTANT

## 2024-02-14 PROCEDURE — 80053 COMPREHEN METABOLIC PANEL: CPT | Mod: PO | Performed by: PHYSICIAN ASSISTANT

## 2024-02-14 PROCEDURE — 81374 HLA I TYPING 1 ANTIGEN LR: CPT | Performed by: PHYSICIAN ASSISTANT

## 2024-02-14 PROCEDURE — 86160 COMPLEMENT ANTIGEN: CPT | Mod: 59 | Performed by: PHYSICIAN ASSISTANT

## 2024-02-14 PROCEDURE — 85652 RBC SED RATE AUTOMATED: CPT | Performed by: PHYSICIAN ASSISTANT

## 2024-02-14 PROCEDURE — 86803 HEPATITIS C AB TEST: CPT | Performed by: PHYSICIAN ASSISTANT

## 2024-02-14 RX ORDER — MIRTAZAPINE 15 MG/1
15 TABLET, FILM COATED ORAL NIGHTLY
Qty: 30 TABLET | Refills: 2 | Status: SHIPPED | OUTPATIENT
Start: 2024-02-14 | End: 2024-03-04 | Stop reason: ALTCHOICE

## 2024-02-14 RX ORDER — POTASSIUM CHLORIDE 20 MEQ/1
20 TABLET, EXTENDED RELEASE ORAL 2 TIMES DAILY
Qty: 30 TABLET | Refills: 0 | Status: SHIPPED | OUTPATIENT
Start: 2024-02-14

## 2024-02-15 LAB
C3 SERPL-MCNC: 138 MG/DL (ref 50–180)
C4 SERPL-MCNC: 43 MG/DL (ref 11–44)
DSDNA AB SER-ACNC: NORMAL [IU]/ML
ERYTHROCYTE [SEDIMENTATION RATE] IN BLOOD BY PHOTOMETRIC METHOD: 17 MM/HR (ref 0–36)
HBV CORE AB SERPL QL IA: NORMAL
HBV SURFACE AB SER-ACNC: 230.98 MIU/ML
HBV SURFACE AB SER-ACNC: REACTIVE M[IU]/ML
HBV SURFACE AG SERPL QL IA: NORMAL
HCV AB SERPL QL IA: NEGATIVE

## 2024-02-16 LAB
GAMMA INTERFERON BACKGROUND BLD IA-ACNC: 0.05 IU/ML
M TB IFN-G CD4+ BCKGRND COR BLD-ACNC: 0.01 IU/ML
M TB IFN-G CD4+ BCKGRND COR BLD-ACNC: 0.01 IU/ML
MITOGEN IGNF BCKGRD COR BLD-ACNC: 9.95 IU/ML
TB GOLD PLUS: NEGATIVE

## 2024-02-19 LAB
NUDT15 GENOTYPE: NORMAL
NUDT15 PHENOTYPE: NORMAL
TPMT ADDITIONAL INFORMATION: NORMAL
TPMT DISCLAIMER: NORMAL
TPMT GENOTYPE RESULT: NORMAL
TPMT INTERPRETATION: NORMAL
TPMT METHOD: NORMAL
TPMT PHENOTYPE: NORMAL
TPMT REVIEWED BY: NORMAL

## 2024-02-22 DIAGNOSIS — M32.9 SYSTEMIC LUPUS ERYTHEMATOSUS, UNSPECIFIED SLE TYPE, UNSPECIFIED ORGAN INVOLVEMENT STATUS: ICD-10-CM

## 2024-02-22 RX ORDER — HYDROXYCHLOROQUINE SULFATE 200 MG/1
200 TABLET, FILM COATED ORAL DAILY
Qty: 30 TABLET | Refills: 1 | Status: SHIPPED | OUTPATIENT
Start: 2024-02-22

## 2024-02-23 ENCOUNTER — PATIENT MESSAGE (OUTPATIENT)
Dept: RHEUMATOLOGY | Facility: CLINIC | Age: 59
End: 2024-02-23
Payer: COMMERCIAL

## 2024-02-29 ENCOUNTER — TELEPHONE (OUTPATIENT)
Dept: PSYCHIATRY | Facility: CLINIC | Age: 59
End: 2024-02-29
Payer: COMMERCIAL

## 2024-03-01 DIAGNOSIS — L29.9 PRURITUS: ICD-10-CM

## 2024-03-01 DIAGNOSIS — E66.01 SEVERE OBESITY: ICD-10-CM

## 2024-03-04 ENCOUNTER — OFFICE VISIT (OUTPATIENT)
Dept: PSYCHIATRY | Facility: CLINIC | Age: 59
End: 2024-03-04
Payer: COMMERCIAL

## 2024-03-04 DIAGNOSIS — F41.0 ANXIETY ATTACK: ICD-10-CM

## 2024-03-04 DIAGNOSIS — F41.0 PANIC DISORDER WITHOUT AGORAPHOBIA: ICD-10-CM

## 2024-03-04 DIAGNOSIS — G47.00 INSOMNIA, UNSPECIFIED TYPE: Primary | ICD-10-CM

## 2024-03-04 PROCEDURE — 99214 OFFICE O/P EST MOD 30 MIN: CPT | Mod: 95,,, | Performed by: PSYCHOLOGIST

## 2024-03-04 RX ORDER — ALPRAZOLAM 0.5 MG/1
0.5 TABLET ORAL 2 TIMES DAILY PRN
Qty: 60 TABLET | Refills: 2 | Status: SHIPPED | OUTPATIENT
Start: 2024-03-04 | End: 2024-07-12

## 2024-03-04 RX ORDER — LIRAGLUTIDE 6 MG/ML
3 INJECTION, SOLUTION SUBCUTANEOUS DAILY
Qty: 15 ML | Refills: 0 | Status: SHIPPED | OUTPATIENT
Start: 2024-03-04 | End: 2024-03-07 | Stop reason: ALTCHOICE

## 2024-03-04 RX ORDER — CETIRIZINE HYDROCHLORIDE 10 MG/1
10 TABLET ORAL DAILY
Qty: 90 TABLET | Refills: 3 | Status: SHIPPED | OUTPATIENT
Start: 2024-03-04

## 2024-03-04 RX ORDER — QUETIAPINE FUMARATE 25 MG/1
25 TABLET, FILM COATED ORAL NIGHTLY
Qty: 30 TABLET | Refills: 2 | Status: SHIPPED | OUTPATIENT
Start: 2024-03-04 | End: 2024-03-19

## 2024-03-04 NOTE — PROGRESS NOTES
"MEDICATION MANAGEMENT SESSION: VIRTUAL    Name: Tosha Kwok  Age: 59 y.o.  : 1965    Preferred Name: Tosha    Referring provider: Tamia Martinez LCSW    Reason for Visit:  Medication follow up    Summary of Visit:  Pt reports she is fatigued, didn't sleep well, and "not good" with her current medications. She has taken Xanax 0.25 mg prn in middle of the night for insomnia, but states it doesn't calm her down enough. She has not been taking Ambien though on medication list and provider had discontinued it. Pt states her anxiety symptoms get bad at night when she can't sleep, because she knows she has to be up for work. Discussed with pt Seroquel 25 mg qhs and reviewed associated benefits and risks.    Current Symptoms:   ADHD: denied   Depressive Disorder: tired/fatigued   Anxiety Disorder: anxiety/nervousness, fatigue, sleep problems   Panic Disorder: nervous, rapid heart rate, and shortness of breath   Manic Disorder: denied   Psychotic Disorder: denied   Substance Use:  denied     Review of Systems   Constitutional:  Positive for fatigue. Negative for activity change, appetite change and unexpected weight change.   Respiratory:  Negative for shortness of breath.    Psychiatric/Behavioral:  Positive for sleep disturbance. Negative for agitation, behavioral problems, confusion, decreased concentration, dysphoric mood, hallucinations, self-injury and suicidal ideas. The patient is nervous/anxious. The patient is not hyperactive.       Constitutional:  Vitals:  Most recent vital signs were reviewed.   Last 3 sets of Vitals        2023     1:00 PM 2024     1:08 PM 3/7/2024     1:57 PM   Vitals - 1 value per visit   SYSTOLIC 184  140   DIASTOLIC 87  100   Pulse 84  73   Temp 97.7 °F (36.5 °C)  97.1 °F (36.2 °C)   Resp 18  18   SPO2 100 %  96 %   Weight (lb)  160.05 187.39   Weight (kg)  72.6 85   Height  5' 2" (1.575 m) 5' 2" (1.575 m)   BMI (Calculated)  29.3 34.3   Pain Score Zero  Zero " "         Psychiatric:  Oriented: x 3   Attitude: cooperative   Eye Contact: good   Behavior: wnl   Mood: "tired"  Affect: appropriate range   Attention: intact   Concentration: grossly intact   Thought Process: goal directed   Speech: intelligible  Volume: WNL   Quantity: WNL   Rhythm: WNL  Insight: fair to good   Threats: no SI / HI   Memory: Grossly intact  Psychosis: denies all   Estimate of Intellectual Function: average   Judgment: fair   Relevant Elements of Neurological Exam: normal gait     Allergy Review:   Review of patient's allergies indicates:   Allergen Reactions    Carrot Swelling     angioedema    Morphine Anxiety     Hives   States she can take dilaudid and percocet without any problems    Clindamycin     Macrolide antibiotics     Erythromycin Other (See Comments)     Hives and cramps     Zofran (as hydrochloride) [ondansetron hcl] Hives     Local hive after IV injection      Medical Problem List:   Patient Active Problem List   Diagnosis    Atrophy of vulva    Ankylosing spondylitis    Chronic, continuous use of opioids    Fibromyalgia    Elevated d-dimer    History of sleeve gastrectomy    Intractable pain    De Quervain's tenosynovitis, right    Adhesive capsulitis of right shoulder    Anxiety    Asthma    Lumbosacral spondylosis    GERD (gastroesophageal reflux disease)    Primary hypertension    Hyperlipidemia    Insulin resistance    Large joint arthralgia of multiple sites    Lupus (systemic lupus erythematosus)    Migraine    Postmenopausal hormone replacement therapy    Vitamin D deficiency    Impingement syndrome of left shoulder    Limited range of motion (ROM) of shoulder    Left foot pain    Vestibular hypofunction    Other iron deficiency anemias    Keloid scar of skin    Iron deficiency anemia due to chronic blood loss    Restless leg syndrome    Chest pain    Insomnia    Port-A-Cath in place    TIA (transient ischemic attack)    History of non anemic vitamin B12 deficiency    Acute " pain of right shoulder    Weight gain    Class 2 severe obesity due to excess calories with serious comorbidity and body mass index (BMI) of 35.0 to 35.9 in adult    Nausea and vomiting    Hypokalemia    Gastritis    Hiatal hernia    Prolonged Q-T interval on ECG    Palpitation    Pulmonary nodule      Encounter Diagnoses   Name Primary?    Panic disorder without agoraphobia     Insomnia, unspecified type Yes    Anxiety attack       PLAN:  Medication Management: Continue current medications. Discussed risks, benefits, and alternatives to treatment plan documented above with patient. I answered all patient questions related to this plan, and patient expressed understanding and agreement.      Follow up in about 4 weeks (around 4/1/2024) for Medication follow up.     Medication List with Changes/Refills   New Medications    HYDROMORPHONE (DILAUDID) 2 MG TABLET    Take 1 tablet (2 mg total) by mouth every 6 to 8 hours as needed.    QUETIAPINE (SEROQUEL) 50 MG TABLET    Take 1 tablet (50 mg total) by mouth every evening.    TIRZEPATIDE, WEIGHT LOSS, (ZEPBOUND) 2.5 MG/0.5 ML PNIJ    Inject 2.5 mg into the skin every 7 days.   Current Medications    ALBUTEROL (PROAIR HFA) 90 MCG/ACTUATION INHALER    Inhale 2 puffs into the lungs every 4 hours as needed.    ATORVASTATIN (LIPITOR) 20 MG TABLET    Take 1 tablet (20 mg total) by mouth once daily.    CETIRIZINE (ZYRTEC) 10 MG TABLET    Take 1 tablet (10 mg total) by mouth once daily.    CYANOCOBALAMIN 1,000 MCG/ML INJECTION    Inject 1 mL (1,000 mcg total) into the muscle every 28 days.    EPINEPHRINE (EPIPEN 2-DENNIS) 0.3 MG/0.3 ML ATIN    Inject 0.3 mLs (0.3 mg total) into the muscle once. for 1 dose    HYDROCHLOROTHIAZIDE (HYDRODIURIL) 12.5 MG TAB    Take 1 tablet (12.5 mg total) by mouth once daily.    HYDROMORPHONE (DILAUDID) 2 MG TABLET    take 1 tablet by mouth every 6-8 hours as needed for pain    HYDROMORPHONE (DILAUDID) 2 MG TABLET    Take 1 tablet by mouth every six to  eight hours as needed for pain    HYDROXYCHLOROQUINE (PLAQUENIL) 200 MG TABLET    Take 1 tablet (200 mg total) by mouth once daily.    HYDROXYZINE PAMOATE (VISTARIL) 25 MG CAP    Take 1 capsule (25 mg total) by mouth every 8 (eight) hours as needed (anxiety).    METOPROLOL SUCCINATE (TOPROL-XL) 25 MG 24 HR TABLET    Take 1 tablet (25 mg total) by mouth once daily.    PANTOPRAZOLE (PROTONIX) 40 MG TABLET    Take 1 tablet (40 mg total) by mouth 2 (two) times daily.    POTASSIUM CHLORIDE SA (K-DUR,KLOR-CON) 20 MEQ TABLET    Take 1 tablet (20 mEq total) by mouth 2 (two) times daily.    PROMETHAZINE (PHENERGAN) 25 MG TABLET    Take 1 tablet (25 mg total) by mouth every 6 (six) hours as needed for Nausea.    SECUKINUMAB (COSENTYX PEN, 2 PENS,) 150 MG/ML PNIJ    Inject 150 mg into the skin every 28 days.    SECUKINUMAB (COSENTYX PEN, 2 PENS,) 150 MG/ML PNIJ    150 mg SQ q 7 days x 5 doses, then 150 mg SQ q 4 weeks thereafter.    TIZANIDINE (ZANAFLEX) 4 MG TABLET    Take 4 mg by mouth 3 (three) times daily as needed.    TIZANIDINE (ZANAFLEX) 4 MG TABLET    Take 1 tablet by mouth three times a day as needed, May cause drowsiness, use caution/ For muscle spasms    TOPIRAMATE (TOPAMAX) 100 MG TABLET    Take 1 tablet (100 mg total) by mouth 2 (two) times daily.   Changed and/or Refilled Medications    Modified Medication Previous Medication    ALPRAZOLAM (XANAX) 0.5 MG TABLET ALPRAZolam (XANAX) 0.25 MG tablet       Take 1 tablet (0.5 mg total) by mouth 2 (two) times daily as needed for Anxiety or Insomnia.    Take 1 tablet (0.25 mg total) by mouth 2 (two) times daily as needed for Anxiety or Insomnia.   Discontinued Medications    LIRAGLUTIDE, WEIGHT LOSS, (SAXENDA) 3 MG/0.5 ML (18 MG/3 ML) PNIJ    Inject 0.6 mg daily x 1 week  then 1.2 mg daily x 1 week  then 1.8 mg daily x 1 week  then 2.4 mg x 1 week  then continue 3 mg daily    LIRAGLUTIDE, WEIGHT LOSS, (SAXENDA) 3 MG/0.5 ML (18 MG/3 ML) PNIJ    Inject 0.6 mg daily x 1  week, then 1.2 mg daily x 1 week, then 1.8 mg daily x 1 week, then 2.4 mg x 1 week, then continue 3 mg daily    MIRTAZAPINE (REMERON) 15 MG TABLET    Take 1 tablet (15 mg total) by mouth every evening.    ZOLPIDEM (AMBIEN) 10 MG TAB    Take 1 tablet by mouth at bedtime as needed May cause drowsiness, use caution/ for sleep only    ZOLPIDEM (AMBIEN) 10 MG TAB    Take 1 tablet by mouth at bedtime as needed May cause drowsiness, use caution/ for sleep only    ZOLPIDEM (AMBIEN) 10 MG TAB    Take 1 tablet by mouth at bedtime as needed May cause drowsiness, use caution/ for sleep only      Time spent with pt including note preparation: 30 minutes     Linda Nuñez, PhD, MP  Medical Psychologist

## 2024-03-05 ENCOUNTER — PATIENT MESSAGE (OUTPATIENT)
Dept: INTERNAL MEDICINE | Facility: CLINIC | Age: 59
End: 2024-03-05
Payer: COMMERCIAL

## 2024-03-05 DIAGNOSIS — E66.01 SEVERE OBESITY: ICD-10-CM

## 2024-03-06 RX ORDER — LIRAGLUTIDE 6 MG/ML
3 INJECTION, SOLUTION SUBCUTANEOUS DAILY
Qty: 15 ML | Refills: 0 | OUTPATIENT
Start: 2024-03-06

## 2024-03-07 ENCOUNTER — OFFICE VISIT (OUTPATIENT)
Dept: INTERNAL MEDICINE | Facility: CLINIC | Age: 59
End: 2024-03-07
Payer: COMMERCIAL

## 2024-03-07 ENCOUNTER — LAB VISIT (OUTPATIENT)
Dept: LAB | Facility: HOSPITAL | Age: 59
End: 2024-03-07
Attending: NURSE PRACTITIONER
Payer: COMMERCIAL

## 2024-03-07 VITALS
DIASTOLIC BLOOD PRESSURE: 88 MMHG | RESPIRATION RATE: 18 BRPM | HEIGHT: 62 IN | TEMPERATURE: 97 F | BODY MASS INDEX: 34.48 KG/M2 | OXYGEN SATURATION: 96 % | SYSTOLIC BLOOD PRESSURE: 134 MMHG | HEART RATE: 73 BPM | WEIGHT: 187.38 LBS

## 2024-03-07 DIAGNOSIS — E87.6 HYPOKALEMIA: ICD-10-CM

## 2024-03-07 DIAGNOSIS — M32.9 SYSTEMIC LUPUS ERYTHEMATOSUS, UNSPECIFIED SLE TYPE, UNSPECIFIED ORGAN INVOLVEMENT STATUS: ICD-10-CM

## 2024-03-07 DIAGNOSIS — E88.819 INSULIN RESISTANCE: ICD-10-CM

## 2024-03-07 DIAGNOSIS — E66.01 CLASS 2 SEVERE OBESITY DUE TO EXCESS CALORIES WITH SERIOUS COMORBIDITY AND BODY MASS INDEX (BMI) OF 35.0 TO 35.9 IN ADULT: Primary | ICD-10-CM

## 2024-03-07 DIAGNOSIS — I10 PRIMARY HYPERTENSION: ICD-10-CM

## 2024-03-07 LAB — POTASSIUM SERPL-SCNC: 3.6 MMOL/L (ref 3.5–5.1)

## 2024-03-07 PROCEDURE — 1159F MED LIST DOCD IN RCRD: CPT | Mod: CPTII,S$GLB,, | Performed by: NURSE PRACTITIONER

## 2024-03-07 PROCEDURE — 3008F BODY MASS INDEX DOCD: CPT | Mod: CPTII,S$GLB,, | Performed by: NURSE PRACTITIONER

## 2024-03-07 PROCEDURE — 3079F DIAST BP 80-89 MM HG: CPT | Mod: CPTII,S$GLB,, | Performed by: NURSE PRACTITIONER

## 2024-03-07 PROCEDURE — 84132 ASSAY OF SERUM POTASSIUM: CPT | Mod: PO | Performed by: NURSE PRACTITIONER

## 2024-03-07 PROCEDURE — 99999 PR PBB SHADOW E&M-EST. PATIENT-LVL V: CPT | Mod: PBBFAC,,, | Performed by: NURSE PRACTITIONER

## 2024-03-07 PROCEDURE — 36415 COLL VENOUS BLD VENIPUNCTURE: CPT | Mod: PO | Performed by: NURSE PRACTITIONER

## 2024-03-07 PROCEDURE — 1160F RVW MEDS BY RX/DR IN RCRD: CPT | Mod: CPTII,S$GLB,, | Performed by: NURSE PRACTITIONER

## 2024-03-07 PROCEDURE — 99214 OFFICE O/P EST MOD 30 MIN: CPT | Mod: S$GLB,,, | Performed by: NURSE PRACTITIONER

## 2024-03-07 PROCEDURE — 3075F SYST BP GE 130 - 139MM HG: CPT | Mod: CPTII,S$GLB,, | Performed by: NURSE PRACTITIONER

## 2024-03-07 NOTE — ASSESSMENT & PLAN NOTE
BP initially elevated. Improved with repeat reading. Continue current medications. Recommend to monitor blood pressure regularly, eat a well-balanced diet that is low in salt, DASH diet, regular physical activity, stress management, maintain a healthy weight, and take medications as prescribed. Notify clinic if BP readings >140/90

## 2024-03-07 NOTE — ASSESSMENT & PLAN NOTE
Proceed with Wegovy if available. If not, will continue Saxenda. Continue to work on diet and exercise.

## 2024-03-07 NOTE — PROGRESS NOTES
Subjective:       Patient ID: Tosha Kwok is a 58 y.o. female.    Chief Complaint: Medication Refill    Mrs. Kwok returns to clinic to discuss medications for weight loss. She was initially on Ozempic for insulin resistance, which unfortunately was no longer covered by insurance. She was unable to fill Wegovy due to supply issues. At last visit, she was started on Saxenda which she has been taking until she ran out 2 weeks prior. She is tolerating Saxenda well without side effects, but does not find it as effective. She would like to try Wegovy again. She is otherwise without complaint. BP initially elevated, but improved with repeat reading. States home BP readings in 120s-130s/80s. Labs completed with rheumatology on 2/14 revealed potassium level 2.8. She has be taking supplement twice daily.         Patient Active Problem List   Diagnosis    Atrophy of vulva    Ankylosing spondylitis    Chronic, continuous use of opioids    Fibromyalgia    Elevated d-dimer    History of sleeve gastrectomy    Intractable pain    De Quervain's tenosynovitis, right    Adhesive capsulitis of right shoulder    Anxiety    Asthma    Lumbosacral spondylosis    GERD (gastroesophageal reflux disease)    Primary hypertension    Hyperlipidemia    Insulin resistance    Large joint arthralgia of multiple sites    Lupus (systemic lupus erythematosus)    Migraine    Postmenopausal hormone replacement therapy    Vitamin D deficiency    Impingement syndrome of left shoulder    Limited range of motion (ROM) of shoulder    Left foot pain    Vestibular hypofunction    Other iron deficiency anemias    Keloid scar of skin    Iron deficiency anemia due to chronic blood loss    Restless leg syndrome    Chest pain    Insomnia    Port-A-Cath in place    TIA (transient ischemic attack)    History of non anemic vitamin B12 deficiency    Acute pain of right shoulder    Weight gain    Class 2 severe obesity due to excess calories with serious comorbidity  and body mass index (BMI) of 35.0 to 35.9 in adult    Nausea and vomiting    Hypokalemia    Gastritis    Hiatal hernia    Prolonged Q-T interval on ECG    Palpitation    Pulmonary nodule       Family History   Problem Relation Age of Onset    Hypertension Mother     Hypertension Brother     Breast cancer Maternal Aunt         x2     Heart attack Father     Hypertension Sister     Lupus Sister     Breast cancer Maternal Grandmother      Past Surgical History:   Procedure Laterality Date    BLADDER SURGERY      BREAST LUMPECTOMY  2018    BREAST RECONSTRUCTION      BREAST SURGERY  2018    reduction     SECTION      CHOLECYSTECTOMY      COLONOSCOPY N/A 10/2/2020    Procedure: COLONOSCOPY;  Surgeon: Guicho Hood MD;  Location: [facility] ENDO;  Service: Endoscopy;  Laterality: N/A;    ESOPHAGOGASTRODUODENOSCOPY N/A 2023    Procedure: EGD (ESOPHAGOGASTRODUODENOSCOPY);  Surgeon: Belkis Mario MD;  Location: [facility] ENDO;  Service: Endoscopy;  Laterality: N/A;    EXCISION OF GRANULOMA Bilateral 2021    Procedure: EXCISION, GRANULOMA SCARS OF BREASTS;  Surgeon: Obed Palmer Jr., MD;  Location: [facility];  Service: Plastics;  Laterality: Bilateral;    HYSTERECTOMY      LASER ABLATION      to back    SLEEVE GASTROPLASTY  2016    TOTAL REDUCTION MAMMOPLASTY Bilateral     two years ago    TUBAL LIGATION           Current Outpatient Medications:     albuterol (PROAIR HFA) 90 mcg/actuation inhaler, Inhale 2 puffs into the lungs every 4 hours as needed., Disp: 18 g, Rfl: 3    ALPRAZolam (XANAX) 0.5 MG tablet, Take 1 tablet (0.5 mg total) by mouth 2 (two) times daily as needed for Anxiety or Insomnia., Disp: 60 tablet, Rfl: 2    atorvastatin (LIPITOR) 20 MG tablet, Take 1 tablet (20 mg total) by mouth once daily., Disp: 90 tablet, Rfl: 3    cetirizine (ZYRTEC) 10 MG tablet, Take 1 tablet (10 mg total) by mouth once daily., Disp: 90 tablet, Rfl: 3    cyanocobalamin 1,000 mcg/mL injection, Inject 1 mL (1,000  mcg total) into the muscle every 28 days., Disp: 10 mL, Rfl: 1    EPINEPHrine (EPIPEN 2-DENNIS) 0.3 mg/0.3 mL AtIn, Inject 0.3 mLs (0.3 mg total) into the muscle once. for 1 dose, Disp: 2 each, Rfl: 3    hydroCHLOROthiazide (HYDRODIURIL) 12.5 MG Tab, Take 1 tablet (12.5 mg total) by mouth once daily., Disp: 90 tablet, Rfl: 3    HYDROmorphone (DILAUDID) 2 MG tablet, take 1 tablet by mouth every 6-8 hours as needed for pain, Disp: 105 tablet, Rfl: 0    HYDROmorphone (DILAUDID) 2 MG tablet, Take 1 tablet by mouth every six to eight hours as needed for pain, Disp: 105 tablet, Rfl: 0    HYDROmorphone (DILAUDID) 2 MG tablet, Take 1 tablet (2 mg total) by mouth every 6 to 8 hours as needed., Disp: 105 tablet, Rfl: 0    hydroxychloroquine (PLAQUENIL) 200 mg tablet, Take 1 tablet (200 mg total) by mouth once daily., Disp: 30 tablet, Rfl: 1    hydrOXYzine pamoate (VISTARIL) 25 MG Cap, Take 1 capsule (25 mg total) by mouth every 8 (eight) hours as needed (anxiety)., Disp: 90 capsule, Rfl: 3    metoprolol succinate (TOPROL-XL) 25 MG 24 hr tablet, Take 1 tablet (25 mg total) by mouth once daily., Disp: 90 tablet, Rfl: 2    pantoprazole (PROTONIX) 40 MG tablet, Take 1 tablet (40 mg total) by mouth 2 (two) times daily., Disp: 60 tablet, Rfl: 1    potassium chloride SA (K-DUR,KLOR-CON) 20 MEQ tablet, Take 1 tablet (20 mEq total) by mouth 2 (two) times daily., Disp: 30 tablet, Rfl: 0    promethazine (PHENERGAN) 25 MG tablet, Take 1 tablet (25 mg total) by mouth every 6 (six) hours as needed for Nausea., Disp: 15 tablet, Rfl: 0    QUEtiapine (SEROQUEL) 25 MG Tab, Take 1 tablet (25 mg total) by mouth every evening., Disp: 30 tablet, Rfl: 2    secukinumab (COSENTYX PEN, 2 PENS,) 150 mg/mL PnIj, Inject 150 mg into the skin every 28 days., Disp: 1 mL, Rfl: 5    secukinumab (COSENTYX PEN, 2 PENS,) 150 mg/mL PnIj, 150 mg SQ q 7 days x 5 doses, then 150 mg SQ q 4 weeks thereafter., Disp: 5 mL, Rfl: 0    tiZANidine (ZANAFLEX) 4 MG tablet, Take  "4 mg by mouth 3 (three) times daily as needed., Disp: , Rfl:     tiZANidine (ZANAFLEX) 4 MG tablet, Take 1 tablet by mouth three times a day as needed, May cause drowsiness, use caution/ For muscle spasms, Disp: 270 tablet, Rfl: 3    topiramate (TOPAMAX) 100 MG tablet, Take 1 tablet (100 mg total) by mouth 2 (two) times daily., Disp: 180 tablet, Rfl: 3    semaglutide, weight loss, 0.25 mg/0.5 mL PnIj, Inject 0.25 mg into the skin every 7 days., Disp: 2 mL, Rfl: 0    Review of Systems   Constitutional:  Positive for unexpected weight change. Negative for activity change.   HENT:  Negative for hearing loss, rhinorrhea and trouble swallowing.    Eyes:  Negative for discharge and visual disturbance.   Respiratory:  Negative for chest tightness and wheezing.    Cardiovascular:  Negative for palpitations.   Gastrointestinal:  Negative for blood in stool, constipation and diarrhea.   Endocrine: Negative for polydipsia and polyuria.   Genitourinary:  Negative for difficulty urinating, dysuria, hematuria and menstrual problem.   Psychiatric/Behavioral:  Negative for confusion and dysphoric mood.        Objective:   /88 (BP Location: Left arm, Patient Position: Sitting, BP Method: Medium (Manual))   Pulse 73   Temp 97.1 °F (36.2 °C)   Resp 18   Ht 5' 2" (1.575 m)   Wt 85 kg (187 lb 6.3 oz)   LMP 03/26/2012   SpO2 96%   BMI 34.27 kg/m²      Physical Exam  Constitutional:       Appearance: Normal appearance. She is obese.   Eyes:      Conjunctiva/sclera: Conjunctivae normal.   Cardiovascular:      Rate and Rhythm: Normal rate and regular rhythm.      Heart sounds: Normal heart sounds. No murmur heard.  Pulmonary:      Effort: Pulmonary effort is normal. No respiratory distress.      Breath sounds: Normal breath sounds. No wheezing, rhonchi or rales.   Neurological:      Mental Status: She is alert and oriented to person, place, and time.      Coordination: Coordination normal.      Gait: Gait normal. " "  Psychiatric:         Behavior: Behavior normal.         Assessment & Plan     1. Class 2 severe obesity due to excess calories with serious comorbidity and body mass index (BMI) of 35.0 to 35.9 in adult  Assessment & Plan:  Proceed with Wegovy if available. If not, will continue Saxenda. Continue to work on diet and exercise.    Orders:  -     semaglutide, weight loss, 0.25 mg/0.5 mL PnIj; Inject 0.25 mg into the skin every 7 days.  Dispense: 2 mL; Refill: 0    2. Hypokalemia  Assessment & Plan:  Repeat potassium today. Continue daily potassium supplement.    Orders:  -     POTASSIUM; Future; Expected date: 03/07/2024    3. Insulin resistance  Assessment & Plan:  Unable to tolerate metformin. Continue to focus on lifestyle modifications    Orders:  -     semaglutide, weight loss, 0.25 mg/0.5 mL PnIj; Inject 0.25 mg into the skin every 7 days.  Dispense: 2 mL; Refill: 0    4. Primary hypertension  Assessment & Plan:  BP initially elevated. Improved with repeat reading. Continue current medications. Recommend to monitor blood pressure regularly, eat a well-balanced diet that is low in salt, DASH diet, regular physical activity, stress management, maintain a healthy weight, and take medications as prescribed. Notify clinic if BP readings >140/90      5. Systemic lupus erythematosus, unspecified SLE type, unspecified organ involvement status  Assessment & Plan:  Reviewed rheumatology note and labs. Continue current treatment plan.              PENNY Coon      Portions of this note may have been created with voice recognition software. Occasional "wrong-word" or "sound-a-like" substitutions may have occurred due to the inherent limitations of voice recognition software. Please, read the note carefully and recognize, using context, where substitutions have occurred.     "

## 2024-03-08 ENCOUNTER — PATIENT MESSAGE (OUTPATIENT)
Dept: INTERNAL MEDICINE | Facility: CLINIC | Age: 59
End: 2024-03-08
Payer: COMMERCIAL

## 2024-03-08 DIAGNOSIS — E66.01 CLASS 2 SEVERE OBESITY DUE TO EXCESS CALORIES WITH SERIOUS COMORBIDITY AND BODY MASS INDEX (BMI) OF 35.0 TO 35.9 IN ADULT: Primary | ICD-10-CM

## 2024-03-08 RX ORDER — LIRAGLUTIDE 6 MG/ML
INJECTION, SOLUTION SUBCUTANEOUS
Qty: 3 ML | Refills: 0 | Status: SHIPPED | OUTPATIENT
Start: 2024-03-08 | End: 2024-03-20 | Stop reason: ALTCHOICE

## 2024-03-19 ENCOUNTER — PATIENT MESSAGE (OUTPATIENT)
Dept: INTERNAL MEDICINE | Facility: CLINIC | Age: 59
End: 2024-03-19
Payer: COMMERCIAL

## 2024-03-19 ENCOUNTER — PATIENT MESSAGE (OUTPATIENT)
Dept: PSYCHIATRY | Facility: CLINIC | Age: 59
End: 2024-03-19
Payer: COMMERCIAL

## 2024-03-19 DIAGNOSIS — F41.0 PANIC DISORDER WITHOUT AGORAPHOBIA: ICD-10-CM

## 2024-03-19 DIAGNOSIS — G47.00 INSOMNIA, UNSPECIFIED TYPE: ICD-10-CM

## 2024-03-19 DIAGNOSIS — E66.01 CLASS 2 SEVERE OBESITY DUE TO EXCESS CALORIES WITH SERIOUS COMORBIDITY AND BODY MASS INDEX (BMI) OF 35.0 TO 35.9 IN ADULT: Primary | ICD-10-CM

## 2024-03-19 RX ORDER — QUETIAPINE FUMARATE 50 MG/1
50 TABLET, FILM COATED ORAL NIGHTLY
Qty: 30 TABLET | Refills: 2 | Status: SHIPPED | OUTPATIENT
Start: 2024-03-19 | End: 2024-07-05

## 2024-03-20 LAB
HLA B27 INTERPRETATION: NORMAL
HLA-B27 RELATED AG QL: NEGATIVE
HLA-B27 RELATED AG QL: NORMAL

## 2024-03-20 RX ORDER — TIRZEPATIDE 2.5 MG/.5ML
2.5 INJECTION, SOLUTION SUBCUTANEOUS
Qty: 4 PEN | Refills: 0 | Status: SHIPPED | OUTPATIENT
Start: 2024-03-20 | End: 2024-04-19

## 2024-04-03 ENCOUNTER — TELEPHONE (OUTPATIENT)
Dept: RHEUMATOLOGY | Facility: CLINIC | Age: 59
End: 2024-04-03
Payer: COMMERCIAL

## 2024-04-07 DIAGNOSIS — F41.9 ANXIETY: ICD-10-CM

## 2024-04-07 RX ORDER — HYDROXYZINE PAMOATE 25 MG/1
25 CAPSULE ORAL EVERY 8 HOURS PRN
Qty: 90 CAPSULE | Refills: 3 | Status: SHIPPED | OUTPATIENT
Start: 2024-04-07

## 2024-05-01 ENCOUNTER — OFFICE VISIT (OUTPATIENT)
Dept: HEMATOLOGY/ONCOLOGY | Facility: CLINIC | Age: 59
End: 2024-05-01
Payer: COMMERCIAL

## 2024-05-01 DIAGNOSIS — Z86.39 HISTORY OF NON ANEMIC VITAMIN B12 DEFICIENCY: ICD-10-CM

## 2024-05-01 DIAGNOSIS — Z90.3 HISTORY OF SLEEVE GASTRECTOMY: ICD-10-CM

## 2024-05-01 DIAGNOSIS — D50.0 IRON DEFICIENCY ANEMIA DUE TO CHRONIC BLOOD LOSS: Primary | ICD-10-CM

## 2024-05-01 PROCEDURE — 1160F RVW MEDS BY RX/DR IN RCRD: CPT | Mod: CPTII,95,, | Performed by: INTERNAL MEDICINE

## 2024-05-01 PROCEDURE — 1159F MED LIST DOCD IN RCRD: CPT | Mod: CPTII,95,, | Performed by: INTERNAL MEDICINE

## 2024-05-01 PROCEDURE — 99213 OFFICE O/P EST LOW 20 MIN: CPT | Mod: 95,,, | Performed by: INTERNAL MEDICINE

## 2024-05-01 NOTE — Clinical Note
1. Schedule cbc, ferritin, iron/tibc, B12 on 5/3/24 and in 6 months at [facility]. 2. Schedule virtual visit in 6 months.  Thanks!

## 2024-05-01 NOTE — PROGRESS NOTES
PATIENT: Tosha Kwok  MRN: [id number]  DATE: 5/1/2024    Diagnosis:   1. Iron deficiency anemia due to chronic blood loss    2. History of sleeve gastrectomy    3. History of non anemic vitamin B12 deficiency      Chief Complaint: Anemia    Telemedicine visit:  The patient location is: home  The chief complaint leading to consultation is: anemia    Visit type: audiovisual    Face to Face time with patient: 5 minutes  10 minutes of total time spent on the encounter, which includes face to face time and non-face to face time preparing to see the patient (eg, review of tests), Obtaining and/or reviewing separately obtained history, Documenting clinical information in the electronic or other health record, Independently interpreting results (not separately reported) and communicating results to the patient/family/caregiver, or Care coordination (not separately reported).     Each patient to whom he or she provides medical services by telemedicine is:  (1) informed of the relationship between the physician and patient and the respective role of any other health care provider with respect to management of the patient; and (2) notified that he or she may decline to receive medical services by telemedicine and may withdraw from such care at any time.    Notes: see below    Subjective:    History of Present Illness: Ms. Kwok is a 59 y.o. female who presented in March 2021 for evaluation and management of anemia. She was referred by Dr. Gibson.    - labs since 2012 reveal an intermittent, mild normocytic anemia  - iron studies in December 2020 and March 2021 reveal a decreased ferritin/iron/saturated iron with increased total iron binding capacity.  - She had a documented decreased B12 level on 6/11/19.  - she has been taking oral iron for two years. She endorses constipation from this.  - she had a hysterectomy in 2018  - she has a history of sleeve gastrectomy in 2016.  - she received ferric carboxymaltose x 2 doses in  April/May 2021.    Interval history:  - she presents for a follow-up appointment for her anemia.  - she received ferric carboxymaltose x 2 doses in 2023.  - today, she endorses fatigue (improved). She denies shortness of breath, chest pain, nausea, vomiting, diarrhea, constipation.  - she lives near [place] now and would like labs there.        Past medical, surgical, family, and social histories have been reviewed and updated below.    Past Medical History:   Past Medical History:   Diagnosis Date    Anemia     Ankylosing spondylitis     Anxiety     Asthma     Cancer 2018    left breast  lumpectomy, xrt    Chronic constipation     Chronic insomnia     Edema     Fibromyalgia     Hypertension     Insulin resistance     Interstitial cystitis     Lupus     Lupus     Migraine headache     PONV (postoperative nausea and vomiting)     Restless legs syndrome     Shingles     Stroke     post partum right sided numbness    TIA (transient ischemic attack)        Past Surgical History:   Past Surgical History:   Procedure Laterality Date    BLADDER SURGERY      BREAST LUMPECTOMY  2018    BREAST RECONSTRUCTION      BREAST SURGERY  2018    reduction     SECTION      CHOLECYSTECTOMY      COLONOSCOPY N/A 10/2/2020    Procedure: COLONOSCOPY;  Surgeon: Guicho Hood MD;  Location: [facility];  Service: Endoscopy;  Laterality: N/A;    ESOPHAGOGASTRODUODENOSCOPY N/A 2023    Procedure: EGD (ESOPHAGOGASTRODUODENOSCOPY);  Surgeon: Belkis Mario MD;  Location: [facility];  Service: Endoscopy;  Laterality: N/A;    EXCISION OF GRANULOMA Bilateral 2021    Procedure: EXCISION, GRANULOMA SCARS OF BREASTS;  Surgeon: Obed Palmer Jr., MD;  Location[facility];  Service: Plastics;  Laterality: Bilateral;    HYSTERECTOMY      LASER ABLATION      to back    SLEEVE GASTROPLASTY  2016    TOTAL REDUCTION MAMMOPLASTY Bilateral     two years ago    TUBAL LIGATION         Family History:   Family  History   Problem Relation Name Age of Onset    Hypertension Mother      Hypertension Brother      Breast cancer Maternal Aunt          x2     Heart attack Father 40     Hypertension Sister      Lupus Sister      Breast cancer Maternal Grandmother         Social History:  reports that she has never smoked. She has never used smokeless tobacco. She reports current alcohol use. She reports that she does not use drugs.    Allergies:  Review of patient's allergies indicates:   Allergen Reactions    Carrot Swelling     angioedema    Morphine Anxiety     Hives   States she can take dilaudid and percocet without any problems    Clindamycin     Macrolide antibiotics     Erythromycin Other (See Comments)     Hives and cramps     Zofran (as hydrochloride) [ondansetron hcl] Hives     Local hive after IV injection       Medications:  Current Outpatient Medications   Medication Sig Dispense Refill    albuterol (PROAIR HFA) 90 mcg/actuation inhaler Inhale 2 puffs into the lungs every 4 hours as needed. 18 g 3    ALPRAZolam (XANAX) 0.5 MG tablet Take 1 tablet (0.5 mg total) by mouth 2 (two) times daily as needed for Anxiety or Insomnia. 60 tablet 2    atorvastatin (LIPITOR) 20 MG tablet Take 1 tablet (20 mg total) by mouth once daily. 90 tablet 3    cetirizine (ZYRTEC) 10 MG tablet Take 1 tablet (10 mg total) by mouth once daily. 90 tablet 3    cyanocobalamin 1,000 mcg/mL injection Inject 1 mL (1,000 mcg total) into the muscle every 28 days. 10 mL 1    EPINEPHrine (EPIPEN 2-DENNIS) 0.3 mg/0.3 mL AtIn Inject 0.3 mLs (0.3 mg total) into the muscle once. for 1 dose 2 each 3    hydroCHLOROthiazide (HYDRODIURIL) 12.5 MG Tab Take 1 tablet (12.5 mg total) by mouth once daily. 90 tablet 3    HYDROmorphone (DILAUDID) 2 MG tablet take 1 tablet by mouth every 6-8 hours as needed for pain 105 tablet 0    HYDROmorphone (DILAUDID) 2 MG tablet Take 1 tablet by mouth every six to eight hours as needed for pain 105 tablet 0    HYDROmorphone  (DILAUDID) 2 MG tablet Take 1 tablet (2 mg total) by mouth every 6 to 8 hours as needed. 105 tablet 0    HYDROmorphone (DILAUDID) 2 MG tablet Take 1 tablet by mouth every 6-8 hours as needed 105 tablet 0    hydroxychloroquine (PLAQUENIL) 200 mg tablet Take 1 tablet (200 mg total) by mouth once daily. 30 tablet 1    hydrOXYzine pamoate (VISTARIL) 25 MG Cap Take 1 capsule (25 mg total) by mouth every 8 (eight) hours as needed (anxiety). 90 capsule 3    metoprolol succinate (TOPROL-XL) 25 MG 24 hr tablet Take 1 tablet (25 mg total) by mouth once daily. 90 tablet 2    pantoprazole (PROTONIX) 40 MG tablet Take 1 tablet (40 mg total) by mouth 2 (two) times daily. 60 tablet 1    potassium chloride SA (K-DUR,KLOR-CON) 20 MEQ tablet Take 1 tablet (20 mEq total) by mouth 2 (two) times daily. 30 tablet 0    promethazine (PHENERGAN) 25 MG tablet Take 1 tablet (25 mg total) by mouth every 6 (six) hours as needed for Nausea. 15 tablet 0    QUEtiapine (SEROQUEL) 50 MG tablet Take 1 tablet (50 mg total) by mouth every evening. 30 tablet 2    secukinumab (COSENTYX PEN, 2 PENS,) 150 mg/mL PnIj Inject 150 mg into the skin every 28 days. 1 mL 5    secukinumab (COSENTYX PEN, 2 PENS,) 150 mg/mL PnIj 150 mg SQ q 7 days x 5 doses, then 150 mg SQ q 4 weeks thereafter. 5 mL 0    tiZANidine (ZANAFLEX) 4 MG tablet Take 4 mg by mouth 3 (three) times daily as needed.      tiZANidine (ZANAFLEX) 4 MG tablet Take 1 tablet by mouth three times a day as needed, May cause drowsiness, use caution/ For muscle spasms 270 tablet 3    topiramate (TOPAMAX) 100 MG tablet Take 1 tablet (100 mg total) by mouth 2 (two) times daily. 180 tablet 3     No current facility-administered medications for this visit.       Review of Systems   Constitutional:  Positive for fatigue.   HENT:  Negative for sore throat.    Eyes:  Negative for visual disturbance.   Respiratory:  Negative for cough and shortness of breath.    Cardiovascular:  Negative for chest pain.    Gastrointestinal:  Negative for abdominal pain, diarrhea, nausea and vomiting.   Genitourinary:  Negative for dysuria.   Musculoskeletal:  Positive for arthralgias. Negative for back pain.   Skin:  Negative for rash.        itching   Neurological:  Negative for headaches.   Hematological:  Negative for adenopathy.   Psychiatric/Behavioral:  The patient is not nervous/anxious.      ECOG Performance Status:   ECOG SCORE 1     Objective:      Vitals:   There were no vitals filed for this visit.    BMI: There is no height or weight on file to calculate BMI.  Deferred due to telemedicine visit.      Physical Exam   Deferred due to telemedicine visit.    Laboratory Data:  Labs have been reviewed.    Lab Results   Component Value Date    WBC 5.08 02/14/2024    HGB 13.5 02/14/2024    HCT 40.1 02/14/2024    MCV 87 02/14/2024     02/14/2024           Imaging:    Assessment:       1. Iron deficiency anemia due to chronic blood loss    2. History of sleeve gastrectomy    3. History of non anemic vitamin B12 deficiency           Plan:     1. Iron deficiency anemia / history of B12 deficiency  - I have reviewed her chart  - labs since 2012 reveal an intermittent, mild normocytic anemia  - iron studies in December 2020 and March 2021 reveal a decreased ferritin/iron/saturated iron with increased total iron binding capacity.  - She had a documented decreased B12 level on 6/11/19. She has a history of sleeve gastrectomy and takes B12 injections  - cause of iron deficiency may be history of abnormal uterine bleeding (had a hysterectomy in 2018) or poor absorption in setting of gastric sleeve  - she has been taking oral iron for almost 2 years without significant improvement in her iron studies and symptoms.  - I recommend ferric carboxymaltose x 2 doses  - she required a port-a-cath placement due to difficulty finding veins.  - she received ferric carboxymaltose x 2 doses in April/May 2021.  - she received ferric  carboxymaltose x 2 doses in December 2023.  - she missed her follow-up labs. She lives near [place] now and would like labs there  - check labs on 5/3/24 and in 6 months.  - return to clinic in 6 months with repeat labs.    2. Lupus  - on hydroxychloroquine  - has drug-induced neutropenia on intermittent labs.  - defer to rheumatology    3. History of gastric sleeve / B12 deficiency  - had a gastric sleeve in 2016  - B12 level in September 2021 revealed B12 deficiency  - follow-up B12 (10/10/23) is 282 pg/mL  - continue B12 injections  - check labs on 5/3/24 and in 6 months.  - return to clinic in 6 months with repeat labs.    4. Advance Care Planning     Power of   After our discussion (at previous visit), the patient decided to complete a HCPOA and appointed her   , health care agent:  Dimitry Kwok ([phone]) .        - check labs on 5/3/24 and in 6 months.  - return to clinic in 6 months with repeat labs.    Jose Manuel Marino M.D.  Hematology/Oncology  [facility] - [address], [address]  [place] [address]  Phone: [phone]  Fax: [phone]

## 2024-05-03 ENCOUNTER — TELEPHONE (OUTPATIENT)
Dept: RHEUMATOLOGY | Facility: CLINIC | Age: 59
End: 2024-05-03
Payer: COMMERCIAL

## 2024-05-03 DIAGNOSIS — M45.9 ANKYLOSING SPONDYLITIS, UNSPECIFIED SITE OF SPINE: ICD-10-CM

## 2024-05-03 DIAGNOSIS — M32.9 SYSTEMIC LUPUS ERYTHEMATOSUS, UNSPECIFIED SLE TYPE, UNSPECIFIED ORGAN INVOLVEMENT STATUS: Primary | ICD-10-CM

## 2024-05-03 DIAGNOSIS — D84.821 IMMUNOCOMPROMISED STATE DUE TO DRUG THERAPY: ICD-10-CM

## 2024-05-03 DIAGNOSIS — Z51.81 MEDICATION MONITORING ENCOUNTER: ICD-10-CM

## 2024-05-03 DIAGNOSIS — Z79.899 HIGH RISK MEDICATION USE: ICD-10-CM

## 2024-05-03 DIAGNOSIS — Z79.899 IMMUNOCOMPROMISED STATE DUE TO DRUG THERAPY: ICD-10-CM

## 2024-05-03 DIAGNOSIS — D84.9 IMMUNOCOMPROMISED: ICD-10-CM

## 2024-05-03 RX ORDER — SECUKINUMAB 150 MG/ML
150 INJECTION SUBCUTANEOUS
Qty: 1 ML | Refills: 5 | Status: ACTIVE | OUTPATIENT
Start: 2024-05-03

## 2024-05-03 NOTE — TELEPHONE ENCOUNTER
Last Office visit: 02/14/2024  Last labs:  02/14/2024      Next office visit: Non scheduled( Due)  Next labs:    *Gian CULLEN CMA  [facility]  Department of Rheumatology

## 2024-05-08 ENCOUNTER — LAB VISIT (OUTPATIENT)
Dept: LAB | Facility: HOSPITAL | Age: 59
End: 2024-05-08
Attending: INTERNAL MEDICINE
Payer: COMMERCIAL

## 2024-05-08 DIAGNOSIS — Z86.39 HISTORY OF NON ANEMIC VITAMIN B12 DEFICIENCY: ICD-10-CM

## 2024-05-08 DIAGNOSIS — D50.0 IRON DEFICIENCY ANEMIA DUE TO CHRONIC BLOOD LOSS: ICD-10-CM

## 2024-05-08 LAB
BASOPHILS # BLD AUTO: 0.04 K/UL (ref 0–0.2)
BASOPHILS NFR BLD: 0.7 % (ref 0–1.9)
DIFFERENTIAL METHOD BLD: NORMAL
EOSINOPHIL # BLD AUTO: 0.1 K/UL (ref 0–0.5)
EOSINOPHIL NFR BLD: 2.2 % (ref 0–8)
ERYTHROCYTE [DISTWIDTH] IN BLOOD BY AUTOMATED COUNT: 12.7 % (ref 11.5–14.5)
FERRITIN SERPL-MCNC: 774 NG/ML (ref 20–300)
HCT VFR BLD AUTO: 41 % (ref 37–48.5)
HGB BLD-MCNC: 13.8 G/DL (ref 12–16)
IMM GRANULOCYTES # BLD AUTO: 0.01 K/UL (ref 0–0.04)
IMM GRANULOCYTES NFR BLD AUTO: 0.2 % (ref 0–0.5)
IRON SERPL-MCNC: 96 UG/DL (ref 30–160)
LYMPHOCYTES # BLD AUTO: 1.6 K/UL (ref 1–4.8)
LYMPHOCYTES NFR BLD: 29.4 % (ref 18–48)
MCH RBC QN AUTO: 29.7 PG (ref 27–31)
MCHC RBC AUTO-ENTMCNC: 33.7 G/DL (ref 32–36)
MCV RBC AUTO: 88 FL (ref 82–98)
MONOCYTES # BLD AUTO: 0.4 K/UL (ref 0.3–1)
MONOCYTES NFR BLD: 7.2 % (ref 4–15)
NEUTROPHILS # BLD AUTO: 3.4 K/UL (ref 1.8–7.7)
NEUTROPHILS NFR BLD: 60.3 % (ref 38–73)
NRBC BLD-RTO: 0 /100 WBC
PLATELET # BLD AUTO: 321 K/UL (ref 150–450)
PMV BLD AUTO: 9.4 FL (ref 9.2–12.9)
RBC # BLD AUTO: 4.65 M/UL (ref 4–5.4)
SATURATED IRON: 24 % (ref 20–50)
TOTAL IRON BINDING CAPACITY: 395 UG/DL (ref 250–450)
TRANSFERRIN SERPL-MCNC: 267 MG/DL (ref 200–375)
VIT B12 SERPL-MCNC: 316 PG/ML (ref 210–950)
WBC # BLD AUTO: 5.58 K/UL (ref 3.9–12.7)

## 2024-05-08 PROCEDURE — 82728 ASSAY OF FERRITIN: CPT | Performed by: INTERNAL MEDICINE

## 2024-05-08 PROCEDURE — 82607 VITAMIN B-12: CPT | Performed by: INTERNAL MEDICINE

## 2024-05-08 PROCEDURE — 85025 COMPLETE CBC W/AUTO DIFF WBC: CPT | Performed by: INTERNAL MEDICINE

## 2024-05-08 PROCEDURE — 83540 ASSAY OF IRON: CPT | Performed by: INTERNAL MEDICINE

## 2024-05-08 PROCEDURE — 36415 COLL VENOUS BLD VENIPUNCTURE: CPT | Performed by: INTERNAL MEDICINE

## 2024-06-05 ENCOUNTER — PATIENT MESSAGE (OUTPATIENT)
Dept: HEMATOLOGY/ONCOLOGY | Facility: CLINIC | Age: 59
End: 2024-06-05
Payer: COMMERCIAL

## 2024-06-06 DIAGNOSIS — Z95.828 PORT-A-CATH IN PLACE: Primary | ICD-10-CM

## 2024-06-06 RX ORDER — HEPARIN SODIUM 1000 [USP'U]/ML
500 INJECTION, SOLUTION INTRAVENOUS; SUBCUTANEOUS SEE ADMIN INSTRUCTIONS
Qty: 10 ML | Refills: 1 | Status: SHIPPED | OUTPATIENT
Start: 2024-06-06

## 2024-06-06 RX ORDER — SODIUM CHLORIDE 0.9 % (FLUSH) 0.9 %
10 SYRINGE (ML) INJECTION
OUTPATIENT
Start: 2024-06-06

## 2024-06-06 RX ORDER — HEPARIN 100 UNIT/ML
500 SYRINGE INTRAVENOUS
OUTPATIENT
Start: 2024-06-06

## 2024-06-07 RX ORDER — ALBUTEROL SULFATE 90 UG/1
2 AEROSOL, METERED RESPIRATORY (INHALATION) EVERY 4 HOURS PRN
Qty: 8.5 G | Refills: 3 | Status: SHIPPED | OUTPATIENT
Start: 2024-06-07

## 2024-06-07 RX ORDER — EPINEPHRINE 0.3 MG/.3ML
1 INJECTION SUBCUTANEOUS ONCE
Qty: 2 EACH | Refills: 3 | Status: SHIPPED | OUTPATIENT
Start: 2024-06-07 | End: 2024-06-13

## 2024-07-01 ENCOUNTER — PATIENT MESSAGE (OUTPATIENT)
Dept: INTERNAL MEDICINE | Facility: CLINIC | Age: 59
End: 2024-07-01
Payer: COMMERCIAL

## 2024-07-01 DIAGNOSIS — F41.0 PANIC DISORDER WITHOUT AGORAPHOBIA: ICD-10-CM

## 2024-07-01 DIAGNOSIS — G47.00 INSOMNIA, UNSPECIFIED TYPE: ICD-10-CM

## 2024-07-01 RX ORDER — QUETIAPINE FUMARATE 50 MG/1
50 TABLET, FILM COATED ORAL NIGHTLY
Qty: 30 TABLET | Refills: 2 | OUTPATIENT
Start: 2024-07-01 | End: 2024-09-29

## 2024-07-09 DIAGNOSIS — G47.00 INSOMNIA, UNSPECIFIED TYPE: ICD-10-CM

## 2024-07-09 DIAGNOSIS — F41.0 PANIC DISORDER WITHOUT AGORAPHOBIA: ICD-10-CM

## 2024-07-09 RX ORDER — QUETIAPINE FUMARATE 50 MG/1
50 TABLET, FILM COATED ORAL NIGHTLY
Qty: 30 TABLET | Refills: 2 | Status: SHIPPED | OUTPATIENT
Start: 2024-07-09 | End: 2024-10-07

## 2024-07-10 ENCOUNTER — PATIENT MESSAGE (OUTPATIENT)
Dept: PSYCHIATRY | Facility: CLINIC | Age: 59
End: 2024-07-10
Payer: COMMERCIAL

## 2024-07-10 ENCOUNTER — PATIENT MESSAGE (OUTPATIENT)
Dept: HEMATOLOGY/ONCOLOGY | Facility: CLINIC | Age: 59
End: 2024-07-10
Payer: COMMERCIAL

## 2024-07-10 DIAGNOSIS — D50.0 IRON DEFICIENCY ANEMIA DUE TO CHRONIC BLOOD LOSS: Primary | ICD-10-CM

## 2024-07-10 DIAGNOSIS — Z95.828 PORT-A-CATH IN PLACE: ICD-10-CM

## 2024-07-10 RX ORDER — HEPARIN SODIUM 1000 [USP'U]/ML
500 INJECTION, SOLUTION INTRAVENOUS; SUBCUTANEOUS SEE ADMIN INSTRUCTIONS
Qty: 10 ML | Refills: 1 | Status: SHIPPED | OUTPATIENT
Start: 2024-07-10 | End: 2024-07-12 | Stop reason: SDUPTHER

## 2024-07-10 RX ORDER — SODIUM CHLORIDE 0.9 % (FLUSH) 0.9 %
10 SYRINGE (ML) INJECTION SEE ADMIN INSTRUCTIONS
Qty: 100 ML | Refills: 1 | Status: SHIPPED | OUTPATIENT
Start: 2024-07-10 | End: 2024-07-12 | Stop reason: SDUPTHER

## 2024-07-10 RX ORDER — NEEDLES, DISPOSABLE 27GX1/2"
1 NEEDLE, DISPOSABLE MISCELLANEOUS SEE ADMIN INSTRUCTIONS
Qty: 100 EACH | Refills: 0 | Status: SHIPPED | OUTPATIENT
Start: 2024-07-10 | End: 2024-07-12 | Stop reason: SDUPTHER

## 2024-07-12 DIAGNOSIS — Z95.828 PORT-A-CATH IN PLACE: ICD-10-CM

## 2024-07-12 DIAGNOSIS — D50.0 IRON DEFICIENCY ANEMIA DUE TO CHRONIC BLOOD LOSS: ICD-10-CM

## 2024-07-12 RX ORDER — SODIUM CHLORIDE 0.9 % (FLUSH) 0.9 %
10 SYRINGE (ML) INJECTION SEE ADMIN INSTRUCTIONS
Qty: 100 ML | Refills: 1 | Status: SHIPPED | OUTPATIENT
Start: 2024-07-12

## 2024-07-12 RX ORDER — NEEDLES, DISPOSABLE 27GX1/2"
1 NEEDLE, DISPOSABLE MISCELLANEOUS SEE ADMIN INSTRUCTIONS
Qty: 100 EACH | Refills: 0 | Status: SHIPPED | OUTPATIENT
Start: 2024-07-12

## 2024-07-12 RX ORDER — HEPARIN SODIUM 1000 [USP'U]/ML
500 INJECTION, SOLUTION INTRAVENOUS; SUBCUTANEOUS SEE ADMIN INSTRUCTIONS
Qty: 10 ML | Refills: 1 | Status: SHIPPED | OUTPATIENT
Start: 2024-07-12

## 2024-07-31 ENCOUNTER — LAB VISIT (OUTPATIENT)
Dept: LAB | Facility: HOSPITAL | Age: 59
End: 2024-07-31
Attending: STUDENT IN AN ORGANIZED HEALTH CARE EDUCATION/TRAINING PROGRAM
Payer: COMMERCIAL

## 2024-07-31 DIAGNOSIS — Z79.899 IMMUNOCOMPROMISED STATE DUE TO DRUG THERAPY: ICD-10-CM

## 2024-07-31 DIAGNOSIS — Z79.899 HIGH RISK MEDICATION USE: ICD-10-CM

## 2024-07-31 DIAGNOSIS — Z51.81 MEDICATION MONITORING ENCOUNTER: ICD-10-CM

## 2024-07-31 DIAGNOSIS — D84.9 IMMUNOCOMPROMISED: ICD-10-CM

## 2024-07-31 DIAGNOSIS — M32.9 SYSTEMIC LUPUS ERYTHEMATOSUS, UNSPECIFIED SLE TYPE, UNSPECIFIED ORGAN INVOLVEMENT STATUS: ICD-10-CM

## 2024-07-31 DIAGNOSIS — D84.821 IMMUNOCOMPROMISED STATE DUE TO DRUG THERAPY: ICD-10-CM

## 2024-07-31 DIAGNOSIS — M45.9 ANKYLOSING SPONDYLITIS, UNSPECIFIED SITE OF SPINE: ICD-10-CM

## 2024-07-31 LAB
ALBUMIN SERPL BCP-MCNC: 4.1 G/DL (ref 3.5–5.2)
ALP SERPL-CCNC: 74 U/L (ref 55–135)
ALT SERPL W/O P-5'-P-CCNC: 11 U/L (ref 10–44)
AMORPH CRY UR QL COMP ASSIST: ABNORMAL
ANION GAP SERPL CALC-SCNC: 11 MMOL/L (ref 8–16)
AST SERPL-CCNC: 12 U/L (ref 10–40)
BACTERIA #/AREA URNS AUTO: ABNORMAL /HPF
BASOPHILS # BLD AUTO: 0.05 K/UL (ref 0–0.2)
BASOPHILS NFR BLD: 1.1 % (ref 0–1.9)
BILIRUB SERPL-MCNC: 0.4 MG/DL (ref 0.1–1)
BILIRUB UR QL STRIP: NEGATIVE
BUN SERPL-MCNC: 12 MG/DL (ref 6–20)
C3 SERPL-MCNC: 131 MG/DL (ref 50–180)
C4 SERPL-MCNC: 40 MG/DL (ref 11–44)
CALCIUM SERPL-MCNC: 9.9 MG/DL (ref 8.7–10.5)
CHLORIDE SERPL-SCNC: 106 MMOL/L (ref 95–110)
CLARITY UR REFRACT.AUTO: ABNORMAL
CO2 SERPL-SCNC: 24 MMOL/L (ref 23–29)
COLOR UR AUTO: YELLOW
CREAT SERPL-MCNC: 0.9 MG/DL (ref 0.5–1.4)
CREAT UR-MCNC: 340.8 MG/DL (ref 15–325)
CRP SERPL-MCNC: 4.7 MG/L (ref 0–8.2)
DIFFERENTIAL METHOD BLD: ABNORMAL
EOSINOPHIL # BLD AUTO: 0.2 K/UL (ref 0–0.5)
EOSINOPHIL NFR BLD: 3.8 % (ref 0–8)
ERYTHROCYTE [DISTWIDTH] IN BLOOD BY AUTOMATED COUNT: 12.8 % (ref 11.5–14.5)
ERYTHROCYTE [SEDIMENTATION RATE] IN BLOOD BY PHOTOMETRIC METHOD: 16 MM/HR (ref 0–36)
EST. GFR  (NO RACE VARIABLE): >60 ML/MIN/1.73 M^2
GLUCOSE SERPL-MCNC: 95 MG/DL (ref 70–110)
GLUCOSE UR QL STRIP: NEGATIVE
HCT VFR BLD AUTO: 38.6 % (ref 37–48.5)
HGB BLD-MCNC: 12.8 G/DL (ref 12–16)
HGB UR QL STRIP: ABNORMAL
HYALINE CASTS UR QL AUTO: 2 /LPF
IMM GRANULOCYTES # BLD AUTO: 0.01 K/UL (ref 0–0.04)
IMM GRANULOCYTES NFR BLD AUTO: 0.2 % (ref 0–0.5)
KETONES UR QL STRIP: NEGATIVE
LEUKOCYTE ESTERASE UR QL STRIP: ABNORMAL
LYMPHOCYTES # BLD AUTO: 2.5 K/UL (ref 1–4.8)
LYMPHOCYTES NFR BLD: 52.8 % (ref 18–48)
MCH RBC QN AUTO: 29.4 PG (ref 27–31)
MCHC RBC AUTO-ENTMCNC: 33.2 G/DL (ref 32–36)
MCV RBC AUTO: 89 FL (ref 82–98)
MICROSCOPIC COMMENT: ABNORMAL
MONOCYTES # BLD AUTO: 0.3 K/UL (ref 0.3–1)
MONOCYTES NFR BLD: 7 % (ref 4–15)
NEUTROPHILS # BLD AUTO: 1.7 K/UL (ref 1.8–7.7)
NEUTROPHILS NFR BLD: 35.1 % (ref 38–73)
NITRITE UR QL STRIP: NEGATIVE
NRBC BLD-RTO: 0 /100 WBC
PH UR STRIP: 6 [PH] (ref 5–8)
PLATELET # BLD AUTO: 325 K/UL (ref 150–450)
PMV BLD AUTO: 9.4 FL (ref 9.2–12.9)
POTASSIUM SERPL-SCNC: 3.4 MMOL/L (ref 3.5–5.1)
PROT SERPL-MCNC: 7.3 G/DL (ref 6–8.4)
PROT UR QL STRIP: NEGATIVE
PROT UR-MCNC: 21 MG/DL (ref 0–15)
PROT/CREAT UR: 0.06 MG/G{CREAT} (ref 0–0.2)
RBC # BLD AUTO: 4.36 M/UL (ref 4–5.4)
RBC #/AREA URNS AUTO: 0 /HPF (ref 0–4)
SODIUM SERPL-SCNC: 141 MMOL/L (ref 136–145)
SP GR UR STRIP: >=1.03 (ref 1–1.03)
SQUAMOUS #/AREA URNS AUTO: 2 /HPF
URN SPEC COLLECT METH UR: ABNORMAL
UROBILINOGEN UR STRIP-ACNC: NEGATIVE EU/DL
WBC # BLD AUTO: 4.72 K/UL (ref 3.9–12.7)
WBC #/AREA URNS AUTO: 3 /HPF (ref 0–5)

## 2024-07-31 PROCEDURE — 86160 COMPLEMENT ANTIGEN: CPT | Mod: 59 | Performed by: STUDENT IN AN ORGANIZED HEALTH CARE EDUCATION/TRAINING PROGRAM

## 2024-07-31 PROCEDURE — 85652 RBC SED RATE AUTOMATED: CPT | Performed by: STUDENT IN AN ORGANIZED HEALTH CARE EDUCATION/TRAINING PROGRAM

## 2024-07-31 PROCEDURE — 81000 URINALYSIS NONAUTO W/SCOPE: CPT | Mod: PO | Performed by: STUDENT IN AN ORGANIZED HEALTH CARE EDUCATION/TRAINING PROGRAM

## 2024-07-31 PROCEDURE — 84156 ASSAY OF PROTEIN URINE: CPT | Mod: PO | Performed by: STUDENT IN AN ORGANIZED HEALTH CARE EDUCATION/TRAINING PROGRAM

## 2024-07-31 PROCEDURE — 86038 ANTINUCLEAR ANTIBODIES: CPT | Performed by: STUDENT IN AN ORGANIZED HEALTH CARE EDUCATION/TRAINING PROGRAM

## 2024-07-31 PROCEDURE — 85025 COMPLETE CBC W/AUTO DIFF WBC: CPT | Mod: PO | Performed by: STUDENT IN AN ORGANIZED HEALTH CARE EDUCATION/TRAINING PROGRAM

## 2024-07-31 PROCEDURE — 86160 COMPLEMENT ANTIGEN: CPT | Performed by: STUDENT IN AN ORGANIZED HEALTH CARE EDUCATION/TRAINING PROGRAM

## 2024-07-31 PROCEDURE — 36415 COLL VENOUS BLD VENIPUNCTURE: CPT | Mod: PO | Performed by: STUDENT IN AN ORGANIZED HEALTH CARE EDUCATION/TRAINING PROGRAM

## 2024-07-31 PROCEDURE — 86225 DNA ANTIBODY NATIVE: CPT | Performed by: STUDENT IN AN ORGANIZED HEALTH CARE EDUCATION/TRAINING PROGRAM

## 2024-07-31 PROCEDURE — 80053 COMPREHEN METABOLIC PANEL: CPT | Mod: PO | Performed by: STUDENT IN AN ORGANIZED HEALTH CARE EDUCATION/TRAINING PROGRAM

## 2024-07-31 PROCEDURE — 86235 NUCLEAR ANTIGEN ANTIBODY: CPT | Mod: 59 | Performed by: STUDENT IN AN ORGANIZED HEALTH CARE EDUCATION/TRAINING PROGRAM

## 2024-07-31 PROCEDURE — 86039 ANTINUCLEAR ANTIBODIES (ANA): CPT | Performed by: STUDENT IN AN ORGANIZED HEALTH CARE EDUCATION/TRAINING PROGRAM

## 2024-07-31 PROCEDURE — 86140 C-REACTIVE PROTEIN: CPT | Mod: PO | Performed by: STUDENT IN AN ORGANIZED HEALTH CARE EDUCATION/TRAINING PROGRAM

## 2024-08-01 ENCOUNTER — PATIENT MESSAGE (OUTPATIENT)
Dept: HEMATOLOGY/ONCOLOGY | Facility: CLINIC | Age: 59
End: 2024-08-01
Payer: COMMERCIAL

## 2024-08-01 LAB
ANA PATTERN 1: NORMAL
ANA SER QL IF: POSITIVE
ANA TITR SER IF: NORMAL {TITER}
DSDNA AB SER-ACNC: NORMAL [IU]/ML

## 2024-08-05 LAB
ANTI SM ANTIBODY: 0.08 RATIO (ref 0–0.99)
ANTI SM/RNP ANTIBODY: 0.07 RATIO (ref 0–0.99)
ANTI-SM INTERPRETATION: NEGATIVE
ANTI-SM/RNP INTERPRETATION: NEGATIVE
ANTI-SSA ANTIBODY: 0.05 RATIO (ref 0–0.99)
ANTI-SSA INTERPRETATION: NEGATIVE
ANTI-SSB ANTIBODY: 0.06 RATIO (ref 0–0.99)
ANTI-SSB INTERPRETATION: NEGATIVE
DSDNA AB SER-ACNC: NORMAL [IU]/ML

## 2024-08-07 ENCOUNTER — HOSPITAL ENCOUNTER (OUTPATIENT)
Dept: RADIOLOGY | Facility: HOSPITAL | Age: 59
Discharge: HOME | End: 2024-08-07
Attending: STUDENT IN AN ORGANIZED HEALTH CARE EDUCATION/TRAINING PROGRAM
Payer: COMMERCIAL

## 2024-08-07 ENCOUNTER — OFFICE VISIT (OUTPATIENT)
Dept: RHEUMATOLOGY | Facility: CLINIC | Age: 59
End: 2024-08-07
Payer: COMMERCIAL

## 2024-08-07 VITALS
DIASTOLIC BLOOD PRESSURE: 88 MMHG | SYSTOLIC BLOOD PRESSURE: 140 MMHG | HEIGHT: 62 IN | HEART RATE: 66 BPM | WEIGHT: 186.06 LBS | BODY MASS INDEX: 34.24 KG/M2

## 2024-08-07 DIAGNOSIS — M32.9 SYSTEMIC LUPUS ERYTHEMATOSUS, UNSPECIFIED SLE TYPE, UNSPECIFIED ORGAN INVOLVEMENT STATUS: ICD-10-CM

## 2024-08-07 DIAGNOSIS — M25.552 LEFT HIP PAIN: ICD-10-CM

## 2024-08-07 DIAGNOSIS — M45.9 ANKYLOSING SPONDYLITIS, UNSPECIFIED SITE OF SPINE: Primary | ICD-10-CM

## 2024-08-07 DIAGNOSIS — D84.9 IMMUNOCOMPROMISED: ICD-10-CM

## 2024-08-07 PROCEDURE — 99215 OFFICE O/P EST HI 40 MIN: CPT | Mod: S$GLB,,, | Performed by: STUDENT IN AN ORGANIZED HEALTH CARE EDUCATION/TRAINING PROGRAM

## 2024-08-07 PROCEDURE — 1160F RVW MEDS BY RX/DR IN RCRD: CPT | Mod: CPTII,S$GLB,, | Performed by: STUDENT IN AN ORGANIZED HEALTH CARE EDUCATION/TRAINING PROGRAM

## 2024-08-07 PROCEDURE — 3008F BODY MASS INDEX DOCD: CPT | Mod: CPTII,S$GLB,, | Performed by: STUDENT IN AN ORGANIZED HEALTH CARE EDUCATION/TRAINING PROGRAM

## 2024-08-07 PROCEDURE — 3077F SYST BP >= 140 MM HG: CPT | Mod: CPTII,S$GLB,, | Performed by: STUDENT IN AN ORGANIZED HEALTH CARE EDUCATION/TRAINING PROGRAM

## 2024-08-07 PROCEDURE — 1159F MED LIST DOCD IN RCRD: CPT | Mod: CPTII,S$GLB,, | Performed by: STUDENT IN AN ORGANIZED HEALTH CARE EDUCATION/TRAINING PROGRAM

## 2024-08-07 PROCEDURE — 3079F DIAST BP 80-89 MM HG: CPT | Mod: CPTII,S$GLB,, | Performed by: STUDENT IN AN ORGANIZED HEALTH CARE EDUCATION/TRAINING PROGRAM

## 2024-08-07 PROCEDURE — 73521 X-RAY EXAM HIPS BI 2 VIEWS: CPT | Mod: TC

## 2024-08-07 PROCEDURE — 73521 X-RAY EXAM HIPS BI 2 VIEWS: CPT | Mod: 26,,, | Performed by: RADIOLOGY

## 2024-08-07 PROCEDURE — 99999 PR PBB SHADOW E&M-EST. PATIENT-LVL V: CPT | Mod: PBBFAC,,, | Performed by: STUDENT IN AN ORGANIZED HEALTH CARE EDUCATION/TRAINING PROGRAM

## 2024-08-14 DIAGNOSIS — G47.00 INSOMNIA, UNSPECIFIED TYPE: ICD-10-CM

## 2024-08-14 DIAGNOSIS — F41.0 ANXIETY ATTACK: ICD-10-CM

## 2024-08-14 DIAGNOSIS — F41.0 PANIC DISORDER WITHOUT AGORAPHOBIA: ICD-10-CM

## 2024-08-15 RX ORDER — ALPRAZOLAM 0.5 MG/1
0.5 TABLET ORAL 2 TIMES DAILY PRN
Qty: 60 TABLET | Refills: 0 | Status: SHIPPED | OUTPATIENT
Start: 2024-08-15 | End: 2024-11-13

## 2024-08-16 ENCOUNTER — OFFICE VISIT (OUTPATIENT)
Dept: OPHTHALMOLOGY | Facility: CLINIC | Age: 59
End: 2024-08-16
Payer: COMMERCIAL

## 2024-08-16 DIAGNOSIS — H52.7 REFRACTIVE ERROR: ICD-10-CM

## 2024-08-16 DIAGNOSIS — H25.13 NUCLEAR SCLEROSIS OF BOTH EYES: ICD-10-CM

## 2024-08-16 DIAGNOSIS — Z79.899 LONG-TERM USE OF HIGH-RISK MEDICATION: Primary | ICD-10-CM

## 2024-08-16 PROCEDURE — 99999 PR PBB SHADOW E&M-EST. PATIENT-LVL IV: CPT | Mod: PBBFAC,,, | Performed by: OPHTHALMOLOGY

## 2024-08-16 NOTE — PROGRESS NOTES
HPI     High Risk Medication     Additional comments: Pt reports for Eye Exam REV HVF 10-2 : Currently   taking Plaquinil.           Comments    High Risk Medication    Soft CTL wearer          Last edited by Sera Vaughn on 8/16/2024  4:13 PM.            Assessment /Plan     For exam results, see Encounter Report.    Long-term use of high-risk medication for Lupus  No evidence of maculopathy on DFE. HVF 10-2 and MOCT both WNL  Follow with annual testing    Nuclear sclerosis of both eyes  Cataracts are present but not visually significant. Will continue to monitor. Interested in contacts. Will refer to Optometry for yearly exams for contacts and Plaquenil screening    Return to clinic in 1 year with Optometry for 10-2 HVF, MOCT, and dilation or sooner PRN

## 2024-09-06 ENCOUNTER — OFFICE VISIT (OUTPATIENT)
Dept: INTERNAL MEDICINE | Facility: CLINIC | Age: 59
End: 2024-09-06
Payer: COMMERCIAL

## 2024-09-06 VITALS
DIASTOLIC BLOOD PRESSURE: 86 MMHG | OXYGEN SATURATION: 95 % | HEART RATE: 89 BPM | HEIGHT: 62 IN | RESPIRATION RATE: 18 BRPM | SYSTOLIC BLOOD PRESSURE: 130 MMHG | WEIGHT: 190.06 LBS | BODY MASS INDEX: 34.98 KG/M2 | TEMPERATURE: 98 F

## 2024-09-06 DIAGNOSIS — L29.9 PRURITUS: ICD-10-CM

## 2024-09-06 DIAGNOSIS — M32.9 SYSTEMIC LUPUS ERYTHEMATOSUS, UNSPECIFIED SLE TYPE, UNSPECIFIED ORGAN INVOLVEMENT STATUS: ICD-10-CM

## 2024-09-06 DIAGNOSIS — I10 PRIMARY HYPERTENSION: ICD-10-CM

## 2024-09-06 DIAGNOSIS — E66.01 CLASS 2 SEVERE OBESITY DUE TO EXCESS CALORIES WITH SERIOUS COMORBIDITY AND BODY MASS INDEX (BMI) OF 35.0 TO 35.9 IN ADULT: ICD-10-CM

## 2024-09-06 DIAGNOSIS — F11.90 CHRONIC, CONTINUOUS USE OF OPIOIDS: ICD-10-CM

## 2024-09-06 DIAGNOSIS — F41.9 ANXIETY: ICD-10-CM

## 2024-09-06 DIAGNOSIS — M26.609 TMJ (TEMPOROMANDIBULAR JOINT DISORDER): ICD-10-CM

## 2024-09-06 DIAGNOSIS — M47.817 SPONDYLOSIS OF LUMBOSACRAL REGION, UNSPECIFIED SPINAL OSTEOARTHRITIS COMPLICATION STATUS: ICD-10-CM

## 2024-09-06 DIAGNOSIS — R30.0 DYSURIA: Primary | ICD-10-CM

## 2024-09-06 DIAGNOSIS — R35.0 URINARY FREQUENCY: ICD-10-CM

## 2024-09-06 LAB
BILIRUB UR QL STRIP: NEGATIVE
CLARITY UR REFRACT.AUTO: CLEAR
COLOR UR AUTO: YELLOW
GLUCOSE UR QL STRIP: NEGATIVE
HGB UR QL STRIP: NEGATIVE
KETONES UR QL STRIP: NEGATIVE
LEUKOCYTE ESTERASE UR QL STRIP: NEGATIVE
NITRITE UR QL STRIP: NEGATIVE
PH UR STRIP: 7 [PH] (ref 5–8)
PROT UR QL STRIP: NEGATIVE
SP GR UR STRIP: 1.02 (ref 1–1.03)
URN SPEC COLLECT METH UR: NORMAL
UROBILINOGEN UR STRIP-ACNC: NEGATIVE EU/DL

## 2024-09-06 PROCEDURE — 99214 OFFICE O/P EST MOD 30 MIN: CPT | Mod: S$GLB,,, | Performed by: NURSE PRACTITIONER

## 2024-09-06 PROCEDURE — 1159F MED LIST DOCD IN RCRD: CPT | Mod: CPTII,S$GLB,, | Performed by: NURSE PRACTITIONER

## 2024-09-06 PROCEDURE — 1160F RVW MEDS BY RX/DR IN RCRD: CPT | Mod: CPTII,S$GLB,, | Performed by: NURSE PRACTITIONER

## 2024-09-06 PROCEDURE — 3079F DIAST BP 80-89 MM HG: CPT | Mod: CPTII,S$GLB,, | Performed by: NURSE PRACTITIONER

## 2024-09-06 PROCEDURE — 3008F BODY MASS INDEX DOCD: CPT | Mod: CPTII,S$GLB,, | Performed by: NURSE PRACTITIONER

## 2024-09-06 PROCEDURE — 99999 PR PBB SHADOW E&M-EST. PATIENT-LVL V: CPT | Mod: PBBFAC,,, | Performed by: NURSE PRACTITIONER

## 2024-09-06 PROCEDURE — 3075F SYST BP GE 130 - 139MM HG: CPT | Mod: CPTII,S$GLB,, | Performed by: NURSE PRACTITIONER

## 2024-09-06 PROCEDURE — 81003 URINALYSIS AUTO W/O SCOPE: CPT | Mod: PO | Performed by: NURSE PRACTITIONER

## 2024-09-06 RX ORDER — TRIAMCINOLONE ACETONIDE 5 MG/G
CREAM TOPICAL 2 TIMES DAILY
Qty: 45 G | Refills: 0 | Status: SHIPPED | OUTPATIENT
Start: 2024-09-06

## 2024-09-06 NOTE — PROGRESS NOTES
Subjective:       Patient ID: Tosha Kwok is a 59 y.o. female.    Chief Complaint: Abdominal Pain (Behind belly button, feels like a pulling feeling) and Hypertension    Abdominal Pain  Associated symptoms include arthralgias and dysuria. Pertinent negatives include no constipation, diarrhea, fever, frequency, hematuria, nausea or vomiting.   Hypertension  Pertinent negatives include no chest pain, palpitations or shortness of breath.       History of Present Illness               Patient Active Problem List   Diagnosis    Atrophy of vulva    Ankylosing spondylitis    Chronic, continuous use of opioids    Fibromyalgia    Elevated d-dimer    History of sleeve gastrectomy    Intractable pain    De Quervain's tenosynovitis, right    Adhesive capsulitis of right shoulder    Anxiety    Asthma    Lumbosacral spondylosis    GERD (gastroesophageal reflux disease)    Primary hypertension    Hyperlipidemia    Insulin resistance    Large joint arthralgia of multiple sites    Lupus (systemic lupus erythematosus)    Migraine    Postmenopausal hormone replacement therapy    Vitamin D deficiency    Impingement syndrome of left shoulder    Limited range of motion (ROM) of shoulder    Left foot pain    Vestibular hypofunction    Other iron deficiency anemias    Keloid scar of skin    Iron deficiency anemia due to chronic blood loss    Restless leg syndrome    Chest pain    Insomnia    Port-A-Cath in place    TIA (transient ischemic attack)    History of non anemic vitamin B12 deficiency    Acute pain of right shoulder    Weight gain    Class 2 severe obesity due to excess calories with serious comorbidity and body mass index (BMI) of 35.0 to 35.9 in adult    Nausea and vomiting    Hypokalemia    Gastritis    Hiatal hernia    Prolonged Q-T interval on ECG    Palpitation    Pulmonary nodule         Past Surgical History:   Procedure Laterality Date    BLADDER SURGERY      BREAST LUMPECTOMY  2018    BREAST RECONSTRUCTION       BREAST SURGERY  2018    reduction     SECTION      CHOLECYSTECTOMY      COLONOSCOPY N/A 10/2/2020    Procedure: COLONOSCOPY;  Surgeon: Guicho Hood MD;  Location: [facility] ENDO;  Service: Endoscopy;  Laterality: N/A;    ESOPHAGOGASTRODUODENOSCOPY N/A 2023    Procedure: EGD (ESOPHAGOGASTRODUODENOSCOPY);  Surgeon: Belkis Mario MD;  Location: [facility] ENDO;  Service: Endoscopy;  Laterality: N/A;    EXCISION OF GRANULOMA Bilateral 2021    Procedure: EXCISION, GRANULOMA SCARS OF BREASTS;  Surgeon: Obed Palmer Jr., MD;  Location: [facility] OR;  Service: Plastics;  Laterality: Bilateral;    HYSTERECTOMY      LASER ABLATION      to back    SLEEVE GASTROPLASTY  2016    TOTAL REDUCTION MAMMOPLASTY Bilateral     two years ago    TUBAL LIGATION           Current Outpatient Medications:     albuterol (PROAIR HFA) 90 mcg/actuation inhaler, Inhale 2 puffs into the lungs every 4 hours as needed., Disp: 8.5 g, Rfl: 3    ALPRAZolam (XANAX) 0.5 MG tablet, Take 1 tablet (0.5 mg total) by mouth 2 (two) times daily as needed for Anxiety or Insomnia., Disp: 60 tablet, Rfl: 0    atorvastatin (LIPITOR) 20 MG tablet, Take 1 tablet (20 mg total) by mouth once daily., Disp: 90 tablet, Rfl: 3    cetirizine (ZYRTEC) 10 MG tablet, Take 1 tablet (10 mg total) by mouth once daily., Disp: 90 tablet, Rfl: 3    cyanocobalamin 1,000 mcg/mL injection, Inject 1 mL (1,000 mcg total) into the muscle every 28 days., Disp: 10 mL, Rfl: 1    heparin sodium,porcine (HEPARIN, PORCINE,) 1,000 unit/mL injection, 0.5 mLs (500 Units total) by Intra-Catheter route As instructed (inject 0.5 mL into port-a-cath once every 3 months.)., Disp: 10 mL, Rfl: 1    hydroCHLOROthiazide (HYDRODIURIL) 12.5 MG Tab, Take 1 tablet (12.5 mg total) by mouth once daily., Disp: 90 tablet, Rfl: 3    HYDROmorphone (DILAUDID) 2 MG tablet, take 1 tablet by mouth every 6-8 hours as needed for pain, Disp: 105 tablet, Rfl: 0    HYDROmorphone (DILAUDID) 2 MG tablet,  "Take 1 tablet by mouth every six to eight hours as needed for pain, Disp: 105 tablet, Rfl: 0    HYDROmorphone (DILAUDID) 2 MG tablet, Take 1 tablet (2 mg total) by mouth every 6 to 8 hours as needed., Disp: 105 tablet, Rfl: 0    HYDROmorphone (DILAUDID) 2 MG tablet, Take 1 tablet by mouth every 6-8 hours as needed, Disp: 105 tablet, Rfl: 0    HYDROmorphone (DILAUDID) 2 MG tablet, Take 1 tablet by mouth every 6-8 hours as needed, Disp: 105 tablet, Rfl: 0    HYDROmorphone (DILAUDID) 2 MG tablet, Take 1 tablet by mouth every 6-8 hours as needed, Disp: 105 tablet, Rfl: 0    hydrOXYzine pamoate (VISTARIL) 25 MG Cap, Take 1 capsule (25 mg total) by mouth every 8 (eight) hours as needed (anxiety)., Disp: 90 capsule, Rfl: 3    metoprolol succinate (TOPROL-XL) 25 MG 24 hr tablet, Take 1 tablet (25 mg total) by mouth once daily., Disp: 90 tablet, Rfl: 2    needle, disp, 16 G 16 gauge x 1" Ndle, 1 Needle by Misc.(Non-Drug; Combo Route) route As instructed (use needle as needed when giving heparin flush through port-a-cath.)., Disp: 100 each, Rfl: 0    pantoprazole (PROTONIX) 40 MG tablet, Take 1 tablet (40 mg total) by mouth 2 (two) times daily., Disp: 60 tablet, Rfl: 1    promethazine (PHENERGAN) 25 MG tablet, Take 1 tablet (25 mg total) by mouth every 6 (six) hours as needed for Nausea., Disp: 15 tablet, Rfl: 0    QUEtiapine (SEROQUEL) 50 MG tablet, Take 1 tablet (50 mg total) by mouth every evening., Disp: 30 tablet, Rfl: 2    secukinumab (COSENTYX PEN, 2 PENS,) 150 mg/mL PnIj, Inject 150 mg into the skin every 28 days., Disp: 1 mL, Rfl: 5    sodium chloride 0.9% (NORMAL SALINE FLUSH) injection, Inject 10 mLs into the vein As instructed (use normal saline slush as needed for port-a-cath)., Disp: 100 mL, Rfl: 1    tiZANidine (ZANAFLEX) 4 MG tablet, Take 1 tablet by mouth three times a day as needed, May cause drowsiness, use caution/ For muscle spasms, Disp: 270 tablet, Rfl: 3    tiZANidine (ZANAFLEX) 4 MG tablet, Take 1 " "tablet by mouth every 8 hours as needed, Disp: 90 tablet, Rfl: 5    topiramate (TOPAMAX) 100 MG tablet, Take 1 tablet (100 mg total) by mouth 2 (two) times daily., Disp: 180 tablet, Rfl: 3    zolpidem (AMBIEN) 10 mg Tab, Take 1 tablet by mouth nightly, Disp: 30 tablet, Rfl: 5    EPINEPHrine (EPIPEN 2-DENNIS) 0.3 mg/0.3 mL AtIn, Inject 0.3 mLs (0.3 mg total) into the muscle once. for 1 dose, Disp: 2 each, Rfl: 3    hydroxychloroquine (PLAQUENIL) 200 mg tablet, Take 1 tablet (200 mg total) by mouth once daily. (Patient not taking: Reported on 9/6/2024), Disp: 30 tablet, Rfl: 1    secukinumab (COSENTYX PEN, 2 PENS,) 150 mg/mL PnIj, 150 mg SQ q 7 days x 5 doses, then 150 mg SQ q 4 weeks thereafter. (Patient not taking: Reported on 9/6/2024), Disp: 5 mL, Rfl: 0    Review of Systems   Constitutional:  Negative for activity change, appetite change, chills, fatigue, fever and unexpected weight change.   Respiratory:  Negative for chest tightness and shortness of breath.    Cardiovascular:  Negative for chest pain and palpitations.   Gastrointestinal:  Positive for abdominal pain (none today). Negative for constipation, diarrhea, nausea and vomiting.   Genitourinary:  Positive for dysuria. Negative for frequency, hematuria, urgency, vaginal bleeding, vaginal discharge and vaginal pain.   Musculoskeletal:  Positive for arthralgias.   Skin:         General itching       Objective:   BP (!) 148/80 (BP Location: Left arm, Patient Position: Sitting, BP Method: Large (Manual))   Pulse 89   Temp 98.2 °F (36.8 °C)   Resp 18   Ht 5' 2" (1.575 m)   Wt 86.2 kg (190 lb 0.6 oz)   LMP 03/26/2012   SpO2 95%   BMI 34.76 kg/m²      Physical Exam    Assessment & Plan     Problem List Items Addressed This Visit    None  Visit Diagnoses       Dysuria    -  Primary    Urinary frequency                Assessment & Plan                   No follow-ups on file.          Portions of this note may have been created with voice recognition " "software. Occasional "wrong-word" or "sound-a-like" substitutions may have occurred due to the inherent limitations of voice recognition software. Please, read the note carefully and recognize, using context, where substitutions have occurred.       This note was generated with the assistance of ambient listening technology. Verbal consent was obtained by the patient and accompanying visitor(s) for the recording of patient appointment to facilitate this note. I attest to having reviewed and edited the generated note for accuracy, though some syntax or spelling errors may persist. Please contact the author of this note for any clarification.       " and Affect: Mood normal.         Behavior: Behavior normal.         Assessment & Plan     Problem List Items Addressed This Visit          Neuro    Lumbosacral spondylosis    Current Assessment & Plan     Followed by pain management. On dilaudid.  reviewed. Discussed the high quantity of narcotics that she is currently being prescribed. Patient reports that she has a large supply of unused pills at home. I again recommend discussing with pain management provider to reduce prescribed number if she is not actually using the large amt being given.              Psychiatric    Chronic, continuous use of opioids (Chronic)    Current Assessment & Plan     Followed by pain management.  reviewed.          Anxiety    Overview     Has tried lexapro (possible EKG changes), wellbutrin (hallucination), abilify, effexor, and hydroxyzine in past.          Current Assessment & Plan     Followed by psych. Continue current medications            Cardiac/Vascular    Primary hypertension    Current Assessment & Plan     Blood pressure stable. Continue current medications.            Immunology/Multi System    Lupus (systemic lupus erythematosus)    Current Assessment & Plan     On plaquenil. Followed by rheum.             Endocrine    Class 2 severe obesity due to excess calories with serious comorbidity and body mass index (BMI) of 35.0 to 35.9 in adult    Current Assessment & Plan     Counseled on importance of diet and exercise in order to improve overall quality of life, and reduce risk of future comorbidities. Rx for wegovy.           Relevant Medications    semaglutide, weight loss, 0.25 mg/0.5 mL PnIj     Other Visit Diagnoses       Dysuria    -  Primary    Relevant Orders    Urinalysis, Reflex to Urine Culture (Completed)    Urinary frequency        Pruritus        Relevant Medications    triamcinolone acetonide 0.5% (KENALOG) 0.5 % Crea            Assessment & Plan    MEDICAL DECISION MAKING:  - Assessed patient's  "intensified itching symptoms, possibly related to Plaquenil use  - Considered alternative treatments for arthritis management given Plaquenil side effects  - Evaluated current pain management regimen, noting excessive Dilaudid prescription  - Reviewed recent lab results, including A1C and iron studies, which appear normal  - Considered weight management options, including Wegovy, as an alternative to previously unavailable medications    PATIENT EDUCATION:  - Discussed potential causes of generalized itching, including medication side effects and environmental factors  - Emphasized the importance of consistent use of prescribed medications, particularly for chronic conditions    ACTION ITEMS/LIFESTYLE:  - Ms. Kwok to monitor blood pressure at home, particularly the diastolic reading    MEDICATIONS:  - Continued Plaquenil for arthritis. Ms. Kwok advised to temporarily discontinue if itching becomes intolerable  - Continued Claritin and Benadryl for allergy management  - Increased Vistaril to 2 tablets at bedtime for itching relief  - Started triamcinolone topical steroid for itching relief  - Started Wegovy 2 mL as starting dose for weight management, pending insurance approval    ORDERS:  - urinalysis ordered to evaluate for possible urinary tract infection    FOLLOW UP:  - Contact the office if urinary symptoms or generalized itching do not improve  - Follow up in 1-2 months for repeat labs                      Portions of this note may have been created with voice recognition software. Occasional "wrong-word" or "sound-a-like" substitutions may have occurred due to the inherent limitations of voice recognition software. Please, read the note carefully and recognize, using context, where substitutions have occurred.       This note was generated with the assistance of ambient listening technology. Verbal consent was obtained by the patient and accompanying visitor(s) for the recording of patient appointment to " facilitate this note. I attest to having reviewed and edited the generated note for accuracy, though some syntax or spelling errors may persist. Please contact the author of this note for any clarification.

## 2024-09-10 DIAGNOSIS — R30.0 DYSURIA: Primary | ICD-10-CM

## 2024-09-19 NOTE — ASSESSMENT & PLAN NOTE
Counseled on importance of diet and exercise in order to improve overall quality of life, and reduce risk of future comorbidities. Rx for wegovy.

## 2024-09-19 NOTE — ASSESSMENT & PLAN NOTE
Followed by pain management. On dilaudid.  reviewed. Discussed the high quantity of narcotics that she is currently being prescribed. Patient reports that she has a large supply of unused pills at home. I again recommend discussing with pain management provider to reduce prescribed number if she is not actually using the large amt being given.

## 2024-09-24 ENCOUNTER — TELEPHONE (OUTPATIENT)
Dept: HEMATOLOGY/ONCOLOGY | Facility: CLINIC | Age: 59
End: 2024-09-24
Payer: COMMERCIAL

## 2024-09-24 ENCOUNTER — PATIENT MESSAGE (OUTPATIENT)
Dept: HEMATOLOGY/ONCOLOGY | Facility: CLINIC | Age: 59
End: 2024-09-24
Payer: COMMERCIAL

## 2024-09-24 DIAGNOSIS — D50.0 IRON DEFICIENCY ANEMIA DUE TO CHRONIC BLOOD LOSS: Primary | ICD-10-CM

## 2024-10-07 DIAGNOSIS — F41.0 ANXIETY ATTACK: ICD-10-CM

## 2024-10-07 DIAGNOSIS — F41.0 PANIC DISORDER WITHOUT AGORAPHOBIA: ICD-10-CM

## 2024-10-07 DIAGNOSIS — G47.00 INSOMNIA, UNSPECIFIED TYPE: ICD-10-CM

## 2024-10-08 RX ORDER — ALPRAZOLAM 0.5 MG/1
0.5 TABLET ORAL 2 TIMES DAILY PRN
Qty: 60 TABLET | Refills: 0 | Status: SHIPPED | OUTPATIENT
Start: 2024-10-08 | End: 2025-01-06

## 2024-10-09 RX ORDER — QUETIAPINE FUMARATE 50 MG/1
50 TABLET, FILM COATED ORAL NIGHTLY
Qty: 30 TABLET | Refills: 1 | Status: SHIPPED | OUTPATIENT
Start: 2024-10-09 | End: 2025-01-07

## 2024-10-17 ENCOUNTER — OFFICE VISIT (OUTPATIENT)
Dept: NEUROLOGY | Facility: CLINIC | Age: 59
End: 2024-10-17
Payer: COMMERCIAL

## 2024-10-17 DIAGNOSIS — M65.4 DE QUERVAIN'S TENOSYNOVITIS, RIGHT: ICD-10-CM

## 2024-10-17 DIAGNOSIS — Z95.828 PORT-A-CATH IN PLACE: ICD-10-CM

## 2024-10-17 DIAGNOSIS — M79.672 LEFT FOOT PAIN: ICD-10-CM

## 2024-10-17 DIAGNOSIS — R94.31 PROLONGED Q-T INTERVAL ON ECG: ICD-10-CM

## 2024-10-17 DIAGNOSIS — J45.909 ASTHMA, UNSPECIFIED ASTHMA SEVERITY, UNSPECIFIED WHETHER COMPLICATED, UNSPECIFIED WHETHER PERSISTENT: ICD-10-CM

## 2024-10-17 DIAGNOSIS — E66.01 CLASS 2 SEVERE OBESITY DUE TO EXCESS CALORIES WITH SERIOUS COMORBIDITY AND BODY MASS INDEX (BMI) OF 35.0 TO 35.9 IN ADULT: ICD-10-CM

## 2024-10-17 DIAGNOSIS — D50.0 IRON DEFICIENCY ANEMIA DUE TO CHRONIC BLOOD LOSS: ICD-10-CM

## 2024-10-17 DIAGNOSIS — M25.619 LIMITED RANGE OF MOTION (ROM) OF SHOULDER: ICD-10-CM

## 2024-10-17 DIAGNOSIS — G25.81 RESTLESS LEG SYNDROME: ICD-10-CM

## 2024-10-17 DIAGNOSIS — K44.9 HIATAL HERNIA: ICD-10-CM

## 2024-10-17 DIAGNOSIS — R07.89 OTHER CHEST PAIN: ICD-10-CM

## 2024-10-17 DIAGNOSIS — M47.817 SPONDYLOSIS OF LUMBOSACRAL REGION, UNSPECIFIED SPINAL OSTEOARTHRITIS COMPLICATION STATUS: ICD-10-CM

## 2024-10-17 DIAGNOSIS — Z90.3 HISTORY OF SLEEVE GASTRECTOMY: Chronic | ICD-10-CM

## 2024-10-17 DIAGNOSIS — Z86.39 HISTORY OF NON ANEMIC VITAMIN B12 DEFICIENCY: ICD-10-CM

## 2024-10-17 DIAGNOSIS — G43.E19 INTRACTABLE CHRONIC MIGRAINE WITH AURA AND WITHOUT STATUS MIGRAINOSUS: Primary | ICD-10-CM

## 2024-10-17 DIAGNOSIS — R91.1 PULMONARY NODULE: ICD-10-CM

## 2024-10-17 DIAGNOSIS — Z79.890 POSTMENOPAUSAL HORMONE REPLACEMENT THERAPY: ICD-10-CM

## 2024-10-17 DIAGNOSIS — R20.2 HAND PARESTHESIA: ICD-10-CM

## 2024-10-17 DIAGNOSIS — R52 INTRACTABLE PAIN: ICD-10-CM

## 2024-10-17 DIAGNOSIS — M45.9 ANKYLOSING SPONDYLITIS, UNSPECIFIED SITE OF SPINE: Chronic | ICD-10-CM

## 2024-10-17 DIAGNOSIS — E88.819 INSULIN RESISTANCE: ICD-10-CM

## 2024-10-17 DIAGNOSIS — N90.5 ATROPHY OF VULVA: ICD-10-CM

## 2024-10-17 DIAGNOSIS — L91.0 KELOID SCAR OF SKIN: ICD-10-CM

## 2024-10-17 DIAGNOSIS — R63.5 WEIGHT GAIN: ICD-10-CM

## 2024-10-17 DIAGNOSIS — F11.90 CHRONIC, CONTINUOUS USE OF OPIOIDS: Chronic | ICD-10-CM

## 2024-10-17 DIAGNOSIS — M79.7 FIBROMYALGIA: Chronic | ICD-10-CM

## 2024-10-17 DIAGNOSIS — E55.9 VITAMIN D DEFICIENCY: ICD-10-CM

## 2024-10-17 DIAGNOSIS — K29.00 ACUTE GASTRITIS WITHOUT HEMORRHAGE, UNSPECIFIED GASTRITIS TYPE: ICD-10-CM

## 2024-10-17 DIAGNOSIS — M25.50 LARGE JOINT ARTHRALGIA OF MULTIPLE SITES: ICD-10-CM

## 2024-10-17 DIAGNOSIS — H83.2X9 VESTIBULAR HYPOFUNCTION, UNSPECIFIED LATERALITY: ICD-10-CM

## 2024-10-17 DIAGNOSIS — M32.9 SYSTEMIC LUPUS ERYTHEMATOSUS, UNSPECIFIED SLE TYPE, UNSPECIFIED ORGAN INVOLVEMENT STATUS: ICD-10-CM

## 2024-10-17 DIAGNOSIS — M75.42 IMPINGEMENT SYNDROME OF LEFT SHOULDER: ICD-10-CM

## 2024-10-17 DIAGNOSIS — E66.812 CLASS 2 SEVERE OBESITY DUE TO EXCESS CALORIES WITH SERIOUS COMORBIDITY AND BODY MASS INDEX (BMI) OF 35.0 TO 35.9 IN ADULT: ICD-10-CM

## 2024-10-17 DIAGNOSIS — I10 PRIMARY HYPERTENSION: ICD-10-CM

## 2024-10-17 DIAGNOSIS — E78.00 PURE HYPERCHOLESTEROLEMIA: ICD-10-CM

## 2024-10-17 DIAGNOSIS — D50.8 OTHER IRON DEFICIENCY ANEMIAS: ICD-10-CM

## 2024-10-17 DIAGNOSIS — F41.9 ANXIETY: ICD-10-CM

## 2024-10-17 DIAGNOSIS — M75.01 ADHESIVE CAPSULITIS OF RIGHT SHOULDER: ICD-10-CM

## 2024-10-17 DIAGNOSIS — K21.9 GASTROESOPHAGEAL REFLUX DISEASE, UNSPECIFIED WHETHER ESOPHAGITIS PRESENT: ICD-10-CM

## 2024-10-17 PROBLEM — R11.2 NAUSEA AND VOMITING: Status: RESOLVED | Noted: 2023-02-15 | Resolved: 2024-10-17

## 2024-10-17 PROBLEM — E87.6 HYPOKALEMIA: Status: RESOLVED | Noted: 2023-02-15 | Resolved: 2024-10-17

## 2024-10-17 PROBLEM — M25.511 ACUTE PAIN OF RIGHT SHOULDER: Status: RESOLVED | Noted: 2022-07-25 | Resolved: 2024-10-17

## 2024-10-17 PROBLEM — R79.89 ELEVATED D-DIMER: Status: RESOLVED | Noted: 2018-09-27 | Resolved: 2024-10-17

## 2024-10-17 PROCEDURE — 99417 PROLNG OP E/M EACH 15 MIN: CPT | Mod: 95,,, | Performed by: PSYCHIATRY & NEUROLOGY

## 2024-10-17 PROCEDURE — 1159F MED LIST DOCD IN RCRD: CPT | Mod: CPTII,95,, | Performed by: PSYCHIATRY & NEUROLOGY

## 2024-10-17 PROCEDURE — 99205 OFFICE O/P NEW HI 60 MIN: CPT | Mod: 95,,, | Performed by: PSYCHIATRY & NEUROLOGY

## 2024-10-17 RX ORDER — TOPIRAMATE 50 MG/1
50 TABLET, FILM COATED ORAL 2 TIMES DAILY
Qty: 180 TABLET | Refills: 3 | Status: SHIPPED | OUTPATIENT
Start: 2024-10-17

## 2024-10-17 RX ORDER — RIZATRIPTAN BENZOATE 10 MG/1
10 TABLET, ORALLY DISINTEGRATING ORAL
Qty: 9 TABLET | Refills: 3 | Status: SHIPPED | OUTPATIENT
Start: 2024-10-17 | End: 2024-11-16

## 2024-10-17 NOTE — PROGRESS NOTES
Subjective:       Patient ID: Tosha Kwok is a 59 y.o. female.    Chief Complaint: Migraine (Nausea )          HPI      The patient presented on 10- for evaluation of headaches and numbness.        The patient used to have classical migraine with aura that started in high school and resolved by 2021. The headaches recurred with vengeance in . The headaches are daily, preceded by visual aura, throbbing, holocephalic, last all day and associated with nausea and light sensitivity. Could not identify triggers. She has taken Dilaudid twice for the headaches. The patient did well in the past on TPM 50 mg BID and Sumatriptan (Imitrex) 100 mg PO PRN. She actually tried old TPM left at home and it did help.       She was also concerned about longstanding hands numbness that started intermittent and has become constant. NCS/EMG in 2020 was reported as normal. Could not link it to taking TPM.          The originating site (patient location) is: Home.      The distant site (neurologist location) is: Neurology Clinic at [facility].      The chief complaint leading to consultation is: HEADACHE, HANDS NUMBNESS.      Visit type: Virtual visit with synchronous audio and video.      Consent: The patient verbally consented to participating in the video visit and informed that may decline to receive medical services by telemedicine and may withdraw from such care at any time.      I discussed with the patient the nature of our telemedicine visits, that:      I  would evaluate the patient and recommend diagnostics and treatments based on my assessment.    Our sessions are not being recorded and that personal health information is protected.    Our team would provide follow up care in person if/when the patient needs it.    Virtual (video/telemedicine) visits have significant limitations. A telemedicine exam is primarily focused on the history and what I can observe. Several critical parts of the neurological  exam cannot be performed.     Review of Systems   Constitutional:  Positive for fatigue. Negative for appetite change.   HENT:  Negative for hearing loss and tinnitus.    Eyes:  Negative for photophobia and visual disturbance.   Respiratory:  Negative for apnea and shortness of breath.    Cardiovascular:  Negative for chest pain and palpitations.   Gastrointestinal:  Negative for nausea and vomiting.   Endocrine: Negative for cold intolerance and heat intolerance.   Genitourinary:  Negative for difficulty urinating and urgency.   Musculoskeletal:  Positive for arthralgias and myalgias. Negative for back pain, gait problem, joint swelling, neck pain and neck stiffness.   Skin:  Negative for color change and rash.   Allergic/Immunologic: Negative for environmental allergies and immunocompromised state.   Neurological:  Positive for headaches. Negative for dizziness, tremors, seizures, syncope, facial asymmetry, speech difficulty, weakness, light-headedness and numbness.   Hematological:  Negative for adenopathy. Does not bruise/bleed easily.   Psychiatric/Behavioral:  Positive for sleep disturbance. Negative for agitation, behavioral problems, confusion, decreased concentration, dysphoric mood, hallucinations, self-injury and suicidal ideas. The patient is nervous/anxious. The patient is not hyperactive.                          Current Outpatient Medications:     albuterol (PROAIR HFA) 90 mcg/actuation inhaler, Inhale 2 puffs into the lungs every 4 hours as needed., Disp: 8.5 g, Rfl: 3    ALPRAZolam (XANAX) 0.5 MG tablet, Take 1 tablet (0.5 mg total) by mouth 2 (two) times daily as needed for Anxiety or Insomnia., Disp: 60 tablet, Rfl: 0    cetirizine (ZYRTEC) 10 MG tablet, Take 1 tablet (10 mg total) by mouth once daily., Disp: 90 tablet, Rfl: 3    cyanocobalamin 1,000 mcg/mL injection, Inject 1 mL (1,000 mcg total) into the muscle every 28 days., Disp: 10 mL, Rfl: 1    heparin sodium,porcine (HEPARIN, PORCINE,)  "1,000 unit/mL injection, 0.5 mLs (500 Units total) by Intra-Catheter route As instructed (inject 0.5 mL into port-a-cath once every 3 months.)., Disp: 10 mL, Rfl: 1    hydroCHLOROthiazide (HYDRODIURIL) 12.5 MG Tab, Take 1 tablet (12.5 mg total) by mouth once daily., Disp: 90 tablet, Rfl: 3    HYDROmorphone (DILAUDID) 2 MG tablet, take 1 tablet by mouth every 6-8 hours as needed for pain, Disp: 105 tablet, Rfl: 0    HYDROmorphone (DILAUDID) 2 MG tablet, Take 1 tablet by mouth every six to eight hours as needed for pain, Disp: 105 tablet, Rfl: 0    HYDROmorphone (DILAUDID) 2 MG tablet, Take 1 tablet (2 mg total) by mouth every 6 to 8 hours as needed., Disp: 105 tablet, Rfl: 0    HYDROmorphone (DILAUDID) 2 MG tablet, Take 1 tablet by mouth every 6-8 hours as needed, Disp: 105 tablet, Rfl: 0    HYDROmorphone (DILAUDID) 2 MG tablet, Take 1 tablet by mouth every 6-8 hours as needed, Disp: 105 tablet, Rfl: 0    HYDROmorphone (DILAUDID) 2 MG tablet, Take 1 tablet by mouth every 6-8 hours as needed, Disp: 105 tablet, Rfl: 0    hydrOXYzine pamoate (VISTARIL) 25 MG Cap, Take 1 capsule (25 mg total) by mouth every 8 (eight) hours as needed (anxiety)., Disp: 90 capsule, Rfl: 3    needle, disp, 16 G 16 gauge x 1" Ndle, 1 Needle by Misc.(Non-Drug; Combo Route) route As instructed (use needle as needed when giving heparin flush through port-a-cath.)., Disp: 100 each, Rfl: 0    promethazine (PHENERGAN) 25 MG tablet, Take 1 tablet (25 mg total) by mouth every 6 (six) hours as needed for Nausea., Disp: 15 tablet, Rfl: 0    QUEtiapine (SEROQUEL) 50 MG tablet, Take 1 tablet (50 mg total) by mouth every evening., Disp: 30 tablet, Rfl: 1    secukinumab (COSENTYX PEN, 2 PENS,) 150 mg/mL PnIj, Inject 150 mg into the skin every 28 days., Disp: 1 mL, Rfl: 5    semaglutide, weight loss, 0.25 mg/0.5 mL PnIj, Inject 0.25 mg into the skin every 7 days., Disp: 2 mL, Rfl: 0    sodium chloride 0.9% (NORMAL SALINE FLUSH) injection, Inject 10 mLs into " the vein As instructed (use normal saline slush as needed for port-a-cath)., Disp: 100 mL, Rfl: 1    tiZANidine (ZANAFLEX) 4 MG tablet, Take 1 tablet by mouth three times a day as needed, May cause drowsiness, use caution/ For muscle spasms, Disp: 270 tablet, Rfl: 3    tiZANidine (ZANAFLEX) 4 MG tablet, Take 1 tablet by mouth every 8 hours as needed, Disp: 90 tablet, Rfl: 5    topiramate (TOPAMAX) 100 MG tablet, Take 1 tablet (100 mg total) by mouth 2 (two) times daily., Disp: 180 tablet, Rfl: 3    triamcinolone acetonide 0.5% (KENALOG) 0.5 % Crea, Apply topically 2 (two) times daily., Disp: 45 g, Rfl: 0    atorvastatin (LIPITOR) 20 MG tablet, Take 1 tablet (20 mg total) by mouth once daily., Disp: 90 tablet, Rfl: 3    EPINEPHrine (EPIPEN 2-DENNIS) 0.3 mg/0.3 mL AtIn, Inject 0.3 mLs (0.3 mg total) into the muscle once. for 1 dose, Disp: 2 each, Rfl: 3    metoprolol succinate (TOPROL-XL) 25 MG 24 hr tablet, Take 1 tablet (25 mg total) by mouth once daily., Disp: 90 tablet, Rfl: 2    pantoprazole (PROTONIX) 40 MG tablet, Take 1 tablet (40 mg total) by mouth 2 (two) times daily., Disp: 60 tablet, Rfl: 1    Past Medical History:   Diagnosis Date    Anemia     Ankylosing spondylitis     Anxiety     Asthma     Cancer 2018    left breast  lumpectomy, xrt    Chronic constipation     Chronic insomnia     Colon polyp     Edema     Fibromyalgia     Hypertension     Insulin resistance     Interstitial cystitis     Lupus     Lupus     Migraine headache     PONV (postoperative nausea and vomiting)     Restless legs syndrome     Shingles     Stroke     post partum right sided numbness    TIA (transient ischemic attack)        Past Surgical History:   Procedure Laterality Date    BLADDER SURGERY      BREAST LUMPECTOMY  2018    BREAST RECONSTRUCTION      BREAST SURGERY  2018    reduction     SECTION      CHOLECYSTECTOMY      COLONOSCOPY N/A 10/2/2020    Procedure: COLONOSCOPY;  Surgeon: Guicho Hood MD;  Location:  [facility];  Service: Endoscopy;  Laterality: N/A;    ESOPHAGOGASTRODUODENOSCOPY N/A 2/16/2023    Procedure: EGD (ESOPHAGOGASTRODUODENOSCOPY);  Surgeon: Belkis Mario MD;  Location: [facility];  Service: Endoscopy;  Laterality: N/A;    EXCISION OF GRANULOMA Bilateral 4/6/2021    Procedure: EXCISION, GRANULOMA SCARS OF BREASTS;  Surgeon: Obed Palmer Jr., MD;  Location: [facility];  Service: Plastics;  Laterality: Bilateral;    HYSTERECTOMY      LASER ABLATION      to back    SLEEVE GASTROPLASTY  05/2016    TOTAL REDUCTION MAMMOPLASTY Bilateral     two years ago    TUBAL LIGATION         Social History     Socioeconomic History    Marital status: Legally    Tobacco Use    Smoking status: Never    Smokeless tobacco: Never   Substance and Sexual Activity    Alcohol use: Yes     Comment: occasionally    Drug use: No    Sexual activity: Yes     Partners: Male     Social Drivers of Health     Financial Resource Strain: Low Risk  (3/7/2024)    Overall Financial Resource Strain (CARDIA)     Difficulty of Paying Living Expenses: Not hard at all   Food Insecurity: No Food Insecurity (3/7/2024)    Hunger Vital Sign     Worried About Running Out of Food in the Last Year: Never true     Ran Out of Food in the Last Year: Never true   Transportation Needs: No Transportation Needs (3/7/2024)    PRAPARE - Transportation     Lack of Transportation (Medical): No     Lack of Transportation (Non-Medical): No   Physical Activity: Insufficiently Active (3/7/2024)    Exercise Vital Sign     Days of Exercise per Week: 3 days     Minutes of Exercise per Session: 30 min   Stress: Stress Concern Present (3/7/2024)    [employer] - Occupational Stress Questionnaire     Feeling of Stress : Very much   Housing Stability: Low Risk  (3/7/2024)    Housing Stability Vital Sign     Unable to Pay for Housing in the Last Year: No     Number of Places Lived in the Last Year: 1     Unstable Housing in the Last  Year: No             Past/Current Medical/Surgical History, Past/Current Social History, Past/Current Family History and Past/Current Medications were reviewed in detail.        Objective:                 GENERAL APPEARANCE:           The patient looks comfortable.    No signs of respiratory distress.    Normal breathing pattern.    No dysmorphic features    Normal eye contact.     GENERAL MEDICAL EXAM:    HEENT:  Head is atraumatic normocephalic.      Neck and Axillae: No JVD. No visible lesions.    Cardiopulmonary: No cyanosis. No tachypnea. Normal respiratory effort.    Gastrointestinal/Urogenital:  No jaundice. No stomas or lesions. No visible hernias. No catheters.     Skin, Hair and Nails: No pathognonomic skin rash. No neurofibromatosis. No visible lesions.No stigmata of autoimmune disease. No clubbing.    Limbs: No varicose veins. No visible swelling.    Muskoskeletal: No visible deformities.No visible lesions.             Neurological Exam  Mental Status  Awake and alert. Oriented to person, place, time and situation. Recent and remote memory are intact. Speech is normal. Language is fluent with no aphasia. Attention and concentration are normal. Fund of knowledge is appropriate for level of education.    Cranial Nerves  CN III, IV, VI: Extraocular movements intact bilaterally. Normal lids and orbits bilaterally.  CN VII: Full and symmetric facial movement.  CN VIII: Hearing is normal.  CN XII: Tongue midline without atrophy or fasciculations.No tongue atrophyTongue without fasciculations    Motor   No abnormal involuntary movements. No pronator drift.No right pronator drift and no left pronator drift.    Lab Results   Component Value Date    WBC 4.72 07/31/2024    HGB 12.8 07/31/2024    HCT 38.6 07/31/2024    MCV 89 07/31/2024     07/31/2024       Sodium   Date Value Ref Range Status   07/31/2024 141 136 - 145 mmol/L Final     Potassium   Date Value Ref Range Status   07/31/2024 3.4 (L) 3.5 - 5.1  mmol/L Final     Chloride   Date Value Ref Range Status   07/31/2024 106 95 - 110 mmol/L Final     CO2   Date Value Ref Range Status   07/31/2024 24 23 - 29 mmol/L Final     Glucose   Date Value Ref Range Status   07/31/2024 95 70 - 110 mg/dL Final     BUN   Date Value Ref Range Status   07/31/2024 12 6 - 20 mg/dL Final     Creatinine   Date Value Ref Range Status   07/31/2024 0.9 0.5 - 1.4 mg/dL Final     Calcium   Date Value Ref Range Status   07/31/2024 9.9 8.7 - 10.5 mg/dL Final     Total Protein   Date Value Ref Range Status   07/31/2024 7.3 6.0 - 8.4 g/dL Final     Albumin   Date Value Ref Range Status   07/31/2024 4.1 3.5 - 5.2 g/dL Final     Total Bilirubin   Date Value Ref Range Status   07/31/2024 0.4 0.1 - 1.0 mg/dL Final     Comment:     For infants and newborns, interpretation of results should be based  on gestational age, weight and in agreement with clinical  observations.    Premature Infant recommended reference ranges:  Up to 24 hours.............<8.0 mg/dL  Up to 48 hours............<12.0 mg/dL  3-5 days..................<15.0 mg/dL  6-29 days.................<15.0 mg/dL       Alkaline Phosphatase   Date Value Ref Range Status   07/31/2024 74 55 - 135 U/L Final     AST   Date Value Ref Range Status   07/31/2024 12 10 - 40 U/L Final     ALT   Date Value Ref Range Status   07/31/2024 11 10 - 44 U/L Final     Anion Gap   Date Value Ref Range Status   07/31/2024 11 8 - 16 mmol/L Final     eGFR if    Date Value Ref Range Status   01/01/2022 >60 >60 mL/min/1.73 m^2 Final     eGFR if non    Date Value Ref Range Status   01/01/2022 >60 >60 mL/min/1.73 m^2 Final     Comment:     Calculation used to obtain the estimated glomerular filtration  rate (eGFR) is the CKD-EPI equation.          Lab Results   Component Value Date    [id number] 316 05/08/2024       Lab Results   Component Value Date    TSH 0.669 08/22/2023    FREET4 0.95 12/08/2020             LABORATORY  EVALUATION      [id number]      CBC, CMP, HA1C, TFT, HIV, HCV, HBV, AID LAWLER Unremarkable      B12 <148-316, Vitamin D 12-22, CHERI +ve      RADIOLOGY EVALUATION       02-    [facility]    NEUROPHYSIOLOGY EVALUATION       09-    NCS/EMG E [facility]    PATHOLOGY EVALUATION        NEUROCOGNITIVE AND NEUROPSYCHOLOGY EVALUATION     Reviewed the neuroimaging independently       Assessment:           1. Intractable chronic migraine with aura and without status migrainosus    2. Atrophy of vulva    3. Ankylosing spondylitis, unspecified site of spine    4. Chronic, continuous use of opioids    5. Fibromyalgia    6. History of sleeve gastrectomy    7. Intractable pain    8. De Quervain's tenosynovitis, right    9. Adhesive capsulitis of right shoulder    10. Anxiety    11. Asthma, unspecified asthma severity, unspecified whether complicated, unspecified whether persistent    12. Spondylosis of lumbosacral region, unspecified spinal osteoarthritis complication status    13. Large joint arthralgia of multiple sites    14. Insulin resistance    15. Primary hypertension    16. Gastroesophageal reflux disease, unspecified whether esophagitis present    17. Pure hypercholesterolemia    18. Systemic lupus erythematosus, unspecified SLE type, unspecified organ involvement status    19. Postmenopausal hormone replacement therapy    20. Vitamin D deficiency    21. Impingement syndrome of left shoulder    22. Limited range of motion (ROM) of shoulder    23. Pulmonary nodule    24. Prolonged Q-T interval on ECG    25. Acute gastritis without hemorrhage, unspecified gastritis type    26. Hiatal hernia    27. Class 2 severe obesity due to excess calories with serious comorbidity and body mass index (BMI) of 35.0 to 35.9 in adult    28. Weight gain    29. History of non anemic vitamin B12 deficiency    30. Port-A-Cath in place    31. Other chest pain    32. Iron deficiency anemia due to chronic blood loss    33. Keloid scar of skin    34.  Other iron deficiency anemias    35. Vestibular hypofunction, unspecified laterality    36. Left foot pain    37. Restless leg syndrome    38. Hand paresthesia          Plan:                    MIGRAINE,CLASSICAL, WITH AURA, EPISODIC, HIGH FREQUENCY         EVALUATION     BRAIN MRI WWO.         MANAGEMENT       HEADACHE DIARY     DISCUSSED THE THREE-FOLD MANAGEMENT OF MIGRAINE:      LIFESTYLE CHANGES:       Good sleep hygiene  Avoid general triggers like lack of sleep/too much sleep, prolonged sun exposure, excessive screen time and specific triggers based on you own diary   Minimize physical and emotional stress  Smoking avoidance and cessation  Limit caffeine drinks to 1-2 a day   Good hydration   Small frequent meals and avoid skipping meals   Moderate 30-minute-long aerobic exercises 3 times/week. Avoid strenuous exercise         ABORTIVE MEDICATIONS (ACUTE-RESCUE MEDICATIONS):     Should only be taken 2-3 times/week to avoid rebound and overuse headaches.    I-explained to the patient that pain meds especially triptans should NOT be taken daily to avoid Rebound Headache and Overuse Headache.    Take at the ONSET of the headache Rizatriptan (Maxalt).10 mg PO  PRN.    Tried Sumatriptan (Imitrex) in the past.    AVOID NARCOTICS (OPIATES)      1. No randomized controlled study shows pain-free results with opioids in the treatment of migraine.     2. The physiologic consequences of opioid use are adverse, occur quickly, and can be permanent. Decreased gray matter, release of calcitonin gene-related peptide, dynorphin, and pro-inflammatory peptides, and activation of excitatory glutamate receptors are all associated with opioid exposure.     3. Opioids are pro-nociceptive, prevent reversal of migraine central sensitization, and interfere with triptan effectiveness.     4.Opioids precipitate bad clinical outcomes, especially transformation to daily headache.     5. They cause disease progression, comorbidity, and  excessive health care consumption.           NEXT OPTIONS:      Gepants: Nuretc (rimegepant)75 mg >Ubrelvy (ubrogepant) 100 mg    Ditans: Reyvow (lasmiditan) 100 mg (No driving due to sedation)    Fioricet without codeine with Reglan.      Prednisone with Reglan.      LAST RESORT:     DHE NS Trudhesa (Max 2 a week)     C/I: concomitant use of vasoconstrictors like Triptans, strong CY inhibitors such as HAART PIs (eg, ritonavir, nelfinavir, or indinavir) and Macrolides (eg, erythromycin or clarithromycin), CAD, PVD, Stroke/TIA and Uncontrolled HTN.  Serious SEs include Vasospasm and Fibrosis (chronic use).               PREVENTATIVE (MORE ACCURATELY MIGRAINE REDUCTION) MEDICATIONS:           Since the patient's headache is very frequent a lengthy discussion about preventative medications was carried out.The patient understands that prevention means DECREASING frequency and severity and NOT elimination.The patient was made aware that any new medication can cause serious allergic reaction.The medication is considered failure only if a therapeutic dose reached and maintained for 6-8 weeks.      Restart Topiramate/Topamax (TPM) slow titration to 50 mg BID which can cause mental slowing, transient tingling, kidney stones, weight loss and rarely glaucoma and visual field defects The patient was encouraged to drink a lot of fluids.       HELPFUL SUPPLEMENTS:     Helpful supplements include Co-Q 10, B2, Mg, Feverfew (Dolovent combination) and butterbur (Petadolex)        NEUROPHARMACOLOGY     NEXT OPTIONS:       Zonisamide/Zonegran (ZNS) 100-400 mg QHS is a good alternative to TPM in case of SE/AE.     Amitriptyline/Elavil (TCA) slow titration to 100-Age which can cause sleepiness, dry eyes, dry mouth, urinary retention, and rarely cardiac arrhythmias    Propranolol/Inderal  (BB)slow titration to 80 mg BID which can cause low blood pressure, slow heart rate, erectile dysfunction, depression, airway obstruction and heart  failure exacerbations. Cannot be used with migraine associate with focal neurological deficits.    Lamotrigine/Lamictal  (LTG)slow titration to 100 mg BID which can cause serious skin rash and rare cardiac arrhythmias. LTG is superior to other therapies for specifically reducing migraine aura.     ANTI-CGRP AGENTS: Qulipta (alogepant) 60 mg QD, Erenumab (Aimovig) 140 mg SQ Pen monthly (Reported cases of Constipation and BP elevation) , Galcanezumab (Emgality) 120 mg SQ Pen monthly after a loading dose of 240 mg  and Fremnezumab (Ajovy) (Ligand Blocker): 225 mg SQ monthly or 675 mg every 3 months     Botox 200 units every 3 months .        LAST RESORT OPTIONS:      Namenda 10 mg BID     Valproic acid/ Depakote         NEUROMODULATION     Cefaly, Relivion, Nerivio and GammaCore (VNS)             BILATERAL HAND NUMBNESS    Brain MRI Demyelinating WWO.    C-Spine MRI Demyelinating WWO.    NCS/EMG BUE.                    MEDICAL/SURGICAL COMORBIDITIES     All relevant medical comorbidities noted and managed by primary care physician and medical care team.          HEALTHY LIFESTYLE AND PREVENTATIVE CARE    The patient to adhere to the age-appropriate health maintenance guidelines including screening tests and vaccinations. The patient to adhere to  healthy lifestyle, optimal weight, exercise, healthy diet, good sleep hygiene and avoiding drugs including smoking, alcohol and recreational drugs.            RTC 3 MONTHS     Rosanna Monson MD, FAAN    Attending Neurologist/Epileptologist         Diplomate, American Board of Psychiatry and Neurology    Diplomate, American Board of Clinical Neurophysiology     Fellow, American Academy of Neurology         I spent a total of 91 minutes on the day of the visit.  This includes face to face time and non-face to face time preparing to see the patient (eg, review of tests), obtaining and/or reviewing separately obtained history, documenting clinical information in the electronic or  other health record, independently interpreting results and communicating results to the patient/family/caregiver, or care coordinator.

## 2024-10-19 ENCOUNTER — HOSPITAL ENCOUNTER (EMERGENCY)
Facility: HOSPITAL | Age: 59
Discharge: HOME | End: 2024-10-19
Attending: EMERGENCY MEDICINE
Payer: COMMERCIAL

## 2024-10-19 VITALS
HEART RATE: 69 BPM | HEIGHT: 62 IN | WEIGHT: 170 LBS | DIASTOLIC BLOOD PRESSURE: 70 MMHG | RESPIRATION RATE: 18 BRPM | TEMPERATURE: 98 F | OXYGEN SATURATION: 98 % | BODY MASS INDEX: 31.28 KG/M2 | SYSTOLIC BLOOD PRESSURE: 126 MMHG

## 2024-10-19 DIAGNOSIS — K52.9 GASTROENTERITIS: Primary | ICD-10-CM

## 2024-10-19 DIAGNOSIS — E87.6 LOW BLOOD POTASSIUM: ICD-10-CM

## 2024-10-19 DIAGNOSIS — R10.12 LEFT UPPER QUADRANT ABDOMINAL PAIN: ICD-10-CM

## 2024-10-19 LAB
ALBUMIN SERPL BCP-MCNC: 4 G/DL (ref 3.5–5.2)
ALP SERPL-CCNC: 69 U/L (ref 40–150)
ALT SERPL W/O P-5'-P-CCNC: 15 U/L (ref 10–44)
ANION GAP SERPL CALC-SCNC: 13 MMOL/L (ref 8–16)
AST SERPL-CCNC: 13 U/L (ref 10–40)
BASOPHILS # BLD AUTO: 0.01 K/UL (ref 0–0.2)
BASOPHILS NFR BLD: 0.2 % (ref 0–1.9)
BILIRUB SERPL-MCNC: 0.2 MG/DL (ref 0.1–1)
BILIRUB UR QL STRIP: NEGATIVE
BUN SERPL-MCNC: 18 MG/DL (ref 6–20)
CALCIUM SERPL-MCNC: 8.9 MG/DL (ref 8.7–10.5)
CHLORIDE SERPL-SCNC: 110 MMOL/L (ref 95–110)
CLARITY UR REFRACT.AUTO: CLEAR
CO2 SERPL-SCNC: 19 MMOL/L (ref 23–29)
COLOR UR AUTO: YELLOW
CREAT SERPL-MCNC: 0.8 MG/DL (ref 0.5–1.4)
DIFFERENTIAL METHOD BLD: ABNORMAL
EOSINOPHIL # BLD AUTO: 0 K/UL (ref 0–0.5)
EOSINOPHIL NFR BLD: 0 % (ref 0–8)
ERYTHROCYTE [DISTWIDTH] IN BLOOD BY AUTOMATED COUNT: 13.9 % (ref 11.5–14.5)
EST. GFR  (NO RACE VARIABLE): >60 ML/MIN/1.73 M^2
GLUCOSE SERPL-MCNC: 125 MG/DL (ref 70–110)
GLUCOSE UR QL STRIP: NEGATIVE
HCT VFR BLD AUTO: 35.1 % (ref 37–48.5)
HGB BLD-MCNC: 12.2 G/DL (ref 12–16)
HGB UR QL STRIP: NEGATIVE
IMM GRANULOCYTES # BLD AUTO: 0.01 K/UL (ref 0–0.04)
IMM GRANULOCYTES NFR BLD AUTO: 0.2 % (ref 0–0.5)
KETONES UR QL STRIP: ABNORMAL
LEUKOCYTE ESTERASE UR QL STRIP: NEGATIVE
LIPASE SERPL-CCNC: 32 U/L (ref 4–60)
LYMPHOCYTES # BLD AUTO: 0.8 K/UL (ref 1–4.8)
LYMPHOCYTES NFR BLD: 15.2 % (ref 18–48)
MCH RBC QN AUTO: 29.5 PG (ref 27–31)
MCHC RBC AUTO-ENTMCNC: 34.8 G/DL (ref 32–36)
MCV RBC AUTO: 85 FL (ref 82–98)
MONOCYTES # BLD AUTO: 0.1 K/UL (ref 0.3–1)
MONOCYTES NFR BLD: 1.5 % (ref 4–15)
NEUTROPHILS # BLD AUTO: 4.4 K/UL (ref 1.8–7.7)
NEUTROPHILS NFR BLD: 82.9 % (ref 38–73)
NITRITE UR QL STRIP: NEGATIVE
NRBC BLD-RTO: 0 /100 WBC
PH UR STRIP: 8 [PH] (ref 5–8)
PLATELET # BLD AUTO: 295 K/UL (ref 150–450)
PMV BLD AUTO: 9.4 FL (ref 9.2–12.9)
POTASSIUM SERPL-SCNC: 3.3 MMOL/L (ref 3.5–5.1)
PROT SERPL-MCNC: 7 G/DL (ref 6–8.4)
PROT UR QL STRIP: ABNORMAL
RBC # BLD AUTO: 4.14 M/UL (ref 4–5.4)
SODIUM SERPL-SCNC: 142 MMOL/L (ref 136–145)
SP GR UR STRIP: 1.02 (ref 1–1.03)
URN SPEC COLLECT METH UR: ABNORMAL
UROBILINOGEN UR STRIP-ACNC: <2 EU/DL
WBC # BLD AUTO: 5.27 K/UL (ref 3.9–12.7)

## 2024-10-19 PROCEDURE — 99285 EMERGENCY DEPT VISIT HI MDM: CPT | Mod: 25,ER

## 2024-10-19 PROCEDURE — 96365 THER/PROPH/DIAG IV INF INIT: CPT | Mod: ER

## 2024-10-19 PROCEDURE — 25000003 PHARM REV CODE 250: Mod: ER | Performed by: EMERGENCY MEDICINE

## 2024-10-19 PROCEDURE — 63600175 PHARM REV CODE 636 W HCPCS: Mod: ER | Performed by: EMERGENCY MEDICINE

## 2024-10-19 PROCEDURE — 96375 TX/PRO/DX INJ NEW DRUG ADDON: CPT | Mod: ER

## 2024-10-19 PROCEDURE — 85025 COMPLETE CBC W/AUTO DIFF WBC: CPT | Mod: ER | Performed by: EMERGENCY MEDICINE

## 2024-10-19 PROCEDURE — 96376 TX/PRO/DX INJ SAME DRUG ADON: CPT | Mod: ER

## 2024-10-19 PROCEDURE — 80053 COMPREHEN METABOLIC PANEL: CPT | Mod: ER | Performed by: EMERGENCY MEDICINE

## 2024-10-19 PROCEDURE — 83690 ASSAY OF LIPASE: CPT | Mod: ER | Performed by: EMERGENCY MEDICINE

## 2024-10-19 PROCEDURE — 81003 URINALYSIS AUTO W/O SCOPE: CPT | Mod: ER | Performed by: EMERGENCY MEDICINE

## 2024-10-19 RX ORDER — PROMETHAZINE HYDROCHLORIDE 6.25 MG/5ML
25 SYRUP ORAL EVERY 6 HOURS PRN
Qty: 400 ML | Refills: 0 | Status: SHIPPED | OUTPATIENT
Start: 2024-10-19 | End: 2024-10-24

## 2024-10-19 RX ORDER — DICYCLOMINE HYDROCHLORIDE 20 MG/1
20 TABLET ORAL 4 TIMES DAILY PRN
Qty: 28 TABLET | Refills: 0 | Status: SHIPPED | OUTPATIENT
Start: 2024-10-19

## 2024-10-19 RX ORDER — PROMETHAZINE HYDROCHLORIDE 25 MG/1
25 SUPPOSITORY RECTAL EVERY 6 HOURS PRN
Qty: 10 SUPPOSITORY | Refills: 0 | Status: SHIPPED | OUTPATIENT
Start: 2024-10-19

## 2024-10-19 RX ORDER — POTASSIUM CHLORIDE 20 MEQ/1
40 TABLET, EXTENDED RELEASE ORAL
Status: COMPLETED | OUTPATIENT
Start: 2024-10-19 | End: 2024-10-19

## 2024-10-19 RX ORDER — HYDROMORPHONE HYDROCHLORIDE 1 MG/ML
1 INJECTION, SOLUTION INTRAMUSCULAR; INTRAVENOUS; SUBCUTANEOUS
Status: COMPLETED | OUTPATIENT
Start: 2024-10-19 | End: 2024-10-19

## 2024-10-19 RX ORDER — HEPARIN 100 UNIT/ML
5 SYRINGE INTRAVENOUS
Status: COMPLETED | OUTPATIENT
Start: 2024-10-19 | End: 2024-10-19

## 2024-10-19 RX ADMIN — POTASSIUM CHLORIDE 40 MEQ: 1500 TABLET, EXTENDED RELEASE ORAL at 09:10

## 2024-10-19 RX ADMIN — PROMETHAZINE HYDROCHLORIDE 12.5 MG: 25 INJECTION INTRAMUSCULAR; INTRAVENOUS at 06:10

## 2024-10-19 RX ADMIN — HEPARIN 500 UNITS: 100 SYRINGE at 09:10

## 2024-10-19 RX ADMIN — HYDROMORPHONE HYDROCHLORIDE 1 MG: 1 INJECTION, SOLUTION INTRAMUSCULAR; INTRAVENOUS; SUBCUTANEOUS at 09:10

## 2024-10-19 RX ADMIN — PROMETHAZINE HYDROCHLORIDE 12.5 MG: 25 INJECTION INTRAMUSCULAR; INTRAVENOUS at 09:10

## 2024-10-19 RX ADMIN — HYDROMORPHONE HYDROCHLORIDE 1 MG: 1 INJECTION, SOLUTION INTRAMUSCULAR; INTRAVENOUS; SUBCUTANEOUS at 06:10

## 2024-10-19 NOTE — DISCHARGE INSTRUCTIONS
Take frequent sips of Pedialyte, Powerade, Gatorade.  Popsicles.  [place] diet, bananas, breads, rice.  Avoid red sauces, fruit juices, and dairy products as can irritate your stomach.  If drinking water, be sure to have something salty such as crackers to help restore electrolytes.  Return to emergency department for: Weakness, passing out, blood in stool, blood in vomit, fever, worsening abdominal pain, or other concerns.

## 2024-10-24 ENCOUNTER — PROCEDURE VISIT (OUTPATIENT)
Dept: NEUROLOGY | Facility: CLINIC | Age: 59
End: 2024-10-24
Payer: COMMERCIAL

## 2024-10-24 DIAGNOSIS — G43.E19 INTRACTABLE CHRONIC MIGRAINE WITH AURA AND WITHOUT STATUS MIGRAINOSUS: ICD-10-CM

## 2024-10-24 DIAGNOSIS — R20.2 HAND PARESTHESIA: ICD-10-CM

## 2024-10-24 PROCEDURE — 95911 NRV CNDJ TEST 9-10 STUDIES: CPT | Mod: S$GLB,,, | Performed by: PSYCHIATRY & NEUROLOGY

## 2024-10-25 ENCOUNTER — TELEPHONE (OUTPATIENT)
Dept: NEUROLOGY | Facility: CLINIC | Age: 59
End: 2024-10-25
Payer: COMMERCIAL

## 2024-10-25 NOTE — TELEPHONE ENCOUNTER
----- Message from Rosanna Monson MD sent at 10/25/2024 10:31 AM CDT -----      10-    NCS/EMG MARY NL

## 2024-10-25 NOTE — PROCEDURES
[facility]   Aurelia Cabrera  Department of Neurology  [address] [place] [address] IRWIN Grant  [id number]  Phone [phone]     Fax  [phone]        Full Name: Tosha Kwok Gender: Female  Patient ID: [id number] YOB: 1965      Visit Date: 10/24/2024 4:03 PM  Age: 59 Years  Examining Physician: Rosanna Monson M.D.  Referring Physician: Rosanna Monson M.D.  Technician: SHAWNA Monroe  History: CTS workup.  C/O: Longstanding hand numbness.  Extensive PMHX including, but not limited to: SLE, s/p sleeve gastrectomy, HTN, Vitamin D deficiency,insulin resistance, HLD, GERD, RLS, migraine.    SUMMARY      Nerve conduction studies were performed in the left and right upper extremities. The left median motor study recording the abductor pollicis brevis showed a normal amplitude, normal distal latency and normal conduction velocity. The left ulnar motor study recording the abductor digiti minimi showed a normal amplitude, normal distal latency and normal conduction velocity. No conduction block or focal slowing was present across the elbow.     The left median sensory response recording digit two showed a normal amplitude, latency and conduction velocity. The left ulnar sensory response recording digit five showed a normal amplitude, latency and conduction velocity. The left radial sensory response recording over the extensor snuff box showed a normal amplitude, latency and conduction velocity.     As routine median motor and sensory studies have a false negative rate of 25%, additional internal comparison studies were done to assess for possible median neuropathy at the wrist. These internal comparison studies (median vs. ulnar palmar mixed; median vs. ulnar sensory recording digit four; median vs. ulnar motor studies to the second lumbrical / interosseous; median vs. radial sensory studies recording digit one; median segmental sensory studies comparing the wrist-palm and palm-digit  velocities) increase the electrodiagnostic sensitivity rate to 95%. However, due to statistical issues with multiple tests, it is required that at least two studies are abnormal to reduce the false positive rate to acceptable levels. Left median-ulnar mixed palmar latencies showed no significant difference.    The right median motor study recording the abductor pollicis brevis showed a normal amplitude, normal distal latency and normal conduction velocity.        The right median sensory response recording digit two showed a normal amplitude, latency and conduction velocity. The right ulnar sensory response recording digit five showed a normal amplitude, latency and conduction velocity.     As routine median motor and sensory studies have a false negative rate of 25%, additional internal comparison studies were done to assess for possible median neuropathy at the wrist. These internal comparison studies (median vs. ulnar palmar mixed; median vs. ulnar sensory recording digit four; median vs. ulnar motor studies to the second lumbrical / interosseous; median vs. radial sensory studies recording digit one; median segmental sensory studies comparing the wrist-palm and palm-digit velocities) increase the electrodiagnostic sensitivity rate to 95%. However, due to statistical issues with multiple tests, it is required that at least two studies are abnormal to reduce the false positive rate to acceptable levels. Right median-ulnar mixed palmar latencies showed no significant difference.         IMPRESSION       This is a normal study.     There is no electrophysiologic evidence of median mononeuropathy across the wrist (carpal tunnel syndrome) on either side. In addition, there is no electrophysiologic evidence of brachial plexopathy or other entrapment mononeuropathy in either upper extremity.  ---------------------------------------             Rosanna Monson M.D., F.A.A.N.      Diplomate, American Board of Psychiatry and  Neurology  Diplomate, American Board of Clinical Neurophysiology  Fellow, American Academy of Neurology        SENSORY NCS      Nerve / Sites Rec. Site Peak NP Amp PP Amp Dist Bill d Lat.2     ms µV µV cm m/s ms   L Median - Dig II      Wrist II 2.73 15.8 20.5 13 60.0 2.73      Ref.  3.60  15.0  48.0    L Median - Ulnar - Palmar      Median Wrist 1.88 34.5 33.4 8 57.3 1.88      Ulnar Wrist 2.06 9.4 15.0 8 1280.0 0.19   L Ulnar - Dig V      Wrist Dig V 2.79 12.5 13.9 11 54.4 2.79      Ref.  3.10  10.0  48.0    L Radial - Snuff box      Forearm Snuff box 2.33 21.2 22.7 10 55.2 2.33      Ref.  2.70  14.0      R Median - Dig II      Wrist II 2.77 17.7 19.3 13 57.8 2.77      Ref.  3.60  15.0  48.0    R Median - Ulnar - Palmar      Median Wrist 1.94 39.1 59.6 8 53.3 1.94      Ulnar Wrist 2.06 14.0 21.1 8 1920.0 0.12   R Ulnar - Dig V      Wrist Dig V 2.58 8.3 10.6 11 52.8 2.58      Ref.  3.10  10.0  48.0        MOTOR NCS      Nerve / Sites Rec. Site Lat Amp Dist Bill     ms mV cm m/s   L Median - APB      Wrist APB 2.65 12.1 8       Ref.  4.00 6.0        Elbow APB 5.81 10.0 19 60.0      Ref.     48.0   L Ulnar - ADM      Wrist ADM 2.88 12.8 8       Ref.  3.10 7.0        B.Elbow ADM 5.40 12.3 20 79.3      Ref.     50.0      A.Elbow ADM 7.08 11.3 10 59.3   R Median - APB      Wrist APB 2.85 11.5 8       Ref.  4.00 6.0        Elbow APB 6.69 11.1 20.5 53.5      Ref.     48.0                         ---------------------------------------             Amer Ermias, M.D., F.A.A.N.      Diplomate, American Board of Psychiatry and Neurology  Diplomate, American Board of Clinical Neurophysiology  Fellow, American Academy of Neurology

## 2024-11-01 ENCOUNTER — LAB VISIT (OUTPATIENT)
Dept: LAB | Facility: HOSPITAL | Age: 59
End: 2024-11-01
Attending: INTERNAL MEDICINE
Payer: COMMERCIAL

## 2024-11-01 DIAGNOSIS — Z86.39 HISTORY OF NON ANEMIC VITAMIN B12 DEFICIENCY: ICD-10-CM

## 2024-11-01 DIAGNOSIS — G43.E19 INTRACTABLE CHRONIC MIGRAINE WITH AURA AND WITHOUT STATUS MIGRAINOSUS: ICD-10-CM

## 2024-11-01 DIAGNOSIS — D50.0 IRON DEFICIENCY ANEMIA DUE TO CHRONIC BLOOD LOSS: ICD-10-CM

## 2024-11-01 DIAGNOSIS — R20.2 HAND PARESTHESIA: ICD-10-CM

## 2024-11-01 LAB
BASOPHILS # BLD AUTO: 0.06 K/UL (ref 0–0.2)
BASOPHILS NFR BLD: 0.8 % (ref 0–1.9)
CREAT SERPL-MCNC: 0.8 MG/DL (ref 0.5–1.4)
DIFFERENTIAL METHOD BLD: ABNORMAL
EOSINOPHIL # BLD AUTO: 0.2 K/UL (ref 0–0.5)
EOSINOPHIL NFR BLD: 2.3 % (ref 0–8)
ERYTHROCYTE [DISTWIDTH] IN BLOOD BY AUTOMATED COUNT: 13.9 % (ref 11.5–14.5)
EST. GFR  (NO RACE VARIABLE): >60 ML/MIN/1.73 M^2
HCT VFR BLD AUTO: 39.1 % (ref 37–48.5)
HGB BLD-MCNC: 13.3 G/DL (ref 12–16)
IMM GRANULOCYTES # BLD AUTO: 0.02 K/UL (ref 0–0.04)
IMM GRANULOCYTES NFR BLD AUTO: 0.3 % (ref 0–0.5)
LYMPHOCYTES # BLD AUTO: 1.8 K/UL (ref 1–4.8)
LYMPHOCYTES NFR BLD: 24.5 % (ref 18–48)
MCH RBC QN AUTO: 29.8 PG (ref 27–31)
MCHC RBC AUTO-ENTMCNC: 34 G/DL (ref 32–36)
MCV RBC AUTO: 88 FL (ref 82–98)
MONOCYTES # BLD AUTO: 0.3 K/UL (ref 0.3–1)
MONOCYTES NFR BLD: 3.9 % (ref 4–15)
NEUTROPHILS # BLD AUTO: 5.1 K/UL (ref 1.8–7.7)
NEUTROPHILS NFR BLD: 68.2 % (ref 38–73)
NRBC BLD-RTO: 0 /100 WBC
PLATELET # BLD AUTO: 304 K/UL (ref 150–450)
PMV BLD AUTO: 9.5 FL (ref 9.2–12.9)
RBC # BLD AUTO: 4.47 M/UL (ref 4–5.4)
WBC # BLD AUTO: 7.51 K/UL (ref 3.9–12.7)

## 2024-11-01 PROCEDURE — 85025 COMPLETE CBC W/AUTO DIFF WBC: CPT | Mod: PO | Performed by: INTERNAL MEDICINE

## 2024-11-01 PROCEDURE — 82728 ASSAY OF FERRITIN: CPT | Performed by: INTERNAL MEDICINE

## 2024-11-01 PROCEDURE — 82607 VITAMIN B-12: CPT | Performed by: INTERNAL MEDICINE

## 2024-11-01 PROCEDURE — 36415 COLL VENOUS BLD VENIPUNCTURE: CPT | Mod: PO | Performed by: INTERNAL MEDICINE

## 2024-11-01 PROCEDURE — 84466 ASSAY OF TRANSFERRIN: CPT | Performed by: INTERNAL MEDICINE

## 2024-11-01 PROCEDURE — 82565 ASSAY OF CREATININE: CPT | Mod: PO | Performed by: PSYCHIATRY & NEUROLOGY

## 2024-11-02 LAB
FERRITIN SERPL-MCNC: 822 NG/ML (ref 20–300)
IRON SERPL-MCNC: 45 UG/DL (ref 30–160)
SATURATED IRON: 13 % (ref 20–50)
TOTAL IRON BINDING CAPACITY: 343 UG/DL (ref 250–450)
TRANSFERRIN SERPL-MCNC: 232 MG/DL (ref 200–375)
VIT B12 SERPL-MCNC: 528 PG/ML (ref 210–950)

## 2024-11-03 NOTE — PROGRESS NOTES
PATIENT: Tosha Kwok  MRN: [id number]  DATE: 11/5/2024    Diagnosis:   1. Iron deficiency anemia due to chronic blood loss    2. History of sleeve gastrectomy    3. History of non anemic vitamin B12 deficiency      Chief Complaint: Anemia    Telemedicine visit:  The patient location is: home  The chief complaint leading to consultation is: anemia    Visit type: audiovisual    Face to Face time with patient: 5 minutes  10 minutes of total time spent on the encounter, which includes face to face time and non-face to face time preparing to see the patient (eg, review of tests), Obtaining and/or reviewing separately obtained history, Documenting clinical information in the electronic or other health record, Independently interpreting results (not separately reported) and communicating results to the patient/family/caregiver, or Care coordination (not separately reported).     Each patient to whom he or she provides medical services by telemedicine is:  (1) informed of the relationship between the physician and patient and the respective role of any other health care provider with respect to management of the patient; and (2) notified that he or she may decline to receive medical services by telemedicine and may withdraw from such care at any time.    Notes: see below    Subjective:    History of Present Illness: Ms. Kwok is a 59 y.o. female who presented in March 2021 for evaluation and management of anemia. She was referred by Dr. Gibson.    - labs since 2012 reveal an intermittent, mild normocytic anemia  - iron studies in December 2020 and March 2021 reveal a decreased ferritin/iron/saturated iron with increased total iron binding capacity.  - She had a documented decreased B12 level on 6/11/19.  - she has been taking oral iron for two years. She endorses constipation from this.  - she had a hysterectomy in 2018  - she has a history of sleeve gastrectomy in 2016.  - she received ferric carboxymaltose x 2 doses in  April/May 2021.  - she received ferric carboxymaltose x 2 doses in 2023.    - she lives near [place] now and would like labs there.      Interval history:  - she presents for a follow-up appointment for her anemia.   - today, she endorses fatigue. She denies shortness of breath, chest pain, nausea, vomiting, diarrhea, constipation.  - she is taking B12 injections  - she was unable to get port flushing equipment for at-home port flushes.        Past medical, surgical, family, and social histories have been reviewed and updated below.    Past Medical History:   Past Medical History:   Diagnosis Date    Anemia     Ankylosing spondylitis     Anxiety     Asthma     Cancer 2018    left breast  lumpectomy, xrt    Chronic constipation     Chronic insomnia     Colon polyp     Edema     Fibromyalgia     Hypertension     Insulin resistance     Interstitial cystitis     Lupus     Lupus     Migraine headache     PONV (postoperative nausea and vomiting)     Restless legs syndrome     Shingles     Stroke     post partum right sided numbness    TIA (transient ischemic attack)        Past Surgical History:   Past Surgical History:   Procedure Laterality Date    BLADDER SURGERY      BREAST LUMPECTOMY  2018    BREAST RECONSTRUCTION      BREAST SURGERY  2018    reduction     SECTION      CHOLECYSTECTOMY      COLONOSCOPY N/A 10/2/2020    Procedure: COLONOSCOPY;  Surgeon: Guicho Hood MD;  Location: [facility];  Service: Endoscopy;  Laterality: N/A;    ESOPHAGOGASTRODUODENOSCOPY N/A 2023    Procedure: EGD (ESOPHAGOGASTRODUODENOSCOPY);  Surgeon: Belkis Mario MD;  Location: [facility];  Service: Endoscopy;  Laterality: N/A;    EXCISION OF GRANULOMA Bilateral 2021    Procedure: EXCISION, GRANULOMA SCARS OF BREASTS;  Surgeon: Obed Palmer Jr., MD;  Location: [facility];  Service: Plastics;  Laterality: Bilateral;    HYSTERECTOMY      LASER ABLATION      to back    SLEEVE GASTROPLASTY   05/2016    TOTAL REDUCTION MAMMOPLASTY Bilateral     two years ago    TUBAL LIGATION         Family History:   Family History   Problem Relation Name Age of Onset    Hypertension Mother      Hypertension Brother      Breast cancer Maternal Aunt          x2     Heart attack Father 40     Hypertension Sister      Lupus Sister      Breast cancer Maternal Grandmother         Social History:  reports that she has never smoked. She has never used smokeless tobacco. She reports current alcohol use. She reports that she does not use drugs.    Allergies:  Review of patient's allergies indicates:   Allergen Reactions    Carrot Swelling     angioedema    Morphine Anxiety     Hives   States she can take dilaudid and percocet without any problems    Clindamycin     Macrolide antibiotics     Erythromycin Other (See Comments)     Hives and cramps     Zofran (as hydrochloride) [ondansetron hcl] Hives     Local hive after IV injection       Medications:  Current Outpatient Medications   Medication Sig Dispense Refill    albuterol (PROAIR HFA) 90 mcg/actuation inhaler Inhale 2 puffs into the lungs every 4 hours as needed. 8.5 g 3    ALPRAZolam (XANAX) 0.5 MG tablet Take 1 tablet (0.5 mg total) by mouth 2 (two) times daily as needed for Anxiety or Insomnia. 60 tablet 0    atorvastatin (LIPITOR) 20 MG tablet Take 1 tablet (20 mg total) by mouth once daily. 90 tablet 3    cetirizine (ZYRTEC) 10 MG tablet Take 1 tablet (10 mg total) by mouth once daily. 90 tablet 3    cyanocobalamin 1,000 mcg/mL injection Inject 1 mL (1,000 mcg total) into the muscle every 28 days. 10 mL 1    dicyclomine (BENTYL) 20 mg tablet Take 1 tablet (20 mg total) by mouth 4 (four) times daily as needed (abdominal pain). 28 tablet 0    EPINEPHrine (EPIPEN 2-DENNIS) 0.3 mg/0.3 mL AtIn Inject 0.3 mLs (0.3 mg total) into the muscle once. for 1 dose 2 each 3    heparin sodium,porcine (HEPARIN, PORCINE,) 1,000 unit/mL injection 0.5 mLs (500 Units total) by Intra-Catheter  "route As instructed (inject 0.5 mL into port-a-cath once every 3 months.). 10 mL 1    HYDROmorphone (DILAUDID) 2 MG tablet as needed; 1 tablet by mouth every 6-8 hours 105 tablet 0    hydrOXYzine pamoate (VISTARIL) 25 MG Cap Take 1 capsule (25 mg total) by mouth every 8 (eight) hours as needed (anxiety). 90 capsule 3    metoprolol succinate (TOPROL-XL) 25 MG 24 hr tablet Take 1 tablet (25 mg total) by mouth once daily. 90 tablet 2    needle, disp, 16 G 16 gauge x 1" Ndle 1 Needle by Misc.(Non-Drug; Combo Route) route As instructed (use needle as needed when giving heparin flush through port-a-cath.). 100 each 0    pantoprazole (PROTONIX) 40 MG tablet Take 1 tablet (40 mg total) by mouth 2 (two) times daily. 60 tablet 1    promethazine (PHENERGAN) 25 MG suppository Place 1 suppository (25 mg total) rectally every 6 (six) hours as needed for Nausea. 10 suppository 0    promethazine (PHENERGAN) 25 MG tablet Take 1 tablet (25 mg total) by mouth every 6 (six) hours as needed for Nausea. 15 tablet 0    QUEtiapine (SEROQUEL) 50 MG tablet Take 1 tablet (50 mg total) by mouth every evening. 30 tablet 1    rizatriptan (MAXALT-MLT) 10 MG disintegrating tablet Take 1 tablet (10 mg total) by mouth as needed for Migraine (Max 2-3 times a week). May repeat in 2 hours if needed 9 tablet 3    sodium chloride 0.9% (NORMAL SALINE FLUSH) injection Inject 10 mLs into the vein As instructed (use normal saline slush as needed for port-a-cath). 100 mL 1    tiZANidine (ZANAFLEX) 4 MG tablet Take 1 tablet by mouth every 8 hours as needed 90 tablet 5    topiramate (TOPAMAX) 50 MG tablet Take 1 tablet (50 mg total) by mouth 2 (two) times daily. 180 tablet 3    triamcinolone acetonide 0.5% (KENALOG) 0.5 % Crea Apply topically 2 (two) times daily. 45 g 0     No current facility-administered medications for this visit.       Review of Systems   Constitutional:  Positive for fatigue.   HENT:  Negative for sore throat.    Eyes:  Negative for visual " disturbance.   Respiratory:  Negative for cough and shortness of breath.    Cardiovascular:  Negative for chest pain.   Gastrointestinal:  Negative for abdominal pain, diarrhea, nausea and vomiting.   Genitourinary:  Negative for dysuria.   Musculoskeletal:  Positive for arthralgias. Negative for back pain.   Skin:  Negative for rash.        itching   Neurological:  Negative for headaches.   Hematological:  Negative for adenopathy.   Psychiatric/Behavioral:  The patient is not nervous/anxious.      ECOG Performance Status:   ECOG SCORE 1     Objective:      Vitals:   There were no vitals filed for this visit.    BMI: There is no height or weight on file to calculate BMI.  Deferred due to telemedicine visit.      Physical Exam   Deferred due to telemedicine visit.    Laboratory Data:  Labs have been reviewed.    Lab Results   Component Value Date    WBC 7.51 11/01/2024    HGB 13.3 11/01/2024    HCT 39.1 11/01/2024    MCV 88 11/01/2024     11/01/2024           Imaging:    Assessment:       1. Iron deficiency anemia due to chronic blood loss    2. History of sleeve gastrectomy    3. History of non anemic vitamin B12 deficiency           Plan:     1. Iron deficiency anemia / history of B12 deficiency  - I have reviewed her chart  - labs since 2012 reveal an intermittent, mild normocytic anemia  - iron studies in December 2020 and March 2021 reveal a decreased ferritin/iron/saturated iron with increased total iron binding capacity.  - She had a documented decreased B12 level on 6/11/19. She has a history of sleeve gastrectomy and takes B12 injections  - cause of iron deficiency may be history of abnormal uterine bleeding (had a hysterectomy in 2018) or poor absorption in setting of gastric sleeve  - she has been taking oral iron for almost 2 years without significant improvement in her iron studies and symptoms.  - I recommend ferric carboxymaltose x 2 doses  - she required a port-a-cath placement due to  difficulty finding veins.  - she received ferric carboxymaltose x 2 doses in April/May 2021.  - she received ferric carboxymaltose x 2 doses in December 2023.  - she missed her follow-up labs. She lives near [place] now and would like labs there  - Labs have been reviewed. Hemoglobin is 13.3 g/dL. Ferritin is elevated 822 ng/mL. B12 is 528 pg/mL  - return to clinic in 6 months with repeat labs.    2. Lupus  - on hydroxychloroquine  - has drug-induced neutropenia on intermittent labs.  - defer to rheumatology    3. History of gastric sleeve / B12 deficiency  - had a gastric sleeve in 2016  - B12 level in September 2021 revealed B12 deficiency  - Labs have been reviewed. B12 is 528 pg/mL  - continue B12 injections  - return to clinic in 6 months with repeat labs.    4. Advance Care Planning     Power of   After our discussion (at previous visit), the patient decided to complete a HCPOA and appointed her   , health care agent:  Dimitry Kwok ([phone]) .        - return to clinic in 6 months with repeat labs.    Jose Manuel Marino M.D.  Hematology/Oncology  [facility] - [address], [address]  [place] [address]  Phone: [phone]  Fax: [phone]

## 2024-11-05 ENCOUNTER — OFFICE VISIT (OUTPATIENT)
Dept: HEMATOLOGY/ONCOLOGY | Facility: CLINIC | Age: 59
End: 2024-11-05
Payer: COMMERCIAL

## 2024-11-05 DIAGNOSIS — Z90.3 HISTORY OF SLEEVE GASTRECTOMY: ICD-10-CM

## 2024-11-05 DIAGNOSIS — Z86.39 HISTORY OF NON ANEMIC VITAMIN B12 DEFICIENCY: ICD-10-CM

## 2024-11-05 DIAGNOSIS — D50.0 IRON DEFICIENCY ANEMIA DUE TO CHRONIC BLOOD LOSS: Primary | ICD-10-CM

## 2024-11-05 PROCEDURE — 1160F RVW MEDS BY RX/DR IN RCRD: CPT | Mod: CPTII,95,, | Performed by: INTERNAL MEDICINE

## 2024-11-05 PROCEDURE — 1159F MED LIST DOCD IN RCRD: CPT | Mod: CPTII,95,, | Performed by: INTERNAL MEDICINE

## 2024-11-05 PROCEDURE — 99214 OFFICE O/P EST MOD 30 MIN: CPT | Mod: 95,,, | Performed by: INTERNAL MEDICINE

## 2024-11-05 NOTE — Clinical Note
1. Schedule cbc, ferritin, iron/tibc, B12 in 6 months at [facility].  2. Schedule virtual visit in 6 months.   Thanks!

## 2024-11-11 DIAGNOSIS — F41.0 ANXIETY ATTACK: ICD-10-CM

## 2024-11-11 DIAGNOSIS — G47.00 INSOMNIA, UNSPECIFIED TYPE: ICD-10-CM

## 2024-11-11 DIAGNOSIS — F41.0 PANIC DISORDER WITHOUT AGORAPHOBIA: ICD-10-CM

## 2024-11-11 RX ORDER — ALPRAZOLAM 0.5 MG/1
0.5 TABLET ORAL 2 TIMES DAILY PRN
Qty: 60 TABLET | Refills: 0 | Status: SHIPPED | OUTPATIENT
Start: 2024-11-11 | End: 2025-02-09

## 2024-11-18 DIAGNOSIS — Z79.899 HIGH RISK MEDICATION USE: Primary | ICD-10-CM

## 2024-12-10 NOTE — ASSESSMENT & PLAN NOTE
Has upcoming appointment with Rheumatology due to feeling that she is having a lupus flare up.   Products Recommended: Elta MD UV Elements or Elta MD UV Replenish Detail Level: Generalized General Sunscreen Counseling: I recommended a broad spectrum sunscreen with a SPF of 30 or higher.  I explained that SPF 30 sunscreens block approximately 97 percent of the sun's harmful rays.  Sunscreens should be applied at least 15 minutes prior to expected sun exposure and then every 2 hours after that as long as sun exposure continues. If swimming or exercising sunscreen should be reapplied every 45 minutes to an hour after getting wet or sweating.  One ounce, or the equivalent of a shot glass full of sunscreen, is adequate to protect the skin not covered by a bathing suit. I also recommended a lip balm with a sunscreen as well. Sun protective clothing can be used in lieu of sunscreen but must be worn the entire time you are exposed to the sun's rays.

## 2025-03-25 NOTE — H&P
[facility]  History & Physical    Patient Name: Tosha Kwok  MRN: [id number]  Admission Date: 9/26/2018  Attending Physician: No att. providers found   Primary Care Provider: Cecille Oliveira MD         Patient information was obtained from patient, spouse/SO and ER records.     Subjective:     Principal Problem:Elevated d-dimer    Chief Complaint:   Chief Complaint   Patient presents with    Breast Pain     To ER with c/o bilateral breast pain.  pt states that she had breast reduction in february and has been having pain x 1 month increasing in pain daily.   states that she is scheduled for surgery soon for pain issues with Dr. Byrd        HPI: Tosha Kwok is a 53 y.o. 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  She lives in [place], [place].  She is .  Her primary care physician is Dr. Cecille Oliveira in [place].  Her rheumatologist is Dr. Gustavo Carson.  Her pain management specialist is Dr. Storm Rae.              She had been having breast pain after bilateral breast reduction surgery.  The surgeon was out of town so she went to [facility] on 9/26/18.  She takes hydrocodone-acetaminophen  mg (90 tablets filled on 9/2/18).  She also used to take hydromorphone 2 mg (90 tablets filled on 7/24/18) and fentanyl 50 mcg/hr patch (10 patches filled on 7/24/18).  She was prescribed 20 tablets of tramadol 50 mg and 9 tablets of diazepam 5 mg by an ED physician at [facility] on 9/2/18.                In [facility] ED, she was given hydromorphone 1 mg x 2, morphine 5 mg, lorazepam 1 mg IV x 2, and diphenhydramine 25 mg despite opioid tolerance and drug-seeking behavior.  Chest X-ray was unremarkable.  Due  to proximity of pain to her chest, a D-dimer was checked and was mildly elevated at 0.73.  No other labs were checked.  An EKG was not checked.  A chest CTA was ordered but peripheral IV was unable to be placed in the ED.  A creatinine was not checked, which is necessary to know prior to getting a CTA.  She was admitted to [facility] the next morning for IV placement and chest CTA.    Past Medical History:   Diagnosis Date    Ankylosing spondylitis     Anxiety     Asthma     Chronic constipation     Chronic insomnia     Edema     Fibromyalgia     Interstitial cystitis     Migraine headache     Restless legs syndrome     TIA (transient ischemic attack)        Past Surgical History:   Procedure Laterality Date    BLADDER SURGERY      BREAST SURGERY  2018    reduction     SECTION      CHOLECYSTECTOMY      HYSTERECTOMY      LASER ABLATION      to back    SLEEVE GASTROPLASTY  2016    TUBAL LIGATION         Review of patient's allergies indicates:   Allergen Reactions    Morphine Hives    Erythromycin Other (See Comments)    Other      Other reaction(s): Angioedema, carrots    Zofran (as hydrochloride) [ondansetron hcl] Hives     Local hive after IV injection       No current facility-administered medications on file prior to encounter.      Current Outpatient Medications on File Prior to Encounter   Medication Sig    HYDROcodone-acetaminophen (NORCO)  mg per tablet Take 1 tablet by mouth every 8 (eight) hours as needed for Pain.     HYDROmorphone (DILAUDID) 2 MG tablet Take 2 mg by mouth every 8 (eight) hours as needed for Pain.    zolpidem (AMBIEN) 10 mg Tab Take 10 mg by mouth nightly as needed (insomnia).    clonazepam (KLONOPIN) 0.5 MG tablet Take 0.5 mg by mouth. 1 Tablet Oral At bedtime.   [id number]    diclofenac sodium (VOLTAREN) 1 % Gel 1 %.  Gel Topical .  AAA bid    ergocalciferol (ERGOCALCIFEROL) 50,000 unit capsule Take by mouth. 1 Capsule Oral  Weekly    estradiol (ESTRACE) 0.01 % (0.1 mg/g) vaginal cream Place 1 g vaginally twice a week.    estradiol 0.1 mg/24 hr td ptwk (ESTRADIOL TRANSDERMAL PATCH) 0.1 mg/24 hr PTWK Place 1 patch onto the skin every 7 days.    lidocaine (LIDODERM) 5 %(700 mg/patch) 1 Adhesive Patch, Medicated Topical Every 12 hours.   [id number]    lisinopril (PRINIVIL,ZESTRIL) 20 MG tablet Take 20 mg by mouth once daily.    meperidine (DEMEROL) 100 MG tablet Take 20 mg by mouth every 4 (four) hours as needed for Pain.    milnacipran (SAVELLA) 50 mg Tab 1 Tablet Oral Twice a day    [facility] blue-sod phos-phsal-hyo (URIBEL) 118-10-40.8-36 mg Cap     omeprazole (PRILOSEC) 20 MG capsule Take 20 mg by mouth. 1 Capsule, Delayed Release(E.C.) Oral Every day    polyethylene glycol 1000 Powd  Powder Miscellaneous .  17g po dailydispense qs 1 month    pregabalin (LYRICA) 150 MG capsule Take 1 capsule (150 mg total) by mouth 2 (two) times daily.    promethazine (PHENERGAN) 25 MG tablet Take 25 mg by mouth. 1 Tablet Oral Every 6 hours    topiramate (TOPAMAX) 50 MG tablet TAKE 1 TABLET BY MOUTH TWICE A DAY FOR MIGRAINE PREVENTION     Family History     Problem Relation (Age of Onset)    Breast cancer Maternal Aunt    Hypertension Mother, Brother        Tobacco Use    Smoking status: Never Smoker   Substance and Sexual Activity    Alcohol use: Yes     Comment: occasionally    Drug use: No    Sexual activity: Yes     Partners: Male     Review of Systems   Unable to perform ROS: Other (Patient refused)   Constitutional: Negative for chills and fever.   Respiratory: Positive for cough. Negative for shortness of breath.    Gastrointestinal: Positive for nausea and vomiting.     Objective:     Vital Signs (Most Recent):  Temp: 98.6 °F (37 °C) (09/27/18 0444)  Pulse: 64 (09/27/18 0856)  Resp: (!) 24 (09/27/18 0856)  BP: (!) 154/82 (09/27/18 0856)  SpO2: 99 % (09/27/18 0856) Vital Signs (24h Range):  Temp:  [98.6 °F (37 °C)] 98.6 °F (37  °C)  Pulse:  [63-78] 64  Resp:  [16-24] 24  SpO2:  [97 %-100 %] 99 %  BP: (136-177)/(69-82) 154/82     Weight: 63.5 kg (140 lb)  Body mass index is 25.61 kg/m².    Physical Exam   Constitutional:   Patient refused exam   Cardiovascular: Normal rate and regular rhythm.           Significant Labs: All pertinent labs within the past 24 hours have been reviewed.    Significant Imaging: I have reviewed all pertinent imaging results/findings within the past 24 hours.    Assessment/Plan:     * Elevated d-dimer    Pt with reported chest pain. D-dimer checked only, 0.73. Pt has multiple inflammatory conditions.  -CTA  -Check BMP prior to CTA  -Check EKG  -Check troponin        Intractable pain    Drug-seeking behavior  Postoperative pain  History of sleeve gastrectomy  Fibromyalgia  Chronic, continuous use of opioids  Ankylosing spondylitis  Continue home pain management regimen. Follow up with rheumatology and surgeon.          VTE Risk Mitigation (From admission, onward)    None             Karen Kwok PA-C  Department of Hospital Medicine   [facility]   independent

## (undated) DEVICE — ELECTRODE BLADE INSULATED 1 IN

## (undated) DEVICE — BLADE SURG STAINLESS STEEL #10

## (undated) DEVICE — PAD ABD 8X10 STERILE

## (undated) DEVICE — SEE MEDLINE ITEM 157131

## (undated) DEVICE — Device

## (undated) DEVICE — SUT VICRYL PLUS 3-0 SH 18IN

## (undated) DEVICE — TRAY FOLEY 16FR INFECTION CONT

## (undated) DEVICE — MARKER SKIN STND TIP BLUE BARR

## (undated) DEVICE — EVACUATOR PENCIL SMOKE NEPTUNE

## (undated) DEVICE — SUT VICRYL PLUS 2-0 CT1 18

## (undated) DEVICE — SUT MCRYL PLUS 4-0 PS2 27IN

## (undated) DEVICE — CLOSURE SKIN STERI STRIP 1/2X4

## (undated) DEVICE — DRAPE STERI INSTRUMENT 1018

## (undated) DEVICE — SEE MEDLINE ITEM 152622

## (undated) DEVICE — ELECTRODE REM PLYHSV RETURN 9

## (undated) DEVICE — STAPLER SKIN ROTATING HEAD

## (undated) DEVICE — SPONGE DERMACEA GAUZE 4X4

## (undated) DEVICE — SEE MEDLINE ITEM 152522

## (undated) DEVICE — APPLICATOR CHLORAPREP ORN 26ML

## (undated) DEVICE — BLADE SURG STAINLESS STEEL #15

## (undated) DEVICE — SPONGE LAP 18X18 PREWASHED